# Patient Record
Sex: FEMALE | Race: WHITE | NOT HISPANIC OR LATINO | URBAN - METROPOLITAN AREA
[De-identification: names, ages, dates, MRNs, and addresses within clinical notes are randomized per-mention and may not be internally consistent; named-entity substitution may affect disease eponyms.]

---

## 2017-02-22 ENCOUNTER — APPOINTMENT (RX ONLY)
Dept: URBAN - METROPOLITAN AREA CLINIC 38 | Facility: CLINIC | Age: 57
Setting detail: DERMATOLOGY
End: 2017-02-22

## 2017-02-22 ENCOUNTER — APPOINTMENT (RX ONLY)
Dept: URBAN - METROPOLITAN AREA CLINIC 37 | Facility: CLINIC | Age: 57
Setting detail: DERMATOLOGY
End: 2017-02-22

## 2017-02-22 DIAGNOSIS — L40.0 PSORIASIS VULGARIS: ICD-10-CM | Status: INADEQUATELY CONTROLLED

## 2017-02-22 PROCEDURE — ? COUNSELING

## 2017-02-22 PROCEDURE — ? MEDICATION COUNSELING

## 2017-02-22 PROCEDURE — ? PRESCRIPTION MEDICATION MANAGEMENT

## 2017-02-22 PROCEDURE — 99213 OFFICE O/P EST LOW 20 MIN: CPT

## 2017-02-22 ASSESSMENT — LOCATION DETAILED DESCRIPTION DERM
LOCATION DETAILED: LEFT MEDIAL FRONTAL SCALP
LOCATION DETAILED: RIGHT CENTRAL FRONTAL SCALP
LOCATION DETAILED: RIGHT CENTRAL FRONTAL SCALP
LOCATION DETAILED: LEFT MEDIAL FRONTAL SCALP

## 2017-02-22 ASSESSMENT — LOCATION SIMPLE DESCRIPTION DERM
LOCATION SIMPLE: LEFT SCALP
LOCATION SIMPLE: LEFT SCALP
LOCATION SIMPLE: SCALP
LOCATION SIMPLE: SCALP

## 2017-02-22 ASSESSMENT — LOCATION ZONE DERM
LOCATION ZONE: SCALP
LOCATION ZONE: SCALP

## 2017-02-22 NOTE — HPI: RASH (PSORIASIS)
How Severe Is Your Psoriasis?: mild
Do You Have A Family History Of Psoriasis?: no
Is This A New Presentation, Or A Follow-Up?: Follow Up Psoriasis
Additional History: She is clear on the elbows

## 2017-02-22 NOTE — PROCEDURE: MIPS QUALITY
Detail Level: Generalized
Quality 110: Preventive Care And Screening: Influenza Immunization: Influenza Immunization Administered during Influenza season
Quality 226: Preventive Care And Screening: Tobacco Use: Screening And Cessation Intervention: Patient screened for tobacco and never smoked

## 2017-02-22 NOTE — PROCEDURE: PRESCRIPTION MEDICATION MANAGEMENT
Plan: Due to her history of hepatocellular carcinoma medications such as MTX as well as Soriatane are not options at present time. I would consider further discussion on Otezla which may take some time to get an authorization so PA started today. I'd like to d/w her current internist as well.
Continue Regimen: Calcipotriene, topicort for flares
Detail Level: Simple

## 2017-02-22 NOTE — PROCEDURE: PRESCRIPTION MEDICATION MANAGEMENT
Detail Level: Simple
Continue Regimen: Calcipotriene, topicort for flares
Plan: Due to her history of hepatocellular carcinoma medications such as MTX as well as Soriatane are not options at present time. I would consider further discussion on Otezla which may take some time to get an authorization so PA started today. I'd like to d/w her current internist as well.

## 2017-02-22 NOTE — PROCEDURE: MIPS QUALITY
Quality 110: Preventive Care And Screening: Influenza Immunization: Influenza Immunization Administered during Influenza season
Quality 226: Preventive Care And Screening: Tobacco Use: Screening And Cessation Intervention: Patient screened for tobacco and never smoked
Detail Level: Generalized

## 2017-07-05 ENCOUNTER — RX ONLY (OUTPATIENT)
Age: 57
Setting detail: RX ONLY
End: 2017-07-05

## 2017-07-05 RX ORDER — CALCIPOTRIENE 0.05 MG/ML
SOLUTION TOPICAL
Qty: 1 | Refills: 5 | Status: ERX

## 2017-10-09 ENCOUNTER — APPOINTMENT (RX ONLY)
Dept: URBAN - METROPOLITAN AREA CLINIC 38 | Facility: CLINIC | Age: 57
Setting detail: DERMATOLOGY
End: 2017-10-09

## 2017-10-09 ENCOUNTER — APPOINTMENT (RX ONLY)
Dept: URBAN - METROPOLITAN AREA CLINIC 37 | Facility: CLINIC | Age: 57
Setting detail: DERMATOLOGY
End: 2017-10-09

## 2017-10-09 DIAGNOSIS — D485 NEOPLASM OF UNCERTAIN BEHAVIOR OF SKIN: ICD-10-CM

## 2017-10-09 DIAGNOSIS — L40.0 PSORIASIS VULGARIS: ICD-10-CM

## 2017-10-09 PROBLEM — D48.5 NEOPLASM OF UNCERTAIN BEHAVIOR OF SKIN: Status: ACTIVE | Noted: 2017-10-09

## 2017-10-09 PROCEDURE — 11100: CPT

## 2017-10-09 PROCEDURE — 99212 OFFICE O/P EST SF 10 MIN: CPT | Mod: 25

## 2017-10-09 PROCEDURE — ? TREATMENT REGIMEN

## 2017-10-09 PROCEDURE — ? COUNSELING

## 2017-10-09 PROCEDURE — ? BIOPSY BY SHAVE METHOD

## 2017-10-09 ASSESSMENT — LOCATION SIMPLE DESCRIPTION DERM
LOCATION SIMPLE: LEFT SHOULDER
LOCATION SIMPLE: LEFT SHOULDER
LOCATION SIMPLE: POSTERIOR SCALP
LOCATION SIMPLE: POSTERIOR SCALP

## 2017-10-09 ASSESSMENT — LOCATION DETAILED DESCRIPTION DERM
LOCATION DETAILED: LEFT POSTAURICULAR SKIN
LOCATION DETAILED: LEFT POSTAURICULAR SKIN
LOCATION DETAILED: MID-OCCIPITAL SCALP
LOCATION DETAILED: RIGHT INFERIOR OCCIPITAL SCALP
LOCATION DETAILED: RIGHT INFERIOR OCCIPITAL SCALP
LOCATION DETAILED: LEFT ANTERIOR SHOULDER
LOCATION DETAILED: LEFT ANTERIOR SHOULDER
LOCATION DETAILED: MID-OCCIPITAL SCALP

## 2017-10-09 ASSESSMENT — LOCATION ZONE DERM
LOCATION ZONE: SCALP
LOCATION ZONE: ARM
LOCATION ZONE: SCALP
LOCATION ZONE: ARM

## 2017-10-09 NOTE — PROCEDURE: MIPS QUALITY
Quality 226: Preventive Care And Screening: Tobacco Use: Screening And Cessation Intervention: Patient screened for tobacco and never smoked
Quality 130: Documentation Of Current Medications In The Medical Record: Current Medications Documented
Detail Level: Generalized
Quality 110: Preventive Care And Screening: Influenza Immunization: Influenza Immunization Administered during Influenza season
Quality 431: Preventive Care And Screening: Unhealthy Alcohol Use - Screening: Patient screened for unhealthy alcohol use using a single question and scores less than 2 times per year

## 2017-10-09 NOTE — HPI: MEDICATION (OTEZLA)
How Severe Is It?: moderate
Is This A New Presentation, Or A Follow-Up?: Follow Up Otezla
Additional History: Patient's last visit was in February and we got her approved for Otezla in March of 2017. Patient states she received the Otezla from [facility] but then her insurance company switched pharmacies and was unable to receive the drug because she had to be home to sign for it. 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 Patient has breakthrough rash on her scalp.

## 2017-10-09 NOTE — PROCEDURE: BIOPSY BY SHAVE METHOD
Bill [id number] For Specimen Handling/Conveyance To Laboratory?: no
Cryotherapy Text: The wound bed was treated with cryotherapy after the biopsy was performed.
X Size Of Lesion In Cm: 0
Biopsy Type: H and E
Biopsy Method: Double edge Personna blades
Wound Care: Vaseline
Electrodesiccation And Curettage Text: The wound bed was treated with electrodesiccation and curettage after the biopsy was performed.
Curettage Text: The wound bed was treated with curettage after the biopsy was performed.
Anesthesia Type: 1% lidocaine with epinephrine
Post-Care Instructions: I reviewed with the patient in detail post-care instructions. Patient is to keep the biopsy site dry overnight, and then apply Vaseline daily until healed. Wound to be washed with gentle cleansers daily in the shower.
Billing Type: Third-Party Bill
Silver Nitrate Text: The wound bed was treated with silver nitrate after the biopsy was performed.
Electrodesiccation Text: The wound bed was treated with electrodesiccation after the biopsy was performed.
Dressing: bandage
Anesthesia Volume In Cc: 0.5
Hemostasis: Electrocautery
Notification Instructions: Patient will be notified of biopsy results. However, patient instructed to call the office if not contacted within 2 weeks. If lesion is benign they will be notified through portal or mail and contacted by phone if biopsy results require further discussion
Type Of Destruction Used: Curettage
Consent: Written consent was obtained and risks were reviewed including but not limited to scarring, infection, bleeding, scabbing, incomplete removal, nerve damage and allergy to anesthesia.
Detail Level: Detailed

## 2017-10-09 NOTE — PROCEDURE: TREATMENT REGIMEN
Action 1: Continue
Samples Given: Otezla starter pack
Continue Regimen: Otezla upon PA/any approvals being had\\n\\nRTO in 3 months for labs
Plan: Have recent lab work sent over for us to review
Detail Level: Simple

## 2017-10-09 NOTE — HPI: MEDICATION (OTEZLA)
How Severe Is It?: moderate
Is This A New Presentation, Or A Follow-Up?: Follow Up Otezla
Additional History: Patient's last visit was in February and we got her approved for Otezla in March of 2017. Patient states she received the Otezla from [facility] but then her insurance company switched pharmacies and was unable to receive the drug because she had to be home to sign for it. 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 Patient has breakthrough rash on her scalp.

## 2017-10-09 NOTE — PROCEDURE: BIOPSY BY SHAVE METHOD
Size Of Lesion In Cm: 0
Post-Care Instructions: I reviewed with the patient in detail post-care instructions. Patient is to keep the biopsy site dry overnight, and then apply Vaseline daily until healed. Wound to be washed with gentle cleansers daily in the shower.
Anesthesia Volume In Cc: 0.5
Silver Nitrate Text: The wound bed was treated with silver nitrate after the biopsy was performed.
Notification Instructions: Patient will be notified of biopsy results. However, patient instructed to call the office if not contacted within 2 weeks. If lesion is benign they will be notified through portal or mail and contacted by phone if biopsy results require further discussion
Render Post-Care Instructions In Note?: no
Cryotherapy Text: The wound bed was treated with cryotherapy after the biopsy was performed.
Electrodesiccation And Curettage Text: The wound bed was treated with electrodesiccation and curettage after the biopsy was performed.
Consent: Written consent was obtained and risks were reviewed including but not limited to scarring, infection, bleeding, scabbing, incomplete removal, nerve damage and allergy to anesthesia.
Lab Facility: [id number]
Lab: [id number]
Biopsy Type: H and E
Dressing: bandage
Wound Care: Vaseline
Curettage Text: The wound bed was treated with curettage after the biopsy was performed.
Billing Type: Third-Party Bill
Detail Level: Detailed
Biopsy Method: Double edge Personna blades
Hemostasis: Electrocautery
Anesthesia Type: 1% lidocaine with epinephrine
Type Of Destruction Used: Curettage
Electrodesiccation Text: The wound bed was treated with electrodesiccation after the biopsy was performed.

## 2017-10-09 NOTE — PROCEDURE: TREATMENT REGIMEN
Samples Given: Otezla starter pack
Action 3: Continue
Continue Regimen: Otezla upon PA/any approvals being had\\n\\nRTO in 3 months for labs
Plan: Have recent lab work sent over for us to review
Detail Level: Simple

## 2017-11-01 ENCOUNTER — RX ONLY (OUTPATIENT)
Age: 57
Setting detail: RX ONLY
End: 2017-11-01

## 2017-11-01 RX ORDER — APREMILAST 30 MG/1
TABLET, FILM COATED ORAL
Qty: 60 | Refills: 3 | Status: ERX

## 2017-11-13 ENCOUNTER — APPOINTMENT (RX ONLY)
Dept: URBAN - METROPOLITAN AREA CLINIC 38 | Facility: CLINIC | Age: 57
Setting detail: DERMATOLOGY
End: 2017-11-13

## 2017-11-13 ENCOUNTER — APPOINTMENT (RX ONLY)
Dept: URBAN - METROPOLITAN AREA CLINIC 37 | Facility: CLINIC | Age: 57
Setting detail: DERMATOLOGY
End: 2017-11-13

## 2017-11-13 DIAGNOSIS — L40.0 PSORIASIS VULGARIS: ICD-10-CM

## 2017-11-13 PROBLEM — C44.619 BASAL CELL CARCINOMA OF SKIN OF LEFT UPPER LIMB, INCLUDING SHOULDER: Status: ACTIVE | Noted: 2017-11-13

## 2017-11-13 PROCEDURE — ? TREATMENT REGIMEN

## 2017-11-13 PROCEDURE — ? COUNSELING

## 2017-11-13 PROCEDURE — 99213 OFFICE O/P EST LOW 20 MIN: CPT

## 2017-11-13 PROCEDURE — ? PRESCRIPTION

## 2017-11-13 RX ORDER — IMIQUIMOD 50 MG/G
1 CREAM TOPICAL ONCE A DAY
Qty: 1 | Refills: 0 | Status: ERX

## 2017-11-13 ASSESSMENT — LOCATION DETAILED DESCRIPTION DERM
LOCATION DETAILED: LEFT POSTAURICULAR SKIN
LOCATION DETAILED: RIGHT INFERIOR OCCIPITAL SCALP
LOCATION DETAILED: MID-OCCIPITAL SCALP
LOCATION DETAILED: LEFT POSTAURICULAR SKIN
LOCATION DETAILED: RIGHT INFERIOR OCCIPITAL SCALP
LOCATION DETAILED: MID-OCCIPITAL SCALP

## 2017-11-13 ASSESSMENT — LOCATION SIMPLE DESCRIPTION DERM
LOCATION SIMPLE: POSTERIOR SCALP
LOCATION SIMPLE: POSTERIOR SCALP

## 2017-11-13 ASSESSMENT — LOCATION ZONE DERM
LOCATION ZONE: SCALP
LOCATION ZONE: SCALP

## 2017-11-13 NOTE — PROCEDURE: MIPS QUALITY
Detail Level: Generalized
Quality 431: Preventive Care And Screening: Unhealthy Alcohol Use - Screening: Patient screened for unhealthy alcohol use using a single question and scores less than 2 times per year
Quality 110: Preventive Care And Screening: Influenza Immunization: Influenza Immunization Administered during Influenza season
Quality 226: Preventive Care And Screening: Tobacco Use: Screening And Cessation Intervention: Patient screened for tobacco and never smoked
Quality 130: Documentation Of Current Medications In The Medical Record: Current Medications Documented

## 2017-11-13 NOTE — PROCEDURE: MIPS QUALITY
Quality 431: Preventive Care And Screening: Unhealthy Alcohol Use - Screening: Patient screened for unhealthy alcohol use using a single question and scores less than 2 times per year
Detail Level: Generalized
Quality 130: Documentation Of Current Medications In The Medical Record: Current Medications Documented
Quality 110: Preventive Care And Screening: Influenza Immunization: Influenza Immunization Administered during Influenza season
Quality 226: Preventive Care And Screening: Tobacco Use: Screening And Cessation Intervention: Patient screened for tobacco and never smoked

## 2017-12-05 ENCOUNTER — RX ONLY (OUTPATIENT)
Age: 57
Setting detail: RX ONLY
End: 2017-12-05

## 2017-12-05 RX ORDER — APREMILAST 30 MG/1
TABLET, FILM COATED ORAL
Qty: 60 | Refills: 3 | Status: ERX

## 2018-02-06 ENCOUNTER — APPOINTMENT (RX ONLY)
Dept: URBAN - METROPOLITAN AREA CLINIC 38 | Facility: CLINIC | Age: 58
Setting detail: DERMATOLOGY
End: 2018-02-06

## 2018-02-06 ENCOUNTER — APPOINTMENT (RX ONLY)
Dept: URBAN - METROPOLITAN AREA CLINIC 37 | Facility: CLINIC | Age: 58
Setting detail: DERMATOLOGY
End: 2018-02-06

## 2018-02-06 DIAGNOSIS — D18.0 HEMANGIOMA: ICD-10-CM

## 2018-02-06 DIAGNOSIS — L71.8 OTHER ROSACEA: ICD-10-CM

## 2018-02-06 DIAGNOSIS — L57.3 POIKILODERMA OF CIVATTE: ICD-10-CM

## 2018-02-06 DIAGNOSIS — L81.4 OTHER MELANIN HYPERPIGMENTATION: ICD-10-CM

## 2018-02-06 DIAGNOSIS — D485 NEOPLASM OF UNCERTAIN BEHAVIOR OF SKIN: ICD-10-CM

## 2018-02-06 DIAGNOSIS — L40.0 PSORIASIS VULGARIS: ICD-10-CM

## 2018-02-06 PROBLEM — D48.5 NEOPLASM OF UNCERTAIN BEHAVIOR OF SKIN: Status: ACTIVE | Noted: 2018-02-06

## 2018-02-06 PROBLEM — D18.01 HEMANGIOMA OF SKIN AND SUBCUTANEOUS TISSUE: Status: ACTIVE | Noted: 2018-02-06

## 2018-02-06 PROCEDURE — 69100 BIOPSY OF EXTERNAL EAR: CPT

## 2018-02-06 PROCEDURE — ? INJECTION

## 2018-02-06 PROCEDURE — ? TREATMENT REGIMEN

## 2018-02-06 PROCEDURE — ? PRESCRIPTION

## 2018-02-06 PROCEDURE — ? COUNSELING

## 2018-02-06 PROCEDURE — ? BIOPSY BY SHAVE METHOD

## 2018-02-06 PROCEDURE — 96372 THER/PROPH/DIAG INJ SC/IM: CPT | Mod: 59

## 2018-02-06 PROCEDURE — 99214 OFFICE O/P EST MOD 30 MIN: CPT | Mod: 25

## 2018-02-06 RX ORDER — CLOBETASOL PROPIONATE 0.5 MG/G
CREAM TOPICAL BID
Qty: 1 | Refills: 0 | Status: ERX

## 2018-02-06 ASSESSMENT — LOCATION SIMPLE DESCRIPTION DERM
LOCATION SIMPLE: UPPER BACK
LOCATION SIMPLE: CHEST
LOCATION SIMPLE: RIGHT CHEEK
LOCATION SIMPLE: RIGHT CHEEK
LOCATION SIMPLE: SCALP
LOCATION SIMPLE: CHEST
LOCATION SIMPLE: POSTERIOR SCALP
LOCATION SIMPLE: LEFT CHEEK
LOCATION SIMPLE: ABDOMEN
LOCATION SIMPLE: RIGHT EAR
LOCATION SIMPLE: LEFT UPPER BACK
LOCATION SIMPLE: LEFT UPPER BACK
LOCATION SIMPLE: LEFT CHEEK
LOCATION SIMPLE: POSTERIOR SCALP
LOCATION SIMPLE: LEFT ELBOW
LOCATION SIMPLE: RIGHT EAR
LOCATION SIMPLE: UPPER BACK
LOCATION SIMPLE: ABDOMEN
LOCATION SIMPLE: LEFT ELBOW
LOCATION SIMPLE: SCALP

## 2018-02-06 ASSESSMENT — LOCATION DETAILED DESCRIPTION DERM
LOCATION DETAILED: PERIUMBILICAL SKIN
LOCATION DETAILED: RIGHT CENTRAL MALAR CHEEK
LOCATION DETAILED: LEFT INFERIOR OCCIPITAL SCALP
LOCATION DETAILED: RIGHT CENTRAL POSTAURICULAR SKIN
LOCATION DETAILED: LEFT CENTRAL MALAR CHEEK
LOCATION DETAILED: MIDDLE STERNUM
LOCATION DETAILED: LEFT INFERIOR OCCIPITAL SCALP
LOCATION DETAILED: STERNAL NOTCH
LOCATION DETAILED: RIGHT SUPERIOR HELIX
LOCATION DETAILED: LEFT INFERIOR PARIETAL SCALP
LOCATION DETAILED: RIGHT LATERAL SUPERIOR CHEST
LOCATION DETAILED: RIGHT SUPERIOR HELIX
LOCATION DETAILED: LEFT SUPERIOR POSTAURICULAR SKIN
LOCATION DETAILED: LEFT ELBOW
LOCATION DETAILED: STERNAL NOTCH
LOCATION DETAILED: LEFT SUPERIOR UPPER BACK
LOCATION DETAILED: LEFT INFERIOR PARIETAL SCALP
LOCATION DETAILED: RIGHT CENTRAL POSTAURICULAR SKIN
LOCATION DETAILED: MIDDLE STERNUM
LOCATION DETAILED: LEFT SUPERIOR UPPER BACK
LOCATION DETAILED: LEFT SUPERIOR OCCIPITAL SCALP
LOCATION DETAILED: RIGHT CENTRAL MALAR CHEEK
LOCATION DETAILED: LEFT ELBOW
LOCATION DETAILED: SUPERIOR THORACIC SPINE
LOCATION DETAILED: SUPERIOR THORACIC SPINE
LOCATION DETAILED: LEFT SUPERIOR POSTAURICULAR SKIN
LOCATION DETAILED: LEFT CENTRAL MALAR CHEEK
LOCATION DETAILED: PERIUMBILICAL SKIN
LOCATION DETAILED: LEFT SUPERIOR OCCIPITAL SCALP
LOCATION DETAILED: RIGHT LATERAL SUPERIOR CHEST

## 2018-02-06 ASSESSMENT — LOCATION ZONE DERM
LOCATION ZONE: TRUNK
LOCATION ZONE: FACE
LOCATION ZONE: FACE
LOCATION ZONE: ARM
LOCATION ZONE: SCALP
LOCATION ZONE: EAR
LOCATION ZONE: ARM
LOCATION ZONE: TRUNK
LOCATION ZONE: SCALP
LOCATION ZONE: EAR

## 2018-02-06 NOTE — PROCEDURE: INJECTION
Treatment Number: 0
Detail Level: Detailed
Render J-Code Information In Note?: yes
Procedure Information: Please note that the numeric value listed in the Medication (1) and associated J-code units and Medication (2) and associated J-code units variables are j-code amounts and do not represent either the concentration or the total amount of the medications injected.  I strongly recommend selecting no to the Render J-code information in note question. This will allow your note to be more clear. If you are billing j-codes with your injection codes you need to document the total amount of the medication injected. This amount should match the j-code units. For example, if you are injecting Triamcinolone 40mg as an intramuscular injection you would select 40 for the dose field and mg for the units. This would allow you to document  with 4 units (40mg = 10mg x 4). The total volume is not used to calculate j-codes only the amount of the medication administered.
Expiration Date (Optional): Jan 2019
Units: mg
Total Volume Injected In Cc (Will Not Affected Billing): 0.6
Consent: The risks of the medication was reviewed with the patient.
Lot # (Optional): [id number]
Medication (1) And Associated J-Code Units: Triamcinolone diacetate, 5mg
Post-Care Instructions: I reviewed with the patient in detail post-care instructions. Patient understands to keep the injection sites clean and call the clinic if there is any redness, swelling or pain.
Route: IM

## 2018-02-06 NOTE — PROCEDURE: BIOPSY BY SHAVE METHOD
Lab: [id number]
Cryotherapy Text: The wound bed was treated with cryotherapy after the biopsy was performed.
Size Of Lesion In Cm: 0
Biopsy Method: Personna blade
Wound Care: Vaseline
Hemostasis: Electrocautery
Notification Instructions: Patient will be notified of biopsy results. However, patient instructed to call the office if not contacted within 2 weeks. If lesion is benign they will be notified through portal or mail and contacted by phone if biopsy results require further discussion
Anesthesia Volume In Cc: 0.5
Consent: Written consent was obtained and risks were reviewed including but not limited to scarring, infection, bleeding, scabbing, incomplete removal, nerve damage and allergy to anesthesia.
Type Of Destruction Used: Curettage
Biopsy Type: H and E
Bill [id number] For Specimen Handling/Conveyance To Laboratory?: no
Billing Type: Third-Party Bill
Lab Facility: [id number]
Electrodesiccation And Curettage Text: The wound bed was treated with electrodesiccation and curettage after the biopsy was performed.
Silver Nitrate Text: The wound bed was treated with silver nitrate after the biopsy was performed.
Post-Care Instructions: I reviewed with the patient in detail post-care instructions. Patient is to keep the biopsy site dry overnight, and then apply Vaseline daily until healed. Wound to be washed with gentle cleansers daily in the shower.
Curettage Text: The wound bed was treated with curettage after the biopsy was performed.
Anesthesia Type: 1% lidocaine with epinephrine
Dressing: bandage
Electrodesiccation Text: The wound bed was treated with electrodesiccation after the biopsy was performed.
Detail Level: Detailed

## 2018-02-06 NOTE — PROCEDURE: BIOPSY BY SHAVE METHOD
Biopsy Method: Personna blade
Electrodesiccation And Curettage Text: The wound bed was treated with electrodesiccation and curettage after the biopsy was performed.
Destruction After The Procedure: No
Size Of Lesion In Cm: 0
Anesthesia Volume In Cc: 0.5
Hemostasis: Electrocautery
Silver Nitrate Text: The wound bed was treated with silver nitrate after the biopsy was performed.
Cryotherapy Text: The wound bed was treated with cryotherapy after the biopsy was performed.
Consent: Written consent was obtained and risks were reviewed including but not limited to scarring, infection, bleeding, scabbing, incomplete removal, nerve damage and allergy to anesthesia.
Curettage Text: The wound bed was treated with curettage after the biopsy was performed.
Anesthesia Type: 1% lidocaine with epinephrine
Biopsy Type: H and E
Billing Type: Third-Party Bill
Post-Care Instructions: I reviewed with the patient in detail post-care instructions. Patient is to keep the biopsy site dry overnight, and then apply Vaseline daily until healed. Wound to be washed with gentle cleansers daily in the shower.
Detail Level: Detailed
Type Of Destruction Used: Curettage
Wound Care: Vaseline
Dressing: bandage
Notification Instructions: Patient will be notified of biopsy results. However, patient instructed to call the office if not contacted within 2 weeks. If lesion is benign they will be notified through portal or mail and contacted by phone if biopsy results require further discussion
Electrodesiccation Text: The wound bed was treated with electrodesiccation after the biopsy was performed.

## 2018-02-06 NOTE — PROCEDURE: TREATMENT REGIMEN
Action 4: Continue
Detail Level: Simple
Continue Regimen: Otezla
Initiate Regimen: Clobeatsol
Plan: ILK provided today to some smaller plaques that have been very persistent. Patient's medical history does complicate her systemic therapy options (history of liver cancer removes use of MTX or soriatane) and she doesn't want biologics. We will see if ILK provides comfort of the persistent lesions as she has had stability with Otezla in the past keeping her clear.  She has done several rounds of xtrac and defers repeated treatments at this time

## 2018-02-06 NOTE — PROCEDURE: MIPS QUALITY
Quality 110: Preventive Care And Screening: Influenza Immunization: Influenza Immunization Administered during Influenza season
Quality 130: Documentation Of Current Medications In The Medical Record: Current Medications Documented
Quality 226: Preventive Care And Screening: Tobacco Use: Screening And Cessation Intervention: Patient screened for tobacco and never smoked
Detail Level: Generalized
Quality 431: Preventive Care And Screening: Unhealthy Alcohol Use - Screening: Patient screened for unhealthy alcohol use using a single question and scores less than 2 times per year

## 2018-02-06 NOTE — HPI: RASH
How Severe Is Your Rash?: mild
Is This A New Presentation, Or A Follow-Up?: Rash
Additional History: Pt is here for full body skin exam. She states the rash is her main concern today. She was using an antibiotic cream but stopped using it

## 2018-02-06 NOTE — PROCEDURE: INJECTION
Total Volume Injected In Cc (Will Not Affected Billing): 0.6
Detail Level: Detailed
Dose Administered (Numbers Only - Mg, G, Mcg, Units, Cc): 0
Expiration Date (Optional): Jan 2019
Consent: The risks of the medication was reviewed with the patient.
Post-Care Instructions: I reviewed with the patient in detail post-care instructions. Patient understands to keep the injection sites clean and call the clinic if there is any redness, swelling or pain.
Procedure Information: Please note that the numeric value listed in the Medication (1) and associated J-code units and Medication (2) and associated J-code units variables are j-code amounts and do not represent either the concentration or the total amount of the medications injected.  I strongly recommend selecting no to the Render J-code information in note question. This will allow your note to be more clear. If you are billing j-codes with your injection codes you need to document the total amount of the medication injected. This amount should match the j-code units. For example, if you are injecting Triamcinolone 40mg as an intramuscular injection you would select 40 for the dose field and mg for the units. This would allow you to document  with 4 units (40mg = 10mg x 4). The total volume is not used to calculate j-codes only the amount of the medication administered.
Bill J-Code: yes
Units: mg
Lot # (Optional): [id number]
Route: IM
Medication (1) And Associated J-Code Units: Triamcinolone diacetate, 5mg

## 2018-02-06 NOTE — PROCEDURE: TREATMENT REGIMEN
Detail Level: Simple
Continue Regimen: Otezla
Action 1: Continue
Initiate Regimen: Clobeatsol
Plan: ILK provided today to some smaller plaques that have been very persistent. Patient's medical history does complicate her systemic therapy options (history of liver cancer removes use of MTX or soriatane) and she doesn't want biologics. We will see if ILK provides comfort of the persistent lesions as she has had stability with Otezla in the past keeping her clear.  She has done several rounds of xtrac and defers repeated treatments at this time

## 2018-02-20 ENCOUNTER — APPOINTMENT (RX ONLY)
Dept: URBAN - METROPOLITAN AREA CLINIC 37 | Facility: CLINIC | Age: 58
Setting detail: DERMATOLOGY
End: 2018-02-20

## 2018-02-20 ENCOUNTER — RX ONLY (OUTPATIENT)
Age: 58
Setting detail: RX ONLY
End: 2018-02-20

## 2018-02-20 ENCOUNTER — APPOINTMENT (RX ONLY)
Dept: URBAN - METROPOLITAN AREA CLINIC 38 | Facility: CLINIC | Age: 58
Setting detail: DERMATOLOGY
End: 2018-02-20

## 2018-02-20 PROBLEM — C44.212 BASAL CELL CARCINOMA OF SKIN OF RIGHT EAR AND EXTERNAL AURICULAR CANAL: Status: ACTIVE | Noted: 2018-02-20

## 2018-02-20 PROCEDURE — 17312 MOHS ADDL STAGE: CPT

## 2018-02-20 PROCEDURE — 17311 MOHS 1 STAGE H/N/HF/G: CPT

## 2018-02-20 PROCEDURE — ? MOHS SURGERY

## 2018-02-20 PROCEDURE — 13152 CMPLX RPR E/N/E/L 2.6-7.5 CM: CPT

## 2018-02-20 PROCEDURE — ? PRESCRIPTION

## 2018-02-20 RX ORDER — CEPHALEXIN 500 MG/1
CAPSULE ORAL TWICE A DAY
Qty: 14 | Refills: 0 | Status: ERX

## 2018-02-20 NOTE — PROCEDURE: MOHS SURGERY
Interpolation Flap Text: A decision was made to reconstruct the defect utilizing an interpolation axial flap and a staged reconstruction.  A telfa template was made of the defect.  This telfa template was then used to outline the interpolation flap.  The donor area for the pedicle flap was then injected with anesthesia.  The flap was excised through the skin and subcutaneous tissue down to the layer of the underlying musculature.  The interpolation flap was carefully excised within this deep plane to maintain its blood supply.  The edges of the donor site were undermined.   The donor site was closed in a primary fashion.  The pedicle was then rotated into position and sutured.  Once the tube was sutured into place, adequate blood supply was confirmed with blanching and refill.  The pedicle was then wrapped with xeroform gauze and dressed appropriately with a telfa and gauze bandage to ensure continued blood supply and protect the attached pedicle.
Dorsal Nasal Flap Text: The defect edges were debeveled with a #15 scalpel blade.  Given the location of the defect and the proximity to free margins a dorsal nasal flap was deemed most appropriate.  Using a sterile surgical marker, an appropriate dorsal nasal flap was drawn around the defect.    The area thus outlined was incised deep to adipose tissue with a #15 scalpel blade.  The skin margins were undermined to an appropriate distance in all directions utilizing iris scissors.
Mucosal Advancement Flap Text: Given the location of the defect, shape of the defect and the proximity to free margins a mucosal advancement flap was deemed most appropriate. Incisions were made with a 15 blade scalpel in the appropriate fashion along the cutaneous vermilion border and the mucosal lip. The remaining actinically damaged mucosal tissue was excised.  The mucosal advancement flap was then elevated to the gingival sulcus with care taken to preserve the neurovascular structures and advanced into the primary defect. Care was taken to ensure that precise realignment of the vermilion border was achieved.
Hatchet Flap Text: The defect edges were debeveled with a #15 scalpel blade.  Given the location of the defect, shape of the defect and the proximity to free margins a hatchet flap was deemed most appropriate.  Using a sterile surgical marker, an appropriate hatchet flap was drawn incorporating the defect and placing the expected incisions within the relaxed skin tension lines where possible.    The area thus outlined was incised deep to adipose tissue with a #15 scalpel blade.  The skin margins were undermined to an appropriate distance in all directions utilizing iris scissors.
Localized Dermabrasion With Wire Brush Text: The patient was draped in routine manner.  Localized dermabrasion using 3 x 17 mm wire brush was performed in routine manner to papillary dermis. This spot dermabrasion is being performed to complete skin cancer reconstruction. It also will eliminate the other sun damaged precancerous cells that are known to be part of the regional effect of a lifetime's worth of sun exposure. This localized dermabrasion is therapeutic and should not be considered cosmetic in any regard.
Alternatives Discussed Intro (Do Not Add Period): I discussed alternative treatments to Mohs surgery and specifically discussed the risks and benefits of
Modified Advancement Flap Text: The defect edges were debeveled with a #15 scalpel blade.  Given the location of the defect, shape of the defect and the proximity to free margins a modified advancement flap was deemed most appropriate.  Using a sterile surgical marker, an appropriate advancement flap was drawn incorporating the defect and placing the expected incisions within the relaxed skin tension lines where possible.    The area thus outlined was incised deep to adipose tissue with a #15 scalpel blade.  The skin margins were undermined to an appropriate distance in all directions utilizing iris scissors.
Stage 6: Additional Anesthesia Type: 1% lidocaine with epinephrine
Quadrants Positive?: 0
Crescentic Complex Repair Preamble Text (Leave Blank If You Do Not Want): Extensive wide undermining was performed.
Partial Purse String (Intermediate) Text: Given the location of the defect and the characteristics of the surrounding skin an intermediate purse string closure was deemed most appropriate.  Undermining was performed circumfirentially around the surgical defect.  A purse string suture was then placed and tightened. Wound tension only allowed a partial closure of the circular defect.
Complex Repair And Flap Additional Text (Will Appearing After The Standard Complex Repair Text): The complex repair was not sufficient to completely close the primary defect. The remaining additional defect was repaired with the flap mentioned below.
Primary Defect Length In Cm (Final Defect Size - Required For Flaps/Grafts): 1.7
Keystone Flap Text: The defect edges were debeveled with a #15 scalpel blade.  Given the location of the defect, shape of the defect a keystone flap was deemed most appropriate.  Using a sterile surgical marker, an appropriate keystone flap was drawn incorporating the defect, outlining the appropriate donor tissue and placing the expected incisions within the relaxed skin tension lines where possible. The area thus outlined was incised deep to adipose tissue with a #15 scalpel blade.  The skin margins were undermined to an appropriate distance in all directions around the primary defect and laterally outward around the flap utilizing iris scissors.
Tarsorrhaphy Performed?: No
Helical Rim Advancement Flap Text: The defect edges were debeveled with a #15 blade scalpel.  Given the location of the defect and the proximity to free margins (helical rim) a double helical rim advancement flap was deemed most appropriate.  Using a sterile surgical marker, the appropriate advancement flaps were drawn incorporating the defect and placing the expected incisions between the helical rim and antihelix where possible.  The area thus outlined was incised through and through with a #15 scalpel blade.  With a skin hook and iris scissors, the flaps were gently and sharply undermined and freed up.
Crescentic Intermediate Repair Preamble Text (Leave Blank If You Do Not Want): Undermining was performed with blunt dissection.
Area M Indication Text: Tumors in this location are included in Area M (cheek, forehead, scalp, neck, jawline and pretibial skin).  Mohs surgery is indicated for tumors in these anatomic locations.
O-L Flap Text: The defect edges were debeveled with a #15 scalpel blade.  Given the location of the defect, shape of the defect and the proximity to free margins an O-L flap was deemed most appropriate.  Using a sterile surgical marker, an appropriate advancement flap was drawn incorporating the defect and placing the expected incisions within the relaxed skin tension lines where possible.    The area thus outlined was incised deep to adipose tissue with a #15 scalpel blade.  The skin margins were undermined to an appropriate distance in all directions utilizing iris scissors.
Bcc Infiltrative Histology Text: There were numerous aggregates of basaloid cells demonstrating an infiltrative pattern.
V-Y Plasty Text: The defect edges were debeveled with a #15 scalpel blade.  Given the location of the defect, shape of the defect and the proximity to free margins an V-Y advancement flap was deemed most appropriate.  Using a sterile surgical marker, an appropriate advancement flap was drawn incorporating the defect and placing the expected incisions within the relaxed skin tension lines where possible.    The area thus outlined was incised deep to adipose tissue with a #15 scalpel blade.  The skin margins were undermined to an appropriate distance in all directions utilizing iris scissors.
Use Separate Skin Prep For Stages And Repair: Yes
Graft Donor Site Dermal Sutures (Optional): 4-0 Vicryl
Home Suture Removal Text: Patient was provided instructions on removing sutures and will remove their sutures at home.  If they have any questions or difficulties they will call the office.
Consent (Spinal Accessory)/Introductory Paragraph: The rationale for Mohs was explained to the patient and consent was obtained. The risks, benefits and alternatives to therapy were discussed in detail. Specifically, the risks of damage to the spinal accessory nerve, infection, scarring, bleeding, prolonged wound healing, incomplete removal, allergy to anesthesia, and recurrence were addressed. Prior to the procedure, the treatment site was clearly identified and confirmed by the patient. All components of Universal Protocol/PAUSE Rule completed.
Purse String (Intermediate) Text: Given the location of the defect and the characteristics of the surrounding skin a pursestring intermediate closure was deemed most appropriate.  Undermining was performed circumfirentially around the surgical defect.  A purstring suture was then placed and tightened.
Same Histology In Subsequent Stages Text: The pattern and morphology of the tumor is as described in the first stage.
Consent (Scalp)/Introductory Paragraph: The rationale for Mohs was explained to the patient and consent was obtained. The risks, benefits and alternatives to therapy were discussed in detail. Specifically, the risks of changes in hair growth pattern secondary to repair, infection, scarring, bleeding, prolonged wound healing, incomplete removal, allergy to anesthesia, nerve injury and recurrence were addressed. Prior to the procedure, the treatment site was clearly identified and confirmed by the patient. All components of Universal Protocol/PAUSE Rule completed.
Location Indication Override (Is Already Calculated Based On Selected Body Location): Area H
Repair Performed By Another Provider Text (Leave Blank If You Do Not Want): After obtaining clear surgical margins the defect was repaired by another provider.
Post-Care Instructions: I reviewed with the patient in detail post-care instructions. Patient is not to engage in any heavy lifting, exercise, or swimming for the next 7 days. Should the patient develop any fevers, chills, bleeding, severe pain patient will contact the office immediately.
Bilobed Flap Text: The defect edges were debeveled with a #15 scalpel blade.  Given the location of the defect and the proximity to free margins a bilobe flap was deemed most appropriate.  Using a sterile surgical marker, an appropriate bilobe flap drawn around the defect.    The area thus outlined was incised deep to adipose tissue with a #15 scalpel blade.  The skin margins were undermined to an appropriate distance in all directions utilizing iris scissors.
Melolabial Transposition Flap Text: The defect edges were debeveled with a #15 scalpel blade.  Given the location of the defect and the proximity to free margins a melolabial flap was deemed most appropriate.  Using a sterile surgical marker, an appropriate melolabial transposition flap was drawn incorporating the defect.    The area thus outlined was incised deep to adipose tissue with a #15 scalpel blade.  The skin margins were undermined to an appropriate distance in all directions utilizing iris scissors.
Anesthesia Volume In Cc: 3
Consent (Nose)/Introductory Paragraph: The rationale for Mohs was explained to the patient and consent was obtained. The risks, benefits and alternatives to therapy were discussed in detail. Specifically, the risks of nasal deformity, changes in the flow of air through the nose, infection, scarring, bleeding, prolonged wound healing, incomplete removal, allergy to anesthesia, nerve injury and recurrence were addressed. Prior to the procedure, the treatment site was clearly identified and confirmed by the patient. All components of Universal Protocol/PAUSE Rule completed.
Muscle Hinge Flap Text: The defect edges were debeveled with a #15 scalpel blade.  Given the size, depth and location of the defect and the proximity to free margins a muscle hinge flap was deemed most appropriate.  Using a sterile surgical marker, an appropriate hinge flap was drawn incorporating the defect. The area thus outlined was incised with a #15 scalpel blade.  The skin margins were undermined to an appropriate distance in all directions utilizing iris scissors.
Rotation Flap Text: The defect edges were debeveled with a #15 scalpel blade.  Given the location of the defect, shape of the defect and the proximity to free margins a rotation flap was deemed most appropriate.  Using a sterile surgical marker, an appropriate rotation flap was drawn incorporating the defect and placing the expected incisions within the relaxed skin tension lines where possible.    The area thus outlined was incised deep to adipose tissue with a #15 scalpel blade.  The skin margins were undermined to an appropriate distance in all directions utilizing iris scissors.
Bi-Rhombic Flap Text: The defect edges were debeveled with a #15 scalpel blade.  Given the location of the defect and the proximity to free margins a bi-rhombic flap was deemed most appropriate.  Using a sterile surgical marker, an appropriate rhombic flap was drawn incorporating the defect. The area thus outlined was incised deep to adipose tissue with a #15 scalpel blade.  The skin margins were undermined to an appropriate distance in all directions utilizing iris scissors.
Surgeon/Pathologist Verbiage (Will Incorporate Name Of Surgeon From Intro If Not Blank): operated in two distinct and integrated capacities as the surgeon and pathologist.
Advancement Flap (Double) Text: The defect edges were debeveled with a #15 scalpel blade.  Given the location of the defect and the proximity to free margins a double advancement flap was deemed most appropriate.  Using a sterile surgical marker, the appropriate advancement flaps were drawn incorporating the defect and placing the expected incisions within the relaxed skin tension lines where possible.    The area thus outlined was incised deep to adipose tissue with a #15 scalpel blade.  The skin margins were undermined to an appropriate distance in all directions utilizing iris scissors.
Advancement-Rotation Flap Text: The defect edges were debeveled with a #15 scalpel blade.  Given the location of the defect, shape of the defect and the proximity to free margins an advancement-rotation flap was deemed most appropriate.  Using a sterile surgical marker, an appropriate flap was drawn incorporating the defect and placing the expected incisions within the relaxed skin tension lines where possible. The area thus outlined was incised deep to adipose tissue with a #15 scalpel blade.  The skin margins were undermined to an appropriate distance in all directions utilizing iris scissors.
Stage 1: Number Of Blocks?: 2
No Residual Tumor Seen Histology Text: There were no malignant cells seen in the sections examined.
Ear Wedge Repair Text: A wedge excision was completed by carrying down an excision through the full thickness of the ear and cartilage with an inward facing Burow's triangle. The wound was then closed in a layered fashion.
Cheek-To-Nose Interpolation Flap Text: A decision was made to reconstruct the defect utilizing an interpolation axial flap and a staged reconstruction.  A telfa template was made of the defect.  This telfa template was then used to outline the Cheek-To-Nose Interpolation flap.  The donor area for the pedicle flap was then injected with anesthesia.  The flap was excised through the skin and subcutaneous tissue down to the layer of the underlying musculature.  The interpolation flap was carefully excised within this deep plane to maintain its blood supply.  The edges of the donor site were undermined.   The donor site was closed in a primary fashion.  The pedicle was then rotated into position and sutured.  Once the tube was sutured into place, adequate blood supply was confirmed with blanching and refill.  The pedicle was then wrapped with xeroform gauze and dressed appropriately with a telfa and gauze bandage to ensure continued blood supply and protect the attached pedicle.
Wound Care: Petrolatum
Medical Necessity Statement: Based on my medical judgement, Mohs surgery is the most appropriate treatment for this cancer compared to other treatments.
S Plasty Text: Given the location and shape of the defect, and the orientation of relaxed skin tension lines, an S-plasty was deemed most appropriate for repair.  Using a sterile surgical marker, the appropriate outline of the S-plasty was drawn, incorporating the defect and placing the expected incisions within the relaxed skin tension lines where possible.  The area thus outlined was incised deep to adipose tissue with a #15 scalpel blade.  The skin margins were undermined to an appropriate distance in all directions utilizing iris scissors. The skin flaps were advanced over the defect.  The opposing margins were then approximated with interrupted buried subcutaneous sutures.
Rhombic Flap Text: The defect edges were debeveled with a #15 scalpel blade.  Given the location of the defect and the proximity to free margins a rhombic flap was deemed most appropriate.  Using a sterile surgical marker, an appropriate rhombic flap was drawn incorporating the defect.    The area thus outlined was incised deep to adipose tissue with a #15 scalpel blade.  The skin margins were undermined to an appropriate distance in all directions utilizing iris scissors.
Cheiloplasty (Complex) Text: A decision was made to reconstruct the defect with a  cheiloplasty.  The defect was undermined extensively.  Additional obicularis oris muscle was excised with a 15 blade scalpel.  The defect was converted into a full thickness wedge to facilite a better cosmetic result.  Small vessels were then tied off with 5-0 monocyrl. The obicularis oris, superficial fascia, adipose and dermis were then reapproximated.  After the deeper layers were approximated the epidermis was reapproximated with particular care given to realign the vermilion border.
Postop Diagnosis: same
Composite Graft Text: The defect edges were debeveled with a #15 scalpel blade.  Given the location of the defect, shape of the defect, the proximity to free margins and the fact the defect was full thickness a composite graft was deemed most appropriate.  The defect was outline and then transferred to the donor site.  A full thickness graft was then excised from the donor site. The graft was then placed in the primary defect, oriented appropriately and then sutured into place.  The secondary defect was then repaired using a primary closure.
Undermining Location (Optional): in the deep fat
Ftsg Text: The defect edges were debeveled with a #15 scalpel blade.  Given the location of the defect, shape of the defect and the proximity to free margins a full thickness skin graft was deemed most appropriate.  Using a sterile surgical marker, the primary defect shape was transferred to the donor site. The area thus outlined was incised deep to adipose tissue with a #15 scalpel blade.  The harvested graft was then trimmed of adipose tissue until only dermis and epidermis was left.  The skin margins of the secondary defect were undermined to an appropriate distance in all directions utilizing iris scissors.  The secondary defect was closed with interrupted buried subcutaneous sutures.  The skin edges were then re-apposed with running  sutures.  The skin graft was then placed in the primary defect and oriented appropriately.
Mohs Rapid Report Verbiage: The area of clinically evident tumor was marked with skin marking ink and appropriately hatched.  The initial incision was made following the Mohs approach through the skin.  The specimen was taken to the lab, divided into the necessary number of pieces, chromacoded and processed according to the Mohs protocol.  This was repeated in successive stages until a tumor free defect was achieved.
W Plasty Text: The lesion was extirpated to the level of the fat with a #15 scalpel blade.  Given the location of the defect, shape of the defect and the proximity to free margins a W-plasty was deemed most appropriate for repair.  Using a sterile surgical marker, the appropriate transposition arms of the W-plasty were drawn incorporating the defect and placing the expected incisions within the relaxed skin tension lines where possible.    The area thus outlined was incised deep to adipose tissue with a #15 scalpel blade.  The skin margins were undermined to an appropriate distance in all directions utilizing iris scissors.  The opposing transposition arms were then transposed into place in opposite direction and anchored with interrupted buried subcutaneous sutures.
Split-Thickness Skin Graft Text: The defect edges were debeveled with a #15 scalpel blade.  Given the location of the defect, shape of the defect and the proximity to free margins a split thickness skin graft was deemed most appropriate.  Using a sterile surgical marker, the primary defect shape was transferred to the donor site. The split thickness graft was then harvested.  The skin graft was then placed in the primary defect and oriented appropriately.
Ear Star Wedge Flap Text: The defect edges were debeveled with a #15 blade scalpel.  Given the location of the defect and the proximity to free margins (helical rim) an ear star wedge flap was deemed most appropriate.  Using a sterile surgical marker, the appropriate flap was drawn incorporating the defect and placing the expected incisions between the helical rim and antihelix where possible.  The area thus outlined was incised through and through with a #15 scalpel blade.
Mauc Instructions: By selecting yes to the question below the MAUC number will be added into the note.  This will be calculated automatically based on the diagnosis chosen, the size entered, the body zone selected (H,M,L) and the specific indications you chose. You will also have the option to override the Mohs AUC if you disagree with the automatically calculated number and this option is found in the Case Summary tab.
Closure 3 Information: This tab is for additional flaps and grafts above and beyond our usual structured repairs.  Please note if you enter information here it will not currently bill and you will need to add the billing information manually.
Dressing (No Sutures): dry sterile dressing
Consent (Ear)/Introductory Paragraph: The rationale for Mohs was explained to the patient and consent was obtained. The risks, benefits and alternatives to therapy were discussed in detail. Specifically, the risks of ear deformity, infection, scarring, bleeding, prolonged wound healing, incomplete removal, allergy to anesthesia, nerve injury and recurrence were addressed. Prior to the procedure, the treatment site was clearly identified and confirmed by the patient. All components of Universal Protocol/PAUSE Rule completed.
Complex Repair And Graft Additional Text (Will Appearing After The Standard Complex Repair Text): The complex repair was not sufficient to completely close the primary defect. The remaining additional defect was repaired with the graft mentioned below.
O-T Plasty Text: The defect edges were debeveled with a #15 scalpel blade.  Given the location of the defect, shape of the defect and the proximity to free margins an O-T plasty was deemed most appropriate.  Using a sterile surgical marker, an appropriate O-T plasty was drawn incorporating the defect and placing the expected incisions within the relaxed skin tension lines where possible.    The area thus outlined was incised deep to adipose tissue with a #15 scalpel blade.  The skin margins were undermined to an appropriate distance in all directions utilizing iris scissors.
Additional Anesthesia Volume In Cc: 6
Eye Protection Verbiage: Before proceeding with the stage, a plastic scleral shield was inserted. The globe was anesthetized with a few drops of 1% lidocaine with 1:100,000 epinephrine. Then, an appropriate sized scleral shield was chosen and coated with lacrilube ointment. The shield was gently inserted and left in place for the duration of each stage. After the stage was completed, the shield was gently removed.
Surgeon: Edgar Mccartney MD
Stage 4: Additional Anesthesia Type: 1% lidocaine, 0.5% Marcaine, 1:100,000 epinephrine and 8.4% sodium bicarbonate
Island Pedicle Flap-Requiring Vessel Identification Text: The defect edges were debeveled with a #15 scalpel blade.  Given the location of the defect, shape of the defect and the proximity to free margins an island pedicle advancement flap was deemed most appropriate.  Using a sterile surgical marker, an appropriate advancement flap was drawn, based on the axial vessel mentioned above, incorporating the defect, outlining the appropriate donor tissue and placing the expected incisions within the relaxed skin tension lines where possible.    The area thus outlined was incised deep to adipose tissue with a #15 scalpel blade.  The skin margins were undermined to an appropriate distance in all directions around the primary defect and laterally outward around the island pedicle utilizing iris scissors.  There was minimal undermining beneath the pedicle flap.
Mastoid Interpolation Flap Text: A decision was made to reconstruct the defect utilizing an interpolation axial flap and a staged reconstruction.  A telfa template was made of the defect.  This telfa template was then used to outline the mastoid interpolation flap.  The donor area for the pedicle flap was then injected with anesthesia.  The flap was excised through the skin and subcutaneous tissue down to the layer of the underlying musculature.  The pedicle flap was carefully excised within this deep plane to maintain its blood supply.  The edges of the donor site were undermined.   The donor site was closed in a primary fashion.  The pedicle was then rotated into position and sutured.  Once the tube was sutured into place, adequate blood supply was confirmed with blanching and refill.  The pedicle was then wrapped with xeroform gauze and dressed appropriately with a telfa and gauze bandage to ensure continued blood supply and protect the attached pedicle.
Mohs Case Number: 
Detail Level: Detailed
Epidermal Closure Graft Donor Site (Optional): running
Primary Defect Width In Cm (Final Defect Size - Required For Flaps/Grafts): 0.8
Star Wedge Flap Text: The defect edges were debeveled with a #15 scalpel blade.  Given the location of the defect, shape of the defect and the proximity to free margins a star wedge flap was deemed most appropriate.  Using a sterile surgical marker, an appropriate rotation flap was drawn incorporating the defect and placing the expected incisions within the relaxed skin tension lines where possible. The area thus outlined was incised deep to adipose tissue with a #15 scalpel blade.  The skin margins were undermined to an appropriate distance in all directions utilizing iris scissors.
Island Pedicle Flap Text: The defect edges were debeveled with a #15 scalpel blade.  Given the location of the defect, shape of the defect and the proximity to free margins an island pedicle advancement flap was deemed most appropriate.  Using a sterile surgical marker, an appropriate advancement flap was drawn incorporating the defect, outlining the appropriate donor tissue and placing the expected incisions within the relaxed skin tension lines where possible.    The area thus outlined was incised deep to adipose tissue with a #15 scalpel blade.  The skin margins were undermined to an appropriate distance in all directions around the primary defect and laterally outward around the island pedicle utilizing iris scissors.  There was minimal undermining beneath the pedicle flap.
Advancement Flap (Single) Text: The defect edges were debeveled with a #15 scalpel blade.  Given the location of the defect and the proximity to free margins a single advancement flap was deemed most appropriate.  Using a sterile surgical marker, an appropriate advancement flap was drawn incorporating the defect and placing the expected incisions within the relaxed skin tension lines where possible.    The area thus outlined was incised deep to adipose tissue with a #15 scalpel blade.  The skin margins were undermined to an appropriate distance in all directions utilizing iris scissors.
Closure 2 Information: This tab is for additional flaps and grafts, including complex repair and grafts and complex repair and flaps. You can also specify a different location for the additional defect, if the location is the same you do not need to select a new one. We will insert the automated text for the repair you select below just as we do for solitary flaps and grafts. Please note that at this time if you select a location with a different insurance zone you will need to override the ICD10 and CPT if appropriate.
H Plasty Text: Given the location of the defect, shape of the defect and the proximity to free margins a H-plasty was deemed most appropriate for repair.  Using a sterile surgical marker, the appropriate advancement arms of the H-plasty were drawn incorporating the defect and placing the expected incisions within the relaxed skin tension lines where possible. The area thus outlined was incised deep to adipose tissue with a #15 scalpel blade. The skin margins were undermined to an appropriate distance in all directions utilizing iris scissors.  The opposing advancement arms were then advanced into place in opposite direction and anchored with interrupted buried subcutaneous sutures.
A-T Advancement Flap Text: The defect edges were debeveled with a #15 scalpel blade.  Given the location of the defect, shape of the defect and the proximity to free margins an A-T advancement flap was deemed most appropriate.  Using a sterile surgical marker, an appropriate advancement flap was drawn incorporating the defect and placing the expected incisions within the relaxed skin tension lines where possible.    The area thus outlined was incised deep to adipose tissue with a #15 scalpel blade.  The skin margins were undermined to an appropriate distance in all directions utilizing iris scissors.
Consent Type: Consent 1 (Standard)
Consent 3/Introductory Paragraph: I gave the patient a chance to ask questions they had about the procedure.  Following this I explained the Mohs procedure and consent was obtained. The risks, benefits and alternatives to therapy were discussed in detail. Specifically, the risks of infection, scarring, bleeding, prolonged wound healing, incomplete removal, allergy to anesthesia, nerve injury and recurrence were addressed. Prior to the procedure, the treatment site was clearly identified and confirmed by the patient. All components of Universal Protocol/PAUSE Rule completed.
Consent (Temporal Branch)/Introductory Paragraph: The rationale for Mohs was explained to the patient and consent was obtained. The risks, benefits and alternatives to therapy were discussed in detail. Specifically, the risks of damage to the temporal branch of the facial nerve, infection, scarring, bleeding, prolonged wound healing, incomplete removal, allergy to anesthesia, and recurrence were addressed. Prior to the procedure, the treatment site was clearly identified and confirmed by the patient. All components of Universal Protocol/PAUSE Rule completed.
Donor Site Anesthesia Type: same as repair anesthesia
Graft Basting Suture (Optional): 5-0 Fast Absorbing Gut
Trilobed Flap Text: The defect edges were debeveled with a #15 scalpel blade.  Given the location of the defect and the proximity to free margins a trilobed flap was deemed most appropriate.  Using a sterile surgical marker, an appropriate trilobed flap drawn around the defect.    The area thus outlined was incised deep to adipose tissue with a #15 scalpel blade.  The skin margins were undermined to an appropriate distance in all directions utilizing iris scissors.
Area H Indication Text: Tumors in this location are included in Area H (eyelids, eyebrows, nose, lips, chin, ear, pre-auricular, post-auricular, temple, genitalia, hands, feet, ankles and areola).  Tissue conservation is critical in these anatomic locations.
Consent 1/Introductory Paragraph: The rationale for Mohs was explained to the patient and consent was obtained. The risks, benefits and alternatives to therapy were discussed in detail. Specifically, the risks of infection, scarring, bleeding, prolonged wound healing, incomplete removal, allergy to anesthesia, nerve injury and recurrence were addressed. Prior to the procedure, the treatment site was clearly identified and confirmed by the patient. All components of Universal Protocol/PAUSE Rule completed.
Bcc Histology Text: There were numerous aggregates of basaloid cells.
Bilateral Helical Rim Advancement Flap Text: The defect edges were debeveled with a #15 blade scalpel.  Given the location of the defect and the proximity to free margins (helical rim) a bilateral helical rim advancement flap was deemed most appropriate.  Using a sterile surgical marker, the appropriate advancement flaps were drawn incorporating the defect and placing the expected incisions between the helical rim and antihelix where possible.  The area thus outlined was incised through and through with a #15 scalpel blade.  With a skin hook and iris scissors, the flaps were gently and sharply undermined and freed up.
Referred To Oculoplastics For Closure Text (Leave Blank If You Do Not Want): After obtaining clear surgical margins the patient was sent to oculoplastics for surgical repair.  The patient understands they will receive post-surgical care and follow-up from the referring physician's office.
Graft Donor Site Epidermal Sutures (Optional): 5-0 Ethilon
Suture Removal: 14 days
Z Plasty Text: The lesion was extirpated to the level of the fat with a #15 scalpel blade.  Given the location of the defect, shape of the defect and the proximity to free margins a Z-plasty was deemed most appropriate for repair.  Using a sterile surgical marker, the appropriate transposition arms of the Z-plasty were drawn incorporating the defect and placing the expected incisions within the relaxed skin tension lines where possible.    The area thus outlined was incised deep to adipose tissue with a #15 scalpel blade.  The skin margins were undermined to an appropriate distance in all directions utilizing iris scissors.  The opposing transposition arms were then transposed into place in opposite direction and anchored with interrupted buried subcutaneous sutures.
Consent (Lip)/Introductory Paragraph: The rationale for Mohs was explained to the patient and consent was obtained. The risks, benefits and alternatives to therapy were discussed in detail. Specifically, the risks of lip deformity, changes in the oral aperture, infection, scarring, bleeding, prolonged wound healing, incomplete removal, allergy to anesthesia, nerve injury and recurrence were addressed. Prior to the procedure, the treatment site was clearly identified and confirmed by the patient. All components of Universal Protocol/PAUSE Rule completed.
Stage 2: Number Of Blocks?: 1
Xenograft Text: The defect edges were debeveled with a #15 scalpel blade.  Given the location of the defect, shape of the defect and the proximity to free margins a xenograft was deemed most appropriate.  The graft was then trimmed to fit the size of the defect.  The graft was then placed in the primary defect and oriented appropriately.
No Repair - Repaired With Adjacent Surgical Defect Text (Leave Blank If You Do Not Want): After obtaining clear surgical margins the defect was repaired concurrently with another surgical defect which was in close approximation.
Full Thickness Lip Wedge Repair (Flap) Text: Given the location of the defect and the proximity to free margins a full thickness wedge repair was deemed most appropriate.  Using a sterile surgical marker, the appropriate repair was drawn incorporating the defect and placing the expected incisions perpendicular to the vermilion border.  The vermilion border was also meticulously outlined to ensure appropriate reapproximation during the repair.  The area thus outlined was incised through and through with a #15 scalpel blade.  The muscularis and dermis were reaproximated with deep sutures following hemostasis. Care was taken to realign the vermilion border before proceeding with the superficial closure.  Once the vermilion was realigned the superfical and mucosal closure was finished.
Melolabial Interpolation Flap Text: A decision was made to reconstruct the defect utilizing an interpolation axial flap and a staged reconstruction.  A telfa template was made of the defect.  This telfa template was then used to outline the melolabial interpolation flap.  The donor area for the pedicle flap was then injected with anesthesia.  The flap was excised through the skin and subcutaneous tissue down to the layer of the underlying musculature.  The pedicle flap was carefully excised within this deep plane to maintain its blood supply.  The edges of the donor site were undermined.   The donor site was closed in a primary fashion.  The pedicle was then rotated into position and sutured.  Once the tube was sutured into place, adequate blood supply was confirmed with blanching and refill.  The pedicle was then wrapped with xeroform gauze and dressed appropriately with a telfa and gauze bandage to ensure continued blood supply and protect the attached pedicle.
Hemostasis: Electrodesiccation
Island Pedicle Flap With Canthal Suspension Text: The defect edges were debeveled with a #15 scalpel blade.  Given the location of the defect, shape of the defect and the proximity to free margins an island pedicle advancement flap was deemed most appropriate.  Using a sterile surgical marker, an appropriate advancement flap was drawn incorporating the defect, outlining the appropriate donor tissue and placing the expected incisions within the relaxed skin tension lines where possible. The area thus outlined was incised deep to adipose tissue with a #15 scalpel blade.  The skin margins were undermined to an appropriate distance in all directions around the primary defect and laterally outward around the island pedicle utilizing iris scissors.  There was minimal undermining beneath the pedicle flap. A suspension suture was placed in the canthal tendon to prevent tension and prevent ectropion.
Referred To Mid-Level For Closure Text (Leave Blank If You Do Not Want): After obtaining clear surgical margins the patient was sent to a mid-level provider for surgical repair.  The patient understands they will receive post-surgical care and follow-up from the mid-level provider.
Purse String (Simple) Text: Given the location of the defect and the characteristics of the surrounding skin a pursestring closure was deemed most appropriate.  Undermining was performed circumfirentially around the surgical defect.  A purstring suture was then placed and tightened.
Tissue Cultured Epidermal Autograft Text: The defect edges were debeveled with a #15 scalpel blade.  Given the location of the defect, shape of the defect and the proximity to free margins a tissue cultured epidermal autograft was deemed most appropriate.  The graft was then trimmed to fit the size of the defect.  The graft was then placed in the primary defect and oriented appropriately.
Simple / Intermediate / Complex Repair - Final Wound Length In Cm: 3.5
Secondary Intention Text (Leave Blank If You Do Not Want): The defect will heal with secondary intention.
Transposition Flap Text: The defect edges were debeveled with a #15 scalpel blade.  Given the location of the defect and the proximity to free margins a transposition flap was deemed most appropriate.  Using a sterile surgical marker, an appropriate transposition flap was drawn incorporating the defect.    The area thus outlined was incised deep to adipose tissue with a #15 scalpel blade.  The skin margins were undermined to an appropriate distance in all directions utilizing iris scissors.
Alar Island Pedicle Flap Text: The defect edges were debeveled with a #15 scalpel blade.  Given the location of the defect, shape of the defect and the proximity to the alar rim an island pedicle advancement flap was deemed most appropriate.  Using a sterile surgical marker, an appropriate advancement flap was drawn incorporating the defect, outlining the appropriate donor tissue and placing the expected incisions within the nasal ala running parallel to the alar rim. The area thus outlined was incised with a #15 scalpel blade.  The skin margins were undermined minimally to an appropriate distance in all directions around the primary defect and laterally outward around the island pedicle utilizing iris scissors.  There was minimal undermining beneath the pedicle flap.
O-Z Plasty Text: The defect edges were debeveled with a #15 scalpel blade.  Given the location of the defect, shape of the defect and the proximity to free margins an O-Z plasty (double transposition flap) was deemed most appropriate.  Using a sterile surgical marker, the appropriate transposition flaps were drawn incorporating the defect and placing the expected incisions within the relaxed skin tension lines where possible.    The area thus outlined was incised deep to adipose tissue with a #15 scalpel blade.  The skin margins were undermined to an appropriate distance in all directions utilizing iris scissors.  Hemostasis was achieved with electrocautery.  The flaps were then transposed into place, one clockwise and the other counterclockwise, and anchored with interrupted buried subcutaneous sutures.
Consent (Near Eyelid Margin)/Introductory Paragraph: The rationale for Mohs was explained to the patient and consent was obtained. The risks, benefits and alternatives to therapy were discussed in detail. Specifically, the risks of ectropion or eyelid deformity, infection, scarring, bleeding, prolonged wound healing, incomplete removal, allergy to anesthesia, nerve injury and recurrence were addressed. Prior to the procedure, the treatment site was clearly identified and confirmed by the patient. All components of Universal Protocol/PAUSE Rule completed.
Posterior Auricular Interpolation Flap Text: A decision was made to reconstruct the defect utilizing an interpolation axial flap and a staged reconstruction.  A telfa template was made of the defect.  This telfa template was then used to outline the posterior auricular interpolation flap.  The donor area for the pedicle flap was then injected with anesthesia.  The flap was excised through the skin and subcutaneous tissue down to the layer of the underlying musculature.  The pedicle flap was carefully excised within this deep plane to maintain its blood supply.  The edges of the donor site were undermined.   The donor site was closed in a primary fashion.  The pedicle was then rotated into position and sutured.  Once the tube was sutured into place, adequate blood supply was confirmed with blanching and refill.  The pedicle was then wrapped with xeroform gauze and dressed appropriately with a telfa and gauze bandage to ensure continued blood supply and protect the attached pedicle.
Repair Type: Ear Wedge Repair
Dermal Autograft Text: The defect edges were debeveled with a #15 scalpel blade.  Given the location of the defect, shape of the defect and the proximity to free margins a dermal autograft was deemed most appropriate.  Using a sterile surgical marker, the primary defect shape was transferred to the donor site. The area thus outlined was incised deep to adipose tissue with a #15 scalpel blade.  The harvested graft was then trimmed of adipose and epidermal tissue until only dermis was left.  The skin graft was then placed in the primary defect and oriented appropriately.
Repair Anesthesia Method: local infiltration
Mohs Method Verbiage: An incision at a 45 degree angle following the standard Mohs approach was done and the specimen was harvested as a microscopic controlled layer.
Tumor Debulked?: curette
V-Y Flap Text: The defect edges were debeveled with a #15 scalpel blade.  Given the location of the defect, shape of the defect and the proximity to free margins a V-Y flap was deemed most appropriate.  Using a sterile surgical marker, an appropriate advancement flap was drawn incorporating the defect and placing the expected incisions within the relaxed skin tension lines where possible.    The area thus outlined was incised deep to adipose tissue with a #15 scalpel blade.  The skin margins were undermined to an appropriate distance in all directions utilizing iris scissors.
Cartilage Graft Text: The defect edges were debeveled with a #15 scalpel blade.  Given the location of the defect, shape of the defect, the fact the defect involved a full thickness cartilage defect a cartilage graft was deemed most appropriate.  An appropriate donor site was identified, cleansed, and anesthetized. The cartilage graft was then harvested and transferred to the recipient site, oriented appropriately and then sutured into place.  The secondary defect was then repaired using a primary closure.
Burow's Advancement Flap Text: The defect edges were debeveled with a #15 scalpel blade.  Given the location of the defect and the proximity to free margins a Burow's advancement flap was deemed most appropriate.  Using a sterile surgical marker, the appropriate advancement flap was drawn incorporating the defect and placing the expected incisions within the relaxed skin tension lines where possible.    The area thus outlined was incised deep to adipose tissue with a #15 scalpel blade.  The skin margins were undermined to an appropriate distance in all directions utilizing iris scissors.
Inflammation Suggestive Of Cancer Camouflage Histology Text: There was a dense lymphocytic infiltrate which prevented adequate histologic evaluation of adjacent structures.
Dressing: pressure dressing
Partial Purse String (Simple) Text: Given the location of the defect and the characteristics of the surrounding skin a simple purse string closure was deemed most appropriate.  Undermining was performed circumfirentially around the surgical defect.  A purse string suture was then placed and tightened. Wound tension only allowed a partial closure of the circular defect.
Crescentic Advancement Flap Text: The defect edges were debeveled with a #15 scalpel blade.  Given the location of the defect and the proximity to free margins a crescentic advancement flap was deemed most appropriate.  Using a sterile surgical marker, the appropriate advancement flap was drawn incorporating the defect and placing the expected incisions within the relaxed skin tension lines where possible.    The area thus outlined was incised deep to adipose tissue with a #15 scalpel blade.  The skin margins were undermined to an appropriate distance in all directions utilizing iris scissors.
Cheiloplasty (Less Than 50%) Text: A decision was made to reconstruct the defect with a  cheiloplasty.  The defect was undermined extensively.  Additional obicularis oris muscle was excised with a 15 blade scalpel.  The defect was converted into a full thickness wedge, of less than 50% of the vertical height of the lip, to facilite a better cosmetic result.  Small vessels were then tied off with 5-0 monocyrl. The obicularis oris, superficial fascia, adipose and dermis were then reapproximated.  After the deeper layers were approximated the epidermis was reapproximated with particular care given to realign the vermilion border.
Graft Donor Site Bandage (Optional-Leave Blank If You Don't Want In Note): pressure bandage were applied to the donor site.
O-T Advancement Flap Text: The defect edges were debeveled with a #15 scalpel blade.  Given the location of the defect, shape of the defect and the proximity to free margins an O-T advancement flap was deemed most appropriate.  Using a sterile surgical marker, an appropriate advancement flap was drawn incorporating the defect and placing the expected incisions within the relaxed skin tension lines where possible.    The area thus outlined was incised deep to adipose tissue with a #15 scalpel blade.  The skin margins were undermined to an appropriate distance in all directions utilizing iris scissors.
Manual Repair Warning Statement: We plan on removing the manually selected variable below in favor of our much easier automatic structured text blocks found in the previous tab. We decided to do this to help make the flow better and give you the full power of structured data. Manual selection is never going to be ideal in our platform and I would encourage you to avoid using manual selection from this point on, especially since I will be sunsetting this feature. It is important that you do one of two things with the customized text below. First, you can save all of the text in a word file so you can have it for future reference. Second, transfer the text to the appropriate area in the Library tab. Lastly, if there is a flap or graft type which we do not have you need to let us know right away so I can add it in before the variable is hidden. No need to panic, we plan to give you roughly 6 months to make the change.
Double Island Pedicle Flap Text: The defect edges were debeveled with a #15 scalpel blade.  Given the location of the defect, shape of the defect and the proximity to free margins a double island pedicle advancement flap was deemed most appropriate.  Using a sterile surgical marker, an appropriate advancement flap was drawn incorporating the defect, outlining the appropriate donor tissue and placing the expected incisions within the relaxed skin tension lines where possible.    The area thus outlined was incised deep to adipose tissue with a #15 scalpel blade.  The skin margins were undermined to an appropriate distance in all directions around the primary defect and laterally outward around the island pedicle utilizing iris scissors.  There was minimal undermining beneath the pedicle flap.
Referred To Plastics For Closure Text (Leave Blank If You Do Not Want): After obtaining clear surgical margins the patient was sent to plastics for surgical repair.  The patient understands they will receive post-surgical care and follow-up from the referring physician's office.
Bilobed Transposition Flap Text: The defect edges were debeveled with a #15 scalpel blade.  Given the location of the defect and the proximity to free margins a bilobed transposition flap was deemed most appropriate.  Using a sterile surgical marker, an appropriate bilobe flap drawn around the defect.    The area thus outlined was incised deep to adipose tissue with a #15 scalpel blade.  The skin margins were undermined to an appropriate distance in all directions utilizing iris scissors.
Consent (Marginal Mandibular)/Introductory Paragraph: The rationale for Mohs was explained to the patient and consent was obtained. The risks, benefits and alternatives to therapy were discussed in detail. Specifically, the risks of damage to the marginal mandibular branch of the facial nerve, infection, scarring, bleeding, prolonged wound healing, incomplete removal, allergy to anesthesia, and recurrence were addressed. Prior to the procedure, the treatment site was clearly identified and confirmed by the patient. All components of Universal Protocol/PAUSE Rule completed.
Spiral Flap Text: The defect edges were debeveled with a #15 scalpel blade.  Given the location of the defect, shape of the defect and the proximity to free margins a spiral flap was deemed most appropriate.  Using a sterile surgical marker, an appropriate rotation flap was drawn incorporating the defect and placing the expected incisions within the relaxed skin tension lines where possible. The area thus outlined was incised deep to adipose tissue with a #15 scalpel blade.  The skin margins were undermined to an appropriate distance in all directions utilizing iris scissors.
Consent 2/Introductory Paragraph: Mohs surgery was explained to the patient and consent was obtained. The risks, benefits and alternatives to therapy were discussed in detail. Specifically, the risks of infection, scarring, bleeding, prolonged wound healing, incomplete removal, allergy to anesthesia, nerve injury and recurrence were addressed. Prior to the procedure, the treatment site was clearly identified and confirmed by the patient. All components of Universal Protocol/PAUSE Rule completed.
Epidermal Sutures: 4-0 Ethilon
Mohs Histo Method Verbiage: Each section was then chromacoded and processed in the Mohs lab using the Mohs protocol and submitted for frozen section.
Paramedian Forehead Flap Text: A decision was made to reconstruct the defect utilizing an interpolation axial flap and a staged reconstruction.  A telfa template was made of the defect.  This telfa template was then used to outline the paramedian forehead pedicle flap.  The donor area for the pedicle flap was then injected with anesthesia.  The flap was excised through the skin and subcutaneous tissue down to the layer of the underlying musculature.  The pedicle flap was carefully excised within this deep plane to maintain its blood supply.  The edges of the donor site were undermined.   The donor site was closed in a primary fashion.  The pedicle was then rotated into position and sutured.  Once the tube was sutured into place, adequate blood supply was confirmed with blanching and refill.  The pedicle was then wrapped with xeroform gauze and dressed appropriately with a telfa and gauze bandage to ensure continued blood supply and protect the attached pedicle.
Estimated Blood Loss (Cc): minimal
Subsequent Stages Histo Method Verbiage: Using a similar technique to that described above, a thin layer of tissue was removed from all areas where tumor was visible on the previous stage.  The tissue was again oriented, mapped, dyed, and processed as above.
ia Id #: [id number]
Skin Substitute Text: The defect edges were debeveled with a #15 scalpel blade.  Given the location of the defect, shape of the defect and the proximity to free margins a skin substitute graft was deemed most appropriate.  The graft material was trimmed to fit the size of the defect. The graft was then placed in the primary defect and oriented appropriately.
Initial Size Of Lesion: 0.7
Referred To Asc For Closure Text (Leave Blank If You Do Not Want): After obtaining clear surgical margins the patient was sent to an ASC for surgical repair.  The patient understands they will receive post-surgical care and follow-up from the ASC physician.
X Size Of Lesion In Cm (Optional): 0.5
Tarsorrhaphy Text: A tarsorrhaphy was performed using Frost sutures.
Cheek Interpolation Flap Text: A decision was made to reconstruct the defect utilizing an interpolation axial flap and a staged reconstruction.  A telfa template was made of the defect.  This telfa template was then used to outline the Cheek Interpolation flap.  The donor area for the pedicle flap was then injected with anesthesia.  The flap was excised through the skin and subcutaneous tissue down to the layer of the underlying musculature.  The interpolation flap was carefully excised within this deep plane to maintain its blood supply.  The edges of the donor site were undermined.   The donor site was closed in a primary fashion.  The pedicle was then rotated into position and sutured.  Once the tube was sutured into place, adequate blood supply was confirmed with blanching and refill.  The pedicle was then wrapped with xeroform gauze and dressed appropriately with a telfa and gauze bandage to ensure continued blood supply and protect the attached pedicle.
Referred To Otolaryngology For Closure Text (Leave Blank If You Do Not Want): After obtaining clear surgical margins the patient was sent to otolaryngology for surgical repair.  The patient understands they will receive post-surgical care and follow-up from the referring physician's office.
Epidermal Autograft Text: The defect edges were debeveled with a #15 scalpel blade.  Given the location of the defect, shape of the defect and the proximity to free margins an epidermal autograft was deemed most appropriate.  Using a sterile surgical marker, the primary defect shape was transferred to the donor site. The epidermal graft was then harvested.  The skin graft was then placed in the primary defect and oriented appropriately.
Unna Boot Text: An Unna boot was placed to help immobilize the limb and facilitate more rapid healing.
Area L Indication Text: Tumors in this location are included in Area L (trunk and extremities).  Mohs surgery is indicated for larger tumors, or tumors with aggressive histologic features, in these anatomic locations.

## 2018-02-20 NOTE — PROCEDURE: MOHS SURGERY
Bcc Infiltrative Histology Text: There were numerous aggregates of basaloid cells demonstrating an infiltrative pattern.
Unna Boot Applied: No
Consent (Nose)/Introductory Paragraph: The rationale for Mohs was explained to the patient and consent was obtained. The risks, benefits and alternatives to therapy were discussed in detail. Specifically, the risks of nasal deformity, changes in the flow of air through the nose, infection, scarring, bleeding, prolonged wound healing, incomplete removal, allergy to anesthesia, nerve injury and recurrence were addressed. Prior to the procedure, the treatment site was clearly identified and confirmed by the patient. All components of Universal Protocol/PAUSE Rule completed.
Stage 12: Number Of Blocks?: 0
Split-Thickness Skin Graft Text: The defect edges were debeveled with a #15 scalpel blade.  Given the location of the defect, shape of the defect and the proximity to free margins a split thickness skin graft was deemed most appropriate.  Using a sterile surgical marker, the primary defect shape was transferred to the donor site. The split thickness graft was then harvested.  The skin graft was then placed in the primary defect and oriented appropriately.
Graft Donor Site Dermal Sutures (Optional): 4-0 Vicryl
Mauc Instructions: By selecting yes to the question below the MAUC number will be added into the note.  This will be calculated automatically based on the diagnosis chosen, the size entered, the body zone selected (H,M,L) and the specific indications you chose. You will also have the option to override the Mohs AUC if you disagree with the automatically calculated number and this option is found in the Case Summary tab.
Stage 5: Additional Anesthesia Type: 1% lidocaine, 0.5% Marcaine, 1:100,000 epinephrine and 8.4% sodium bicarbonate
Unna Boot Text: An Unna boot was placed to help immobilize the limb and facilitate more rapid healing.
O-L Flap Text: The defect edges were debeveled with a #15 scalpel blade.  Given the location of the defect, shape of the defect and the proximity to free margins an O-L flap was deemed most appropriate.  Using a sterile surgical marker, an appropriate advancement flap was drawn incorporating the defect and placing the expected incisions within the relaxed skin tension lines where possible.    The area thus outlined was incised deep to adipose tissue with a #15 scalpel blade.  The skin margins were undermined to an appropriate distance in all directions utilizing iris scissors.
Double Island Pedicle Flap Text: The defect edges were debeveled with a #15 scalpel blade.  Given the location of the defect, shape of the defect and the proximity to free margins a double island pedicle advancement flap was deemed most appropriate.  Using a sterile surgical marker, an appropriate advancement flap was drawn incorporating the defect, outlining the appropriate donor tissue and placing the expected incisions within the relaxed skin tension lines where possible.    The area thus outlined was incised deep to adipose tissue with a #15 scalpel blade.  The skin margins were undermined to an appropriate distance in all directions around the primary defect and laterally outward around the island pedicle utilizing iris scissors.  There was minimal undermining beneath the pedicle flap.
Referred To Asc For Closure Text (Leave Blank If You Do Not Want): After obtaining clear surgical margins the patient was sent to an ASC for surgical repair.  The patient understands they will receive post-surgical care and follow-up from the ASC physician.
Eye Protection Verbiage: Before proceeding with the stage, a plastic scleral shield was inserted. The globe was anesthetized with a few drops of 1% lidocaine with 1:100,000 epinephrine. Then, an appropriate sized scleral shield was chosen and coated with lacrilube ointment. The shield was gently inserted and left in place for the duration of each stage. After the stage was completed, the shield was gently removed.
A-T Advancement Flap Text: The defect edges were debeveled with a #15 scalpel blade.  Given the location of the defect, shape of the defect and the proximity to free margins an A-T advancement flap was deemed most appropriate.  Using a sterile surgical marker, an appropriate advancement flap was drawn incorporating the defect and placing the expected incisions within the relaxed skin tension lines where possible.    The area thus outlined was incised deep to adipose tissue with a #15 scalpel blade.  The skin margins were undermined to an appropriate distance in all directions utilizing iris scissors.
ia Id #: [id number]
Complex Repair Preamble Text (Leave Blank If You Do Not Want): Extensive wide undermining was performed.
Consent 1/Introductory Paragraph: The rationale for Mohs was explained to the patient and consent was obtained. The risks, benefits and alternatives to therapy were discussed in detail. Specifically, the risks of infection, scarring, bleeding, prolonged wound healing, incomplete removal, allergy to anesthesia, nerve injury and recurrence were addressed. Prior to the procedure, the treatment site was clearly identified and confirmed by the patient. All components of Universal Protocol/PAUSE Rule completed.
X Size Of Lesion In Cm (Optional): 0.5
Graft Basting Suture (Optional): 5-0 Fast Absorbing Gut
Medical Necessity Statement: Based on my medical judgement, Mohs surgery is the most appropriate treatment for this cancer compared to other treatments.
Stage 7: Additional Anesthesia Type: 1% lidocaine with epinephrine
Partial Purse String (Intermediate) Text: Given the location of the defect and the characteristics of the surrounding skin an intermediate purse string closure was deemed most appropriate.  Undermining was performed circumfirentially around the surgical defect.  A purse string suture was then placed and tightened. Wound tension only allowed a partial closure of the circular defect.
Stage 1: Number Of Blocks?: 2
Biopsy Photograph Reviewed: Yes
Mastoid Interpolation Flap Text: A decision was made to reconstruct the defect utilizing an interpolation axial flap and a staged reconstruction.  A telfa template was made of the defect.  This telfa template was then used to outline the mastoid interpolation flap.  The donor area for the pedicle flap was then injected with anesthesia.  The flap was excised through the skin and subcutaneous tissue down to the layer of the underlying musculature.  The pedicle flap was carefully excised within this deep plane to maintain its blood supply.  The edges of the donor site were undermined.   The donor site was closed in a primary fashion.  The pedicle was then rotated into position and sutured.  Once the tube was sutured into place, adequate blood supply was confirmed with blanching and refill.  The pedicle was then wrapped with xeroform gauze and dressed appropriately with a telfa and gauze bandage to ensure continued blood supply and protect the attached pedicle.
Consent (Near Eyelid Margin)/Introductory Paragraph: The rationale for Mohs was explained to the patient and consent was obtained. The risks, benefits and alternatives to therapy were discussed in detail. Specifically, the risks of ectropion or eyelid deformity, infection, scarring, bleeding, prolonged wound healing, incomplete removal, allergy to anesthesia, nerve injury and recurrence were addressed. Prior to the procedure, the treatment site was clearly identified and confirmed by the patient. All components of Universal Protocol/PAUSE Rule completed.
Additional Anesthesia Volume In Cc: 6
Keystone Flap Text: The defect edges were debeveled with a #15 scalpel blade.  Given the location of the defect, shape of the defect a keystone flap was deemed most appropriate.  Using a sterile surgical marker, an appropriate keystone flap was drawn incorporating the defect, outlining the appropriate donor tissue and placing the expected incisions within the relaxed skin tension lines where possible. The area thus outlined was incised deep to adipose tissue with a #15 scalpel blade.  The skin margins were undermined to an appropriate distance in all directions around the primary defect and laterally outward around the flap utilizing iris scissors.
V-Y Plasty Text: The defect edges were debeveled with a #15 scalpel blade.  Given the location of the defect, shape of the defect and the proximity to free margins an V-Y advancement flap was deemed most appropriate.  Using a sterile surgical marker, an appropriate advancement flap was drawn incorporating the defect and placing the expected incisions within the relaxed skin tension lines where possible.    The area thus outlined was incised deep to adipose tissue with a #15 scalpel blade.  The skin margins were undermined to an appropriate distance in all directions utilizing iris scissors.
Surgeon/Pathologist Verbiage (Will Incorporate Name Of Surgeon From Intro If Not Blank): operated in two distinct and integrated capacities as the surgeon and pathologist.
Detail Level: Detailed
Same Histology In Subsequent Stages Text: The pattern and morphology of the tumor is as described in the first stage.
Z Plasty Text: The lesion was extirpated to the level of the fat with a #15 scalpel blade.  Given the location of the defect, shape of the defect and the proximity to free margins a Z-plasty was deemed most appropriate for repair.  Using a sterile surgical marker, the appropriate transposition arms of the Z-plasty were drawn incorporating the defect and placing the expected incisions within the relaxed skin tension lines where possible.    The area thus outlined was incised deep to adipose tissue with a #15 scalpel blade.  The skin margins were undermined to an appropriate distance in all directions utilizing iris scissors.  The opposing transposition arms were then transposed into place in opposite direction and anchored with interrupted buried subcutaneous sutures.
Estimated Blood Loss (Cc): minimal
Muscle Hinge Flap Text: The defect edges were debeveled with a #15 scalpel blade.  Given the size, depth and location of the defect and the proximity to free margins a muscle hinge flap was deemed most appropriate.  Using a sterile surgical marker, an appropriate hinge flap was drawn incorporating the defect. The area thus outlined was incised with a #15 scalpel blade.  The skin margins were undermined to an appropriate distance in all directions utilizing iris scissors.
Consent Type: Consent 1 (Standard)
Tissue Cultured Epidermal Autograft Text: The defect edges were debeveled with a #15 scalpel blade.  Given the location of the defect, shape of the defect and the proximity to free margins a tissue cultured epidermal autograft was deemed most appropriate.  The graft was then trimmed to fit the size of the defect.  The graft was then placed in the primary defect and oriented appropriately.
Modified Advancement Flap Text: The defect edges were debeveled with a #15 scalpel blade.  Given the location of the defect, shape of the defect and the proximity to free margins a modified advancement flap was deemed most appropriate.  Using a sterile surgical marker, an appropriate advancement flap was drawn incorporating the defect and placing the expected incisions within the relaxed skin tension lines where possible.    The area thus outlined was incised deep to adipose tissue with a #15 scalpel blade.  The skin margins were undermined to an appropriate distance in all directions utilizing iris scissors.
Lazy S Intermediate Repair Preamble Text (Leave Blank If You Do Not Want): Undermining was performed with blunt dissection.
Bilobed Transposition Flap Text: The defect edges were debeveled with a #15 scalpel blade.  Given the location of the defect and the proximity to free margins a bilobed transposition flap was deemed most appropriate.  Using a sterile surgical marker, an appropriate bilobe flap drawn around the defect.    The area thus outlined was incised deep to adipose tissue with a #15 scalpel blade.  The skin margins were undermined to an appropriate distance in all directions utilizing iris scissors.
Spiral Flap Text: The defect edges were debeveled with a #15 scalpel blade.  Given the location of the defect, shape of the defect and the proximity to free margins a spiral flap was deemed most appropriate.  Using a sterile surgical marker, an appropriate rotation flap was drawn incorporating the defect and placing the expected incisions within the relaxed skin tension lines where possible. The area thus outlined was incised deep to adipose tissue with a #15 scalpel blade.  The skin margins were undermined to an appropriate distance in all directions utilizing iris scissors.
Ear Star Wedge Flap Text: The defect edges were debeveled with a #15 blade scalpel.  Given the location of the defect and the proximity to free margins (helical rim) an ear star wedge flap was deemed most appropriate.  Using a sterile surgical marker, the appropriate flap was drawn incorporating the defect and placing the expected incisions between the helical rim and antihelix where possible.  The area thus outlined was incised through and through with a #15 scalpel blade.
Advancement-Rotation Flap Text: The defect edges were debeveled with a #15 scalpel blade.  Given the location of the defect, shape of the defect and the proximity to free margins an advancement-rotation flap was deemed most appropriate.  Using a sterile surgical marker, an appropriate flap was drawn incorporating the defect and placing the expected incisions within the relaxed skin tension lines where possible. The area thus outlined was incised deep to adipose tissue with a #15 scalpel blade.  The skin margins were undermined to an appropriate distance in all directions utilizing iris scissors.
Referred To Otolaryngology For Closure Text (Leave Blank If You Do Not Want): After obtaining clear surgical margins the patient was sent to otolaryngology for surgical repair.  The patient understands they will receive post-surgical care and follow-up from the referring physician's office.
Home Suture Removal Text: Patient was provided instructions on removing sutures and will remove their sutures at home.  If they have any questions or difficulties they will call the office.
Donor Site Anesthesia Type: same as repair anesthesia
O-Z Plasty Text: The defect edges were debeveled with a #15 scalpel blade.  Given the location of the defect, shape of the defect and the proximity to free margins an O-Z plasty (double transposition flap) was deemed most appropriate.  Using a sterile surgical marker, the appropriate transposition flaps were drawn incorporating the defect and placing the expected incisions within the relaxed skin tension lines where possible.    The area thus outlined was incised deep to adipose tissue with a #15 scalpel blade.  The skin margins were undermined to an appropriate distance in all directions utilizing iris scissors.  Hemostasis was achieved with electrocautery.  The flaps were then transposed into place, one clockwise and the other counterclockwise, and anchored with interrupted buried subcutaneous sutures.
Primary Defect Width In Cm (Final Defect Size - Required For Flaps/Grafts): 0.8
Crescentic Advancement Flap Text: The defect edges were debeveled with a #15 scalpel blade.  Given the location of the defect and the proximity to free margins a crescentic advancement flap was deemed most appropriate.  Using a sterile surgical marker, the appropriate advancement flap was drawn incorporating the defect and placing the expected incisions within the relaxed skin tension lines where possible.    The area thus outlined was incised deep to adipose tissue with a #15 scalpel blade.  The skin margins were undermined to an appropriate distance in all directions utilizing iris scissors.
Rhombic Flap Text: The defect edges were debeveled with a #15 scalpel blade.  Given the location of the defect and the proximity to free margins a rhombic flap was deemed most appropriate.  Using a sterile surgical marker, an appropriate rhombic flap was drawn incorporating the defect.    The area thus outlined was incised deep to adipose tissue with a #15 scalpel blade.  The skin margins were undermined to an appropriate distance in all directions utilizing iris scissors.
Composite Graft Text: The defect edges were debeveled with a #15 scalpel blade.  Given the location of the defect, shape of the defect, the proximity to free margins and the fact the defect was full thickness a composite graft was deemed most appropriate.  The defect was outline and then transferred to the donor site.  A full thickness graft was then excised from the donor site. The graft was then placed in the primary defect, oriented appropriately and then sutured into place.  The secondary defect was then repaired using a primary closure.
Dressing: pressure dressing
Epidermal Closure: running
Island Pedicle Flap Text: The defect edges were debeveled with a #15 scalpel blade.  Given the location of the defect, shape of the defect and the proximity to free margins an island pedicle advancement flap was deemed most appropriate.  Using a sterile surgical marker, an appropriate advancement flap was drawn incorporating the defect, outlining the appropriate donor tissue and placing the expected incisions within the relaxed skin tension lines where possible.    The area thus outlined was incised deep to adipose tissue with a #15 scalpel blade.  The skin margins were undermined to an appropriate distance in all directions around the primary defect and laterally outward around the island pedicle utilizing iris scissors.  There was minimal undermining beneath the pedicle flap.
Manual Repair Warning Statement: We plan on removing the manually selected variable below in favor of our much easier automatic structured text blocks found in the previous tab. We decided to do this to help make the flow better and give you the full power of structured data. Manual selection is never going to be ideal in our platform and I would encourage you to avoid using manual selection from this point on, especially since I will be sunsetting this feature. It is important that you do one of two things with the customized text below. First, you can save all of the text in a word file so you can have it for future reference. Second, transfer the text to the appropriate area in the Library tab. Lastly, if there is a flap or graft type which we do not have you need to let us know right away so I can add it in before the variable is hidden. No need to panic, we plan to give you roughly 6 months to make the change.
Consent (Temporal Branch)/Introductory Paragraph: The rationale for Mohs was explained to the patient and consent was obtained. The risks, benefits and alternatives to therapy were discussed in detail. Specifically, the risks of damage to the temporal branch of the facial nerve, infection, scarring, bleeding, prolonged wound healing, incomplete removal, allergy to anesthesia, and recurrence were addressed. Prior to the procedure, the treatment site was clearly identified and confirmed by the patient. All components of Universal Protocol/PAUSE Rule completed.
Subsequent Stages Histo Method Verbiage: Using a similar technique to that described above, a thin layer of tissue was removed from all areas where tumor was visible on the previous stage.  The tissue was again oriented, mapped, dyed, and processed as above.
Consent (Scalp)/Introductory Paragraph: The rationale for Mohs was explained to the patient and consent was obtained. The risks, benefits and alternatives to therapy were discussed in detail. Specifically, the risks of changes in hair growth pattern secondary to repair, infection, scarring, bleeding, prolonged wound healing, incomplete removal, allergy to anesthesia, nerve injury and recurrence were addressed. Prior to the procedure, the treatment site was clearly identified and confirmed by the patient. All components of Universal Protocol/PAUSE Rule completed.
Advancement Flap (Double) Text: The defect edges were debeveled with a #15 scalpel blade.  Given the location of the defect and the proximity to free margins a double advancement flap was deemed most appropriate.  Using a sterile surgical marker, the appropriate advancement flaps were drawn incorporating the defect and placing the expected incisions within the relaxed skin tension lines where possible.    The area thus outlined was incised deep to adipose tissue with a #15 scalpel blade.  The skin margins were undermined to an appropriate distance in all directions utilizing iris scissors.
Anesthesia Volume In Cc: 3
Interpolation Flap Text: A decision was made to reconstruct the defect utilizing an interpolation axial flap and a staged reconstruction.  A telfa template was made of the defect.  This telfa template was then used to outline the interpolation flap.  The donor area for the pedicle flap was then injected with anesthesia.  The flap was excised through the skin and subcutaneous tissue down to the layer of the underlying musculature.  The interpolation flap was carefully excised within this deep plane to maintain its blood supply.  The edges of the donor site were undermined.   The donor site was closed in a primary fashion.  The pedicle was then rotated into position and sutured.  Once the tube was sutured into place, adequate blood supply was confirmed with blanching and refill.  The pedicle was then wrapped with xeroform gauze and dressed appropriately with a telfa and gauze bandage to ensure continued blood supply and protect the attached pedicle.
Alar Island Pedicle Flap Text: The defect edges were debeveled with a #15 scalpel blade.  Given the location of the defect, shape of the defect and the proximity to the alar rim an island pedicle advancement flap was deemed most appropriate.  Using a sterile surgical marker, an appropriate advancement flap was drawn incorporating the defect, outlining the appropriate donor tissue and placing the expected incisions within the nasal ala running parallel to the alar rim. The area thus outlined was incised with a #15 scalpel blade.  The skin margins were undermined minimally to an appropriate distance in all directions around the primary defect and laterally outward around the island pedicle utilizing iris scissors.  There was minimal undermining beneath the pedicle flap.
Trilobed Flap Text: The defect edges were debeveled with a #15 scalpel blade.  Given the location of the defect and the proximity to free margins a trilobed flap was deemed most appropriate.  Using a sterile surgical marker, an appropriate trilobed flap drawn around the defect.    The area thus outlined was incised deep to adipose tissue with a #15 scalpel blade.  The skin margins were undermined to an appropriate distance in all directions utilizing iris scissors.
Cheiloplasty (Less Than 50%) Text: A decision was made to reconstruct the defect with a  cheiloplasty.  The defect was undermined extensively.  Additional obicularis oris muscle was excised with a 15 blade scalpel.  The defect was converted into a full thickness wedge, of less than 50% of the vertical height of the lip, to facilite a better cosmetic result.  Small vessels were then tied off with 5-0 monocyrl. The obicularis oris, superficial fascia, adipose and dermis were then reapproximated.  After the deeper layers were approximated the epidermis was reapproximated with particular care given to realign the vermilion border.
Purse String (Simple) Text: Given the location of the defect and the characteristics of the surrounding skin a pursestring closure was deemed most appropriate.  Undermining was performed circumfirentially around the surgical defect.  A purstring suture was then placed and tightened.
Tarsorrhaphy Text: A tarsorrhaphy was performed using Frost sutures.
Helical Rim Advancement Flap Text: The defect edges were debeveled with a #15 blade scalpel.  Given the location of the defect and the proximity to free margins (helical rim) a double helical rim advancement flap was deemed most appropriate.  Using a sterile surgical marker, the appropriate advancement flaps were drawn incorporating the defect and placing the expected incisions between the helical rim and antihelix where possible.  The area thus outlined was incised through and through with a #15 scalpel blade.  With a skin hook and iris scissors, the flaps were gently and sharply undermined and freed up.
Inflammation Suggestive Of Cancer Camouflage Histology Text: There was a dense lymphocytic infiltrate which prevented adequate histologic evaluation of adjacent structures.
Closure 2 Information: This tab is for additional flaps and grafts, including complex repair and grafts and complex repair and flaps. You can also specify a different location for the additional defect, if the location is the same you do not need to select a new one. We will insert the automated text for the repair you select below just as we do for solitary flaps and grafts. Please note that at this time if you select a location with a different insurance zone you will need to override the ICD10 and CPT if appropriate.
Dermal Autograft Text: The defect edges were debeveled with a #15 scalpel blade.  Given the location of the defect, shape of the defect and the proximity to free margins a dermal autograft was deemed most appropriate.  Using a sterile surgical marker, the primary defect shape was transferred to the donor site. The area thus outlined was incised deep to adipose tissue with a #15 scalpel blade.  The harvested graft was then trimmed of adipose and epidermal tissue until only dermis was left.  The skin graft was then placed in the primary defect and oriented appropriately.
Consent 2/Introductory Paragraph: Mohs surgery was explained to the patient and consent was obtained. The risks, benefits and alternatives to therapy were discussed in detail. Specifically, the risks of infection, scarring, bleeding, prolonged wound healing, incomplete removal, allergy to anesthesia, nerve injury and recurrence were addressed. Prior to the procedure, the treatment site was clearly identified and confirmed by the patient. All components of Universal Protocol/PAUSE Rule completed.
Ear Wedge Repair Text: A wedge excision was completed by carrying down an excision through the full thickness of the ear and cartilage with an inward facing Burow's triangle. The wound was then closed in a layered fashion.
No Repair - Repaired With Adjacent Surgical Defect Text (Leave Blank If You Do Not Want): After obtaining clear surgical margins the defect was repaired concurrently with another surgical defect which was in close approximation.
Hatchet Flap Text: The defect edges were debeveled with a #15 scalpel blade.  Given the location of the defect, shape of the defect and the proximity to free margins a hatchet flap was deemed most appropriate.  Using a sterile surgical marker, an appropriate hatchet flap was drawn incorporating the defect and placing the expected incisions within the relaxed skin tension lines where possible.    The area thus outlined was incised deep to adipose tissue with a #15 scalpel blade.  The skin margins were undermined to an appropriate distance in all directions utilizing iris scissors.
Bcc Histology Text: There were numerous aggregates of basaloid cells.
Consent (Spinal Accessory)/Introductory Paragraph: The rationale for Mohs was explained to the patient and consent was obtained. The risks, benefits and alternatives to therapy were discussed in detail. Specifically, the risks of damage to the spinal accessory nerve, infection, scarring, bleeding, prolonged wound healing, incomplete removal, allergy to anesthesia, and recurrence were addressed. Prior to the procedure, the treatment site was clearly identified and confirmed by the patient. All components of Universal Protocol/PAUSE Rule completed.
Wound Care: Petrolatum
Area M Indication Text: Tumors in this location are included in Area M (cheek, forehead, scalp, neck, jawline and pretibial skin).  Mohs surgery is indicated for tumors in these anatomic locations.
Advancement Flap (Single) Text: The defect edges were debeveled with a #15 scalpel blade.  Given the location of the defect and the proximity to free margins a single advancement flap was deemed most appropriate.  Using a sterile surgical marker, an appropriate advancement flap was drawn incorporating the defect and placing the expected incisions within the relaxed skin tension lines where possible.    The area thus outlined was incised deep to adipose tissue with a #15 scalpel blade.  The skin margins were undermined to an appropriate distance in all directions utilizing iris scissors.
Tumor Debulked?: curette
Dressing (No Sutures): dry sterile dressing
Alternatives Discussed Intro (Do Not Add Period): I discussed alternative treatments to Mohs surgery and specifically discussed the risks and benefits of
Transposition Flap Text: The defect edges were debeveled with a #15 scalpel blade.  Given the location of the defect and the proximity to free margins a transposition flap was deemed most appropriate.  Using a sterile surgical marker, an appropriate transposition flap was drawn incorporating the defect.    The area thus outlined was incised deep to adipose tissue with a #15 scalpel blade.  The skin margins were undermined to an appropriate distance in all directions utilizing iris scissors.
Area H Indication Text: Tumors in this location are included in Area H (eyelids, eyebrows, nose, lips, chin, ear, pre-auricular, post-auricular, temple, genitalia, hands, feet, ankles and areola).  Tissue conservation is critical in these anatomic locations.
Stage 2: Number Of Blocks?: 1
Surgeon: Khanh Victor MD
W Plasty Text: The lesion was extirpated to the level of the fat with a #15 scalpel blade.  Given the location of the defect, shape of the defect and the proximity to free margins a W-plasty was deemed most appropriate for repair.  Using a sterile surgical marker, the appropriate transposition arms of the W-plasty were drawn incorporating the defect and placing the expected incisions within the relaxed skin tension lines where possible.    The area thus outlined was incised deep to adipose tissue with a #15 scalpel blade.  The skin margins were undermined to an appropriate distance in all directions utilizing iris scissors.  The opposing transposition arms were then transposed into place in opposite direction and anchored with interrupted buried subcutaneous sutures.
Rotation Flap Text: The defect edges were debeveled with a #15 scalpel blade.  Given the location of the defect, shape of the defect and the proximity to free margins a rotation flap was deemed most appropriate.  Using a sterile surgical marker, an appropriate rotation flap was drawn incorporating the defect and placing the expected incisions within the relaxed skin tension lines where possible.    The area thus outlined was incised deep to adipose tissue with a #15 scalpel blade.  The skin margins were undermined to an appropriate distance in all directions utilizing iris scissors.
Island Pedicle Flap-Requiring Vessel Identification Text: The defect edges were debeveled with a #15 scalpel blade.  Given the location of the defect, shape of the defect and the proximity to free margins an island pedicle advancement flap was deemed most appropriate.  Using a sterile surgical marker, an appropriate advancement flap was drawn, based on the axial vessel mentioned above, incorporating the defect, outlining the appropriate donor tissue and placing the expected incisions within the relaxed skin tension lines where possible.    The area thus outlined was incised deep to adipose tissue with a #15 scalpel blade.  The skin margins were undermined to an appropriate distance in all directions around the primary defect and laterally outward around the island pedicle utilizing iris scissors.  There was minimal undermining beneath the pedicle flap.
Repair Anesthesia Method: local infiltration
Skin Substitute Text: The defect edges were debeveled with a #15 scalpel blade.  Given the location of the defect, shape of the defect and the proximity to free margins a skin substitute graft was deemed most appropriate.  The graft material was trimmed to fit the size of the defect. The graft was then placed in the primary defect and oriented appropriately.
Cheiloplasty (Complex) Text: A decision was made to reconstruct the defect with a  cheiloplasty.  The defect was undermined extensively.  Additional obicularis oris muscle was excised with a 15 blade scalpel.  The defect was converted into a full thickness wedge to facilite a better cosmetic result.  Small vessels were then tied off with 5-0 monocyrl. The obicularis oris, superficial fascia, adipose and dermis were then reapproximated.  After the deeper layers were approximated the epidermis was reapproximated with particular care given to realign the vermilion border.
Posterior Auricular Interpolation Flap Text: A decision was made to reconstruct the defect utilizing an interpolation axial flap and a staged reconstruction.  A telfa template was made of the defect.  This telfa template was then used to outline the posterior auricular interpolation flap.  The donor area for the pedicle flap was then injected with anesthesia.  The flap was excised through the skin and subcutaneous tissue down to the layer of the underlying musculature.  The pedicle flap was carefully excised within this deep plane to maintain its blood supply.  The edges of the donor site were undermined.   The donor site was closed in a primary fashion.  The pedicle was then rotated into position and sutured.  Once the tube was sutured into place, adequate blood supply was confirmed with blanching and refill.  The pedicle was then wrapped with xeroform gauze and dressed appropriately with a telfa and gauze bandage to ensure continued blood supply and protect the attached pedicle.
Closure 3 Information: This tab is for additional flaps and grafts above and beyond our usual structured repairs.  Please note if you enter information here it will not currently bill and you will need to add the billing information manually.
H Plasty Text: Given the location of the defect, shape of the defect and the proximity to free margins a H-plasty was deemed most appropriate for repair.  Using a sterile surgical marker, the appropriate advancement arms of the H-plasty were drawn incorporating the defect and placing the expected incisions within the relaxed skin tension lines where possible. The area thus outlined was incised deep to adipose tissue with a #15 scalpel blade. The skin margins were undermined to an appropriate distance in all directions utilizing iris scissors.  The opposing advancement arms were then advanced into place in opposite direction and anchored with interrupted buried subcutaneous sutures.
Referred To Plastics For Closure Text (Leave Blank If You Do Not Want): After obtaining clear surgical margins the patient was sent to plastics for surgical repair.  The patient understands they will receive post-surgical care and follow-up from the referring physician's office.
Complex Repair And Flap Additional Text (Will Appearing After The Standard Complex Repair Text): The complex repair was not sufficient to completely close the primary defect. The remaining additional defect was repaired with the flap mentioned below.
Mohs Case Number: 
Cartilage Graft Text: The defect edges were debeveled with a #15 scalpel blade.  Given the location of the defect, shape of the defect, the fact the defect involved a full thickness cartilage defect a cartilage graft was deemed most appropriate.  An appropriate donor site was identified, cleansed, and anesthetized. The cartilage graft was then harvested and transferred to the recipient site, oriented appropriately and then sutured into place.  The secondary defect was then repaired using a primary closure.
Ftsg Text: The defect edges were debeveled with a #15 scalpel blade.  Given the location of the defect, shape of the defect and the proximity to free margins a full thickness skin graft was deemed most appropriate.  Using a sterile surgical marker, the primary defect shape was transferred to the donor site. The area thus outlined was incised deep to adipose tissue with a #15 scalpel blade.  The harvested graft was then trimmed of adipose tissue until only dermis and epidermis was left.  The skin margins of the secondary defect were undermined to an appropriate distance in all directions utilizing iris scissors.  The secondary defect was closed with interrupted buried subcutaneous sutures.  The skin edges were then re-apposed with running  sutures.  The skin graft was then placed in the primary defect and oriented appropriately.
Consent (Ear)/Introductory Paragraph: The rationale for Mohs was explained to the patient and consent was obtained. The risks, benefits and alternatives to therapy were discussed in detail. Specifically, the risks of ear deformity, infection, scarring, bleeding, prolonged wound healing, incomplete removal, allergy to anesthesia, nerve injury and recurrence were addressed. Prior to the procedure, the treatment site was clearly identified and confirmed by the patient. All components of Universal Protocol/PAUSE Rule completed.
Referred To Mid-Level For Closure Text (Leave Blank If You Do Not Want): After obtaining clear surgical margins the patient was sent to a mid-level provider for surgical repair.  The patient understands they will receive post-surgical care and follow-up from the mid-level provider.
Repair Type: Ear Wedge Repair
Epidermal Sutures: 4-0 Ethilon
Bilobed Flap Text: The defect edges were debeveled with a #15 scalpel blade.  Given the location of the defect and the proximity to free margins a bilobe flap was deemed most appropriate.  Using a sterile surgical marker, an appropriate bilobe flap drawn around the defect.    The area thus outlined was incised deep to adipose tissue with a #15 scalpel blade.  The skin margins were undermined to an appropriate distance in all directions utilizing iris scissors.
O-T Advancement Flap Text: The defect edges were debeveled with a #15 scalpel blade.  Given the location of the defect, shape of the defect and the proximity to free margins an O-T advancement flap was deemed most appropriate.  Using a sterile surgical marker, an appropriate advancement flap was drawn incorporating the defect and placing the expected incisions within the relaxed skin tension lines where possible.    The area thus outlined was incised deep to adipose tissue with a #15 scalpel blade.  The skin margins were undermined to an appropriate distance in all directions utilizing iris scissors.
Partial Purse String (Simple) Text: Given the location of the defect and the characteristics of the surrounding skin a simple purse string closure was deemed most appropriate.  Undermining was performed circumfirentially around the surgical defect.  A purse string suture was then placed and tightened. Wound tension only allowed a partial closure of the circular defect.
Mohs Method Verbiage: An incision at a 45 degree angle following the standard Mohs approach was done and the specimen was harvested as a microscopic controlled layer.
Star Wedge Flap Text: The defect edges were debeveled with a #15 scalpel blade.  Given the location of the defect, shape of the defect and the proximity to free margins a star wedge flap was deemed most appropriate.  Using a sterile surgical marker, an appropriate rotation flap was drawn incorporating the defect and placing the expected incisions within the relaxed skin tension lines where possible. The area thus outlined was incised deep to adipose tissue with a #15 scalpel blade.  The skin margins were undermined to an appropriate distance in all directions utilizing iris scissors.
Cheek Interpolation Flap Text: A decision was made to reconstruct the defect utilizing an interpolation axial flap and a staged reconstruction.  A telfa template was made of the defect.  This telfa template was then used to outline the Cheek Interpolation flap.  The donor area for the pedicle flap was then injected with anesthesia.  The flap was excised through the skin and subcutaneous tissue down to the layer of the underlying musculature.  The interpolation flap was carefully excised within this deep plane to maintain its blood supply.  The edges of the donor site were undermined.   The donor site was closed in a primary fashion.  The pedicle was then rotated into position and sutured.  Once the tube was sutured into place, adequate blood supply was confirmed with blanching and refill.  The pedicle was then wrapped with xeroform gauze and dressed appropriately with a telfa and gauze bandage to ensure continued blood supply and protect the attached pedicle.
Melolabial Interpolation Flap Text: A decision was made to reconstruct the defect utilizing an interpolation axial flap and a staged reconstruction.  A telfa template was made of the defect.  This telfa template was then used to outline the melolabial interpolation flap.  The donor area for the pedicle flap was then injected with anesthesia.  The flap was excised through the skin and subcutaneous tissue down to the layer of the underlying musculature.  The pedicle flap was carefully excised within this deep plane to maintain its blood supply.  The edges of the donor site were undermined.   The donor site was closed in a primary fashion.  The pedicle was then rotated into position and sutured.  Once the tube was sutured into place, adequate blood supply was confirmed with blanching and refill.  The pedicle was then wrapped with xeroform gauze and dressed appropriately with a telfa and gauze bandage to ensure continued blood supply and protect the attached pedicle.
Initial Size Of Lesion: 0.7
Island Pedicle Flap With Canthal Suspension Text: The defect edges were debeveled with a #15 scalpel blade.  Given the location of the defect, shape of the defect and the proximity to free margins an island pedicle advancement flap was deemed most appropriate.  Using a sterile surgical marker, an appropriate advancement flap was drawn incorporating the defect, outlining the appropriate donor tissue and placing the expected incisions within the relaxed skin tension lines where possible. The area thus outlined was incised deep to adipose tissue with a #15 scalpel blade.  The skin margins were undermined to an appropriate distance in all directions around the primary defect and laterally outward around the island pedicle utilizing iris scissors.  There was minimal undermining beneath the pedicle flap. A suspension suture was placed in the canthal tendon to prevent tension and prevent ectropion.
Graft Donor Site Bandage (Optional-Leave Blank If You Don't Want In Note): pressure bandage were applied to the donor site.
Consent (Marginal Mandibular)/Introductory Paragraph: The rationale for Mohs was explained to the patient and consent was obtained. The risks, benefits and alternatives to therapy were discussed in detail. Specifically, the risks of damage to the marginal mandibular branch of the facial nerve, infection, scarring, bleeding, prolonged wound healing, incomplete removal, allergy to anesthesia, and recurrence were addressed. Prior to the procedure, the treatment site was clearly identified and confirmed by the patient. All components of Universal Protocol/PAUSE Rule completed.
No Residual Tumor Seen Histology Text: There were no malignant cells seen in the sections examined.
Repair Hemostasis (Optional): Electrodesiccation
Bilateral Helical Rim Advancement Flap Text: The defect edges were debeveled with a #15 blade scalpel.  Given the location of the defect and the proximity to free margins (helical rim) a bilateral helical rim advancement flap was deemed most appropriate.  Using a sterile surgical marker, the appropriate advancement flaps were drawn incorporating the defect and placing the expected incisions between the helical rim and antihelix where possible.  The area thus outlined was incised through and through with a #15 scalpel blade.  With a skin hook and iris scissors, the flaps were gently and sharply undermined and freed up.
Mohs Histo Method Verbiage: Each section was then chromacoded and processed in the Mohs lab using the Mohs protocol and submitted for frozen section.
Consent (Lip)/Introductory Paragraph: The rationale for Mohs was explained to the patient and consent was obtained. The risks, benefits and alternatives to therapy were discussed in detail. Specifically, the risks of lip deformity, changes in the oral aperture, infection, scarring, bleeding, prolonged wound healing, incomplete removal, allergy to anesthesia, nerve injury and recurrence were addressed. Prior to the procedure, the treatment site was clearly identified and confirmed by the patient. All components of Universal Protocol/PAUSE Rule completed.
Melolabial Transposition Flap Text: The defect edges were debeveled with a #15 scalpel blade.  Given the location of the defect and the proximity to free margins a melolabial flap was deemed most appropriate.  Using a sterile surgical marker, an appropriate melolabial transposition flap was drawn incorporating the defect.    The area thus outlined was incised deep to adipose tissue with a #15 scalpel blade.  The skin margins were undermined to an appropriate distance in all directions utilizing iris scissors.
Consent 3/Introductory Paragraph: I gave the patient a chance to ask questions they had about the procedure.  Following this I explained the Mohs procedure and consent was obtained. The risks, benefits and alternatives to therapy were discussed in detail. Specifically, the risks of infection, scarring, bleeding, prolonged wound healing, incomplete removal, allergy to anesthesia, nerve injury and recurrence were addressed. Prior to the procedure, the treatment site was clearly identified and confirmed by the patient. All components of Universal Protocol/PAUSE Rule completed.
Paramedian Forehead Flap Text: A decision was made to reconstruct the defect utilizing an interpolation axial flap and a staged reconstruction.  A telfa template was made of the defect.  This telfa template was then used to outline the paramedian forehead pedicle flap.  The donor area for the pedicle flap was then injected with anesthesia.  The flap was excised through the skin and subcutaneous tissue down to the layer of the underlying musculature.  The pedicle flap was carefully excised within this deep plane to maintain its blood supply.  The edges of the donor site were undermined.   The donor site was closed in a primary fashion.  The pedicle was then rotated into position and sutured.  Once the tube was sutured into place, adequate blood supply was confirmed with blanching and refill.  The pedicle was then wrapped with xeroform gauze and dressed appropriately with a telfa and gauze bandage to ensure continued blood supply and protect the attached pedicle.
Suture Removal: 14 days
O-T Plasty Text: The defect edges were debeveled with a #15 scalpel blade.  Given the location of the defect, shape of the defect and the proximity to free margins an O-T plasty was deemed most appropriate.  Using a sterile surgical marker, an appropriate O-T plasty was drawn incorporating the defect and placing the expected incisions within the relaxed skin tension lines where possible.    The area thus outlined was incised deep to adipose tissue with a #15 scalpel blade.  The skin margins were undermined to an appropriate distance in all directions utilizing iris scissors.
Xenograft Text: The defect edges were debeveled with a #15 scalpel blade.  Given the location of the defect, shape of the defect and the proximity to free margins a xenograft was deemed most appropriate.  The graft was then trimmed to fit the size of the defect.  The graft was then placed in the primary defect and oriented appropriately.
Repair Performed By Another Provider Text (Leave Blank If You Do Not Want): After obtaining clear surgical margins the defect was repaired by another provider.
Dorsal Nasal Flap Text: The defect edges were debeveled with a #15 scalpel blade.  Given the location of the defect and the proximity to free margins a dorsal nasal flap was deemed most appropriate.  Using a sterile surgical marker, an appropriate dorsal nasal flap was drawn around the defect.    The area thus outlined was incised deep to adipose tissue with a #15 scalpel blade.  The skin margins were undermined to an appropriate distance in all directions utilizing iris scissors.
Mucosal Advancement Flap Text: Given the location of the defect, shape of the defect and the proximity to free margins a mucosal advancement flap was deemed most appropriate. Incisions were made with a 15 blade scalpel in the appropriate fashion along the cutaneous vermilion border and the mucosal lip. The remaining actinically damaged mucosal tissue was excised.  The mucosal advancement flap was then elevated to the gingival sulcus with care taken to preserve the neurovascular structures and advanced into the primary defect. Care was taken to ensure that precise realignment of the vermilion border was achieved.
S Plasty Text: Given the location and shape of the defect, and the orientation of relaxed skin tension lines, an S-plasty was deemed most appropriate for repair.  Using a sterile surgical marker, the appropriate outline of the S-plasty was drawn, incorporating the defect and placing the expected incisions within the relaxed skin tension lines where possible.  The area thus outlined was incised deep to adipose tissue with a #15 scalpel blade.  The skin margins were undermined to an appropriate distance in all directions utilizing iris scissors. The skin flaps were advanced over the defect.  The opposing margins were then approximated with interrupted buried subcutaneous sutures.
Full Thickness Lip Wedge Repair (Flap) Text: Given the location of the defect and the proximity to free margins a full thickness wedge repair was deemed most appropriate.  Using a sterile surgical marker, the appropriate repair was drawn incorporating the defect and placing the expected incisions perpendicular to the vermilion border.  The vermilion border was also meticulously outlined to ensure appropriate reapproximation during the repair.  The area thus outlined was incised through and through with a #15 scalpel blade.  The muscularis and dermis were reaproximated with deep sutures following hemostasis. Care was taken to realign the vermilion border before proceeding with the superficial closure.  Once the vermilion was realigned the superfical and mucosal closure was finished.
Cheek-To-Nose Interpolation Flap Text: A decision was made to reconstruct the defect utilizing an interpolation axial flap and a staged reconstruction.  A telfa template was made of the defect.  This telfa template was then used to outline the Cheek-To-Nose Interpolation flap.  The donor area for the pedicle flap was then injected with anesthesia.  The flap was excised through the skin and subcutaneous tissue down to the layer of the underlying musculature.  The interpolation flap was carefully excised within this deep plane to maintain its blood supply.  The edges of the donor site were undermined.   The donor site was closed in a primary fashion.  The pedicle was then rotated into position and sutured.  Once the tube was sutured into place, adequate blood supply was confirmed with blanching and refill.  The pedicle was then wrapped with xeroform gauze and dressed appropriately with a telfa and gauze bandage to ensure continued blood supply and protect the attached pedicle.
Purse String (Intermediate) Text: Given the location of the defect and the characteristics of the surrounding skin a pursestring intermediate closure was deemed most appropriate.  Undermining was performed circumfirentially around the surgical defect.  A purstring suture was then placed and tightened.
Simple / Intermediate / Complex Repair - Final Wound Length In Cm: 3.5
Epidermal Autograft Text: The defect edges were debeveled with a #15 scalpel blade.  Given the location of the defect, shape of the defect and the proximity to free margins an epidermal autograft was deemed most appropriate.  Using a sterile surgical marker, the primary defect shape was transferred to the donor site. The epidermal graft was then harvested.  The skin graft was then placed in the primary defect and oriented appropriately.
V-Y Flap Text: The defect edges were debeveled with a #15 scalpel blade.  Given the location of the defect, shape of the defect and the proximity to free margins a V-Y flap was deemed most appropriate.  Using a sterile surgical marker, an appropriate advancement flap was drawn incorporating the defect and placing the expected incisions within the relaxed skin tension lines where possible.    The area thus outlined was incised deep to adipose tissue with a #15 scalpel blade.  The skin margins were undermined to an appropriate distance in all directions utilizing iris scissors.
Localized Dermabrasion With Wire Brush Text: The patient was draped in routine manner.  Localized dermabrasion using 3 x 17 mm wire brush was performed in routine manner to papillary dermis. This spot dermabrasion is being performed to complete skin cancer reconstruction. It also will eliminate the other sun damaged precancerous cells that are known to be part of the regional effect of a lifetime's worth of sun exposure. This localized dermabrasion is therapeutic and should not be considered cosmetic in any regard.
Secondary Intention Text (Leave Blank If You Do Not Want): The defect will heal with secondary intention.
Complex Repair And Graft Additional Text (Will Appearing After The Standard Complex Repair Text): The complex repair was not sufficient to completely close the primary defect. The remaining additional defect was repaired with the graft mentioned below.
Area L Indication Text: Tumors in this location are included in Area L (trunk and extremities).  Mohs surgery is indicated for larger tumors, or tumors with aggressive histologic features, in these anatomic locations.
Primary Defect Length In Cm (Final Defect Size - Required For Flaps/Grafts): 1.7
Referred To Oculoplastics For Closure Text (Leave Blank If You Do Not Want): After obtaining clear surgical margins the patient was sent to oculoplastics for surgical repair.  The patient understands they will receive post-surgical care and follow-up from the referring physician's office.
Post-Care Instructions: I reviewed with the patient in detail post-care instructions. Patient is not to engage in any heavy lifting, exercise, or swimming for the next 7 days. Should the patient develop any fevers, chills, bleeding, severe pain patient will contact the office immediately.
Bi-Rhombic Flap Text: The defect edges were debeveled with a #15 scalpel blade.  Given the location of the defect and the proximity to free margins a bi-rhombic flap was deemed most appropriate.  Using a sterile surgical marker, an appropriate rhombic flap was drawn incorporating the defect. The area thus outlined was incised deep to adipose tissue with a #15 scalpel blade.  The skin margins were undermined to an appropriate distance in all directions utilizing iris scissors.
Graft Donor Site Epidermal Sutures (Optional): 5-0 Ethilon
Postop Diagnosis: same
Undermining Location (Optional): in the deep fat
Burow's Advancement Flap Text: The defect edges were debeveled with a #15 scalpel blade.  Given the location of the defect and the proximity to free margins a Burow's advancement flap was deemed most appropriate.  Using a sterile surgical marker, the appropriate advancement flap was drawn incorporating the defect and placing the expected incisions within the relaxed skin tension lines where possible.    The area thus outlined was incised deep to adipose tissue with a #15 scalpel blade.  The skin margins were undermined to an appropriate distance in all directions utilizing iris scissors.
Location Indication Override (Is Already Calculated Based On Selected Body Location): Area H
Mohs Rapid Report Verbiage: The area of clinically evident tumor was marked with skin marking ink and appropriately hatched.  The initial incision was made following the Mohs approach through the skin.  The specimen was taken to the lab, divided into the necessary number of pieces, chromacoded and processed according to the Mohs protocol.  This was repeated in successive stages until a tumor free defect was achieved.

## 2018-02-26 ENCOUNTER — RX ONLY (OUTPATIENT)
Age: 58
Setting detail: RX ONLY
End: 2018-02-26

## 2018-02-26 RX ORDER — FLUCONAZOLE 200 MG/1
1 TAB TABLET ORAL QD
Qty: 1 | Refills: 0 | Status: ERX

## 2018-02-26 RX ORDER — FLUCONAZOLE 150 MG/1
TABLET ORAL
Qty: 1 | Refills: 0 | Status: CANCELLED

## 2018-03-06 ENCOUNTER — APPOINTMENT (RX ONLY)
Dept: URBAN - METROPOLITAN AREA CLINIC 37 | Facility: CLINIC | Age: 58
Setting detail: DERMATOLOGY
End: 2018-03-06

## 2018-03-06 ENCOUNTER — APPOINTMENT (RX ONLY)
Dept: URBAN - METROPOLITAN AREA CLINIC 38 | Facility: CLINIC | Age: 58
Setting detail: DERMATOLOGY
End: 2018-03-06

## 2018-03-06 DIAGNOSIS — L40.0 PSORIASIS VULGARIS: ICD-10-CM

## 2018-03-06 DIAGNOSIS — D485 NEOPLASM OF UNCERTAIN BEHAVIOR OF SKIN: ICD-10-CM

## 2018-03-06 DIAGNOSIS — Z48.02 ENCOUNTER FOR REMOVAL OF SUTURES: ICD-10-CM

## 2018-03-06 PROBLEM — D48.5 NEOPLASM OF UNCERTAIN BEHAVIOR OF SKIN: Status: ACTIVE | Noted: 2018-03-06

## 2018-03-06 PROBLEM — Z85.828 PERSONAL HISTORY OF OTHER MALIGNANT NEOPLASM OF SKIN: Status: ACTIVE | Noted: 2018-03-06

## 2018-03-06 PROCEDURE — 99024 POSTOP FOLLOW-UP VISIT: CPT

## 2018-03-06 PROCEDURE — ? BIOPSY BY SHAVE METHOD

## 2018-03-06 PROCEDURE — ? TREATMENT REGIMEN

## 2018-03-06 PROCEDURE — ? SUTURE REMOVAL (GLOBAL PERIOD)

## 2018-03-06 PROCEDURE — ? INTRALESIONAL KENALOG

## 2018-03-06 PROCEDURE — ? COUNSELING

## 2018-03-06 PROCEDURE — 11100: CPT

## 2018-03-06 PROCEDURE — 11900 INJECT SKIN LESIONS </W 7: CPT

## 2018-03-06 ASSESSMENT — LOCATION SIMPLE DESCRIPTION DERM
LOCATION SIMPLE: RIGHT EAR
LOCATION SIMPLE: POSTERIOR SCALP
LOCATION SIMPLE: LEFT SHOULDER
LOCATION SIMPLE: SCALP
LOCATION SIMPLE: RIGHT EAR
LOCATION SIMPLE: LEFT SHOULDER
LOCATION SIMPLE: SCALP
LOCATION SIMPLE: POSTERIOR SCALP

## 2018-03-06 ASSESSMENT — LOCATION ZONE DERM
LOCATION ZONE: EAR
LOCATION ZONE: SCALP
LOCATION ZONE: ARM
LOCATION ZONE: ARM
LOCATION ZONE: SCALP
LOCATION ZONE: EAR

## 2018-03-06 ASSESSMENT — LOCATION DETAILED DESCRIPTION DERM
LOCATION DETAILED: RIGHT SUPERIOR HELIX
LOCATION DETAILED: LEFT INFERIOR OCCIPITAL SCALP
LOCATION DETAILED: RIGHT SUPERIOR HELIX
LOCATION DETAILED: LEFT OCCIPITAL SCALP
LOCATION DETAILED: MID-OCCIPITAL SCALP
LOCATION DETAILED: LEFT ANTERIOR SHOULDER
LOCATION DETAILED: LEFT OCCIPITAL SCALP
LOCATION DETAILED: LEFT INFERIOR OCCIPITAL SCALP
LOCATION DETAILED: RIGHT SUPERIOR PARIETAL SCALP
LOCATION DETAILED: LEFT ANTERIOR SHOULDER
LOCATION DETAILED: RIGHT SUPERIOR PARIETAL SCALP
LOCATION DETAILED: MID-OCCIPITAL SCALP

## 2018-03-06 NOTE — PROCEDURE: BIOPSY BY SHAVE METHOD
Anesthesia Type: 1% lidocaine with epinephrine
Render Post-Care Instructions In Note?: no
Silver Nitrate Text: The wound bed was treated with silver nitrate after the biopsy was performed.
Post-Care Instructions: I reviewed with the patient in detail post-care instructions. Patient is to keep the biopsy site dry overnight, and then apply Vaseline daily until healed. Wound to be washed with gentle cleansers daily in the shower.
Lab Facility: [id number]
Size Of Lesion In Cm: 0
Dressing: bandage
Detail Level: Detailed
Billing Type: Third-Party Bill
Hemostasis: Electrocautery
Consent: Written consent was obtained and risks were reviewed including but not limited to scarring, infection, bleeding, scabbing, incomplete removal, nerve damage and allergy to anesthesia.
Lab: [id number]
Cryotherapy Text: The wound bed was treated with cryotherapy after the biopsy was performed.
Biopsy Type: H and E
Electrodesiccation And Curettage Text: The wound bed was treated with electrodesiccation and curettage after the biopsy was performed.
Electrodesiccation Text: The wound bed was treated with electrodesiccation after the biopsy was performed.
Curettage Text: The wound bed was treated with curettage after the biopsy was performed.
Wound Care: Vaseline
Type Of Destruction Used: Curettage
Anesthesia Volume In Cc: 0.5
Notification Instructions: Patient will be notified of biopsy results. However, patient instructed to call the office if not contacted within 2 weeks. If lesion is benign they will be notified through portal or mail and contacted by phone if biopsy results require further discussion

## 2018-03-06 NOTE — PROCEDURE: SUTURE REMOVAL (GLOBAL PERIOD)
Detail Level: Detailed
Add 21354 Cpt? (Important Note: In 2017 The Use Of 57244 Is Being Tracked By Cms To Determine Future Global Period Reimbursement For Global Periods): yes

## 2018-03-06 NOTE — PROCEDURE: MIPS QUALITY
Quality 226: Preventive Care And Screening: Tobacco Use: Screening And Cessation Intervention: Patient screened for tobacco and never smoked
Quality 431: Preventive Care And Screening: Unhealthy Alcohol Use - Screening: Patient screened for unhealthy alcohol use using a single question and scores less than 2 times per year
Quality 110: Preventive Care And Screening: Influenza Immunization: Influenza Immunization Administered during Influenza season
Quality 130: Documentation Of Current Medications In The Medical Record: Current Medications Documented
Detail Level: Generalized

## 2018-03-06 NOTE — PROCEDURE: SUTURE REMOVAL (GLOBAL PERIOD)
Add 47315 Cpt? (Important Note: In 2017 The Use Of 99219 Is Being Tracked By Cms To Determine Future Global Period Reimbursement For Global Periods): yes
Detail Level: Detailed

## 2018-03-06 NOTE — PROCEDURE: TREATMENT REGIMEN
Action 4: Continue
Detail Level: Zone
Continue Regimen: Otezla
Plan: ILK repeated today\\n\\nRTO in 4 weeks

## 2018-03-06 NOTE — PROCEDURE: INTRALESIONAL KENALOG
X Size Of Lesion In Cm (Optional): 0
Detail Level: Detailed
Include Z78.9 (Other Specified Conditions Influencing Health Status) As An Associated Diagnosis?: No
Total Volume (Ccs): 0.6
Administered By (Optional): Ziyad Narvaez PA-C
Concentration Of Kenalog Solution Injected (Mg/Ml): 10.0
Expiration Date For Kenalog (Optional): May 2018
Medical Necessity Clause: This procedure was medically necessary because the lesions that were treated were:
Kenalog Preparation: Kenalog
Consent: The risks of atrophy were reviewed with the patient.  Return visits suggested every month until symptoms resolve
Lot # For Kenalog (Optional): [id number]

## 2018-03-06 NOTE — PROCEDURE: TREATMENT REGIMEN
Continue Regimen: Otezla
Action 1: Continue
Plan: ILK repeated today\\n\\nRTO in 4 weeks
Detail Level: Zone

## 2018-03-06 NOTE — PROCEDURE: MIPS QUALITY
Quality 110: Preventive Care And Screening: Influenza Immunization: Influenza Immunization Administered during Influenza season
Quality 431: Preventive Care And Screening: Unhealthy Alcohol Use - Screening: Patient screened for unhealthy alcohol use using a single question and scores less than 2 times per year
Detail Level: Generalized
Quality 226: Preventive Care And Screening: Tobacco Use: Screening And Cessation Intervention: Patient screened for tobacco and never smoked
Quality 130: Documentation Of Current Medications In The Medical Record: Current Medications Documented

## 2018-03-06 NOTE — PROCEDURE: BIOPSY BY SHAVE METHOD
Anesthesia Volume In Cc: 0.5
Size Of Lesion In Cm: 0
Dressing: bandage
Hemostasis: Electrocautery
Electrodesiccation Text: The wound bed was treated with electrodesiccation after the biopsy was performed.
Wound Care: Vaseline
Silver Nitrate Text: The wound bed was treated with silver nitrate after the biopsy was performed.
Render Post-Care Instructions In Note?: no
Billing Type: Third-Party Bill
Post-Care Instructions: I reviewed with the patient in detail post-care instructions. Patient is to keep the biopsy site dry overnight, and then apply Vaseline daily until healed. Wound to be washed with gentle cleansers daily in the shower.
Detail Level: Detailed
Cryotherapy Text: The wound bed was treated with cryotherapy after the biopsy was performed.
Notification Instructions: Patient will be notified of biopsy results. However, patient instructed to call the office if not contacted within 2 weeks. If lesion is benign they will be notified through portal or mail and contacted by phone if biopsy results require further discussion
Anesthesia Type: 1% lidocaine with epinephrine
Consent: Written consent was obtained and risks were reviewed including but not limited to scarring, infection, bleeding, scabbing, incomplete removal, nerve damage and allergy to anesthesia.
Electrodesiccation And Curettage Text: The wound bed was treated with electrodesiccation and curettage after the biopsy was performed.
Biopsy Type: H and E
Curettage Text: The wound bed was treated with curettage after the biopsy was performed.
Type Of Destruction Used: Curettage

## 2018-03-06 NOTE — PROCEDURE: INTRALESIONAL KENALOG
Total Volume (Ccs): 0.6
Concentration Of Kenalog Solution Injected (Mg/Ml): 10.0
Expiration Date For Kenalog (Optional): May 2018
Consent: The risks of atrophy were reviewed with the patient.  Return visits suggested every month until symptoms resolve
Medical Necessity Clause: This procedure was medically necessary because the lesions that were treated were:
Detail Level: Detailed
Kenalog Preparation: Kenalog
Lot # For Kenalog (Optional): [id number]
Administered By (Optional): Valente Rico PA-C
X Size Of Lesion In Cm (Optional): 0
Include Z78.9 (Other Specified Conditions Influencing Health Status) As An Associated Diagnosis?: No

## 2018-03-28 ENCOUNTER — APPOINTMENT (RX ONLY)
Dept: URBAN - METROPOLITAN AREA CLINIC 38 | Facility: CLINIC | Age: 58
Setting detail: DERMATOLOGY
End: 2018-03-28

## 2018-03-28 ENCOUNTER — APPOINTMENT (RX ONLY)
Dept: URBAN - METROPOLITAN AREA CLINIC 37 | Facility: CLINIC | Age: 58
Setting detail: DERMATOLOGY
End: 2018-03-28

## 2018-03-28 PROBLEM — C44.619 BASAL CELL CARCINOMA OF SKIN OF LEFT UPPER LIMB, INCLUDING SHOULDER: Status: ACTIVE | Noted: 2018-03-28

## 2018-03-28 PROCEDURE — 12032 INTMD RPR S/A/T/EXT 2.6-7.5: CPT

## 2018-03-28 PROCEDURE — 11602 EXC TR-EXT MAL+MARG 1.1-2 CM: CPT

## 2018-03-28 PROCEDURE — ? EXCISION

## 2018-03-28 NOTE — PROCEDURE: EXCISION
Complex Repair And W Plasty Text: The defect edges were debeveled with a #15 scalpel blade.  The primary defect was closed partially with a complex linear closure.  Given the location of the remaining defect, shape of the defect and the proximity to free margins a W plasty was deemed most appropriate for complete closure of the defect.  Using a sterile surgical marker, an appropriate advancement flap was drawn incorporating the defect and placing the expected incisions within the relaxed skin tension lines where possible.    The area thus outlined was incised deep to adipose tissue with a #15 scalpel blade.  The skin margins were undermined to an appropriate distance in all directions utilizing iris scissors.
Complex Repair And Tissue Cultured Epidermal Autograft Text: The defect edges were debeveled with a #15 scalpel blade.  The primary defect was closed partially with a complex linear closure.  Given the location of the defect, shape of the defect and the proximity to free margins an tissue cultured epidermal autograft was deemed most appropriate to repair the remaining defect.  The graft was trimmed to fit the size of the remaining defect.  The graft was then placed in the primary defect, oriented appropriately, and sutured into place.
Anesthesia Volume In Cc: 4
Skin Substitute Units (Will Override Primary Defect Units If Greater Than 0): 0
Complex Repair And M Plasty Text: The defect edges were debeveled with a #15 scalpel blade.  The primary defect was closed partially with a complex linear closure.  Given the location of the remaining defect, shape of the defect and the proximity to free margins an M plasty was deemed most appropriate for complete closure of the defect.  Using a sterile surgical marker, an appropriate advancement flap was drawn incorporating the defect and placing the expected incisions within the relaxed skin tension lines where possible.    The area thus outlined was incised deep to adipose tissue with a #15 scalpel blade.  The skin margins were undermined to an appropriate distance in all directions utilizing iris scissors.
Anesthesia Volume In Cc: 3
Repair Performed By Another Provider Text (Leave Blank If You Do Not Want): After the tissue was excised the defect was repaired by another provider.
Additional Anesthesia Volume In Cc: 6
Render Path Notes In Note?: no
Complex Repair And A-T Advancement Flap Text: The defect edges were debeveled with a #15 scalpel blade.  The primary defect was closed partially with a complex linear closure.  Given the location of the remaining defect, shape of the defect and the proximity to free margins an A-T advancement flap was deemed most appropriate for complete closure of the defect.  Using a sterile surgical marker, an appropriate advancement flap was drawn incorporating the defect and placing the expected incisions within the relaxed skin tension lines where possible.    The area thus outlined was incised deep to adipose tissue with a #15 scalpel blade.  The skin margins were undermined to an appropriate distance in all directions utilizing iris scissors.
Cheek-To-Nose Interpolation Flap Text: A decision was made to reconstruct the defect utilizing an interpolation axial flap and a staged reconstruction.  A telfa template was made of the defect.  This telfa template was then used to outline the Cheek-To-Nose Interpolation flap.  The donor area for the pedicle flap was then injected with anesthesia.  The flap was excised through the skin and subcutaneous tissue down to the layer of the underlying musculature.  The interpolation flap was carefully excised within this deep plane to maintain its blood supply.  The edges of the donor site were undermined.   The donor site was closed in a primary fashion.  The pedicle was then rotated into position and sutured.  Once the tube was sutured into place, adequate blood supply was confirmed with blanching and refill.  The pedicle was then wrapped with xeroform gauze and dressed appropriately with a telfa and gauze bandage to ensure continued blood supply and protect the attached pedicle.
O-L Flap Text: The defect edges were debeveled with a #15 scalpel blade.  Given the location of the defect, shape of the defect and the proximity to free margins an O-L flap was deemed most appropriate.  Using a sterile surgical marker, an appropriate advancement flap was drawn incorporating the defect and placing the expected incisions within the relaxed skin tension lines where possible.    The area thus outlined was incised deep to adipose tissue with a #15 scalpel blade.  The skin margins were undermined to an appropriate distance in all directions utilizing iris scissors.
Complex Repair And Dermal Autograft Text: The defect edges were debeveled with a #15 scalpel blade.  The primary defect was closed partially with a complex linear closure.  Given the location of the defect, shape of the defect and the proximity to free margins an dermal autograft was deemed most appropriate to repair the remaining defect.  The graft was trimmed to fit the size of the remaining defect.  The graft was then placed in the primary defect, oriented appropriately, and sutured into place.
Xenograft Text: The defect edges were debeveled with a #15 scalpel blade.  Given the location of the defect, shape of the defect and the proximity to free margins a xenograft was deemed most appropriate.  The graft was then trimmed to fit the size of the defect.  The graft was then placed in the primary defect and oriented appropriately.
Fusiform Excision Additional Text (Leave Blank If You Do Not Want): The margin was drawn around the clinically apparent lesion.  A fusiform shape was then drawn on the skin incorporating the lesion and margins.  Incisions were then made along these lines to the appropriate tissue plane and the lesion was extirpated.
Cartilage Graft Text: The defect edges were debeveled with a #15 scalpel blade.  Given the location of the defect, shape of the defect, the fact the defect involved a full thickness cartilage defect a cartilage graft was deemed most appropriate.  An appropriate donor site was identified, cleansed, and anesthetized. The cartilage graft was then harvested and transferred to the recipient site, oriented appropriately and then sutured into place.  The secondary defect was then repaired using a primary closure.
Crescentic Intermediate Repair Preamble Text (Leave Blank If You Do Not Want): Undermining was performed with blunt dissection.
Complex Repair And Melolabial Flap Text: The defect edges were debeveled with a #15 scalpel blade.  The primary defect was closed partially with a complex linear closure.  Given the location of the remaining defect, shape of the defect and the proximity to free margins a melolabial flap was deemed most appropriate for complete closure of the defect.  Using a sterile surgical marker, an appropriate advancement flap was drawn incorporating the defect and placing the expected incisions within the relaxed skin tension lines where possible.    The area thus outlined was incised deep to adipose tissue with a #15 scalpel blade.  The skin margins were undermined to an appropriate distance in all directions utilizing iris scissors.
Double Island Pedicle Flap Text: The defect edges were debeveled with a #15 scalpel blade.  Given the location of the defect, shape of the defect and the proximity to free margins a double island pedicle advancement flap was deemed most appropriate.  Using a sterile surgical marker, an appropriate advancement flap was drawn incorporating the defect, outlining the appropriate donor tissue and placing the expected incisions within the relaxed skin tension lines where possible.    The area thus outlined was incised deep to adipose tissue with a #15 scalpel blade.  The skin margins were undermined to an appropriate distance in all directions around the primary defect and laterally outward around the island pedicle utilizing iris scissors.  There was minimal undermining beneath the pedicle flap.
Detail Level: Detailed
Ear Star Wedge Flap Text: The defect edges were debeveled with a #15 blade scalpel.  Given the location of the defect and the proximity to free margins (helical rim) an ear star wedge flap was deemed most appropriate.  Using a sterile surgical marker, the appropriate flap was drawn incorporating the defect and placing the expected incisions between the helical rim and antihelix where possible.  The area thus outlined was incised through and through with a #15 scalpel blade.
Split-Thickness Skin Graft Text: The defect edges were debeveled with a #15 scalpel blade.  Given the location of the defect, shape of the defect and the proximity to free margins a split thickness skin graft was deemed most appropriate.  Using a sterile surgical marker, the primary defect shape was transferred to the donor site. The split thickness graft was then harvested.  The skin graft was then placed in the primary defect and oriented appropriately.
Show Accession Variable: Yes
Scalpel Size: 15 blade
Complex Repair And Xenograft Text: The defect edges were debeveled with a #15 scalpel blade.  The primary defect was closed partially with a complex linear closure.  Given the location of the defect, shape of the defect and the proximity to free margins a xenograft was deemed most appropriate to repair the remaining defect.  The graft was trimmed to fit the size of the remaining defect.  The graft was then placed in the primary defect, oriented appropriately, and sutured into place.
W Plasty Text: The lesion was extirpated to the level of the fat with a #15 scalpel blade.  Given the location of the defect, shape of the defect and the proximity to free margins a W-plasty was deemed most appropriate for repair.  Using a sterile surgical marker, the appropriate transposition arms of the W-plasty were drawn incorporating the defect and placing the expected incisions within the relaxed skin tension lines where possible.    The area thus outlined was incised deep to adipose tissue with a #15 scalpel blade.  The skin margins were undermined to an appropriate distance in all directions utilizing iris scissors.  The opposing transposition arms were then transposed into place in opposite direction and anchored with interrupted buried subcutaneous sutures.
Mucosal Advancement Flap Text: Given the location of the defect, shape of the defect and the proximity to free margins a mucosal advancement flap was deemed most appropriate. Incisions were made with a 15 blade scalpel in the appropriate fashion along the cutaneous vermilion border and the mucosal lip. The remaining actinically damaged mucosal tissue was excised.  The mucosal advancement flap was then elevated to the gingival sulcus with care taken to preserve the neurovascular structures and advanced into the primary defect. Care was taken to ensure that precise realignment of the vermilion border was achieved.
Estimated Blood Loss (Cc): minimal
Complex Repair And Double M Plasty Text: The defect edges were debeveled with a #15 scalpel blade.  The primary defect was closed partially with a complex linear closure.  Given the location of the remaining defect, shape of the defect and the proximity to free margins a double M plasty was deemed most appropriate for complete closure of the defect.  Using a sterile surgical marker, an appropriate advancement flap was drawn incorporating the defect and placing the expected incisions within the relaxed skin tension lines where possible.    The area thus outlined was incised deep to adipose tissue with a #15 scalpel blade.  The skin margins were undermined to an appropriate distance in all directions utilizing iris scissors.
Lazy S Complex Repair Preamble Text (Leave Blank If You Do Not Want): Extensive wide undermining was performed.
Lab Facility: [id number]
Undermining Location (Optional): in the deep fat
Elliptical Excision Additional Text (Leave Blank If You Do Not Want): The margin was drawn around the clinically apparent lesion.  An elliptical shape was then drawn on the skin incorporating the lesion and margins.  Incisions were then made along these lines to the appropriate tissue plane and the lesion was extirpated.
Hatchet Flap Text: The defect edges were debeveled with a #15 scalpel blade.  Given the location of the defect, shape of the defect and the proximity to free margins a hatchet flap was deemed most appropriate.  Using a sterile surgical marker, an appropriate hatchet flap was drawn incorporating the defect and placing the expected incisions within the relaxed skin tension lines where possible.    The area thus outlined was incised deep to adipose tissue with a #15 scalpel blade.  The skin margins were undermined to an appropriate distance in all directions utilizing iris scissors.
Excision Depth: adipose tissue
Complex Repair And Z Plasty Text: The defect edges were debeveled with a #15 scalpel blade.  The primary defect was closed partially with a complex linear closure.  Given the location of the remaining defect, shape of the defect and the proximity to free margins a Z plasty was deemed most appropriate for complete closure of the defect.  Using a sterile surgical marker, an appropriate advancement flap was drawn incorporating the defect and placing the expected incisions within the relaxed skin tension lines where possible.    The area thus outlined was incised deep to adipose tissue with a #15 scalpel blade.  The skin margins were undermined to an appropriate distance in all directions utilizing iris scissors.
Spiral Flap Text: The defect edges were debeveled with a #15 scalpel blade.  Given the location of the defect, shape of the defect and the proximity to free margins a spiral flap was deemed most appropriate.  Using a sterile surgical marker, an appropriate rotation flap was drawn incorporating the defect and placing the expected incisions within the relaxed skin tension lines where possible. The area thus outlined was incised deep to adipose tissue with a #15 scalpel blade.  The skin margins were undermined to an appropriate distance in all directions utilizing iris scissors.
Island Pedicle Flap-Requiring Vessel Identification Text: The defect edges were debeveled with a #15 scalpel blade.  Given the location of the defect, shape of the defect and the proximity to free margins an island pedicle advancement flap was deemed most appropriate.  Using a sterile surgical marker, an appropriate advancement flap was drawn, based on the axial vessel mentioned above, incorporating the defect, outlining the appropriate donor tissue and placing the expected incisions within the relaxed skin tension lines where possible.    The area thus outlined was incised deep to adipose tissue with a #15 scalpel blade.  The skin margins were undermined to an appropriate distance in all directions around the primary defect and laterally outward around the island pedicle utilizing iris scissors.  There was minimal undermining beneath the pedicle flap.
Anesthesia Type: 1% lidocaine, 0.25% Marcaine, 1:600,000 epinephrine and a 1:10 solution of sodium bicarbonate
Purse String (Simple) Text: Given the location of the defect and the characteristics of the surrounding skin a purse string simple closure was deemed most appropriate.  Undermining was performed circumferentially around the surgical defect.  A purse string suture was then placed and tightened.
Burow's Advancement Flap Text: The defect edges were debeveled with a #15 scalpel blade.  Given the location of the defect and the proximity to free margins a Burow's advancement flap was deemed most appropriate.  Using a sterile surgical marker, the appropriate advancement flap was drawn incorporating the defect and placing the expected incisions within the relaxed skin tension lines where possible.    The area thus outlined was incised deep to adipose tissue with a #15 scalpel blade.  The skin margins were undermined to an appropriate distance in all directions utilizing iris scissors.
Graft Donor Site Bandage (Optional-Leave Blank If You Don't Want In Note): Steri-strips and a pressure bandage were applied to the donor site.
Complex Repair And Bilobe Flap Text: The defect edges were debeveled with a #15 scalpel blade.  The primary defect was closed partially with a complex linear closure.  Given the location of the remaining defect, shape of the defect and the proximity to free margins a bilobe flap was deemed most appropriate for complete closure of the defect.  Using a sterile surgical marker, an appropriate advancement flap was drawn incorporating the defect and placing the expected incisions within the relaxed skin tension lines where possible.    The area thus outlined was incised deep to adipose tissue with a #15 scalpel blade.  The skin margins were undermined to an appropriate distance in all directions utilizing iris scissors.
Epidermal Closure Graft Donor Site (Optional): simple interrupted
Bilobed Transposition Flap Text: The defect edges were debeveled with a #15 scalpel blade.  Given the location of the defect and the proximity to free margins a bilobed transposition flap was deemed most appropriate.  Using a sterile surgical marker, an appropriate bilobe flap drawn around the defect.    The area thus outlined was incised deep to adipose tissue with a #15 scalpel blade.  The skin margins were undermined to an appropriate distance in all directions utilizing iris scissors.
Purse String (Intermediate) Text: Given the location of the defect and the characteristics of the surrounding skin a pursestring intermediate closure was deemed most appropriate.  Undermining was performed circumfirentially around the surgical defect.  A purstring suture was then placed and tightened.
Interpolation Flap Text: A decision was made to reconstruct the defect utilizing an interpolation axial flap and a staged reconstruction.  A telfa template was made of the defect.  This telfa template was then used to outline the interpolation flap.  The donor area for the pedicle flap was then injected with anesthesia.  The flap was excised through the skin and subcutaneous tissue down to the layer of the underlying musculature.  The interpolation flap was carefully excised within this deep plane to maintain its blood supply.  The edges of the donor site were undermined.   The donor site was closed in a primary fashion.  The pedicle was then rotated into position and sutured.  Once the tube was sutured into place, adequate blood supply was confirmed with blanching and refill.  The pedicle was then wrapped with xeroform gauze and dressed appropriately with a telfa and gauze bandage to ensure continued blood supply and protect the attached pedicle.
A-T Advancement Flap Text: The defect edges were debeveled with a #15 scalpel blade.  Given the location of the defect, shape of the defect and the proximity to free margins an A-T advancement flap was deemed most appropriate.  Using a sterile surgical marker, an appropriate advancement flap was drawn incorporating the defect and placing the expected incisions within the relaxed skin tension lines where possible.    The area thus outlined was incised deep to adipose tissue with a #15 scalpel blade.  The skin margins were undermined to an appropriate distance in all directions utilizing iris scissors.
Complex Repair And O-L Flap Text: The defect edges were debeveled with a #15 scalpel blade.  The primary defect was closed partially with a complex linear closure.  Given the location of the remaining defect, shape of the defect and the proximity to free margins an O-L flap was deemed most appropriate for complete closure of the defect.  Using a sterile surgical marker, an appropriate flap was drawn incorporating the defect and placing the expected incisions within the relaxed skin tension lines where possible.    The area thus outlined was incised deep to adipose tissue with a #15 scalpel blade.  The skin margins were undermined to an appropriate distance in all directions utilizing iris scissors.
O-T Advancement Flap Text: The defect edges were debeveled with a #15 scalpel blade.  Given the location of the defect, shape of the defect and the proximity to free margins an O-T advancement flap was deemed most appropriate.  Using a sterile surgical marker, an appropriate advancement flap was drawn incorporating the defect and placing the expected incisions within the relaxed skin tension lines where possible.    The area thus outlined was incised deep to adipose tissue with a #15 scalpel blade.  The skin margins were undermined to an appropriate distance in all directions utilizing iris scissors.
Cheek Interpolation Flap Text: A decision was made to reconstruct the defect utilizing an interpolation axial flap and a staged reconstruction.  A telfa template was made of the defect.  This telfa template was then used to outline the Cheek Interpolation flap.  The donor area for the pedicle flap was then injected with anesthesia.  The flap was excised through the skin and subcutaneous tissue down to the layer of the underlying musculature.  The interpolation flap was carefully excised within this deep plane to maintain its blood supply.  The edges of the donor site were undermined.   The donor site was closed in a primary fashion.  The pedicle was then rotated into position and sutured.  Once the tube was sutured into place, adequate blood supply was confirmed with blanching and refill.  The pedicle was then wrapped with xeroform gauze and dressed appropriately with a telfa and gauze bandage to ensure continued blood supply and protect the attached pedicle.
Dermal Autograft Text: The defect edges were debeveled with a #15 scalpel blade.  Given the location of the defect, shape of the defect and the proximity to free margins a dermal autograft was deemed most appropriate.  Using a sterile surgical marker, the primary defect shape was transferred to the donor site. The area thus outlined was incised deep to adipose tissue with a #15 scalpel blade.  The harvested graft was then trimmed of adipose and epidermal tissue until only dermis was left.  The skin graft was then placed in the primary defect and oriented appropriately.
Post-Care Instructions: I reviewed with the patient in detail post-care instructions. Patient is not to engage in any heavy lifting, exercise, or swimming for the next 14 days. Should the patient develop any fevers, chills, bleeding, severe pain patient will contact the office immediately.
Z Plasty Text: The lesion was extirpated to the level of the fat with a #15 scalpel blade.  Given the location of the defect, shape of the defect and the proximity to free margins a Z-plasty was deemed most appropriate for repair.  Using a sterile surgical marker, the appropriate transposition arms of the Z-plasty were drawn incorporating the defect and placing the expected incisions within the relaxed skin tension lines where possible.    The area thus outlined was incised deep to adipose tissue with a #15 scalpel blade.  The skin margins were undermined to an appropriate distance in all directions utilizing iris scissors.  The opposing transposition arms were then transposed into place in opposite direction and anchored with interrupted buried subcutaneous sutures.
Complex Repair And V-Y Plasty Text: The defect edges were debeveled with a #15 scalpel blade.  The primary defect was closed partially with a complex linear closure.  Given the location of the remaining defect, shape of the defect and the proximity to free margins a V-Y plasty was deemed most appropriate for complete closure of the defect.  Using a sterile surgical marker, an appropriate advancement flap was drawn incorporating the defect and placing the expected incisions within the relaxed skin tension lines where possible.    The area thus outlined was incised deep to adipose tissue with a #15 scalpel blade.  The skin margins were undermined to an appropriate distance in all directions utilizing iris scissors.
Complex Repair And Epidermal Autograft Text: The defect edges were debeveled with a #15 scalpel blade.  The primary defect was closed partially with a complex linear closure.  Given the location of the defect, shape of the defect and the proximity to free margins an epidermal autograft was deemed most appropriate to repair the remaining defect.  The graft was trimmed to fit the size of the remaining defect.  The graft was then placed in the primary defect, oriented appropriately, and sutured into place.
S Plasty Text: Given the location and shape of the defect, and the orientation of relaxed skin tension lines, an S-plasty was deemed most appropriate for repair.  Using a sterile surgical marker, the appropriate outline of the S-plasty was drawn, incorporating the defect and placing the expected incisions within the relaxed skin tension lines where possible.  The area thus outlined was incised deep to adipose tissue with a #15 scalpel blade.  The skin margins were undermined to an appropriate distance in all directions utilizing iris scissors. The skin flaps were advanced over the defect.  The opposing margins were then approximated with interrupted buried subcutaneous sutures.
Saucerization Excision Additional Text (Leave Blank If You Do Not Want): The margin was drawn around the clinically apparent lesion.  Incisions were then made along these lines, in a tangential fashion, to the appropriate tissue plane and the lesion was extirpated.
Bilobed Flap Text: The defect edges were debeveled with a #15 scalpel blade.  Given the location of the defect and the proximity to free margins a bilobe flap was deemed most appropriate.  Using a sterile surgical marker, an appropriate bilobe flap drawn around the defect.    The area thus outlined was incised deep to adipose tissue with a #15 scalpel blade.  The skin margins were undermined to an appropriate distance in all directions utilizing iris scissors.
Ftsg Text: The defect edges were debeveled with a #15 scalpel blade.  Given the location of the defect, shape of the defect and the proximity to free margins a full thickness skin graft was deemed most appropriate.  Using a sterile surgical marker, the primary defect shape was transferred to the donor site. The area thus outlined was incised deep to adipose tissue with a #15 scalpel blade.  The harvested graft was then trimmed of adipose tissue until only dermis and epidermis was left.  The skin margins of the secondary defect were undermined to an appropriate distance in all directions utilizing iris scissors.  The secondary defect was closed with interrupted buried subcutaneous sutures.  The skin edges were then re-apposed with running  sutures.  The skin graft was then placed in the primary defect and oriented appropriately.
Excision Method: Elliptical
Rotation Flap Text: The defect edges were debeveled with a #15 scalpel blade.  Given the location of the defect, shape of the defect and the proximity to free margins a rotation flap was deemed most appropriate.  Using a sterile surgical marker, an appropriate rotation flap was drawn incorporating the defect and placing the expected incisions within the relaxed skin tension lines where possible.    The area thus outlined was incised deep to adipose tissue with a #15 scalpel blade.  The skin margins were undermined to an appropriate distance in all directions utilizing iris scissors.
Billing Type: Third-Party Bill
Wound Care: Petrolatum
Skin Substitute Text: The defect edges were debeveled with a #15 scalpel blade.  Given the location of the defect, shape of the defect and the proximity to free margins a skin substitute graft was deemed most appropriate.  The graft material was trimmed to fit the size of the defect. The graft was then placed in the primary defect and oriented appropriately.
Partial Purse String (Simple) Text: Given the location of the defect and the characteristics of the surrounding skin a simple purse string closure was deemed most appropriate.  Undermining was performed circumferentially around the surgical defect.  A purse string suture was then placed and tightened. Wound tension of the circular defect prevented complete closure of the wound.
Melolabial Interpolation Flap Text: A decision was made to reconstruct the defect utilizing an interpolation axial flap and a staged reconstruction.  A telfa template was made of the defect.  This telfa template was then used to outline the melolabial interpolation flap.  The donor area for the pedicle flap was then injected with anesthesia.  The flap was excised through the skin and subcutaneous tissue down to the layer of the underlying musculature.  The pedicle flap was carefully excised within this deep plane to maintain its blood supply.  The edges of the donor site were undermined.   The donor site was closed in a primary fashion.  The pedicle was then rotated into position and sutured.  Once the tube was sutured into place, adequate blood supply was confirmed with blanching and refill.  The pedicle was then wrapped with xeroform gauze and dressed appropriately with a telfa and gauze bandage to ensure continued blood supply and protect the attached pedicle.
Complex Repair And Rotation Flap Text: The defect edges were debeveled with a #15 scalpel blade.  The primary defect was closed partially with a complex linear closure.  Given the location of the remaining defect, shape of the defect and the proximity to free margins a rotation flap was deemed most appropriate for complete closure of the defect.  Using a sterile surgical marker, an appropriate advancement flap was drawn incorporating the defect and placing the expected incisions within the relaxed skin tension lines where possible.    The area thus outlined was incised deep to adipose tissue with a #15 scalpel blade.  The skin margins were undermined to an appropriate distance in all directions utilizing iris scissors.
Island Pedicle Flap With Canthal Suspension Text: The defect edges were debeveled with a #15 scalpel blade.  Given the location of the defect, shape of the defect and the proximity to free margins an island pedicle advancement flap was deemed most appropriate.  Using a sterile surgical marker, an appropriate advancement flap was drawn incorporating the defect, outlining the appropriate donor tissue and placing the expected incisions within the relaxed skin tension lines where possible. The area thus outlined was incised deep to adipose tissue with a #15 scalpel blade.  The skin margins were undermined to an appropriate distance in all directions around the primary defect and laterally outward around the island pedicle utilizing iris scissors.  There was minimal undermining beneath the pedicle flap. A suspension suture was placed in the canthal tendon to prevent tension and prevent ectropion.
Alar Island Pedicle Flap Text: The defect edges were debeveled with a #15 scalpel blade.  Given the location of the defect, shape of the defect and the proximity to the alar rim an island pedicle advancement flap was deemed most appropriate.  Using a sterile surgical marker, an appropriate advancement flap was drawn incorporating the defect, outlining the appropriate donor tissue and placing the expected incisions within the nasal ala running parallel to the alar rim. The area thus outlined was incised with a #15 scalpel blade.  The skin margins were undermined minimally to an appropriate distance in all directions around the primary defect and laterally outward around the island pedicle utilizing iris scissors.  There was minimal undermining beneath the pedicle flap.
Partial Purse String (Intermediate) Text: Given the location of the defect and the characteristics of the surrounding skin an intermediate purse string closure was deemed most appropriate.  Undermining was performed circumferentially around the surgical defect.  A purse string suture was then placed and tightened. Wound tension of the circular defect prevented complete closure of the wound.
Trilobed Flap Text: The defect edges were debeveled with a #15 scalpel blade.  Given the location of the defect and the proximity to free margins a trilobed flap was deemed most appropriate.  Using a sterile surgical marker, an appropriate trilobed flap drawn around the defect.    The area thus outlined was incised deep to adipose tissue with a #15 scalpel blade.  The skin margins were undermined to an appropriate distance in all directions utilizing iris scissors.
Epidermal Closure: running
Positioning (Leave Blank If You Do Not Want): The patient was placed in a comfortable position exposing the surgical site.
Muscle Hinge Flap Text: The defect edges were debeveled with a #15 scalpel blade.  Given the size, depth and location of the defect and the proximity to free margins a muscle hinge flap was deemed most appropriate.  Using a sterile surgical marker, an appropriate hinge flap was drawn incorporating the defect. The area thus outlined was incised with a #15 scalpel blade.  The skin margins were undermined to an appropriate distance in all directions utilizing iris scissors.
Slit Excision Additional Text (Leave Blank If You Do Not Want): A linear line was drawn on the skin overlying the lesion. An incision was made slowly until the lesion was visualized.  Once visualized, the lesion was removed with blunt dissection.
Repair Type: Intermediate
Complex Repair And Skin Substitute Graft Text: The defect edges were debeveled with a #15 scalpel blade.  The primary defect was closed partially with a complex linear closure.  Given the location of the remaining defect, shape of the defect and the proximity to free margins a skin substitute graft was deemed most appropriate to repair the remaining defect.  The graft was trimmed to fit the size of the remaining defect.  The graft was then placed in the primary defect, oriented appropriately, and sutured into place.
Complex Repair And O-T Advancement Flap Text: The defect edges were debeveled with a #15 scalpel blade.  The primary defect was closed partially with a complex linear closure.  Given the location of the remaining defect, shape of the defect and the proximity to free margins an O-T advancement flap was deemed most appropriate for complete closure of the defect.  Using a sterile surgical marker, an appropriate advancement flap was drawn incorporating the defect and placing the expected incisions within the relaxed skin tension lines where possible.    The area thus outlined was incised deep to adipose tissue with a #15 scalpel blade.  The skin margins were undermined to an appropriate distance in all directions utilizing iris scissors.
Advancement Flap (Double) Text: The defect edges were debeveled with a #15 scalpel blade.  Given the location of the defect and the proximity to free margins a double advancement flap was deemed most appropriate.  Using a sterile surgical marker, the appropriate advancement flaps were drawn incorporating the defect and placing the expected incisions within the relaxed skin tension lines where possible.    The area thus outlined was incised deep to adipose tissue with a #15 scalpel blade.  The skin margins were undermined to an appropriate distance in all directions utilizing iris scissors.
X Size Of Lesion In Cm (Optional): 0.5
Melolabial Transposition Flap Text: The defect edges were debeveled with a #15 scalpel blade.  Given the location of the defect and the proximity to free margins a melolabial flap was deemed most appropriate.  Using a sterile surgical marker, an appropriate melolabial transposition flap was drawn incorporating the defect.    The area thus outlined was incised deep to adipose tissue with a #15 scalpel blade.  The skin margins were undermined to an appropriate distance in all directions utilizing iris scissors.
Epidermal Autograft Text: The defect edges were debeveled with a #15 scalpel blade.  Given the location of the defect, shape of the defect and the proximity to free margins an epidermal autograft was deemed most appropriate.  Using a sterile surgical marker, the primary defect shape was transferred to the donor site. The epidermal graft was then harvested.  The skin graft was then placed in the primary defect and oriented appropriately.
Suture Removal: 14 days
Transposition Flap Text: The defect edges were debeveled with a #15 scalpel blade.  Given the location of the defect and the proximity to free margins a transposition flap was deemed most appropriate.  Using a sterile surgical marker, an appropriate transposition flap was drawn incorporating the defect.    The area thus outlined was incised deep to adipose tissue with a #15 scalpel blade.  The skin margins were undermined to an appropriate distance in all directions utilizing iris scissors.
Complex Repair And Dorsal Nasal Flap Text: The defect edges were debeveled with a #15 scalpel blade.  The primary defect was closed partially with a complex linear closure.  Given the location of the remaining defect, shape of the defect and the proximity to free margins a dorsal nasal flap was deemed most appropriate for complete closure of the defect.  Using a sterile surgical marker, an appropriate flap was drawn incorporating the defect and placing the expected incisions within the relaxed skin tension lines where possible.    The area thus outlined was incised deep to adipose tissue with a #15 scalpel blade.  The skin margins were undermined to an appropriate distance in all directions utilizing iris scissors.
Deep Sutures: 4-0 Vicryl
Lip Wedge Excision Repair Text: Given the location of the defect and the proximity to free margins a full thickness wedge repair was deemed most appropriate.  Using a sterile surgical marker, the appropriate repair was drawn incorporating the defect and placing the expected incisions perpendicular to the vermilion border.  The vermilion border was also meticulously outlined to ensure appropriate reapproximation during the repair.  The area thus outlined was incised through and through with a #15 scalpel blade.  The muscularis and dermis were reaproximated with deep sutures following hemostasis. Care was taken to realign the vermilion border before proceeding with the superficial closure.  Once the vermilion was realigned the superfical and mucosal closure was finished.
Pre-Excision Curettage Text (Leave Blank If You Do Not Want): Prior to drawing the surgical margin the visible lesion was removed with electrodesiccation and curettage to clearly define the lesion size.
Complex Repair And Split-Thickness Skin Graft Text: The defect edges were debeveled with a #15 scalpel blade.  The primary defect was closed partially with a complex linear closure.  Given the location of the defect, shape of the defect and the proximity to free margins a split thickness skin graft was deemed most appropriate to repair the remaining defect.  The graft was trimmed to fit the size of the remaining defect.  The graft was then placed in the primary defect, oriented appropriately, and sutured into place.
O-Z Plasty Text: The defect edges were debeveled with a #15 scalpel blade.  Given the location of the defect, shape of the defect and the proximity to free margins an O-Z plasty (double transposition flap) was deemed most appropriate.  Using a sterile surgical marker, the appropriate transposition flaps were drawn incorporating the defect and placing the expected incisions within the relaxed skin tension lines where possible.    The area thus outlined was incised deep to adipose tissue with a #15 scalpel blade.  The skin margins were undermined to an appropriate distance in all directions utilizing iris scissors.  Hemostasis was achieved with electrocautery.  The flaps were then transposed into place, one clockwise and the other counterclockwise, and anchored with interrupted buried subcutaneous sutures.
Complex Repair And Single Advancement Flap Text: The defect edges were debeveled with a #15 scalpel blade.  The primary defect was closed partially with a complex linear closure.  Given the location of the remaining defect, shape of the defect and the proximity to free margins a single advancement flap was deemed most appropriate for complete closure of the defect.  Using a sterile surgical marker, an appropriate advancement flap was drawn incorporating the defect and placing the expected incisions within the relaxed skin tension lines where possible.    The area thus outlined was incised deep to adipose tissue with a #15 scalpel blade.  The skin margins were undermined to an appropriate distance in all directions utilizing iris scissors.
Complex Repair And Double Advancement Flap Text: The defect edges were debeveled with a #15 scalpel blade.  The primary defect was closed partially with a complex linear closure.  Given the location of the remaining defect, shape of the defect and the proximity to free margins a double advancement flap was deemed most appropriate for complete closure of the defect.  Using a sterile surgical marker, an appropriate advancement flap was drawn incorporating the defect and placing the expected incisions within the relaxed skin tension lines where possible.    The area thus outlined was incised deep to adipose tissue with a #15 scalpel blade.  The skin margins were undermined to an appropriate distance in all directions utilizing iris scissors.
Bilateral Helical Rim Advancement Flap Text: The defect edges were debeveled with a #15 blade scalpel.  Given the location of the defect and the proximity to free margins (helical rim) a bilateral helical rim advancement flap was deemed most appropriate.  Using a sterile surgical marker, the appropriate advancement flaps were drawn incorporating the defect and placing the expected incisions between the helical rim and antihelix where possible.  The area thus outlined was incised through and through with a #15 scalpel blade.  With a skin hook and iris scissors, the flaps were gently and sharply undermined and freed up.
Epidermal Sutures: 4-0 Ethilon
Hemostasis: Electrodesiccation
Composite Graft Text: The defect edges were debeveled with a #15 scalpel blade.  Given the location of the defect, shape of the defect, the proximity to free margins and the fact the defect was full thickness a composite graft was deemed most appropriate.  The defect was outline and then transferred to the donor site.  A full thickness graft was then excised from the donor site. The graft was then placed in the primary defect, oriented appropriately and then sutured into place.  The secondary defect was then repaired using a primary closure.
Advancement Flap (Single) Text: The defect edges were debeveled with a #15 scalpel blade.  Given the location of the defect and the proximity to free margins a single advancement flap was deemed most appropriate.  Using a sterile surgical marker, an appropriate advancement flap was drawn incorporating the defect and placing the expected incisions within the relaxed skin tension lines where possible.    The area thus outlined was incised deep to adipose tissue with a #15 scalpel blade.  The skin margins were undermined to an appropriate distance in all directions utilizing iris scissors.
Consent was obtained from the patient. The risks and benefits to therapy were discussed in detail. Specifically, the risks of infection, scarring, bleeding, prolonged wound healing, incomplete removal, allergy to anesthesia, nerve injury and recurrence were addressed. Prior to the procedure, the treatment site was clearly identified and confirmed by the patient. All components of Universal Protocol/PAUSE Rule completed.
V-Y Plasty Text: The defect edges were debeveled with a #15 scalpel blade.  Given the location of the defect, shape of the defect and the proximity to free margins an V-Y advancement flap was deemed most appropriate.  Using a sterile surgical marker, an appropriate advancement flap was drawn incorporating the defect and placing the expected incisions within the relaxed skin tension lines where possible.    The area thus outlined was incised deep to adipose tissue with a #15 scalpel blade.  The skin margins were undermined to an appropriate distance in all directions utilizing iris scissors.
Island Pedicle Flap Text: The defect edges were debeveled with a #15 scalpel blade.  Given the location of the defect, shape of the defect and the proximity to free margins an island pedicle advancement flap was deemed most appropriate.  Using a sterile surgical marker, an appropriate advancement flap was drawn incorporating the defect, outlining the appropriate donor tissue and placing the expected incisions within the relaxed skin tension lines where possible.    The area thus outlined was incised deep to adipose tissue with a #15 scalpel blade.  The skin margins were undermined to an appropriate distance in all directions around the primary defect and laterally outward around the island pedicle utilizing iris scissors.  There was minimal undermining beneath the pedicle flap.
Path Notes (To The Dermatopathologist): Please check margins.
Excisional Biopsy Additional Text (Leave Blank If You Do Not Want): The margin was drawn around the clinically apparent lesion. An elliptical shape was then drawn on the skin incorporating the lesion and margins.  Incisions were then made along these lines to the appropriate tissue plane and the lesion was extirpated.
Complex Repair And Rhombic Flap Text: The defect edges were debeveled with a #15 scalpel blade.  The primary defect was closed partially with a complex linear closure.  Given the location of the remaining defect, shape of the defect and the proximity to free margins a rhombic flap was deemed most appropriate for complete closure of the defect.  Using a sterile surgical marker, an appropriate advancement flap was drawn incorporating the defect and placing the expected incisions within the relaxed skin tension lines where possible.    The area thus outlined was incised deep to adipose tissue with a #15 scalpel blade.  The skin margins were undermined to an appropriate distance in all directions utilizing iris scissors.
Mastoid Interpolation Flap Text: A decision was made to reconstruct the defect utilizing an interpolation axial flap and a staged reconstruction.  A telfa template was made of the defect.  This telfa template was then used to outline the mastoid interpolation flap.  The donor area for the pedicle flap was then injected with anesthesia.  The flap was excised through the skin and subcutaneous tissue down to the layer of the underlying musculature.  The pedicle flap was carefully excised within this deep plane to maintain its blood supply.  The edges of the donor site were undermined.   The donor site was closed in a primary fashion.  The pedicle was then rotated into position and sutured.  Once the tube was sutured into place, adequate blood supply was confirmed with blanching and refill.  The pedicle was then wrapped with xeroform gauze and dressed appropriately with a telfa and gauze bandage to ensure continued blood supply and protect the attached pedicle.
O-T Plasty Text: The defect edges were debeveled with a #15 scalpel blade.  Given the location of the defect, shape of the defect and the proximity to free margins an O-T plasty was deemed most appropriate.  Using a sterile surgical marker, an appropriate O-T plasty was drawn incorporating the defect and placing the expected incisions within the relaxed skin tension lines where possible.    The area thus outlined was incised deep to adipose tissue with a #15 scalpel blade.  The skin margins were undermined to an appropriate distance in all directions utilizing iris scissors.
Rhombic Flap Text: The defect edges were debeveled with a #15 scalpel blade.  Given the location of the defect and the proximity to free margins a rhombic flap was deemed most appropriate.  Using a sterile surgical marker, an appropriate rhombic flap was drawn incorporating the defect.    The area thus outlined was incised deep to adipose tissue with a #15 scalpel blade.  The skin margins were undermined to an appropriate distance in all directions utilizing iris scissors.
Tissue Cultured Epidermal Autograft Text: The defect edges were debeveled with a #15 scalpel blade.  Given the location of the defect, shape of the defect and the proximity to free margins a tissue cultured epidermal autograft was deemed most appropriate.  The graft was then trimmed to fit the size of the defect.  The graft was then placed in the primary defect and oriented appropriately.
Star Wedge Flap Text: The defect edges were debeveled with a #15 scalpel blade.  Given the location of the defect, shape of the defect and the proximity to free margins a star wedge flap was deemed most appropriate.  Using a sterile surgical marker, an appropriate rotation flap was drawn incorporating the defect and placing the expected incisions within the relaxed skin tension lines where possible. The area thus outlined was incised deep to adipose tissue with a #15 scalpel blade.  The skin margins were undermined to an appropriate distance in all directions utilizing iris scissors.
Perilesional Excision Additional Text (Leave Blank If You Do Not Want): The margin was drawn around the clinically apparent lesion. Incisions were then made along these lines to the appropriate tissue plane and the lesion was extirpated.
Advancement-Rotation Flap Text: The defect edges were debeveled with a #15 scalpel blade.  Given the location of the defect, shape of the defect and the proximity to free margins an advancement-rotation flap was deemed most appropriate.  Using a sterile surgical marker, an appropriate flap was drawn incorporating the defect and placing the expected incisions within the relaxed skin tension lines where possible. The area thus outlined was incised deep to adipose tissue with a #15 scalpel blade.  The skin margins were undermined to an appropriate distance in all directions utilizing iris scissors.
Crescentic Advancement Flap Text: The defect edges were debeveled with a #15 scalpel blade.  Given the location of the defect and the proximity to free margins a crescentic advancement flap was deemed most appropriate.  Using a sterile surgical marker, the appropriate advancement flap was drawn incorporating the defect and placing the expected incisions within the relaxed skin tension lines where possible.    The area thus outlined was incised deep to adipose tissue with a #15 scalpel blade.  The skin margins were undermined to an appropriate distance in all directions utilizing iris scissors.
Dressing: pressure dressing
Dorsal Nasal Flap Text: The defect edges were debeveled with a #15 scalpel blade.  Given the location of the defect and the proximity to free margins a dorsal nasal flap was deemed most appropriate.  Using a sterile surgical marker, an appropriate dorsal nasal flap was drawn around the defect.    The area thus outlined was incised deep to adipose tissue with a #15 scalpel blade.  The skin margins were undermined to an appropriate distance in all directions utilizing iris scissors.
Curvilinear Excision Additional Text (Leave Blank If You Do Not Want): The margin was drawn around the clinically apparent lesion.  A curvilinear shape was then drawn on the skin incorporating the lesion and margins.  Incisions were then made along these lines to the appropriate tissue plane and the lesion was extirpated.
No Repair - Repaired With Adjacent Surgical Defect Text (Leave Blank If You Do Not Want): After the excision the defect was repaired concurrently with another surgical defect which was in close approximation.
Size Of Margin In Cm: 0.4
H Plasty Text: Given the location of the defect, shape of the defect and the proximity to free margins a H-plasty was deemed most appropriate for repair.  Using a sterile surgical marker, the appropriate advancement arms of the H-plasty were drawn incorporating the defect and placing the expected incisions within the relaxed skin tension lines where possible. The area thus outlined was incised deep to adipose tissue with a #15 scalpel blade. The skin margins were undermined to an appropriate distance in all directions utilizing iris scissors.  The opposing advancement arms were then advanced into place in opposite direction and anchored with interrupted buried subcutaneous sutures.
Paramedian Forehead Flap Text: A decision was made to reconstruct the defect utilizing an interpolation axial flap and a staged reconstruction.  A telfa template was made of the defect.  This telfa template was then used to outline the paramedian forehead pedicle flap.  The donor area for the pedicle flap was then injected with anesthesia.  The flap was excised through the skin and subcutaneous tissue down to the layer of the underlying musculature.  The pedicle flap was carefully excised within this deep plane to maintain its blood supply.  The edges of the donor site were undermined.   The donor site was closed in a primary fashion.  The pedicle was then rotated into position and sutured.  Once the tube was sutured into place, adequate blood supply was confirmed with blanching and refill.  The pedicle was then wrapped with xeroform gauze and dressed appropriately with a telfa and gauze bandage to ensure continued blood supply and protect the attached pedicle.
Complex Repair And Modified Advancement Flap Text: The defect edges were debeveled with a #15 scalpel blade.  The primary defect was closed partially with a complex linear closure.  Given the location of the remaining defect, shape of the defect and the proximity to free margins a modified advancement flap was deemed most appropriate for complete closure of the defect.  Using a sterile surgical marker, an appropriate advancement flap was drawn incorporating the defect and placing the expected incisions within the relaxed skin tension lines where possible.    The area thus outlined was incised deep to adipose tissue with a #15 scalpel blade.  The skin margins were undermined to an appropriate distance in all directions utilizing iris scissors.
Complex Repair And Transposition Flap Text: The defect edges were debeveled with a #15 scalpel blade.  The primary defect was closed partially with a complex linear closure.  Given the location of the remaining defect, shape of the defect and the proximity to free margins a transposition flap was deemed most appropriate for complete closure of the defect.  Using a sterile surgical marker, an appropriate advancement flap was drawn incorporating the defect and placing the expected incisions within the relaxed skin tension lines where possible.    The area thus outlined was incised deep to adipose tissue with a #15 scalpel blade.  The skin margins were undermined to an appropriate distance in all directions utilizing iris scissors.
Keystone Flap Text: The defect edges were debeveled with a #15 scalpel blade.  Given the location of the defect, shape of the defect a keystone flap was deemed most appropriate.  Using a sterile surgical marker, an appropriate keystone flap was drawn incorporating the defect, outlining the appropriate donor tissue and placing the expected incisions within the relaxed skin tension lines where possible. The area thus outlined was incised deep to adipose tissue with a #15 scalpel blade.  The skin margins were undermined to an appropriate distance in all directions around the primary defect and laterally outward around the flap utilizing iris scissors.
V-Y Flap Text: The defect edges were debeveled with a #15 scalpel blade.  Given the location of the defect, shape of the defect and the proximity to free margins a V-Y flap was deemed most appropriate.  Using a sterile surgical marker, an appropriate advancement flap was drawn incorporating the defect and placing the expected incisions within the relaxed skin tension lines where possible.    The area thus outlined was incised deep to adipose tissue with a #15 scalpel blade.  The skin margins were undermined to an appropriate distance in all directions utilizing iris scissors.
Complex Repair And Ftsg Text: The defect edges were debeveled with a #15 scalpel blade.  The primary defect was closed partially with a complex linear closure.  Given the location of the defect, shape of the defect and the proximity to free margins a full thickness skin graft was deemed most appropriate to repair the remaining defect.  The graft was trimmed to fit the size of the remaining defect.  The graft was then placed in the primary defect, oriented appropriately, and sutured into place.
Posterior Auricular Interpolation Flap Text: A decision was made to reconstruct the defect utilizing an interpolation axial flap and a staged reconstruction.  A telfa template was made of the defect.  This telfa template was then used to outline the posterior auricular interpolation flap.  The donor area for the pedicle flap was then injected with anesthesia.  The flap was excised through the skin and subcutaneous tissue down to the layer of the underlying musculature.  The pedicle flap was carefully excised within this deep plane to maintain its blood supply.  The edges of the donor site were undermined.   The donor site was closed in a primary fashion.  The pedicle was then rotated into position and sutured.  Once the tube was sutured into place, adequate blood supply was confirmed with blanching and refill.  The pedicle was then wrapped with xeroform gauze and dressed appropriately with a telfa and gauze bandage to ensure continued blood supply and protect the attached pedicle.
Modified Advancement Flap Text: The defect edges were debeveled with a #15 scalpel blade.  Given the location of the defect, shape of the defect and the proximity to free margins a modified advancement flap was deemed most appropriate.  Using a sterile surgical marker, an appropriate advancement flap was drawn incorporating the defect and placing the expected incisions within the relaxed skin tension lines where possible.    The area thus outlined was incised deep to adipose tissue with a #15 scalpel blade.  The skin margins were undermined to an appropriate distance in all directions utilizing iris scissors.
Home Suture Removal Text: Patient was provided a home suture removal kit and will remove their sutures at home.  If they have any questions or difficulties they will call the office.
Helical Rim Advancement Flap Text: The defect edges were debeveled with a #15 blade scalpel.  Given the location of the defect and the proximity to free margins (helical rim) a double helical rim advancement flap was deemed most appropriate.  Using a sterile surgical marker, the appropriate advancement flaps were drawn incorporating the defect and placing the expected incisions between the helical rim and antihelix where possible.  The area thus outlined was incised through and through with a #15 scalpel blade.  With a skin hook and iris scissors, the flaps were gently and sharply undermined and freed up.
Lab: [id number]
Bi-Rhombic Flap Text: The defect edges were debeveled with a #15 scalpel blade.  Given the location of the defect and the proximity to free margins a bi-rhombic flap was deemed most appropriate.  Using a sterile surgical marker, an appropriate rhombic flap was drawn incorporating the defect. The area thus outlined was incised deep to adipose tissue with a #15 scalpel blade.  The skin margins were undermined to an appropriate distance in all directions utilizing iris scissors.

## 2018-03-28 NOTE — PROCEDURE: EXCISION
Patient Will Remove Sutures At Home?: No
Complex Repair And O-L Flap Text: The defect edges were debeveled with a #15 scalpel blade.  The primary defect was closed partially with a complex linear closure.  Given the location of the remaining defect, shape of the defect and the proximity to free margins an O-L flap was deemed most appropriate for complete closure of the defect.  Using a sterile surgical marker, an appropriate flap was drawn incorporating the defect and placing the expected incisions within the relaxed skin tension lines where possible.    The area thus outlined was incised deep to adipose tissue with a #15 scalpel blade.  The skin margins were undermined to an appropriate distance in all directions utilizing iris scissors.
Advancement Flap (Double) Text: The defect edges were debeveled with a #15 scalpel blade.  Given the location of the defect and the proximity to free margins a double advancement flap was deemed most appropriate.  Using a sterile surgical marker, the appropriate advancement flaps were drawn incorporating the defect and placing the expected incisions within the relaxed skin tension lines where possible.    The area thus outlined was incised deep to adipose tissue with a #15 scalpel blade.  The skin margins were undermined to an appropriate distance in all directions utilizing iris scissors.
Saucerization Excision Additional Text (Leave Blank If You Do Not Want): The margin was drawn around the clinically apparent lesion.  Incisions were then made along these lines, in a tangential fashion, to the appropriate tissue plane and the lesion was extirpated.
Complex Repair And Tissue Cultured Epidermal Autograft Text: The defect edges were debeveled with a #15 scalpel blade.  The primary defect was closed partially with a complex linear closure.  Given the location of the defect, shape of the defect and the proximity to free margins an tissue cultured epidermal autograft was deemed most appropriate to repair the remaining defect.  The graft was trimmed to fit the size of the remaining defect.  The graft was then placed in the primary defect, oriented appropriately, and sutured into place.
Lazy S Complex Repair Preamble Text (Leave Blank If You Do Not Want): Extensive wide undermining was performed.
Post-Care Instructions: I reviewed with the patient in detail post-care instructions. Patient is not to engage in any heavy lifting, exercise, or swimming for the next 14 days. Should the patient develop any fevers, chills, bleeding, severe pain patient will contact the office immediately.
Intermediate / Complex Repair - Final Wound Length In Cm: 3
Epidermal Closure Graft Donor Site (Optional): simple interrupted
Cheek-To-Nose Interpolation Flap Text: A decision was made to reconstruct the defect utilizing an interpolation axial flap and a staged reconstruction.  A telfa template was made of the defect.  This telfa template was then used to outline the Cheek-To-Nose Interpolation flap.  The donor area for the pedicle flap was then injected with anesthesia.  The flap was excised through the skin and subcutaneous tissue down to the layer of the underlying musculature.  The interpolation flap was carefully excised within this deep plane to maintain its blood supply.  The edges of the donor site were undermined.   The donor site was closed in a primary fashion.  The pedicle was then rotated into position and sutured.  Once the tube was sutured into place, adequate blood supply was confirmed with blanching and refill.  The pedicle was then wrapped with xeroform gauze and dressed appropriately with a telfa and gauze bandage to ensure continued blood supply and protect the attached pedicle.
Complex Repair And Ftsg Text: The defect edges were debeveled with a #15 scalpel blade.  The primary defect was closed partially with a complex linear closure.  Given the location of the defect, shape of the defect and the proximity to free margins a full thickness skin graft was deemed most appropriate to repair the remaining defect.  The graft was trimmed to fit the size of the remaining defect.  The graft was then placed in the primary defect, oriented appropriately, and sutured into place.
W Plasty Text: The lesion was extirpated to the level of the fat with a #15 scalpel blade.  Given the location of the defect, shape of the defect and the proximity to free margins a W-plasty was deemed most appropriate for repair.  Using a sterile surgical marker, the appropriate transposition arms of the W-plasty were drawn incorporating the defect and placing the expected incisions within the relaxed skin tension lines where possible.    The area thus outlined was incised deep to adipose tissue with a #15 scalpel blade.  The skin margins were undermined to an appropriate distance in all directions utilizing iris scissors.  The opposing transposition arms were then transposed into place in opposite direction and anchored with interrupted buried subcutaneous sutures.
Bi-Rhombic Flap Text: The defect edges were debeveled with a #15 scalpel blade.  Given the location of the defect and the proximity to free margins a bi-rhombic flap was deemed most appropriate.  Using a sterile surgical marker, an appropriate rhombic flap was drawn incorporating the defect. The area thus outlined was incised deep to adipose tissue with a #15 scalpel blade.  The skin margins were undermined to an appropriate distance in all directions utilizing iris scissors.
Dressing: pressure dressing
Complex Repair And Skin Substitute Graft Text: The defect edges were debeveled with a #15 scalpel blade.  The primary defect was closed partially with a complex linear closure.  Given the location of the remaining defect, shape of the defect and the proximity to free margins a skin substitute graft was deemed most appropriate to repair the remaining defect.  The graft was trimmed to fit the size of the remaining defect.  The graft was then placed in the primary defect, oriented appropriately, and sutured into place.
Spiral Flap Text: The defect edges were debeveled with a #15 scalpel blade.  Given the location of the defect, shape of the defect and the proximity to free margins a spiral flap was deemed most appropriate.  Using a sterile surgical marker, an appropriate rotation flap was drawn incorporating the defect and placing the expected incisions within the relaxed skin tension lines where possible. The area thus outlined was incised deep to adipose tissue with a #15 scalpel blade.  The skin margins were undermined to an appropriate distance in all directions utilizing iris scissors.
Anesthesia Type: 1% lidocaine, 0.25% Marcaine, 1:600,000 epinephrine and a 1:10 solution of sodium bicarbonate
Show Asc Variables: Yes
Island Pedicle Flap With Canthal Suspension Text: The defect edges were debeveled with a #15 scalpel blade.  Given the location of the defect, shape of the defect and the proximity to free margins an island pedicle advancement flap was deemed most appropriate.  Using a sterile surgical marker, an appropriate advancement flap was drawn incorporating the defect, outlining the appropriate donor tissue and placing the expected incisions within the relaxed skin tension lines where possible. The area thus outlined was incised deep to adipose tissue with a #15 scalpel blade.  The skin margins were undermined to an appropriate distance in all directions around the primary defect and laterally outward around the island pedicle utilizing iris scissors.  There was minimal undermining beneath the pedicle flap. A suspension suture was placed in the canthal tendon to prevent tension and prevent ectropion.
Complex Repair And Epidermal Autograft Text: The defect edges were debeveled with a #15 scalpel blade.  The primary defect was closed partially with a complex linear closure.  Given the location of the defect, shape of the defect and the proximity to free margins an epidermal autograft was deemed most appropriate to repair the remaining defect.  The graft was trimmed to fit the size of the remaining defect.  The graft was then placed in the primary defect, oriented appropriately, and sutured into place.
Tissue Cultured Epidermal Autograft Text: The defect edges were debeveled with a #15 scalpel blade.  Given the location of the defect, shape of the defect and the proximity to free margins a tissue cultured epidermal autograft was deemed most appropriate.  The graft was then trimmed to fit the size of the defect.  The graft was then placed in the primary defect and oriented appropriately.
Composite Graft Text: The defect edges were debeveled with a #15 scalpel blade.  Given the location of the defect, shape of the defect, the proximity to free margins and the fact the defect was full thickness a composite graft was deemed most appropriate.  The defect was outline and then transferred to the donor site.  A full thickness graft was then excised from the donor site. The graft was then placed in the primary defect, oriented appropriately and then sutured into place.  The secondary defect was then repaired using a primary closure.
Primary Defect Width (In Cm): 0
Purse String (Intermediate) Text: Given the location of the defect and the characteristics of the surrounding skin a pursestring intermediate closure was deemed most appropriate.  Undermining was performed circumfirentially around the surgical defect.  A purstring suture was then placed and tightened.
Complex Repair And Dermal Autograft Text: The defect edges were debeveled with a #15 scalpel blade.  The primary defect was closed partially with a complex linear closure.  Given the location of the defect, shape of the defect and the proximity to free margins an dermal autograft was deemed most appropriate to repair the remaining defect.  The graft was trimmed to fit the size of the remaining defect.  The graft was then placed in the primary defect, oriented appropriately, and sutured into place.
Epidermal Closure: running
No Repair - Repaired With Adjacent Surgical Defect Text (Leave Blank If You Do Not Want): After the excision the defect was repaired concurrently with another surgical defect which was in close approximation.
Lip Wedge Excision Repair Text: Given the location of the defect and the proximity to free margins a full thickness wedge repair was deemed most appropriate.  Using a sterile surgical marker, the appropriate repair was drawn incorporating the defect and placing the expected incisions perpendicular to the vermilion border.  The vermilion border was also meticulously outlined to ensure appropriate reapproximation during the repair.  The area thus outlined was incised through and through with a #15 scalpel blade.  The muscularis and dermis were reaproximated with deep sutures following hemostasis. Care was taken to realign the vermilion border before proceeding with the superficial closure.  Once the vermilion was realigned the superfical and mucosal closure was finished.
Wound Care: Petrolatum
Cartilage Graft Text: The defect edges were debeveled with a #15 scalpel blade.  Given the location of the defect, shape of the defect, the fact the defect involved a full thickness cartilage defect a cartilage graft was deemed most appropriate.  An appropriate donor site was identified, cleansed, and anesthetized. The cartilage graft was then harvested and transferred to the recipient site, oriented appropriately and then sutured into place.  The secondary defect was then repaired using a primary closure.
Size Of Margin In Cm: 0.4
Hemostasis: Electrodesiccation
Partial Purse String (Simple) Text: Given the location of the defect and the characteristics of the surrounding skin a simple purse string closure was deemed most appropriate.  Undermining was performed circumferentially around the surgical defect.  A purse string suture was then placed and tightened. Wound tension of the circular defect prevented complete closure of the wound.
Elliptical Excision Additional Text (Leave Blank If You Do Not Want): The margin was drawn around the clinically apparent lesion.  An elliptical shape was then drawn on the skin incorporating the lesion and margins.  Incisions were then made along these lines to the appropriate tissue plane and the lesion was extirpated.
Consent was obtained from the patient. The risks and benefits to therapy were discussed in detail. Specifically, the risks of infection, scarring, bleeding, prolonged wound healing, incomplete removal, allergy to anesthesia, nerve injury and recurrence were addressed. Prior to the procedure, the treatment site was clearly identified and confirmed by the patient. All components of Universal Protocol/PAUSE Rule completed.
Complex Repair And Dorsal Nasal Flap Text: The defect edges were debeveled with a #15 scalpel blade.  The primary defect was closed partially with a complex linear closure.  Given the location of the remaining defect, shape of the defect and the proximity to free margins a dorsal nasal flap was deemed most appropriate for complete closure of the defect.  Using a sterile surgical marker, an appropriate flap was drawn incorporating the defect and placing the expected incisions within the relaxed skin tension lines where possible.    The area thus outlined was incised deep to adipose tissue with a #15 scalpel blade.  The skin margins were undermined to an appropriate distance in all directions utilizing iris scissors.
Trilobed Flap Text: The defect edges were debeveled with a #15 scalpel blade.  Given the location of the defect and the proximity to free margins a trilobed flap was deemed most appropriate.  Using a sterile surgical marker, an appropriate trilobed flap drawn around the defect.    The area thus outlined was incised deep to adipose tissue with a #15 scalpel blade.  The skin margins were undermined to an appropriate distance in all directions utilizing iris scissors.
Paramedian Forehead Flap Text: A decision was made to reconstruct the defect utilizing an interpolation axial flap and a staged reconstruction.  A telfa template was made of the defect.  This telfa template was then used to outline the paramedian forehead pedicle flap.  The donor area for the pedicle flap was then injected with anesthesia.  The flap was excised through the skin and subcutaneous tissue down to the layer of the underlying musculature.  The pedicle flap was carefully excised within this deep plane to maintain its blood supply.  The edges of the donor site were undermined.   The donor site was closed in a primary fashion.  The pedicle was then rotated into position and sutured.  Once the tube was sutured into place, adequate blood supply was confirmed with blanching and refill.  The pedicle was then wrapped with xeroform gauze and dressed appropriately with a telfa and gauze bandage to ensure continued blood supply and protect the attached pedicle.
A-T Advancement Flap Text: The defect edges were debeveled with a #15 scalpel blade.  Given the location of the defect, shape of the defect and the proximity to free margins an A-T advancement flap was deemed most appropriate.  Using a sterile surgical marker, an appropriate advancement flap was drawn incorporating the defect and placing the expected incisions within the relaxed skin tension lines where possible.    The area thus outlined was incised deep to adipose tissue with a #15 scalpel blade.  The skin margins were undermined to an appropriate distance in all directions utilizing iris scissors.
Scalpel Size: 15 blade
Muscle Hinge Flap Text: The defect edges were debeveled with a #15 scalpel blade.  Given the size, depth and location of the defect and the proximity to free margins a muscle hinge flap was deemed most appropriate.  Using a sterile surgical marker, an appropriate hinge flap was drawn incorporating the defect. The area thus outlined was incised with a #15 scalpel blade.  The skin margins were undermined to an appropriate distance in all directions utilizing iris scissors.
Island Pedicle Flap-Requiring Vessel Identification Text: The defect edges were debeveled with a #15 scalpel blade.  Given the location of the defect, shape of the defect and the proximity to free margins an island pedicle advancement flap was deemed most appropriate.  Using a sterile surgical marker, an appropriate advancement flap was drawn, based on the axial vessel mentioned above, incorporating the defect, outlining the appropriate donor tissue and placing the expected incisions within the relaxed skin tension lines where possible.    The area thus outlined was incised deep to adipose tissue with a #15 scalpel blade.  The skin margins were undermined to an appropriate distance in all directions around the primary defect and laterally outward around the island pedicle utilizing iris scissors.  There was minimal undermining beneath the pedicle flap.
Complex Repair And Double M Plasty Text: The defect edges were debeveled with a #15 scalpel blade.  The primary defect was closed partially with a complex linear closure.  Given the location of the remaining defect, shape of the defect and the proximity to free margins a double M plasty was deemed most appropriate for complete closure of the defect.  Using a sterile surgical marker, an appropriate advancement flap was drawn incorporating the defect and placing the expected incisions within the relaxed skin tension lines where possible.    The area thus outlined was incised deep to adipose tissue with a #15 scalpel blade.  The skin margins were undermined to an appropriate distance in all directions utilizing iris scissors.
Complex Repair And Bilobe Flap Text: The defect edges were debeveled with a #15 scalpel blade.  The primary defect was closed partially with a complex linear closure.  Given the location of the remaining defect, shape of the defect and the proximity to free margins a bilobe flap was deemed most appropriate for complete closure of the defect.  Using a sterile surgical marker, an appropriate advancement flap was drawn incorporating the defect and placing the expected incisions within the relaxed skin tension lines where possible.    The area thus outlined was incised deep to adipose tissue with a #15 scalpel blade.  The skin margins were undermined to an appropriate distance in all directions utilizing iris scissors.
Advancement Flap (Single) Text: The defect edges were debeveled with a #15 scalpel blade.  Given the location of the defect and the proximity to free margins a single advancement flap was deemed most appropriate.  Using a sterile surgical marker, an appropriate advancement flap was drawn incorporating the defect and placing the expected incisions within the relaxed skin tension lines where possible.    The area thus outlined was incised deep to adipose tissue with a #15 scalpel blade.  The skin margins were undermined to an appropriate distance in all directions utilizing iris scissors.
V-Y Flap Text: The defect edges were debeveled with a #15 scalpel blade.  Given the location of the defect, shape of the defect and the proximity to free margins a V-Y flap was deemed most appropriate.  Using a sterile surgical marker, an appropriate advancement flap was drawn incorporating the defect and placing the expected incisions within the relaxed skin tension lines where possible.    The area thus outlined was incised deep to adipose tissue with a #15 scalpel blade.  The skin margins were undermined to an appropriate distance in all directions utilizing iris scissors.
Complex Repair And Xenograft Text: The defect edges were debeveled with a #15 scalpel blade.  The primary defect was closed partially with a complex linear closure.  Given the location of the defect, shape of the defect and the proximity to free margins a xenograft was deemed most appropriate to repair the remaining defect.  The graft was trimmed to fit the size of the remaining defect.  The graft was then placed in the primary defect, oriented appropriately, and sutured into place.
Complex Repair And Melolabial Flap Text: The defect edges were debeveled with a #15 scalpel blade.  The primary defect was closed partially with a complex linear closure.  Given the location of the remaining defect, shape of the defect and the proximity to free margins a melolabial flap was deemed most appropriate for complete closure of the defect.  Using a sterile surgical marker, an appropriate advancement flap was drawn incorporating the defect and placing the expected incisions within the relaxed skin tension lines where possible.    The area thus outlined was incised deep to adipose tissue with a #15 scalpel blade.  The skin margins were undermined to an appropriate distance in all directions utilizing iris scissors.
Transposition Flap Text: The defect edges were debeveled with a #15 scalpel blade.  Given the location of the defect and the proximity to free margins a transposition flap was deemed most appropriate.  Using a sterile surgical marker, an appropriate transposition flap was drawn incorporating the defect.    The area thus outlined was incised deep to adipose tissue with a #15 scalpel blade.  The skin margins were undermined to an appropriate distance in all directions utilizing iris scissors.
S Plasty Text: Given the location and shape of the defect, and the orientation of relaxed skin tension lines, an S-plasty was deemed most appropriate for repair.  Using a sterile surgical marker, the appropriate outline of the S-plasty was drawn, incorporating the defect and placing the expected incisions within the relaxed skin tension lines where possible.  The area thus outlined was incised deep to adipose tissue with a #15 scalpel blade.  The skin margins were undermined to an appropriate distance in all directions utilizing iris scissors. The skin flaps were advanced over the defect.  The opposing margins were then approximated with interrupted buried subcutaneous sutures.
Home Suture Removal Text: Patient was provided a home suture removal kit and will remove their sutures at home.  If they have any questions or difficulties they will call the office.
Dermal Autograft Text: The defect edges were debeveled with a #15 scalpel blade.  Given the location of the defect, shape of the defect and the proximity to free margins a dermal autograft was deemed most appropriate.  Using a sterile surgical marker, the primary defect shape was transferred to the donor site. The area thus outlined was incised deep to adipose tissue with a #15 scalpel blade.  The harvested graft was then trimmed of adipose and epidermal tissue until only dermis was left.  The skin graft was then placed in the primary defect and oriented appropriately.
Excisional Biopsy Additional Text (Leave Blank If You Do Not Want): The margin was drawn around the clinically apparent lesion. An elliptical shape was then drawn on the skin incorporating the lesion and margins.  Incisions were then made along these lines to the appropriate tissue plane and the lesion was extirpated.
Anesthesia Volume In Cc: 4
Repair Performed By Another Provider Text (Leave Blank If You Do Not Want): After the tissue was excised the defect was repaired by another provider.
Mastoid Interpolation Flap Text: A decision was made to reconstruct the defect utilizing an interpolation axial flap and a staged reconstruction.  A telfa template was made of the defect.  This telfa template was then used to outline the mastoid interpolation flap.  The donor area for the pedicle flap was then injected with anesthesia.  The flap was excised through the skin and subcutaneous tissue down to the layer of the underlying musculature.  The pedicle flap was carefully excised within this deep plane to maintain its blood supply.  The edges of the donor site were undermined.   The donor site was closed in a primary fashion.  The pedicle was then rotated into position and sutured.  Once the tube was sutured into place, adequate blood supply was confirmed with blanching and refill.  The pedicle was then wrapped with xeroform gauze and dressed appropriately with a telfa and gauze bandage to ensure continued blood supply and protect the attached pedicle.
Curvilinear Excision Additional Text (Leave Blank If You Do Not Want): The margin was drawn around the clinically apparent lesion.  A curvilinear shape was then drawn on the skin incorporating the lesion and margins.  Incisions were then made along these lines to the appropriate tissue plane and the lesion was extirpated.
Mucosal Advancement Flap Text: Given the location of the defect, shape of the defect and the proximity to free margins a mucosal advancement flap was deemed most appropriate. Incisions were made with a 15 blade scalpel in the appropriate fashion along the cutaneous vermilion border and the mucosal lip. The remaining actinically damaged mucosal tissue was excised.  The mucosal advancement flap was then elevated to the gingival sulcus with care taken to preserve the neurovascular structures and advanced into the primary defect. Care was taken to ensure that precise realignment of the vermilion border was achieved.
Island Pedicle Flap Text: The defect edges were debeveled with a #15 scalpel blade.  Given the location of the defect, shape of the defect and the proximity to free margins an island pedicle advancement flap was deemed most appropriate.  Using a sterile surgical marker, an appropriate advancement flap was drawn incorporating the defect, outlining the appropriate donor tissue and placing the expected incisions within the relaxed skin tension lines where possible.    The area thus outlined was incised deep to adipose tissue with a #15 scalpel blade.  The skin margins were undermined to an appropriate distance in all directions around the primary defect and laterally outward around the island pedicle utilizing iris scissors.  There was minimal undermining beneath the pedicle flap.
Purse String (Simple) Text: Given the location of the defect and the characteristics of the surrounding skin a purse string simple closure was deemed most appropriate.  Undermining was performed circumferentially around the surgical defect.  A purse string suture was then placed and tightened.
Star Wedge Flap Text: The defect edges were debeveled with a #15 scalpel blade.  Given the location of the defect, shape of the defect and the proximity to free margins a star wedge flap was deemed most appropriate.  Using a sterile surgical marker, an appropriate rotation flap was drawn incorporating the defect and placing the expected incisions within the relaxed skin tension lines where possible. The area thus outlined was incised deep to adipose tissue with a #15 scalpel blade.  The skin margins were undermined to an appropriate distance in all directions utilizing iris scissors.
Positioning (Leave Blank If You Do Not Want): The patient was placed in a comfortable position exposing the surgical site.
Bilateral Helical Rim Advancement Flap Text: The defect edges were debeveled with a #15 blade scalpel.  Given the location of the defect and the proximity to free margins (helical rim) a bilateral helical rim advancement flap was deemed most appropriate.  Using a sterile surgical marker, the appropriate advancement flaps were drawn incorporating the defect and placing the expected incisions between the helical rim and antihelix where possible.  The area thus outlined was incised through and through with a #15 scalpel blade.  With a skin hook and iris scissors, the flaps were gently and sharply undermined and freed up.
Complex Repair And W Plasty Text: The defect edges were debeveled with a #15 scalpel blade.  The primary defect was closed partially with a complex linear closure.  Given the location of the remaining defect, shape of the defect and the proximity to free margins a W plasty was deemed most appropriate for complete closure of the defect.  Using a sterile surgical marker, an appropriate advancement flap was drawn incorporating the defect and placing the expected incisions within the relaxed skin tension lines where possible.    The area thus outlined was incised deep to adipose tissue with a #15 scalpel blade.  The skin margins were undermined to an appropriate distance in all directions utilizing iris scissors.
Skin Substitute Text: The defect edges were debeveled with a #15 scalpel blade.  Given the location of the defect, shape of the defect and the proximity to free margins a skin substitute graft was deemed most appropriate.  The graft material was trimmed to fit the size of the defect. The graft was then placed in the primary defect and oriented appropriately.
Epidermal Autograft Text: The defect edges were debeveled with a #15 scalpel blade.  Given the location of the defect, shape of the defect and the proximity to free margins an epidermal autograft was deemed most appropriate.  Using a sterile surgical marker, the primary defect shape was transferred to the donor site. The epidermal graft was then harvested.  The skin graft was then placed in the primary defect and oriented appropriately.
Excision Depth: adipose tissue
Complex Repair And Modified Advancement Flap Text: The defect edges were debeveled with a #15 scalpel blade.  The primary defect was closed partially with a complex linear closure.  Given the location of the remaining defect, shape of the defect and the proximity to free margins a modified advancement flap was deemed most appropriate for complete closure of the defect.  Using a sterile surgical marker, an appropriate advancement flap was drawn incorporating the defect and placing the expected incisions within the relaxed skin tension lines where possible.    The area thus outlined was incised deep to adipose tissue with a #15 scalpel blade.  The skin margins were undermined to an appropriate distance in all directions utilizing iris scissors.
Hatchet Flap Text: The defect edges were debeveled with a #15 scalpel blade.  Given the location of the defect, shape of the defect and the proximity to free margins a hatchet flap was deemed most appropriate.  Using a sterile surgical marker, an appropriate hatchet flap was drawn incorporating the defect and placing the expected incisions within the relaxed skin tension lines where possible.    The area thus outlined was incised deep to adipose tissue with a #15 scalpel blade.  The skin margins were undermined to an appropriate distance in all directions utilizing iris scissors.
Z Plasty Text: The lesion was extirpated to the level of the fat with a #15 scalpel blade.  Given the location of the defect, shape of the defect and the proximity to free margins a Z-plasty was deemed most appropriate for repair.  Using a sterile surgical marker, the appropriate transposition arms of the Z-plasty were drawn incorporating the defect and placing the expected incisions within the relaxed skin tension lines where possible.    The area thus outlined was incised deep to adipose tissue with a #15 scalpel blade.  The skin margins were undermined to an appropriate distance in all directions utilizing iris scissors.  The opposing transposition arms were then transposed into place in opposite direction and anchored with interrupted buried subcutaneous sutures.
H Plasty Text: Given the location of the defect, shape of the defect and the proximity to free margins a H-plasty was deemed most appropriate for repair.  Using a sterile surgical marker, the appropriate advancement arms of the H-plasty were drawn incorporating the defect and placing the expected incisions within the relaxed skin tension lines where possible. The area thus outlined was incised deep to adipose tissue with a #15 scalpel blade. The skin margins were undermined to an appropriate distance in all directions utilizing iris scissors.  The opposing advancement arms were then advanced into place in opposite direction and anchored with interrupted buried subcutaneous sutures.
Rotation Flap Text: The defect edges were debeveled with a #15 scalpel blade.  Given the location of the defect, shape of the defect and the proximity to free margins a rotation flap was deemed most appropriate.  Using a sterile surgical marker, an appropriate rotation flap was drawn incorporating the defect and placing the expected incisions within the relaxed skin tension lines where possible.    The area thus outlined was incised deep to adipose tissue with a #15 scalpel blade.  The skin margins were undermined to an appropriate distance in all directions utilizing iris scissors.
Complex Repair And Transposition Flap Text: The defect edges were debeveled with a #15 scalpel blade.  The primary defect was closed partially with a complex linear closure.  Given the location of the remaining defect, shape of the defect and the proximity to free margins a transposition flap was deemed most appropriate for complete closure of the defect.  Using a sterile surgical marker, an appropriate advancement flap was drawn incorporating the defect and placing the expected incisions within the relaxed skin tension lines where possible.    The area thus outlined was incised deep to adipose tissue with a #15 scalpel blade.  The skin margins were undermined to an appropriate distance in all directions utilizing iris scissors.
Crescentic Intermediate Repair Preamble Text (Leave Blank If You Do Not Want): Undermining was performed with blunt dissection.
O-Z Plasty Text: The defect edges were debeveled with a #15 scalpel blade.  Given the location of the defect, shape of the defect and the proximity to free margins an O-Z plasty (double transposition flap) was deemed most appropriate.  Using a sterile surgical marker, the appropriate transposition flaps were drawn incorporating the defect and placing the expected incisions within the relaxed skin tension lines where possible.    The area thus outlined was incised deep to adipose tissue with a #15 scalpel blade.  The skin margins were undermined to an appropriate distance in all directions utilizing iris scissors.  Hemostasis was achieved with electrocautery.  The flaps were then transposed into place, one clockwise and the other counterclockwise, and anchored with interrupted buried subcutaneous sutures.
Cheek Interpolation Flap Text: A decision was made to reconstruct the defect utilizing an interpolation axial flap and a staged reconstruction.  A telfa template was made of the defect.  This telfa template was then used to outline the Cheek Interpolation flap.  The donor area for the pedicle flap was then injected with anesthesia.  The flap was excised through the skin and subcutaneous tissue down to the layer of the underlying musculature.  The interpolation flap was carefully excised within this deep plane to maintain its blood supply.  The edges of the donor site were undermined.   The donor site was closed in a primary fashion.  The pedicle was then rotated into position and sutured.  Once the tube was sutured into place, adequate blood supply was confirmed with blanching and refill.  The pedicle was then wrapped with xeroform gauze and dressed appropriately with a telfa and gauze bandage to ensure continued blood supply and protect the attached pedicle.
Complex Repair And Rhombic Flap Text: The defect edges were debeveled with a #15 scalpel blade.  The primary defect was closed partially with a complex linear closure.  Given the location of the remaining defect, shape of the defect and the proximity to free margins a rhombic flap was deemed most appropriate for complete closure of the defect.  Using a sterile surgical marker, an appropriate advancement flap was drawn incorporating the defect and placing the expected incisions within the relaxed skin tension lines where possible.    The area thus outlined was incised deep to adipose tissue with a #15 scalpel blade.  The skin margins were undermined to an appropriate distance in all directions utilizing iris scissors.
Advancement-Rotation Flap Text: The defect edges were debeveled with a #15 scalpel blade.  Given the location of the defect, shape of the defect and the proximity to free margins an advancement-rotation flap was deemed most appropriate.  Using a sterile surgical marker, an appropriate flap was drawn incorporating the defect and placing the expected incisions within the relaxed skin tension lines where possible. The area thus outlined was incised deep to adipose tissue with a #15 scalpel blade.  The skin margins were undermined to an appropriate distance in all directions utilizing iris scissors.
Crescentic Advancement Flap Text: The defect edges were debeveled with a #15 scalpel blade.  Given the location of the defect and the proximity to free margins a crescentic advancement flap was deemed most appropriate.  Using a sterile surgical marker, the appropriate advancement flap was drawn incorporating the defect and placing the expected incisions within the relaxed skin tension lines where possible.    The area thus outlined was incised deep to adipose tissue with a #15 scalpel blade.  The skin margins were undermined to an appropriate distance in all directions utilizing iris scissors.
Fusiform Excision Additional Text (Leave Blank If You Do Not Want): The margin was drawn around the clinically apparent lesion.  A fusiform shape was then drawn on the skin incorporating the lesion and margins.  Incisions were then made along these lines to the appropriate tissue plane and the lesion was extirpated.
Ear Star Wedge Flap Text: The defect edges were debeveled with a #15 blade scalpel.  Given the location of the defect and the proximity to free margins (helical rim) an ear star wedge flap was deemed most appropriate.  Using a sterile surgical marker, the appropriate flap was drawn incorporating the defect and placing the expected incisions between the helical rim and antihelix where possible.  The area thus outlined was incised through and through with a #15 scalpel blade.
Graft Donor Site Bandage (Optional-Leave Blank If You Don't Want In Note): Steri-strips and a pressure bandage were applied to the donor site.
Path Notes (To The Dermatopathologist): Please check margins.
O-T Plasty Text: The defect edges were debeveled with a #15 scalpel blade.  Given the location of the defect, shape of the defect and the proximity to free margins an O-T plasty was deemed most appropriate.  Using a sterile surgical marker, an appropriate O-T plasty was drawn incorporating the defect and placing the expected incisions within the relaxed skin tension lines where possible.    The area thus outlined was incised deep to adipose tissue with a #15 scalpel blade.  The skin margins were undermined to an appropriate distance in all directions utilizing iris scissors.
Partial Purse String (Intermediate) Text: Given the location of the defect and the characteristics of the surrounding skin an intermediate purse string closure was deemed most appropriate.  Undermining was performed circumferentially around the surgical defect.  A purse string suture was then placed and tightened. Wound tension of the circular defect prevented complete closure of the wound.
Double Island Pedicle Flap Text: The defect edges were debeveled with a #15 scalpel blade.  Given the location of the defect, shape of the defect and the proximity to free margins a double island pedicle advancement flap was deemed most appropriate.  Using a sterile surgical marker, an appropriate advancement flap was drawn incorporating the defect, outlining the appropriate donor tissue and placing the expected incisions within the relaxed skin tension lines where possible.    The area thus outlined was incised deep to adipose tissue with a #15 scalpel blade.  The skin margins were undermined to an appropriate distance in all directions around the primary defect and laterally outward around the island pedicle utilizing iris scissors.  There was minimal undermining beneath the pedicle flap.
Melolabial Transposition Flap Text: The defect edges were debeveled with a #15 scalpel blade.  Given the location of the defect and the proximity to free margins a melolabial flap was deemed most appropriate.  Using a sterile surgical marker, an appropriate melolabial transposition flap was drawn incorporating the defect.    The area thus outlined was incised deep to adipose tissue with a #15 scalpel blade.  The skin margins were undermined to an appropriate distance in all directions utilizing iris scissors.
Burow's Advancement Flap Text: The defect edges were debeveled with a #15 scalpel blade.  Given the location of the defect and the proximity to free margins a Burow's advancement flap was deemed most appropriate.  Using a sterile surgical marker, the appropriate advancement flap was drawn incorporating the defect and placing the expected incisions within the relaxed skin tension lines where possible.    The area thus outlined was incised deep to adipose tissue with a #15 scalpel blade.  The skin margins were undermined to an appropriate distance in all directions utilizing iris scissors.
Additional Anesthesia Volume In Cc: 6
Deep Sutures: 4-0 Vicryl
X Size Of Lesion In Cm (Optional): 0.5
V-Y Plasty Text: The defect edges were debeveled with a #15 scalpel blade.  Given the location of the defect, shape of the defect and the proximity to free margins an V-Y advancement flap was deemed most appropriate.  Using a sterile surgical marker, an appropriate advancement flap was drawn incorporating the defect and placing the expected incisions within the relaxed skin tension lines where possible.    The area thus outlined was incised deep to adipose tissue with a #15 scalpel blade.  The skin margins were undermined to an appropriate distance in all directions utilizing iris scissors.
Bilobed Flap Text: The defect edges were debeveled with a #15 scalpel blade.  Given the location of the defect and the proximity to free margins a bilobe flap was deemed most appropriate.  Using a sterile surgical marker, an appropriate bilobe flap drawn around the defect.    The area thus outlined was incised deep to adipose tissue with a #15 scalpel blade.  The skin margins were undermined to an appropriate distance in all directions utilizing iris scissors.
Slit Excision Additional Text (Leave Blank If You Do Not Want): A linear line was drawn on the skin overlying the lesion. An incision was made slowly until the lesion was visualized.  Once visualized, the lesion was removed with blunt dissection.
Posterior Auricular Interpolation Flap Text: A decision was made to reconstruct the defect utilizing an interpolation axial flap and a staged reconstruction.  A telfa template was made of the defect.  This telfa template was then used to outline the posterior auricular interpolation flap.  The donor area for the pedicle flap was then injected with anesthesia.  The flap was excised through the skin and subcutaneous tissue down to the layer of the underlying musculature.  The pedicle flap was carefully excised within this deep plane to maintain its blood supply.  The edges of the donor site were undermined.   The donor site was closed in a primary fashion.  The pedicle was then rotated into position and sutured.  Once the tube was sutured into place, adequate blood supply was confirmed with blanching and refill.  The pedicle was then wrapped with xeroform gauze and dressed appropriately with a telfa and gauze bandage to ensure continued blood supply and protect the attached pedicle.
Undermining Location (Optional): in the deep fat
Complex Repair And M Plasty Text: The defect edges were debeveled with a #15 scalpel blade.  The primary defect was closed partially with a complex linear closure.  Given the location of the remaining defect, shape of the defect and the proximity to free margins an M plasty was deemed most appropriate for complete closure of the defect.  Using a sterile surgical marker, an appropriate advancement flap was drawn incorporating the defect and placing the expected incisions within the relaxed skin tension lines where possible.    The area thus outlined was incised deep to adipose tissue with a #15 scalpel blade.  The skin margins were undermined to an appropriate distance in all directions utilizing iris scissors.
Billing Type: Third-Party Bill
Perilesional Excision Additional Text (Leave Blank If You Do Not Want): The margin was drawn around the clinically apparent lesion. Incisions were then made along these lines to the appropriate tissue plane and the lesion was extirpated.
Xenograft Text: The defect edges were debeveled with a #15 scalpel blade.  Given the location of the defect, shape of the defect and the proximity to free margins a xenograft was deemed most appropriate.  The graft was then trimmed to fit the size of the defect.  The graft was then placed in the primary defect and oriented appropriately.
Modified Advancement Flap Text: The defect edges were debeveled with a #15 scalpel blade.  Given the location of the defect, shape of the defect and the proximity to free margins a modified advancement flap was deemed most appropriate.  Using a sterile surgical marker, an appropriate advancement flap was drawn incorporating the defect and placing the expected incisions within the relaxed skin tension lines where possible.    The area thus outlined was incised deep to adipose tissue with a #15 scalpel blade.  The skin margins were undermined to an appropriate distance in all directions utilizing iris scissors.
Estimated Blood Loss (Cc): minimal
Suture Removal: 14 days
Complex Repair And A-T Advancement Flap Text: The defect edges were debeveled with a #15 scalpel blade.  The primary defect was closed partially with a complex linear closure.  Given the location of the remaining defect, shape of the defect and the proximity to free margins an A-T advancement flap was deemed most appropriate for complete closure of the defect.  Using a sterile surgical marker, an appropriate advancement flap was drawn incorporating the defect and placing the expected incisions within the relaxed skin tension lines where possible.    The area thus outlined was incised deep to adipose tissue with a #15 scalpel blade.  The skin margins were undermined to an appropriate distance in all directions utilizing iris scissors.
Pre-Excision Curettage Text (Leave Blank If You Do Not Want): Prior to drawing the surgical margin the visible lesion was removed with electrodesiccation and curettage to clearly define the lesion size.
Complex Repair And Double Advancement Flap Text: The defect edges were debeveled with a #15 scalpel blade.  The primary defect was closed partially with a complex linear closure.  Given the location of the remaining defect, shape of the defect and the proximity to free margins a double advancement flap was deemed most appropriate for complete closure of the defect.  Using a sterile surgical marker, an appropriate advancement flap was drawn incorporating the defect and placing the expected incisions within the relaxed skin tension lines where possible.    The area thus outlined was incised deep to adipose tissue with a #15 scalpel blade.  The skin margins were undermined to an appropriate distance in all directions utilizing iris scissors.
Repair Type: Intermediate
Rhombic Flap Text: The defect edges were debeveled with a #15 scalpel blade.  Given the location of the defect and the proximity to free margins a rhombic flap was deemed most appropriate.  Using a sterile surgical marker, an appropriate rhombic flap was drawn incorporating the defect.    The area thus outlined was incised deep to adipose tissue with a #15 scalpel blade.  The skin margins were undermined to an appropriate distance in all directions utilizing iris scissors.
O-L Flap Text: The defect edges were debeveled with a #15 scalpel blade.  Given the location of the defect, shape of the defect and the proximity to free margins an O-L flap was deemed most appropriate.  Using a sterile surgical marker, an appropriate advancement flap was drawn incorporating the defect and placing the expected incisions within the relaxed skin tension lines where possible.    The area thus outlined was incised deep to adipose tissue with a #15 scalpel blade.  The skin margins were undermined to an appropriate distance in all directions utilizing iris scissors.
Complex Repair And O-T Advancement Flap Text: The defect edges were debeveled with a #15 scalpel blade.  The primary defect was closed partially with a complex linear closure.  Given the location of the remaining defect, shape of the defect and the proximity to free margins an O-T advancement flap was deemed most appropriate for complete closure of the defect.  Using a sterile surgical marker, an appropriate advancement flap was drawn incorporating the defect and placing the expected incisions within the relaxed skin tension lines where possible.    The area thus outlined was incised deep to adipose tissue with a #15 scalpel blade.  The skin margins were undermined to an appropriate distance in all directions utilizing iris scissors.
Complex Repair And Single Advancement Flap Text: The defect edges were debeveled with a #15 scalpel blade.  The primary defect was closed partially with a complex linear closure.  Given the location of the remaining defect, shape of the defect and the proximity to free margins a single advancement flap was deemed most appropriate for complete closure of the defect.  Using a sterile surgical marker, an appropriate advancement flap was drawn incorporating the defect and placing the expected incisions within the relaxed skin tension lines where possible.    The area thus outlined was incised deep to adipose tissue with a #15 scalpel blade.  The skin margins were undermined to an appropriate distance in all directions utilizing iris scissors.
Epidermal Sutures: 4-0 Ethilon
Complex Repair And Rotation Flap Text: The defect edges were debeveled with a #15 scalpel blade.  The primary defect was closed partially with a complex linear closure.  Given the location of the remaining defect, shape of the defect and the proximity to free margins a rotation flap was deemed most appropriate for complete closure of the defect.  Using a sterile surgical marker, an appropriate advancement flap was drawn incorporating the defect and placing the expected incisions within the relaxed skin tension lines where possible.    The area thus outlined was incised deep to adipose tissue with a #15 scalpel blade.  The skin margins were undermined to an appropriate distance in all directions utilizing iris scissors.
O-T Advancement Flap Text: The defect edges were debeveled with a #15 scalpel blade.  Given the location of the defect, shape of the defect and the proximity to free margins an O-T advancement flap was deemed most appropriate.  Using a sterile surgical marker, an appropriate advancement flap was drawn incorporating the defect and placing the expected incisions within the relaxed skin tension lines where possible.    The area thus outlined was incised deep to adipose tissue with a #15 scalpel blade.  The skin margins were undermined to an appropriate distance in all directions utilizing iris scissors.
Split-Thickness Skin Graft Text: The defect edges were debeveled with a #15 scalpel blade.  Given the location of the defect, shape of the defect and the proximity to free margins a split thickness skin graft was deemed most appropriate.  Using a sterile surgical marker, the primary defect shape was transferred to the donor site. The split thickness graft was then harvested.  The skin graft was then placed in the primary defect and oriented appropriately.
Helical Rim Advancement Flap Text: The defect edges were debeveled with a #15 blade scalpel.  Given the location of the defect and the proximity to free margins (helical rim) a double helical rim advancement flap was deemed most appropriate.  Using a sterile surgical marker, the appropriate advancement flaps were drawn incorporating the defect and placing the expected incisions between the helical rim and antihelix where possible.  The area thus outlined was incised through and through with a #15 scalpel blade.  With a skin hook and iris scissors, the flaps were gently and sharply undermined and freed up.
Complex Repair And Z Plasty Text: The defect edges were debeveled with a #15 scalpel blade.  The primary defect was closed partially with a complex linear closure.  Given the location of the remaining defect, shape of the defect and the proximity to free margins a Z plasty was deemed most appropriate for complete closure of the defect.  Using a sterile surgical marker, an appropriate advancement flap was drawn incorporating the defect and placing the expected incisions within the relaxed skin tension lines where possible.    The area thus outlined was incised deep to adipose tissue with a #15 scalpel blade.  The skin margins were undermined to an appropriate distance in all directions utilizing iris scissors.
Complex Repair And V-Y Plasty Text: The defect edges were debeveled with a #15 scalpel blade.  The primary defect was closed partially with a complex linear closure.  Given the location of the remaining defect, shape of the defect and the proximity to free margins a V-Y plasty was deemed most appropriate for complete closure of the defect.  Using a sterile surgical marker, an appropriate advancement flap was drawn incorporating the defect and placing the expected incisions within the relaxed skin tension lines where possible.    The area thus outlined was incised deep to adipose tissue with a #15 scalpel blade.  The skin margins were undermined to an appropriate distance in all directions utilizing iris scissors.
Ftsg Text: The defect edges were debeveled with a #15 scalpel blade.  Given the location of the defect, shape of the defect and the proximity to free margins a full thickness skin graft was deemed most appropriate.  Using a sterile surgical marker, the primary defect shape was transferred to the donor site. The area thus outlined was incised deep to adipose tissue with a #15 scalpel blade.  The harvested graft was then trimmed of adipose tissue until only dermis and epidermis was left.  The skin margins of the secondary defect were undermined to an appropriate distance in all directions utilizing iris scissors.  The secondary defect was closed with interrupted buried subcutaneous sutures.  The skin edges were then re-apposed with running  sutures.  The skin graft was then placed in the primary defect and oriented appropriately.
Bilobed Transposition Flap Text: The defect edges were debeveled with a #15 scalpel blade.  Given the location of the defect and the proximity to free margins a bilobed transposition flap was deemed most appropriate.  Using a sterile surgical marker, an appropriate bilobe flap drawn around the defect.    The area thus outlined was incised deep to adipose tissue with a #15 scalpel blade.  The skin margins were undermined to an appropriate distance in all directions utilizing iris scissors.
Interpolation Flap Text: A decision was made to reconstruct the defect utilizing an interpolation axial flap and a staged reconstruction.  A telfa template was made of the defect.  This telfa template was then used to outline the interpolation flap.  The donor area for the pedicle flap was then injected with anesthesia.  The flap was excised through the skin and subcutaneous tissue down to the layer of the underlying musculature.  The interpolation flap was carefully excised within this deep plane to maintain its blood supply.  The edges of the donor site were undermined.   The donor site was closed in a primary fashion.  The pedicle was then rotated into position and sutured.  Once the tube was sutured into place, adequate blood supply was confirmed with blanching and refill.  The pedicle was then wrapped with xeroform gauze and dressed appropriately with a telfa and gauze bandage to ensure continued blood supply and protect the attached pedicle.
Detail Level: Detailed
Dorsal Nasal Flap Text: The defect edges were debeveled with a #15 scalpel blade.  Given the location of the defect and the proximity to free margins a dorsal nasal flap was deemed most appropriate.  Using a sterile surgical marker, an appropriate dorsal nasal flap was drawn around the defect.    The area thus outlined was incised deep to adipose tissue with a #15 scalpel blade.  The skin margins were undermined to an appropriate distance in all directions utilizing iris scissors.
Alar Island Pedicle Flap Text: The defect edges were debeveled with a #15 scalpel blade.  Given the location of the defect, shape of the defect and the proximity to the alar rim an island pedicle advancement flap was deemed most appropriate.  Using a sterile surgical marker, an appropriate advancement flap was drawn incorporating the defect, outlining the appropriate donor tissue and placing the expected incisions within the nasal ala running parallel to the alar rim. The area thus outlined was incised with a #15 scalpel blade.  The skin margins were undermined minimally to an appropriate distance in all directions around the primary defect and laterally outward around the island pedicle utilizing iris scissors.  There was minimal undermining beneath the pedicle flap.
Complex Repair And Split-Thickness Skin Graft Text: The defect edges were debeveled with a #15 scalpel blade.  The primary defect was closed partially with a complex linear closure.  Given the location of the defect, shape of the defect and the proximity to free margins a split thickness skin graft was deemed most appropriate to repair the remaining defect.  The graft was trimmed to fit the size of the remaining defect.  The graft was then placed in the primary defect, oriented appropriately, and sutured into place.
Excision Method: Elliptical
Keystone Flap Text: The defect edges were debeveled with a #15 scalpel blade.  Given the location of the defect, shape of the defect a keystone flap was deemed most appropriate.  Using a sterile surgical marker, an appropriate keystone flap was drawn incorporating the defect, outlining the appropriate donor tissue and placing the expected incisions within the relaxed skin tension lines where possible. The area thus outlined was incised deep to adipose tissue with a #15 scalpel blade.  The skin margins were undermined to an appropriate distance in all directions around the primary defect and laterally outward around the flap utilizing iris scissors.
Melolabial Interpolation Flap Text: A decision was made to reconstruct the defect utilizing an interpolation axial flap and a staged reconstruction.  A telfa template was made of the defect.  This telfa template was then used to outline the melolabial interpolation flap.  The donor area for the pedicle flap was then injected with anesthesia.  The flap was excised through the skin and subcutaneous tissue down to the layer of the underlying musculature.  The pedicle flap was carefully excised within this deep plane to maintain its blood supply.  The edges of the donor site were undermined.   The donor site was closed in a primary fashion.  The pedicle was then rotated into position and sutured.  Once the tube was sutured into place, adequate blood supply was confirmed with blanching and refill.  The pedicle was then wrapped with xeroform gauze and dressed appropriately with a telfa and gauze bandage to ensure continued blood supply and protect the attached pedicle.

## 2018-04-09 ENCOUNTER — APPOINTMENT (RX ONLY)
Dept: URBAN - METROPOLITAN AREA CLINIC 38 | Facility: CLINIC | Age: 58
Setting detail: DERMATOLOGY
End: 2018-04-09

## 2018-04-09 ENCOUNTER — APPOINTMENT (RX ONLY)
Dept: URBAN - METROPOLITAN AREA CLINIC 37 | Facility: CLINIC | Age: 58
Setting detail: DERMATOLOGY
End: 2018-04-09

## 2018-04-09 DIAGNOSIS — Z48.02 ENCOUNTER FOR REMOVAL OF SUTURES: ICD-10-CM

## 2018-04-09 PROCEDURE — ? SUTURE REMOVAL (GLOBAL PERIOD)

## 2018-04-09 PROCEDURE — 99024 POSTOP FOLLOW-UP VISIT: CPT

## 2018-04-09 ASSESSMENT — LOCATION SIMPLE DESCRIPTION DERM
LOCATION SIMPLE: LEFT SHOULDER
LOCATION SIMPLE: LEFT SHOULDER

## 2018-04-09 ASSESSMENT — LOCATION DETAILED DESCRIPTION DERM
LOCATION DETAILED: LEFT ANTERIOR SHOULDER
LOCATION DETAILED: LEFT ANTERIOR SHOULDER

## 2018-04-09 ASSESSMENT — LOCATION ZONE DERM
LOCATION ZONE: ARM
LOCATION ZONE: ARM

## 2018-04-09 NOTE — PROCEDURE: SUTURE REMOVAL (GLOBAL PERIOD)
Add 94777 Cpt? (Important Note: In 2017 The Use Of 97207 Is Being Tracked By Cms To Determine Future Global Period Reimbursement For Global Periods): yes
Detail Level: Detailed

## 2018-04-09 NOTE — PROCEDURE: SUTURE REMOVAL (GLOBAL PERIOD)
Add 93137 Cpt? (Important Note: In 2017 The Use Of 87584 Is Being Tracked By Cms To Determine Future Global Period Reimbursement For Global Periods): yes
Detail Level: Detailed

## 2018-04-13 ENCOUNTER — APPOINTMENT (RX ONLY)
Dept: URBAN - METROPOLITAN AREA CLINIC 38 | Facility: CLINIC | Age: 58
Setting detail: DERMATOLOGY
End: 2018-04-13

## 2018-04-13 ENCOUNTER — APPOINTMENT (RX ONLY)
Dept: URBAN - METROPOLITAN AREA CLINIC 37 | Facility: CLINIC | Age: 58
Setting detail: DERMATOLOGY
End: 2018-04-13

## 2018-04-13 DIAGNOSIS — L40.0 PSORIASIS VULGARIS: ICD-10-CM

## 2018-04-13 PROCEDURE — 99212 OFFICE O/P EST SF 10 MIN: CPT

## 2018-04-13 PROCEDURE — ? COUNSELING

## 2018-04-13 PROCEDURE — ? TREATMENT REGIMEN

## 2018-04-13 ASSESSMENT — LOCATION SIMPLE DESCRIPTION DERM
LOCATION SIMPLE: SCALP
LOCATION SIMPLE: SCALP

## 2018-04-13 ASSESSMENT — LOCATION DETAILED DESCRIPTION DERM
LOCATION DETAILED: RIGHT SUPERIOR PARIETAL SCALP
LOCATION DETAILED: RIGHT SUPERIOR PARIETAL SCALP

## 2018-04-13 ASSESSMENT — LOCATION ZONE DERM
LOCATION ZONE: SCALP
LOCATION ZONE: SCALP

## 2018-04-13 NOTE — PROCEDURE: TREATMENT REGIMEN
Action 1: Continue
Continue Regimen: Otezla for now
Plan: Discussed with patient today concerning previous liver cancer, and unable to take many other medications for psoriasis (IE: MTX).  \\n\\nUnfortunately she is not clear.  Ideally in most other situations I would suggest MTX or a biologic due to persistence. 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 However if improved with Xtrac we will continue with such \\n\\nReturn visit based on how she does with xtrac and clearance or lack of from GI (records faxed today)
Detail Level: Zone

## 2018-04-13 NOTE — PROCEDURE: TREATMENT REGIMEN
Action 1: Continue
Plan: Discussed with patient today concerning previous liver cancer, and unable to take many other medications for psoriasis (IE: MTX).  \\n\\nUnfortunately she is not clear.  Ideally in most other situations I would suggest MTX or a biologic due to persistence. 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 However if improved with Xtrac we will continue with such \\n\\nReturn visit based on how she does with xtrac and clearance or lack of from GI (records faxed today)
Continue Regimen: Otezla for now
Detail Level: Zone

## 2018-04-26 ENCOUNTER — APPOINTMENT (RX ONLY)
Dept: URBAN - METROPOLITAN AREA CLINIC 37 | Facility: CLINIC | Age: 58
Setting detail: DERMATOLOGY
End: 2018-04-26

## 2018-04-26 ENCOUNTER — APPOINTMENT (RX ONLY)
Dept: URBAN - METROPOLITAN AREA CLINIC 38 | Facility: CLINIC | Age: 58
Setting detail: DERMATOLOGY
End: 2018-04-26

## 2018-04-26 DIAGNOSIS — L40.0 PSORIASIS VULGARIS: ICD-10-CM

## 2018-04-26 PROCEDURE — ? EXCIMER LASER

## 2018-04-26 PROCEDURE — 96920 EXCIMER LSR PSRIASIS<250SQCM: CPT

## 2018-04-26 ASSESSMENT — LOCATION DETAILED DESCRIPTION DERM
LOCATION DETAILED: MID-OCCIPITAL SCALP
LOCATION DETAILED: RIGHT SUPERIOR POSTAURICULAR SKIN
LOCATION DETAILED: MID-OCCIPITAL SCALP
LOCATION DETAILED: LEFT CENTRAL POSTAURICULAR SKIN
LOCATION DETAILED: RIGHT SUPERIOR POSTAURICULAR SKIN
LOCATION DETAILED: LEFT CENTRAL POSTAURICULAR SKIN

## 2018-04-26 ASSESSMENT — LOCATION SIMPLE DESCRIPTION DERM
LOCATION SIMPLE: POSTERIOR SCALP
LOCATION SIMPLE: SCALP
LOCATION SIMPLE: POSTERIOR SCALP
LOCATION SIMPLE: SCALP

## 2018-04-26 ASSESSMENT — LOCATION ZONE DERM
LOCATION ZONE: SCALP
LOCATION ZONE: SCALP

## 2018-04-26 NOTE — PROCEDURE: EXCIMER LASER
Comments: Patient is returning for a second round of treatment. She states that she remained clear and in remission for several months. Patient states that she is currently using Topicort as a daily regimen on the scalp.
Location #1: scalp
Mode: repeat paint
Spot Size: 2 x 2 cm
Post-Care Instructions: I reviewed with the patient in detail post-care instructions. Patient should stay away from the sun and wear sun protection until treated areas are fully healed.
Total Pulses (Optional): 35
Fluence #1 (J/Cm2 Or Mj/Cm2): 400
Fluence Units: J/cm2
Treatment Number: 1
Device Serial Number (Optional): [id number]
Total Square Area In Cm2 (Required For Proper Billing- Whole Numbers Only Please): 84
Detail Level: Zone
Consent: Written consent obtained, risks reviewed including but not limited to crusting, scabbing, blistering, scarring, darker or lighter pigmentary change, incidental hair removal, bruising, and/or incomplete removal.

## 2018-04-26 NOTE — PROCEDURE: EXCIMER LASER
Fluence #1 (J/Cm2 Or Mj/Cm2): 400
Detail Level: Zone
Consent: Written consent obtained, risks reviewed including but not limited to crusting, scabbing, blistering, scarring, darker or lighter pigmentary change, incidental hair removal, bruising, and/or incomplete removal.
Spot Size: 2 x 2 cm
Fluence Units: J/cm2
Mode: repeat paint
Comments: Patient is returning for a second round of treatment. She states that she remained clear and in remission for several months. Patient states that she is currently using Topicort as a daily regimen on the scalp.
Total Square Area In Cm2 (Required For Proper Billing- Whole Numbers Only Please): 84
Total Pulses (Optional): 35
Device Serial Number (Optional): [id number]
Location #1: scalp
Post-Care Instructions: I reviewed with the patient in detail post-care instructions. Patient should stay away from the sun and wear sun protection until treated areas are fully healed.
Treatment Number: 1

## 2018-05-03 ENCOUNTER — APPOINTMENT (RX ONLY)
Dept: URBAN - METROPOLITAN AREA CLINIC 37 | Facility: CLINIC | Age: 58
Setting detail: DERMATOLOGY
End: 2018-05-03

## 2018-05-03 ENCOUNTER — APPOINTMENT (RX ONLY)
Dept: URBAN - METROPOLITAN AREA CLINIC 38 | Facility: CLINIC | Age: 58
Setting detail: DERMATOLOGY
End: 2018-05-03

## 2018-05-03 DIAGNOSIS — L40.0 PSORIASIS VULGARIS: ICD-10-CM

## 2018-05-03 PROCEDURE — ? EXCIMER LASER

## 2018-05-03 PROCEDURE — 96920 EXCIMER LSR PSRIASIS<250SQCM: CPT

## 2018-05-03 ASSESSMENT — LOCATION DETAILED DESCRIPTION DERM
LOCATION DETAILED: MID-OCCIPITAL SCALP
LOCATION DETAILED: MID-OCCIPITAL SCALP

## 2018-05-03 ASSESSMENT — LOCATION ZONE DERM
LOCATION ZONE: SCALP
LOCATION ZONE: SCALP

## 2018-05-03 ASSESSMENT — LOCATION SIMPLE DESCRIPTION DERM
LOCATION SIMPLE: POSTERIOR SCALP
LOCATION SIMPLE: POSTERIOR SCALP

## 2018-05-03 NOTE — PROCEDURE: EXCIMER LASER
Post-Care Instructions: I reviewed with the patient in detail post-care instructions. Patient should stay away from the sun and wear sun protection until treated areas are fully healed.
Spot Size: 2 x 2 cm
Device Serial Number (Optional): [id number]
Consent: Written consent obtained, risks reviewed including but not limited to crusting, scabbing, blistering, scarring, darker or lighter pigmentary change, incidental hair removal, bruising, and/or incomplete removal.
Mode: repeat paint
Total Square Area In Cm2 (Required For Proper Billing- Whole Numbers Only Please): 56
Comments: Patient states that she tolerated her last treatment well. Patient states that she has a recurring spot on her nose that come and go. She states that Valente Rico PA-C asked her to be sure the next time the spot recur to be sure to let her see it. \\n***Discussed with the patient Valente is not in the office today, but we will take a photo of the area on the nose and put in her chart. Discussed I will also, forward a message to Valente. Please SEE Photo.
Detail Level: Zone
Total Pulses (Optional): 28
Fluence Units: J/cm2
Fluence #1 (J/Cm2 Or Mj/Cm2): 500
Location #1: scalp
Treatment Number: 2

## 2018-05-03 NOTE — PROCEDURE: EXCIMER LASER
Total Pulses (Optional): 28
Location #1: scalp
Total Square Area In Cm2 (Required For Proper Billing- Whole Numbers Only Please): 56
Comments: Patient states that she tolerated her last treatment well. Patient states that she has a recurring spot on her nose that come and go. She states that Ziyad Narvaez PA-C asked her to be sure the next time the spot recur to be sure to let her see it. \\n***Discussed with the patient Ziyad is not in the office today, but we will take a photo of the area on the nose and put in her chart. Discussed I will also, forward a message to Ziyad. Please SEE Photo.
Spot Size: 2 x 2 cm
Consent: Written consent obtained, risks reviewed including but not limited to crusting, scabbing, blistering, scarring, darker or lighter pigmentary change, incidental hair removal, bruising, and/or incomplete removal.
Fluence #1 (J/Cm2 Or Mj/Cm2): 500
Treatment Number: 2
Device Serial Number (Optional): [id number]
Fluence Units: J/cm2
Post-Care Instructions: I reviewed with the patient in detail post-care instructions. Patient should stay away from the sun and wear sun protection until treated areas are fully healed.
Detail Level: Zone
Mode: repeat paint

## 2018-05-10 ENCOUNTER — APPOINTMENT (RX ONLY)
Dept: URBAN - METROPOLITAN AREA CLINIC 37 | Facility: CLINIC | Age: 58
Setting detail: DERMATOLOGY
End: 2018-05-10

## 2018-05-10 ENCOUNTER — APPOINTMENT (RX ONLY)
Dept: URBAN - METROPOLITAN AREA CLINIC 38 | Facility: CLINIC | Age: 58
Setting detail: DERMATOLOGY
End: 2018-05-10

## 2018-05-10 DIAGNOSIS — L40.0 PSORIASIS VULGARIS: ICD-10-CM

## 2018-05-10 PROCEDURE — 96920 EXCIMER LSR PSRIASIS<250SQCM: CPT

## 2018-05-10 PROCEDURE — ? EXCIMER LASER

## 2018-05-10 ASSESSMENT — LOCATION DETAILED DESCRIPTION DERM
LOCATION DETAILED: MID-OCCIPITAL SCALP
LOCATION DETAILED: MID-OCCIPITAL SCALP

## 2018-05-10 ASSESSMENT — LOCATION ZONE DERM
LOCATION ZONE: SCALP
LOCATION ZONE: SCALP

## 2018-05-10 ASSESSMENT — LOCATION SIMPLE DESCRIPTION DERM
LOCATION SIMPLE: POSTERIOR SCALP
LOCATION SIMPLE: POSTERIOR SCALP

## 2018-05-10 NOTE — PROCEDURE: EXCIMER LASER
Post-Care Instructions: I reviewed with the patient in detail post-care instructions. Patient should stay away from the sun and wear sun protection until treated areas are fully healed.
Fluence #1 (J/Cm2 Or Mj/Cm2): 625
Detail Level: Zone
Comments: Patient states that she tolerated her last treatment well.
Fluence Units: J/cm2
Total Pulses (Optional): 40
Device Serial Number (Optional): [id number]
Spot Size: 2 x 2 cm
Mode: repeat paint
Total Square Area In Cm2 (Required For Proper Billing- Whole Numbers Only Please): 64
Treatment Number: 1
Consent: Written consent obtained, risks reviewed including but not limited to crusting, scabbing, blistering, scarring, darker or lighter pigmentary change, incidental hair removal, bruising, and/or incomplete removal.
Location #1: scalp

## 2018-05-10 NOTE — PROCEDURE: EXCIMER LASER
Total Square Area In Cm2 (Required For Proper Billing- Whole Numbers Only Please): 64
Device Serial Number (Optional): [id number]
Treatment Number: 1
Detail Level: Zone
Mode: repeat paint
Fluence Units: J/cm2
Post-Care Instructions: I reviewed with the patient in detail post-care instructions. Patient should stay away from the sun and wear sun protection until treated areas are fully healed.
Comments: Patient states that she tolerated her last treatment well.
Fluence #1 (J/Cm2 Or Mj/Cm2): 625
Spot Size: 2 x 2 cm
Consent: Written consent obtained, risks reviewed including but not limited to crusting, scabbing, blistering, scarring, darker or lighter pigmentary change, incidental hair removal, bruising, and/or incomplete removal.
Total Pulses (Optional): 40
Location #1: scalp

## 2018-05-11 ENCOUNTER — APPOINTMENT (RX ONLY)
Dept: URBAN - METROPOLITAN AREA CLINIC 38 | Facility: CLINIC | Age: 58
Setting detail: DERMATOLOGY
End: 2018-05-11

## 2018-05-11 ENCOUNTER — APPOINTMENT (RX ONLY)
Dept: URBAN - METROPOLITAN AREA CLINIC 37 | Facility: CLINIC | Age: 58
Setting detail: DERMATOLOGY
End: 2018-05-11

## 2018-05-11 DIAGNOSIS — L57.0 ACTINIC KERATOSIS: ICD-10-CM

## 2018-05-11 PROCEDURE — ? COUNSELING

## 2018-05-11 PROCEDURE — 99213 OFFICE O/P EST LOW 20 MIN: CPT | Mod: 25

## 2018-05-11 PROCEDURE — ? LIQUID NITROGEN

## 2018-05-11 PROCEDURE — ? TREATMENT REGIMEN

## 2018-05-11 PROCEDURE — 17000 DESTRUCT PREMALG LESION: CPT

## 2018-05-11 ASSESSMENT — LOCATION SIMPLE DESCRIPTION DERM
LOCATION SIMPLE: NOSE
LOCATION SIMPLE: NOSE

## 2018-05-11 ASSESSMENT — LOCATION DETAILED DESCRIPTION DERM
LOCATION DETAILED: NASAL SUPRATIP
LOCATION DETAILED: NASAL SUPRATIP

## 2018-05-11 ASSESSMENT — LOCATION ZONE DERM
LOCATION ZONE: NOSE
LOCATION ZONE: NOSE

## 2018-05-11 NOTE — PROCEDURE: TREATMENT REGIMEN
Plan: Patient advised to monitor affected area on nose if there’s no improvement with the cryotherapy today, she will return to the office for a skin biopsy
Detail Level: Zone

## 2018-05-11 NOTE — PROCEDURE: LIQUID NITROGEN
Number Of Freeze-Thaw Cycles: 2 freeze-thaw cycles
Detail Level: Detailed
Duration Of Freeze Thaw-Cycle (Seconds): 3
Render Post-Care Instructions In Note?: yes
Consent: The patient's consent was obtained including but not limited to risks of crusting, scabbing, blistering, scarring, darker or lighter pigmentary change, recurrence, incomplete removal and infection.
Post-Care Instructions: I reviewed with the patient in detail post-care instructions. Patient is to wear sunprotection, and avoid picking at any of the treated lesions. Pt may apply Vaseline to crusted or scabbing areas.

## 2018-05-11 NOTE — HPI: SKIN LESION
How Severe Is Your Skin Lesion?: mild
Has Your Skin Lesion Been Treated?: not been treated
Is This A New Presentation, Or A Follow-Up?: Skin Lesion
Additional History: Patient stated the last time she was here Zaina took a picture of her nose when it was scaly. She stated a skin biopsy was discussed in the past for a similar lesion. Patient states it is rough and scaly but not bleeding as her BCC on her ear did

## 2018-05-11 NOTE — PROCEDURE: TREATMENT REGIMEN
Detail Level: Zone
Plan: Patient advised to monitor affected area on nose if there’s no improvement with the cryotherapy today, she will return to the office for a skin biopsy

## 2018-05-11 NOTE — HPI: SKIN LESION
How Severe Is Your Skin Lesion?: mild
Has Your Skin Lesion Been Treated?: not been treated
Is This A New Presentation, Or A Follow-Up?: Skin Lesion
Additional History: Patient stated the last time she was here Lobito took a picture of her nose when it was scaly. She stated a skin biopsy was discussed in the past for a similar lesion. Patient states it is rough and scaly but not bleeding as her BCC on her ear did

## 2018-05-17 ENCOUNTER — APPOINTMENT (RX ONLY)
Dept: URBAN - METROPOLITAN AREA CLINIC 38 | Facility: CLINIC | Age: 58
Setting detail: DERMATOLOGY
End: 2018-05-17

## 2018-05-17 ENCOUNTER — APPOINTMENT (RX ONLY)
Dept: URBAN - METROPOLITAN AREA CLINIC 37 | Facility: CLINIC | Age: 58
Setting detail: DERMATOLOGY
End: 2018-05-17

## 2018-05-17 DIAGNOSIS — L40.0 PSORIASIS VULGARIS: ICD-10-CM

## 2018-05-17 PROCEDURE — 96920 EXCIMER LSR PSRIASIS<250SQCM: CPT

## 2018-05-17 PROCEDURE — ? EXCIMER LASER

## 2018-05-17 ASSESSMENT — LOCATION ZONE DERM
LOCATION ZONE: SCALP
LOCATION ZONE: SCALP

## 2018-05-17 ASSESSMENT — LOCATION SIMPLE DESCRIPTION DERM
LOCATION SIMPLE: POSTERIOR SCALP
LOCATION SIMPLE: POSTERIOR SCALP

## 2018-05-17 ASSESSMENT — LOCATION DETAILED DESCRIPTION DERM
LOCATION DETAILED: MID-OCCIPITAL SCALP
LOCATION DETAILED: MID-OCCIPITAL SCALP

## 2018-05-17 NOTE — PROCEDURE: EXCIMER LASER
Spot Size: 2 x 2 cm
Total Pulses (Optional): 62
Total Square Area In Cm2 (Required For Proper Billing- Whole Numbers Only Please): 80
Location #1: posterior scalp
Comments: Patient states that she tolerated her last treatment well.
Consent: Written consent obtained, risks reviewed including but not limited to crusting, scabbing, blistering, scarring, darker or lighter pigmentary change, incidental hair removal, bruising, and/or incomplete removal.
Fluence Units: J/cm2
Fluence #1 (J/Cm2 Or Mj/Cm2): 78
Mode: repeat paint
Detail Level: Zone
Device Serial Number (Optional): [id number]
Post-Care Instructions: I reviewed with the patient in detail post-care instructions. Patient should stay away from the sun and wear sun protection until treated areas are fully healed.
Treatment Number: 4

## 2018-05-17 NOTE — PROCEDURE: EXCIMER LASER
Fluence #1 (J/Cm2 Or Mj/Cm2): 787
Total Pulses (Optional): 62
Comments: Patient states that she tolerated her last treatment well.
Detail Level: Zone
Total Square Area In Cm2 (Required For Proper Billing- Whole Numbers Only Please): 80
Mode: repeat paint
Post-Care Instructions: I reviewed with the patient in detail post-care instructions. Patient should stay away from the sun and wear sun protection until treated areas are fully healed.
Consent: Written consent obtained, risks reviewed including but not limited to crusting, scabbing, blistering, scarring, darker or lighter pigmentary change, incidental hair removal, bruising, and/or incomplete removal.
Fluence Units: J/cm2
Location #1: posterior scalp
Treatment Number: 4
Device Serial Number (Optional): [id number]
Spot Size: 2 x 2 cm

## 2018-05-24 ENCOUNTER — APPOINTMENT (RX ONLY)
Dept: URBAN - METROPOLITAN AREA CLINIC 37 | Facility: CLINIC | Age: 58
Setting detail: DERMATOLOGY
End: 2018-05-24

## 2018-05-24 ENCOUNTER — APPOINTMENT (RX ONLY)
Dept: URBAN - METROPOLITAN AREA CLINIC 38 | Facility: CLINIC | Age: 58
Setting detail: DERMATOLOGY
End: 2018-05-24

## 2018-05-24 DIAGNOSIS — L40.0 PSORIASIS VULGARIS: ICD-10-CM

## 2018-05-24 PROCEDURE — ? EXCIMER LASER

## 2018-05-24 PROCEDURE — 96920 EXCIMER LSR PSRIASIS<250SQCM: CPT

## 2018-05-24 ASSESSMENT — LOCATION DETAILED DESCRIPTION DERM
LOCATION DETAILED: RIGHT CENTRAL POSTAURICULAR SKIN
LOCATION DETAILED: MID-OCCIPITAL SCALP
LOCATION DETAILED: LEFT CENTRAL POSTAURICULAR SKIN
LOCATION DETAILED: MID-OCCIPITAL SCALP
LOCATION DETAILED: RIGHT CENTRAL POSTAURICULAR SKIN
LOCATION DETAILED: LEFT CENTRAL POSTAURICULAR SKIN

## 2018-05-24 ASSESSMENT — LOCATION ZONE DERM
LOCATION ZONE: SCALP
LOCATION ZONE: SCALP

## 2018-05-24 ASSESSMENT — LOCATION SIMPLE DESCRIPTION DERM
LOCATION SIMPLE: SCALP
LOCATION SIMPLE: POSTERIOR SCALP
LOCATION SIMPLE: SCALP
LOCATION SIMPLE: POSTERIOR SCALP

## 2018-05-24 NOTE — PROCEDURE: EXCIMER LASER
Post-Care Instructions: I reviewed with the patient in detail post-care instructions. Patient should stay away from the sun and wear sun protection until treated areas are fully healed.
Detail Level: Zone
Fluence Units: J/cm2
Total Pulses (Optional): 85
Consent: Written consent obtained, risks reviewed including but not limited to crusting, scabbing, blistering, scarring, darker or lighter pigmentary change, incidental hair removal, bruising, and/or incomplete removal.
Device Serial Number (Optional): [id number]
Spot Size: 2 x 2 cm
Fluence #1 (J/Cm2 Or Mj/Cm2): 976
Treatment Number: 5
Mode: repeat paint
Location #1: posterior scalp
Total Square Area In Cm2 (Required For Proper Billing- Whole Numbers Only Please): 88
Comments: Patient states that she tolerated her last treatment well.

## 2018-05-30 ENCOUNTER — APPOINTMENT (RX ONLY)
Dept: URBAN - METROPOLITAN AREA CLINIC 38 | Facility: CLINIC | Age: 58
Setting detail: DERMATOLOGY
End: 2018-05-30

## 2018-05-30 ENCOUNTER — APPOINTMENT (RX ONLY)
Dept: URBAN - METROPOLITAN AREA CLINIC 37 | Facility: CLINIC | Age: 58
Setting detail: DERMATOLOGY
End: 2018-05-30

## 2018-05-30 DIAGNOSIS — L40.0 PSORIASIS VULGARIS: ICD-10-CM

## 2018-05-30 PROCEDURE — 96920 EXCIMER LSR PSRIASIS<250SQCM: CPT

## 2018-05-30 PROCEDURE — ? PRESCRIPTION

## 2018-05-30 PROCEDURE — ? EXCIMER LASER

## 2018-05-30 RX ORDER — FLUOCINOLONE ACETONIDE 0.11 MG/ML
OIL TOPICAL
Qty: 1 | Refills: 3 | Status: ERX | COMMUNITY
Start: 2018-05-30

## 2018-05-30 RX ADMIN — FLUOCINOLONE ACETONIDE ONCE: 0.11 OIL TOPICAL at 00:00

## 2018-05-30 ASSESSMENT — LOCATION DETAILED DESCRIPTION DERM
LOCATION DETAILED: MID-OCCIPITAL SCALP
LOCATION DETAILED: MID-OCCIPITAL SCALP

## 2018-05-30 ASSESSMENT — LOCATION ZONE DERM
LOCATION ZONE: SCALP
LOCATION ZONE: SCALP

## 2018-05-30 ASSESSMENT — LOCATION SIMPLE DESCRIPTION DERM
LOCATION SIMPLE: POSTERIOR SCALP
LOCATION SIMPLE: POSTERIOR SCALP

## 2018-05-30 NOTE — PROCEDURE: EXCIMER LASER
Location #1: posterior scalp
Mode: repeat paint
Comments: Patient states that she tolerated her last treatment well. She states that she is still experiencing itching on the scalp and admits to scratching. \\n\\n***Discussed with the patient, we are adding Dermasmoothe FS oil to her treatment regimen, to be applied to the scalp the night prior to her xtrac session, overnight once to twice a week; then rinse the following morning.
Consent: Written consent obtained, risks reviewed including but not limited to crusting, scabbing, blistering, scarring, darker or lighter pigmentary change, incidental hair removal, bruising, and/or incomplete removal.
Detail Level: Zone
Treatment Number: 6
Spot Size: 2 x 2 cm
Fluence #1 (J/Cm2 Or Mj/Cm2): 6573
Fluence Units: J/cm2
Total Pulses (Optional): 97
Total Square Area In Cm2 (Required For Proper Billing- Whole Numbers Only Please): 80
Post-Care Instructions: I reviewed with the patient in detail post-care instructions. Patient should stay away from the sun and wear sun protection until treated areas are fully healed.
Device Serial Number (Optional): [id number]

## 2018-06-05 ENCOUNTER — APPOINTMENT (RX ONLY)
Dept: URBAN - METROPOLITAN AREA CLINIC 38 | Facility: CLINIC | Age: 58
Setting detail: DERMATOLOGY
End: 2018-06-05

## 2018-06-05 ENCOUNTER — APPOINTMENT (RX ONLY)
Dept: URBAN - METROPOLITAN AREA CLINIC 37 | Facility: CLINIC | Age: 58
Setting detail: DERMATOLOGY
End: 2018-06-05

## 2018-06-05 DIAGNOSIS — L40.0 PSORIASIS VULGARIS: ICD-10-CM

## 2018-06-05 PROCEDURE — ? EXCIMER LASER

## 2018-06-05 PROCEDURE — 96920 EXCIMER LSR PSRIASIS<250SQCM: CPT

## 2018-06-05 ASSESSMENT — LOCATION ZONE DERM
LOCATION ZONE: SCALP
LOCATION ZONE: SCALP

## 2018-06-05 ASSESSMENT — LOCATION DETAILED DESCRIPTION DERM
LOCATION DETAILED: MID-OCCIPITAL SCALP
LOCATION DETAILED: MID-OCCIPITAL SCALP

## 2018-06-05 ASSESSMENT — LOCATION SIMPLE DESCRIPTION DERM
LOCATION SIMPLE: POSTERIOR SCALP
LOCATION SIMPLE: POSTERIOR SCALP

## 2018-06-05 NOTE — PROCEDURE: EXCIMER LASER
Fluence Units: J/cm2
Comments: Patient states that she tolerated her last treatment well.
Spot Size: 2 x 2 cm
Total Pulses (Optional): 128
Mode: repeat paint
Total Square Area In Cm2 (Required For Proper Billing- Whole Numbers Only Please): 84
Treatment Number: 7
Device Serial Number (Optional): [id number]
Detail Level: Zone
Post-Care Instructions: I reviewed with the patient in detail post-care instructions. Patient should stay away from the sun and wear sun protection until treated areas are fully healed.
Fluence #1 (J/Cm2 Or Mj/Cm2): 5542
Location #1: posterior scalp
Consent: Written consent obtained, risks reviewed including but not limited to crusting, scabbing, blistering, scarring, darker or lighter pigmentary change, incidental hair removal, bruising, and/or incomplete removal.

## 2018-06-05 NOTE — PROCEDURE: EXCIMER LASER
Treatment Number: 7
Total Pulses (Optional): 128
Total Square Area In Cm2 (Required For Proper Billing- Whole Numbers Only Please): 84
Location #1: posterior scalp
Post-Care Instructions: I reviewed with the patient in detail post-care instructions. Patient should stay away from the sun and wear sun protection until treated areas are fully healed.
Consent: Written consent obtained, risks reviewed including but not limited to crusting, scabbing, blistering, scarring, darker or lighter pigmentary change, incidental hair removal, bruising, and/or incomplete removal.
Device Serial Number (Optional): [id number]
Spot Size: 2 x 2 cm
Fluence Units: J/cm2
Mode: repeat paint
Comments: Patient states that she tolerated her last treatment well.
Detail Level: Zone
Fluence #1 (J/Cm2 Or Mj/Cm2): 2418

## 2018-06-08 ENCOUNTER — APPOINTMENT (RX ONLY)
Dept: URBAN - METROPOLITAN AREA CLINIC 38 | Facility: CLINIC | Age: 58
Setting detail: DERMATOLOGY
End: 2018-06-08

## 2018-06-08 ENCOUNTER — APPOINTMENT (RX ONLY)
Dept: URBAN - METROPOLITAN AREA CLINIC 37 | Facility: CLINIC | Age: 58
Setting detail: DERMATOLOGY
End: 2018-06-08

## 2018-06-08 DIAGNOSIS — L57.0 ACTINIC KERATOSIS: ICD-10-CM

## 2018-06-08 DIAGNOSIS — L40.0 PSORIASIS VULGARIS: ICD-10-CM | Status: IMPROVED

## 2018-06-08 PROCEDURE — ? TREATMENT REGIMEN

## 2018-06-08 PROCEDURE — ? COUNSELING

## 2018-06-08 PROCEDURE — 99213 OFFICE O/P EST LOW 20 MIN: CPT

## 2018-06-14 ENCOUNTER — APPOINTMENT (RX ONLY)
Dept: URBAN - METROPOLITAN AREA CLINIC 38 | Facility: CLINIC | Age: 58
Setting detail: DERMATOLOGY
End: 2018-06-14

## 2018-06-14 ENCOUNTER — APPOINTMENT (RX ONLY)
Dept: URBAN - METROPOLITAN AREA CLINIC 37 | Facility: CLINIC | Age: 58
Setting detail: DERMATOLOGY
End: 2018-06-14

## 2018-06-14 DIAGNOSIS — L40.0 PSORIASIS VULGARIS: ICD-10-CM

## 2018-06-14 PROCEDURE — ? EXCIMER LASER

## 2018-06-14 PROCEDURE — 96920 EXCIMER LSR PSRIASIS<250SQCM: CPT

## 2018-06-14 ASSESSMENT — LOCATION ZONE DERM
LOCATION ZONE: SCALP
LOCATION ZONE: SCALP

## 2018-06-14 ASSESSMENT — LOCATION SIMPLE DESCRIPTION DERM
LOCATION SIMPLE: POSTERIOR SCALP
LOCATION SIMPLE: POSTERIOR SCALP

## 2018-06-14 ASSESSMENT — LOCATION DETAILED DESCRIPTION DERM
LOCATION DETAILED: MID-OCCIPITAL SCALP
LOCATION DETAILED: MID-OCCIPITAL SCALP

## 2018-06-14 NOTE — PROCEDURE: EXCIMER LASER
Detail Level: Zone
Fluence #1 (J/Cm2 Or Mj/Cm2): 8958
Spot Size: 2 x 2 cm
Location #1: posterior scalp
Total Pulses (Optional): 205
Comments: Patient states that she tolerated her last treatment well.
Device Serial Number (Optional): [id number]
Mode: repeat paint
Total Square Area In Cm2 (Required For Proper Billing- Whole Numbers Only Please): 108
Fluence Units: J/cm2
Consent: Written consent obtained, risks reviewed including but not limited to crusting, scabbing, blistering, scarring, darker or lighter pigmentary change, incidental hair removal, bruising, and/or incomplete removal.
Treatment Number: 8
Post-Care Instructions: I reviewed with the patient in detail post-care instructions. Patient should stay away from the sun and wear sun protection until treated areas are fully healed.

## 2018-06-14 NOTE — PROCEDURE: EXCIMER LASER
Fluence Units: J/cm2
Total Pulses (Optional): 205
Mode: repeat paint
Comments: Patient states that she tolerated her last treatment well.
Detail Level: Zone
Consent: Written consent obtained, risks reviewed including but not limited to crusting, scabbing, blistering, scarring, darker or lighter pigmentary change, incidental hair removal, bruising, and/or incomplete removal.
Spot Size: 2 x 2 cm
Post-Care Instructions: I reviewed with the patient in detail post-care instructions. Patient should stay away from the sun and wear sun protection until treated areas are fully healed.
Total Square Area In Cm2 (Required For Proper Billing- Whole Numbers Only Please): 108
Fluence #1 (J/Cm2 Or Mj/Cm2): 7166
Device Serial Number (Optional): [id number]
Treatment Number: 8
Location #1: posterior scalp

## 2018-06-20 ENCOUNTER — APPOINTMENT (RX ONLY)
Dept: URBAN - METROPOLITAN AREA CLINIC 37 | Facility: CLINIC | Age: 58
Setting detail: DERMATOLOGY
End: 2018-06-20

## 2018-06-20 ENCOUNTER — APPOINTMENT (RX ONLY)
Dept: URBAN - METROPOLITAN AREA CLINIC 38 | Facility: CLINIC | Age: 58
Setting detail: DERMATOLOGY
End: 2018-06-20

## 2018-06-20 DIAGNOSIS — L40.0 PSORIASIS VULGARIS: ICD-10-CM

## 2018-06-20 PROCEDURE — ? EXCIMER LASER

## 2018-06-20 PROCEDURE — 96920 EXCIMER LSR PSRIASIS<250SQCM: CPT

## 2018-06-20 ASSESSMENT — LOCATION DETAILED DESCRIPTION DERM
LOCATION DETAILED: LEFT INFERIOR FRONTAL SCALP
LOCATION DETAILED: RIGHT INFERIOR FRONTAL SCALP
LOCATION DETAILED: LEFT INFERIOR OCCIPITAL SCALP
LOCATION DETAILED: RIGHT INFERIOR OCCIPITAL SCALP
LOCATION DETAILED: LEFT INFERIOR FRONTAL SCALP
LOCATION DETAILED: LEFT INFERIOR OCCIPITAL SCALP
LOCATION DETAILED: RIGHT INFERIOR OCCIPITAL SCALP
LOCATION DETAILED: RIGHT INFERIOR FRONTAL SCALP

## 2018-06-20 ASSESSMENT — LOCATION ZONE DERM
LOCATION ZONE: SCALP
LOCATION ZONE: SCALP

## 2018-06-20 ASSESSMENT — LOCATION SIMPLE DESCRIPTION DERM
LOCATION SIMPLE: SCALP
LOCATION SIMPLE: SCALP
LOCATION SIMPLE: POSTERIOR SCALP
LOCATION SIMPLE: POSTERIOR SCALP

## 2018-06-20 NOTE — PROCEDURE: EXCIMER LASER
Comments: Patient states that she experienced moderate sunburn sensation with tenderness and oozing on the scalp 12-24 hours after her last treatment. She states today her scalp feel fine with very little tenderness noted.\\n\\n***We maintained the dose setting on the scalp; due to Good Effects.

## 2018-06-20 NOTE — PROCEDURE: EXCIMER LASER
Post-Care Instructions: I reviewed with the patient in detail post-care instructions. Patient should stay away from the sun and wear sun protection until treated areas are fully healed.
Total Square Area In Cm2 (Required For Proper Billing- Whole Numbers Only Please): 68
Fluence #1 (J/Cm2 Or Mj/Cm2): 9093
Total Pulses (Optional): 129
Spot Size: 2 x 2 cm
Treatment Number: 9
Fluence Units: J/cm2
Detail Level: Zone
Device Serial Number (Optional): [id number]
Consent: Written consent obtained, risks reviewed including but not limited to crusting, scabbing, blistering, scarring, darker or lighter pigmentary change, incidental hair removal, bruising, and/or incomplete removal.
Comments: Patient states that she experienced moderate sunburn sensation with tenderness and oozing on the scalp 12-24 hours after her last treatment. She states today her scalp feel fine with very little tenderness noted.\\n\\n***We maintained the dose setting on the scalp; due to Good Effects.
Mode: repeat paint
Location #1: posterior scalp

## 2018-06-27 ENCOUNTER — APPOINTMENT (RX ONLY)
Dept: URBAN - METROPOLITAN AREA CLINIC 37 | Facility: CLINIC | Age: 58
Setting detail: DERMATOLOGY
End: 2018-06-27

## 2018-06-27 ENCOUNTER — APPOINTMENT (RX ONLY)
Dept: URBAN - METROPOLITAN AREA CLINIC 38 | Facility: CLINIC | Age: 58
Setting detail: DERMATOLOGY
End: 2018-06-27

## 2018-06-27 DIAGNOSIS — L40.0 PSORIASIS VULGARIS: ICD-10-CM

## 2018-06-27 PROBLEM — E13.9 OTHER SPECIFIED DIABETES MELLITUS WITHOUT COMPLICATIONS: Status: ACTIVE | Noted: 2018-06-27

## 2018-06-27 PROCEDURE — 96920 EXCIMER LSR PSRIASIS<250SQCM: CPT

## 2018-06-27 PROCEDURE — ? EXCIMER LASER

## 2018-06-27 ASSESSMENT — LOCATION SIMPLE DESCRIPTION DERM
LOCATION SIMPLE: POSTERIOR SCALP
LOCATION SIMPLE: POSTERIOR SCALP
LOCATION SIMPLE: SCALP
LOCATION SIMPLE: SCALP

## 2018-06-27 ASSESSMENT — LOCATION DETAILED DESCRIPTION DERM
LOCATION DETAILED: LEFT CENTRAL POSTAURICULAR SKIN
LOCATION DETAILED: RIGHT CENTRAL POSTAURICULAR SKIN
LOCATION DETAILED: RIGHT CENTRAL POSTAURICULAR SKIN
LOCATION DETAILED: LEFT CENTRAL POSTAURICULAR SKIN
LOCATION DETAILED: MID-OCCIPITAL SCALP
LOCATION DETAILED: MID-OCCIPITAL SCALP

## 2018-06-27 ASSESSMENT — LOCATION ZONE DERM
LOCATION ZONE: SCALP
LOCATION ZONE: SCALP

## 2018-06-27 NOTE — PROCEDURE: EXCIMER LASER
Detail Level: Zone
Consent: Written consent obtained, risks reviewed including but not limited to crusting, scabbing, blistering, scarring, darker or lighter pigmentary change, incidental hair removal, bruising, and/or incomplete removal.
Comments: Patient states that she tolerated her last treatment well.
Spot Size: 2 x 2 cm
Total Square Area In Cm2 (Required For Proper Billing- Whole Numbers Only Please): 92
Treatment Number: 10
Total Pulses (Optional): 219
Fluence Units: J/cm2
Fluence #1 (J/Cm2 Or Mj/Cm2): 6254
Mode: repeat paint
Device Serial Number (Optional): [id number]
Location #1: posterior scalp
Post-Care Instructions: I reviewed with the patient in detail post-care instructions. Patient should stay away from the sun and wear sun protection until treated areas are fully healed.

## 2018-06-27 NOTE — PROCEDURE: EXCIMER LASER
Location #1: posterior scalp
Consent: Written consent obtained, risks reviewed including but not limited to crusting, scabbing, blistering, scarring, darker or lighter pigmentary change, incidental hair removal, bruising, and/or incomplete removal.
Fluence Units: J/cm2
Detail Level: Zone
Post-Care Instructions: I reviewed with the patient in detail post-care instructions. Patient should stay away from the sun and wear sun protection until treated areas are fully healed.
Comments: Patient states that she tolerated her last treatment well.
Fluence #1 (J/Cm2 Or Mj/Cm2): 5915
Total Square Area In Cm2 (Required For Proper Billing- Whole Numbers Only Please): 92
Treatment Number: 10
Mode: repeat paint
Total Pulses (Optional): 219
Spot Size: 2 x 2 cm
Device Serial Number (Optional): [id number]

## 2018-07-12 ENCOUNTER — APPOINTMENT (RX ONLY)
Dept: URBAN - METROPOLITAN AREA CLINIC 37 | Facility: CLINIC | Age: 58
Setting detail: DERMATOLOGY
End: 2018-07-12

## 2018-07-12 ENCOUNTER — APPOINTMENT (RX ONLY)
Dept: URBAN - METROPOLITAN AREA CLINIC 38 | Facility: CLINIC | Age: 58
Setting detail: DERMATOLOGY
End: 2018-07-12

## 2018-07-12 DIAGNOSIS — L40.0 PSORIASIS VULGARIS: ICD-10-CM

## 2018-07-12 PROCEDURE — 96920 EXCIMER LSR PSRIASIS<250SQCM: CPT

## 2018-07-12 PROCEDURE — ? EXCIMER LASER

## 2018-07-12 ASSESSMENT — LOCATION DETAILED DESCRIPTION DERM
LOCATION DETAILED: RIGHT INFERIOR FRONTAL SCALP
LOCATION DETAILED: LEFT INFERIOR FRONTAL SCALP
LOCATION DETAILED: MID-OCCIPITAL SCALP
LOCATION DETAILED: MID-OCCIPITAL SCALP
LOCATION DETAILED: RIGHT INFERIOR FRONTAL SCALP
LOCATION DETAILED: LEFT INFERIOR FRONTAL SCALP
LOCATION DETAILED: RIGHT CENTRAL POSTAURICULAR SKIN
LOCATION DETAILED: LEFT SUPERIOR POSTAURICULAR SKIN
LOCATION DETAILED: RIGHT CENTRAL POSTAURICULAR SKIN
LOCATION DETAILED: LEFT SUPERIOR POSTAURICULAR SKIN

## 2018-07-12 ASSESSMENT — LOCATION ZONE DERM
LOCATION ZONE: SCALP
LOCATION ZONE: SCALP

## 2018-07-12 NOTE — PROCEDURE: EXCIMER LASER
Total Square Area In Cm2 (Required For Proper Billing- Whole Numbers Only Please): 64
Fluence Units: J/cm2
Spot Size: 2 x 2 cm
Treatment Number: 11
Detail Level: Zone
Consent: Written consent obtained, risks reviewed including but not limited to crusting, scabbing, blistering, scarring, darker or lighter pigmentary change, incidental hair removal, bruising, and/or incomplete removal.
Comments: Patient states that she tolerated her last treatment well.
Post-Care Instructions: I reviewed with the patient in detail post-care instructions. Patient should stay away from the sun and wear sun protection until treated areas are fully healed.
Device Serial Number (Optional): [id number]
Mode: repeat paint
Fluence #1 (J/Cm2 Or Mj/Cm2): 6738
Location #1: posterior scalp
Total Pulses (Optional): 190

## 2018-07-12 NOTE — PROCEDURE: EXCIMER LASER
Mode: repeat paint
Treatment Number: 11
Post-Care Instructions: I reviewed with the patient in detail post-care instructions. Patient should stay away from the sun and wear sun protection until treated areas are fully healed.
Detail Level: Zone
Location #1: posterior scalp
Fluence Units: J/cm2
Consent: Written consent obtained, risks reviewed including but not limited to crusting, scabbing, blistering, scarring, darker or lighter pigmentary change, incidental hair removal, bruising, and/or incomplete removal.
Total Square Area In Cm2 (Required For Proper Billing- Whole Numbers Only Please): 64
Comments: Patient states that she tolerated her last treatment well.
Fluence #1 (J/Cm2 Or Mj/Cm2): 1693
Total Pulses (Optional): 190
Spot Size: 2 x 2 cm
Device Serial Number (Optional): [id number]

## 2018-07-25 ENCOUNTER — APPOINTMENT (RX ONLY)
Dept: URBAN - METROPOLITAN AREA CLINIC 38 | Facility: CLINIC | Age: 58
Setting detail: DERMATOLOGY
End: 2018-07-25

## 2018-07-25 ENCOUNTER — APPOINTMENT (RX ONLY)
Dept: URBAN - METROPOLITAN AREA CLINIC 37 | Facility: CLINIC | Age: 58
Setting detail: DERMATOLOGY
End: 2018-07-25

## 2018-07-25 DIAGNOSIS — L40.0 PSORIASIS VULGARIS: ICD-10-CM

## 2018-07-25 PROCEDURE — 96920 EXCIMER LSR PSRIASIS<250SQCM: CPT

## 2018-07-25 PROCEDURE — ? EXCIMER LASER

## 2018-07-25 ASSESSMENT — LOCATION DETAILED DESCRIPTION DERM
LOCATION DETAILED: RIGHT INFERIOR OCCIPITAL SCALP
LOCATION DETAILED: RIGHT INFERIOR OCCIPITAL SCALP
LOCATION DETAILED: LEFT POSTAURICULAR SKIN
LOCATION DETAILED: MID-OCCIPITAL SCALP
LOCATION DETAILED: MID-OCCIPITAL SCALP
LOCATION DETAILED: RIGHT INFERIOR PARIETAL SCALP
LOCATION DETAILED: RIGHT INFERIOR PARIETAL SCALP
LOCATION DETAILED: LEFT POSTAURICULAR SKIN

## 2018-07-25 ASSESSMENT — LOCATION ZONE DERM
LOCATION ZONE: SCALP
LOCATION ZONE: SCALP

## 2018-07-25 NOTE — PROCEDURE: EXCIMER LASER
Post-Care Instructions: I reviewed with the patient in detail post-care instructions. Patient should stay away from the sun and wear sun protection until treated areas are fully healed.
Spot Size: 2 x 2 cm
Location #1: posterior scalp
Detail Level: Zone
Device Serial Number (Optional): [id number]
Fluence Units: J/cm2
Consent: Written consent obtained, risks reviewed including but not limited to crusting, scabbing, blistering, scarring, darker or lighter pigmentary change, incidental hair removal, bruising, and/or incomplete removal.
Comments: Patient states that she experienced moderate sunburn sensation with tenderness and oozing on the scalp <12 hours after her last treatment. She states today her scalp feel better. She states that she did notice a lot of the plaque sloughing off.\\n\\n***We decreased the dose setting 25mJ; due to Good Effects and moderate erythema.
Mode: repeat paint
Fluence #1 (J/Cm2 Or Mj/Cm2): 0033
Treatment Number: 12
Total Square Area In Cm2 (Required For Proper Billing- Whole Numbers Only Please): 60
Total Pulses (Optional): 142

## 2018-07-25 NOTE — PROCEDURE: EXCIMER LASER
Post-Care Instructions: I reviewed with the patient in detail post-care instructions. Patient should stay away from the sun and wear sun protection until treated areas are fully healed.
Consent: Written consent obtained, risks reviewed including but not limited to crusting, scabbing, blistering, scarring, darker or lighter pigmentary change, incidental hair removal, bruising, and/or incomplete removal.
Fluence #1 (J/Cm2 Or Mj/Cm2): 3296
Device Serial Number (Optional): [id number]
Spot Size: 2 x 2 cm
Treatment Number: 12
Fluence Units: J/cm2
Comments: Patient states that she experienced moderate sunburn sensation with tenderness and oozing on the scalp <12 hours after her last treatment. She states today her scalp feel better. She states that she did notice a lot of the plaque sloughing off.\\n\\n***We decreased the dose setting 25mJ; due to Good Effects and moderate erythema.
Detail Level: Zone
Total Square Area In Cm2 (Required For Proper Billing- Whole Numbers Only Please): 60
Mode: repeat paint
Location #1: posterior scalp
Total Pulses (Optional): 142

## 2018-08-08 ENCOUNTER — APPOINTMENT (RX ONLY)
Dept: URBAN - METROPOLITAN AREA CLINIC 38 | Facility: CLINIC | Age: 58
Setting detail: DERMATOLOGY
End: 2018-08-08

## 2018-08-08 ENCOUNTER — APPOINTMENT (RX ONLY)
Dept: URBAN - METROPOLITAN AREA CLINIC 37 | Facility: CLINIC | Age: 58
Setting detail: DERMATOLOGY
End: 2018-08-08

## 2018-08-08 DIAGNOSIS — L40.0 PSORIASIS VULGARIS: ICD-10-CM

## 2018-08-08 PROCEDURE — 96920 EXCIMER LSR PSRIASIS<250SQCM: CPT

## 2018-08-08 PROCEDURE — ? EXCIMER LASER

## 2018-08-08 ASSESSMENT — LOCATION DETAILED DESCRIPTION DERM
LOCATION DETAILED: RIGHT CENTRAL POSTAURICULAR SKIN
LOCATION DETAILED: LEFT INFERIOR OCCIPITAL SCALP
LOCATION DETAILED: RIGHT CENTRAL POSTAURICULAR SKIN
LOCATION DETAILED: LEFT CENTRAL POSTAURICULAR SKIN
LOCATION DETAILED: LEFT INFERIOR OCCIPITAL SCALP
LOCATION DETAILED: LEFT CENTRAL POSTAURICULAR SKIN

## 2018-08-08 ASSESSMENT — LOCATION SIMPLE DESCRIPTION DERM
LOCATION SIMPLE: POSTERIOR SCALP
LOCATION SIMPLE: SCALP
LOCATION SIMPLE: SCALP
LOCATION SIMPLE: POSTERIOR SCALP

## 2018-08-08 ASSESSMENT — LOCATION ZONE DERM
LOCATION ZONE: SCALP
LOCATION ZONE: SCALP

## 2018-08-08 NOTE — PROCEDURE: EXCIMER LASER
Total Pulses (Optional): 305
Location #1: posterior scalp
Total Square Area In Cm2 (Required For Proper Billing- Whole Numbers Only Please): 128
Treatment Number: 13
Device Serial Number (Optional): [id number]
Fluence Units: J/cm2
Mode: repeat paint
Post-Care Instructions: I reviewed with the patient in detail post-care instructions. Patient should stay away from the sun and wear sun protection until treated areas are fully healed.
Spot Size: 2 x 2 cm
Consent: Written consent obtained, risks reviewed including but not limited to crusting, scabbing, blistering, scarring, darker or lighter pigmentary change, incidental hair removal, bruising, and/or incomplete removal.
Fluence #1 (J/Cm2 Or Mj/Cm2): 8757
Detail Level: Zone
Comments: Patient states that she tolerated her last treatment well.

## 2018-08-08 NOTE — PROCEDURE: EXCIMER LASER
Comments: Patient states that she tolerated her last treatment well.
Fluence Units: J/cm2
Consent: Written consent obtained, risks reviewed including but not limited to crusting, scabbing, blistering, scarring, darker or lighter pigmentary change, incidental hair removal, bruising, and/or incomplete removal.
Post-Care Instructions: I reviewed with the patient in detail post-care instructions. Patient should stay away from the sun and wear sun protection until treated areas are fully healed.
Location #1: posterior scalp
Fluence #1 (J/Cm2 Or Mj/Cm2): 5583
Device Serial Number (Optional): [id number]
Detail Level: Zone
Spot Size: 2 x 2 cm
Mode: repeat paint
Treatment Number: 13
Total Pulses (Optional): 305
Total Square Area In Cm2 (Required For Proper Billing- Whole Numbers Only Please): 128

## 2018-08-22 ENCOUNTER — APPOINTMENT (RX ONLY)
Dept: URBAN - METROPOLITAN AREA CLINIC 38 | Facility: CLINIC | Age: 58
Setting detail: DERMATOLOGY
End: 2018-08-22

## 2018-08-22 ENCOUNTER — APPOINTMENT (RX ONLY)
Dept: URBAN - METROPOLITAN AREA CLINIC 37 | Facility: CLINIC | Age: 58
Setting detail: DERMATOLOGY
End: 2018-08-22

## 2018-08-22 DIAGNOSIS — L40.0 PSORIASIS VULGARIS: ICD-10-CM

## 2018-08-22 PROCEDURE — ? EXCIMER LASER

## 2018-08-22 PROCEDURE — 96920 EXCIMER LSR PSRIASIS<250SQCM: CPT

## 2018-08-22 ASSESSMENT — LOCATION DETAILED DESCRIPTION DERM
LOCATION DETAILED: RIGHT CENTRAL POSTAURICULAR SKIN
LOCATION DETAILED: LEFT CENTRAL POSTAURICULAR SKIN
LOCATION DETAILED: MID-OCCIPITAL SCALP
LOCATION DETAILED: LEFT CENTRAL POSTAURICULAR SKIN
LOCATION DETAILED: MID-OCCIPITAL SCALP
LOCATION DETAILED: RIGHT CENTRAL POSTAURICULAR SKIN

## 2018-08-22 ASSESSMENT — LOCATION ZONE DERM
LOCATION ZONE: SCALP
LOCATION ZONE: SCALP

## 2018-08-22 NOTE — PROCEDURE: EXCIMER LASER
Detail Level: Zone
Total Square Area In Cm2 (Required For Proper Billing- Whole Numbers Only Please): 76
Consent: Written consent obtained, risks reviewed including but not limited to crusting, scabbing, blistering, scarring, darker or lighter pigmentary change, incidental hair removal, bruising, and/or incomplete removal.
Mode: repeat paint
Spot Size: 2 x 2 cm
Post-Care Instructions: I reviewed with the patient in detail post-care instructions. Patient should stay away from the sun and wear sun protection until treated areas are fully healed.
Fluence Units: J/cm2
Location #1: posterior scalp
Comments: Patient states that she tolerated her last treatment well. She states that she is not feeling well today; due to bronchitis, that is being treated with antibiotics.\\n\\n***We maintained the dose setting today.
Fluence #1 (J/Cm2 Or Mj/Cm2): 6286
Treatment Number: 14
Total Pulses (Optional): 181
Device Serial Number (Optional): [id number]

## 2018-08-22 NOTE — PROCEDURE: EXCIMER LASER
Treatment Number: 14
Total Square Area In Cm2 (Required For Proper Billing- Whole Numbers Only Please): 76
Mode: repeat paint
Fluence #1 (J/Cm2 Or Mj/Cm2): 3749
Post-Care Instructions: I reviewed with the patient in detail post-care instructions. Patient should stay away from the sun and wear sun protection until treated areas are fully healed.
Location #1: posterior scalp
Total Pulses (Optional): 181
Consent: Written consent obtained, risks reviewed including but not limited to crusting, scabbing, blistering, scarring, darker or lighter pigmentary change, incidental hair removal, bruising, and/or incomplete removal.
Comments: Patient states that she tolerated her last treatment well. She states that she is not feeling well today; due to bronchitis, that is being treated with antibiotics.\\n\\n***We maintained the dose setting today.
Device Serial Number (Optional): [id number]
Spot Size: 2 x 2 cm
Detail Level: Zone
Fluence Units: J/cm2

## 2018-08-29 ENCOUNTER — APPOINTMENT (RX ONLY)
Dept: URBAN - METROPOLITAN AREA CLINIC 38 | Facility: CLINIC | Age: 58
Setting detail: DERMATOLOGY
End: 2018-08-29

## 2018-08-29 ENCOUNTER — APPOINTMENT (RX ONLY)
Dept: URBAN - METROPOLITAN AREA CLINIC 37 | Facility: CLINIC | Age: 58
Setting detail: DERMATOLOGY
End: 2018-08-29

## 2018-08-29 DIAGNOSIS — L40.0 PSORIASIS VULGARIS: ICD-10-CM

## 2018-08-29 PROCEDURE — 96920 EXCIMER LSR PSRIASIS<250SQCM: CPT

## 2018-08-29 PROCEDURE — ? EXCIMER LASER

## 2018-08-29 ASSESSMENT — LOCATION SIMPLE DESCRIPTION DERM
LOCATION SIMPLE: SCALP
LOCATION SIMPLE: POSTERIOR SCALP
LOCATION SIMPLE: POSTERIOR SCALP
LOCATION SIMPLE: SCALP

## 2018-08-29 ASSESSMENT — LOCATION DETAILED DESCRIPTION DERM
LOCATION DETAILED: RIGHT SUPERIOR POSTAURICULAR SKIN
LOCATION DETAILED: MID-OCCIPITAL SCALP
LOCATION DETAILED: MID-OCCIPITAL SCALP
LOCATION DETAILED: RIGHT SUPERIOR POSTAURICULAR SKIN
LOCATION DETAILED: LEFT CENTRAL POSTAURICULAR SKIN
LOCATION DETAILED: LEFT CENTRAL POSTAURICULAR SKIN

## 2018-08-29 ASSESSMENT — LOCATION ZONE DERM
LOCATION ZONE: SCALP
LOCATION ZONE: SCALP

## 2018-08-29 NOTE — PROCEDURE: EXCIMER LASER
Mode: repeat paint
Fluence #1 (J/Cm2 Or Mj/Cm2): 3772
Fluence Units: J/cm2
Post-Care Instructions: I reviewed with the patient in detail post-care instructions. Patient should stay away from the sun and wear sun protection until treated areas are fully healed.
Consent: Written consent obtained, risks reviewed including but not limited to crusting, scabbing, blistering, scarring, darker or lighter pigmentary change, incidental hair removal, bruising, and/or incomplete removal.
Treatment Number: 15
Comments: Patient states that she tolerated her last treatment well. She states that she has been battling with bronchitis for the past three weeks, but getting better.\\n\\n***Ziyad Narvaez PA-C, evaluated the patient’s scalp; discussed very little improvement noted to the treated areas on the posterior scalp, after 15 sessions. Discussed other treatment options such as; systemic treatment.  We will give her the Humira handout and get the PA started. Discussed we will call  her once we have the approval.
Location #1: posterior scalp
Detail Level: Zone
Spot Size: 2 x 2 cm
Device Serial Number (Optional): [id number]
Total Square Area In Cm2 (Required For Proper Billing- Whole Numbers Only Please): 120
Total Pulses (Optional): 357

## 2018-08-29 NOTE — PROCEDURE: EXCIMER LASER
Fluence #1 (J/Cm2 Or Mj/Cm2): 9533
Total Pulses (Optional): 357
Post-Care Instructions: I reviewed with the patient in detail post-care instructions. Patient should stay away from the sun and wear sun protection until treated areas are fully healed.
Spot Size: 2 x 2 cm
Fluence Units: J/cm2
Treatment Number: 15
Mode: repeat paint
Total Square Area In Cm2 (Required For Proper Billing- Whole Numbers Only Please): 120
Consent: Written consent obtained, risks reviewed including but not limited to crusting, scabbing, blistering, scarring, darker or lighter pigmentary change, incidental hair removal, bruising, and/or incomplete removal.
Comments: Patient states that she tolerated her last treatment well. She states that she has been battling with bronchitis for the past three weeks, but getting better.\\n\\n***Valente Rico PA-C, evaluated the patient’s scalp; discussed very little improvement noted to the treated areas on the posterior scalp, after 15 sessions. Discussed other treatment options such as; systemic treatment.  We will give her the Humira handout and get the PA started. Discussed we will call  her once we have the approval.
Location #1: posterior scalp
Detail Level: Zone
Device Serial Number (Optional): [id number]

## 2018-09-06 ENCOUNTER — APPOINTMENT (RX ONLY)
Dept: URBAN - METROPOLITAN AREA CLINIC 38 | Facility: CLINIC | Age: 58
Setting detail: DERMATOLOGY
End: 2018-09-06

## 2018-09-06 ENCOUNTER — APPOINTMENT (RX ONLY)
Dept: URBAN - METROPOLITAN AREA CLINIC 37 | Facility: CLINIC | Age: 58
Setting detail: DERMATOLOGY
End: 2018-09-06

## 2018-09-06 DIAGNOSIS — L40.0 PSORIASIS VULGARIS: ICD-10-CM

## 2018-09-06 PROCEDURE — 96920 EXCIMER LSR PSRIASIS<250SQCM: CPT

## 2018-09-06 PROCEDURE — ? EXCIMER LASER

## 2018-09-06 ASSESSMENT — LOCATION DETAILED DESCRIPTION DERM
LOCATION DETAILED: RIGHT CENTRAL POSTAURICULAR SKIN
LOCATION DETAILED: MID-OCCIPITAL SCALP
LOCATION DETAILED: RIGHT CENTRAL POSTAURICULAR SKIN
LOCATION DETAILED: LEFT CENTRAL POSTAURICULAR SKIN
LOCATION DETAILED: MID-OCCIPITAL SCALP
LOCATION DETAILED: LEFT CENTRAL POSTAURICULAR SKIN

## 2018-09-06 ASSESSMENT — LOCATION ZONE DERM
LOCATION ZONE: SCALP
LOCATION ZONE: SCALP

## 2018-09-06 NOTE — PROCEDURE: EXCIMER LASER
Total Square Area In Cm2 (Required For Proper Billing- Whole Numbers Only Please): 108
Post-Care Instructions: I reviewed with the patient in detail post-care instructions. Patient should stay away from the sun and wear sun protection until treated areas are fully healed.
Device Serial Number (Optional): [id number]
Location #1: posterior scalp
Consent: Written consent obtained, risks reviewed including but not limited to crusting, scabbing, blistering, scarring, darker or lighter pigmentary change, incidental hair removal, bruising, and/or incomplete removal.
Total Pulses (Optional): 321
Fluence #1 (J/Cm2 Or Mj/Cm2): 6858
Treatment Number: 16
Fluence Units: J/cm2
Spot Size: 2 x 2 cm
Comments: Patient states that she tolerated her last treatment well. She states that she would like to continue with the Xtrac treatments, until approved for the Humira.
Mode: repeat paint
Detail Level: Zone

## 2018-09-06 NOTE — PROCEDURE: EXCIMER LASER
Total Pulses (Optional): 321
Location #1: posterior scalp
Total Square Area In Cm2 (Required For Proper Billing- Whole Numbers Only Please): 108
Spot Size: 2 x 2 cm
Device Serial Number (Optional): [id number]
Consent: Written consent obtained, risks reviewed including but not limited to crusting, scabbing, blistering, scarring, darker or lighter pigmentary change, incidental hair removal, bruising, and/or incomplete removal.
Fluence Units: J/cm2
Treatment Number: 16
Mode: repeat paint
Post-Care Instructions: I reviewed with the patient in detail post-care instructions. Patient should stay away from the sun and wear sun protection until treated areas are fully healed.
Fluence #1 (J/Cm2 Or Mj/Cm2): 3347
Comments: Patient states that she tolerated her last treatment well. She states that she would like to continue with the Xtrac treatments, until approved for the Humira.
Detail Level: Zone

## 2018-09-13 ENCOUNTER — APPOINTMENT (RX ONLY)
Dept: URBAN - METROPOLITAN AREA CLINIC 38 | Facility: CLINIC | Age: 58
Setting detail: DERMATOLOGY
End: 2018-09-13

## 2018-09-13 ENCOUNTER — APPOINTMENT (RX ONLY)
Dept: URBAN - METROPOLITAN AREA CLINIC 37 | Facility: CLINIC | Age: 58
Setting detail: DERMATOLOGY
End: 2018-09-13

## 2018-09-13 DIAGNOSIS — L40.0 PSORIASIS VULGARIS: ICD-10-CM

## 2018-09-13 PROCEDURE — 96920 EXCIMER LSR PSRIASIS<250SQCM: CPT

## 2018-09-13 PROCEDURE — ? EXCIMER LASER

## 2018-09-13 ASSESSMENT — LOCATION DETAILED DESCRIPTION DERM
LOCATION DETAILED: LEFT CENTRAL POSTAURICULAR SKIN
LOCATION DETAILED: LEFT INFERIOR OCCIPITAL SCALP
LOCATION DETAILED: RIGHT CENTRAL POSTAURICULAR SKIN
LOCATION DETAILED: LEFT CENTRAL POSTAURICULAR SKIN
LOCATION DETAILED: RIGHT CENTRAL POSTAURICULAR SKIN
LOCATION DETAILED: LEFT INFERIOR OCCIPITAL SCALP

## 2018-09-13 ASSESSMENT — LOCATION ZONE DERM
LOCATION ZONE: SCALP
LOCATION ZONE: SCALP

## 2018-09-13 NOTE — PROCEDURE: EXCIMER LASER
Mode: repeat paint
Fluence Units: J/cm2
Device Serial Number (Optional): [id number]
Detail Level: Zone
Total Pulses (Optional): 424
Treatment Number: 17
Fluence #1 (J/Cm2 Or Mj/Cm2): 6345
Consent: Written consent obtained, risks reviewed including but not limited to crusting, scabbing, blistering, scarring, darker or lighter pigmentary change, incidental hair removal, bruising, and/or incomplete removal.
Comments: Patient states that she tolerated her last treatment well.
Spot Size: 2 x 2 cm
Total Square Area In Cm2 (Required For Proper Billing- Whole Numbers Only Please): 124
Location #1: posterior scalp
Post-Care Instructions: I reviewed with the patient in detail post-care instructions. Patient should stay away from the sun and wear sun protection until treated areas are fully healed.

## 2018-09-13 NOTE — PROCEDURE: EXCIMER LASER
Total Pulses (Optional): 424
Total Square Area In Cm2 (Required For Proper Billing- Whole Numbers Only Please): 124
Fluence #1 (J/Cm2 Or Mj/Cm2): 8718
Mode: repeat paint
Location #1: posterior scalp
Consent: Written consent obtained, risks reviewed including but not limited to crusting, scabbing, blistering, scarring, darker or lighter pigmentary change, incidental hair removal, bruising, and/or incomplete removal.
Detail Level: Zone
Spot Size: 2 x 2 cm
Comments: Patient states that she tolerated her last treatment well.
Device Serial Number (Optional): [id number]
Treatment Number: 17
Fluence Units: J/cm2
Post-Care Instructions: I reviewed with the patient in detail post-care instructions. Patient should stay away from the sun and wear sun protection until treated areas are fully healed.

## 2018-09-20 ENCOUNTER — APPOINTMENT (RX ONLY)
Dept: URBAN - METROPOLITAN AREA CLINIC 37 | Facility: CLINIC | Age: 58
Setting detail: DERMATOLOGY
End: 2018-09-20

## 2018-09-20 ENCOUNTER — APPOINTMENT (RX ONLY)
Dept: URBAN - METROPOLITAN AREA CLINIC 38 | Facility: CLINIC | Age: 58
Setting detail: DERMATOLOGY
End: 2018-09-20

## 2018-09-20 DIAGNOSIS — L40.0 PSORIASIS VULGARIS: ICD-10-CM

## 2018-09-20 PROCEDURE — 96920 EXCIMER LSR PSRIASIS<250SQCM: CPT

## 2018-09-20 PROCEDURE — ? EXCIMER LASER

## 2018-09-20 ASSESSMENT — LOCATION DETAILED DESCRIPTION DERM
LOCATION DETAILED: RIGHT SUPERIOR POSTAURICULAR SKIN
LOCATION DETAILED: MID-OCCIPITAL SCALP
LOCATION DETAILED: MID-OCCIPITAL SCALP
LOCATION DETAILED: LEFT CENTRAL POSTAURICULAR SKIN
LOCATION DETAILED: RIGHT SUPERIOR POSTAURICULAR SKIN
LOCATION DETAILED: LEFT CENTRAL POSTAURICULAR SKIN

## 2018-09-20 ASSESSMENT — LOCATION ZONE DERM
LOCATION ZONE: SCALP
LOCATION ZONE: SCALP

## 2018-09-20 NOTE — PROCEDURE: EXCIMER LASER
Consent: Written consent obtained, risks reviewed including but not limited to crusting, scabbing, blistering, scarring, darker or lighter pigmentary change, incidental hair removal, bruising, and/or incomplete removal.
Location #1: posterior scalp
Total Square Area In Cm2 (Required For Proper Billing- Whole Numbers Only Please): 108
Mode: repeat paint
Device Serial Number (Optional): [id number]
Comments: Patient states that she tolerated her last treatment well.
Treatment Number: 18
Total Pulses (Optional): 425
Fluence #1 (J/Cm2 Or Mj/Cm2): 8180
Detail Level: Zone
Spot Size: 2 x 2 cm
Fluence Units: J/cm2
Post-Care Instructions: I reviewed with the patient in detail post-care instructions. Patient should stay away from the sun and wear sun protection until treated areas are fully healed.

## 2018-09-20 NOTE — PROCEDURE: EXCIMER LASER
Device Serial Number (Optional): [id number]
Spot Size: 2 x 2 cm
Location #1: posterior scalp
Mode: repeat paint
Comments: Patient states that she tolerated her last treatment well.
Total Square Area In Cm2 (Required For Proper Billing- Whole Numbers Only Please): 108
Detail Level: Zone
Post-Care Instructions: I reviewed with the patient in detail post-care instructions. Patient should stay away from the sun and wear sun protection until treated areas are fully healed.
Consent: Written consent obtained, risks reviewed including but not limited to crusting, scabbing, blistering, scarring, darker or lighter pigmentary change, incidental hair removal, bruising, and/or incomplete removal.
Treatment Number: 18
Total Pulses (Optional): 425
Fluence #1 (J/Cm2 Or Mj/Cm2): 3946
Fluence Units: J/cm2

## 2018-10-03 ENCOUNTER — APPOINTMENT (RX ONLY)
Dept: URBAN - METROPOLITAN AREA CLINIC 38 | Facility: CLINIC | Age: 58
Setting detail: DERMATOLOGY
End: 2018-10-03

## 2018-10-03 ENCOUNTER — APPOINTMENT (RX ONLY)
Dept: URBAN - METROPOLITAN AREA CLINIC 37 | Facility: CLINIC | Age: 58
Setting detail: DERMATOLOGY
End: 2018-10-03

## 2018-10-03 DIAGNOSIS — L40.0 PSORIASIS VULGARIS: ICD-10-CM

## 2018-10-03 PROCEDURE — ? EXCIMER LASER

## 2018-10-03 PROCEDURE — ? ORDER TESTS

## 2018-10-03 PROCEDURE — 96920 EXCIMER LSR PSRIASIS<250SQCM: CPT

## 2018-10-03 PROCEDURE — ? COUNSELING

## 2018-10-03 PROCEDURE — 36415 COLL VENOUS BLD VENIPUNCTURE: CPT

## 2018-10-03 PROCEDURE — 99213 OFFICE O/P EST LOW 20 MIN: CPT | Mod: 25

## 2018-10-03 PROCEDURE — ? VENIPUNCTURE

## 2018-10-03 ASSESSMENT — LOCATION SIMPLE DESCRIPTION DERM
LOCATION SIMPLE: SCALP
LOCATION SIMPLE: POSTERIOR SCALP
LOCATION SIMPLE: LEFT UPPER ARM
LOCATION SIMPLE: SCALP
LOCATION SIMPLE: LEFT UPPER ARM
LOCATION SIMPLE: POSTERIOR SCALP

## 2018-10-03 ASSESSMENT — LOCATION DETAILED DESCRIPTION DERM
LOCATION DETAILED: LEFT CENTRAL POSTAURICULAR SKIN
LOCATION DETAILED: RIGHT INFERIOR FRONTAL SCALP
LOCATION DETAILED: LEFT INFERIOR FRONTAL SCALP
LOCATION DETAILED: LEFT CENTRAL POSTAURICULAR SKIN
LOCATION DETAILED: MID-OCCIPITAL SCALP
LOCATION DETAILED: LEFT ANTECUBITAL SKIN
LOCATION DETAILED: MID-OCCIPITAL SCALP
LOCATION DETAILED: RIGHT INFERIOR FRONTAL SCALP
LOCATION DETAILED: RIGHT CENTRAL POSTAURICULAR SKIN
LOCATION DETAILED: RIGHT CENTRAL POSTAURICULAR SKIN
LOCATION DETAILED: LEFT ANTECUBITAL SKIN
LOCATION DETAILED: LEFT INFERIOR FRONTAL SCALP

## 2018-10-03 ASSESSMENT — LOCATION ZONE DERM
LOCATION ZONE: SCALP
LOCATION ZONE: ARM
LOCATION ZONE: ARM
LOCATION ZONE: SCALP

## 2018-10-03 NOTE — PROCEDURE: HIGH RISK MEDICATION MONITORING
Colchicine Counseling:  Patient counseled regarding adverse effects including but not limited to stomach upset (nausea, vomiting, stomach pain, or diarrhea).  Patient instructed to limit alcohol consumption while taking this medication.  Colchicine may reduce blood counts especially with prolonged use.  The patient understands that monitoring of kidney function and blood counts may be required, especially at baseline. The patient verbalized understanding of the proper use and possible adverse effects of colchicine.  All of the patient's questions and concerns were addressed.
Tazorac Counseling:  Patient advised that medication is irritating and drying.  Patient may need to apply sparingly and wash off after an hour before eventually leaving it on overnight.  The patient verbalized understanding of the proper use and possible adverse effects of tazorac.  All of the patient's questions and concerns were addressed.
Include Pregnancy/Lactation Warning?: No
Arava Counseling:  Patient counseled regarding adverse effects of Arava including but not limited to nausea, vomiting, abnormalities in liver function tests. Patients may develop mouth sores, rash, diarrhea, and abnormalities in blood counts. The patient understands that monitoring is required including LFTs and blood counts.  There is a rare possibility of scarring of the liver and lung problems that can occur when taking methotrexate. Persistent nausea, loss of appetite, pale stools, dark urine, cough, and shortness of breath should be reported immediately. Patient advised to discontinue Arava treatment and consult with a physician prior to attempting conception. The patient will have to undergo a treatment to eliminate Arava from the body prior to conception.
Cimzia Counseling:  I discussed with the patient the risks of Cimzia including but not limited to immunosuppression, allergic reactions and infections.  The patient understands that monitoring is required including a PPD at baseline and must alert us or the primary physician if symptoms of infection or other concerning signs are noted.
Rituxan Pregnancy And Lactation Text: This medication is Pregnancy Category C and it isn't know if it is safe during pregnancy. It is unknown if this medication is excreted in breast milk but similar antibodies are known to be excreted.
Simponi Counseling:  I discussed with the patient the risks of golimumab including but not limited to myelosuppression, immunosuppression, autoimmune hepatitis, demyelinating diseases, lymphoma, and serious infections.  The patient understands that monitoring is required including a PPD at baseline and must alert us or the primary physician if symptoms of infection or other concerning signs are noted.
Enbrel Counseling:  I discussed with the patient the risks of etanercept including but not limited to myelosuppression, immunosuppression, autoimmune hepatitis, demyelinating diseases, lymphoma, and infections.  The patient understands that monitoring is required including a PPD at baseline and must alert us or the primary physician if symptoms of infection or other concerning signs are noted.
Azathioprine Counseling:  I discussed with the patient the risks of azathioprine including but not limited to myelosuppression, immunosuppression, hepatotoxicity, lymphoma, and infections.  The patient understands that monitoring is required including baseline LFTs, Creatinine, possible TPMP genotyping and weekly CBCs for the first month and then every 2 weeks thereafter.  The patient verbalized understanding of the proper use and possible adverse effects of azathioprine.  All of the patient's questions and concerns were addressed.
Simponi Pregnancy And Lactation Text: The risk during pregnancy and breastfeeding is uncertain with this medication.
Imiquimod Pregnancy And Lactation Text: This medication is Pregnancy Category C. It is unknown if this medication is excreted in breast milk.
Doxycycline Pregnancy And Lactation Text: This medication is Pregnancy Category D and not consider safe during pregnancy. It is also excreted in breast milk but is considered safe for shorter treatment courses.
Rifampin Counseling: I discussed with the patient the risks of rifampin including but not limited to liver damage, kidney damage, red-orange body fluids, nausea/vomiting and severe allergy.
Cephalexin Pregnancy And Lactation Text: This medication is Pregnancy Category B and considered safe during pregnancy.  It is also excreted in breast milk but can be used safely for shorter doses.
Cellcept Pregnancy And Lactation Text: This medication is Pregnancy Category D and isn't considered safe during pregnancy. It is unknown if this medication is excreted in breast milk.
Acitretin Pregnancy And Lactation Text: This medication is Pregnancy Category X and should not be given to women who are pregnant or may become pregnant in the future. This medication is excreted in breast milk.
Solaraze Counseling:  I discussed with the patient the risks of Solaraze including but not limited to erythema, scaling, itching, weeping, crusting, and pain.
Spironolactone Pregnancy And Lactation Text: This medication can cause feminization of the male fetus and should be avoided during pregnancy. The active metabolite is also found in breast milk.
Nsaids Pregnancy And Lactation Text: These medications are considered safe up to 30 weeks gestation. It is excreted in breast milk.
Quinolones Counseling:  I discussed with the patient the risks of fluoroquinolones including but not limited to GI upset, allergic reaction, drug rash, diarrhea, dizziness, photosensitivity, yeast infections, liver function test abnormalities, tendonitis/tendon rupture.
Erivedge Pregnancy And Lactation Text: This medication is Pregnancy Category X and is absolutely contraindicated during pregnancy. It is unknown if it is excreted in breast milk.
Clindamycin Pregnancy And Lactation Text: This medication can be used in pregnancy if certain situations. Clindamycin is also present in breast milk.
Isotretinoin Pregnancy And Lactation Text: This medication is Pregnancy Category X and is considered extremely dangerous during pregnancy. It is unknown if it is excreted in breast milk.
Cephalexin Counseling: I counseled the patient regarding use of cephalexin as an antibiotic for prophylactic and/or therapeutic purposes. Cephalexin (commonly prescribed under brand name Keflex) is a cephalosporin antibiotic which is active against numerous classes of bacteria, including most skin bacteria. Side effects may include nausea, diarrhea, gastrointestinal upset, rash, hives, yeast infections, and in rare cases, hepatitis, kidney disease, seizures, fever, confusion, neurologic symptoms, and others. Patients with severe allergies to penicillin medications are cautioned that there is about a 10% incidence of cross-reactivity with cephalosporins. When possible, patients with penicillin allergies should use alternatives to cephalosporins for antibiotic therapy.
Cyclophosphamide Pregnancy And Lactation Text: This medication is Pregnancy Category D and it isn't considered safe during pregnancy. This medication is excreted in breast milk.
Elidel Counseling: Patient may experience a mild burning sensation during topical application. Elidel is not approved in children less than 2 years of age. There have been case reports of hematologic and skin malignancies in patients using topical calcineurin inhibitors although causality is questionable.
Dupixent Counseling: I discussed with the patient the risks of dupilumab including but not limited to eye infection and irritation, cold sores, injection site reactions, worsening of asthma, allergic reactions and increased risk of parasitic infection.  Live vaccines should be avoided while taking dupilumab. Dupilumab will also interact with certain medications such as warfarin and cyclosporine. The patient understands that monitoring is required and they must alert us or the primary physician if symptoms of infection or other concerning signs are noted.
Topical Retinoid counseling:  Patient advised to apply a pea-sized amount only at bedtime and wait 30 minutes after washing their face before applying.  If too drying, patient may add a non-comedogenic moisturizer. The patient verbalized understanding of the proper use and possible adverse effects of retinoids.  All of the patient's questions and concerns were addressed.
Doxepin Counseling:  Patient advised that the medication is sedating and not to drive a car after taking this medication. Patient informed of potential adverse effects including but not limited to dry mouth, urinary retention, and blurry vision.  The patient verbalized understanding of the proper use and possible adverse effects of doxepin.  All of the patient's questions and concerns were addressed.
Glycopyrrolate Pregnancy And Lactation Text: This medication is Pregnancy Category B and is considered safe during pregnancy. It is unknown if it is excreted breast milk.
Hydroquinone Pregnancy And Lactation Text: This medication has not been assigned a Pregnancy Risk Category but animal studies failed to show danger with the topical medication. It is unknown if the medication is excreted in breast milk.
Picato Counseling:  I discussed with the patient the risks of Picato including but not limited to erythema, scaling, itching, weeping, crusting, and pain.
Benzoyl Peroxide Counseling: Patient counseled that medicine may cause skin irritation and bleach clothing.  In the event of skin irritation, the patient was advised to reduce the amount of the drug applied or use it less frequently.   The patient verbalized understanding of the proper use and possible adverse effects of benzoyl peroxide.  All of the patient's questions and concerns were addressed.
Hydroxyzine Pregnancy And Lactation Text: This medication is not safe during pregnancy and should not be taken. It is also excreted in breast milk and breast feeding isn't recommended.
Hydroxychloroquine Counseling:  I discussed with the patient that a baseline ophthalmologic exam is needed at the start of therapy and every year thereafter while on therapy. A CBC may also be warranted for monitoring.  The side effects of this medication were discussed with the patient, including but not limited to agranulocytosis, aplastic anemia, seizures, rashes, retinopathy, and liver toxicity. Patient instructed to call the office should any adverse effect occur.  The patient verbalized understanding of the proper use and possible adverse effects of Plaquenil.  All the patient's questions and concerns were addressed.
Ivermectin Counseling:  Patient instructed to take medication on an empty stomach with a full glass of water.  Patient informed of potential adverse effects including but not limited to nausea, diarrhea, dizziness, itching, and swelling of the extremities or lymph nodes.  The patient verbalized understanding of the proper use and possible adverse effects of ivermectin.  All of the patient's questions and concerns were addressed.
Azithromycin Counseling:  I discussed with the patient the risks of azithromycin including but not limited to GI upset, allergic reaction, drug rash, diarrhea, and yeast infections.
Terbinafine Counseling: Patient counseling regarding adverse effects of terbinafine including but not limited to headache, diarrhea, rash, upset stomach, liver function test abnormalities, itching, taste/smell disturbance, nausea, abdominal pain, and flatulence.  There is a rare possibility of liver failure that can occur when taking terbinafine.  The patient understands that a baseline LFT and kidney function test may be required. The patient verbalized understanding of the proper use and possible adverse effects of terbinafine.  All of the patient's questions and concerns were addressed.
Xeljasonz Pregnancy And Lactation Text: This medication is Pregnancy Category D and is not considered safe during pregnancy.  The risk during breast feeding is also uncertain.
Humira Counseling:  I discussed with the patient the risks of adalimumab including but not limited to myelosuppression, immunosuppression, autoimmune hepatitis, demyelinating diseases, lymphoma, and serious infections.  The patient understands that monitoring is required including a PPD at baseline and must alert us or the primary physician if symptoms of infection or other concerning signs are noted.
Colchicine Pregnancy And Lactation Text: This medication is Pregnancy Category C and isn't considered safe during pregnancy. It is excreted in breast milk.
Methotrexate Pregnancy And Lactation Text: This medication is Pregnancy Category X and is known to cause fetal harm. This medication is excreted in breast milk.
Tazorac Pregnancy And Lactation Text: This medication is not safe during pregnancy. It is unknown if this medication is excreted in breast milk.
Hydroquinone Counseling:  Patient advised that medication may result in skin irritation, lightening (hypopigmentation), dryness, and burning.  In the event of skin irritation, the patient was advised to reduce the amount of the drug applied or use it less frequently.  Rarely, spots that are treated with hydroquinone can become darker (pseudoochronosis).  Should this occur, patient instructed to stop medication and call the office. The patient verbalized understanding of the proper use and possible adverse effects of hydroquinone.  All of the patient's questions and concerns were addressed.
Sski Pregnancy And Lactation Text: This medication is Pregnancy Category D and isn't considered safe during pregnancy. It is excreted in breast milk.
Metronidazole Pregnancy And Lactation Text: This medication is Pregnancy Category B and considered safe during pregnancy.  It is also excreted in breast milk.
Bexarotene Pregnancy And Lactation Text: This medication is Pregnancy Category X and should not be given to women who are pregnant or may become pregnant. This medication should not be used if you are breast feeding.
Detail Level: Zone
Methotrexate Counseling:  Patient counseled regarding adverse effects of methotrexate including but not limited to nausea, vomiting, abnormalities in liver function tests. Patients may develop mouth sores, rash, diarrhea, and abnormalities in blood counts. The patient understands that monitoring is required including LFT's and blood counts.  There is a rare possibility of scarring of the liver and lung problems that can occur when taking methotrexate. Persistent nausea, loss of appetite, pale stools, dark urine, cough, and shortness of breath should be reported immediately. Patient advised to discontinue methotrexate treatment at least three months before attempting to become pregnant.  I discussed the need for folate supplements while taking methotrexate.  These supplements can decrease side effects during methotrexate treatment. The patient verbalized understanding of the proper use and possible adverse effects of methotrexate.  All of the patient's questions and concerns were addressed.
Erivedge Counseling- I discussed with the patient the risks of Erivedge including but not limited to nausea, vomiting, diarrhea, constipation, weight loss, changes in the sense of taste, decreased appetite, muscle spasms, and hair loss.  The patient verbalized understanding of the proper use and possible adverse effects of Erivedge.  All of the patient's questions and concerns were addressed.
Hydroxyzine Counseling: Patient advised that the medication is sedating and not to drive a car after taking this medication.  Patient informed of potential adverse effects including but not limited to dry mouth, urinary retention, and blurry vision.  The patient verbalized understanding of the proper use and possible adverse effects of hydroxyzine.  All of the patient's questions and concerns were addressed.
Dupixent Pregnancy And Lactation Text: This medication likely crosses the placenta but the risk for the fetus is uncertain. This medication is excreted in breast milk.
Cosentyx Pregnancy And Lactation Text: This medication is Pregnancy Category B and is considered safe during pregnancy. It is unknown if this medication is excreted in breast milk.
Taltz Counseling: I discussed with the patient the risks of ixekizumab including but not limited to immunosuppression, serious infections, worsening of inflammatory bowel disease and drug reactions.  The patient understands that monitoring is required including a PPD at baseline and must alert us or the primary physician if symptoms of infection or other concerning signs are noted.
Infliximab Counseling:  I discussed with the patient the risks of infliximab including but not limited to myelosuppression, immunosuppression, autoimmune hepatitis, demyelinating diseases, lymphoma, and serious infections.  The patient understands that monitoring is required including a PPD at baseline and must alert us or the primary physician if symptoms of infection or other concerning signs are noted.
Spironolactone Counseling: Patient advised regarding risks of diarrhea, abdominal pain, hyperkalemia, birth defects (for female patients), liver toxicity and renal toxicity. The patient may need blood work to monitor liver and kidney function and potassium levels while on therapy. The patient verbalized understanding of the proper use and possible adverse effects of spironolactone.  All of the patient's questions and concerns were addressed.
Clindamycin Counseling: I counseled the patient regarding use of clindamycin as an antibiotic for prophylactic and/or therapeutic purposes. Clindamycin is active against numerous classes of bacteria, including skin bacteria. Side effects may include nausea, diarrhea, gastrointestinal upset, rash, hives, yeast infections, and in rare cases, colitis.
Birth Control Pills Counseling: Birth Control Pill Counseling: I discussed with the patient the potential side effects of OCPs including but not limited to increased risk of stroke, heart attack, thrombophlebitis, deep venous thrombosis, hepatic adenomas, breast changes, GI upset, headaches, and depression.  The patient verbalized understanding of the proper use and possible adverse effects of OCPs. All of the patient's questions and concerns were addressed.
Cimzia Pregnancy And Lactation Text: This medication crosses the placenta but can be considered safe in certain situations. Cimzia may be excreted in breast milk.
Topical Clindamycin Pregnancy And Lactation Text: This medication is Pregnancy Category B and is considered safe during pregnancy. It is unknown if it is excreted in breast milk.
Topical Sulfur Applications Pregnancy And Lactation Text: This medication is Pregnancy Category C and has an unknown safety profile during pregnancy. It is unknown if this topical medication is excreted in breast milk.
Cimetidine Pregnancy And Lactation Text: This medication is Pregnancy Category B and is considered safe during pregnancy. It is also excreted in breast milk and breast feeding isn't recommended.
High Dose Vitamin A Counseling: Side effects reviewed, pt to contact office should one occur.
Valtrex Pregnancy And Lactation Text: this medication is Pregnancy Category B and is considered safe during pregnancy. This medication is not directly found in breast milk but it's metabolite acyclovir is present.
Carac Pregnancy And Lactation Text: This medication is Pregnancy Category X and contraindicated in pregnancy and in women who may become pregnant. It is unknown if this medication is excreted in breast milk.
Valtrex Counseling: I discussed with the patient the risks of valacyclovir including but not limited to kidney damage, nausea, vomiting and severe allergy.  The patient understands that if the infection seems to be worsening or is not improving, they are to call.
Drysol Counseling:  I discussed with the patient the risks of drysol/aluminum chloride including but not limited to skin rash, itching, irritation, burning.
Glycopyrrolate Counseling:  I discussed with the patient the risks of glycopyrrolate including but not limited to skin rash, drowsiness, dry mouth, difficulty urinating, and blurred vision.
Prednisone Counseling:  I discussed with the patient the risks of prolonged use of prednisone including but not limited to weight gain, insomnia, osteoporosis, mood changes, diabetes, susceptibility to infection, glaucoma and high blood pressure.  In cases where prednisone use is prolonged, patients should be monitored with blood pressure checks, serum glucose levels and an eye exam.  Additionally, the patient may need to be placed on GI prophylaxis, PCP prophylaxis, and calcium and vitamin D supplementation and/or a bisphosphonate.  The patient verbalized understanding of the proper use and the possible adverse effects of prednisone.  All of the patient's questions and concerns were addressed.
Erythromycin Pregnancy And Lactation Text: This medication is Pregnancy Category B and is considered safe during pregnancy. It is also excreted in breast milk.
5-Fu Counseling: 5-Fluorouracil Counseling:  I discussed with the patient the risks of 5-fluorouracil including but not limited to erythema, scaling, itching, weeping, crusting, and pain.
Isotretinoin Counseling: Patient should get monthly blood tests, not donate blood, not drive at night if vision affected, not share medication, and not undergo elective surgery for 6 months after tx completed. Side effects reviewed, pt to contact office should one occur.
Itraconazole Pregnancy And Lactation Text: This medication is Pregnancy Category C and it isn't know if it is safe during pregnancy. It is also excreted in breast milk.
Itraconazole Counseling:  I discussed with the patient the risks of itraconazole including but not limited to liver damage, nausea/vomiting, neuropathy, and severe allergy.  The patient understands that this medication is best absorbed when taken with acidic beverages such as non-diet cola or ginger ale.  The patient understands that monitoring is required including baseline LFTs and repeat LFTs at intervals.  The patient understands that they are to contact us or the primary physician if concerning signs are noted.
Rituxan Counseling:  I discussed with the patient the risks of Rituxan infusions. Side effects can include infusion reactions, severe drug rashes including mucocutaneous reactions, reactivation of latent hepatitis and other infections and rarely progressive multifocal leukoencephalopathy.  All of the patient's questions and concerns were addressed.
Otezla Pregnancy And Lactation Text: This medication is Pregnancy Category C and it isn't known if it is safe during pregnancy. It is unknown if it is excreted in breast milk.
Xolair Counseling:  Patient informed of potential adverse effects including but not limited to fever, muscle aches, rash and allergic reactions.  The patient verbalized understanding of the proper use and possible adverse effects of Xolair.  All of the patient's questions and concerns were addressed.
Solaraze Pregnancy And Lactation Text: This medication is Pregnancy Category B and is considered safe. There is some data to suggest avoiding during the third trimester. It is unknown if this medication is excreted in breast milk.
Xeljanz Counseling: I discussed with the patient the risks of Xeljanz therapy including increased risk of infection, liver issues, headache, diarrhea, or cold symptoms. Live vaccines should be avoided. They were instructed to call if they have any problems.
Hydroxychloroquine Pregnancy And Lactation Text: This medication has been shown to cause fetal harm but it isn't assigned a Pregnancy Risk Category. There are small amounts excreted in breast milk.
Rifampin Pregnancy And Lactation Text: This medication is Pregnancy Category C and it isn't know if it is safe during pregnancy. It is also excreted in breast milk and should not be used if you are breast feeding.
Otezla Counseling: The side effects of Otezla were discussed with the patient, including but not limited to worsening or new depression, weight loss, diarrhea, nausea, upper respiratory tract infection, and headache. Patient instructed to call the office should any adverse effect occur.  The patient verbalized understanding of the proper use and possible adverse effects of Otezla.  All the patient's questions and concerns were addressed.
Nsaids Counseling: NSAID Counseling: I discussed with the patient that NSAIDs should be taken with food. Prolonged use of NSAIDs can result in the development of stomach ulcers.  Patient advised to stop taking NSAIDs if abdominal pain occurs.  The patient verbalized understanding of the proper use and possible adverse effects of NSAIDs.  All of the patient's questions and concerns were addressed.
Doxycycline Counseling:  Patient counseled regarding possible photosensitivity and increased risk for sunburn.  Patient instructed to avoid sunlight, if possible.  When exposed to sunlight, patients should wear protective clothing, sunglasses, and sunscreen.  The patient was instructed to call the office immediately if the following severe adverse effects occur:  hearing changes, easy bruising/bleeding, severe headache, or vision changes.  The patient verbalized understanding of the proper use and possible adverse effects of doxycycline.  All of the patient's questions and concerns were addressed.
Siliq Counseling:  I discussed with the patient the risks of Siliq including but not limited to new or worsening depression, suicidal thoughts and behavior, immunosuppression, malignancy, posterior leukoencephalopathy syndrome, and serious infections.  The patient understands that monitoring is required including a PPD at baseline and must alert us or the primary physician if symptoms of infection or other concerning signs are noted. There is also a special program designed to monitor depression which is required with Siliq.
Drysol Pregnancy And Lactation Text: This medication is considered safe during pregnancy and breast feeding.
High Dose Vitamin A Pregnancy And Lactation Text: High dose vitamin A therapy is contraindicated during pregnancy and breast feeding.
Protopic Counseling: Patient may experience a mild burning sensation during topical application. Protopic is not approved in children less than 2 years of age. There have been case reports of hematologic and skin malignancies in patients using topical calcineurin inhibitors although causality is questionable.
Doxepin Pregnancy And Lactation Text: This medication is Pregnancy Category C and it isn't known if it is safe during pregnancy. It is also excreted in breast milk and breast feeding isn't recommended.
Odomzo Counseling- I discussed with the patient the risks of Odomzo including but not limited to nausea, vomiting, diarrhea, constipation, weight loss, changes in the sense of taste, decreased appetite, muscle spasms, and hair loss.  The patient verbalized understanding of the proper use and possible adverse effects of Odomzo.  All of the patient's questions and concerns were addressed.
Zyclara Counseling:  I discussed with the patient the risks of imiquimod including but not limited to erythema, scaling, itching, weeping, crusting, and pain.  Patient understands that the inflammatory response to imiquimod is variable from person to person and was educated regarded proper titration schedule.  If flu-like symptoms develop, patient knows to discontinue the medication and contact us.
Acitretin Counseling:  I discussed with the patient the risks of acitretin including but not limited to hair loss, dry lips/skin/eyes, liver damage, hyperlipidemia, depression/suicidal ideation, photosensitivity.  Serious rare side effects can include but are not limited to pancreatitis, pseudotumor cerebri, bony changes, clot formation/stroke/heart attack.  Patient understands that alcohol is contraindicated since it can result in liver toxicity and significantly prolong the elimination of the drug by many years.
Bexarotene Counseling:  I discussed with the patient the risks of bexarotene including but not limited to hair loss, dry lips/skin/eyes, liver abnormalities, hyperlipidemia, pancreatitis, depression/suicidal ideation, photosensitivity, drug rash/allergic reactions, hypothyroidism, anemia, leukopenia, infection, cataracts, and teratogenicity.  Patient understands that they will need regular blood tests to check lipid profile, liver function tests, white blood cell count, thyroid function tests and pregnancy test if applicable.
Cyclosporine Counseling:  I discussed with the patient the risks of cyclosporine including but not limited to hypertension, gingival hyperplasia,myelosuppression, immunosuppression, liver damage, kidney damage, neurotoxicity, lymphoma, and serious infections. The patient understands that monitoring is required including baseline blood pressure, CBC, CMP, lipid panel and uric acid, and then 1-2 times monthly CMP and blood pressure.
Cyclophosphamide Counseling:  I discussed with the patient the risks of cyclophosphamide including but not limited to hair loss, hormonal abnormalities, decreased fertility, abdominal pain, diarrhea, nausea and vomiting, bone marrow suppression and infection. The patient understands that monitoring is required while taking this medication.
SSKI Counseling:  I discussed with the patient the risks of SSKI including but not limited to thyroid abnormalities, metallic taste, GI upset, fever, headache, acne, arthralgias, paraesthesias, lymphadenopathy, easy bleeding, arrhythmias, and allergic reaction.
Minoxidil Counseling: Minoxidil is a topical medication which can increase blood flow where it is applied. It is uncertain how this medication increases hair growth. Side effects are uncommon and include stinging and allergic reactions.
Minocycline Pregnancy And Lactation Text: This medication is Pregnancy Category D and not consider safe during pregnancy. It is also excreted in breast milk.
Ivermectin Pregnancy And Lactation Text: This medication is Pregnancy Category C and it isn't known if it is safe during pregnancy. It is also excreted in breast milk.
Oxybutynin Counseling:  I discussed with the patient the risks of oxybutynin including but not limited to skin rash, drowsiness, dry mouth, difficulty urinating, and blurred vision.
Eucrisa Counseling: Patient may experience a mild burning sensation during topical application. Eucrisa is not approved in children less than 2 years of age.
Bactrim Counseling:  I discussed with the patient the risks of sulfa antibiotics including but not limited to GI upset, allergic reaction, drug rash, diarrhea, dizziness, photosensitivity, and yeast infections.  Rarely, more serious reactions can occur including but not limited to aplastic anemia, agranulocytosis, methemoglobinemia, blood dyscrasias, liver or kidney failure, lung infiltrates or desquamative/blistering drug rashes.
Dapsone Counseling: I discussed with the patient the risks of dapsone including but not limited to hemolytic anemia, agranulocytosis, rashes, methemoglobinemia, kidney failure, peripheral neuropathy, headaches, GI upset, and liver toxicity.  Patients who start dapsone require monitoring including baseline LFTs and weekly CBCs for the first month, then every month thereafter.  The patient verbalized understanding of the proper use and possible adverse effects of dapsone.  All of the patient's questions and concerns were addressed.
Xolair Pregnancy And Lactation Text: This medication is Pregnancy Category B and is considered safe during pregnancy. This medication is excreted in breast milk.
Stelara Counseling:  I discussed with the patient the risks of ustekinumab including but not limited to immunosuppression, malignancy, posterior leukoencephalopathy syndrome, and serious infections.  The patient understands that monitoring is required including a PPD at baseline and must alert us or the primary physician if symptoms of infection or other concerning signs are noted.
Dapsone Pregnancy And Lactation Text: This medication is Pregnancy Category C and is not considered safe during pregnancy or breast feeding.
Ketoconazole Pregnancy And Lactation Text: This medication is Pregnancy Category C and it isn't know if it is safe during pregnancy. It is also excreted in breast milk and breast feeding isn't recommended.
Carac Counseling:  I discussed with the patient the risks of Carac including but not limited to erythema, scaling, itching, weeping, crusting, and pain.
Imiquimod Counseling:  I discussed with the patient the risks of imiquimod including but not limited to erythema, scaling, itching, weeping, crusting, and pain.  Patient understands that the inflammatory response to imiquimod is variable from person to person and was educated regarded proper titration schedule.  If flu-like symptoms develop, patient knows to discontinue the medication and contact us.
Benzoyl Peroxide Pregnancy And Lactation Text: This medication is Pregnancy Category C. It is unknown if benzoyl peroxide is excreted in breast milk.
Cosentyx Counseling:  I discussed with the patient the risks of Cosentyx including but not limited to worsening of Crohn's disease, immunosuppression, allergic reactions and infections.  The patient understands that monitoring is required including a PPD at baseline and must alert us or the primary physician if symptoms of infection or other concerning signs are noted.
Cimetidine Counseling:  I discussed with the patient the risks of Cimetidine including but not limited to gynecomastia, headache, diarrhea, nausea, drowsiness, arrhythmias, pancreatitis, skin rashes, psychosis, bone marrow suppression and kidney toxicity.
Cyclosporine Pregnancy And Lactation Text: This medication is Pregnancy Category C and it isn't know if it is safe during pregnancy. This medication is excreted in breast milk.
Minocycline Counseling: Patient advised regarding possible photosensitivity and discoloration of the teeth, skin, lips, tongue and gums.  Patient instructed to avoid sunlight, if possible.  When exposed to sunlight, patients should wear protective clothing, sunglasses, and sunscreen.  The patient was instructed to call the office immediately if the following severe adverse effects occur:  hearing changes, easy bruising/bleeding, severe headache, or vision changes.  The patient verbalized understanding of the proper use and possible adverse effects of minocycline.  All of the patient's questions and concerns were addressed.
Gabapentin Counseling: I discussed with the patient the risks of gabapentin including but not limited to dizziness, somnolence, fatigue and ataxia.
Topical Clindamycin Counseling: Patient counseled that this medication may cause skin irritation or allergic reactions.  In the event of skin irritation, the patient was advised to reduce the amount of the drug applied or use it less frequently.   The patient verbalized understanding of the proper use and possible adverse effects of clindamycin.  All of the patient's questions and concerns were addressed.
Ketoconazole Counseling:   Patient counseled regarding improving absorption with orange juice.  Adverse effects include but are not limited to breast enlargement, headache, diarrhea, nausea, upset stomach, liver function test abnormalities, taste disturbance, and stomach pain.  There is a rare possibility of liver failure that can occur when taking ketoconazole. The patient understands that monitoring of LFTs may be required, especially at baseline. The patient verbalized understanding of the proper use and possible adverse effects of ketoconazole.  All of the patient's questions and concerns were addressed.
Albendazole Counseling:  I discussed with the patient the risks of albendazole including but not limited to cytopenia, kidney damage, nausea/vomiting and severe allergy.  The patient understands that this medication is being used in an off-label manner.
Tremfya Counseling: I discussed with the patient the risks of guselkumab including but not limited to immunosuppression, serious infections, worsening of inflammatory bowel disease and drug reactions.  The patient understands that monitoring is required including a PPD at baseline and must alert us or the primary physician if symptoms of infection or other concerning signs are noted.
Azithromycin Pregnancy And Lactation Text: This medication is considered safe during pregnancy and is also secreted in breast milk.
Erythromycin Counseling:  I discussed with the patient the risks of erythromycin including but not limited to GI upset, allergic reaction, drug rash, diarrhea, increase in liver enzymes, and yeast infections.
Bactrim Pregnancy And Lactation Text: This medication is Pregnancy Category D and is known to cause fetal risk.  It is also excreted in breast milk.
Clofazimine Counseling:  I discussed with the patient the risks of clofazimine including but not limited to skin and eye pigmentation, liver damage, nausea/vomiting, gastrointestinal bleeding and allergy.
Protopic Pregnancy And Lactation Text: This medication is Pregnancy Category C. It is unknown if this medication is excreted in breast milk when applied topically.
Griseofulvin Pregnancy And Lactation Text: This medication is Pregnancy Category X and is known to cause serious birth defects. It is unknown if this medication is excreted in breast milk but breast feeding should be avoided.
Cellcept Counseling:  I discussed with the patient the risks of mycophenolate mofetil including but not limited to infection/immunosuppression, GI upset, hypokalemia, hypercholesterolemia, bone marrow suppression, lymphoproliferative disorders, malignancy, GI ulceration/bleed/perforation, colitis, interstitial lung disease, kidney failure, progressive multifocal leukoencephalopathy, and birth defects.  The patient understands that monitoring is required including a baseline creatinine and regular CBC testing. In addition, patient must alert us immediately if symptoms of infection or other concerning signs are noted.
Griseofulvin Counseling:  I discussed with the patient the risks of griseofulvin including but not limited to photosensitivity, cytopenia, liver damage, nausea/vomiting and severe allergy.  The patient understands that this medication is best absorbed when taken with a fatty meal (e.g., ice cream or french fries).
Metronidazole Counseling:  I discussed with the patient the risks of metronidazole including but not limited to seizures, nausea/vomiting, a metallic taste in the mouth, nausea/vomiting and severe allergy.
Fluconazole Counseling:  Patient counseled regarding adverse effects of fluconazole including but not limited to headache, diarrhea, nausea, upset stomach, liver function test abnormalities, taste disturbance, and stomach pain.  There is a rare possibility of liver failure that can occur when taking fluconazole.  The patient understands that monitoring of LFTs and kidney function test may be required, especially at baseline. The patient verbalized understanding of the proper use and possible adverse effects of fluconazole.  All of the patient's questions and concerns were addressed.
Tetracycline Counseling: Patient counseled regarding possible photosensitivity and increased risk for sunburn.  Patient instructed to avoid sunlight, if possible.  When exposed to sunlight, patients should wear protective clothing, sunglasses, and sunscreen.  The patient was instructed to call the office immediately if the following severe adverse effects occur:  hearing changes, easy bruising/bleeding, severe headache, or vision changes.  The patient verbalized understanding of the proper use and possible adverse effects of tetracycline.  All of the patient's questions and concerns were addressed. Patient understands to avoid pregnancy while on therapy due to potential birth defects.
Thalidomide Counseling: I discussed with the patient the risks of thalidomide including but not limited to birth defects, anxiety, weakness, chest pain, dizziness, cough and severe allergy.
Birth Control Pills Pregnancy And Lactation Text: This medication should be avoided if pregnant and for the first 30 days post-partum.
Topical Sulfur Applications Counseling: Topical Sulfur Counseling: Patient counseled that this medication may cause skin irritation or allergic reactions.  In the event of skin irritation, the patient was advised to reduce the amount of the drug applied or use it less frequently.   The patient verbalized understanding of the proper use and possible adverse effects of topical sulfur application.  All of the patient's questions and concerns were addressed.

## 2018-10-03 NOTE — PROCEDURE: VENIPUNCTURE
Number Of Tubes Drawn: 6
Bill For Individual Tests Below?: yes
Venipuncture Paragraph: An alcohol pad was applied to the venipuncture site. Venipuncture was performed using a butterfly needle. Pressure and a bandaid was applied to the site. No complications were noted.
Detail Level: None

## 2018-10-03 NOTE — PROCEDURE: EXCIMER LASER
Device Serial Number (Optional): [id number]
Detail Level: Zone
Spot Size: 2 x 2 cm
Location #1: posterior scalp
Consent: Written consent obtained, risks reviewed including but not limited to crusting, scabbing, blistering, scarring, darker or lighter pigmentary change, incidental hair removal, bruising, and/or incomplete removal.
Treatment Number: 19
Fluence #1 (J/Cm2 Or Mj/Cm2): 6961
Comments: Patient states that she tolerated her last treatment well. Patient states that her scalp feel so much better.
Total Square Area In Cm2 (Required For Proper Billing- Whole Numbers Only Please): 72
Fluence Units: J/cm2
Total Pulses (Optional): 354
Post-Care Instructions: I reviewed with the patient in detail post-care instructions. Patient should stay away from the sun and wear sun protection until treated areas are fully healed.
Mode: repeat paint

## 2018-10-03 NOTE — PROCEDURE: VENIPUNCTURE
Venipuncture Paragraph: An alcohol pad was applied to the venipuncture site. Venipuncture was performed using a butterfly needle. Pressure and a bandaid was applied to the site. No complications were noted.
Bill For Individual Tests Below?: yes
Number Of Tubes Drawn: 6
Detail Level: None

## 2018-10-03 NOTE — PROCEDURE: MIPS QUALITY
Additional Notes: Has not yet gotten flu shot
Quality 130: Documentation Of Current Medications In The Medical Record: Current Medications Documented
Quality 110: Preventive Care And Screening: Influenza Immunization: Influenza Immunization not Administered for Documented Reasons.
Quality 431: Preventive Care And Screening: Unhealthy Alcohol Use - Screening: Patient screened for unhealthy alcohol use using a single question and scores less than 2 times per year
Quality 226: Preventive Care And Screening: Tobacco Use: Screening And Cessation Intervention: Patient screened for tobacco and never smoked
Detail Level: Generalized

## 2018-10-03 NOTE — PROCEDURE: ORDER TESTS
Expected Date Of Service: 10/03/2018
Lab Facility: [id number]
Billing Type: Third-Party Bill
Performing Laboratory: [id number]
Bill For Surgical Tray: no

## 2018-10-03 NOTE — PROCEDURE: MIPS QUALITY
Quality 130: Documentation Of Current Medications In The Medical Record: Current Medications Documented
Quality 431: Preventive Care And Screening: Unhealthy Alcohol Use - Screening: Patient screened for unhealthy alcohol use using a single question and scores less than 2 times per year
Quality 226: Preventive Care And Screening: Tobacco Use: Screening And Cessation Intervention: Patient screened for tobacco and never smoked
Additional Notes: Has not yet gotten flu shot
Quality 110: Preventive Care And Screening: Influenza Immunization: Influenza Immunization not Administered for Documented Reasons.
Detail Level: Generalized

## 2018-10-03 NOTE — PROCEDURE: HIGH RISK MEDICATION MONITORING
Clindamycin Pregnancy And Lactation Text: This medication can be used in pregnancy if certain situations. Clindamycin is also present in breast milk.
Protopic Counseling: Patient may experience a mild burning sensation during topical application. Protopic is not approved in children less than 2 years of age. There have been case reports of hematologic and skin malignancies in patients using topical calcineurin inhibitors although causality is questionable.
Colchicine Pregnancy And Lactation Text: This medication is Pregnancy Category C and isn't considered safe during pregnancy. It is excreted in breast milk.
Topical Retinoid Pregnancy And Lactation Text: This medication is Pregnancy Category C. It is unknown if this medication is excreted in breast milk.
Enbrel Counseling:  I discussed with the patient the risks of etanercept including but not limited to myelosuppression, immunosuppression, autoimmune hepatitis, demyelinating diseases, lymphoma, and infections.  The patient understands that monitoring is required including a PPD at baseline and must alert us or the primary physician if symptoms of infection or other concerning signs are noted.
Eucrisa Pregnancy And Lactation Text: This medication has not been assigned a Pregnancy Risk Category but animal studies failed to show danger with the topical medication. It is unknown if the medication is excreted in breast milk.
Tremfya Pregnancy And Lactation Text: The risk during pregnancy and breastfeeding is uncertain with this medication.
Tremfya Counseling: I discussed with the patient the risks of guselkumab including but not limited to immunosuppression, serious infections, worsening of inflammatory bowel disease and drug reactions.  The patient understands that monitoring is required including a PPD at baseline and must alert us or the primary physician if symptoms of infection or other concerning signs are noted.
Bactrim Pregnancy And Lactation Text: This medication is Pregnancy Category D and is known to cause fetal risk.  It is also excreted in breast milk.
Dupixent Counseling: I discussed with the patient the risks of dupilumab including but not limited to eye infection and irritation, cold sores, injection site reactions, worsening of asthma, allergic reactions and increased risk of parasitic infection.  Live vaccines should be avoided while taking dupilumab. Dupilumab will also interact with certain medications such as warfarin and cyclosporine. The patient understands that monitoring is required and they must alert us or the primary physician if symptoms of infection or other concerning signs are noted.
Acitretin Counseling:  I discussed with the patient the risks of acitretin including but not limited to hair loss, dry lips/skin/eyes, liver damage, hyperlipidemia, depression/suicidal ideation, photosensitivity.  Serious rare side effects can include but are not limited to pancreatitis, pseudotumor cerebri, bony changes, clot formation/stroke/heart attack.  Patient understands that alcohol is contraindicated since it can result in liver toxicity and significantly prolong the elimination of the drug by many years.
Albendazole Pregnancy And Lactation Text: This medication is Pregnancy Category C and it isn't known if it is safe during pregnancy. It is also excreted in breast milk.
Metronidazole Pregnancy And Lactation Text: This medication is Pregnancy Category B and considered safe during pregnancy.  It is also excreted in breast milk.
Hydroxyzine Counseling: Patient advised that the medication is sedating and not to drive a car after taking this medication.  Patient informed of potential adverse effects including but not limited to dry mouth, urinary retention, and blurry vision.  The patient verbalized understanding of the proper use and possible adverse effects of hydroxyzine.  All of the patient's questions and concerns were addressed.
Doxycycline Pregnancy And Lactation Text: This medication is Pregnancy Category D and not consider safe during pregnancy. It is also excreted in breast milk but is considered safe for shorter treatment courses.
Otezla Pregnancy And Lactation Text: This medication is Pregnancy Category C and it isn't known if it is safe during pregnancy. It is unknown if it is excreted in breast milk.
Quinolones Counseling:  I discussed with the patient the risks of fluoroquinolones including but not limited to GI upset, allergic reaction, drug rash, diarrhea, dizziness, photosensitivity, yeast infections, liver function test abnormalities, tendonitis/tendon rupture.
Use Enhanced Medication Counseling?: No
Cimetidine Pregnancy And Lactation Text: This medication is Pregnancy Category B and is considered safe during pregnancy. It is also excreted in breast milk and breast feeding isn't recommended.
Prednisone Pregnancy And Lactation Text: This medication is Pregnancy Category C and it isn't know if it is safe during pregnancy. This medication is excreted in breast milk.
Picato Counseling:  I discussed with the patient the risks of Picato including but not limited to erythema, scaling, itching, weeping, crusting, and pain.
Minoxidil Counseling: Minoxidil is a topical medication which can increase blood flow where it is applied. It is uncertain how this medication increases hair growth. Side effects are uncommon and include stinging and allergic reactions.
Fluconazole Pregnancy And Lactation Text: This medication is Pregnancy Category C and it isn't know if it is safe during pregnancy. It is also excreted in breast milk.
Solaraze Counseling:  I discussed with the patient the risks of Solaraze including but not limited to erythema, scaling, itching, weeping, crusting, and pain.
Valtrex Counseling: I discussed with the patient the risks of valacyclovir including but not limited to kidney damage, nausea, vomiting and severe allergy.  The patient understands that if the infection seems to be worsening or is not improving, they are to call.
Metronidazole Counseling:  I discussed with the patient the risks of metronidazole including but not limited to seizures, nausea/vomiting, a metallic taste in the mouth, nausea/vomiting and severe allergy.
Dapsone Pregnancy And Lactation Text: This medication is Pregnancy Category C and is not considered safe during pregnancy or breast feeding.
Nsaids Counseling: NSAID Counseling: I discussed with the patient that NSAIDs should be taken with food. Prolonged use of NSAIDs can result in the development of stomach ulcers.  Patient advised to stop taking NSAIDs if abdominal pain occurs.  The patient verbalized understanding of the proper use and possible adverse effects of NSAIDs.  All of the patient's questions and concerns were addressed.
Erythromycin Pregnancy And Lactation Text: This medication is Pregnancy Category B and is considered safe during pregnancy. It is also excreted in breast milk.
Birth Control Pills Pregnancy And Lactation Text: This medication should be avoided if pregnant and for the first 30 days post-partum.
Humira Counseling:  I discussed with the patient the risks of adalimumab including but not limited to myelosuppression, immunosuppression, autoimmune hepatitis, demyelinating diseases, lymphoma, and serious infections.  The patient understands that monitoring is required including a PPD at baseline and must alert us or the primary physician if symptoms of infection or other concerning signs are noted.
Methotrexate Counseling:  Patient counseled regarding adverse effects of methotrexate including but not limited to nausea, vomiting, abnormalities in liver function tests. Patients may develop mouth sores, rash, diarrhea, and abnormalities in blood counts. The patient understands that monitoring is required including LFT's and blood counts.  There is a rare possibility of scarring of the liver and lung problems that can occur when taking methotrexate. Persistent nausea, loss of appetite, pale stools, dark urine, cough, and shortness of breath should be reported immediately. Patient advised to discontinue methotrexate treatment at least three months before attempting to become pregnant.  I discussed the need for folate supplements while taking methotrexate.  These supplements can decrease side effects during methotrexate treatment. The patient verbalized understanding of the proper use and possible adverse effects of methotrexate.  All of the patient's questions and concerns were addressed.
Imiquimod Counseling:  I discussed with the patient the risks of imiquimod including but not limited to erythema, scaling, itching, weeping, crusting, and pain.  Patient understands that the inflammatory response to imiquimod is variable from person to person and was educated regarded proper titration schedule.  If flu-like symptoms develop, patient knows to discontinue the medication and contact us.
Isotretinoin Counseling: Patient should get monthly blood tests, not donate blood, not drive at night if vision affected, not share medication, and not undergo elective surgery for 6 months after tx completed. Side effects reviewed, pt to contact office should one occur.
Erivedge Pregnancy And Lactation Text: This medication is Pregnancy Category X and is absolutely contraindicated during pregnancy. It is unknown if it is excreted in breast milk.
High Dose Vitamin A Pregnancy And Lactation Text: High dose vitamin A therapy is contraindicated during pregnancy and breast feeding.
Albendazole Counseling:  I discussed with the patient the risks of albendazole including but not limited to cytopenia, kidney damage, nausea/vomiting and severe allergy.  The patient understands that this medication is being used in an off-label manner.
Erythromycin Counseling:  I discussed with the patient the risks of erythromycin including but not limited to GI upset, allergic reaction, drug rash, diarrhea, increase in liver enzymes, and yeast infections.
Zyclara Counseling:  I discussed with the patient the risks of imiquimod including but not limited to erythema, scaling, itching, weeping, crusting, and pain.  Patient understands that the inflammatory response to imiquimod is variable from person to person and was educated regarded proper titration schedule.  If flu-like symptoms develop, patient knows to discontinue the medication and contact us.
Sski Pregnancy And Lactation Text: This medication is Pregnancy Category D and isn't considered safe during pregnancy. It is excreted in breast milk.
Erivedge Counseling- I discussed with the patient the risks of Erivedge including but not limited to nausea, vomiting, diarrhea, constipation, weight loss, changes in the sense of taste, decreased appetite, muscle spasms, and hair loss.  The patient verbalized understanding of the proper use and possible adverse effects of Erivedge.  All of the patient's questions and concerns were addressed.
Cimetidine Counseling:  I discussed with the patient the risks of Cimetidine including but not limited to gynecomastia, headache, diarrhea, nausea, drowsiness, arrhythmias, pancreatitis, skin rashes, psychosis, bone marrow suppression and kidney toxicity.
Clindamycin Counseling: I counseled the patient regarding use of clindamycin as an antibiotic for prophylactic and/or therapeutic purposes. Clindamycin is active against numerous classes of bacteria, including skin bacteria. Side effects may include nausea, diarrhea, gastrointestinal upset, rash, hives, yeast infections, and in rare cases, colitis.
Tetracycline Pregnancy And Lactation Text: This medication is Pregnancy Category D and not consider safe during pregnancy. It is also excreted in breast milk.
Glycopyrrolate Counseling:  I discussed with the patient the risks of glycopyrrolate including but not limited to skin rash, drowsiness, dry mouth, difficulty urinating, and blurred vision.
Doxycycline Counseling:  Patient counseled regarding possible photosensitivity and increased risk for sunburn.  Patient instructed to avoid sunlight, if possible.  When exposed to sunlight, patients should wear protective clothing, sunglasses, and sunscreen.  The patient was instructed to call the office immediately if the following severe adverse effects occur:  hearing changes, easy bruising/bleeding, severe headache, or vision changes.  The patient verbalized understanding of the proper use and possible adverse effects of doxycycline.  All of the patient's questions and concerns were addressed.
Azithromycin Counseling:  I discussed with the patient the risks of azithromycin including but not limited to GI upset, allergic reaction, drug rash, diarrhea, and yeast infections.
Topical Sulfur Applications Counseling: Topical Sulfur Counseling: Patient counseled that this medication may cause skin irritation or allergic reactions.  In the event of skin irritation, the patient was advised to reduce the amount of the drug applied or use it less frequently.   The patient verbalized understanding of the proper use and possible adverse effects of topical sulfur application.  All of the patient's questions and concerns were addressed.
Cimzia Counseling:  I discussed with the patient the risks of Cimzia including but not limited to immunosuppression, allergic reactions and infections.  The patient understands that monitoring is required including a PPD at baseline and must alert us or the primary physician if symptoms of infection or other concerning signs are noted.
Azathioprine Pregnancy And Lactation Text: This medication is Pregnancy Category D and isn't considered safe during pregnancy. It is unknown if this medication is excreted in breast milk.
Benzoyl Peroxide Pregnancy And Lactation Text: This medication is Pregnancy Category C. It is unknown if benzoyl peroxide is excreted in breast milk.
Isotretinoin Pregnancy And Lactation Text: This medication is Pregnancy Category X and is considered extremely dangerous during pregnancy. It is unknown if it is excreted in breast milk.
Doxepin Pregnancy And Lactation Text: This medication is Pregnancy Category C and it isn't known if it is safe during pregnancy. It is also excreted in breast milk and breast feeding isn't recommended.
Methotrexate Pregnancy And Lactation Text: This medication is Pregnancy Category X and is known to cause fetal harm. This medication is excreted in breast milk.
Colchicine Counseling:  Patient counseled regarding adverse effects including but not limited to stomach upset (nausea, vomiting, stomach pain, or diarrhea).  Patient instructed to limit alcohol consumption while taking this medication.  Colchicine may reduce blood counts especially with prolonged use.  The patient understands that monitoring of kidney function and blood counts may be required, especially at baseline. The patient verbalized understanding of the proper use and possible adverse effects of colchicine.  All of the patient's questions and concerns were addressed.
Azathioprine Counseling:  I discussed with the patient the risks of azathioprine including but not limited to myelosuppression, immunosuppression, hepatotoxicity, lymphoma, and infections.  The patient understands that monitoring is required including baseline LFTs, Creatinine, possible TPMP genotyping and weekly CBCs for the first month and then every 2 weeks thereafter.  The patient verbalized understanding of the proper use and possible adverse effects of azathioprine.  All of the patient's questions and concerns were addressed.
Dupixent Pregnancy And Lactation Text: This medication likely crosses the placenta but the risk for the fetus is uncertain. This medication is excreted in breast milk.
Birth Control Pills Counseling: Birth Control Pill Counseling: I discussed with the patient the potential side effects of OCPs including but not limited to increased risk of stroke, heart attack, thrombophlebitis, deep venous thrombosis, hepatic adenomas, breast changes, GI upset, headaches, and depression.  The patient verbalized understanding of the proper use and possible adverse effects of OCPs. All of the patient's questions and concerns were addressed.
Clofazimine Counseling:  I discussed with the patient the risks of clofazimine including but not limited to skin and eye pigmentation, liver damage, nausea/vomiting, gastrointestinal bleeding and allergy.
Topical Retinoid counseling:  Patient advised to apply a pea-sized amount only at bedtime and wait 30 minutes after washing their face before applying.  If too drying, patient may add a non-comedogenic moisturizer. The patient verbalized understanding of the proper use and possible adverse effects of retinoids.  All of the patient's questions and concerns were addressed.
Bexarotene Counseling:  I discussed with the patient the risks of bexarotene including but not limited to hair loss, dry lips/skin/eyes, liver abnormalities, hyperlipidemia, pancreatitis, depression/suicidal ideation, photosensitivity, drug rash/allergic reactions, hypothyroidism, anemia, leukopenia, infection, cataracts, and teratogenicity.  Patient understands that they will need regular blood tests to check lipid profile, liver function tests, white blood cell count, thyroid function tests and pregnancy test if applicable.
Bexarotene Pregnancy And Lactation Text: This medication is Pregnancy Category X and should not be given to women who are pregnant or may become pregnant. This medication should not be used if you are breast feeding.
5-Fu Pregnancy And Lactation Text: This medication is Pregnancy Category X and contraindicated in pregnancy and in women who may become pregnant. It is unknown if this medication is excreted in breast milk.
Rifampin Counseling: I discussed with the patient the risks of rifampin including but not limited to liver damage, kidney damage, red-orange body fluids, nausea/vomiting and severe allergy.
Humira Pregnancy And Lactation Text: This medication is Pregnancy Category B and is considered safe during pregnancy. It is unknown if this medication is excreted in breast milk.
High Dose Vitamin A Counseling: Side effects reviewed, pt to contact office should one occur.
Dapsone Counseling: I discussed with the patient the risks of dapsone including but not limited to hemolytic anemia, agranulocytosis, rashes, methemoglobinemia, kidney failure, peripheral neuropathy, headaches, GI upset, and liver toxicity.  Patients who start dapsone require monitoring including baseline LFTs and weekly CBCs for the first month, then every month thereafter.  The patient verbalized understanding of the proper use and possible adverse effects of dapsone.  All of the patient's questions and concerns were addressed.
Spironolactone Pregnancy And Lactation Text: This medication can cause feminization of the male fetus and should be avoided during pregnancy. The active metabolite is also found in breast milk.
Drysol Counseling:  I discussed with the patient the risks of drysol/aluminum chloride including but not limited to skin rash, itching, irritation, burning.
Ivermectin Counseling:  Patient instructed to take medication on an empty stomach with a full glass of water.  Patient informed of potential adverse effects including but not limited to nausea, diarrhea, dizziness, itching, and swelling of the extremities or lymph nodes.  The patient verbalized understanding of the proper use and possible adverse effects of ivermectin.  All of the patient's questions and concerns were addressed.
Arava Counseling:  Patient counseled regarding adverse effects of Arava including but not limited to nausea, vomiting, abnormalities in liver function tests. Patients may develop mouth sores, rash, diarrhea, and abnormalities in blood counts. The patient understands that monitoring is required including LFTs and blood counts.  There is a rare possibility of scarring of the liver and lung problems that can occur when taking methotrexate. Persistent nausea, loss of appetite, pale stools, dark urine, cough, and shortness of breath should be reported immediately. Patient advised to discontinue Arava treatment and consult with a physician prior to attempting conception. The patient will have to undergo a treatment to eliminate Arava from the body prior to conception.
Cellcept Counseling:  I discussed with the patient the risks of mycophenolate mofetil including but not limited to infection/immunosuppression, GI upset, hypokalemia, hypercholesterolemia, bone marrow suppression, lymphoproliferative disorders, malignancy, GI ulceration/bleed/perforation, colitis, interstitial lung disease, kidney failure, progressive multifocal leukoencephalopathy, and birth defects.  The patient understands that monitoring is required including a baseline creatinine and regular CBC testing. In addition, patient must alert us immediately if symptoms of infection or other concerning signs are noted.
Xolair Pregnancy And Lactation Text: This medication is Pregnancy Category B and is considered safe during pregnancy. This medication is excreted in breast milk.
Valtrex Pregnancy And Lactation Text: this medication is Pregnancy Category B and is considered safe during pregnancy. This medication is not directly found in breast milk but it's metabolite acyclovir is present.
Rifampin Pregnancy And Lactation Text: This medication is Pregnancy Category C and it isn't know if it is safe during pregnancy. It is also excreted in breast milk and should not be used if you are breast feeding.
Hydroxyzine Pregnancy And Lactation Text: This medication is not safe during pregnancy and should not be taken. It is also excreted in breast milk and breast feeding isn't recommended.
Prednisone Counseling:  I discussed with the patient the risks of prolonged use of prednisone including but not limited to weight gain, insomnia, osteoporosis, mood changes, diabetes, susceptibility to infection, glaucoma and high blood pressure.  In cases where prednisone use is prolonged, patients should be monitored with blood pressure checks, serum glucose levels and an eye exam.  Additionally, the patient may need to be placed on GI prophylaxis, PCP prophylaxis, and calcium and vitamin D supplementation and/or a bisphosphonate.  The patient verbalized understanding of the proper use and the possible adverse effects of prednisone.  All of the patient's questions and concerns were addressed.
Tetracycline Counseling: Patient counseled regarding possible photosensitivity and increased risk for sunburn.  Patient instructed to avoid sunlight, if possible.  When exposed to sunlight, patients should wear protective clothing, sunglasses, and sunscreen.  The patient was instructed to call the office immediately if the following severe adverse effects occur:  hearing changes, easy bruising/bleeding, severe headache, or vision changes.  The patient verbalized understanding of the proper use and possible adverse effects of tetracycline.  All of the patient's questions and concerns were addressed. Patient understands to avoid pregnancy while on therapy due to potential birth defects.
Ketoconazole Pregnancy And Lactation Text: This medication is Pregnancy Category C and it isn't know if it is safe during pregnancy. It is also excreted in breast milk and breast feeding isn't recommended.
Siliq Counseling:  I discussed with the patient the risks of Siliq including but not limited to new or worsening depression, suicidal thoughts and behavior, immunosuppression, malignancy, posterior leukoencephalopathy syndrome, and serious infections.  The patient understands that monitoring is required including a PPD at baseline and must alert us or the primary physician if symptoms of infection or other concerning signs are noted. There is also a special program designed to monitor depression which is required with Siliq.
Terbinafine Counseling: Patient counseling regarding adverse effects of terbinafine including but not limited to headache, diarrhea, rash, upset stomach, liver function test abnormalities, itching, taste/smell disturbance, nausea, abdominal pain, and flatulence.  There is a rare possibility of liver failure that can occur when taking terbinafine.  The patient understands that a baseline LFT and kidney function test may be required. The patient verbalized understanding of the proper use and possible adverse effects of terbinafine.  All of the patient's questions and concerns were addressed.
Azithromycin Pregnancy And Lactation Text: This medication is considered safe during pregnancy and is also secreted in breast milk.
Rituxan Pregnancy And Lactation Text: This medication is Pregnancy Category C and it isn't know if it is safe during pregnancy. It is unknown if this medication is excreted in breast milk but similar antibodies are known to be excreted.
Infliximab Counseling:  I discussed with the patient the risks of infliximab including but not limited to myelosuppression, immunosuppression, autoimmune hepatitis, demyelinating diseases, lymphoma, and serious infections.  The patient understands that monitoring is required including a PPD at baseline and must alert us or the primary physician if symptoms of infection or other concerning signs are noted.
Topical Clindamycin Counseling: Patient counseled that this medication may cause skin irritation or allergic reactions.  In the event of skin irritation, the patient was advised to reduce the amount of the drug applied or use it less frequently.   The patient verbalized understanding of the proper use and possible adverse effects of clindamycin.  All of the patient's questions and concerns were addressed.
Itraconazole Counseling:  I discussed with the patient the risks of itraconazole including but not limited to liver damage, nausea/vomiting, neuropathy, and severe allergy.  The patient understands that this medication is best absorbed when taken with acidic beverages such as non-diet cola or ginger ale.  The patient understands that monitoring is required including baseline LFTs and repeat LFTs at intervals.  The patient understands that they are to contact us or the primary physician if concerning signs are noted.
Cephalexin Counseling: I counseled the patient regarding use of cephalexin as an antibiotic for prophylactic and/or therapeutic purposes. Cephalexin (commonly prescribed under brand name Keflex) is a cephalosporin antibiotic which is active against numerous classes of bacteria, including most skin bacteria. Side effects may include nausea, diarrhea, gastrointestinal upset, rash, hives, yeast infections, and in rare cases, hepatitis, kidney disease, seizures, fever, confusion, neurologic symptoms, and others. Patients with severe allergies to penicillin medications are cautioned that there is about a 10% incidence of cross-reactivity with cephalosporins. When possible, patients with penicillin allergies should use alternatives to cephalosporins for antibiotic therapy.
Tazorac Pregnancy And Lactation Text: This medication is not safe during pregnancy. It is unknown if this medication is excreted in breast milk.
Tazorac Counseling:  Patient advised that medication is irritating and drying.  Patient may need to apply sparingly and wash off after an hour before eventually leaving it on overnight.  The patient verbalized understanding of the proper use and possible adverse effects of tazorac.  All of the patient's questions and concerns were addressed.
Detail Level: Zone
Minocycline Counseling: Patient advised regarding possible photosensitivity and discoloration of the teeth, skin, lips, tongue and gums.  Patient instructed to avoid sunlight, if possible.  When exposed to sunlight, patients should wear protective clothing, sunglasses, and sunscreen.  The patient was instructed to call the office immediately if the following severe adverse effects occur:  hearing changes, easy bruising/bleeding, severe headache, or vision changes.  The patient verbalized understanding of the proper use and possible adverse effects of minocycline.  All of the patient's questions and concerns were addressed.
Cimzia Pregnancy And Lactation Text: This medication crosses the placenta but can be considered safe in certain situations. Cimzia may be excreted in breast milk.
Xolair Counseling:  Patient informed of potential adverse effects including but not limited to fever, muscle aches, rash and allergic reactions.  The patient verbalized understanding of the proper use and possible adverse effects of Xolair.  All of the patient's questions and concerns were addressed.
Doxepin Counseling:  Patient advised that the medication is sedating and not to drive a car after taking this medication. Patient informed of potential adverse effects including but not limited to dry mouth, urinary retention, and blurry vision.  The patient verbalized understanding of the proper use and possible adverse effects of doxepin.  All of the patient's questions and concerns were addressed.
Cyclosporine Counseling:  I discussed with the patient the risks of cyclosporine including but not limited to hypertension, gingival hyperplasia,myelosuppression, immunosuppression, liver damage, kidney damage, neurotoxicity, lymphoma, and serious infections. The patient understands that monitoring is required including baseline blood pressure, CBC, CMP, lipid panel and uric acid, and then 1-2 times monthly CMP and blood pressure.
Oxybutynin Counseling:  I discussed with the patient the risks of oxybutynin including but not limited to skin rash, drowsiness, dry mouth, difficulty urinating, and blurred vision.
Griseofulvin Pregnancy And Lactation Text: This medication is Pregnancy Category X and is known to cause serious birth defects. It is unknown if this medication is excreted in breast milk but breast feeding should be avoided.
Cephalexin Pregnancy And Lactation Text: This medication is Pregnancy Category B and considered safe during pregnancy.  It is also excreted in breast milk but can be used safely for shorter doses.
Hydroxychloroquine Pregnancy And Lactation Text: This medication has been shown to cause fetal harm but it isn't assigned a Pregnancy Risk Category. There are small amounts excreted in breast milk.
Benzoyl Peroxide Counseling: Patient counseled that medicine may cause skin irritation and bleach clothing.  In the event of skin irritation, the patient was advised to reduce the amount of the drug applied or use it less frequently.   The patient verbalized understanding of the proper use and possible adverse effects of benzoyl peroxide.  All of the patient's questions and concerns were addressed.
Fluconazole Counseling:  Patient counseled regarding adverse effects of fluconazole including but not limited to headache, diarrhea, nausea, upset stomach, liver function test abnormalities, taste disturbance, and stomach pain.  There is a rare possibility of liver failure that can occur when taking fluconazole.  The patient understands that monitoring of LFTs and kidney function test may be required, especially at baseline. The patient verbalized understanding of the proper use and possible adverse effects of fluconazole.  All of the patient's questions and concerns were addressed.
Nsaids Pregnancy And Lactation Text: These medications are considered safe up to 30 weeks gestation. It is excreted in breast milk.
Elidel Counseling: Patient may experience a mild burning sensation during topical application. Elidel is not approved in children less than 2 years of age. There have been case reports of hematologic and skin malignancies in patients using topical calcineurin inhibitors although causality is questionable.
Topical Sulfur Applications Pregnancy And Lactation Text: This medication is Pregnancy Category C and has an unknown safety profile during pregnancy. It is unknown if this topical medication is excreted in breast milk.
Protopic Pregnancy And Lactation Text: This medication is Pregnancy Category C. It is unknown if this medication is excreted in breast milk when applied topically.
SSKI Counseling:  I discussed with the patient the risks of SSKI including but not limited to thyroid abnormalities, metallic taste, GI upset, fever, headache, acne, arthralgias, paraesthesias, lymphadenopathy, easy bleeding, arrhythmias, and allergic reaction.
Bactrim Counseling:  I discussed with the patient the risks of sulfa antibiotics including but not limited to GI upset, allergic reaction, drug rash, diarrhea, dizziness, photosensitivity, and yeast infections.  Rarely, more serious reactions can occur including but not limited to aplastic anemia, agranulocytosis, methemoglobinemia, blood dyscrasias, liver or kidney failure, lung infiltrates or desquamative/blistering drug rashes.
Taltz Counseling: I discussed with the patient the risks of ixekizumab including but not limited to immunosuppression, serious infections, worsening of inflammatory bowel disease and drug reactions.  The patient understands that monitoring is required including a PPD at baseline and must alert us or the primary physician if symptoms of infection or other concerning signs are noted.
Acitretin Pregnancy And Lactation Text: This medication is Pregnancy Category X and should not be given to women who are pregnant or may become pregnant in the future. This medication is excreted in breast milk.
Xeljanz Counseling: I discussed with the patient the risks of Xeljanz therapy including increased risk of infection, liver issues, headache, diarrhea, or cold symptoms. Live vaccines should be avoided. They were instructed to call if they have any problems.
Griseofulvin Counseling:  I discussed with the patient the risks of griseofulvin including but not limited to photosensitivity, cytopenia, liver damage, nausea/vomiting and severe allergy.  The patient understands that this medication is best absorbed when taken with a fatty meal (e.g., ice cream or french fries).
Hydroxychloroquine Counseling:  I discussed with the patient that a baseline ophthalmologic exam is needed at the start of therapy and every year thereafter while on therapy. A CBC may also be warranted for monitoring.  The side effects of this medication were discussed with the patient, including but not limited to agranulocytosis, aplastic anemia, seizures, rashes, retinopathy, and liver toxicity. Patient instructed to call the office should any adverse effect occur.  The patient verbalized understanding of the proper use and possible adverse effects of Plaquenil.  All the patient's questions and concerns were addressed.
Stelara Counseling:  I discussed with the patient the risks of ustekinumab including but not limited to immunosuppression, malignancy, posterior leukoencephalopathy syndrome, and serious infections.  The patient understands that monitoring is required including a PPD at baseline and must alert us or the primary physician if symptoms of infection or other concerning signs are noted.
Otezla Counseling: The side effects of Otezla were discussed with the patient, including but not limited to worsening or new depression, weight loss, diarrhea, nausea, upper respiratory tract infection, and headache. Patient instructed to call the office should any adverse effect occur.  The patient verbalized understanding of the proper use and possible adverse effects of Otezla.  All the patient's questions and concerns were addressed.
Thalidomide Counseling: I discussed with the patient the risks of thalidomide including but not limited to birth defects, anxiety, weakness, chest pain, dizziness, cough and severe allergy.
Xelalessandraz Pregnancy And Lactation Text: This medication is Pregnancy Category D and is not considered safe during pregnancy.  The risk during breast feeding is also uncertain.
Carac Counseling:  I discussed with the patient the risks of Carac including but not limited to erythema, scaling, itching, weeping, crusting, and pain.
Simponi Counseling:  I discussed with the patient the risks of golimumab including but not limited to myelosuppression, immunosuppression, autoimmune hepatitis, demyelinating diseases, lymphoma, and serious infections.  The patient understands that monitoring is required including a PPD at baseline and must alert us or the primary physician if symptoms of infection or other concerning signs are noted.
5-Fu Counseling: 5-Fluorouracil Counseling:  I discussed with the patient the risks of 5-fluorouracil including but not limited to erythema, scaling, itching, weeping, crusting, and pain.
Drysol Pregnancy And Lactation Text: This medication is considered safe during pregnancy and breast feeding.
Hydroquinone Counseling:  Patient advised that medication may result in skin irritation, lightening (hypopigmentation), dryness, and burning.  In the event of skin irritation, the patient was advised to reduce the amount of the drug applied or use it less frequently.  Rarely, spots that are treated with hydroquinone can become darker (pseudoochronosis).  Should this occur, patient instructed to stop medication and call the office. The patient verbalized understanding of the proper use and possible adverse effects of hydroquinone.  All of the patient's questions and concerns were addressed.
Cosentyx Counseling:  I discussed with the patient the risks of Cosentyx including but not limited to worsening of Crohn's disease, immunosuppression, allergic reactions and infections.  The patient understands that monitoring is required including a PPD at baseline and must alert us or the primary physician if symptoms of infection or other concerning signs are noted.
Spironolactone Counseling: Patient advised regarding risks of diarrhea, abdominal pain, hyperkalemia, birth defects (for female patients), liver toxicity and renal toxicity. The patient may need blood work to monitor liver and kidney function and potassium levels while on therapy. The patient verbalized understanding of the proper use and possible adverse effects of spironolactone.  All of the patient's questions and concerns were addressed.
Ketoconazole Counseling:   Patient counseled regarding improving absorption with orange juice.  Adverse effects include but are not limited to breast enlargement, headache, diarrhea, nausea, upset stomach, liver function test abnormalities, taste disturbance, and stomach pain.  There is a rare possibility of liver failure that can occur when taking ketoconazole. The patient understands that monitoring of LFTs may be required, especially at baseline. The patient verbalized understanding of the proper use and possible adverse effects of ketoconazole.  All of the patient's questions and concerns were addressed.
Rituxan Counseling:  I discussed with the patient the risks of Rituxan infusions. Side effects can include infusion reactions, severe drug rashes including mucocutaneous reactions, reactivation of latent hepatitis and other infections and rarely progressive multifocal leukoencephalopathy.  All of the patient's questions and concerns were addressed.
Cyclophosphamide Pregnancy And Lactation Text: This medication is Pregnancy Category D and it isn't considered safe during pregnancy. This medication is excreted in breast milk.
Odomzo Counseling- I discussed with the patient the risks of Odomzo including but not limited to nausea, vomiting, diarrhea, constipation, weight loss, changes in the sense of taste, decreased appetite, muscle spasms, and hair loss.  The patient verbalized understanding of the proper use and possible adverse effects of Odomzo.  All of the patient's questions and concerns were addressed.
Oxybutynin Pregnancy And Lactation Text: This medication is Pregnancy Category B and is considered safe during pregnancy. It is unknown if it is excreted in breast milk.
Eucrisa Counseling: Patient may experience a mild burning sensation during topical application. Eucrisa is not approved in children less than 2 years of age.
Gabapentin Counseling: I discussed with the patient the risks of gabapentin including but not limited to dizziness, somnolence, fatigue and ataxia.
Solaraze Pregnancy And Lactation Text: This medication is Pregnancy Category B and is considered safe. There is some data to suggest avoiding during the third trimester. It is unknown if this medication is excreted in breast milk.
Glycopyrrolate Pregnancy And Lactation Text: This medication is Pregnancy Category B and is considered safe during pregnancy. It is unknown if it is excreted breast milk.
Cyclophosphamide Counseling:  I discussed with the patient the risks of cyclophosphamide including but not limited to hair loss, hormonal abnormalities, decreased fertility, abdominal pain, diarrhea, nausea and vomiting, bone marrow suppression and infection. The patient understands that monitoring is required while taking this medication.

## 2018-10-03 NOTE — PROCEDURE: EXCIMER LASER
Consent: Written consent obtained, risks reviewed including but not limited to crusting, scabbing, blistering, scarring, darker or lighter pigmentary change, incidental hair removal, bruising, and/or incomplete removal.
Fluence #1 (J/Cm2 Or Mj/Cm2): 4511
Device Serial Number (Optional): [id number]
Comments: Patient states that she tolerated her last treatment well. Patient states that her scalp feel so much better.
Post-Care Instructions: I reviewed with the patient in detail post-care instructions. Patient should stay away from the sun and wear sun protection until treated areas are fully healed.
Spot Size: 2 x 2 cm
Total Pulses (Optional): 354
Detail Level: Zone
Fluence Units: J/cm2
Treatment Number: 19
Total Square Area In Cm2 (Required For Proper Billing- Whole Numbers Only Please): 72
Mode: repeat paint
Location #1: posterior scalp

## 2018-10-19 ENCOUNTER — APPOINTMENT (RX ONLY)
Dept: URBAN - METROPOLITAN AREA CLINIC 37 | Facility: CLINIC | Age: 58
Setting detail: DERMATOLOGY
End: 2018-10-19

## 2018-10-19 ENCOUNTER — APPOINTMENT (RX ONLY)
Dept: URBAN - METROPOLITAN AREA CLINIC 38 | Facility: CLINIC | Age: 58
Setting detail: DERMATOLOGY
End: 2018-10-19

## 2018-10-19 NOTE — HPI: RASH (PSORIASIS)
How Severe Is Your Psoriasis?: mild
Do You Have A Family History Of Psoriasis?: no
Is This A New Presentation, Or A Follow-Up?: Psoriasis
Additional History: Patient states that her Humira will be delivered any day to her home so she can start on medication.

## 2018-10-19 NOTE — PROCEDURE: MIPS QUALITY
Detail Level: Generalized
Quality 226: Preventive Care And Screening: Tobacco Use: Screening And Cessation Intervention: Patient screened for tobacco and never smoked
Quality 130: Documentation Of Current Medications In The Medical Record: Current Medications Documented
Quality 110: Preventive Care And Screening: Influenza Immunization: Influenza Immunization Administered during Influenza season
Quality 431: Preventive Care And Screening: Unhealthy Alcohol Use - Screening: Patient screened for unhealthy alcohol use using a single question and scores less than 2 times per year

## 2018-11-05 ENCOUNTER — APPOINTMENT (RX ONLY)
Dept: URBAN - METROPOLITAN AREA CLINIC 38 | Facility: CLINIC | Age: 58
Setting detail: DERMATOLOGY
End: 2018-11-05

## 2018-11-05 ENCOUNTER — APPOINTMENT (RX ONLY)
Dept: URBAN - METROPOLITAN AREA CLINIC 37 | Facility: CLINIC | Age: 58
Setting detail: DERMATOLOGY
End: 2018-11-05

## 2018-11-05 DIAGNOSIS — L40.0 PSORIASIS VULGARIS: ICD-10-CM

## 2018-11-05 PROCEDURE — ? EXCIMER LASER

## 2018-11-05 PROCEDURE — 96920 EXCIMER LSR PSRIASIS<250SQCM: CPT

## 2018-11-05 ASSESSMENT — LOCATION DETAILED DESCRIPTION DERM
LOCATION DETAILED: RIGHT CENTRAL POSTAURICULAR SKIN
LOCATION DETAILED: MID-OCCIPITAL SCALP
LOCATION DETAILED: LEFT INFERIOR OCCIPITAL SCALP
LOCATION DETAILED: MID-OCCIPITAL SCALP
LOCATION DETAILED: LEFT INFERIOR OCCIPITAL SCALP
LOCATION DETAILED: LEFT CENTRAL POSTAURICULAR SKIN
LOCATION DETAILED: LEFT CENTRAL POSTAURICULAR SKIN
LOCATION DETAILED: RIGHT CENTRAL POSTAURICULAR SKIN

## 2018-11-05 ASSESSMENT — LOCATION ZONE DERM
LOCATION ZONE: SCALP
LOCATION ZONE: SCALP

## 2018-11-05 NOTE — PROCEDURE: EXCIMER LASER
Mode: repeat paint
Detail Level: Zone
Total Pulses (Optional): 147
Fluence Units: J/cm2
Treatment Number: 20
Consent: Written consent obtained, risks reviewed including but not limited to crusting, scabbing, blistering, scarring, darker or lighter pigmentary change, incidental hair removal, bruising, and/or incomplete removal.
Spot Size: 2 x 2 cm
Fluence #1 (J/Cm2 Or Mj/Cm2): 4928
Device Serial Number (Optional): [id number]
Total Square Area In Cm2 (Required For Proper Billing- Whole Numbers Only Please): 60
Comments: Patient is returning for treatment after one month of downtime; due to starting Humira. Patient states that her scalp is flaring and very itchy. She states that she feel that the Humira has not kicked in yet. She would like to continue treating with Xtrac.
Location #1: posterior scalp
Post-Care Instructions: I reviewed with the patient in detail post-care instructions. Patient should stay away from the sun and wear sun protection until treated areas are fully healed.

## 2018-11-05 NOTE — PROCEDURE: EXCIMER LASER
Spot Size: 2 x 2 cm
Device Serial Number (Optional): [id number]
Comments: Patient is returning for treatment after one month of downtime; due to starting Humira. Patient states that her scalp is flaring and very itchy. She states that she feel that the Humira has not kicked in yet. She would like to continue treating with Xtrac.
Treatment Number: 20
Detail Level: Zone
Total Pulses (Optional): 147
Total Square Area In Cm2 (Required For Proper Billing- Whole Numbers Only Please): 60
Consent: Written consent obtained, risks reviewed including but not limited to crusting, scabbing, blistering, scarring, darker or lighter pigmentary change, incidental hair removal, bruising, and/or incomplete removal.
Location #1: posterior scalp
Fluence Units: J/cm2
Post-Care Instructions: I reviewed with the patient in detail post-care instructions. Patient should stay away from the sun and wear sun protection until treated areas are fully healed.
Mode: repeat paint
Fluence #1 (J/Cm2 Or Mj/Cm2): 1371

## 2018-11-12 ENCOUNTER — APPOINTMENT (RX ONLY)
Dept: URBAN - METROPOLITAN AREA CLINIC 37 | Facility: CLINIC | Age: 58
Setting detail: DERMATOLOGY
End: 2018-11-12

## 2018-11-12 ENCOUNTER — APPOINTMENT (RX ONLY)
Dept: URBAN - METROPOLITAN AREA CLINIC 38 | Facility: CLINIC | Age: 58
Setting detail: DERMATOLOGY
End: 2018-11-12

## 2018-11-12 DIAGNOSIS — L40.0 PSORIASIS VULGARIS: ICD-10-CM | Status: STABLE

## 2018-11-12 DIAGNOSIS — Z79.899 OTHER LONG TERM (CURRENT) DRUG THERAPY: ICD-10-CM

## 2018-11-12 DIAGNOSIS — L40.0 PSORIASIS VULGARIS: ICD-10-CM

## 2018-11-12 PROCEDURE — 96920 EXCIMER LSR PSRIASIS<250SQCM: CPT

## 2018-11-12 PROCEDURE — 99213 OFFICE O/P EST LOW 20 MIN: CPT

## 2018-11-12 PROCEDURE — ? TREATMENT REGIMEN

## 2018-11-12 PROCEDURE — ? COUNSELING

## 2018-11-12 PROCEDURE — ? EXCIMER LASER

## 2018-11-12 PROCEDURE — ? HIGH RISK MEDICATION MONITORING

## 2018-11-12 ASSESSMENT — LOCATION DETAILED DESCRIPTION DERM
LOCATION DETAILED: MID POSTERIOR NECK
LOCATION DETAILED: MID-OCCIPITAL SCALP
LOCATION DETAILED: LEFT CENTRAL POSTAURICULAR SKIN
LOCATION DETAILED: MID POSTERIOR NECK
LOCATION DETAILED: RIGHT SUPERIOR POSTAURICULAR SKIN
LOCATION DETAILED: LEFT CENTRAL POSTAURICULAR SKIN
LOCATION DETAILED: RIGHT SUPERIOR POSTAURICULAR SKIN
LOCATION DETAILED: MID-OCCIPITAL SCALP

## 2018-11-12 ASSESSMENT — LOCATION ZONE DERM
LOCATION ZONE: SCALP
LOCATION ZONE: NECK
LOCATION ZONE: SCALP
LOCATION ZONE: NECK

## 2018-11-12 ASSESSMENT — LOCATION SIMPLE DESCRIPTION DERM
LOCATION SIMPLE: POSTERIOR SCALP
LOCATION SIMPLE: POSTERIOR SCALP
LOCATION SIMPLE: SCALP
LOCATION SIMPLE: POSTERIOR NECK
LOCATION SIMPLE: POSTERIOR NECK
LOCATION SIMPLE: POSTERIOR SCALP
LOCATION SIMPLE: POSTERIOR SCALP
LOCATION SIMPLE: SCALP

## 2018-11-12 NOTE — PROCEDURE: HIGH RISK MEDICATION MONITORING
Doxepin Counseling:  Patient advised that the medication is sedating and not to drive a car after taking this medication. Patient informed of potential adverse effects including but not limited to dry mouth, urinary retention, and blurry vision.  The patient verbalized understanding of the proper use and possible adverse effects of doxepin.  All of the patient's questions and concerns were addressed.
Nsaids Pregnancy And Lactation Text: These medications are considered safe up to 30 weeks gestation. It is excreted in breast milk.
Tremfya Pregnancy And Lactation Text: The risk during pregnancy and breastfeeding is uncertain with this medication.
Erythromycin Pregnancy And Lactation Text: This medication is Pregnancy Category B and is considered safe during pregnancy. It is also excreted in breast milk.
Xolair Counseling:  Patient informed of potential adverse effects including but not limited to fever, muscle aches, rash and allergic reactions.  The patient verbalized understanding of the proper use and possible adverse effects of Xolair.  All of the patient's questions and concerns were addressed.
Protopic Pregnancy And Lactation Text: This medication is Pregnancy Category C. It is unknown if this medication is excreted in breast milk when applied topically.
Enbrel Counseling:  I discussed with the patient the risks of etanercept including but not limited to myelosuppression, immunosuppression, autoimmune hepatitis, demyelinating diseases, lymphoma, and infections.  The patient understands that monitoring is required including a PPD at baseline and must alert us or the primary physician if symptoms of infection or other concerning signs are noted.
Drysol Pregnancy And Lactation Text: This medication is considered safe during pregnancy and breast feeding.
Bactrim Counseling:  I discussed with the patient the risks of sulfa antibiotics including but not limited to GI upset, allergic reaction, drug rash, diarrhea, dizziness, photosensitivity, and yeast infections.  Rarely, more serious reactions can occur including but not limited to aplastic anemia, agranulocytosis, methemoglobinemia, blood dyscrasias, liver or kidney failure, lung infiltrates or desquamative/blistering drug rashes.
Topical Retinoid counseling:  Patient advised to apply a pea-sized amount only at bedtime and wait 30 minutes after washing their face before applying.  If too drying, patient may add a non-comedogenic moisturizer. The patient verbalized understanding of the proper use and possible adverse effects of retinoids.  All of the patient's questions and concerns were addressed.
Topical Sulfur Applications Counseling: Topical Sulfur Counseling: Patient counseled that this medication may cause skin irritation or allergic reactions.  In the event of skin irritation, the patient was advised to reduce the amount of the drug applied or use it less frequently.   The patient verbalized understanding of the proper use and possible adverse effects of topical sulfur application.  All of the patient's questions and concerns were addressed.
5-Fu Pregnancy And Lactation Text: This medication is Pregnancy Category X and contraindicated in pregnancy and in women who may become pregnant. It is unknown if this medication is excreted in breast milk.
Thalidomide Counseling: I discussed with the patient the risks of thalidomide including but not limited to birth defects, anxiety, weakness, chest pain, dizziness, cough and severe allergy.
High Dose Vitamin A Pregnancy And Lactation Text: High dose vitamin A therapy is contraindicated during pregnancy and breast feeding.
Gabapentin Pregnancy And Lactation Text: This medication is Pregnancy Category C and isn't considered safe during pregnancy. It is excreted in breast milk.
Cellcept Pregnancy And Lactation Text: This medication is Pregnancy Category D and isn't considered safe during pregnancy. It is unknown if this medication is excreted in breast milk.
Dupixent Counseling: I discussed with the patient the risks of dupilumab including but not limited to eye infection and irritation, cold sores, injection site reactions, worsening of asthma, allergic reactions and increased risk of parasitic infection.  Live vaccines should be avoided while taking dupilumab. Dupilumab will also interact with certain medications such as warfarin and cyclosporine. The patient understands that monitoring is required and they must alert us or the primary physician if symptoms of infection or other concerning signs are noted.
Picato Counseling:  I discussed with the patient the risks of Picato including but not limited to erythema, scaling, itching, weeping, crusting, and pain.
Clindamycin Counseling: I counseled the patient regarding use of clindamycin as an antibiotic for prophylactic and/or therapeutic purposes. Clindamycin is active against numerous classes of bacteria, including skin bacteria. Side effects may include nausea, diarrhea, gastrointestinal upset, rash, hives, yeast infections, and in rare cases, colitis.
Sski Pregnancy And Lactation Text: This medication is Pregnancy Category D and isn't considered safe during pregnancy. It is excreted in breast milk.
Rituxan Pregnancy And Lactation Text: This medication is Pregnancy Category C and it isn't know if it is safe during pregnancy. It is unknown if this medication is excreted in breast milk but similar antibodies are known to be excreted.
Minoxidil Counseling: Minoxidil is a topical medication which can increase blood flow where it is applied. It is uncertain how this medication increases hair growth. Side effects are uncommon and include stinging and allergic reactions.
Tetracycline Counseling: Patient counseled regarding possible photosensitivity and increased risk for sunburn.  Patient instructed to avoid sunlight, if possible.  When exposed to sunlight, patients should wear protective clothing, sunglasses, and sunscreen.  The patient was instructed to call the office immediately if the following severe adverse effects occur:  hearing changes, easy bruising/bleeding, severe headache, or vision changes.  The patient verbalized understanding of the proper use and possible adverse effects of tetracycline.  All of the patient's questions and concerns were addressed. Patient understands to avoid pregnancy while on therapy due to potential birth defects.
Ketoconazole Counseling:   Patient counseled regarding improving absorption with orange juice.  Adverse effects include but are not limited to breast enlargement, headache, diarrhea, nausea, upset stomach, liver function test abnormalities, taste disturbance, and stomach pain.  There is a rare possibility of liver failure that can occur when taking ketoconazole. The patient understands that monitoring of LFTs may be required, especially at baseline. The patient verbalized understanding of the proper use and possible adverse effects of ketoconazole.  All of the patient's questions and concerns were addressed.
Cimzia Pregnancy And Lactation Text: This medication crosses the placenta but can be considered safe in certain situations. Cimzia may be excreted in breast milk.
Cosentyx Counseling:  I discussed with the patient the risks of Cosentyx including but not limited to worsening of Crohn's disease, immunosuppression, allergic reactions and infections.  The patient understands that monitoring is required including a PPD at baseline and must alert us or the primary physician if symptoms of infection or other concerning signs are noted.
Otezla Counseling: The side effects of Otezla were discussed with the patient, including but not limited to worsening or new depression, weight loss, diarrhea, nausea, upper respiratory tract infection, and headache. Patient instructed to call the office should any adverse effect occur.  The patient verbalized understanding of the proper use and possible adverse effects of Otezla.  All the patient's questions and concerns were addressed.
Valtrex Counseling: I discussed with the patient the risks of valacyclovir including but not limited to kidney damage, nausea, vomiting and severe allergy.  The patient understands that if the infection seems to be worsening or is not improving, they are to call.
Hydroquinone Counseling:  Patient advised that medication may result in skin irritation, lightening (hypopigmentation), dryness, and burning.  In the event of skin irritation, the patient was advised to reduce the amount of the drug applied or use it less frequently.  Rarely, spots that are treated with hydroquinone can become darker (pseudoochronosis).  Should this occur, patient instructed to stop medication and call the office. The patient verbalized understanding of the proper use and possible adverse effects of hydroquinone.  All of the patient's questions and concerns were addressed.
Albendazole Pregnancy And Lactation Text: This medication is Pregnancy Category C and it isn't known if it is safe during pregnancy. It is also excreted in breast milk.
Infliximab Counseling:  I discussed with the patient the risks of infliximab including but not limited to myelosuppression, immunosuppression, autoimmune hepatitis, demyelinating diseases, lymphoma, and serious infections.  The patient understands that monitoring is required including a PPD at baseline and must alert us or the primary physician if symptoms of infection or other concerning signs are noted.
Xeljanz Counseling: I discussed with the patient the risks of Xeljanz therapy including increased risk of infection, liver issues, headache, diarrhea, or cold symptoms. Live vaccines should be avoided. They were instructed to call if they have any problems.
Clofazimine Counseling:  I discussed with the patient the risks of clofazimine including but not limited to skin and eye pigmentation, liver damage, nausea/vomiting, gastrointestinal bleeding and allergy.
Terbinafine Pregnancy And Lactation Text: This medication is Pregnancy Category B and is considered safe during pregnancy. It is also excreted in breast milk and breast feeding isn't recommended.
Stelara Counseling:  I discussed with the patient the risks of ustekinumab including but not limited to immunosuppression, malignancy, posterior leukoencephalopathy syndrome, and serious infections.  The patient understands that monitoring is required including a PPD at baseline and must alert us or the primary physician if symptoms of infection or other concerning signs are noted.
Fluconazole Counseling:  Patient counseled regarding adverse effects of fluconazole including but not limited to headache, diarrhea, nausea, upset stomach, liver function test abnormalities, taste disturbance, and stomach pain.  There is a rare possibility of liver failure that can occur when taking fluconazole.  The patient understands that monitoring of LFTs and kidney function test may be required, especially at baseline. The patient verbalized understanding of the proper use and possible adverse effects of fluconazole.  All of the patient's questions and concerns were addressed.
Nsaids Counseling: NSAID Counseling: I discussed with the patient that NSAIDs should be taken with food. Prolonged use of NSAIDs can result in the development of stomach ulcers.  Patient advised to stop taking NSAIDs if abdominal pain occurs.  The patient verbalized understanding of the proper use and possible adverse effects of NSAIDs.  All of the patient's questions and concerns were addressed.
Topical Sulfur Applications Pregnancy And Lactation Text: This medication is Pregnancy Category C and has an unknown safety profile during pregnancy. It is unknown if this topical medication is excreted in breast milk.
Infliximab Pregnancy And Lactation Text: This medication is Pregnancy Category B and is considered safe during pregnancy. It is unknown if this medication is excreted in breast milk.
Xolair Pregnancy And Lactation Text: This medication is Pregnancy Category B and is considered safe during pregnancy. This medication is excreted in breast milk.
Itraconazole Counseling:  I discussed with the patient the risks of itraconazole including but not limited to liver damage, nausea/vomiting, neuropathy, and severe allergy.  The patient understands that this medication is best absorbed when taken with acidic beverages such as non-diet cola or ginger ale.  The patient understands that monitoring is required including baseline LFTs and repeat LFTs at intervals.  The patient understands that they are to contact us or the primary physician if concerning signs are noted.
Minocycline Counseling: Patient advised regarding possible photosensitivity and discoloration of the teeth, skin, lips, tongue and gums.  Patient instructed to avoid sunlight, if possible.  When exposed to sunlight, patients should wear protective clothing, sunglasses, and sunscreen.  The patient was instructed to call the office immediately if the following severe adverse effects occur:  hearing changes, easy bruising/bleeding, severe headache, or vision changes.  The patient verbalized understanding of the proper use and possible adverse effects of minocycline.  All of the patient's questions and concerns were addressed.
Azathioprine Counseling:  I discussed with the patient the risks of azathioprine including but not limited to myelosuppression, immunosuppression, hepatotoxicity, lymphoma, and infections.  The patient understands that monitoring is required including baseline LFTs, Creatinine, possible TPMP genotyping and weekly CBCs for the first month and then every 2 weeks thereafter.  The patient verbalized understanding of the proper use and possible adverse effects of azathioprine.  All of the patient's questions and concerns were addressed.
Erivedge Pregnancy And Lactation Text: This medication is Pregnancy Category X and is absolutely contraindicated during pregnancy. It is unknown if it is excreted in breast milk.
Minocycline Pregnancy And Lactation Text: This medication is Pregnancy Category D and not consider safe during pregnancy. It is also excreted in breast milk.
Taltz Counseling: I discussed with the patient the risks of ixekizumab including but not limited to immunosuppression, serious infections, worsening of inflammatory bowel disease and drug reactions.  The patient understands that monitoring is required including a PPD at baseline and must alert us or the primary physician if symptoms of infection or other concerning signs are noted.
Bexarotene Pregnancy And Lactation Text: This medication is Pregnancy Category X and should not be given to women who are pregnant or may become pregnant. This medication should not be used if you are breast feeding.
Dupixent Pregnancy And Lactation Text: This medication likely crosses the placenta but the risk for the fetus is uncertain. This medication is excreted in breast milk.
Hydroxychloroquine Pregnancy And Lactation Text: This medication has been shown to cause fetal harm but it isn't assigned a Pregnancy Risk Category. There are small amounts excreted in breast milk.
Picato Pregnancy And Lactation Text: This medication is Pregnancy Category C. It is unknown if this medication is excreted in breast milk.
Valtrex Pregnancy And Lactation Text: this medication is Pregnancy Category B and is considered safe during pregnancy. This medication is not directly found in breast milk but it's metabolite acyclovir is present.
Cyclosporine Pregnancy And Lactation Text: This medication is Pregnancy Category C and it isn't know if it is safe during pregnancy. This medication is excreted in breast milk.
Cyclosporine Counseling:  I discussed with the patient the risks of cyclosporine including but not limited to hypertension, gingival hyperplasia,myelosuppression, immunosuppression, liver damage, kidney damage, neurotoxicity, lymphoma, and serious infections. The patient understands that monitoring is required including baseline blood pressure, CBC, CMP, lipid panel and uric acid, and then 1-2 times monthly CMP and blood pressure.
Rifampin Pregnancy And Lactation Text: This medication is Pregnancy Category C and it isn't know if it is safe during pregnancy. It is also excreted in breast milk and should not be used if you are breast feeding.
Odomzo Counseling- I discussed with the patient the risks of Odomzo including but not limited to nausea, vomiting, diarrhea, constipation, weight loss, changes in the sense of taste, decreased appetite, muscle spasms, and hair loss.  The patient verbalized understanding of the proper use and possible adverse effects of Odomzo.  All of the patient's questions and concerns were addressed.
Ketoconazole Pregnancy And Lactation Text: This medication is Pregnancy Category C and it isn't know if it is safe during pregnancy. It is also excreted in breast milk and breast feeding isn't recommended.
Siliq Counseling:  I discussed with the patient the risks of Siliq including but not limited to new or worsening depression, suicidal thoughts and behavior, immunosuppression, malignancy, posterior leukoencephalopathy syndrome, and serious infections.  The patient understands that monitoring is required including a PPD at baseline and must alert us or the primary physician if symptoms of infection or other concerning signs are noted. There is also a special program designed to monitor depression which is required with Siliq.
Rifampin Counseling: I discussed with the patient the risks of rifampin including but not limited to liver damage, kidney damage, red-orange body fluids, nausea/vomiting and severe allergy.
High Dose Vitamin A Counseling: Side effects reviewed, pt to contact office should one occur.
Cellcept Counseling:  I discussed with the patient the risks of mycophenolate mofetil including but not limited to infection/immunosuppression, GI upset, hypokalemia, hypercholesterolemia, bone marrow suppression, lymphoproliferative disorders, malignancy, GI ulceration/bleed/perforation, colitis, interstitial lung disease, kidney failure, progressive multifocal leukoencephalopathy, and birth defects.  The patient understands that monitoring is required including a baseline creatinine and regular CBC testing. In addition, patient must alert us immediately if symptoms of infection or other concerning signs are noted.
Cimetidine Counseling:  I discussed with the patient the risks of Cimetidine including but not limited to gynecomastia, headache, diarrhea, nausea, drowsiness, arrhythmias, pancreatitis, skin rashes, psychosis, bone marrow suppression and kidney toxicity.
Birth Control Pills Counseling: Birth Control Pill Counseling: I discussed with the patient the potential side effects of OCPs including but not limited to increased risk of stroke, heart attack, thrombophlebitis, deep venous thrombosis, hepatic adenomas, breast changes, GI upset, headaches, and depression.  The patient verbalized understanding of the proper use and possible adverse effects of OCPs. All of the patient's questions and concerns were addressed.
Cyclophosphamide Counseling:  I discussed with the patient the risks of cyclophosphamide including but not limited to hair loss, hormonal abnormalities, decreased fertility, abdominal pain, diarrhea, nausea and vomiting, bone marrow suppression and infection. The patient understands that monitoring is required while taking this medication.
Albendazole Counseling:  I discussed with the patient the risks of albendazole including but not limited to cytopenia, kidney damage, nausea/vomiting and severe allergy.  The patient understands that this medication is being used in an off-label manner.
Topical Clindamycin Counseling: Patient counseled that this medication may cause skin irritation or allergic reactions.  In the event of skin irritation, the patient was advised to reduce the amount of the drug applied or use it less frequently.   The patient verbalized understanding of the proper use and possible adverse effects of clindamycin.  All of the patient's questions and concerns were addressed.
Ivermectin Counseling:  Patient instructed to take medication on an empty stomach with a full glass of water.  Patient informed of potential adverse effects including but not limited to nausea, diarrhea, dizziness, itching, and swelling of the extremities or lymph nodes.  The patient verbalized understanding of the proper use and possible adverse effects of ivermectin.  All of the patient's questions and concerns were addressed.
Dapsone Pregnancy And Lactation Text: This medication is Pregnancy Category C and is not considered safe during pregnancy or breast feeding.
Tremfya Counseling: I discussed with the patient the risks of guselkumab including but not limited to immunosuppression, serious infections, worsening of inflammatory bowel disease and drug reactions.  The patient understands that monitoring is required including a PPD at baseline and must alert us or the primary physician if symptoms of infection or other concerning signs are noted.
Isotretinoin Counseling: Patient should get monthly blood tests, not donate blood, not drive at night if vision affected, not share medication, and not undergo elective surgery for 6 months after tx completed. Side effects reviewed, pt to contact office should one occur.
Eucrisa Counseling: Patient may experience a mild burning sensation during topical application. Eucrisa is not approved in children less than 2 years of age.
Itraconazole Pregnancy And Lactation Text: This medication is Pregnancy Category C and it isn't know if it is safe during pregnancy. It is also excreted in breast milk.
Detail Level: Zone
Oxybutynin Pregnancy And Lactation Text: This medication is Pregnancy Category B and is considered safe during pregnancy. It is unknown if it is excreted in breast milk.
Spironolactone Pregnancy And Lactation Text: This medication can cause feminization of the male fetus and should be avoided during pregnancy. The active metabolite is also found in breast milk.
Otezla Pregnancy And Lactation Text: This medication is Pregnancy Category C and it isn't known if it is safe during pregnancy. It is unknown if it is excreted in breast milk.
Elidel Counseling: Patient may experience a mild burning sensation during topical application. Elidel is not approved in children less than 2 years of age. There have been case reports of hematologic and skin malignancies in patients using topical calcineurin inhibitors although causality is questionable.
Solaraze Pregnancy And Lactation Text: This medication is Pregnancy Category B and is considered safe. There is some data to suggest avoiding during the third trimester. It is unknown if this medication is excreted in breast milk.
Cephalexin Pregnancy And Lactation Text: This medication is Pregnancy Category B and considered safe during pregnancy.  It is also excreted in breast milk but can be used safely for shorter doses.
Hydroxyzine Pregnancy And Lactation Text: This medication is not safe during pregnancy and should not be taken. It is also excreted in breast milk and breast feeding isn't recommended.
Rituxan Counseling:  I discussed with the patient the risks of Rituxan infusions. Side effects can include infusion reactions, severe drug rashes including mucocutaneous reactions, reactivation of latent hepatitis and other infections and rarely progressive multifocal leukoencephalopathy.  All of the patient's questions and concerns were addressed.
SSKI Counseling:  I discussed with the patient the risks of SSKI including but not limited to thyroid abnormalities, metallic taste, GI upset, fever, headache, acne, arthralgias, paraesthesias, lymphadenopathy, easy bleeding, arrhythmias, and allergic reaction.
Cimzia Counseling:  I discussed with the patient the risks of Cimzia including but not limited to immunosuppression, allergic reactions and infections.  The patient understands that monitoring is required including a PPD at baseline and must alert us or the primary physician if symptoms of infection or other concerning signs are noted.
Zyclara Counseling:  I discussed with the patient the risks of imiquimod including but not limited to erythema, scaling, itching, weeping, crusting, and pain.  Patient understands that the inflammatory response to imiquimod is variable from person to person and was educated regarded proper titration schedule.  If flu-like symptoms develop, patient knows to discontinue the medication and contact us.
Benzoyl Peroxide Pregnancy And Lactation Text: This medication is Pregnancy Category C. It is unknown if benzoyl peroxide is excreted in breast milk.
Imiquimod Counseling:  I discussed with the patient the risks of imiquimod including but not limited to erythema, scaling, itching, weeping, crusting, and pain.  Patient understands that the inflammatory response to imiquimod is variable from person to person and was educated regarded proper titration schedule.  If flu-like symptoms develop, patient knows to discontinue the medication and contact us.
Griseofulvin Pregnancy And Lactation Text: This medication is Pregnancy Category X and is known to cause serious birth defects. It is unknown if this medication is excreted in breast milk but breast feeding should be avoided.
Bactrim Pregnancy And Lactation Text: This medication is Pregnancy Category D and is known to cause fetal risk.  It is also excreted in breast milk.
Include Pregnancy/Lactation Warning?: No
Drysol Counseling:  I discussed with the patient the risks of drysol/aluminum chloride including but not limited to skin rash, itching, irritation, burning.
Metronidazole Pregnancy And Lactation Text: This medication is Pregnancy Category B and considered safe during pregnancy.  It is also excreted in breast milk.
5-Fu Counseling: 5-Fluorouracil Counseling:  I discussed with the patient the risks of 5-fluorouracil including but not limited to erythema, scaling, itching, weeping, crusting, and pain.
Acitretin Pregnancy And Lactation Text: This medication is Pregnancy Category X and should not be given to women who are pregnant or may become pregnant in the future. This medication is excreted in breast milk.
Hydroxychloroquine Counseling:  I discussed with the patient that a baseline ophthalmologic exam is needed at the start of therapy and every year thereafter while on therapy. A CBC may also be warranted for monitoring.  The side effects of this medication were discussed with the patient, including but not limited to agranulocytosis, aplastic anemia, seizures, rashes, retinopathy, and liver toxicity. Patient instructed to call the office should any adverse effect occur.  The patient verbalized understanding of the proper use and possible adverse effects of Plaquenil.  All the patient's questions and concerns were addressed.
Doxepin Pregnancy And Lactation Text: This medication is Pregnancy Category C and it isn't known if it is safe during pregnancy. It is also excreted in breast milk and breast feeding isn't recommended.
Glycopyrrolate Counseling:  I discussed with the patient the risks of glycopyrrolate including but not limited to skin rash, drowsiness, dry mouth, difficulty urinating, and blurred vision.
Azithromycin Counseling:  I discussed with the patient the risks of azithromycin including but not limited to GI upset, allergic reaction, drug rash, diarrhea, and yeast infections.
Doxycycline Pregnancy And Lactation Text: This medication is Pregnancy Category D and not consider safe during pregnancy. It is also excreted in breast milk but is considered safe for shorter treatment courses.
Terbinafine Counseling: Patient counseling regarding adverse effects of terbinafine including but not limited to headache, diarrhea, rash, upset stomach, liver function test abnormalities, itching, taste/smell disturbance, nausea, abdominal pain, and flatulence.  There is a rare possibility of liver failure that can occur when taking terbinafine.  The patient understands that a baseline LFT and kidney function test may be required. The patient verbalized understanding of the proper use and possible adverse effects of terbinafine.  All of the patient's questions and concerns were addressed.
Prednisone Counseling:  I discussed with the patient the risks of prolonged use of prednisone including but not limited to weight gain, insomnia, osteoporosis, mood changes, diabetes, susceptibility to infection, glaucoma and high blood pressure.  In cases where prednisone use is prolonged, patients should be monitored with blood pressure checks, serum glucose levels and an eye exam.  Additionally, the patient may need to be placed on GI prophylaxis, PCP prophylaxis, and calcium and vitamin D supplementation and/or a bisphosphonate.  The patient verbalized understanding of the proper use and the possible adverse effects of prednisone.  All of the patient's questions and concerns were addressed.
Isotretinoin Pregnancy And Lactation Text: This medication is Pregnancy Category X and is considered extremely dangerous during pregnancy. It is unknown if it is excreted in breast milk.
Azithromycin Pregnancy And Lactation Text: This medication is considered safe during pregnancy and is also secreted in breast milk.
Glycopyrrolate Pregnancy And Lactation Text: This medication is Pregnancy Category B and is considered safe during pregnancy. It is unknown if it is excreted breast milk.
Erivedge Counseling- I discussed with the patient the risks of Erivedge including but not limited to nausea, vomiting, diarrhea, constipation, weight loss, changes in the sense of taste, decreased appetite, muscle spasms, and hair loss.  The patient verbalized understanding of the proper use and possible adverse effects of Erivedge.  All of the patient's questions and concerns were addressed.
Arava Counseling:  Patient counseled regarding adverse effects of Arava including but not limited to nausea, vomiting, abnormalities in liver function tests. Patients may develop mouth sores, rash, diarrhea, and abnormalities in blood counts. The patient understands that monitoring is required including LFTs and blood counts.  There is a rare possibility of scarring of the liver and lung problems that can occur when taking methotrexate. Persistent nausea, loss of appetite, pale stools, dark urine, cough, and shortness of breath should be reported immediately. Patient advised to discontinue Arava treatment and consult with a physician prior to attempting conception. The patient will have to undergo a treatment to eliminate Arava from the body prior to conception.
Clindamycin Pregnancy And Lactation Text: This medication can be used in pregnancy if certain situations. Clindamycin is also present in breast milk.
Hydroxyzine Counseling: Patient advised that the medication is sedating and not to drive a car after taking this medication.  Patient informed of potential adverse effects including but not limited to dry mouth, urinary retention, and blurry vision.  The patient verbalized understanding of the proper use and possible adverse effects of hydroxyzine.  All of the patient's questions and concerns were addressed.
Benzoyl Peroxide Counseling: Patient counseled that medicine may cause skin irritation and bleach clothing.  In the event of skin irritation, the patient was advised to reduce the amount of the drug applied or use it less frequently.   The patient verbalized understanding of the proper use and possible adverse effects of benzoyl peroxide.  All of the patient's questions and concerns were addressed.
Methotrexate Pregnancy And Lactation Text: This medication is Pregnancy Category X and is known to cause fetal harm. This medication is excreted in breast milk.
Simponi Counseling:  I discussed with the patient the risks of golimumab including but not limited to myelosuppression, immunosuppression, autoimmune hepatitis, demyelinating diseases, lymphoma, and serious infections.  The patient understands that monitoring is required including a PPD at baseline and must alert us or the primary physician if symptoms of infection or other concerning signs are noted.
Methotrexate Counseling:  Patient counseled regarding adverse effects of methotrexate including but not limited to nausea, vomiting, abnormalities in liver function tests. Patients may develop mouth sores, rash, diarrhea, and abnormalities in blood counts. The patient understands that monitoring is required including LFT's and blood counts.  There is a rare possibility of scarring of the liver and lung problems that can occur when taking methotrexate. Persistent nausea, loss of appetite, pale stools, dark urine, cough, and shortness of breath should be reported immediately. Patient advised to discontinue methotrexate treatment at least three months before attempting to become pregnant.  I discussed the need for folate supplements while taking methotrexate.  These supplements can decrease side effects during methotrexate treatment. The patient verbalized understanding of the proper use and possible adverse effects of methotrexate.  All of the patient's questions and concerns were addressed.
Gabapentin Counseling: I discussed with the patient the risks of gabapentin including but not limited to dizziness, somnolence, fatigue and ataxia.
Eucrisa Pregnancy And Lactation Text: This medication has not been assigned a Pregnancy Risk Category but animal studies failed to show danger with the topical medication. It is unknown if the medication is excreted in breast milk.
Oxybutynin Counseling:  I discussed with the patient the risks of oxybutynin including but not limited to skin rash, drowsiness, dry mouth, difficulty urinating, and blurred vision.
Spironolactone Counseling: Patient advised regarding risks of diarrhea, abdominal pain, hyperkalemia, birth defects (for female patients), liver toxicity and renal toxicity. The patient may need blood work to monitor liver and kidney function and potassium levels while on therapy. The patient verbalized understanding of the proper use and possible adverse effects of spironolactone.  All of the patient's questions and concerns were addressed.
Birth Control Pills Pregnancy And Lactation Text: This medication should be avoided if pregnant and for the first 30 days post-partum.
Protopic Counseling: Patient may experience a mild burning sensation during topical application. Protopic is not approved in children less than 2 years of age. There have been case reports of hematologic and skin malignancies in patients using topical calcineurin inhibitors although causality is questionable.
Metronidazole Counseling:  I discussed with the patient the risks of metronidazole including but not limited to seizures, nausea/vomiting, a metallic taste in the mouth, nausea/vomiting and severe allergy.
Dapsone Counseling: I discussed with the patient the risks of dapsone including but not limited to hemolytic anemia, agranulocytosis, rashes, methemoglobinemia, kidney failure, peripheral neuropathy, headaches, GI upset, and liver toxicity.  Patients who start dapsone require monitoring including baseline LFTs and weekly CBCs for the first month, then every month thereafter.  The patient verbalized understanding of the proper use and possible adverse effects of dapsone.  All of the patient's questions and concerns were addressed.
Griseofulvin Counseling:  I discussed with the patient the risks of griseofulvin including but not limited to photosensitivity, cytopenia, liver damage, nausea/vomiting and severe allergy.  The patient understands that this medication is best absorbed when taken with a fatty meal (e.g., ice cream or french fries).
Humira Counseling:  I discussed with the patient the risks of adalimumab including but not limited to myelosuppression, immunosuppression, autoimmune hepatitis, demyelinating diseases, lymphoma, and serious infections.  The patient understands that monitoring is required including a PPD at baseline and must alert us or the primary physician if symptoms of infection or other concerning signs are noted.
Colchicine Counseling:  Patient counseled regarding adverse effects including but not limited to stomach upset (nausea, vomiting, stomach pain, or diarrhea).  Patient instructed to limit alcohol consumption while taking this medication.  Colchicine may reduce blood counts especially with prolonged use.  The patient understands that monitoring of kidney function and blood counts may be required, especially at baseline. The patient verbalized understanding of the proper use and possible adverse effects of colchicine.  All of the patient's questions and concerns were addressed.
Carac Counseling:  I discussed with the patient the risks of Carac including but not limited to erythema, scaling, itching, weeping, crusting, and pain.
Acitretin Counseling:  I discussed with the patient the risks of acitretin including but not limited to hair loss, dry lips/skin/eyes, liver damage, hyperlipidemia, depression/suicidal ideation, photosensitivity.  Serious rare side effects can include but are not limited to pancreatitis, pseudotumor cerebri, bony changes, clot formation/stroke/heart attack.  Patient understands that alcohol is contraindicated since it can result in liver toxicity and significantly prolong the elimination of the drug by many years.
Doxycycline Counseling:  Patient counseled regarding possible photosensitivity and increased risk for sunburn.  Patient instructed to avoid sunlight, if possible.  When exposed to sunlight, patients should wear protective clothing, sunglasses, and sunscreen.  The patient was instructed to call the office immediately if the following severe adverse effects occur:  hearing changes, easy bruising/bleeding, severe headache, or vision changes.  The patient verbalized understanding of the proper use and possible adverse effects of doxycycline.  All of the patient's questions and concerns were addressed.
Cyclophosphamide Pregnancy And Lactation Text: This medication is Pregnancy Category D and it isn't considered safe during pregnancy. This medication is excreted in breast milk.
Bexarotene Counseling:  I discussed with the patient the risks of bexarotene including but not limited to hair loss, dry lips/skin/eyes, liver abnormalities, hyperlipidemia, pancreatitis, depression/suicidal ideation, photosensitivity, drug rash/allergic reactions, hypothyroidism, anemia, leukopenia, infection, cataracts, and teratogenicity.  Patient understands that they will need regular blood tests to check lipid profile, liver function tests, white blood cell count, thyroid function tests and pregnancy test if applicable.
Solaraze Counseling:  I discussed with the patient the risks of Solaraze including but not limited to erythema, scaling, itching, weeping, crusting, and pain.
Tazorac Pregnancy And Lactation Text: This medication is not safe during pregnancy. It is unknown if this medication is excreted in breast milk.
Cephalexin Counseling: I counseled the patient regarding use of cephalexin as an antibiotic for prophylactic and/or therapeutic purposes. Cephalexin (commonly prescribed under brand name Keflex) is a cephalosporin antibiotic which is active against numerous classes of bacteria, including most skin bacteria. Side effects may include nausea, diarrhea, gastrointestinal upset, rash, hives, yeast infections, and in rare cases, hepatitis, kidney disease, seizures, fever, confusion, neurologic symptoms, and others. Patients with severe allergies to penicillin medications are cautioned that there is about a 10% incidence of cross-reactivity with cephalosporins. When possible, patients with penicillin allergies should use alternatives to cephalosporins for antibiotic therapy.
Xeljasonz Pregnancy And Lactation Text: This medication is Pregnancy Category D and is not considered safe during pregnancy.  The risk during breast feeding is also uncertain.
Quinolones Counseling:  I discussed with the patient the risks of fluoroquinolones including but not limited to GI upset, allergic reaction, drug rash, diarrhea, dizziness, photosensitivity, yeast infections, liver function test abnormalities, tendonitis/tendon rupture.
Tazorac Counseling:  Patient advised that medication is irritating and drying.  Patient may need to apply sparingly and wash off after an hour before eventually leaving it on overnight.  The patient verbalized understanding of the proper use and possible adverse effects of tazorac.  All of the patient's questions and concerns were addressed.
Erythromycin Counseling:  I discussed with the patient the risks of erythromycin including but not limited to GI upset, allergic reaction, drug rash, diarrhea, increase in liver enzymes, and yeast infections.

## 2018-11-12 NOTE — PROCEDURE: TREATMENT REGIMEN
Plan: If at any point she starts to get worse, if she has any pain at the site, or if she experiences any aching or pain, she will let us know and we can look into changing medications. \\n\\nWe discussed although not necessarily better yet, she is stable today and we will continue to keep an eye and watch for any improvements. It could be to soon to see improvements from the Humira yet as it has only been 4 weeks total on treatment. \\n\\nShe is having lab work done soon and will have those faxed over to us.\\n\\nFollow up in 3 months
Action 1: Continue
Detail Level: Simple
Continue Regimen: Humira- inject every 2 weeks\\n\\nUVB phototherapy

## 2018-11-12 NOTE — PROCEDURE: EXCIMER LASER
Fluence #1 (J/Cm2 Or Mj/Cm2): 7167
Treatment Number: 21
Mode: repeat paint
Device Serial Number (Optional): [id number]
Post-Care Instructions: I reviewed with the patient in detail post-care instructions. Patient should stay away from the sun and wear sun protection until treated areas are fully healed.
Consent: Written consent obtained, risks reviewed including but not limited to crusting, scabbing, blistering, scarring, darker or lighter pigmentary change, incidental hair removal, bruising, and/or incomplete removal.
Total Pulses (Optional): 167
Comments: Patient states that she experienced mild sunburn sensation on the posterior scalp 12-24 hours after her last treatment. \\n\\n***We maintained the dose setting today; due to Good Effects.
Detail Level: Zone
Spot Size: 2 x 2 cm
Location #1: posterior scalp
Total Square Area In Cm2 (Required For Proper Billing- Whole Numbers Only Please): 68
Fluence Units: J/cm2

## 2018-11-12 NOTE — PROCEDURE: HIGH RISK MEDICATION MONITORING
Glycopyrrolate Pregnancy And Lactation Text: This medication is Pregnancy Category B and is considered safe during pregnancy. It is unknown if it is excreted breast milk.
Minocycline Counseling: Patient advised regarding possible photosensitivity and discoloration of the teeth, skin, lips, tongue and gums.  Patient instructed to avoid sunlight, if possible.  When exposed to sunlight, patients should wear protective clothing, sunglasses, and sunscreen.  The patient was instructed to call the office immediately if the following severe adverse effects occur:  hearing changes, easy bruising/bleeding, severe headache, or vision changes.  The patient verbalized understanding of the proper use and possible adverse effects of minocycline.  All of the patient's questions and concerns were addressed.
Eucrisa Counseling: Patient may experience a mild burning sensation during topical application. Eucrisa is not approved in children less than 2 years of age.
Thalidomide Counseling: I discussed with the patient the risks of thalidomide including but not limited to birth defects, anxiety, weakness, chest pain, dizziness, cough and severe allergy.
Solaraze Pregnancy And Lactation Text: This medication is Pregnancy Category B and is considered safe. There is some data to suggest avoiding during the third trimester. It is unknown if this medication is excreted in breast milk.
Sski Pregnancy And Lactation Text: This medication is Pregnancy Category D and isn't considered safe during pregnancy. It is excreted in breast milk.
Tazorac Counseling:  Patient advised that medication is irritating and drying.  Patient may need to apply sparingly and wash off after an hour before eventually leaving it on overnight.  The patient verbalized understanding of the proper use and possible adverse effects of tazorac.  All of the patient's questions and concerns were addressed.
Ivermectin Pregnancy And Lactation Text: This medication is Pregnancy Category C and it isn't known if it is safe during pregnancy. It is also excreted in breast milk.
Eucrisa Pregnancy And Lactation Text: This medication has not been assigned a Pregnancy Risk Category but animal studies failed to show danger with the topical medication. It is unknown if the medication is excreted in breast milk.
Bexarotene Pregnancy And Lactation Text: This medication is Pregnancy Category X and should not be given to women who are pregnant or may become pregnant. This medication should not be used if you are breast feeding.
Picato Counseling:  I discussed with the patient the risks of Picato including but not limited to erythema, scaling, itching, weeping, crusting, and pain.
Ketoconazole Counseling:   Patient counseled regarding improving absorption with orange juice.  Adverse effects include but are not limited to breast enlargement, headache, diarrhea, nausea, upset stomach, liver function test abnormalities, taste disturbance, and stomach pain.  There is a rare possibility of liver failure that can occur when taking ketoconazole. The patient understands that monitoring of LFTs may be required, especially at baseline. The patient verbalized understanding of the proper use and possible adverse effects of ketoconazole.  All of the patient's questions and concerns were addressed.
Use Enhanced Medication Counseling?: No
Cyclosporine Pregnancy And Lactation Text: This medication is Pregnancy Category C and it isn't know if it is safe during pregnancy. This medication is excreted in breast milk.
Carac Counseling:  I discussed with the patient the risks of Carac including but not limited to erythema, scaling, itching, weeping, crusting, and pain.
High Dose Vitamin A Counseling: Side effects reviewed, pt to contact office should one occur.
Tremfya Pregnancy And Lactation Text: The risk during pregnancy and breastfeeding is uncertain with this medication.
Cimzia Counseling:  I discussed with the patient the risks of Cimzia including but not limited to immunosuppression, allergic reactions and infections.  The patient understands that monitoring is required including a PPD at baseline and must alert us or the primary physician if symptoms of infection or other concerning signs are noted.
Topical Clindamycin Pregnancy And Lactation Text: This medication is Pregnancy Category B and is considered safe during pregnancy. It is unknown if it is excreted in breast milk.
Valtrex Counseling: I discussed with the patient the risks of valacyclovir including but not limited to kidney damage, nausea, vomiting and severe allergy.  The patient understands that if the infection seems to be worsening or is not improving, they are to call.
Glycopyrrolate Counseling:  I discussed with the patient the risks of glycopyrrolate including but not limited to skin rash, drowsiness, dry mouth, difficulty urinating, and blurred vision.
Xeljanz Counseling: I discussed with the patient the risks of Xeljanz therapy including increased risk of infection, liver issues, headache, diarrhea, or cold symptoms. Live vaccines should be avoided. They were instructed to call if they have any problems.
Siliq Counseling:  I discussed with the patient the risks of Siliq including but not limited to new or worsening depression, suicidal thoughts and behavior, immunosuppression, malignancy, posterior leukoencephalopathy syndrome, and serious infections.  The patient understands that monitoring is required including a PPD at baseline and must alert us or the primary physician if symptoms of infection or other concerning signs are noted. There is also a special program designed to monitor depression which is required with Siliq.
Cyclophosphamide Counseling:  I discussed with the patient the risks of cyclophosphamide including but not limited to hair loss, hormonal abnormalities, decreased fertility, abdominal pain, diarrhea, nausea and vomiting, bone marrow suppression and infection. The patient understands that monitoring is required while taking this medication.
Drysol Pregnancy And Lactation Text: This medication is considered safe during pregnancy and breast feeding.
Imiquimod Counseling:  I discussed with the patient the risks of imiquimod including but not limited to erythema, scaling, itching, weeping, crusting, and pain.  Patient understands that the inflammatory response to imiquimod is variable from person to person and was educated regarded proper titration schedule.  If flu-like symptoms develop, patient knows to discontinue the medication and contact us.
Azithromycin Pregnancy And Lactation Text: This medication is considered safe during pregnancy and is also secreted in breast milk.
Fluconazole Counseling:  Patient counseled regarding adverse effects of fluconazole including but not limited to headache, diarrhea, nausea, upset stomach, liver function test abnormalities, taste disturbance, and stomach pain.  There is a rare possibility of liver failure that can occur when taking fluconazole.  The patient understands that monitoring of LFTs and kidney function test may be required, especially at baseline. The patient verbalized understanding of the proper use and possible adverse effects of fluconazole.  All of the patient's questions and concerns were addressed.
Itraconazole Counseling:  I discussed with the patient the risks of itraconazole including but not limited to liver damage, nausea/vomiting, neuropathy, and severe allergy.  The patient understands that this medication is best absorbed when taken with acidic beverages such as non-diet cola or ginger ale.  The patient understands that monitoring is required including baseline LFTs and repeat LFTs at intervals.  The patient understands that they are to contact us or the primary physician if concerning signs are noted.
Solaraze Counseling:  I discussed with the patient the risks of Solaraze including but not limited to erythema, scaling, itching, weeping, crusting, and pain.
Humira Pregnancy And Lactation Text: This medication is Pregnancy Category B and is considered safe during pregnancy. It is unknown if this medication is excreted in breast milk.
5-Fu Pregnancy And Lactation Text: This medication is Pregnancy Category X and contraindicated in pregnancy and in women who may become pregnant. It is unknown if this medication is excreted in breast milk.
Clindamycin Counseling: I counseled the patient regarding use of clindamycin as an antibiotic for prophylactic and/or therapeutic purposes. Clindamycin is active against numerous classes of bacteria, including skin bacteria. Side effects may include nausea, diarrhea, gastrointestinal upset, rash, hives, yeast infections, and in rare cases, colitis.
Isotretinoin Counseling: Patient should get monthly blood tests, not donate blood, not drive at night if vision affected, not share medication, and not undergo elective surgery for 6 months after tx completed. Side effects reviewed, pt to contact office should one occur.
Hydroxyzine Counseling: Patient advised that the medication is sedating and not to drive a car after taking this medication.  Patient informed of potential adverse effects including but not limited to dry mouth, urinary retention, and blurry vision.  The patient verbalized understanding of the proper use and possible adverse effects of hydroxyzine.  All of the patient's questions and concerns were addressed.
Gabapentin Counseling: I discussed with the patient the risks of gabapentin including but not limited to dizziness, somnolence, fatigue and ataxia.
Taltz Counseling: I discussed with the patient the risks of ixekizumab including but not limited to immunosuppression, serious infections, worsening of inflammatory bowel disease and drug reactions.  The patient understands that monitoring is required including a PPD at baseline and must alert us or the primary physician if symptoms of infection or other concerning signs are noted.
Topical Clindamycin Counseling: Patient counseled that this medication may cause skin irritation or allergic reactions.  In the event of skin irritation, the patient was advised to reduce the amount of the drug applied or use it less frequently.   The patient verbalized understanding of the proper use and possible adverse effects of clindamycin.  All of the patient's questions and concerns were addressed.
Terbinafine Pregnancy And Lactation Text: This medication is Pregnancy Category B and is considered safe during pregnancy. It is also excreted in breast milk and breast feeding isn't recommended.
Topical Sulfur Applications Pregnancy And Lactation Text: This medication is Pregnancy Category C and has an unknown safety profile during pregnancy. It is unknown if this topical medication is excreted in breast milk.
Spironolactone Pregnancy And Lactation Text: This medication can cause feminization of the male fetus and should be avoided during pregnancy. The active metabolite is also found in breast milk.
Bactrim Counseling:  I discussed with the patient the risks of sulfa antibiotics including but not limited to GI upset, allergic reaction, drug rash, diarrhea, dizziness, photosensitivity, and yeast infections.  Rarely, more serious reactions can occur including but not limited to aplastic anemia, agranulocytosis, methemoglobinemia, blood dyscrasias, liver or kidney failure, lung infiltrates or desquamative/blistering drug rashes.
Bactrim Pregnancy And Lactation Text: This medication is Pregnancy Category D and is known to cause fetal risk.  It is also excreted in breast milk.
Cephalexin Counseling: I counseled the patient regarding use of cephalexin as an antibiotic for prophylactic and/or therapeutic purposes. Cephalexin (commonly prescribed under brand name Keflex) is a cephalosporin antibiotic which is active against numerous classes of bacteria, including most skin bacteria. Side effects may include nausea, diarrhea, gastrointestinal upset, rash, hives, yeast infections, and in rare cases, hepatitis, kidney disease, seizures, fever, confusion, neurologic symptoms, and others. Patients with severe allergies to penicillin medications are cautioned that there is about a 10% incidence of cross-reactivity with cephalosporins. When possible, patients with penicillin allergies should use alternatives to cephalosporins for antibiotic therapy.
Erivedge Counseling- I discussed with the patient the risks of Erivedge including but not limited to nausea, vomiting, diarrhea, constipation, weight loss, changes in the sense of taste, decreased appetite, muscle spasms, and hair loss.  The patient verbalized understanding of the proper use and possible adverse effects of Erivedge.  All of the patient's questions and concerns were addressed.
Azathioprine Counseling:  I discussed with the patient the risks of azathioprine including but not limited to myelosuppression, immunosuppression, hepatotoxicity, lymphoma, and infections.  The patient understands that monitoring is required including baseline LFTs, Creatinine, possible TPMP genotyping and weekly CBCs for the first month and then every 2 weeks thereafter.  The patient verbalized understanding of the proper use and possible adverse effects of azathioprine.  All of the patient's questions and concerns were addressed.
Azathioprine Pregnancy And Lactation Text: This medication is Pregnancy Category D and isn't considered safe during pregnancy. It is unknown if this medication is excreted in breast milk.
Fluconazole Pregnancy And Lactation Text: This medication is Pregnancy Category C and it isn't know if it is safe during pregnancy. It is also excreted in breast milk.
High Dose Vitamin A Pregnancy And Lactation Text: High dose vitamin A therapy is contraindicated during pregnancy and breast feeding.
Dupixent Pregnancy And Lactation Text: This medication likely crosses the placenta but the risk for the fetus is uncertain. This medication is excreted in breast milk.
Oxybutynin Counseling:  I discussed with the patient the risks of oxybutynin including but not limited to skin rash, drowsiness, dry mouth, difficulty urinating, and blurred vision.
Dapsone Counseling: I discussed with the patient the risks of dapsone including but not limited to hemolytic anemia, agranulocytosis, rashes, methemoglobinemia, kidney failure, peripheral neuropathy, headaches, GI upset, and liver toxicity.  Patients who start dapsone require monitoring including baseline LFTs and weekly CBCs for the first month, then every month thereafter.  The patient verbalized understanding of the proper use and possible adverse effects of dapsone.  All of the patient's questions and concerns were addressed.
Hydroxyzine Pregnancy And Lactation Text: This medication is not safe during pregnancy and should not be taken. It is also excreted in breast milk and breast feeding isn't recommended.
Cimzia Pregnancy And Lactation Text: This medication crosses the placenta but can be considered safe in certain situations. Cimzia may be excreted in breast milk.
Cellcept Counseling:  I discussed with the patient the risks of mycophenolate mofetil including but not limited to infection/immunosuppression, GI upset, hypokalemia, hypercholesterolemia, bone marrow suppression, lymphoproliferative disorders, malignancy, GI ulceration/bleed/perforation, colitis, interstitial lung disease, kidney failure, progressive multifocal leukoencephalopathy, and birth defects.  The patient understands that monitoring is required including a baseline creatinine and regular CBC testing. In addition, patient must alert us immediately if symptoms of infection or other concerning signs are noted.
Azithromycin Counseling:  I discussed with the patient the risks of azithromycin including but not limited to GI upset, allergic reaction, drug rash, diarrhea, and yeast infections.
Cyclosporine Counseling:  I discussed with the patient the risks of cyclosporine including but not limited to hypertension, gingival hyperplasia,myelosuppression, immunosuppression, liver damage, kidney damage, neurotoxicity, lymphoma, and serious infections. The patient understands that monitoring is required including baseline blood pressure, CBC, CMP, lipid panel and uric acid, and then 1-2 times monthly CMP and blood pressure.
Erythromycin Pregnancy And Lactation Text: This medication is Pregnancy Category B and is considered safe during pregnancy. It is also excreted in breast milk.
Zyclara Counseling:  I discussed with the patient the risks of imiquimod including but not limited to erythema, scaling, itching, weeping, crusting, and pain.  Patient understands that the inflammatory response to imiquimod is variable from person to person and was educated regarded proper titration schedule.  If flu-like symptoms develop, patient knows to discontinue the medication and contact us.
Hydroquinone Counseling:  Patient advised that medication may result in skin irritation, lightening (hypopigmentation), dryness, and burning.  In the event of skin irritation, the patient was advised to reduce the amount of the drug applied or use it less frequently.  Rarely, spots that are treated with hydroquinone can become darker (pseudoochronosis).  Should this occur, patient instructed to stop medication and call the office. The patient verbalized understanding of the proper use and possible adverse effects of hydroquinone.  All of the patient's questions and concerns were addressed.
Drysol Counseling:  I discussed with the patient the risks of drysol/aluminum chloride including but not limited to skin rash, itching, irritation, burning.
Doxycycline Counseling:  Patient counseled regarding possible photosensitivity and increased risk for sunburn.  Patient instructed to avoid sunlight, if possible.  When exposed to sunlight, patients should wear protective clothing, sunglasses, and sunscreen.  The patient was instructed to call the office immediately if the following severe adverse effects occur:  hearing changes, easy bruising/bleeding, severe headache, or vision changes.  The patient verbalized understanding of the proper use and possible adverse effects of doxycycline.  All of the patient's questions and concerns were addressed.
Albendazole Counseling:  I discussed with the patient the risks of albendazole including but not limited to cytopenia, kidney damage, nausea/vomiting and severe allergy.  The patient understands that this medication is being used in an off-label manner.
Prednisone Counseling:  I discussed with the patient the risks of prolonged use of prednisone including but not limited to weight gain, insomnia, osteoporosis, mood changes, diabetes, susceptibility to infection, glaucoma and high blood pressure.  In cases where prednisone use is prolonged, patients should be monitored with blood pressure checks, serum glucose levels and an eye exam.  Additionally, the patient may need to be placed on GI prophylaxis, PCP prophylaxis, and calcium and vitamin D supplementation and/or a bisphosphonate.  The patient verbalized understanding of the proper use and the possible adverse effects of prednisone.  All of the patient's questions and concerns were addressed.
Clofazimine Counseling:  I discussed with the patient the risks of clofazimine including but not limited to skin and eye pigmentation, liver damage, nausea/vomiting, gastrointestinal bleeding and allergy.
Nsaids Counseling: NSAID Counseling: I discussed with the patient that NSAIDs should be taken with food. Prolonged use of NSAIDs can result in the development of stomach ulcers.  Patient advised to stop taking NSAIDs if abdominal pain occurs.  The patient verbalized understanding of the proper use and possible adverse effects of NSAIDs.  All of the patient's questions and concerns were addressed.
Griseofulvin Pregnancy And Lactation Text: This medication is Pregnancy Category X and is known to cause serious birth defects. It is unknown if this medication is excreted in breast milk but breast feeding should be avoided.
Tazorac Pregnancy And Lactation Text: This medication is not safe during pregnancy. It is unknown if this medication is excreted in breast milk.
Doxepin Counseling:  Patient advised that the medication is sedating and not to drive a car after taking this medication. Patient informed of potential adverse effects including but not limited to dry mouth, urinary retention, and blurry vision.  The patient verbalized understanding of the proper use and possible adverse effects of doxepin.  All of the patient's questions and concerns were addressed.
Odomzo Counseling- I discussed with the patient the risks of Odomzo including but not limited to nausea, vomiting, diarrhea, constipation, weight loss, changes in the sense of taste, decreased appetite, muscle spasms, and hair loss.  The patient verbalized understanding of the proper use and possible adverse effects of Odomzo.  All of the patient's questions and concerns were addressed.
Topical Retinoid Pregnancy And Lactation Text: This medication is Pregnancy Category C. It is unknown if this medication is excreted in breast milk.
5-Fu Counseling: 5-Fluorouracil Counseling:  I discussed with the patient the risks of 5-fluorouracil including but not limited to erythema, scaling, itching, weeping, crusting, and pain.
Doxycycline Pregnancy And Lactation Text: This medication is Pregnancy Category D and not consider safe during pregnancy. It is also excreted in breast milk but is considered safe for shorter treatment courses.
Protopic Pregnancy And Lactation Text: This medication is Pregnancy Category C. It is unknown if this medication is excreted in breast milk when applied topically.
Ivermectin Counseling:  Patient instructed to take medication on an empty stomach with a full glass of water.  Patient informed of potential adverse effects including but not limited to nausea, diarrhea, dizziness, itching, and swelling of the extremities or lymph nodes.  The patient verbalized understanding of the proper use and possible adverse effects of ivermectin.  All of the patient's questions and concerns were addressed.
Cyclophosphamide Pregnancy And Lactation Text: This medication is Pregnancy Category D and it isn't considered safe during pregnancy. This medication is excreted in breast milk.
Tetracycline Pregnancy And Lactation Text: This medication is Pregnancy Category D and not consider safe during pregnancy. It is also excreted in breast milk.
Otezla Pregnancy And Lactation Text: This medication is Pregnancy Category C and it isn't known if it is safe during pregnancy. It is unknown if it is excreted in breast milk.
Topical Retinoid counseling:  Patient advised to apply a pea-sized amount only at bedtime and wait 30 minutes after washing their face before applying.  If too drying, patient may add a non-comedogenic moisturizer. The patient verbalized understanding of the proper use and possible adverse effects of retinoids.  All of the patient's questions and concerns were addressed.
Erivedge Pregnancy And Lactation Text: This medication is Pregnancy Category X and is absolutely contraindicated during pregnancy. It is unknown if it is excreted in breast milk.
Metronidazole Counseling:  I discussed with the patient the risks of metronidazole including but not limited to seizures, nausea/vomiting, a metallic taste in the mouth, nausea/vomiting and severe allergy.
Xelalessandraz Pregnancy And Lactation Text: This medication is Pregnancy Category D and is not considered safe during pregnancy.  The risk during breast feeding is also uncertain.
Rituxan Pregnancy And Lactation Text: This medication is Pregnancy Category C and it isn't know if it is safe during pregnancy. It is unknown if this medication is excreted in breast milk but similar antibodies are known to be excreted.
Arava Counseling:  Patient counseled regarding adverse effects of Arava including but not limited to nausea, vomiting, abnormalities in liver function tests. Patients may develop mouth sores, rash, diarrhea, and abnormalities in blood counts. The patient understands that monitoring is required including LFTs and blood counts.  There is a rare possibility of scarring of the liver and lung problems that can occur when taking methotrexate. Persistent nausea, loss of appetite, pale stools, dark urine, cough, and shortness of breath should be reported immediately. Patient advised to discontinue Arava treatment and consult with a physician prior to attempting conception. The patient will have to undergo a treatment to eliminate Arava from the body prior to conception.
Simponi Counseling:  I discussed with the patient the risks of golimumab including but not limited to myelosuppression, immunosuppression, autoimmune hepatitis, demyelinating diseases, lymphoma, and serious infections.  The patient understands that monitoring is required including a PPD at baseline and must alert us or the primary physician if symptoms of infection or other concerning signs are noted.
Gabapentin Pregnancy And Lactation Text: This medication is Pregnancy Category C and isn't considered safe during pregnancy. It is excreted in breast milk.
Hydroxychloroquine Pregnancy And Lactation Text: This medication has been shown to cause fetal harm but it isn't assigned a Pregnancy Risk Category. There are small amounts excreted in breast milk.
Griseofulvin Counseling:  I discussed with the patient the risks of griseofulvin including but not limited to photosensitivity, cytopenia, liver damage, nausea/vomiting and severe allergy.  The patient understands that this medication is best absorbed when taken with a fatty meal (e.g., ice cream or french fries).
Acitretin Pregnancy And Lactation Text: This medication is Pregnancy Category X and should not be given to women who are pregnant or may become pregnant in the future. This medication is excreted in breast milk.
Benzoyl Peroxide Counseling: Patient counseled that medicine may cause skin irritation and bleach clothing.  In the event of skin irritation, the patient was advised to reduce the amount of the drug applied or use it less frequently.   The patient verbalized understanding of the proper use and possible adverse effects of benzoyl peroxide.  All of the patient's questions and concerns were addressed.
Spironolactone Counseling: Patient advised regarding risks of diarrhea, abdominal pain, hyperkalemia, birth defects (for female patients), liver toxicity and renal toxicity. The patient may need blood work to monitor liver and kidney function and potassium levels while on therapy. The patient verbalized understanding of the proper use and possible adverse effects of spironolactone.  All of the patient's questions and concerns were addressed.
Rituxan Counseling:  I discussed with the patient the risks of Rituxan infusions. Side effects can include infusion reactions, severe drug rashes including mucocutaneous reactions, reactivation of latent hepatitis and other infections and rarely progressive multifocal leukoencephalopathy.  All of the patient's questions and concerns were addressed.
Rifampin Pregnancy And Lactation Text: This medication is Pregnancy Category C and it isn't know if it is safe during pregnancy. It is also excreted in breast milk and should not be used if you are breast feeding.
Cimetidine Counseling:  I discussed with the patient the risks of Cimetidine including but not limited to gynecomastia, headache, diarrhea, nausea, drowsiness, arrhythmias, pancreatitis, skin rashes, psychosis, bone marrow suppression and kidney toxicity.
Metronidazole Pregnancy And Lactation Text: This medication is Pregnancy Category B and considered safe during pregnancy.  It is also excreted in breast milk.
Xolair Counseling:  Patient informed of potential adverse effects including but not limited to fever, muscle aches, rash and allergic reactions.  The patient verbalized understanding of the proper use and possible adverse effects of Xolair.  All of the patient's questions and concerns were addressed.
Cephalexin Pregnancy And Lactation Text: This medication is Pregnancy Category B and considered safe during pregnancy.  It is also excreted in breast milk but can be used safely for shorter doses.
Dupixent Counseling: I discussed with the patient the risks of dupilumab including but not limited to eye infection and irritation, cold sores, injection site reactions, worsening of asthma, allergic reactions and increased risk of parasitic infection.  Live vaccines should be avoided while taking dupilumab. Dupilumab will also interact with certain medications such as warfarin and cyclosporine. The patient understands that monitoring is required and they must alert us or the primary physician if symptoms of infection or other concerning signs are noted.
Tetracycline Counseling: Patient counseled regarding possible photosensitivity and increased risk for sunburn.  Patient instructed to avoid sunlight, if possible.  When exposed to sunlight, patients should wear protective clothing, sunglasses, and sunscreen.  The patient was instructed to call the office immediately if the following severe adverse effects occur:  hearing changes, easy bruising/bleeding, severe headache, or vision changes.  The patient verbalized understanding of the proper use and possible adverse effects of tetracycline.  All of the patient's questions and concerns were addressed. Patient understands to avoid pregnancy while on therapy due to potential birth defects.
Valtrex Pregnancy And Lactation Text: this medication is Pregnancy Category B and is considered safe during pregnancy. This medication is not directly found in breast milk but it's metabolite acyclovir is present.
Protopic Counseling: Patient may experience a mild burning sensation during topical application. Protopic is not approved in children less than 2 years of age. There have been case reports of hematologic and skin malignancies in patients using topical calcineurin inhibitors although causality is questionable.
Colchicine Counseling:  Patient counseled regarding adverse effects including but not limited to stomach upset (nausea, vomiting, stomach pain, or diarrhea).  Patient instructed to limit alcohol consumption while taking this medication.  Colchicine may reduce blood counts especially with prolonged use.  The patient understands that monitoring of kidney function and blood counts may be required, especially at baseline. The patient verbalized understanding of the proper use and possible adverse effects of colchicine.  All of the patient's questions and concerns were addressed.
Rifampin Counseling: I discussed with the patient the risks of rifampin including but not limited to liver damage, kidney damage, red-orange body fluids, nausea/vomiting and severe allergy.
Otezla Counseling: The side effects of Otezla were discussed with the patient, including but not limited to worsening or new depression, weight loss, diarrhea, nausea, upper respiratory tract infection, and headache. Patient instructed to call the office should any adverse effect occur.  The patient verbalized understanding of the proper use and possible adverse effects of Otezla.  All the patient's questions and concerns were addressed.
Doxepin Pregnancy And Lactation Text: This medication is Pregnancy Category C and it isn't known if it is safe during pregnancy. It is also excreted in breast milk and breast feeding isn't recommended.
Nsaids Pregnancy And Lactation Text: These medications are considered safe up to 30 weeks gestation. It is excreted in breast milk.
Ketoconazole Pregnancy And Lactation Text: This medication is Pregnancy Category C and it isn't know if it is safe during pregnancy. It is also excreted in breast milk and breast feeding isn't recommended.
Acitretin Counseling:  I discussed with the patient the risks of acitretin including but not limited to hair loss, dry lips/skin/eyes, liver damage, hyperlipidemia, depression/suicidal ideation, photosensitivity.  Serious rare side effects can include but are not limited to pancreatitis, pseudotumor cerebri, bony changes, clot formation/stroke/heart attack.  Patient understands that alcohol is contraindicated since it can result in liver toxicity and significantly prolong the elimination of the drug by many years.
Birth Control Pills Counseling: Birth Control Pill Counseling: I discussed with the patient the potential side effects of OCPs including but not limited to increased risk of stroke, heart attack, thrombophlebitis, deep venous thrombosis, hepatic adenomas, breast changes, GI upset, headaches, and depression.  The patient verbalized understanding of the proper use and possible adverse effects of OCPs. All of the patient's questions and concerns were addressed.
Benzoyl Peroxide Pregnancy And Lactation Text: This medication is Pregnancy Category C. It is unknown if benzoyl peroxide is excreted in breast milk.
Terbinafine Counseling: Patient counseling regarding adverse effects of terbinafine including but not limited to headache, diarrhea, rash, upset stomach, liver function test abnormalities, itching, taste/smell disturbance, nausea, abdominal pain, and flatulence.  There is a rare possibility of liver failure that can occur when taking terbinafine.  The patient understands that a baseline LFT and kidney function test may be required. The patient verbalized understanding of the proper use and possible adverse effects of terbinafine.  All of the patient's questions and concerns were addressed.
Infliximab Counseling:  I discussed with the patient the risks of infliximab including but not limited to myelosuppression, immunosuppression, autoimmune hepatitis, demyelinating diseases, lymphoma, and serious infections.  The patient understands that monitoring is required including a PPD at baseline and must alert us or the primary physician if symptoms of infection or other concerning signs are noted.
Enbrel Counseling:  I discussed with the patient the risks of etanercept including but not limited to myelosuppression, immunosuppression, autoimmune hepatitis, demyelinating diseases, lymphoma, and infections.  The patient understands that monitoring is required including a PPD at baseline and must alert us or the primary physician if symptoms of infection or other concerning signs are noted.
Xolair Pregnancy And Lactation Text: This medication is Pregnancy Category B and is considered safe during pregnancy. This medication is excreted in breast milk.
Bexarotene Counseling:  I discussed with the patient the risks of bexarotene including but not limited to hair loss, dry lips/skin/eyes, liver abnormalities, hyperlipidemia, pancreatitis, depression/suicidal ideation, photosensitivity, drug rash/allergic reactions, hypothyroidism, anemia, leukopenia, infection, cataracts, and teratogenicity.  Patient understands that they will need regular blood tests to check lipid profile, liver function tests, white blood cell count, thyroid function tests and pregnancy test if applicable.
Dapsone Pregnancy And Lactation Text: This medication is Pregnancy Category C and is not considered safe during pregnancy or breast feeding.
Topical Sulfur Applications Counseling: Topical Sulfur Counseling: Patient counseled that this medication may cause skin irritation or allergic reactions.  In the event of skin irritation, the patient was advised to reduce the amount of the drug applied or use it less frequently.   The patient verbalized understanding of the proper use and possible adverse effects of topical sulfur application.  All of the patient's questions and concerns were addressed.
Isotretinoin Pregnancy And Lactation Text: This medication is Pregnancy Category X and is considered extremely dangerous during pregnancy. It is unknown if it is excreted in breast milk.
Stelara Counseling:  I discussed with the patient the risks of ustekinumab including but not limited to immunosuppression, malignancy, posterior leukoencephalopathy syndrome, and serious infections.  The patient understands that monitoring is required including a PPD at baseline and must alert us or the primary physician if symptoms of infection or other concerning signs are noted.
Tremfya Counseling: I discussed with the patient the risks of guselkumab including but not limited to immunosuppression, serious infections, worsening of inflammatory bowel disease and drug reactions.  The patient understands that monitoring is required including a PPD at baseline and must alert us or the primary physician if symptoms of infection or other concerning signs are noted.
Elidel Counseling: Patient may experience a mild burning sensation during topical application. Elidel is not approved in children less than 2 years of age. There have been case reports of hematologic and skin malignancies in patients using topical calcineurin inhibitors although causality is questionable.
Methotrexate Counseling:  Patient counseled regarding adverse effects of methotrexate including but not limited to nausea, vomiting, abnormalities in liver function tests. Patients may develop mouth sores, rash, diarrhea, and abnormalities in blood counts. The patient understands that monitoring is required including LFT's and blood counts.  There is a rare possibility of scarring of the liver and lung problems that can occur when taking methotrexate. Persistent nausea, loss of appetite, pale stools, dark urine, cough, and shortness of breath should be reported immediately. Patient advised to discontinue methotrexate treatment at least three months before attempting to become pregnant.  I discussed the need for folate supplements while taking methotrexate.  These supplements can decrease side effects during methotrexate treatment. The patient verbalized understanding of the proper use and possible adverse effects of methotrexate.  All of the patient's questions and concerns were addressed.
Quinolones Counseling:  I discussed with the patient the risks of fluoroquinolones including but not limited to GI upset, allergic reaction, drug rash, diarrhea, dizziness, photosensitivity, yeast infections, liver function test abnormalities, tendonitis/tendon rupture.
Minoxidil Counseling: Minoxidil is a topical medication which can increase blood flow where it is applied. It is uncertain how this medication increases hair growth. Side effects are uncommon and include stinging and allergic reactions.
Clindamycin Pregnancy And Lactation Text: This medication can be used in pregnancy if certain situations. Clindamycin is also present in breast milk.
Detail Level: Zone
Birth Control Pills Pregnancy And Lactation Text: This medication should be avoided if pregnant and for the first 30 days post-partum.
Humira Counseling:  I discussed with the patient the risks of adalimumab including but not limited to myelosuppression, immunosuppression, autoimmune hepatitis, demyelinating diseases, lymphoma, and serious infections.  The patient understands that monitoring is required including a PPD at baseline and must alert us or the primary physician if symptoms of infection or other concerning signs are noted.
Erythromycin Counseling:  I discussed with the patient the risks of erythromycin including but not limited to GI upset, allergic reaction, drug rash, diarrhea, increase in liver enzymes, and yeast infections.
SSKI Counseling:  I discussed with the patient the risks of SSKI including but not limited to thyroid abnormalities, metallic taste, GI upset, fever, headache, acne, arthralgias, paraesthesias, lymphadenopathy, easy bleeding, arrhythmias, and allergic reaction.
Cosentyx Counseling:  I discussed with the patient the risks of Cosentyx including but not limited to worsening of Crohn's disease, immunosuppression, allergic reactions and infections.  The patient understands that monitoring is required including a PPD at baseline and must alert us or the primary physician if symptoms of infection or other concerning signs are noted.
Hydroxychloroquine Counseling:  I discussed with the patient that a baseline ophthalmologic exam is needed at the start of therapy and every year thereafter while on therapy. A CBC may also be warranted for monitoring.  The side effects of this medication were discussed with the patient, including but not limited to agranulocytosis, aplastic anemia, seizures, rashes, retinopathy, and liver toxicity. Patient instructed to call the office should any adverse effect occur.  The patient verbalized understanding of the proper use and possible adverse effects of Plaquenil.  All the patient's questions and concerns were addressed.
Methotrexate Pregnancy And Lactation Text: This medication is Pregnancy Category X and is known to cause fetal harm. This medication is excreted in breast milk.

## 2018-11-12 NOTE — HPI: MEDICATION (HUMIRA)
Is This A New Presentation, Or A Follow-Up?: Follow Up Humira
How Severe Is It?: mild
How Many Months Of Humira Have You Completed?: 1
Additional History: Patient had her starting dose of Humira on October 20th. She received her 4th injection this past Saturday. She is just using Dermasmoothe topically for scalp just as needed. \\n\\nShe states she has not seen much improvement from the Humira just yet.

## 2018-11-12 NOTE — PROCEDURE: TREATMENT REGIMEN
Plan: If at any point she starts to get worse, if she has any pain at the site, or if she experiences any aching or pain, she will let us know and we can look into changing medications. \\n\\nWe discussed although not necessarily better yet, she is stable today and we will continue to keep an eye and watch for any improvements. It could be to soon to see improvements from the Humira yet as it has only been 4 weeks total on treatment. \\n\\nShe is having lab work done soon and will have those faxed over to us.\\n\\nFollow up in 3 months
Action 2: Continue
Detail Level: Simple
Continue Regimen: Humira- inject every 2 weeks\\n\\nUVB phototherapy

## 2018-11-12 NOTE — PROCEDURE: EXCIMER LASER
Fluence Units: J/cm2
Total Pulses (Optional): 167
Post-Care Instructions: I reviewed with the patient in detail post-care instructions. Patient should stay away from the sun and wear sun protection until treated areas are fully healed.
Fluence #1 (J/Cm2 Or Mj/Cm2): 9707
Device Serial Number (Optional): [id number]
Comments: Patient states that she experienced mild sunburn sensation on the posterior scalp 12-24 hours after her last treatment. \\n\\n***We maintained the dose setting today; due to Good Effects.
Consent: Written consent obtained, risks reviewed including but not limited to crusting, scabbing, blistering, scarring, darker or lighter pigmentary change, incidental hair removal, bruising, and/or incomplete removal.
Location #1: posterior scalp
Treatment Number: 21
Detail Level: Zone
Spot Size: 2 x 2 cm
Mode: repeat paint
Total Square Area In Cm2 (Required For Proper Billing- Whole Numbers Only Please): 68

## 2018-11-12 NOTE — HPI: MEDICATION (HUMIRA)
How Severe Is It?: mild
Is This A New Presentation, Or A Follow-Up?: Follow Up Humira
Additional History: Patient had her starting dose of Humira on October 20th. She received her 4th injection this past Saturday. She is just using Dermasmoothe topically for scalp just as needed. \\n\\nShe states she has not seen much improvement from the Humira just yet.
How Many Months Of Humira Have You Completed?: 1

## 2018-11-12 NOTE — PROCEDURE: MIPS QUALITY
Quality 130: Documentation Of Current Medications In The Medical Record: Current Medications Documented
Quality 431: Preventive Care And Screening: Unhealthy Alcohol Use - Screening: Patient screened for unhealthy alcohol use using a single question and scores less than 2 times per year
Quality 110: Preventive Care And Screening: Influenza Immunization: Influenza Immunization Administered during Influenza season
Detail Level: Generalized
Quality 226: Preventive Care And Screening: Tobacco Use: Screening And Cessation Intervention: Patient screened for tobacco use and is an ex/non-smoker

## 2018-11-27 ENCOUNTER — APPOINTMENT (RX ONLY)
Dept: URBAN - METROPOLITAN AREA CLINIC 38 | Facility: CLINIC | Age: 58
Setting detail: DERMATOLOGY
End: 2018-11-27

## 2018-11-27 ENCOUNTER — APPOINTMENT (RX ONLY)
Dept: URBAN - METROPOLITAN AREA CLINIC 37 | Facility: CLINIC | Age: 58
Setting detail: DERMATOLOGY
End: 2018-11-27

## 2018-11-27 DIAGNOSIS — L40.0 PSORIASIS VULGARIS: ICD-10-CM

## 2018-11-27 PROCEDURE — ? EXCIMER LASER

## 2018-11-27 PROCEDURE — 96920 EXCIMER LSR PSRIASIS<250SQCM: CPT

## 2018-11-27 ASSESSMENT — LOCATION DETAILED DESCRIPTION DERM
LOCATION DETAILED: RIGHT CENTRAL POSTAURICULAR SKIN
LOCATION DETAILED: RIGHT CENTRAL POSTAURICULAR SKIN
LOCATION DETAILED: MID-OCCIPITAL SCALP
LOCATION DETAILED: LEFT CENTRAL POSTAURICULAR SKIN
LOCATION DETAILED: LEFT CENTRAL POSTAURICULAR SKIN
LOCATION DETAILED: MID-OCCIPITAL SCALP

## 2018-11-27 ASSESSMENT — LOCATION ZONE DERM
LOCATION ZONE: SCALP
LOCATION ZONE: SCALP

## 2018-11-27 NOTE — PROCEDURE: EXCIMER LASER
Treatment Number: 22
Fluence Units: J/cm2
Device Serial Number (Optional): [id number]
Detail Level: Zone
Total Pulses (Optional): 294
Mode: repeat paint
Location #1: posterior scalp
Spot Size: 2 x 2 cm
Consent: Written consent obtained, risks reviewed including but not limited to crusting, scabbing, blistering, scarring, darker or lighter pigmentary change, incidental hair removal, bruising, and/or incomplete removal.
Post-Care Instructions: I reviewed with the patient in detail post-care instructions. Patient should stay away from the sun and wear sun protection until treated areas are fully healed.
Comments: Patient states that she tolerated her last treatment well. She states that her scalp is flaring.
Total Square Area In Cm2 (Required For Proper Billing- Whole Numbers Only Please): 104
Fluence #1 (J/Cm2 Or Mj/Cm2): 7016

## 2018-11-27 NOTE — PROCEDURE: EXCIMER LASER
Total Square Area In Cm2 (Required For Proper Billing- Whole Numbers Only Please): 104
Fluence #1 (J/Cm2 Or Mj/Cm2): 7662
Spot Size: 2 x 2 cm
Total Pulses (Optional): 294
Post-Care Instructions: I reviewed with the patient in detail post-care instructions. Patient should stay away from the sun and wear sun protection until treated areas are fully healed.
Treatment Number: 22
Device Serial Number (Optional): [id number]
Mode: repeat paint
Comments: Patient states that she tolerated her last treatment well. She states that her scalp is flaring.
Location #1: posterior scalp
Detail Level: Zone
Consent: Written consent obtained, risks reviewed including but not limited to crusting, scabbing, blistering, scarring, darker or lighter pigmentary change, incidental hair removal, bruising, and/or incomplete removal.
Fluence Units: J/cm2

## 2018-12-18 ENCOUNTER — APPOINTMENT (RX ONLY)
Dept: URBAN - METROPOLITAN AREA CLINIC 38 | Facility: CLINIC | Age: 58
Setting detail: DERMATOLOGY
End: 2018-12-18

## 2018-12-18 ENCOUNTER — APPOINTMENT (RX ONLY)
Dept: URBAN - METROPOLITAN AREA CLINIC 37 | Facility: CLINIC | Age: 58
Setting detail: DERMATOLOGY
End: 2018-12-18

## 2018-12-18 DIAGNOSIS — L40.0 PSORIASIS VULGARIS: ICD-10-CM

## 2018-12-18 PROCEDURE — 96920 EXCIMER LSR PSRIASIS<250SQCM: CPT

## 2018-12-18 PROCEDURE — ? EXCIMER LASER

## 2018-12-18 ASSESSMENT — LOCATION ZONE DERM
LOCATION ZONE: SCALP
LOCATION ZONE: SCALP

## 2018-12-18 ASSESSMENT — LOCATION DETAILED DESCRIPTION DERM
LOCATION DETAILED: RIGHT CENTRAL POSTAURICULAR SKIN
LOCATION DETAILED: RIGHT CENTRAL POSTAURICULAR SKIN
LOCATION DETAILED: MID-OCCIPITAL SCALP
LOCATION DETAILED: MID-OCCIPITAL SCALP
LOCATION DETAILED: LEFT CENTRAL POSTAURICULAR SKIN
LOCATION DETAILED: LEFT CENTRAL POSTAURICULAR SKIN

## 2018-12-18 NOTE — PROCEDURE: EXCIMER LASER
Detail Level: Zone
Fluence Units: J/cm2
Total Pulses (Optional): 271
Treatment Number: 23
Consent: Written consent obtained, risks reviewed including but not limited to crusting, scabbing, blistering, scarring, darker or lighter pigmentary change, incidental hair removal, bruising, and/or incomplete removal.
Location #1: posterior scalp
Comments: Patient states that her scalp seem to be very itchy; she admits to scratching.
Total Square Area In Cm2 (Required For Proper Billing- Whole Numbers Only Please): 128
Post-Care Instructions: I reviewed with the patient in detail post-care instructions. Patient should stay away from the sun and wear sun protection until treated areas are fully healed.
Mode: repeat paint
Fluence #1 (J/Cm2 Or Mj/Cm2): 9337
Spot Size: 2 x 2 cm
Device Serial Number (Optional): [id number]

## 2018-12-18 NOTE — PROCEDURE: EXCIMER LASER
Location #1: posterior scalp
Mode: repeat paint
Treatment Number: 23
Detail Level: Zone
Total Pulses (Optional): 271
Total Square Area In Cm2 (Required For Proper Billing- Whole Numbers Only Please): 128
Device Serial Number (Optional): [id number]
Spot Size: 2 x 2 cm
Post-Care Instructions: I reviewed with the patient in detail post-care instructions. Patient should stay away from the sun and wear sun protection until treated areas are fully healed.
Consent: Written consent obtained, risks reviewed including but not limited to crusting, scabbing, blistering, scarring, darker or lighter pigmentary change, incidental hair removal, bruising, and/or incomplete removal.
Fluence Units: J/cm2
Comments: Patient states that her scalp seem to be very itchy; she admits to scratching.
Fluence #1 (J/Cm2 Or Mj/Cm2): 5406

## 2019-01-10 ENCOUNTER — APPOINTMENT (RX ONLY)
Dept: URBAN - METROPOLITAN AREA CLINIC 38 | Facility: CLINIC | Age: 59
Setting detail: DERMATOLOGY
End: 2019-01-10

## 2019-01-10 ENCOUNTER — APPOINTMENT (RX ONLY)
Dept: URBAN - METROPOLITAN AREA CLINIC 37 | Facility: CLINIC | Age: 59
Setting detail: DERMATOLOGY
End: 2019-01-10

## 2019-01-10 DIAGNOSIS — L40.0 PSORIASIS VULGARIS: ICD-10-CM

## 2019-01-10 PROCEDURE — ? EXCIMER LASER

## 2019-01-10 PROCEDURE — 96920 EXCIMER LSR PSRIASIS<250SQCM: CPT

## 2019-01-10 ASSESSMENT — LOCATION DETAILED DESCRIPTION DERM
LOCATION DETAILED: RIGHT INFERIOR POSTAURICULAR SKIN
LOCATION DETAILED: MID-OCCIPITAL SCALP
LOCATION DETAILED: RIGHT INFERIOR POSTAURICULAR SKIN
LOCATION DETAILED: LEFT CENTRAL POSTAURICULAR SKIN
LOCATION DETAILED: MID-OCCIPITAL SCALP
LOCATION DETAILED: LEFT CENTRAL POSTAURICULAR SKIN

## 2019-01-10 ASSESSMENT — LOCATION ZONE DERM
LOCATION ZONE: SCALP
LOCATION ZONE: SCALP

## 2019-01-10 NOTE — PROCEDURE: EXCIMER LASER
Detail Level: Zone
Fluence #1 (J/Cm2 Or Mj/Cm2): 1150
Treatment Number: 24
Mode: repeat paint
Fluence Units: J/cm2
Location #1: posterior scalp
Device Serial Number (Optional): [id number]
Post-Care Instructions: I reviewed with the patient in detail post-care instructions. Patient should stay away from the sun and wear sun protection until treated areas are fully healed.
Comments: Patient is returning for treatment after three weeks of downtime; due to the Holidays.
Total Pulses (Optional): 159
Consent: Written consent obtained, risks reviewed including but not limited to crusting, scabbing, blistering, scarring, darker or lighter pigmentary change, incidental hair removal, bruising, and/or incomplete removal.
Total Square Area In Cm2 (Required For Proper Billing- Whole Numbers Only Please): 100
Spot Size: 2 x 2 cm

## 2019-01-10 NOTE — PROCEDURE: EXCIMER LASER
Post-Care Instructions: I reviewed with the patient in detail post-care instructions. Patient should stay away from the sun and wear sun protection until treated areas are fully healed.
Comments: Patient is returning for treatment after three weeks of downtime; due to the Holidays.
Device Serial Number (Optional): [id number]
Total Square Area In Cm2 (Required For Proper Billing- Whole Numbers Only Please): 100
Fluence #1 (J/Cm2 Or Mj/Cm2): 8815
Mode: repeat paint
Fluence Units: J/cm2
Consent: Written consent obtained, risks reviewed including but not limited to crusting, scabbing, blistering, scarring, darker or lighter pigmentary change, incidental hair removal, bruising, and/or incomplete removal.
Detail Level: Zone
Spot Size: 2 x 2 cm
Total Pulses (Optional): 159
Location #1: posterior scalp
Treatment Number: 24

## 2019-01-16 ENCOUNTER — APPOINTMENT (RX ONLY)
Dept: URBAN - METROPOLITAN AREA CLINIC 37 | Facility: CLINIC | Age: 59
Setting detail: DERMATOLOGY
End: 2019-01-16

## 2019-01-16 ENCOUNTER — APPOINTMENT (RX ONLY)
Dept: URBAN - METROPOLITAN AREA CLINIC 38 | Facility: CLINIC | Age: 59
Setting detail: DERMATOLOGY
End: 2019-01-16

## 2019-01-16 DIAGNOSIS — L40.0 PSORIASIS VULGARIS: ICD-10-CM

## 2019-01-16 PROCEDURE — ? EXCIMER LASER

## 2019-01-16 PROCEDURE — 96920 EXCIMER LSR PSRIASIS<250SQCM: CPT

## 2019-01-16 ASSESSMENT — LOCATION DETAILED DESCRIPTION DERM
LOCATION DETAILED: RIGHT INFERIOR POSTAURICULAR SKIN
LOCATION DETAILED: RIGHT CENTRAL POSTAURICULAR SKIN
LOCATION DETAILED: RIGHT CAVUM CONCHA
LOCATION DETAILED: MEDIAL FRONTAL SCALP
LOCATION DETAILED: MID-OCCIPITAL SCALP
LOCATION DETAILED: RIGHT CENTRAL POSTAURICULAR SKIN
LOCATION DETAILED: RIGHT INFERIOR POSTAURICULAR SKIN
LOCATION DETAILED: RIGHT CAVUM CONCHA
LOCATION DETAILED: MEDIAL FRONTAL SCALP
LOCATION DETAILED: LEFT CENTRAL POSTAURICULAR SKIN
LOCATION DETAILED: MID-OCCIPITAL SCALP
LOCATION DETAILED: LEFT CENTRAL POSTAURICULAR SKIN

## 2019-01-16 ASSESSMENT — LOCATION ZONE DERM
LOCATION ZONE: SCALP
LOCATION ZONE: SCALP
LOCATION ZONE: EAR
LOCATION ZONE: EAR

## 2019-01-16 ASSESSMENT — LOCATION SIMPLE DESCRIPTION DERM
LOCATION SIMPLE: POSTERIOR SCALP
LOCATION SIMPLE: RIGHT EAR
LOCATION SIMPLE: POSTERIOR SCALP
LOCATION SIMPLE: SCALP
LOCATION SIMPLE: RIGHT EAR
LOCATION SIMPLE: SCALP
LOCATION SIMPLE: FRONTAL SCALP
LOCATION SIMPLE: FRONTAL SCALP

## 2019-01-16 NOTE — PROCEDURE: EXCIMER LASER
Treatment Number: 25
Fluence Units: J/cm2
Location #1: posterior scalp, right ear
Total Pulses (Optional): 153
Comments: Patient states that she tolerated her last treatment well. She states today, her scalp seems to be flared and very itchy. Patient states that she washed her scalp in vinegar last night, to help calm the itch. Patient states that she would like Ziyad Narvaez PA-C, to take a look at her scalp. She states that she does not feel that the Humira is working for her and would like to try something else.\\n\\n***Ziyad Narvaez PA-C evaluated the patient’s scalp; discussed after three months of Humira, the patient should already be clear or close to clearance on the scalp. Discussed the next step, is to try Stelara. Discussed we will get the paperwork started. Patient signed the Stelara Consent form today. Patient is scheduled tomorrow for bloodwork at her PCP office; of which, she will have the results faxed to our office.
Mode: repeat paint
Fluence #1 (J/Cm2 Or Mj/Cm2): 1140
Total Square Area In Cm2 (Required For Proper Billing- Whole Numbers Only Please): 84
Post-Care Instructions: I reviewed with the patient in detail post-care instructions. Patient should stay away from the sun and wear sun protection until treated areas are fully healed.
Consent: Written consent obtained, risks reviewed including but not limited to crusting, scabbing, blistering, scarring, darker or lighter pigmentary change, incidental hair removal, bruising, and/or incomplete removal.
Detail Level: Zone
Spot Size: 2 x 2 cm
Device Serial Number (Optional): [id number]

## 2019-01-16 NOTE — PROCEDURE: EXCIMER LASER
Treatment Number: 25
Total Square Area In Cm2 (Required For Proper Billing- Whole Numbers Only Please): 84
Location #1: posterior scalp, right ear
Post-Care Instructions: I reviewed with the patient in detail post-care instructions. Patient should stay away from the sun and wear sun protection until treated areas are fully healed.
Mode: repeat paint
Device Serial Number (Optional): [id number]
Total Pulses (Optional): 153
Spot Size: 2 x 2 cm
Comments: Patient states that she tolerated her last treatment well. She states today, her scalp seems to be flared and very itchy. Patient states that she washed her scalp in vinegar last night, to help calm the itch. Patient states that she would like Valente Rico PA-C, to take a look at her scalp. She states that she does not feel that the Humira is working for her and would like to try something else.\\n\\n***Valente Rico PA-C evaluated the patient’s scalp; discussed after three months of Humira, the patient should already be clear or close to clearance on the scalp. Discussed the next step, is to try Stelara. Discussed we will get the paperwork started. Patient signed the Stelara Consent form today. Patient is scheduled tomorrow for bloodwork at her PCP office; of which, she will have the results faxed to our office.
Consent: Written consent obtained, risks reviewed including but not limited to crusting, scabbing, blistering, scarring, darker or lighter pigmentary change, incidental hair removal, bruising, and/or incomplete removal.
Fluence Units: J/cm2
Fluence #1 (J/Cm2 Or Mj/Cm2): 9627
Detail Level: Zone

## 2019-01-25 ENCOUNTER — APPOINTMENT (RX ONLY)
Dept: URBAN - METROPOLITAN AREA CLINIC 37 | Facility: CLINIC | Age: 59
Setting detail: DERMATOLOGY
End: 2019-01-25

## 2019-01-25 ENCOUNTER — APPOINTMENT (RX ONLY)
Dept: URBAN - METROPOLITAN AREA CLINIC 38 | Facility: CLINIC | Age: 59
Setting detail: DERMATOLOGY
End: 2019-01-25

## 2019-01-25 DIAGNOSIS — L57.3 POIKILODERMA OF CIVATTE: ICD-10-CM

## 2019-01-25 DIAGNOSIS — L81.4 OTHER MELANIN HYPERPIGMENTATION: ICD-10-CM

## 2019-01-25 DIAGNOSIS — D18.0 HEMANGIOMA: ICD-10-CM

## 2019-01-25 DIAGNOSIS — L71.8 OTHER ROSACEA: ICD-10-CM

## 2019-01-25 DIAGNOSIS — L40.0 PSORIASIS VULGARIS: ICD-10-CM

## 2019-01-25 DIAGNOSIS — L82.1 OTHER SEBORRHEIC KERATOSIS: ICD-10-CM

## 2019-01-25 DIAGNOSIS — D22 MELANOCYTIC NEVI: ICD-10-CM

## 2019-01-25 PROBLEM — D22.39 MELANOCYTIC NEVI OF OTHER PARTS OF FACE: Status: ACTIVE | Noted: 2019-01-25

## 2019-01-25 PROBLEM — D18.01 HEMANGIOMA OF SKIN AND SUBCUTANEOUS TISSUE: Status: ACTIVE | Noted: 2019-01-25

## 2019-01-25 PROBLEM — D22.5 MELANOCYTIC NEVI OF TRUNK: Status: ACTIVE | Noted: 2019-01-25

## 2019-01-25 PROCEDURE — ? COSENTYX INITIATION

## 2019-01-25 PROCEDURE — ? COUNSELING

## 2019-01-25 PROCEDURE — ? TREATMENT REGIMEN

## 2019-01-25 PROCEDURE — 99214 OFFICE O/P EST MOD 30 MIN: CPT

## 2019-01-25 ASSESSMENT — LOCATION DETAILED DESCRIPTION DERM
LOCATION DETAILED: LEFT DISTAL POSTERIOR UPPER ARM
LOCATION DETAILED: LEFT INFERIOR LATERAL NECK
LOCATION DETAILED: MID-OCCIPITAL SCALP
LOCATION DETAILED: LEFT BUTTOCK
LOCATION DETAILED: RIGHT ELBOW
LOCATION DETAILED: LEFT DISTAL POSTERIOR UPPER ARM
LOCATION DETAILED: MID-OCCIPITAL SCALP
LOCATION DETAILED: LEFT LATERAL BUTTOCK
LOCATION DETAILED: RIGHT PROXIMAL POSTERIOR UPPER ARM
LOCATION DETAILED: RIGHT INFERIOR FOREHEAD
LOCATION DETAILED: EPIGASTRIC SKIN
LOCATION DETAILED: LEFT POSTERIOR EAR
LOCATION DETAILED: LEFT CENTRAL MALAR CHEEK
LOCATION DETAILED: LEFT BUTTOCK
LOCATION DETAILED: LEFT LATERAL BUTTOCK
LOCATION DETAILED: LEFT INFERIOR LATERAL NECK
LOCATION DETAILED: RIGHT PROXIMAL LATERAL POSTERIOR THIGH
LOCATION DETAILED: RIGHT INFERIOR LATERAL NECK
LOCATION DETAILED: RIGHT POSTERIOR EAR
LOCATION DETAILED: EPIGASTRIC SKIN
LOCATION DETAILED: RIGHT CENTRAL MALAR CHEEK
LOCATION DETAILED: LEFT MEDIAL SUPERIOR CHEST
LOCATION DETAILED: RIGHT CENTRAL MALAR CHEEK
LOCATION DETAILED: LEFT PROXIMAL POSTERIOR UPPER ARM
LOCATION DETAILED: RIGHT INFERIOR LATERAL NECK
LOCATION DETAILED: RIGHT ELBOW
LOCATION DETAILED: LEFT CENTRAL MALAR CHEEK
LOCATION DETAILED: INFERIOR THORACIC SPINE
LOCATION DETAILED: STERNAL NOTCH
LOCATION DETAILED: RIGHT PROXIMAL POSTERIOR UPPER ARM
LOCATION DETAILED: LEFT POSTERIOR EAR
LOCATION DETAILED: RIGHT PROXIMAL LATERAL POSTERIOR THIGH
LOCATION DETAILED: RIGHT INFERIOR FOREHEAD
LOCATION DETAILED: RIGHT CAVUM CONCHA
LOCATION DETAILED: RIGHT POSTERIOR EAR
LOCATION DETAILED: STERNAL NOTCH
LOCATION DETAILED: LEFT MEDIAL SUPERIOR CHEST
LOCATION DETAILED: LEFT PROXIMAL POSTERIOR UPPER ARM
LOCATION DETAILED: SUPERIOR THORACIC SPINE
LOCATION DETAILED: RIGHT CAVUM CONCHA
LOCATION DETAILED: SUPERIOR THORACIC SPINE
LOCATION DETAILED: INFERIOR THORACIC SPINE

## 2019-01-25 ASSESSMENT — LOCATION SIMPLE DESCRIPTION DERM
LOCATION SIMPLE: CHEST
LOCATION SIMPLE: ABDOMEN
LOCATION SIMPLE: UPPER BACK
LOCATION SIMPLE: LEFT ANTERIOR NECK
LOCATION SIMPLE: RIGHT CHEEK
LOCATION SIMPLE: LEFT ANTERIOR NECK
LOCATION SIMPLE: RIGHT EAR
LOCATION SIMPLE: RIGHT POSTERIOR UPPER ARM
LOCATION SIMPLE: RIGHT ELBOW
LOCATION SIMPLE: UPPER BACK
LOCATION SIMPLE: LEFT CHEEK
LOCATION SIMPLE: LEFT EAR
LOCATION SIMPLE: RIGHT ANTERIOR NECK
LOCATION SIMPLE: LEFT EAR
LOCATION SIMPLE: RIGHT POSTERIOR UPPER ARM
LOCATION SIMPLE: RIGHT POSTERIOR THIGH
LOCATION SIMPLE: RIGHT EAR
LOCATION SIMPLE: RIGHT FOREHEAD
LOCATION SIMPLE: LEFT BUTTOCK
LOCATION SIMPLE: RIGHT CHEEK
LOCATION SIMPLE: POSTERIOR SCALP
LOCATION SIMPLE: RIGHT POSTERIOR THIGH
LOCATION SIMPLE: LEFT POSTERIOR UPPER ARM
LOCATION SIMPLE: RIGHT ELBOW
LOCATION SIMPLE: POSTERIOR SCALP
LOCATION SIMPLE: RIGHT ANTERIOR NECK
LOCATION SIMPLE: LEFT BUTTOCK
LOCATION SIMPLE: LEFT POSTERIOR UPPER ARM
LOCATION SIMPLE: ABDOMEN
LOCATION SIMPLE: CHEST
LOCATION SIMPLE: RIGHT FOREHEAD
LOCATION SIMPLE: LEFT CHEEK

## 2019-01-25 ASSESSMENT — LOCATION ZONE DERM
LOCATION ZONE: EAR
LOCATION ZONE: ARM
LOCATION ZONE: TRUNK
LOCATION ZONE: FACE
LOCATION ZONE: NECK
LOCATION ZONE: LEG
LOCATION ZONE: EAR
LOCATION ZONE: LEG
LOCATION ZONE: NECK
LOCATION ZONE: SCALP
LOCATION ZONE: FACE
LOCATION ZONE: SCALP
LOCATION ZONE: TRUNK
LOCATION ZONE: ARM

## 2019-01-25 NOTE — PROCEDURE: TREATMENT REGIMEN
Action 2: Continue
Detail Level: Simple
Hold Regimen: Xtrac for now
Continue Regimen: Humira for now
Plan: We have submitted a PA for Kurt. She has medicare which may prove to be a challenge for coverage however her secondary BCBS may pick it up. If not to avoid going to [place] which is over an hour away to receive it we will have her sign cosentyx paperwork today should we have a challenge moving forward with Stelara

## 2019-01-25 NOTE — PROCEDURE: TREATMENT REGIMEN
Action 1: Continue
Plan: We have submitted a PA for Yogesh. She has medicare which may prove to be a challenge for coverage however her secondary BCBS may pick it up. If not to avoid going to [place] which is over an hour away to receive it we will have her sign cosentyx paperwork today should we have a challenge moving forward with Stelara
Continue Regimen: Humira for now
Hold Regimen: Xtrac for now
Detail Level: Simple

## 2019-01-25 NOTE — PROCEDURE: COSENTYX INITIATION
Add Pregnancy And Lactation Warning To Medication Counseling?: No
Detail Level: Zone
Cosentyx Dosing: 300 mg SC week 0, 1, 2, 3, and 4 then every 4 weeks after that
Pregnancy And Lactation Warning Text: This medication is Pregnancy Category B and is considered safe during pregnancy. It is unknown if this medication is excreted in breast milk.
Diagnosis (Required): Psoriasis
Cosentyx Monitoring Guidelines: A yearly test for tuberculosis is required while taking Cosentyx.

## 2019-01-25 NOTE — PROCEDURE: MIPS QUALITY
Quality 110: Preventive Care And Screening: Influenza Immunization: Influenza Immunization previously received during influenza season
Quality 47: Advance Care Plan: Advance Care Planning discussed and documented in the medical record; patient did not wish or was not able to name a surrogate decision maker or provide an advance care plan.
Quality 226: Preventive Care And Screening: Tobacco Use: Screening And Cessation Intervention: Patient screened for tobacco use and is an ex/non-smoker
Quality 431: Preventive Care And Screening: Unhealthy Alcohol Use - Screening: Patient screened for unhealthy alcohol use using a single question and scores less than 2 times per year
Quality 130: Documentation Of Current Medications In The Medical Record: Current Medications Documented
Detail Level: Generalized
Quality 111:Pneumonia Vaccination Status For Older Adults: Pneumococcal Vaccination not Administered or Previously Received, Reason not Otherwise Specified

## 2019-01-25 NOTE — HPI: EVALUATION OF SKIN LESION(S)
How Severe Are Your Spot(S)?: mild
Have Your Spot(S) Been Treated In The Past?: has not been treated
Hpi Title: Evaluation of Skin Lesions
Additional History: Patient is here for a TBSE
Year Removed: 1900

## 2019-01-25 NOTE — PROCEDURE: COSENTYX INITIATION
Is Phototherapy Contraindicated?: No
Pregnancy And Lactation Warning Text: This medication is Pregnancy Category B and is considered safe during pregnancy. It is unknown if this medication is excreted in breast milk.
Cosentyx Dosing: 300 mg SC week 0, 1, 2, 3, and 4 then every 4 weeks after that
Diagnosis (Required): Psoriasis
Detail Level: Zone
Cosentyx Monitoring Guidelines: A yearly test for tuberculosis is required while taking Cosentyx.

## 2019-01-25 NOTE — PROCEDURE: MIPS QUALITY
Quality 431: Preventive Care And Screening: Unhealthy Alcohol Use - Screening: Patient screened for unhealthy alcohol use using a single question and scores less than 2 times per year
Quality 110: Preventive Care And Screening: Influenza Immunization: Influenza Immunization previously received during influenza season
Quality 130: Documentation Of Current Medications In The Medical Record: Current Medications Documented
Quality 47: Advance Care Plan: Advance Care Planning discussed and documented in the medical record; patient did not wish or was not able to name a surrogate decision maker or provide an advance care plan.
Detail Level: Generalized
Quality 226: Preventive Care And Screening: Tobacco Use: Screening And Cessation Intervention: Patient screened for tobacco use and is an ex/non-smoker
Quality 111:Pneumonia Vaccination Status For Older Adults: Pneumococcal Vaccination not Administered or Previously Received, Reason not Otherwise Specified

## 2019-01-28 ENCOUNTER — APPOINTMENT (RX ONLY)
Dept: URBAN - METROPOLITAN AREA CLINIC 37 | Facility: CLINIC | Age: 59
Setting detail: DERMATOLOGY
End: 2019-01-28

## 2019-01-28 ENCOUNTER — APPOINTMENT (RX ONLY)
Dept: URBAN - METROPOLITAN AREA CLINIC 38 | Facility: CLINIC | Age: 59
Setting detail: DERMATOLOGY
End: 2019-01-28

## 2019-01-28 DIAGNOSIS — L40.0 PSORIASIS VULGARIS: ICD-10-CM

## 2019-01-28 PROCEDURE — ? EXCIMER LASER

## 2019-01-28 PROCEDURE — 96920 EXCIMER LSR PSRIASIS<250SQCM: CPT

## 2019-01-28 ASSESSMENT — LOCATION SIMPLE DESCRIPTION DERM
LOCATION SIMPLE: POSTERIOR SCALP
LOCATION SIMPLE: NECK
LOCATION SIMPLE: SCALP
LOCATION SIMPLE: POSTERIOR SCALP
LOCATION SIMPLE: NECK
LOCATION SIMPLE: SCALP

## 2019-01-28 ASSESSMENT — LOCATION ZONE DERM
LOCATION ZONE: NECK
LOCATION ZONE: SCALP
LOCATION ZONE: NECK
LOCATION ZONE: SCALP

## 2019-01-28 ASSESSMENT — LOCATION DETAILED DESCRIPTION DERM
LOCATION DETAILED: LEFT SUPERIOR LATERAL NECK
LOCATION DETAILED: LEFT CENTRAL POSTAURICULAR SKIN
LOCATION DETAILED: MID-OCCIPITAL SCALP
LOCATION DETAILED: LEFT SUPERIOR LATERAL NECK
LOCATION DETAILED: RIGHT INFERIOR POSTAURICULAR SKIN
LOCATION DETAILED: RIGHT INFERIOR POSTAURICULAR SKIN
LOCATION DETAILED: LEFT CENTRAL POSTAURICULAR SKIN
LOCATION DETAILED: MID-OCCIPITAL SCALP

## 2019-01-28 NOTE — PROCEDURE: EXCIMER LASER
Detail Level: Zone
Consent: Written consent obtained, risks reviewed including but not limited to crusting, scabbing, blistering, scarring, darker or lighter pigmentary change, incidental hair removal, bruising, and/or incomplete removal.
Treatment Number: 26
Total Pulses (Optional): 173
Location #1: posterior scalp
Spot Size: 2 x 2 cm
Fluence Units: J/cm2
Device Serial Number (Optional): [id number]
Mode: repeat paint
Comments: Patient states that she tolerated her last treatment well.
Total Square Area In Cm2 (Required For Proper Billing- Whole Numbers Only Please): 76
Fluence #1 (J/Cm2 Or Mj/Cm2): 0969
Post-Care Instructions: I reviewed with the patient in detail post-care instructions. Patient should stay away from the sun and wear sun protection until treated areas are fully healed.

## 2019-01-28 NOTE — PROCEDURE: EXCIMER LASER
Total Pulses (Optional): 173
Total Square Area In Cm2 (Required For Proper Billing- Whole Numbers Only Please): 76
Comments: Patient states that she tolerated her last treatment well.
Consent: Written consent obtained, risks reviewed including but not limited to crusting, scabbing, blistering, scarring, darker or lighter pigmentary change, incidental hair removal, bruising, and/or incomplete removal.
Fluence Units: J/cm2
Location #1: posterior scalp
Fluence #1 (J/Cm2 Or Mj/Cm2): 3756
Mode: repeat paint
Post-Care Instructions: I reviewed with the patient in detail post-care instructions. Patient should stay away from the sun and wear sun protection until treated areas are fully healed.
Detail Level: Zone
Device Serial Number (Optional): [id number]
Treatment Number: 26
Spot Size: 2 x 2 cm

## 2019-02-19 ENCOUNTER — APPOINTMENT (RX ONLY)
Dept: URBAN - METROPOLITAN AREA CLINIC 37 | Facility: CLINIC | Age: 59
Setting detail: DERMATOLOGY
End: 2019-02-19

## 2019-02-19 ENCOUNTER — APPOINTMENT (RX ONLY)
Dept: URBAN - METROPOLITAN AREA CLINIC 38 | Facility: CLINIC | Age: 59
Setting detail: DERMATOLOGY
End: 2019-02-19

## 2019-02-19 DIAGNOSIS — L40.0 PSORIASIS VULGARIS: ICD-10-CM

## 2019-02-19 PROCEDURE — 96920 EXCIMER LSR PSRIASIS<250SQCM: CPT

## 2019-02-19 PROCEDURE — ? EXCIMER LASER

## 2019-02-19 ASSESSMENT — LOCATION ZONE DERM
LOCATION ZONE: SCALP
LOCATION ZONE: SCALP

## 2019-02-19 ASSESSMENT — LOCATION DETAILED DESCRIPTION DERM
LOCATION DETAILED: LEFT INFERIOR OCCIPITAL SCALP
LOCATION DETAILED: LEFT SUPERIOR POSTAURICULAR SKIN
LOCATION DETAILED: RIGHT CENTRAL POSTAURICULAR SKIN
LOCATION DETAILED: RIGHT CENTRAL POSTAURICULAR SKIN
LOCATION DETAILED: LEFT INFERIOR OCCIPITAL SCALP
LOCATION DETAILED: LEFT SUPERIOR POSTAURICULAR SKIN

## 2019-02-19 NOTE — PROCEDURE: EXCIMER LASER
Total Square Area In Cm2 (Required For Proper Billing- Whole Numbers Only Please): 40
Fluence #1 (J/Cm2 Or Mj/Cm2): 2555
Post-Care Instructions: I reviewed with the patient in detail post-care instructions. Patient should stay away from the sun and wear sun protection until treated areas are fully healed.
Spot Size: 2 x 2 cm
Comments: Patient states that her scalp feel so much better; with very little itching.
Mode: repeat paint
Total Pulses (Optional): 91
Detail Level: Zone
Fluence Units: J/cm2
Device Serial Number (Optional): [id number]
Location #1: posterior scalp
Consent: Written consent obtained, risks reviewed including but not limited to crusting, scabbing, blistering, scarring, darker or lighter pigmentary change, incidental hair removal, bruising, and/or incomplete removal.
Treatment Number: 27

## 2019-02-19 NOTE — PROCEDURE: EXCIMER LASER
Mode: repeat paint
Total Square Area In Cm2 (Required For Proper Billing- Whole Numbers Only Please): 40
Location #1: posterior scalp
Spot Size: 2 x 2 cm
Total Pulses (Optional): 91
Fluence Units: J/cm2
Fluence #1 (J/Cm2 Or Mj/Cm2): 4445
Consent: Written consent obtained, risks reviewed including but not limited to crusting, scabbing, blistering, scarring, darker or lighter pigmentary change, incidental hair removal, bruising, and/or incomplete removal.
Treatment Number: 27
Comments: Patient states that her scalp feel so much better; with very little itching.
Post-Care Instructions: I reviewed with the patient in detail post-care instructions. Patient should stay away from the sun and wear sun protection until treated areas are fully healed.
Device Serial Number (Optional): [id number]
Detail Level: Zone

## 2019-03-07 ENCOUNTER — APPOINTMENT (RX ONLY)
Dept: URBAN - METROPOLITAN AREA CLINIC 38 | Facility: CLINIC | Age: 59
Setting detail: DERMATOLOGY
End: 2019-03-07

## 2019-03-07 ENCOUNTER — APPOINTMENT (RX ONLY)
Dept: URBAN - METROPOLITAN AREA CLINIC 37 | Facility: CLINIC | Age: 59
Setting detail: DERMATOLOGY
End: 2019-03-07

## 2019-03-07 DIAGNOSIS — L40.0 PSORIASIS VULGARIS: ICD-10-CM

## 2019-03-07 PROCEDURE — ? EXCIMER LASER

## 2019-03-07 PROCEDURE — 96920 EXCIMER LSR PSRIASIS<250SQCM: CPT

## 2019-03-07 ASSESSMENT — LOCATION DETAILED DESCRIPTION DERM
LOCATION DETAILED: LEFT CENTRAL POSTAURICULAR SKIN
LOCATION DETAILED: MID-OCCIPITAL SCALP
LOCATION DETAILED: RIGHT CENTRAL POSTAURICULAR SKIN
LOCATION DETAILED: MID-OCCIPITAL SCALP
LOCATION DETAILED: RIGHT CENTRAL POSTAURICULAR SKIN
LOCATION DETAILED: LEFT CENTRAL POSTAURICULAR SKIN

## 2019-03-07 ASSESSMENT — LOCATION ZONE DERM
LOCATION ZONE: SCALP
LOCATION ZONE: SCALP

## 2019-03-07 NOTE — PROCEDURE: EXCIMER LASER
Consent: Written consent obtained, risks reviewed including but not limited to crusting, scabbing, blistering, scarring, darker or lighter pigmentary change, incidental hair removal, bruising, and/or incomplete removal.
Fluence Units: J/cm2
Total Pulses (Optional): 201
Spot Size: 2 x 2 cm
Device Serial Number (Optional): [id number]
Fluence #1 (J/Cm2 Or Mj/Cm2): 8097
Total Square Area In Cm2 (Required For Proper Billing- Whole Numbers Only Please): 88
Mode: repeat paint
Post-Care Instructions: I reviewed with the patient in detail post-care instructions. Patient should stay away from the sun and wear sun protection until treated areas are fully healed.
Comments: Patient states that her scalp feel much better.
Location #1: posterior scalp
Detail Level: Zone
Treatment Number: 28

## 2019-03-07 NOTE — PROCEDURE: EXCIMER LASER
Device Serial Number (Optional): [id number]
Post-Care Instructions: I reviewed with the patient in detail post-care instructions. Patient should stay away from the sun and wear sun protection until treated areas are fully healed.
Treatment Number: 28
Fluence #1 (J/Cm2 Or Mj/Cm2): 0795
Comments: Patient states that her scalp feel much better.
Total Pulses (Optional): 201
Detail Level: Zone
Consent: Written consent obtained, risks reviewed including but not limited to crusting, scabbing, blistering, scarring, darker or lighter pigmentary change, incidental hair removal, bruising, and/or incomplete removal.
Total Square Area In Cm2 (Required For Proper Billing- Whole Numbers Only Please): 88
Location #1: posterior scalp
Spot Size: 2 x 2 cm
Fluence Units: J/cm2
Mode: repeat paint

## 2019-03-15 ENCOUNTER — RX ONLY (OUTPATIENT)
Age: 59
Setting detail: RX ONLY
End: 2019-03-15

## 2019-03-15 RX ORDER — SECUKINUMAB 150 MG/ML
2 INJECTIONS INJECTION SUBCUTANEOUS
Qty: 1 | Refills: 6 | Status: ERX

## 2019-03-15 RX ORDER — SECUKINUMAB 150 MG/ML
2 INJECTION SUBCUTANEOUS
Qty: 5 | Refills: 0 | Status: ERX

## 2019-04-03 ENCOUNTER — APPOINTMENT (RX ONLY)
Dept: URBAN - METROPOLITAN AREA CLINIC 37 | Facility: CLINIC | Age: 59
Setting detail: DERMATOLOGY
End: 2019-04-03

## 2019-04-03 ENCOUNTER — APPOINTMENT (RX ONLY)
Dept: URBAN - METROPOLITAN AREA CLINIC 38 | Facility: CLINIC | Age: 59
Setting detail: DERMATOLOGY
End: 2019-04-03

## 2019-04-03 DIAGNOSIS — L40.0 PSORIASIS VULGARIS: ICD-10-CM

## 2019-04-03 PROCEDURE — 96920 EXCIMER LSR PSRIASIS<250SQCM: CPT

## 2019-04-03 PROCEDURE — ? EXCIMER LASER

## 2019-04-03 ASSESSMENT — LOCATION ZONE DERM
LOCATION ZONE: SCALP
LOCATION ZONE: SCALP

## 2019-04-03 ASSESSMENT — LOCATION DETAILED DESCRIPTION DERM
LOCATION DETAILED: LEFT CENTRAL POSTAURICULAR SKIN
LOCATION DETAILED: RIGHT CENTRAL POSTAURICULAR SKIN
LOCATION DETAILED: LEFT CENTRAL POSTAURICULAR SKIN
LOCATION DETAILED: RIGHT CENTRAL POSTAURICULAR SKIN
LOCATION DETAILED: MID-OCCIPITAL SCALP
LOCATION DETAILED: MID-OCCIPITAL SCALP

## 2019-04-03 NOTE — PROCEDURE: EXCIMER LASER
Comments: Patient is returning for treatment after one month of downtime. She states that her scalp is still itchy.
Treatment Number: 29
Spot Size: 2 x 2 cm
Consent: Written consent obtained, risks reviewed including but not limited to crusting, scabbing, blistering, scarring, darker or lighter pigmentary change, incidental hair removal, bruising, and/or incomplete removal.
Post-Care Instructions: I reviewed with the patient in detail post-care instructions. Patient should stay away from the sun and wear sun protection until treated areas are fully healed.
Fluence Units: J/cm2
Total Square Area In Cm2 (Required For Proper Billing- Whole Numbers Only Please): 60
Fluence #1 (J/Cm2 Or Mj/Cm2): 8250
Detail Level: Zone
Total Pulses (Optional): 137
Location #1: posterior scalp
Device Serial Number (Optional): [id number]
Mode: repeat paint

## 2019-04-03 NOTE — PROCEDURE: EXCIMER LASER
Fluence #1 (J/Cm2 Or Mj/Cm2): 6570
Spot Size: 2 x 2 cm
Mode: repeat paint
Total Square Area In Cm2 (Required For Proper Billing- Whole Numbers Only Please): 60
Comments: Patient is returning for treatment after one month of downtime. She states that her scalp is still itchy.
Post-Care Instructions: I reviewed with the patient in detail post-care instructions. Patient should stay away from the sun and wear sun protection until treated areas are fully healed.
Total Pulses (Optional): 137
Location #1: posterior scalp
Fluence Units: J/cm2
Device Serial Number (Optional): [id number]
Consent: Written consent obtained, risks reviewed including but not limited to crusting, scabbing, blistering, scarring, darker or lighter pigmentary change, incidental hair removal, bruising, and/or incomplete removal.
Treatment Number: 29
Detail Level: Zone

## 2019-04-24 ENCOUNTER — APPOINTMENT (RX ONLY)
Dept: URBAN - METROPOLITAN AREA CLINIC 38 | Facility: CLINIC | Age: 59
Setting detail: DERMATOLOGY
End: 2019-04-24

## 2019-04-24 ENCOUNTER — APPOINTMENT (RX ONLY)
Dept: URBAN - METROPOLITAN AREA CLINIC 37 | Facility: CLINIC | Age: 59
Setting detail: DERMATOLOGY
End: 2019-04-24

## 2019-04-24 DIAGNOSIS — L40.0 PSORIASIS VULGARIS: ICD-10-CM

## 2019-04-24 PROCEDURE — ? EXCIMER LASER

## 2019-04-24 PROCEDURE — 96920 EXCIMER LSR PSRIASIS<250SQCM: CPT

## 2019-04-24 ASSESSMENT — LOCATION ZONE DERM
LOCATION ZONE: SCALP
LOCATION ZONE: SCALP
LOCATION ZONE: EAR
LOCATION ZONE: ARM
LOCATION ZONE: ARM
LOCATION ZONE: EAR

## 2019-04-24 ASSESSMENT — LOCATION SIMPLE DESCRIPTION DERM
LOCATION SIMPLE: POSTERIOR SCALP
LOCATION SIMPLE: LEFT ELBOW
LOCATION SIMPLE: LEFT ELBOW
LOCATION SIMPLE: RIGHT EAR
LOCATION SIMPLE: SCALP
LOCATION SIMPLE: RIGHT EAR
LOCATION SIMPLE: POSTERIOR SCALP
LOCATION SIMPLE: RIGHT ELBOW
LOCATION SIMPLE: SCALP
LOCATION SIMPLE: RIGHT ELBOW

## 2019-04-24 ASSESSMENT — LOCATION DETAILED DESCRIPTION DERM
LOCATION DETAILED: MID-OCCIPITAL SCALP
LOCATION DETAILED: MID-OCCIPITAL SCALP
LOCATION DETAILED: RIGHT INFERIOR POSTAURICULAR SKIN
LOCATION DETAILED: LEFT INFERIOR POSTAURICULAR SKIN
LOCATION DETAILED: RIGHT SUPERIOR POSTAURICULAR SKIN
LOCATION DETAILED: RIGHT ELBOW
LOCATION DETAILED: RIGHT CAVUM CONCHA
LOCATION DETAILED: RIGHT SUPERIOR POSTAURICULAR SKIN
LOCATION DETAILED: LEFT SUPERIOR POSTAURICULAR SKIN
LOCATION DETAILED: LEFT ELBOW
LOCATION DETAILED: RIGHT ELBOW
LOCATION DETAILED: LEFT ELBOW
LOCATION DETAILED: RIGHT INFERIOR POSTAURICULAR SKIN
LOCATION DETAILED: LEFT SUPERIOR POSTAURICULAR SKIN
LOCATION DETAILED: LEFT INFERIOR POSTAURICULAR SKIN
LOCATION DETAILED: RIGHT CAVUM CONCHA

## 2019-04-24 NOTE — PROCEDURE: EXCIMER LASER
Total Pulses (Optional): 210
Fluence Units: J/cm2
Treatment Number: 30
Location #1: posterior scalp, right ear, elbows
Device Serial Number (Optional): [id number]
Fluence #1 (J/Cm2 Or Mj/Cm2): 6128
Spot Size: 2 x 2 cm
Detail Level: Zone
Consent: Written consent obtained, risks reviewed including but not limited to crusting, scabbing, blistering, scarring, darker or lighter pigmentary change, incidental hair removal, bruising, and/or incomplete removal.
Total Square Area In Cm2 (Required For Proper Billing- Whole Numbers Only Please): 92
Mode: repeat paint
Comments: Patient states that her scalp feel much better, but still has itching. Patient admits to scratching.
Post-Care Instructions: I reviewed with the patient in detail post-care instructions. Patient should stay away from the sun and wear sun protection until treated areas are fully healed.

## 2019-04-24 NOTE — PROCEDURE: EXCIMER LASER
Total Square Area In Cm2 (Required For Proper Billing- Whole Numbers Only Please): 92
Detail Level: Zone
Device Serial Number (Optional): [id number]
Comments: Patient states that her scalp feel much better, but still has itching. Patient admits to scratching.
Total Pulses (Optional): 210
Mode: repeat paint
Post-Care Instructions: I reviewed with the patient in detail post-care instructions. Patient should stay away from the sun and wear sun protection until treated areas are fully healed.
Fluence #1 (J/Cm2 Or Mj/Cm2): 5498
Location #1: posterior scalp, right ear, elbows
Spot Size: 2 x 2 cm
Fluence Units: J/cm2
Consent: Written consent obtained, risks reviewed including but not limited to crusting, scabbing, blistering, scarring, darker or lighter pigmentary change, incidental hair removal, bruising, and/or incomplete removal.
Treatment Number: 30

## 2019-05-16 ENCOUNTER — APPOINTMENT (RX ONLY)
Dept: URBAN - METROPOLITAN AREA CLINIC 37 | Facility: CLINIC | Age: 59
Setting detail: DERMATOLOGY
End: 2019-05-16

## 2019-05-16 ENCOUNTER — APPOINTMENT (RX ONLY)
Dept: URBAN - METROPOLITAN AREA CLINIC 38 | Facility: CLINIC | Age: 59
Setting detail: DERMATOLOGY
End: 2019-05-16

## 2019-05-16 DIAGNOSIS — L40.0 PSORIASIS VULGARIS: ICD-10-CM

## 2019-05-16 PROCEDURE — 96920 EXCIMER LSR PSRIASIS<250SQCM: CPT

## 2019-05-16 PROCEDURE — ? EXCIMER LASER

## 2019-05-16 ASSESSMENT — LOCATION DETAILED DESCRIPTION DERM
LOCATION DETAILED: RIGHT SUPERIOR LATERAL NECK
LOCATION DETAILED: LEFT INFERIOR POSTAURICULAR SKIN
LOCATION DETAILED: RIGHT CENTRAL POSTAURICULAR SKIN
LOCATION DETAILED: MID-OCCIPITAL SCALP
LOCATION DETAILED: RIGHT ELBOW
LOCATION DETAILED: MID-OCCIPITAL SCALP
LOCATION DETAILED: RIGHT SUPERIOR LATERAL NECK
LOCATION DETAILED: LEFT INFERIOR POSTAURICULAR SKIN
LOCATION DETAILED: RIGHT CENTRAL POSTAURICULAR SKIN
LOCATION DETAILED: LEFT CENTRAL POSTAURICULAR SKIN
LOCATION DETAILED: RIGHT ELBOW
LOCATION DETAILED: LEFT CENTRAL POSTAURICULAR SKIN
LOCATION DETAILED: LEFT ELBOW
LOCATION DETAILED: LEFT ELBOW

## 2019-05-16 ASSESSMENT — LOCATION ZONE DERM
LOCATION ZONE: ARM
LOCATION ZONE: SCALP
LOCATION ZONE: ARM
LOCATION ZONE: NECK
LOCATION ZONE: NECK
LOCATION ZONE: SCALP

## 2019-05-16 ASSESSMENT — LOCATION SIMPLE DESCRIPTION DERM
LOCATION SIMPLE: RIGHT ELBOW
LOCATION SIMPLE: SCALP
LOCATION SIMPLE: RIGHT ELBOW
LOCATION SIMPLE: LEFT ELBOW
LOCATION SIMPLE: SCALP
LOCATION SIMPLE: NECK
LOCATION SIMPLE: POSTERIOR SCALP
LOCATION SIMPLE: NECK
LOCATION SIMPLE: LEFT ELBOW
LOCATION SIMPLE: POSTERIOR SCALP

## 2019-05-16 NOTE — PROCEDURE: EXCIMER LASER
Comments: Patient is returning for treatment on the scalp, after three weeks since her last session. Patient states that her scalp is flaring and admits to scratching. \\n\\n***Discussed with the patient, much flaring noted on the scalp.
Post-Care Instructions: I reviewed with the patient in detail post-care instructions. Patient should stay away from the sun and wear sun protection until treated areas are fully healed.
Total Square Area In Cm2 (Required For Proper Billing- Whole Numbers Only Please): 140
Fluence #1 (J/Cm2 Or Mj/Cm2): 7945
Mode: repeat paint
Device Serial Number (Optional): [id number]
Spot Size: 2 x 2 cm
Detail Level: Zone
Consent: Written consent obtained, risks reviewed including but not limited to crusting, scabbing, blistering, scarring, darker or lighter pigmentary change, incidental hair removal, bruising, and/or incomplete removal.
Total Pulses (Optional): 368
Fluence Units: J/cm2
Location #1: scalp, posterior ears, elbows
Treatment Number: 31

## 2019-05-16 NOTE — PROCEDURE: EXCIMER LASER
Total Pulses (Optional): 368
Treatment Number: 31
Mode: repeat paint
Consent: Written consent obtained, risks reviewed including but not limited to crusting, scabbing, blistering, scarring, darker or lighter pigmentary change, incidental hair removal, bruising, and/or incomplete removal.
Fluence Units: J/cm2
Detail Level: Zone
Post-Care Instructions: I reviewed with the patient in detail post-care instructions. Patient should stay away from the sun and wear sun protection until treated areas are fully healed.
Spot Size: 2 x 2 cm
Device Serial Number (Optional): [id number]
Total Square Area In Cm2 (Required For Proper Billing- Whole Numbers Only Please): 140
Comments: Patient is returning for treatment on the scalp, after three weeks since her last session. Patient states that her scalp is flaring and admits to scratching. \\n\\n***Discussed with the patient, much flaring noted on the scalp.
Location #1: scalp, posterior ears, elbows
Fluence #1 (J/Cm2 Or Mj/Cm2): 9237

## 2019-06-13 ENCOUNTER — APPOINTMENT (RX ONLY)
Dept: URBAN - METROPOLITAN AREA CLINIC 38 | Facility: CLINIC | Age: 59
Setting detail: DERMATOLOGY
End: 2019-06-13

## 2019-06-13 ENCOUNTER — APPOINTMENT (RX ONLY)
Dept: URBAN - METROPOLITAN AREA CLINIC 37 | Facility: CLINIC | Age: 59
Setting detail: DERMATOLOGY
End: 2019-06-13

## 2019-06-13 DIAGNOSIS — L40.0 PSORIASIS VULGARIS: ICD-10-CM

## 2019-06-13 PROCEDURE — 96920 EXCIMER LSR PSRIASIS<250SQCM: CPT

## 2019-06-13 PROCEDURE — ? EXCIMER LASER

## 2019-06-13 ASSESSMENT — LOCATION DETAILED DESCRIPTION DERM
LOCATION DETAILED: MID-OCCIPITAL SCALP
LOCATION DETAILED: MID-OCCIPITAL SCALP
LOCATION DETAILED: RIGHT INFERIOR POSTAURICULAR SKIN
LOCATION DETAILED: RIGHT INFERIOR POSTAURICULAR SKIN

## 2019-06-13 ASSESSMENT — LOCATION ZONE DERM
LOCATION ZONE: SCALP
LOCATION ZONE: SCALP

## 2019-06-13 NOTE — PROCEDURE: EXCIMER LASER
Device Serial Number (Optional): [id number]
Location #1: scalp, right posterior ear
Comments: Patient states that she tolerated her last treatment well. She states that her scalp is still flaring, but not as bad. She states that she started her initial Stelara injection three weeks ago.
Treatment Number: 32
Fluence #1 (J/Cm2 Or Mj/Cm2): 4199
Post-Care Instructions: I reviewed with the patient in detail post-care instructions. Patient should stay away from the sun and wear sun protection until treated areas are fully healed.
Mode: repeat paint
Spot Size: 2 x 2 cm
Total Square Area In Cm2 (Required For Proper Billing- Whole Numbers Only Please): 100
Total Pulses (Optional): 263
Fluence Units: J/cm2
Consent: Written consent obtained, risks reviewed including but not limited to crusting, scabbing, blistering, scarring, darker or lighter pigmentary change, incidental hair removal, bruising, and/or incomplete removal.
Detail Level: Zone

## 2019-06-13 NOTE — PROCEDURE: EXCIMER LASER
Treatment Number: 32
Location #1: scalp, right posterior ear
Total Pulses (Optional): 263
Detail Level: Zone
Consent: Written consent obtained, risks reviewed including but not limited to crusting, scabbing, blistering, scarring, darker or lighter pigmentary change, incidental hair removal, bruising, and/or incomplete removal.
Spot Size: 2 x 2 cm
Fluence Units: J/cm2
Device Serial Number (Optional): [id number]
Mode: repeat paint
Total Square Area In Cm2 (Required For Proper Billing- Whole Numbers Only Please): 100
Fluence #1 (J/Cm2 Or Mj/Cm2): 3925
Post-Care Instructions: I reviewed with the patient in detail post-care instructions. Patient should stay away from the sun and wear sun protection until treated areas are fully healed.
Comments: Patient states that she tolerated her last treatment well. She states that her scalp is still flaring, but not as bad. She states that she started her initial Stelara injection three weeks ago.

## 2019-06-21 ENCOUNTER — APPOINTMENT (RX ONLY)
Dept: URBAN - METROPOLITAN AREA CLINIC 37 | Facility: CLINIC | Age: 59
Setting detail: DERMATOLOGY
End: 2019-06-21

## 2019-06-21 ENCOUNTER — APPOINTMENT (RX ONLY)
Dept: URBAN - METROPOLITAN AREA CLINIC 38 | Facility: CLINIC | Age: 59
Setting detail: DERMATOLOGY
End: 2019-06-21

## 2019-06-21 DIAGNOSIS — L40.0 PSORIASIS VULGARIS: ICD-10-CM | Status: IMPROVED

## 2019-06-21 PROCEDURE — ? ORDER TESTS

## 2019-06-21 PROCEDURE — ? COUNSELING

## 2019-06-21 PROCEDURE — ? MEDICATION COUNSELING

## 2019-06-21 PROCEDURE — 99213 OFFICE O/P EST LOW 20 MIN: CPT | Mod: 25

## 2019-06-21 PROCEDURE — ? VENIPUNCTURE

## 2019-06-21 PROCEDURE — 36415 COLL VENOUS BLD VENIPUNCTURE: CPT

## 2019-06-21 PROCEDURE — ? TREATMENT REGIMEN

## 2019-06-21 ASSESSMENT — LOCATION SIMPLE DESCRIPTION DERM
LOCATION SIMPLE: RIGHT UPPER ARM
LOCATION SIMPLE: RIGHT UPPER ARM

## 2019-06-21 ASSESSMENT — PGA PSORIASIS
PGA PSORIASIS: MODERATE (MODERATE PLAQUE ELEVATION, MODERATE ERYTHEMA, COARSE SCALE PREDOMINATES)
PGA PSORIASIS: MODERATE (MODERATE PLAQUE ELEVATION, MODERATE ERYTHEMA, COARSE SCALE PREDOMINATES)

## 2019-06-21 ASSESSMENT — LOCATION DETAILED DESCRIPTION DERM
LOCATION DETAILED: RIGHT ANTECUBITAL SKIN
LOCATION DETAILED: RIGHT ANTECUBITAL SKIN

## 2019-06-21 ASSESSMENT — LOCATION ZONE DERM
LOCATION ZONE: ARM
LOCATION ZONE: ARM

## 2019-06-21 NOTE — PROCEDURE: MEDICATION COUNSELING
Cosentyx Pregnancy And Lactation Text: This medication is Pregnancy Category B and is considered safe during pregnancy. It is unknown if this medication is excreted in breast milk.
Valtrex Counseling: I discussed with the patient the risks of valacyclovir including but not limited to kidney damage, nausea, vomiting and severe allergy.  The patient understands that if the infection seems to be worsening or is not improving, they are to call.
Solaraze Counseling:  I discussed with the patient the risks of Solaraze including but not limited to erythema, scaling, itching, weeping, crusting, and pain.
Prednisone Counseling:  I discussed with the patient the risks of prolonged use of prednisone including but not limited to weight gain, insomnia, osteoporosis, mood changes, diabetes, susceptibility to infection, glaucoma and high blood pressure.  In cases where prednisone use is prolonged, patients should be monitored with blood pressure checks, serum glucose levels and an eye exam.  Additionally, the patient may need to be placed on GI prophylaxis, PCP prophylaxis, and calcium and vitamin D supplementation and/or a bisphosphonate.  The patient verbalized understanding of the proper use and the possible adverse effects of prednisone.  All of the patient's questions and concerns were addressed.
Drysol Pregnancy And Lactation Text: This medication is considered safe during pregnancy and breast feeding.
Hydroxychloroquine Pregnancy And Lactation Text: This medication has been shown to cause fetal harm but it isn't assigned a Pregnancy Risk Category. There are small amounts excreted in breast milk.
Prednisone Pregnancy And Lactation Text: This medication is Pregnancy Category C and it isn't know if it is safe during pregnancy. This medication is excreted in breast milk.
Opioid Counseling: I discussed with the patient the potential side effects of opioids including but not limited to addiction, altered mental status, and depression. I stressed avoiding alcohol, benzodiazepines, muscle relaxants and sleep aids unless specifically okayed by a physician. The patient verbalized understanding of the proper use and possible adverse effects of opioids. All of the patient's questions and concerns were addressed. They were instructed to flush the remaining pills down the toilet if they did not need them for pain.
Cimetidine Counseling:  I discussed with the patient the risks of Cimetidine including but not limited to gynecomastia, headache, diarrhea, nausea, drowsiness, arrhythmias, pancreatitis, skin rashes, psychosis, bone marrow suppression and kidney toxicity.
Taltz Pregnancy And Lactation Text: The risk during pregnancy and breastfeeding is uncertain with this medication.
Minocycline Pregnancy And Lactation Text: This medication is Pregnancy Category D and not consider safe during pregnancy. It is also excreted in breast milk.
Dupixent Counseling: I discussed with the patient the risks of dupilumab including but not limited to eye infection and irritation, cold sores, injection site reactions, worsening of asthma, allergic reactions and increased risk of parasitic infection.  Live vaccines should be avoided while taking dupilumab. Dupilumab will also interact with certain medications such as warfarin and cyclosporine. The patient understands that monitoring is required and they must alert us or the primary physician if symptoms of infection or other concerning signs are noted.
Niacinamide Counseling: I recommended taking niacin or niacinamide, also know as vitamin B3, twice daily. Recent evidence suggests that taking vitamin B3 (500 mg twice daily) can reduce the risk of actinic keratoses and non-melanoma skin cancers. Side effects of vitamin B3 include flushing and headache.
Elidel Counseling: Patient may experience a mild burning sensation during topical application. Elidel is not approved in children less than 2 years of age. There have been case reports of hematologic and skin malignancies in patients using topical calcineurin inhibitors although causality is questionable.
Solaraze Pregnancy And Lactation Text: This medication is Pregnancy Category B and is considered safe. There is some data to suggest avoiding during the third trimester. It is unknown if this medication is excreted in breast milk.
Valtrex Pregnancy And Lactation Text: this medication is Pregnancy Category B and is considered safe during pregnancy. This medication is not directly found in breast milk but it's metabolite acyclovir is present.
Quinolones Counseling:  I discussed with the patient the risks of fluoroquinolones including but not limited to GI upset, allergic reaction, drug rash, diarrhea, dizziness, photosensitivity, yeast infections, liver function test abnormalities, tendonitis/tendon rupture.
Niacinamide Pregnancy And Lactation Text: These medications are considered safe during pregnancy.
Elidel Pregnancy And Lactation Text: This medication is Pregnancy Category C. It is unknown if this medication is excreted in breast milk.
Tremfya Counseling: I discussed with the patient the risks of guselkumab including but not limited to immunosuppression, serious infections, worsening of inflammatory bowel disease and drug reactions.  The patient understands that monitoring is required including a PPD at baseline and must alert us or the primary physician if symptoms of infection or other concerning signs are noted.
Use Enhanced Medication Counseling?: No
Topical Retinoid counseling:  Patient advised to apply a pea-sized amount only at bedtime and wait 30 minutes after washing their face before applying.  If too drying, patient may add a non-comedogenic moisturizer. The patient verbalized understanding of the proper use and possible adverse effects of retinoids.  All of the patient's questions and concerns were addressed.
Dupixent Pregnancy And Lactation Text: This medication likely crosses the placenta but the risk for the fetus is uncertain. This medication is excreted in breast milk.
Cimetidine Pregnancy And Lactation Text: This medication is Pregnancy Category B and is considered safe during pregnancy. It is also excreted in breast milk and breast feeding isn't recommended.
Opioid Pregnancy And Lactation Text: These medications can lead to premature delivery and should be avoided during pregnancy. These medications are also present in breast milk in small amounts.
Quinolones Pregnancy And Lactation Text: This medication is Pregnancy Category C and it isn't know if it is safe during pregnancy. It is also excreted in breast milk.
Enbrel Counseling:  I discussed with the patient the risks of etanercept including but not limited to myelosuppression, immunosuppression, autoimmune hepatitis, demyelinating diseases, lymphoma, and infections.  The patient understands that monitoring is required including a PPD at baseline and must alert us or the primary physician if symptoms of infection or other concerning signs are noted.
Doxepin Counseling:  Patient advised that the medication is sedating and not to drive a car after taking this medication. Patient informed of potential adverse effects including but not limited to dry mouth, urinary retention, and blurry vision.  The patient verbalized understanding of the proper use and possible adverse effects of doxepin.  All of the patient's questions and concerns were addressed.
Acitretin Counseling:  I discussed with the patient the risks of acitretin including but not limited to hair loss, dry lips/skin/eyes, liver damage, hyperlipidemia, depression/suicidal ideation, photosensitivity.  Serious rare side effects can include but are not limited to pancreatitis, pseudotumor cerebri, bony changes, clot formation/stroke/heart attack.  Patient understands that alcohol is contraindicated since it can result in liver toxicity and significantly prolong the elimination of the drug by many years.
Eucrisa Counseling: Patient may experience a mild burning sensation during topical application. Eucrisa is not approved in children less than 2 years of age.
Detail Level: Simple
Nsaids Counseling: NSAID Counseling: I discussed with the patient that NSAIDs should be taken with food. Prolonged use of NSAIDs can result in the development of stomach ulcers.  Patient advised to stop taking NSAIDs if abdominal pain occurs.  The patient verbalized understanding of the proper use and possible adverse effects of NSAIDs.  All of the patient's questions and concerns were addressed.
Acitretin Pregnancy And Lactation Text: This medication is Pregnancy Category X and should not be given to women who are pregnant or may become pregnant in the future. This medication is excreted in breast milk.
Arava Counseling:  Patient counseled regarding adverse effects of Arava including but not limited to nausea, vomiting, abnormalities in liver function tests. Patients may develop mouth sores, rash, diarrhea, and abnormalities in blood counts. The patient understands that monitoring is required including LFTs and blood counts.  There is a rare possibility of scarring of the liver and lung problems that can occur when taking methotrexate. Persistent nausea, loss of appetite, pale stools, dark urine, cough, and shortness of breath should be reported immediately. Patient advised to discontinue Arava treatment and consult with a physician prior to attempting conception. The patient will have to undergo a treatment to eliminate Arava from the body prior to conception.
Doxepin Pregnancy And Lactation Text: This medication is Pregnancy Category C and it isn't known if it is safe during pregnancy. It is also excreted in breast milk and breast feeding isn't recommended.
Rifampin Counseling: I discussed with the patient the risks of rifampin including but not limited to liver damage, kidney damage, red-orange body fluids, nausea/vomiting and severe allergy.
Xeljanz Counseling: I discussed with the patient the risks of Xeljanz therapy including increased risk of infection, liver issues, headache, diarrhea, or cold symptoms. Live vaccines should be avoided. They were instructed to call if they have any problems.
Eucrisa Pregnancy And Lactation Text: This medication has not been assigned a Pregnancy Risk Category but animal studies failed to show danger with the topical medication. It is unknown if the medication is excreted in breast milk.
Nsaids Pregnancy And Lactation Text: These medications are considered safe up to 30 weeks gestation. It is excreted in breast milk.
Tazorac Counseling:  Patient advised that medication is irritating and drying.  Patient may need to apply sparingly and wash off after an hour before eventually leaving it on overnight.  The patient verbalized understanding of the proper use and possible adverse effects of tazorac.  All of the patient's questions and concerns were addressed.
Odomzo Counseling- I discussed with the patient the risks of Odomzo including but not limited to nausea, vomiting, diarrhea, constipation, weight loss, changes in the sense of taste, decreased appetite, muscle spasms, and hair loss.  The patient verbalized understanding of the proper use and possible adverse effects of Odomzo.  All of the patient's questions and concerns were addressed.
Hydroxyzine Counseling: Patient advised that the medication is sedating and not to drive a car after taking this medication.  Patient informed of potential adverse effects including but not limited to dry mouth, urinary retention, and blurry vision.  The patient verbalized understanding of the proper use and possible adverse effects of hydroxyzine.  All of the patient's questions and concerns were addressed.
Hydroquinone Counseling:  Patient advised that medication may result in skin irritation, lightening (hypopigmentation), dryness, and burning.  In the event of skin irritation, the patient was advised to reduce the amount of the drug applied or use it less frequently.  Rarely, spots that are treated with hydroquinone can become darker (pseudoochronosis).  Should this occur, patient instructed to stop medication and call the office. The patient verbalized understanding of the proper use and possible adverse effects of hydroquinone.  All of the patient's questions and concerns were addressed.
Rifampin Pregnancy And Lactation Text: This medication is Pregnancy Category C and it isn't know if it is safe during pregnancy. It is also excreted in breast milk and should not be used if you are breast feeding.
Tazorac Pregnancy And Lactation Text: This medication is not safe during pregnancy. It is unknown if this medication is excreted in breast milk.
Xeljasonz Pregnancy And Lactation Text: This medication is Pregnancy Category D and is not considered safe during pregnancy.  The risk during breast feeding is also uncertain.
Humira Counseling:  I discussed with the patient the risks of adalimumab including but not limited to myelosuppression, immunosuppression, autoimmune hepatitis, demyelinating diseases, lymphoma, and serious infections.  The patient understands that monitoring is required including a PPD at baseline and must alert us or the primary physician if symptoms of infection or other concerning signs are noted.
Bexarotene Counseling:  I discussed with the patient the risks of bexarotene including but not limited to hair loss, dry lips/skin/eyes, liver abnormalities, hyperlipidemia, pancreatitis, depression/suicidal ideation, photosensitivity, drug rash/allergic reactions, hypothyroidism, anemia, leukopenia, infection, cataracts, and teratogenicity.  Patient understands that they will need regular blood tests to check lipid profile, liver function tests, white blood cell count, thyroid function tests and pregnancy test if applicable.
Tetracycline Counseling: Patient counseled regarding possible photosensitivity and increased risk for sunburn.  Patient instructed to avoid sunlight, if possible.  When exposed to sunlight, patients should wear protective clothing, sunglasses, and sunscreen.  The patient was instructed to call the office immediately if the following severe adverse effects occur:  hearing changes, easy bruising/bleeding, severe headache, or vision changes.  The patient verbalized understanding of the proper use and possible adverse effects of tetracycline.  All of the patient's questions and concerns were addressed. Patient understands to avoid pregnancy while on therapy due to potential birth defects.
Arava Pregnancy And Lactation Text: This medication is Pregnancy Category X and is absolutely contraindicated during pregnancy. It is unknown if it is excreted in breast milk.
Topical Clindamycin Counseling: Patient counseled that this medication may cause skin irritation or allergic reactions.  In the event of skin irritation, the patient was advised to reduce the amount of the drug applied or use it less frequently.   The patient verbalized understanding of the proper use and possible adverse effects of clindamycin.  All of the patient's questions and concerns were addressed.
Clofazimine Counseling:  I discussed with the patient the risks of clofazimine including but not limited to skin and eye pigmentation, liver damage, nausea/vomiting, gastrointestinal bleeding and allergy.
Hydroxyzine Pregnancy And Lactation Text: This medication is not safe during pregnancy and should not be taken. It is also excreted in breast milk and breast feeding isn't recommended.
Bexarotene Pregnancy And Lactation Text: This medication is Pregnancy Category X and should not be given to women who are pregnant or may become pregnant. This medication should not be used if you are breast feeding.
Xolair Counseling:  Patient informed of potential adverse effects including but not limited to fever, muscle aches, rash and allergic reactions.  The patient verbalized understanding of the proper use and possible adverse effects of Xolair.  All of the patient's questions and concerns were addressed.
Isotretinoin Counseling: Patient should get monthly blood tests, not donate blood, not drive at night if vision affected, not share medication, and not undergo elective surgery for 6 months after tx completed. Side effects reviewed, pt to contact office should one occur.
Otezla Counseling: The side effects of Otezla were discussed with the patient, including but not limited to worsening or new depression, weight loss, diarrhea, nausea, upper respiratory tract infection, and headache. Patient instructed to call the office should any adverse effect occur.  The patient verbalized understanding of the proper use and possible adverse effects of Otezla.  All the patient's questions and concerns were addressed.
Clofazimine Pregnancy And Lactation Text: This medication is Pregnancy Category C and isn't considered safe during pregnancy. It is excreted in breast milk.
Xolair Pregnancy And Lactation Text: This medication is Pregnancy Category B and is considered safe during pregnancy. This medication is excreted in breast milk.
Ilumya Counseling: I discussed with the patient the risks of tildrakizumab including but not limited to immunosuppression, malignancy, posterior leukoencephalopathy syndrome, and serious infections.  The patient understands that monitoring is required including a PPD at baseline and must alert us or the primary physician if symptoms of infection or other concerning signs are noted.
Imiquimod Counseling:  I discussed with the patient the risks of imiquimod including but not limited to erythema, scaling, itching, weeping, crusting, and pain.  Patient understands that the inflammatory response to imiquimod is variable from person to person and was educated regarded proper titration schedule.  If flu-like symptoms develop, patient knows to discontinue the medication and contact us.
Topical Clindamycin Pregnancy And Lactation Text: This medication is Pregnancy Category B and is considered safe during pregnancy. It is unknown if it is excreted in breast milk.
Otezla Pregnancy And Lactation Text: This medication is Pregnancy Category C and it isn't known if it is safe during pregnancy. It is unknown if it is excreted in breast milk.
Albendazole Counseling:  I discussed with the patient the risks of albendazole including but not limited to cytopenia, kidney damage, nausea/vomiting and severe allergy.  The patient understands that this medication is being used in an off-label manner.
Topical Sulfur Applications Counseling: Topical Sulfur Counseling: Patient counseled that this medication may cause skin irritation or allergic reactions.  In the event of skin irritation, the patient was advised to reduce the amount of the drug applied or use it less frequently.   The patient verbalized understanding of the proper use and possible adverse effects of topical sulfur application.  All of the patient's questions and concerns were addressed.
Fluconazole Counseling:  Patient counseled regarding adverse effects of fluconazole including but not limited to headache, diarrhea, nausea, upset stomach, liver function test abnormalities, taste disturbance, and stomach pain.  There is a rare possibility of liver failure that can occur when taking fluconazole.  The patient understands that monitoring of LFTs and kidney function test may be required, especially at baseline. The patient verbalized understanding of the proper use and possible adverse effects of fluconazole.  All of the patient's questions and concerns were addressed.
Isotretinoin Pregnancy And Lactation Text: This medication is Pregnancy Category X and is considered extremely dangerous during pregnancy. It is unknown if it is excreted in breast milk.
Colchicine Counseling:  Patient counseled regarding adverse effects including but not limited to stomach upset (nausea, vomiting, stomach pain, or diarrhea).  Patient instructed to limit alcohol consumption while taking this medication.  Colchicine may reduce blood counts especially with prolonged use.  The patient understands that monitoring of kidney function and blood counts may be required, especially at baseline. The patient verbalized understanding of the proper use and possible adverse effects of colchicine.  All of the patient's questions and concerns were addressed.
Topical Sulfur Applications Pregnancy And Lactation Text: This medication is Pregnancy Category C and has an unknown safety profile during pregnancy. It is unknown if this topical medication is excreted in breast milk.
Infliximab Counseling:  I discussed with the patient the risks of infliximab including but not limited to myelosuppression, immunosuppression, autoimmune hepatitis, demyelinating diseases, lymphoma, and serious infections.  The patient understands that monitoring is required including a PPD at baseline and must alert us or the primary physician if symptoms of infection or other concerning signs are noted.
Azithromycin Counseling:  I discussed with the patient the risks of azithromycin including but not limited to GI upset, allergic reaction, drug rash, diarrhea, and yeast infections.
High Dose Vitamin A Counseling: Side effects reviewed, pt to contact office should one occur.
Albendazole Pregnancy And Lactation Text: This medication is Pregnancy Category C and it isn't known if it is safe during pregnancy. It is also excreted in breast milk.
Minoxidil Counseling: Minoxidil is a topical medication which can increase blood flow where it is applied. It is uncertain how this medication increases hair growth. Side effects are uncommon and include stinging and allergic reactions.
Azathioprine Counseling:  I discussed with the patient the risks of azathioprine including but not limited to myelosuppression, immunosuppression, hepatotoxicity, lymphoma, and infections.  The patient understands that monitoring is required including baseline LFTs, Creatinine, possible TPMP genotyping and weekly CBCs for the first month and then every 2 weeks thereafter.  The patient verbalized understanding of the proper use and possible adverse effects of azathioprine.  All of the patient's questions and concerns were addressed.
Ivermectin Counseling:  Patient instructed to take medication on an empty stomach with a full glass of water.  Patient informed of potential adverse effects including but not limited to nausea, diarrhea, dizziness, itching, and swelling of the extremities or lymph nodes.  The patient verbalized understanding of the proper use and possible adverse effects of ivermectin.  All of the patient's questions and concerns were addressed.
Oxybutynin Counseling:  I discussed with the patient the risks of oxybutynin including but not limited to skin rash, drowsiness, dry mouth, difficulty urinating, and blurred vision.
High Dose Vitamin A Pregnancy And Lactation Text: High dose vitamin A therapy is contraindicated during pregnancy and breast feeding.
Azathioprine Pregnancy And Lactation Text: This medication is Pregnancy Category D and isn't considered safe during pregnancy. It is unknown if this medication is excreted in breast milk.
Azithromycin Pregnancy And Lactation Text: This medication is considered safe during pregnancy and is also secreted in breast milk.
Dapsone Counseling: I discussed with the patient the risks of dapsone including but not limited to hemolytic anemia, agranulocytosis, rashes, methemoglobinemia, kidney failure, peripheral neuropathy, headaches, GI upset, and liver toxicity.  Patients who start dapsone require monitoring including baseline LFTs and weekly CBCs for the first month, then every month thereafter.  The patient verbalized understanding of the proper use and possible adverse effects of dapsone.  All of the patient's questions and concerns were addressed.
Mirvaso Counseling: Mirvaso is a topical medication which can decrease superficial blood flow where applied. Side effects are uncommon and include stinging, redness and allergic reactions.
Griseofulvin Counseling:  I discussed with the patient the risks of griseofulvin including but not limited to photosensitivity, cytopenia, liver damage, nausea/vomiting and severe allergy.  The patient understands that this medication is best absorbed when taken with a fatty meal (e.g., ice cream or french fries).
Cellcept Counseling:  I discussed with the patient the risks of mycophenolate mofetil including but not limited to infection/immunosuppression, GI upset, hypokalemia, hypercholesterolemia, bone marrow suppression, lymphoproliferative disorders, malignancy, GI ulceration/bleed/perforation, colitis, interstitial lung disease, kidney failure, progressive multifocal leukoencephalopathy, and birth defects.  The patient understands that monitoring is required including a baseline creatinine and regular CBC testing. In addition, patient must alert us immediately if symptoms of infection or other concerning signs are noted.
Rituxan Counseling:  I discussed with the patient the risks of Rituxan infusions. Side effects can include infusion reactions, severe drug rashes including mucocutaneous reactions, reactivation of latent hepatitis and other infections and rarely progressive multifocal leukoencephalopathy.  All of the patient's questions and concerns were addressed.
Bactrim Counseling:  I discussed with the patient the risks of sulfa antibiotics including but not limited to GI upset, allergic reaction, drug rash, diarrhea, dizziness, photosensitivity, and yeast infections.  Rarely, more serious reactions can occur including but not limited to aplastic anemia, agranulocytosis, methemoglobinemia, blood dyscrasias, liver or kidney failure, lung infiltrates or desquamative/blistering drug rashes.
Griseofulvin Pregnancy And Lactation Text: This medication is Pregnancy Category X and is known to cause serious birth defects. It is unknown if this medication is excreted in breast milk but breast feeding should be avoided.
Dapsone Pregnancy And Lactation Text: This medication is Pregnancy Category C and is not considered safe during pregnancy or breast feeding.
Birth Control Pills Counseling: Birth Control Pill Counseling: I discussed with the patient the potential side effects of OCPs including but not limited to increased risk of stroke, heart attack, thrombophlebitis, deep venous thrombosis, hepatic adenomas, breast changes, GI upset, headaches, and depression.  The patient verbalized understanding of the proper use and possible adverse effects of OCPs. All of the patient's questions and concerns were addressed.
Rituxan Pregnancy And Lactation Text: This medication is Pregnancy Category C and it isn't know if it is safe during pregnancy. It is unknown if this medication is excreted in breast milk but similar antibodies are known to be excreted.
Bactrim Pregnancy And Lactation Text: This medication is Pregnancy Category D and is known to cause fetal risk.  It is also excreted in breast milk.
Benzoyl Peroxide Counseling: Patient counseled that medicine may cause skin irritation and bleach clothing.  In the event of skin irritation, the patient was advised to reduce the amount of the drug applied or use it less frequently.   The patient verbalized understanding of the proper use and possible adverse effects of benzoyl peroxide.  All of the patient's questions and concerns were addressed.
Erivedge Counseling- I discussed with the patient the risks of Erivedge including but not limited to nausea, vomiting, diarrhea, constipation, weight loss, changes in the sense of taste, decreased appetite, muscle spasms, and hair loss.  The patient verbalized understanding of the proper use and possible adverse effects of Erivedge.  All of the patient's questions and concerns were addressed.
Mirvaso Pregnancy And Lactation Text: This medication has not been assigned a Pregnancy Risk Category. It is unknown if the medication is excreted in breast milk.
Itraconazole Counseling:  I discussed with the patient the risks of itraconazole including but not limited to liver damage, nausea/vomiting, neuropathy, and severe allergy.  The patient understands that this medication is best absorbed when taken with acidic beverages such as non-diet cola or ginger ale.  The patient understands that monitoring is required including baseline LFTs and repeat LFTs at intervals.  The patient understands that they are to contact us or the primary physician if concerning signs are noted.
Picato Counseling:  I discussed with the patient the risks of Picato including but not limited to erythema, scaling, itching, weeping, crusting, and pain.
Siliq Counseling:  I discussed with the patient the risks of Siliq including but not limited to new or worsening depression, suicidal thoughts and behavior, immunosuppression, malignancy, posterior leukoencephalopathy syndrome, and serious infections.  The patient understands that monitoring is required including a PPD at baseline and must alert us or the primary physician if symptoms of infection or other concerning signs are noted. There is also a special program designed to monitor depression which is required with Siliq.
Birth Control Pills Pregnancy And Lactation Text: This medication should be avoided if pregnant and for the first 30 days post-partum.
Spironolactone Counseling: Patient advised regarding risks of diarrhea, abdominal pain, hyperkalemia, birth defects (for female patients), liver toxicity and renal toxicity. The patient may need blood work to monitor liver and kidney function and potassium levels while on therapy. The patient verbalized understanding of the proper use and possible adverse effects of spironolactone.  All of the patient's questions and concerns were addressed.
Cyclophosphamide Counseling:  I discussed with the patient the risks of cyclophosphamide including but not limited to hair loss, hormonal abnormalities, decreased fertility, abdominal pain, diarrhea, nausea and vomiting, bone marrow suppression and infection. The patient understands that monitoring is required while taking this medication.
Cephalexin Counseling: I counseled the patient regarding use of cephalexin as an antibiotic for prophylactic and/or therapeutic purposes. Cephalexin (commonly prescribed under brand name Keflex) is a cephalosporin antibiotic which is active against numerous classes of bacteria, including most skin bacteria. Side effects may include nausea, diarrhea, gastrointestinal upset, rash, hives, yeast infections, and in rare cases, hepatitis, kidney disease, seizures, fever, confusion, neurologic symptoms, and others. Patients with severe allergies to penicillin medications are cautioned that there is about a 10% incidence of cross-reactivity with cephalosporins. When possible, patients with penicillin allergies should use alternatives to cephalosporins for antibiotic therapy.
Benzoyl Peroxide Pregnancy And Lactation Text: This medication is Pregnancy Category C. It is unknown if benzoyl peroxide is excreted in breast milk.
Cyclophosphamide Pregnancy And Lactation Text: This medication is Pregnancy Category D and it isn't considered safe during pregnancy. This medication is excreted in breast milk.
Cephalexin Pregnancy And Lactation Text: This medication is Pregnancy Category B and considered safe during pregnancy.  It is also excreted in breast milk but can be used safely for shorter doses.
Gabapentin Counseling: I discussed with the patient the risks of gabapentin including but not limited to dizziness, somnolence, fatigue and ataxia.
Ketoconazole Counseling:   Patient counseled regarding improving absorption with orange juice.  Adverse effects include but are not limited to breast enlargement, headache, diarrhea, nausea, upset stomach, liver function test abnormalities, taste disturbance, and stomach pain.  There is a rare possibility of liver failure that can occur when taking ketoconazole. The patient understands that monitoring of LFTs may be required, especially at baseline. The patient verbalized understanding of the proper use and possible adverse effects of ketoconazole.  All of the patient's questions and concerns were addressed.
Carac Counseling:  I discussed with the patient the risks of Carac including but not limited to erythema, scaling, itching, weeping, crusting, and pain.
Spironolactone Pregnancy And Lactation Text: This medication can cause feminization of the male fetus and should be avoided during pregnancy. The active metabolite is also found in breast milk.
Cyclosporine Counseling:  I discussed with the patient the risks of cyclosporine including but not limited to hypertension, gingival hyperplasia,myelosuppression, immunosuppression, liver damage, kidney damage, neurotoxicity, lymphoma, and serious infections. The patient understands that monitoring is required including baseline blood pressure, CBC, CMP, lipid panel and uric acid, and then 1-2 times monthly CMP and blood pressure.
Wartpeel Counseling:  I discussed with the patient the risks of Wartpeel including but not limited to erythema, scaling, itching, weeping, crusting, and pain.
Clindamycin Counseling: I counseled the patient regarding use of clindamycin as an antibiotic for prophylactic and/or therapeutic purposes. Clindamycin is active against numerous classes of bacteria, including skin bacteria. Side effects may include nausea, diarrhea, gastrointestinal upset, rash, hives, yeast infections, and in rare cases, colitis.
Erythromycin Counseling:  I discussed with the patient the risks of erythromycin including but not limited to GI upset, allergic reaction, drug rash, diarrhea, increase in liver enzymes, and yeast infections.
Simponi Counseling:  I discussed with the patient the risks of golimumab including but not limited to myelosuppression, immunosuppression, autoimmune hepatitis, demyelinating diseases, lymphoma, and serious infections.  The patient understands that monitoring is required including a PPD at baseline and must alert us or the primary physician if symptoms of infection or other concerning signs are noted.
Carac Pregnancy And Lactation Text: This medication is Pregnancy Category X and contraindicated in pregnancy and in women who may become pregnant. It is unknown if this medication is excreted in breast milk.
Protopic Counseling: Patient may experience a mild burning sensation during topical application. Protopic is not approved in children less than 2 years of age. There have been case reports of hematologic and skin malignancies in patients using topical calcineurin inhibitors although causality is questionable.
Erythromycin Pregnancy And Lactation Text: This medication is Pregnancy Category B and is considered safe during pregnancy. It is also excreted in breast milk.
SSKI Counseling:  I discussed with the patient the risks of SSKI including but not limited to thyroid abnormalities, metallic taste, GI upset, fever, headache, acne, arthralgias, paraesthesias, lymphadenopathy, easy bleeding, arrhythmias, and allergic reaction.
Cimzia Counseling:  I discussed with the patient the risks of Cimzia including but not limited to immunosuppression, allergic reactions and infections.  The patient understands that monitoring is required including a PPD at baseline and must alert us or the primary physician if symptoms of infection or other concerning signs are noted.
Sski Pregnancy And Lactation Text: This medication is Pregnancy Category D and isn't considered safe during pregnancy. It is excreted in breast milk.
Protopic Pregnancy And Lactation Text: This medication is Pregnancy Category C. It is unknown if this medication is excreted in breast milk when applied topically.
Clindamycin Pregnancy And Lactation Text: This medication can be used in pregnancy if certain situations. Clindamycin is also present in breast milk.
Ketoconazole Pregnancy And Lactation Text: This medication is Pregnancy Category C and it isn't know if it is safe during pregnancy. It is also excreted in breast milk and breast feeding isn't recommended.
Glycopyrrolate Counseling:  I discussed with the patient the risks of glycopyrrolate including but not limited to skin rash, drowsiness, dry mouth, difficulty urinating, and blurred vision.
5-Fu Counseling: 5-Fluorouracil Counseling:  I discussed with the patient the risks of 5-fluorouracil including but not limited to erythema, scaling, itching, weeping, crusting, and pain.
Cimzia Pregnancy And Lactation Text: This medication crosses the placenta but can be considered safe in certain situations. Cimzia may be excreted in breast milk.
Zyclara Counseling:  I discussed with the patient the risks of imiquimod including but not limited to erythema, scaling, itching, weeping, crusting, and pain.  Patient understands that the inflammatory response to imiquimod is variable from person to person and was educated regarded proper titration schedule.  If flu-like symptoms develop, patient knows to discontinue the medication and contact us.
Methotrexate Counseling:  Patient counseled regarding adverse effects of methotrexate including but not limited to nausea, vomiting, abnormalities in liver function tests. Patients may develop mouth sores, rash, diarrhea, and abnormalities in blood counts. The patient understands that monitoring is required including LFT's and blood counts.  There is a rare possibility of scarring of the liver and lung problems that can occur when taking methotrexate. Persistent nausea, loss of appetite, pale stools, dark urine, cough, and shortness of breath should be reported immediately. Patient advised to discontinue methotrexate treatment at least three months before attempting to become pregnant.  I discussed the need for folate supplements while taking methotrexate.  These supplements can decrease side effects during methotrexate treatment. The patient verbalized understanding of the proper use and possible adverse effects of methotrexate.  All of the patient's questions and concerns were addressed.
Terbinafine Counseling: Patient counseling regarding adverse effects of terbinafine including but not limited to headache, diarrhea, rash, upset stomach, liver function test abnormalities, itching, taste/smell disturbance, nausea, abdominal pain, and flatulence.  There is a rare possibility of liver failure that can occur when taking terbinafine.  The patient understands that a baseline LFT and kidney function test may be required. The patient verbalized understanding of the proper use and possible adverse effects of terbinafine.  All of the patient's questions and concerns were addressed.
Metronidazole Counseling:  I discussed with the patient the risks of metronidazole including but not limited to seizures, nausea/vomiting, a metallic taste in the mouth, nausea/vomiting and severe allergy.
Stelara Counseling:  I discussed with the patient the risks of ustekinumab including but not limited to immunosuppression, malignancy, posterior leukoencephalopathy syndrome, and serious infections.  The patient understands that monitoring is required including a PPD at baseline and must alert us or the primary physician if symptoms of infection or other concerning signs are noted.
Glycopyrrolate Pregnancy And Lactation Text: This medication is Pregnancy Category B and is considered safe during pregnancy. It is unknown if it is excreted breast milk.
Rhofade Counseling: Rhofade is a topical medication which can decrease superficial blood flow where applied. Side effects are uncommon and include stinging, redness and allergic reactions.
Thalidomide Counseling: I discussed with the patient the risks of thalidomide including but not limited to birth defects, anxiety, weakness, chest pain, dizziness, cough and severe allergy.
Methotrexate Pregnancy And Lactation Text: This medication is Pregnancy Category X and is known to cause fetal harm. This medication is excreted in breast milk.
Hydroxychloroquine Counseling:  I discussed with the patient that a baseline ophthalmologic exam is needed at the start of therapy and every year thereafter while on therapy. A CBC may also be warranted for monitoring.  The side effects of this medication were discussed with the patient, including but not limited to agranulocytosis, aplastic anemia, seizures, rashes, retinopathy, and liver toxicity. Patient instructed to call the office should any adverse effect occur.  The patient verbalized understanding of the proper use and possible adverse effects of Plaquenil.  All the patient's questions and concerns were addressed.
Metronidazole Pregnancy And Lactation Text: This medication is Pregnancy Category B and considered safe during pregnancy.  It is also excreted in breast milk.
Drysol Counseling:  I discussed with the patient the risks of drysol/aluminum chloride including but not limited to skin rash, itching, irritation, burning.
Cosentyx Counseling:  I discussed with the patient the risks of Cosentyx including but not limited to worsening of Crohn's disease, immunosuppression, allergic reactions and infections.  The patient understands that monitoring is required including a PPD at baseline and must alert us or the primary physician if symptoms of infection or other concerning signs are noted.
Doxycycline Counseling:  Patient counseled regarding possible photosensitivity and increased risk for sunburn.  Patient instructed to avoid sunlight, if possible.  When exposed to sunlight, patients should wear protective clothing, sunglasses, and sunscreen.  The patient was instructed to call the office immediately if the following severe adverse effects occur:  hearing changes, easy bruising/bleeding, severe headache, or vision changes.  The patient verbalized understanding of the proper use and possible adverse effects of doxycycline.  All of the patient's questions and concerns were addressed.
Doxycycline Pregnancy And Lactation Text: This medication is Pregnancy Category D and not consider safe during pregnancy. It is also excreted in breast milk but is considered safe for shorter treatment courses.
Minocycline Counseling: Patient advised regarding possible photosensitivity and discoloration of the teeth, skin, lips, tongue and gums.  Patient instructed to avoid sunlight, if possible.  When exposed to sunlight, patients should wear protective clothing, sunglasses, and sunscreen.  The patient was instructed to call the office immediately if the following severe adverse effects occur:  hearing changes, easy bruising/bleeding, severe headache, or vision changes.  The patient verbalized understanding of the proper use and possible adverse effects of minocycline.  All of the patient's questions and concerns were addressed.
Taltz Counseling: I discussed with the patient the risks of ixekizumab including but not limited to immunosuppression, serious infections, worsening of inflammatory bowel disease and drug reactions.  The patient understands that monitoring is required including a PPD at baseline and must alert us or the primary physician if symptoms of infection or other concerning signs are noted.

## 2019-06-21 NOTE — PROCEDURE: ORDER TESTS
Billing Type: Third-Party Bill
Lab Facility: [id number]
Bill For Surgical Tray: no
Performing Laboratory: [id number]
Expected Date Of Service: 06/21/2019

## 2019-06-21 NOTE — PROCEDURE: TREATMENT REGIMEN
Action 4: Continue
Detail Level: Simple
Treatment 1: Stelara 90 mg injected every 12 weeks
Continue Regimen: Xtrac for the next few weeks as plaques begin to thin given patient is early in treatment
Plan: RTO in 4 months for f/u. If no change in exam may have to discuss other therapeutics but early signs of improvement after baseline injection are supportive of a favorable response ahead.  Will watch closely

## 2019-06-21 NOTE — PROCEDURE: TREATMENT REGIMEN
Action 4: Continue
Treatment 1: Stelara 90 mg injected every 12 weeks
Plan: RTO in 4 months for f/u. If no change in exam may have to discuss other therapeutics but early signs of improvement after baseline injection are supportive of a favorable response ahead.  Will watch closely
Continue Regimen: Xtrac for the next few weeks as plaques begin to thin given patient is early in treatment
Detail Level: Simple

## 2019-06-21 NOTE — HPI: MEDICATION (STELARA)
How Severe Is It?: moderate
Is This A New Presentation, Or A Follow-Up?: Follow Up Stelara
Additional History: Pt presents following up on psoriasis with STELARA. Or does see improvement and is due for an injection tomorrow\\n\\nShe has only had her \"week 0\" injection. \\n\\nShe is already noticing her scalp lesions thinning with less itch

## 2019-06-21 NOTE — PROCEDURE: MEDICATION COUNSELING
Acitretin Counseling:  I discussed with the patient the risks of acitretin including but not limited to hair loss, dry lips/skin/eyes, liver damage, hyperlipidemia, depression/suicidal ideation, photosensitivity.  Serious rare side effects can include but are not limited to pancreatitis, pseudotumor cerebri, bony changes, clot formation/stroke/heart attack.  Patient understands that alcohol is contraindicated since it can result in liver toxicity and significantly prolong the elimination of the drug by many years.
Rhofade Pregnancy And Lactation Text: This medication has not been assigned a Pregnancy Risk Category. It is unknown if the medication is excreted in breast milk.
Tazorac Pregnancy And Lactation Text: This medication is not safe during pregnancy. It is unknown if this medication is excreted in breast milk.
Clofazimine Counseling:  I discussed with the patient the risks of clofazimine including but not limited to skin and eye pigmentation, liver damage, nausea/vomiting, gastrointestinal bleeding and allergy.
Prednisone Counseling:  I discussed with the patient the risks of prolonged use of prednisone including but not limited to weight gain, insomnia, osteoporosis, mood changes, diabetes, susceptibility to infection, glaucoma and high blood pressure.  In cases where prednisone use is prolonged, patients should be monitored with blood pressure checks, serum glucose levels and an eye exam.  Additionally, the patient may need to be placed on GI prophylaxis, PCP prophylaxis, and calcium and vitamin D supplementation and/or a bisphosphonate.  The patient verbalized understanding of the proper use and the possible adverse effects of prednisone.  All of the patient's questions and concerns were addressed.
Methotrexate Counseling:  Patient counseled regarding adverse effects of methotrexate including but not limited to nausea, vomiting, abnormalities in liver function tests. Patients may develop mouth sores, rash, diarrhea, and abnormalities in blood counts. The patient understands that monitoring is required including LFT's and blood counts.  There is a rare possibility of scarring of the liver and lung problems that can occur when taking methotrexate. Persistent nausea, loss of appetite, pale stools, dark urine, cough, and shortness of breath should be reported immediately. Patient advised to discontinue methotrexate treatment at least three months before attempting to become pregnant.  I discussed the need for folate supplements while taking methotrexate.  These supplements can decrease side effects during methotrexate treatment. The patient verbalized understanding of the proper use and possible adverse effects of methotrexate.  All of the patient's questions and concerns were addressed.
Birth Control Pills Pregnancy And Lactation Text: This medication should be avoided if pregnant and for the first 30 days post-partum.
Odomzo Pregnancy And Lactation Text: This medication is Pregnancy Category X and is absolutely contraindicated during pregnancy. It is unknown if it is excreted in breast milk.
Siliq Pregnancy And Lactation Text: The risk during pregnancy and breastfeeding is uncertain with this medication.
Colchicine Counseling:  Patient counseled regarding adverse effects including but not limited to stomach upset (nausea, vomiting, stomach pain, or diarrhea).  Patient instructed to limit alcohol consumption while taking this medication.  Colchicine may reduce blood counts especially with prolonged use.  The patient understands that monitoring of kidney function and blood counts may be required, especially at baseline. The patient verbalized understanding of the proper use and possible adverse effects of colchicine.  All of the patient's questions and concerns were addressed.
Bactrim Pregnancy And Lactation Text: This medication is Pregnancy Category D and is known to cause fetal risk.  It is also excreted in breast milk.
Opioid Counseling: I discussed with the patient the potential side effects of opioids including but not limited to addiction, altered mental status, and depression. I stressed avoiding alcohol, benzodiazepines, muscle relaxants and sleep aids unless specifically okayed by a physician. The patient verbalized understanding of the proper use and possible adverse effects of opioids. All of the patient's questions and concerns were addressed. They were instructed to flush the remaining pills down the toilet if they did not need them for pain.
5-Fu Pregnancy And Lactation Text: This medication is Pregnancy Category X and contraindicated in pregnancy and in women who may become pregnant. It is unknown if this medication is excreted in breast milk.
Benzoyl Peroxide Pregnancy And Lactation Text: This medication is Pregnancy Category C. It is unknown if benzoyl peroxide is excreted in breast milk.
Clofazimine Pregnancy And Lactation Text: This medication is Pregnancy Category C and isn't considered safe during pregnancy. It is excreted in breast milk.
Picato Pregnancy And Lactation Text: This medication is Pregnancy Category C. It is unknown if this medication is excreted in breast milk.
High Dose Vitamin A Counseling: Side effects reviewed, pt to contact office should one occur.
Oxybutynin Pregnancy And Lactation Text: This medication is Pregnancy Category B and is considered safe during pregnancy. It is unknown if it is excreted in breast milk.
Doxepin Pregnancy And Lactation Text: This medication is Pregnancy Category C and it isn't known if it is safe during pregnancy. It is also excreted in breast milk and breast feeding isn't recommended.
Niacinamide Counseling: I recommended taking niacin or niacinamide, also know as vitamin B3, twice daily. Recent evidence suggests that taking vitamin B3 (500 mg twice daily) can reduce the risk of actinic keratoses and non-melanoma skin cancers. Side effects of vitamin B3 include flushing and headache.
Topical Retinoid counseling:  Patient advised to apply a pea-sized amount only at bedtime and wait 30 minutes after washing their face before applying.  If too drying, patient may add a non-comedogenic moisturizer. The patient verbalized understanding of the proper use and possible adverse effects of retinoids.  All of the patient's questions and concerns were addressed.
Metronidazole Pregnancy And Lactation Text: This medication is Pregnancy Category B and considered safe during pregnancy.  It is also excreted in breast milk.
Erythromycin Counseling:  I discussed with the patient the risks of erythromycin including but not limited to GI upset, allergic reaction, drug rash, diarrhea, increase in liver enzymes, and yeast infections.
Detail Level: Simple
Doxycycline Counseling:  Patient counseled regarding possible photosensitivity and increased risk for sunburn.  Patient instructed to avoid sunlight, if possible.  When exposed to sunlight, patients should wear protective clothing, sunglasses, and sunscreen.  The patient was instructed to call the office immediately if the following severe adverse effects occur:  hearing changes, easy bruising/bleeding, severe headache, or vision changes.  The patient verbalized understanding of the proper use and possible adverse effects of doxycycline.  All of the patient's questions and concerns were addressed.
Nsaids Pregnancy And Lactation Text: These medications are considered safe up to 30 weeks gestation. It is excreted in breast milk.
Ivermectin Pregnancy And Lactation Text: This medication is Pregnancy Category C and it isn't known if it is safe during pregnancy. It is also excreted in breast milk.
Cyclosporine Pregnancy And Lactation Text: This medication is Pregnancy Category C and it isn't know if it is safe during pregnancy. This medication is excreted in breast milk.
Niacinamide Pregnancy And Lactation Text: These medications are considered safe during pregnancy.
Quinolones Pregnancy And Lactation Text: This medication is Pregnancy Category C and it isn't know if it is safe during pregnancy. It is also excreted in breast milk.
Topical Sulfur Applications Counseling: Topical Sulfur Counseling: Patient counseled that this medication may cause skin irritation or allergic reactions.  In the event of skin irritation, the patient was advised to reduce the amount of the drug applied or use it less frequently.   The patient verbalized understanding of the proper use and possible adverse effects of topical sulfur application.  All of the patient's questions and concerns were addressed.
Taltz Counseling: I discussed with the patient the risks of ixekizumab including but not limited to immunosuppression, serious infections, worsening of inflammatory bowel disease and drug reactions.  The patient understands that monitoring is required including a PPD at baseline and must alert us or the primary physician if symptoms of infection or other concerning signs are noted.
Rifampin Pregnancy And Lactation Text: This medication is Pregnancy Category C and it isn't know if it is safe during pregnancy. It is also excreted in breast milk and should not be used if you are breast feeding.
Bactrim Counseling:  I discussed with the patient the risks of sulfa antibiotics including but not limited to GI upset, allergic reaction, drug rash, diarrhea, dizziness, photosensitivity, and yeast infections.  Rarely, more serious reactions can occur including but not limited to aplastic anemia, agranulocytosis, methemoglobinemia, blood dyscrasias, liver or kidney failure, lung infiltrates or desquamative/blistering drug rashes.
Humira Pregnancy And Lactation Text: This medication is Pregnancy Category B and is considered safe during pregnancy. It is unknown if this medication is excreted in breast milk.
Zyclara Counseling:  I discussed with the patient the risks of imiquimod including but not limited to erythema, scaling, itching, weeping, crusting, and pain.  Patient understands that the inflammatory response to imiquimod is variable from person to person and was educated regarded proper titration schedule.  If flu-like symptoms develop, patient knows to discontinue the medication and contact us.
Cyclophosphamide Pregnancy And Lactation Text: This medication is Pregnancy Category D and it isn't considered safe during pregnancy. This medication is excreted in breast milk.
Isotretinoin Pregnancy And Lactation Text: This medication is Pregnancy Category X and is considered extremely dangerous during pregnancy. It is unknown if it is excreted in breast milk.
Minoxidil Pregnancy And Lactation Text: This medication has not been assigned a Pregnancy Risk Category but animal studies failed to show danger with the topical medication. It is unknown if the medication is excreted in breast milk.
Protopic Pregnancy And Lactation Text: This medication is Pregnancy Category C. It is unknown if this medication is excreted in breast milk when applied topically.
Solaraze Pregnancy And Lactation Text: This medication is Pregnancy Category B and is considered safe. There is some data to suggest avoiding during the third trimester. It is unknown if this medication is excreted in breast milk.
Albendazole Counseling:  I discussed with the patient the risks of albendazole including but not limited to cytopenia, kidney damage, nausea/vomiting and severe allergy.  The patient understands that this medication is being used in an off-label manner.
Tremfya Counseling: I discussed with the patient the risks of guselkumab including but not limited to immunosuppression, serious infections, worsening of inflammatory bowel disease and drug reactions.  The patient understands that monitoring is required including a PPD at baseline and must alert us or the primary physician if symptoms of infection or other concerning signs are noted.
Enbrel Counseling:  I discussed with the patient the risks of etanercept including but not limited to myelosuppression, immunosuppression, autoimmune hepatitis, demyelinating diseases, lymphoma, and infections.  The patient understands that monitoring is required including a PPD at baseline and must alert us or the primary physician if symptoms of infection or other concerning signs are noted.
Griseofulvin Counseling:  I discussed with the patient the risks of griseofulvin including but not limited to photosensitivity, cytopenia, liver damage, nausea/vomiting and severe allergy.  The patient understands that this medication is best absorbed when taken with a fatty meal (e.g., ice cream or french fries).
Odomzo Counseling- I discussed with the patient the risks of Odomzo including but not limited to nausea, vomiting, diarrhea, constipation, weight loss, changes in the sense of taste, decreased appetite, muscle spasms, and hair loss.  The patient verbalized understanding of the proper use and possible adverse effects of Odomzo.  All of the patient's questions and concerns were addressed.
SSKI Counseling:  I discussed with the patient the risks of SSKI including but not limited to thyroid abnormalities, metallic taste, GI upset, fever, headache, acne, arthralgias, paraesthesias, lymphadenopathy, easy bleeding, arrhythmias, and allergic reaction.
Glycopyrrolate Counseling:  I discussed with the patient the risks of glycopyrrolate including but not limited to skin rash, drowsiness, dry mouth, difficulty urinating, and blurred vision.
Cellcept Pregnancy And Lactation Text: This medication is Pregnancy Category D and isn't considered safe during pregnancy. It is unknown if this medication is excreted in breast milk.
Hydroxyzine Pregnancy And Lactation Text: This medication is not safe during pregnancy and should not be taken. It is also excreted in breast milk and breast feeding isn't recommended.
Eucrisa Counseling: Patient may experience a mild burning sensation during topical application. Eucrisa is not approved in children less than 2 years of age.
Griseofulvin Pregnancy And Lactation Text: This medication is Pregnancy Category X and is known to cause serious birth defects. It is unknown if this medication is excreted in breast milk but breast feeding should be avoided.
Xelalessandraz Pregnancy And Lactation Text: This medication is Pregnancy Category D and is not considered safe during pregnancy.  The risk during breast feeding is also uncertain.
Terbinafine Counseling: Patient counseling regarding adverse effects of terbinafine including but not limited to headache, diarrhea, rash, upset stomach, liver function test abnormalities, itching, taste/smell disturbance, nausea, abdominal pain, and flatulence.  There is a rare possibility of liver failure that can occur when taking terbinafine.  The patient understands that a baseline LFT and kidney function test may be required. The patient verbalized understanding of the proper use and possible adverse effects of terbinafine.  All of the patient's questions and concerns were addressed.
Spironolactone Counseling: Patient advised regarding risks of diarrhea, abdominal pain, hyperkalemia, birth defects (for female patients), liver toxicity and renal toxicity. The patient may need blood work to monitor liver and kidney function and potassium levels while on therapy. The patient verbalized understanding of the proper use and possible adverse effects of spironolactone.  All of the patient's questions and concerns were addressed.
Isotretinoin Counseling: Patient should get monthly blood tests, not donate blood, not drive at night if vision affected, not share medication, and not undergo elective surgery for 6 months after tx completed. Side effects reviewed, pt to contact office should one occur.
Cephalexin Pregnancy And Lactation Text: This medication is Pregnancy Category B and considered safe during pregnancy.  It is also excreted in breast milk but can be used safely for shorter doses.
Topical Sulfur Applications Pregnancy And Lactation Text: This medication is Pregnancy Category C and has an unknown safety profile during pregnancy. It is unknown if this topical medication is excreted in breast milk.
Rhofade Counseling: Rhofade is a topical medication which can decrease superficial blood flow where applied. Side effects are uncommon and include stinging, redness and allergic reactions.
Include Pregnancy/Lactation Warning?: No
Benzoyl Peroxide Counseling: Patient counseled that medicine may cause skin irritation and bleach clothing.  In the event of skin irritation, the patient was advised to reduce the amount of the drug applied or use it less frequently.   The patient verbalized understanding of the proper use and possible adverse effects of benzoyl peroxide.  All of the patient's questions and concerns were addressed.
Cimetidine Counseling:  I discussed with the patient the risks of Cimetidine including but not limited to gynecomastia, headache, diarrhea, nausea, drowsiness, arrhythmias, pancreatitis, skin rashes, psychosis, bone marrow suppression and kidney toxicity.
Xolair Pregnancy And Lactation Text: This medication is Pregnancy Category B and is considered safe during pregnancy. This medication is excreted in breast milk.
Spironolactone Pregnancy And Lactation Text: This medication can cause feminization of the male fetus and should be avoided during pregnancy. The active metabolite is also found in breast milk.
Rifampin Counseling: I discussed with the patient the risks of rifampin including but not limited to liver damage, kidney damage, red-orange body fluids, nausea/vomiting and severe allergy.
Elidel Counseling: Patient may experience a mild burning sensation during topical application. Elidel is not approved in children less than 2 years of age. There have been case reports of hematologic and skin malignancies in patients using topical calcineurin inhibitors although causality is questionable.
Dupixent Pregnancy And Lactation Text: This medication likely crosses the placenta but the risk for the fetus is uncertain. This medication is excreted in breast milk.
Otezla Pregnancy And Lactation Text: This medication is Pregnancy Category C and it isn't known if it is safe during pregnancy. It is unknown if it is excreted in breast milk.
Simponi Counseling:  I discussed with the patient the risks of golimumab including but not limited to myelosuppression, immunosuppression, autoimmune hepatitis, demyelinating diseases, lymphoma, and serious infections.  The patient understands that monitoring is required including a PPD at baseline and must alert us or the primary physician if symptoms of infection or other concerning signs are noted.
Terbinafine Pregnancy And Lactation Text: This medication is Pregnancy Category B and is considered safe during pregnancy. It is also excreted in breast milk and breast feeding isn't recommended.
Mirvaso Counseling: Mirvaso is a topical medication which can decrease superficial blood flow where applied. Side effects are uncommon and include stinging, redness and allergic reactions.
Tazorac Counseling:  Patient advised that medication is irritating and drying.  Patient may need to apply sparingly and wash off after an hour before eventually leaving it on overnight.  The patient verbalized understanding of the proper use and possible adverse effects of tazorac.  All of the patient's questions and concerns were addressed.
Erythromycin Pregnancy And Lactation Text: This medication is Pregnancy Category B and is considered safe during pregnancy. It is also excreted in breast milk.
Methotrexate Pregnancy And Lactation Text: This medication is Pregnancy Category X and is known to cause fetal harm. This medication is excreted in breast milk.
Acitretin Pregnancy And Lactation Text: This medication is Pregnancy Category X and should not be given to women who are pregnant or may become pregnant in the future. This medication is excreted in breast milk.
Wartpeel Counseling:  I discussed with the patient the risks of Wartpeel including but not limited to erythema, scaling, itching, weeping, crusting, and pain.
Minocycline Pregnancy And Lactation Text: This medication is Pregnancy Category D and not consider safe during pregnancy. It is also excreted in breast milk.
Ilumya Counseling: I discussed with the patient the risks of tildrakizumab including but not limited to immunosuppression, malignancy, posterior leukoencephalopathy syndrome, and serious infections.  The patient understands that monitoring is required including a PPD at baseline and must alert us or the primary physician if symptoms of infection or other concerning signs are noted.
Valtrex Pregnancy And Lactation Text: this medication is Pregnancy Category B and is considered safe during pregnancy. This medication is not directly found in breast milk but it's metabolite acyclovir is present.
Opioid Pregnancy And Lactation Text: These medications can lead to premature delivery and should be avoided during pregnancy. These medications are also present in breast milk in small amounts.
Clindamycin Pregnancy And Lactation Text: This medication can be used in pregnancy if certain situations. Clindamycin is also present in breast milk.
Hydroxyzine Counseling: Patient advised that the medication is sedating and not to drive a car after taking this medication.  Patient informed of potential adverse effects including but not limited to dry mouth, urinary retention, and blurry vision.  The patient verbalized understanding of the proper use and possible adverse effects of hydroxyzine.  All of the patient's questions and concerns were addressed.
Doxycycline Pregnancy And Lactation Text: This medication is Pregnancy Category D and not consider safe during pregnancy. It is also excreted in breast milk but is considered safe for shorter treatment courses.
Cellcept Counseling:  I discussed with the patient the risks of mycophenolate mofetil including but not limited to infection/immunosuppression, GI upset, hypokalemia, hypercholesterolemia, bone marrow suppression, lymphoproliferative disorders, malignancy, GI ulceration/bleed/perforation, colitis, interstitial lung disease, kidney failure, progressive multifocal leukoencephalopathy, and birth defects.  The patient understands that monitoring is required including a baseline creatinine and regular CBC testing. In addition, patient must alert us immediately if symptoms of infection or other concerning signs are noted.
Minoxidil Counseling: Minoxidil is a topical medication which can increase blood flow where it is applied. It is uncertain how this medication increases hair growth. Side effects are uncommon and include stinging and allergic reactions.
Valtrex Counseling: I discussed with the patient the risks of valacyclovir including but not limited to kidney damage, nausea, vomiting and severe allergy.  The patient understands that if the infection seems to be worsening or is not improving, they are to call.
Cimzia Counseling:  I discussed with the patient the risks of Cimzia including but not limited to immunosuppression, allergic reactions and infections.  The patient understands that monitoring is required including a PPD at baseline and must alert us or the primary physician if symptoms of infection or other concerning signs are noted.
5-Fu Counseling: 5-Fluorouracil Counseling:  I discussed with the patient the risks of 5-fluorouracil including but not limited to erythema, scaling, itching, weeping, crusting, and pain.
Dapsone Pregnancy And Lactation Text: This medication is Pregnancy Category C and is not considered safe during pregnancy or breast feeding.
Humira Counseling:  I discussed with the patient the risks of adalimumab including but not limited to myelosuppression, immunosuppression, autoimmune hepatitis, demyelinating diseases, lymphoma, and serious infections.  The patient understands that monitoring is required including a PPD at baseline and must alert us or the primary physician if symptoms of infection or other concerning signs are noted.
Itraconazole Counseling:  I discussed with the patient the risks of itraconazole including but not limited to liver damage, nausea/vomiting, neuropathy, and severe allergy.  The patient understands that this medication is best absorbed when taken with acidic beverages such as non-diet cola or ginger ale.  The patient understands that monitoring is required including baseline LFTs and repeat LFTs at intervals.  The patient understands that they are to contact us or the primary physician if concerning signs are noted.
Bexarotene Pregnancy And Lactation Text: This medication is Pregnancy Category X and should not be given to women who are pregnant or may become pregnant. This medication should not be used if you are breast feeding.
Metronidazole Counseling:  I discussed with the patient the risks of metronidazole including but not limited to seizures, nausea/vomiting, a metallic taste in the mouth, nausea/vomiting and severe allergy.
Infliximab Counseling:  I discussed with the patient the risks of infliximab including but not limited to myelosuppression, immunosuppression, autoimmune hepatitis, demyelinating diseases, lymphoma, and serious infections.  The patient understands that monitoring is required including a PPD at baseline and must alert us or the primary physician if symptoms of infection or other concerning signs are noted.
Drysol Pregnancy And Lactation Text: This medication is considered safe during pregnancy and breast feeding.
Erivedge Counseling- I discussed with the patient the risks of Erivedge including but not limited to nausea, vomiting, diarrhea, constipation, weight loss, changes in the sense of taste, decreased appetite, muscle spasms, and hair loss.  The patient verbalized understanding of the proper use and possible adverse effects of Erivedge.  All of the patient's questions and concerns were addressed.
Otezla Counseling: The side effects of Otezla were discussed with the patient, including but not limited to worsening or new depression, weight loss, diarrhea, nausea, upper respiratory tract infection, and headache. Patient instructed to call the office should any adverse effect occur.  The patient verbalized understanding of the proper use and possible adverse effects of Otezla.  All the patient's questions and concerns were addressed.
Cyclophosphamide Counseling:  I discussed with the patient the risks of cyclophosphamide including but not limited to hair loss, hormonal abnormalities, decreased fertility, abdominal pain, diarrhea, nausea and vomiting, bone marrow suppression and infection. The patient understands that monitoring is required while taking this medication.
Gabapentin Counseling: I discussed with the patient the risks of gabapentin including but not limited to dizziness, somnolence, fatigue and ataxia.
Thalidomide Counseling: I discussed with the patient the risks of thalidomide including but not limited to birth defects, anxiety, weakness, chest pain, dizziness, cough and severe allergy.
Nsaids Counseling: NSAID Counseling: I discussed with the patient that NSAIDs should be taken with food. Prolonged use of NSAIDs can result in the development of stomach ulcers.  Patient advised to stop taking NSAIDs if abdominal pain occurs.  The patient verbalized understanding of the proper use and possible adverse effects of NSAIDs.  All of the patient's questions and concerns were addressed.
Cyclosporine Counseling:  I discussed with the patient the risks of cyclosporine including but not limited to hypertension, gingival hyperplasia,myelosuppression, immunosuppression, liver damage, kidney damage, neurotoxicity, lymphoma, and serious infections. The patient understands that monitoring is required including baseline blood pressure, CBC, CMP, lipid panel and uric acid, and then 1-2 times monthly CMP and blood pressure.
Drysol Counseling:  I discussed with the patient the risks of drysol/aluminum chloride including but not limited to skin rash, itching, irritation, burning.
Azithromycin Pregnancy And Lactation Text: This medication is considered safe during pregnancy and is also secreted in breast milk.
Xeljanz Counseling: I discussed with the patient the risks of Xeljanz therapy including increased risk of infection, liver issues, headache, diarrhea, or cold symptoms. Live vaccines should be avoided. They were instructed to call if they have any problems.
Rituxan Pregnancy And Lactation Text: This medication is Pregnancy Category C and it isn't know if it is safe during pregnancy. It is unknown if this medication is excreted in breast milk but similar antibodies are known to be excreted.
Tetracycline Counseling: Patient counseled regarding possible photosensitivity and increased risk for sunburn.  Patient instructed to avoid sunlight, if possible.  When exposed to sunlight, patients should wear protective clothing, sunglasses, and sunscreen.  The patient was instructed to call the office immediately if the following severe adverse effects occur:  hearing changes, easy bruising/bleeding, severe headache, or vision changes.  The patient verbalized understanding of the proper use and possible adverse effects of tetracycline.  All of the patient's questions and concerns were addressed. Patient understands to avoid pregnancy while on therapy due to potential birth defects.
Siliq Counseling:  I discussed with the patient the risks of Siliq including but not limited to new or worsening depression, suicidal thoughts and behavior, immunosuppression, malignancy, posterior leukoencephalopathy syndrome, and serious infections.  The patient understands that monitoring is required including a PPD at baseline and must alert us or the primary physician if symptoms of infection or other concerning signs are noted. There is also a special program designed to monitor depression which is required with Siliq.
Xolair Counseling:  Patient informed of potential adverse effects including but not limited to fever, muscle aches, rash and allergic reactions.  The patient verbalized understanding of the proper use and possible adverse effects of Xolair.  All of the patient's questions and concerns were addressed.
Glycopyrrolate Pregnancy And Lactation Text: This medication is Pregnancy Category B and is considered safe during pregnancy. It is unknown if it is excreted breast milk.
Oxybutynin Counseling:  I discussed with the patient the risks of oxybutynin including but not limited to skin rash, drowsiness, dry mouth, difficulty urinating, and blurred vision.
Carac Counseling:  I discussed with the patient the risks of Carac including but not limited to erythema, scaling, itching, weeping, crusting, and pain.
Arava Counseling:  Patient counseled regarding adverse effects of Arava including but not limited to nausea, vomiting, abnormalities in liver function tests. Patients may develop mouth sores, rash, diarrhea, and abnormalities in blood counts. The patient understands that monitoring is required including LFTs and blood counts.  There is a rare possibility of scarring of the liver and lung problems that can occur when taking methotrexate. Persistent nausea, loss of appetite, pale stools, dark urine, cough, and shortness of breath should be reported immediately. Patient advised to discontinue Arava treatment and consult with a physician prior to attempting conception. The patient will have to undergo a treatment to eliminate Arava from the body prior to conception.
Dupixent Counseling: I discussed with the patient the risks of dupilumab including but not limited to eye infection and irritation, cold sores, injection site reactions, worsening of asthma, allergic reactions and increased risk of parasitic infection.  Live vaccines should be avoided while taking dupilumab. Dupilumab will also interact with certain medications such as warfarin and cyclosporine. The patient understands that monitoring is required and they must alert us or the primary physician if symptoms of infection or other concerning signs are noted.
Hydroxychloroquine Counseling:  I discussed with the patient that a baseline ophthalmologic exam is needed at the start of therapy and every year thereafter while on therapy. A CBC may also be warranted for monitoring.  The side effects of this medication were discussed with the patient, including but not limited to agranulocytosis, aplastic anemia, seizures, rashes, retinopathy, and liver toxicity. Patient instructed to call the office should any adverse effect occur.  The patient verbalized understanding of the proper use and possible adverse effects of Plaquenil.  All the patient's questions and concerns were addressed.
High Dose Vitamin A Pregnancy And Lactation Text: High dose vitamin A therapy is contraindicated during pregnancy and breast feeding.
Imiquimod Counseling:  I discussed with the patient the risks of imiquimod including but not limited to erythema, scaling, itching, weeping, crusting, and pain.  Patient understands that the inflammatory response to imiquimod is variable from person to person and was educated regarded proper titration schedule.  If flu-like symptoms develop, patient knows to discontinue the medication and contact us.
Clindamycin Counseling: I counseled the patient regarding use of clindamycin as an antibiotic for prophylactic and/or therapeutic purposes. Clindamycin is active against numerous classes of bacteria, including skin bacteria. Side effects may include nausea, diarrhea, gastrointestinal upset, rash, hives, yeast infections, and in rare cases, colitis.
Dapsone Counseling: I discussed with the patient the risks of dapsone including but not limited to hemolytic anemia, agranulocytosis, rashes, methemoglobinemia, kidney failure, peripheral neuropathy, headaches, GI upset, and liver toxicity.  Patients who start dapsone require monitoring including baseline LFTs and weekly CBCs for the first month, then every month thereafter.  The patient verbalized understanding of the proper use and possible adverse effects of dapsone.  All of the patient's questions and concerns were addressed.
Quinolones Counseling:  I discussed with the patient the risks of fluoroquinolones including but not limited to GI upset, allergic reaction, drug rash, diarrhea, dizziness, photosensitivity, yeast infections, liver function test abnormalities, tendonitis/tendon rupture.
Cosentyx Counseling:  I discussed with the patient the risks of Cosentyx including but not limited to worsening of Crohn's disease, immunosuppression, allergic reactions and infections.  The patient understands that monitoring is required including a PPD at baseline and must alert us or the primary physician if symptoms of infection or other concerning signs are noted.
Protopic Counseling: Patient may experience a mild burning sensation during topical application. Protopic is not approved in children less than 2 years of age. There have been case reports of hematologic and skin malignancies in patients using topical calcineurin inhibitors although causality is questionable.
Ketoconazole Pregnancy And Lactation Text: This medication is Pregnancy Category C and it isn't know if it is safe during pregnancy. It is also excreted in breast milk and breast feeding isn't recommended.
Rituxan Counseling:  I discussed with the patient the risks of Rituxan infusions. Side effects can include infusion reactions, severe drug rashes including mucocutaneous reactions, reactivation of latent hepatitis and other infections and rarely progressive multifocal leukoencephalopathy.  All of the patient's questions and concerns were addressed.
Hydroquinone Counseling:  Patient advised that medication may result in skin irritation, lightening (hypopigmentation), dryness, and burning.  In the event of skin irritation, the patient was advised to reduce the amount of the drug applied or use it less frequently.  Rarely, spots that are treated with hydroquinone can become darker (pseudoochronosis).  Should this occur, patient instructed to stop medication and call the office. The patient verbalized understanding of the proper use and possible adverse effects of hydroquinone.  All of the patient's questions and concerns were addressed.
Topical Clindamycin Counseling: Patient counseled that this medication may cause skin irritation or allergic reactions.  In the event of skin irritation, the patient was advised to reduce the amount of the drug applied or use it less frequently.   The patient verbalized understanding of the proper use and possible adverse effects of clindamycin.  All of the patient's questions and concerns were addressed.
Cimzia Pregnancy And Lactation Text: This medication crosses the placenta but can be considered safe in certain situations. Cimzia may be excreted in breast milk.
Doxepin Counseling:  Patient advised that the medication is sedating and not to drive a car after taking this medication. Patient informed of potential adverse effects including but not limited to dry mouth, urinary retention, and blurry vision.  The patient verbalized understanding of the proper use and possible adverse effects of doxepin.  All of the patient's questions and concerns were addressed.
Minocycline Counseling: Patient advised regarding possible photosensitivity and discoloration of the teeth, skin, lips, tongue and gums.  Patient instructed to avoid sunlight, if possible.  When exposed to sunlight, patients should wear protective clothing, sunglasses, and sunscreen.  The patient was instructed to call the office immediately if the following severe adverse effects occur:  hearing changes, easy bruising/bleeding, severe headache, or vision changes.  The patient verbalized understanding of the proper use and possible adverse effects of minocycline.  All of the patient's questions and concerns were addressed.
Fluconazole Counseling:  Patient counseled regarding adverse effects of fluconazole including but not limited to headache, diarrhea, nausea, upset stomach, liver function test abnormalities, taste disturbance, and stomach pain.  There is a rare possibility of liver failure that can occur when taking fluconazole.  The patient understands that monitoring of LFTs and kidney function test may be required, especially at baseline. The patient verbalized understanding of the proper use and possible adverse effects of fluconazole.  All of the patient's questions and concerns were addressed.
Azathioprine Counseling:  I discussed with the patient the risks of azathioprine including but not limited to myelosuppression, immunosuppression, hepatotoxicity, lymphoma, and infections.  The patient understands that monitoring is required including baseline LFTs, Creatinine, possible TPMP genotyping and weekly CBCs for the first month and then every 2 weeks thereafter.  The patient verbalized understanding of the proper use and possible adverse effects of azathioprine.  All of the patient's questions and concerns were addressed.
Solaraze Counseling:  I discussed with the patient the risks of Solaraze including but not limited to erythema, scaling, itching, weeping, crusting, and pain.
Sski Pregnancy And Lactation Text: This medication is Pregnancy Category D and isn't considered safe during pregnancy. It is excreted in breast milk.
Bexarotene Counseling:  I discussed with the patient the risks of bexarotene including but not limited to hair loss, dry lips/skin/eyes, liver abnormalities, hyperlipidemia, pancreatitis, depression/suicidal ideation, photosensitivity, drug rash/allergic reactions, hypothyroidism, anemia, leukopenia, infection, cataracts, and teratogenicity.  Patient understands that they will need regular blood tests to check lipid profile, liver function tests, white blood cell count, thyroid function tests and pregnancy test if applicable.
Birth Control Pills Counseling: Birth Control Pill Counseling: I discussed with the patient the potential side effects of OCPs including but not limited to increased risk of stroke, heart attack, thrombophlebitis, deep venous thrombosis, hepatic adenomas, breast changes, GI upset, headaches, and depression.  The patient verbalized understanding of the proper use and possible adverse effects of OCPs. All of the patient's questions and concerns were addressed.
Picato Counseling:  I discussed with the patient the risks of Picato including but not limited to erythema, scaling, itching, weeping, crusting, and pain.
Stelara Counseling:  I discussed with the patient the risks of ustekinumab including but not limited to immunosuppression, malignancy, posterior leukoencephalopathy syndrome, and serious infections.  The patient understands that monitoring is required including a PPD at baseline and must alert us or the primary physician if symptoms of infection or other concerning signs are noted.
Ivermectin Counseling:  Patient instructed to take medication on an empty stomach with a full glass of water.  Patient informed of potential adverse effects including but not limited to nausea, diarrhea, dizziness, itching, and swelling of the extremities or lymph nodes.  The patient verbalized understanding of the proper use and possible adverse effects of ivermectin.  All of the patient's questions and concerns were addressed.
Ketoconazole Counseling:   Patient counseled regarding improving absorption with orange juice.  Adverse effects include but are not limited to breast enlargement, headache, diarrhea, nausea, upset stomach, liver function test abnormalities, taste disturbance, and stomach pain.  There is a rare possibility of liver failure that can occur when taking ketoconazole. The patient understands that monitoring of LFTs may be required, especially at baseline. The patient verbalized understanding of the proper use and possible adverse effects of ketoconazole.  All of the patient's questions and concerns were addressed.
Azithromycin Counseling:  I discussed with the patient the risks of azithromycin including but not limited to GI upset, allergic reaction, drug rash, diarrhea, and yeast infections.
Hydroxychloroquine Pregnancy And Lactation Text: This medication has been shown to cause fetal harm but it isn't assigned a Pregnancy Risk Category. There are small amounts excreted in breast milk.
Cephalexin Counseling: I counseled the patient regarding use of cephalexin as an antibiotic for prophylactic and/or therapeutic purposes. Cephalexin (commonly prescribed under brand name Keflex) is a cephalosporin antibiotic which is active against numerous classes of bacteria, including most skin bacteria. Side effects may include nausea, diarrhea, gastrointestinal upset, rash, hives, yeast infections, and in rare cases, hepatitis, kidney disease, seizures, fever, confusion, neurologic symptoms, and others. Patients with severe allergies to penicillin medications are cautioned that there is about a 10% incidence of cross-reactivity with cephalosporins. When possible, patients with penicillin allergies should use alternatives to cephalosporins for antibiotic therapy.

## 2019-06-25 ENCOUNTER — APPOINTMENT (RX ONLY)
Dept: URBAN - METROPOLITAN AREA CLINIC 38 | Facility: CLINIC | Age: 59
Setting detail: DERMATOLOGY
End: 2019-06-25

## 2019-06-25 ENCOUNTER — APPOINTMENT (RX ONLY)
Dept: URBAN - METROPOLITAN AREA CLINIC 37 | Facility: CLINIC | Age: 59
Setting detail: DERMATOLOGY
End: 2019-06-25

## 2019-06-25 DIAGNOSIS — R79.89 OTHER SPECIFIED ABNORMAL FINDINGS OF BLOOD CHEMISTRY: ICD-10-CM

## 2019-06-25 PROCEDURE — ? LAB REPORTS REVIEWED

## 2019-06-25 PROCEDURE — ? REFERRAL

## 2019-07-02 ENCOUNTER — APPOINTMENT (RX ONLY)
Dept: URBAN - METROPOLITAN AREA CLINIC 37 | Facility: CLINIC | Age: 59
Setting detail: DERMATOLOGY
End: 2019-07-02

## 2019-07-02 ENCOUNTER — APPOINTMENT (RX ONLY)
Dept: URBAN - METROPOLITAN AREA CLINIC 38 | Facility: CLINIC | Age: 59
Setting detail: DERMATOLOGY
End: 2019-07-02

## 2019-07-02 DIAGNOSIS — L40.0 PSORIASIS VULGARIS: ICD-10-CM

## 2019-07-02 PROCEDURE — ? EXCIMER LASER

## 2019-07-02 PROCEDURE — 96920 EXCIMER LSR PSRIASIS<250SQCM: CPT

## 2019-07-02 ASSESSMENT — LOCATION SIMPLE DESCRIPTION DERM
LOCATION SIMPLE: SCALP
LOCATION SIMPLE: POSTERIOR SCALP
LOCATION SIMPLE: RIGHT EAR
LOCATION SIMPLE: SCALP
LOCATION SIMPLE: RIGHT EAR
LOCATION SIMPLE: POSTERIOR SCALP

## 2019-07-02 ASSESSMENT — LOCATION ZONE DERM
LOCATION ZONE: EAR
LOCATION ZONE: SCALP
LOCATION ZONE: EAR
LOCATION ZONE: SCALP

## 2019-07-02 ASSESSMENT — LOCATION DETAILED DESCRIPTION DERM
LOCATION DETAILED: MID-OCCIPITAL SCALP
LOCATION DETAILED: MID-OCCIPITAL SCALP
LOCATION DETAILED: LEFT INFERIOR POSTAURICULAR SKIN
LOCATION DETAILED: RIGHT CAVUM CONCHA
LOCATION DETAILED: LEFT INFERIOR POSTAURICULAR SKIN
LOCATION DETAILED: RIGHT CAVUM CONCHA
LOCATION DETAILED: RIGHT INFERIOR POSTAURICULAR SKIN
LOCATION DETAILED: RIGHT INFERIOR POSTAURICULAR SKIN

## 2019-07-02 NOTE — PROCEDURE: EXCIMER LASER
Fluence Units: J/cm2
Fluence #1 (J/Cm2 Or Mj/Cm2): 6312
Spot Size: 2 x 2 cm
Location #1: posterior scalp, right ear
Consent: Written consent obtained, risks reviewed including but not limited to crusting, scabbing, blistering, scarring, darker or lighter pigmentary change, incidental hair removal, bruising, and/or incomplete removal.
Detail Level: Zone
Total Pulses (Optional): 411
Mode: repeat paint
Comments: Patient states that she tolerated her last treatment well.
Total Square Area In Cm2 (Required For Proper Billing- Whole Numbers Only Please): 136
Treatment Number: 33
Post-Care Instructions: I reviewed with the patient in detail post-care instructions. Patient should stay away from the sun and wear sun protection until treated areas are fully healed.
Device Serial Number (Optional): [id number]

## 2019-07-02 NOTE — PROCEDURE: EXCIMER LASER
Treatment Number: 33
Location #1: posterior scalp, right ear
Post-Care Instructions: I reviewed with the patient in detail post-care instructions. Patient should stay away from the sun and wear sun protection until treated areas are fully healed.
Comments: Patient states that she tolerated her last treatment well.
Device Serial Number (Optional): [id number]
Total Square Area In Cm2 (Required For Proper Billing- Whole Numbers Only Please): 136
Fluence #1 (J/Cm2 Or Mj/Cm2): 9873
Mode: repeat paint
Consent: Written consent obtained, risks reviewed including but not limited to crusting, scabbing, blistering, scarring, darker or lighter pigmentary change, incidental hair removal, bruising, and/or incomplete removal.
Fluence Units: J/cm2
Total Pulses (Optional): 411
Spot Size: 2 x 2 cm
Detail Level: Zone

## 2019-07-05 ENCOUNTER — RX ONLY (OUTPATIENT)
Age: 59
Setting detail: RX ONLY
End: 2019-07-05

## 2019-07-05 RX ORDER — USTEKINUMAB 90 MG/ML
INJECTION, SOLUTION SUBCUTANEOUS
Qty: 1 | Refills: 4 | Status: CANCELLED
Stop reason: CLARIF

## 2019-07-24 RX ORDER — CLOBETASOL PROPIONATE 0.5 MG/G
THIN LAYER CREAM TOPICAL BID
Qty: 1 | Refills: 3 | Status: ERX

## 2019-07-24 RX ORDER — CLOBETASOL PROPIONATE 0.5 MG/G
THIN LAYER CREAM TOPICAL BID
Qty: 1 | Refills: 3 | Status: ERX | COMMUNITY
Start: 2019-07-24

## 2019-07-24 RX ADMIN — CLOBETASOL PROPIONATE THIN LAYER: 0.5 CREAM TOPICAL at 00:00

## 2019-07-29 ENCOUNTER — APPOINTMENT (RX ONLY)
Dept: URBAN - METROPOLITAN AREA CLINIC 37 | Facility: CLINIC | Age: 59
Setting detail: DERMATOLOGY
End: 2019-07-29

## 2019-07-29 ENCOUNTER — APPOINTMENT (RX ONLY)
Dept: URBAN - METROPOLITAN AREA CLINIC 38 | Facility: CLINIC | Age: 59
Setting detail: DERMATOLOGY
End: 2019-07-29

## 2019-07-29 DIAGNOSIS — L40.0 PSORIASIS VULGARIS: ICD-10-CM

## 2019-07-29 PROCEDURE — ? EXCIMER LASER

## 2019-07-29 PROCEDURE — 96920 EXCIMER LSR PSRIASIS<250SQCM: CPT

## 2019-07-29 ASSESSMENT — LOCATION DETAILED DESCRIPTION DERM
LOCATION DETAILED: RIGHT CRUS OF HELIX
LOCATION DETAILED: LEFT ELBOW
LOCATION DETAILED: RIGHT CENTRAL POSTAURICULAR SKIN
LOCATION DETAILED: LEFT CENTRAL POSTAURICULAR SKIN
LOCATION DETAILED: LEFT CENTRAL POSTAURICULAR SKIN
LOCATION DETAILED: RIGHT CENTRAL POSTAURICULAR SKIN
LOCATION DETAILED: LEFT OCCIPITAL SCALP
LOCATION DETAILED: LEFT ELBOW
LOCATION DETAILED: RIGHT CRUS OF HELIX
LOCATION DETAILED: MID-OCCIPITAL SCALP
LOCATION DETAILED: LEFT OCCIPITAL SCALP
LOCATION DETAILED: MID-OCCIPITAL SCALP

## 2019-07-29 ASSESSMENT — LOCATION SIMPLE DESCRIPTION DERM
LOCATION SIMPLE: POSTERIOR SCALP
LOCATION SIMPLE: LEFT ELBOW
LOCATION SIMPLE: RIGHT EAR
LOCATION SIMPLE: POSTERIOR SCALP
LOCATION SIMPLE: SCALP
LOCATION SIMPLE: SCALP
LOCATION SIMPLE: RIGHT EAR
LOCATION SIMPLE: LEFT ELBOW

## 2019-07-29 ASSESSMENT — LOCATION ZONE DERM
LOCATION ZONE: ARM
LOCATION ZONE: EAR
LOCATION ZONE: SCALP
LOCATION ZONE: SCALP
LOCATION ZONE: EAR
LOCATION ZONE: ARM

## 2019-07-29 NOTE — PROCEDURE: EXCIMER LASER
Treatment Number: 34
Detail Level: Zone
Total Square Area In Cm2 (Required For Proper Billing- Whole Numbers Only Please): 88
Location #1: posterior scalp, right ear, left elbow
Mode: repeat paint
Consent: Written consent obtained, risks reviewed including but not limited to crusting, scabbing, blistering, scarring, darker or lighter pigmentary change, incidental hair removal, bruising, and/or incomplete removal.
Post-Care Instructions: I reviewed with the patient in detail post-care instructions. Patient should stay away from the sun and wear sun protection until treated areas are fully healed.
Comments: Patient states that she tolerated her last treatment well. She states that she applied the Dermasmoothe FS oil on her scalp, last night and noticed this morning, a lot of the plaque sloughing off. She states that her scalp feel so much better.
Device Serial Number (Optional): [id number]
Spot Size: 2 x 2 cm
Fluence #1 (J/Cm2 Or Mj/Cm2): 7528
Fluence Units: J/cm2
Total Pulses (Optional): 306

## 2019-07-29 NOTE — PROCEDURE: EXCIMER LASER
Consent: Written consent obtained, risks reviewed including but not limited to crusting, scabbing, blistering, scarring, darker or lighter pigmentary change, incidental hair removal, bruising, and/or incomplete removal.
Device Serial Number (Optional): [id number]
Fluence Units: J/cm2
Spot Size: 2 x 2 cm
Detail Level: Zone
Fluence #1 (J/Cm2 Or Mj/Cm2): 3147
Total Square Area In Cm2 (Required For Proper Billing- Whole Numbers Only Please): 88
Mode: repeat paint
Post-Care Instructions: I reviewed with the patient in detail post-care instructions. Patient should stay away from the sun and wear sun protection until treated areas are fully healed.
Comments: Patient states that she tolerated her last treatment well. She states that she applied the Dermasmoothe FS oil on her scalp, last night and noticed this morning, a lot of the plaque sloughing off. She states that her scalp feel so much better.
Location #1: posterior scalp, right ear, left elbow
Total Pulses (Optional): 306
Treatment Number: 34

## 2019-08-22 RX ORDER — FLUOCINOLONE ACETONIDE 0.11 MG/ML
THIN LAYER OIL TOPICAL BIW
Qty: 1 | Refills: 3 | Status: ERX

## 2019-09-04 ENCOUNTER — RX ONLY (OUTPATIENT)
Age: 59
Setting detail: RX ONLY
End: 2019-09-04

## 2019-09-04 ENCOUNTER — APPOINTMENT (RX ONLY)
Dept: URBAN - METROPOLITAN AREA CLINIC 37 | Facility: CLINIC | Age: 59
Setting detail: DERMATOLOGY
End: 2019-09-04

## 2019-09-04 ENCOUNTER — APPOINTMENT (RX ONLY)
Dept: URBAN - METROPOLITAN AREA CLINIC 38 | Facility: CLINIC | Age: 59
Setting detail: DERMATOLOGY
End: 2019-09-04

## 2019-09-04 DIAGNOSIS — L40.0 PSORIASIS VULGARIS: ICD-10-CM

## 2019-09-04 PROCEDURE — ? EXCIMER LASER

## 2019-09-04 PROCEDURE — 96920 EXCIMER LSR PSRIASIS<250SQCM: CPT

## 2019-09-04 RX ORDER — USTEKINUMAB 90 MG/ML
1 INJECTION, SOLUTION SUBCUTANEOUS
Qty: 1 | Refills: 3 | Status: CANCELLED

## 2019-09-04 ASSESSMENT — LOCATION SIMPLE DESCRIPTION DERM
LOCATION SIMPLE: POSTERIOR SCALP
LOCATION SIMPLE: LEFT EAR
LOCATION SIMPLE: LEFT EAR
LOCATION SIMPLE: POSTERIOR SCALP
LOCATION SIMPLE: LEFT POSTERIOR UPPER ARM
LOCATION SIMPLE: LEFT POSTERIOR UPPER ARM

## 2019-09-04 ASSESSMENT — LOCATION ZONE DERM
LOCATION ZONE: EAR
LOCATION ZONE: SCALP
LOCATION ZONE: SCALP
LOCATION ZONE: ARM
LOCATION ZONE: ARM
LOCATION ZONE: EAR

## 2019-09-04 ASSESSMENT — LOCATION DETAILED DESCRIPTION DERM
LOCATION DETAILED: MID-OCCIPITAL SCALP
LOCATION DETAILED: MID-OCCIPITAL SCALP
LOCATION DETAILED: LEFT DISTAL POSTERIOR UPPER ARM
LOCATION DETAILED: RIGHT POSTAURICULAR SKIN
LOCATION DETAILED: LEFT POSTERIOR EAR
LOCATION DETAILED: RIGHT POSTAURICULAR SKIN
LOCATION DETAILED: LEFT DISTAL POSTERIOR UPPER ARM
LOCATION DETAILED: LEFT POSTERIOR EAR

## 2019-09-04 NOTE — PROCEDURE: EXCIMER LASER
Comments: Patient states that she tolerated her last treatment well. She says that her scalp feel much better.
Location #1: posterior scalp, posterior ears, left elbow
Treatment Number: 35
Consent: Written consent obtained, risks reviewed including but not limited to crusting, scabbing, blistering, scarring, darker or lighter pigmentary change, incidental hair removal, bruising, and/or incomplete removal.
Fluence Units: J/cm2
Total Pulses (Optional): 417
Spot Size: 2 x 2 cm
Detail Level: Zone
Mode: repeat paint
Device Serial Number (Optional): [id number]
Fluence #1 (J/Cm2 Or Mj/Cm2): 0177
Total Square Area In Cm2 (Required For Proper Billing- Whole Numbers Only Please): 120
Post-Care Instructions: I reviewed with the patient in detail post-care instructions. Patient should stay away from the sun and wear sun protection until treated areas are fully healed.

## 2019-09-04 NOTE — PROCEDURE: EXCIMER LASER
Fluence #1 (J/Cm2 Or Mj/Cm2): 9732
Comments: Patient states that she tolerated her last treatment well. She says that her scalp feel much better.
Treatment Number: 35
Spot Size: 2 x 2 cm
Total Square Area In Cm2 (Required For Proper Billing- Whole Numbers Only Please): 120
Location #1: posterior scalp, posterior ears, left elbow
Total Pulses (Optional): 417
Mode: repeat paint
Consent: Written consent obtained, risks reviewed including but not limited to crusting, scabbing, blistering, scarring, darker or lighter pigmentary change, incidental hair removal, bruising, and/or incomplete removal.
Device Serial Number (Optional): [id number]
Post-Care Instructions: I reviewed with the patient in detail post-care instructions. Patient should stay away from the sun and wear sun protection until treated areas are fully healed.
Fluence Units: J/cm2
Detail Level: Zone

## 2019-09-23 ENCOUNTER — APPOINTMENT (RX ONLY)
Dept: URBAN - METROPOLITAN AREA CLINIC 38 | Facility: CLINIC | Age: 59
Setting detail: DERMATOLOGY
End: 2019-09-23

## 2019-09-23 ENCOUNTER — APPOINTMENT (RX ONLY)
Dept: URBAN - METROPOLITAN AREA CLINIC 37 | Facility: CLINIC | Age: 59
Setting detail: DERMATOLOGY
End: 2019-09-23

## 2019-09-23 DIAGNOSIS — L40.0 PSORIASIS VULGARIS: ICD-10-CM

## 2019-09-23 PROCEDURE — ? COUNSELING

## 2019-09-23 PROCEDURE — ? ORDER TESTS

## 2019-09-23 PROCEDURE — 99213 OFFICE O/P EST LOW 20 MIN: CPT | Mod: 25

## 2019-09-23 PROCEDURE — ? VENIPUNCTURE

## 2019-09-23 PROCEDURE — 36415 COLL VENOUS BLD VENIPUNCTURE: CPT

## 2019-09-23 ASSESSMENT — LOCATION SIMPLE DESCRIPTION DERM
LOCATION SIMPLE: POSTERIOR SCALP
LOCATION SIMPLE: LEFT ELBOW
LOCATION SIMPLE: LEFT ELBOW
LOCATION SIMPLE: POSTERIOR SCALP
LOCATION SIMPLE: RIGHT PRETIBIAL REGION
LOCATION SIMPLE: SCALP
LOCATION SIMPLE: LEFT PRETIBIAL REGION
LOCATION SIMPLE: LEFT PRETIBIAL REGION
LOCATION SIMPLE: RIGHT PRETIBIAL REGION
LOCATION SIMPLE: SCALP

## 2019-09-23 ASSESSMENT — LOCATION ZONE DERM
LOCATION ZONE: LEG
LOCATION ZONE: SCALP
LOCATION ZONE: LEG
LOCATION ZONE: ARM
LOCATION ZONE: ARM
LOCATION ZONE: SCALP

## 2019-09-23 ASSESSMENT — LOCATION DETAILED DESCRIPTION DERM
LOCATION DETAILED: RIGHT CENTRAL PARIETAL SCALP
LOCATION DETAILED: MID-OCCIPITAL SCALP
LOCATION DETAILED: LEFT DISTAL PRETIBIAL REGION
LOCATION DETAILED: RIGHT PROXIMAL PRETIBIAL REGION
LOCATION DETAILED: RIGHT PROXIMAL PRETIBIAL REGION
LOCATION DETAILED: MID-OCCIPITAL SCALP
LOCATION DETAILED: RIGHT CENTRAL PARIETAL SCALP
LOCATION DETAILED: LEFT ELBOW
LOCATION DETAILED: LEFT ELBOW
LOCATION DETAILED: LEFT DISTAL PRETIBIAL REGION

## 2019-09-23 ASSESSMENT — BSA PSORIASIS
% BODY COVERED IN PSORIASIS: 1
% BODY COVERED IN PSORIASIS: 1

## 2019-09-23 NOTE — PROCEDURE: ORDER TESTS
Expected Date Of Service: 09/23/2019
Bill For Surgical Tray: no
Lab Facility: [id number]
Billing Type: Third-Party Bill
Performing Laboratory: [id number]

## 2019-09-23 NOTE — HPI: MEDICATION (STELARA)
How Severe Is It?: moderate
Is This A New Presentation, Or A Follow-Up?: Follow Up Stelara
Additional History: Patient does inject herself at home and reports no issues.  She seems to think this injection has helped most.

## 2019-09-23 NOTE — PROCEDURE: VENIPUNCTURE
Venipuncture Paragraph: An alcohol pad was applied to the venipuncture site. Venipuncture was performed using a butterfly needle. Pressure and a bandaid was applied to the site. No complications were noted.
Number Of Tubes Drawn: 6
Detail Level: None
Bill For Individual Tests Below?: no

## 2019-09-23 NOTE — PROCEDURE: COUNSELING
Detail Level: Detailed
Patient Specific Counseling (Will Not Stick From Patient To Patient): \\nWhile BSA is modest, the scalp is quite severe in intensity. It's bad enough that she is continuing to get Xtrac treatments. To me, given the similar safety profiles and cost of newer agents, it makes more sense to me to try and find a single biologic that will clear her scalp to the point where she does not need adjuvant laser treatments. Decision will depend on what her insurance will approve but would start with Tremfiya given similar safety prfile and greater efficacy.

## 2019-09-23 NOTE — PROCEDURE: COUNSELING
Patient Specific Counseling (Will Not Stick From Patient To Patient): \\nWhile BSA is modest, the scalp is quite severe in intensity. It's bad enough that she is continuing to get Xtrac treatments. To me, given the similar safety profiles and cost of newer agents, it makes more sense to me to try and find a single biologic that will clear her scalp to the point where she does not need adjuvant laser treatments. Decision will depend on what her insurance will approve but would start with Tremfiya given similar safety prfile and greater efficacy.
Detail Level: Detailed

## 2019-10-02 ENCOUNTER — APPOINTMENT (RX ONLY)
Dept: URBAN - METROPOLITAN AREA CLINIC 37 | Facility: CLINIC | Age: 59
Setting detail: DERMATOLOGY
End: 2019-10-02

## 2019-10-02 ENCOUNTER — APPOINTMENT (RX ONLY)
Dept: URBAN - METROPOLITAN AREA CLINIC 38 | Facility: CLINIC | Age: 59
Setting detail: DERMATOLOGY
End: 2019-10-02

## 2019-10-02 DIAGNOSIS — L40.0 PSORIASIS VULGARIS: ICD-10-CM

## 2019-10-02 PROCEDURE — ? EXCIMER LASER

## 2019-10-02 PROCEDURE — 96920 EXCIMER LSR PSRIASIS<250SQCM: CPT

## 2019-10-02 ASSESSMENT — LOCATION DETAILED DESCRIPTION DERM
LOCATION DETAILED: LEFT SUPERIOR POSTAURICULAR SKIN
LOCATION DETAILED: RIGHT CENTRAL POSTAURICULAR SKIN
LOCATION DETAILED: RIGHT CENTRAL POSTAURICULAR SKIN
LOCATION DETAILED: MID-OCCIPITAL SCALP
LOCATION DETAILED: LEFT SUPERIOR POSTAURICULAR SKIN
LOCATION DETAILED: MID-OCCIPITAL SCALP

## 2019-10-02 ASSESSMENT — LOCATION ZONE DERM
LOCATION ZONE: SCALP
LOCATION ZONE: SCALP

## 2019-10-02 NOTE — PROCEDURE: EXCIMER LASER
Total Square Area In Cm2 (Required For Proper Billing- Whole Numbers Only Please): 84
Comments: Patient states that her scalp has been flaring for the past few days. She admits to scratching the scalp. She did apply the Dermasmoothe FS oil last night to the scalp.
Mode: repeat paint
Device Serial Number (Optional): [id number]
Post-Care Instructions: I reviewed with the patient in detail post-care instructions. Patient should stay away from the sun and wear sun protection until treated areas are fully healed.
Total Pulses (Optional): 292
Fluence #1 (J/Cm2 Or Mj/Cm2): 2222
Location #1: posterior scalp, posterior ears, left elbow
Fluence Units: J/cm2
Detail Level: Zone
Consent: Written consent obtained, risks reviewed including but not limited to crusting, scabbing, blistering, scarring, darker or lighter pigmentary change, incidental hair removal, bruising, and/or incomplete removal.
Spot Size: 2 x 2 cm
Treatment Number: 36

## 2019-10-02 NOTE — PROCEDURE: EXCIMER LASER
Treatment Number: 36
Total Pulses (Optional): 292
Location #1: posterior scalp, posterior ears, left elbow
Consent: Written consent obtained, risks reviewed including but not limited to crusting, scabbing, blistering, scarring, darker or lighter pigmentary change, incidental hair removal, bruising, and/or incomplete removal.
Comments: Patient states that her scalp has been flaring for the past few days. She admits to scratching the scalp. She did apply the Dermasmoothe FS oil last night to the scalp.
Mode: repeat paint
Total Square Area In Cm2 (Required For Proper Billing- Whole Numbers Only Please): 84
Spot Size: 2 x 2 cm
Post-Care Instructions: I reviewed with the patient in detail post-care instructions. Patient should stay away from the sun and wear sun protection until treated areas are fully healed.
Fluence #1 (J/Cm2 Or Mj/Cm2): 4630
Detail Level: Zone
Device Serial Number (Optional): [id number]
Fluence Units: J/cm2

## 2019-12-19 ENCOUNTER — APPOINTMENT (RX ONLY)
Dept: URBAN - METROPOLITAN AREA CLINIC 37 | Facility: CLINIC | Age: 59
Setting detail: DERMATOLOGY
End: 2019-12-19

## 2019-12-19 ENCOUNTER — APPOINTMENT (RX ONLY)
Dept: URBAN - METROPOLITAN AREA CLINIC 38 | Facility: CLINIC | Age: 59
Setting detail: DERMATOLOGY
End: 2019-12-19

## 2019-12-19 DIAGNOSIS — L40.0 PSORIASIS VULGARIS: ICD-10-CM

## 2019-12-19 PROCEDURE — 96920 EXCIMER LSR PSRIASIS<250SQCM: CPT

## 2019-12-19 PROCEDURE — ? EXCIMER LASER

## 2019-12-19 ASSESSMENT — LOCATION SIMPLE DESCRIPTION DERM
LOCATION SIMPLE: SCALP
LOCATION SIMPLE: RIGHT EAR
LOCATION SIMPLE: RIGHT EAR
LOCATION SIMPLE: SCALP
LOCATION SIMPLE: POSTERIOR SCALP
LOCATION SIMPLE: POSTERIOR SCALP

## 2019-12-19 ASSESSMENT — LOCATION DETAILED DESCRIPTION DERM
LOCATION DETAILED: LEFT INFERIOR FRONTAL SCALP
LOCATION DETAILED: RIGHT SUPERIOR POSTAURICULAR SKIN
LOCATION DETAILED: RIGHT SUPERIOR POSTAURICULAR SKIN
LOCATION DETAILED: LEFT INFERIOR OCCIPITAL SCALP
LOCATION DETAILED: LEFT SUPERIOR POSTAURICULAR SKIN
LOCATION DETAILED: LEFT INFERIOR FRONTAL SCALP
LOCATION DETAILED: RIGHT CAVUM CONCHA
LOCATION DETAILED: RIGHT INFERIOR OCCIPITAL SCALP
LOCATION DETAILED: LEFT INFERIOR OCCIPITAL SCALP
LOCATION DETAILED: RIGHT INFERIOR OCCIPITAL SCALP
LOCATION DETAILED: RIGHT CAVUM CONCHA
LOCATION DETAILED: LEFT SUPERIOR POSTAURICULAR SKIN

## 2019-12-19 ASSESSMENT — LOCATION ZONE DERM
LOCATION ZONE: EAR
LOCATION ZONE: SCALP
LOCATION ZONE: EAR
LOCATION ZONE: SCALP

## 2019-12-19 NOTE — PROCEDURE: EXCIMER LASER
Fluence #1 (J/Cm2 Or Mj/Cm2): 1000
Detail Level: Zone
Total Pulses (Optional): 116
Location #1: posterior scalp, posterior ears
Fluence Units: J/cm2
Mode: repeat paint
Spot Size: 2 x 2 cm
Treatment Number: 37
Device Serial Number (Optional): [id number]
Comments: Patient is returning for treatment after two months. She states that her scalp is flaring a little and itching.
Consent: Written consent obtained, risks reviewed including but not limited to crusting, scabbing, blistering, scarring, darker or lighter pigmentary change, incidental hair removal, bruising, and/or incomplete removal.
Post-Care Instructions: I reviewed with the patient in detail post-care instructions. Patient should stay away from the sun and wear sun protection until treated areas are fully healed.

## 2019-12-19 NOTE — PROCEDURE: EXCIMER LASER
Total Square Area In Cm2 (Required For Proper Billing- Whole Numbers Only Please): 116
Mode: repeat paint
Post-Care Instructions: I reviewed with the patient in detail post-care instructions. Patient should stay away from the sun and wear sun protection until treated areas are fully healed.
Spot Size: 2 x 2 cm
Detail Level: Zone
Device Serial Number (Optional): [id number]
Fluence #1 (J/Cm2 Or Mj/Cm2): 1000
Treatment Number: 37
Consent: Written consent obtained, risks reviewed including but not limited to crusting, scabbing, blistering, scarring, darker or lighter pigmentary change, incidental hair removal, bruising, and/or incomplete removal.
Fluence Units: J/cm2
Comments: Patient is returning for treatment after two months. She states that her scalp is flaring a little and itching.
Location #1: posterior scalp, posterior ears

## 2019-12-31 ENCOUNTER — APPOINTMENT (RX ONLY)
Dept: URBAN - METROPOLITAN AREA CLINIC 38 | Facility: CLINIC | Age: 59
Setting detail: DERMATOLOGY
End: 2019-12-31

## 2019-12-31 ENCOUNTER — RX ONLY (OUTPATIENT)
Age: 59
Setting detail: RX ONLY
End: 2019-12-31

## 2019-12-31 ENCOUNTER — APPOINTMENT (RX ONLY)
Dept: URBAN - METROPOLITAN AREA CLINIC 37 | Facility: CLINIC | Age: 59
Setting detail: DERMATOLOGY
End: 2019-12-31

## 2019-12-31 DIAGNOSIS — L40.0 PSORIASIS VULGARIS: ICD-10-CM

## 2019-12-31 PROCEDURE — 36415 COLL VENOUS BLD VENIPUNCTURE: CPT

## 2019-12-31 PROCEDURE — ? COUNSELING

## 2019-12-31 PROCEDURE — ? ORDER TESTS

## 2019-12-31 PROCEDURE — 99213 OFFICE O/P EST LOW 20 MIN: CPT | Mod: 25

## 2019-12-31 PROCEDURE — ? VENIPUNCTURE

## 2019-12-31 PROCEDURE — ? ADDITIONAL NOTES

## 2019-12-31 RX ORDER — FLUOCINOLONE ACETONIDE 0.11 MG/ML
THIN LAYER OIL TOPICAL BIW
Qty: 1 | Refills: 3 | Status: ERX

## 2019-12-31 ASSESSMENT — LOCATION DETAILED DESCRIPTION DERM
LOCATION DETAILED: RIGHT CRUS OF HELIX
LOCATION DETAILED: LEFT SUPERIOR PARIETAL SCALP
LOCATION DETAILED: RIGHT ANTECUBITAL SKIN
LOCATION DETAILED: MID-OCCIPITAL SCALP
LOCATION DETAILED: RIGHT ELBOW
LOCATION DETAILED: LEFT SUPERIOR PARIETAL SCALP
LOCATION DETAILED: RIGHT CRUS OF HELIX
LOCATION DETAILED: RIGHT ELBOW
LOCATION DETAILED: MID-OCCIPITAL SCALP
LOCATION DETAILED: RIGHT ANTECUBITAL SKIN
LOCATION DETAILED: LEFT ELBOW
LOCATION DETAILED: LEFT ELBOW

## 2019-12-31 ASSESSMENT — LOCATION SIMPLE DESCRIPTION DERM
LOCATION SIMPLE: RIGHT UPPER ARM
LOCATION SIMPLE: POSTERIOR SCALP
LOCATION SIMPLE: RIGHT EAR
LOCATION SIMPLE: RIGHT ELBOW
LOCATION SIMPLE: POSTERIOR SCALP
LOCATION SIMPLE: LEFT ELBOW
LOCATION SIMPLE: RIGHT EAR
LOCATION SIMPLE: RIGHT ELBOW
LOCATION SIMPLE: LEFT ELBOW
LOCATION SIMPLE: RIGHT UPPER ARM
LOCATION SIMPLE: SCALP
LOCATION SIMPLE: SCALP

## 2019-12-31 ASSESSMENT — LOCATION ZONE DERM
LOCATION ZONE: SCALP
LOCATION ZONE: EAR
LOCATION ZONE: ARM
LOCATION ZONE: ARM
LOCATION ZONE: EAR
LOCATION ZONE: SCALP

## 2019-12-31 NOTE — PROCEDURE: ORDER TESTS
Bill For Surgical Tray: no
Billing Type: Third-Party Bill
Lab Facility: [id number]
Performing Laboratory: -[id number]
Expected Date Of Service: 12/31/2019

## 2019-12-31 NOTE — PROCEDURE: MIPS QUALITY
Quality 226: Preventive Care And Screening: Tobacco Use: Screening And Cessation Intervention: Patient screened for tobacco use and is an ex/non-smoker
Quality 130: Documentation Of Current Medications In The Medical Record: Current Medications Documented
Quality 110: Preventive Care And Screening: Influenza Immunization: Influenza Immunization Administered during Influenza season
Quality 431: Preventive Care And Screening: Unhealthy Alcohol Use - Screening: Patient screened for unhealthy alcohol use using a single question and scores less than 2 times per year
Detail Level: Generalized

## 2019-12-31 NOTE — PROCEDURE: MIPS QUALITY
Quality 226: Preventive Care And Screening: Tobacco Use: Screening And Cessation Intervention: Patient screened for tobacco use and is an ex/non-smoker
Quality 130: Documentation Of Current Medications In The Medical Record: Current Medications Documented
Detail Level: Generalized
Quality 110: Preventive Care And Screening: Influenza Immunization: Influenza Immunization Administered during Influenza season
Quality 431: Preventive Care And Screening: Unhealthy Alcohol Use - Screening: Patient screened for unhealthy alcohol use using a single question and scores less than 2 times per year

## 2019-12-31 NOTE — PROCEDURE: VENIPUNCTURE
Bill For Individual Tests Below?: no
Number Of Tubes Drawn: 2
Detail Level: Simple
Venipuncture Paragraph: An alcohol pad was applied to the venipuncture site. Venipuncture was performed using a butterfly needle. Pressure and a bandaid was applied to the site. No complications were noted.

## 2019-12-31 NOTE — HPI: RASH (PSORIASIS)
How Severe Is Your Psoriasis?: moderate
Do You Have A Family History Of Psoriasis?: no
Is This A New Presentation, Or A Follow-Up?: Follow Up Psoriasis
Additional History: Pt is following up on Skyrizi, states no side effects at this time. Pt states flaring on the scalp

## 2019-12-31 NOTE — PROCEDURE: ADDITIONAL NOTES
Detail Level: Simple
Additional Notes: Pt will find out Friday about heart surgery. Explained that it is up to the surgeon if injection needs to be held. Explained as the practice we are good with what is decided.

## 2019-12-31 NOTE — PROCEDURE: VENIPUNCTURE
Detail Level: Simple
Number Of Tubes Drawn: 2
Venipuncture Paragraph: An alcohol pad was applied to the venipuncture site. Venipuncture was performed using a butterfly needle. Pressure and a bandaid was applied to the site. No complications were noted.
Bill For Individual Tests Below?: no

## 2020-03-02 ENCOUNTER — APPOINTMENT (RX ONLY)
Dept: URBAN - METROPOLITAN AREA CLINIC 37 | Facility: CLINIC | Age: 60
Setting detail: DERMATOLOGY
End: 2020-03-02

## 2020-03-02 ENCOUNTER — APPOINTMENT (RX ONLY)
Dept: URBAN - METROPOLITAN AREA CLINIC 38 | Facility: CLINIC | Age: 60
Setting detail: DERMATOLOGY
End: 2020-03-02

## 2020-03-02 DIAGNOSIS — L40.0 PSORIASIS VULGARIS: ICD-10-CM

## 2020-03-02 PROCEDURE — 96920 EXCIMER LSR PSRIASIS<250SQCM: CPT

## 2020-03-02 PROCEDURE — ? EXCIMER LASER

## 2020-03-02 ASSESSMENT — LOCATION DETAILED DESCRIPTION DERM
LOCATION DETAILED: RIGHT POSTERIOR EAR
LOCATION DETAILED: MID-OCCIPITAL SCALP
LOCATION DETAILED: RIGHT POSTERIOR EAR
LOCATION DETAILED: MID-OCCIPITAL SCALP

## 2020-03-02 ASSESSMENT — LOCATION SIMPLE DESCRIPTION DERM
LOCATION SIMPLE: POSTERIOR SCALP
LOCATION SIMPLE: RIGHT EAR
LOCATION SIMPLE: RIGHT EAR
LOCATION SIMPLE: POSTERIOR SCALP

## 2020-03-02 ASSESSMENT — LOCATION ZONE DERM
LOCATION ZONE: EAR
LOCATION ZONE: SCALP
LOCATION ZONE: EAR
LOCATION ZONE: SCALP

## 2020-03-02 NOTE — PROCEDURE: EXCIMER LASER
Fluence #1 (J/Cm2 Or Mj/Cm2): 300
Device Serial Number (Optional): [id number]
Total Square Area In Cm2 (Required For Proper Billing- Whole Numbers Only Please): 152
Post-Care Instructions: I reviewed with the patient in detail post-care instructions. Patient should stay away from the sun and wear sun protection until treated areas are fully healed.
Comments: Patient is returning after 3.5 months, for Xtrac treatment on the scalp. She states that her scalp has been manageable for several weeks. She is continuing with her Dermasmoothe FS oil.
Fluence Units: J/cm2
Treatment Number: 1
Detail Level: Zone
Location #1: posterior scalp, right posterior ear
Spot Size: 2 x 2 cm
Total Pulses (Optional): 45
Mode: repeat paint
Consent: Written consent obtained, risks reviewed including but not limited to crusting, scabbing, blistering, scarring, darker or lighter pigmentary change, incidental hair removal, bruising, and/or incomplete removal.

## 2020-03-02 NOTE — PROCEDURE: EXCIMER LASER
Fluence #1 (J/Cm2 Or Mj/Cm2): 300
Post-Care Instructions: I reviewed with the patient in detail post-care instructions. Patient should stay away from the sun and wear sun protection until treated areas are fully healed.
Fluence Units: J/cm2
Spot Size: 2 x 2 cm
Device Serial Number (Optional): [id number]
Total Square Area In Cm2 (Required For Proper Billing- Whole Numbers Only Please): 152
Total Pulses (Optional): 45
Detail Level: Zone
Mode: repeat paint
Location #1: posterior scalp, right posterior ear
Treatment Number: 1
Comments: Patient is returning after 3.5 months, for Xtrac treatment on the scalp. She states that her scalp has been manageable for several weeks. She is continuing with her Dermasmoothe FS oil.
Consent: Written consent obtained, risks reviewed including but not limited to crusting, scabbing, blistering, scarring, darker or lighter pigmentary change, incidental hair removal, bruising, and/or incomplete removal.

## 2020-06-03 ENCOUNTER — LAB OUTSIDE AN ENCOUNTER (OUTPATIENT)
Dept: URBAN - METROPOLITAN AREA CLINIC 92 | Facility: CLINIC | Age: 60
End: 2020-06-03

## 2020-06-03 ENCOUNTER — TELEPHONE ENCOUNTER (OUTPATIENT)
Dept: URBAN - METROPOLITAN AREA CLINIC 92 | Facility: CLINIC | Age: 60
End: 2020-06-03

## 2020-06-15 ENCOUNTER — OFFICE VISIT (OUTPATIENT)
Dept: URBAN - METROPOLITAN AREA TELEHEALTH 2 | Facility: TELEHEALTH | Age: 60
End: 2020-06-15

## 2020-06-15 ENCOUNTER — TELEPHONE ENCOUNTER (OUTPATIENT)
Dept: URBAN - METROPOLITAN AREA CLINIC 92 | Facility: CLINIC | Age: 60
End: 2020-06-15

## 2020-06-15 RX ORDER — POTASSIUM CHLORIDE 1500 MG/1
TAKE 1 TABLET (20 MEQ) BY ORAL ROUTE ONCE DAILY WITH FOOD TABLET, FILM COATED, EXTENDED RELEASE ORAL 1
Qty: 0 | Refills: 0 | Status: ACTIVE | COMMUNITY
Start: 1900-01-01 | End: 1900-01-01

## 2020-06-15 RX ORDER — VORTIOXETINE 20 MG/1
TAKE 1 TABLET (20 MG) BY ORAL ROUTE ONCE DAILY AT THE SAME TIME EACH DAY TABLET, FILM COATED ORAL 1
Qty: 0 | Refills: 0 | Status: ACTIVE | COMMUNITY
Start: 1900-01-01 | End: 1900-01-01

## 2020-06-15 RX ORDER — OMEPRAZOLE MAGNESIUM 10 MG/1
GRANULE, DELAYED RELEASE ORAL
Qty: 0 | Refills: 0 | Status: ACTIVE | COMMUNITY
Start: 1900-01-01 | End: 1900-01-01

## 2020-06-17 ENCOUNTER — OFFICE VISIT (OUTPATIENT)
Dept: URBAN - METROPOLITAN AREA SURGERY CENTER 16 | Facility: SURGERY CENTER | Age: 60
End: 2020-06-17
Payer: MEDICARE

## 2020-06-17 ENCOUNTER — CLAIMS CREATED FROM THE CLAIM WINDOW (OUTPATIENT)
Dept: URBAN - METROPOLITAN AREA CLINIC 4 | Facility: CLINIC | Age: 60
End: 2020-06-17
Payer: MEDICARE

## 2020-06-17 DIAGNOSIS — K25.9 GASTRIC ULCER, UNSPECIFIED AS ACUTE OR CHRONIC, WITHOUT HEMORRHAGE OR PERFORATION: ICD-10-CM

## 2020-06-17 DIAGNOSIS — K31.89 OTHER DISEASES OF STOMACH AND DUODENUM: ICD-10-CM

## 2020-06-17 DIAGNOSIS — K31.89 ACQUIRED DEFORMITY OF PYLORUS: ICD-10-CM

## 2020-06-17 DIAGNOSIS — K21.0 BILE REFLUX ESOPHAGITIS: ICD-10-CM

## 2020-06-17 DIAGNOSIS — K25.3 ACUTE BENIGN GASTRIC ULCER: ICD-10-CM

## 2020-06-17 DIAGNOSIS — K76.6 CLINICALLY SIGNIFICANT PORTAL HYPERTENSION: ICD-10-CM

## 2020-06-17 PROCEDURE — G8907 PT DOC NO EVENTS ON DISCHARG: HCPCS | Performed by: INTERNAL MEDICINE

## 2020-06-17 PROCEDURE — 43239 EGD BIOPSY SINGLE/MULTIPLE: CPT | Performed by: INTERNAL MEDICINE

## 2020-06-17 PROCEDURE — 88342 IMHCHEM/IMCYTCHM 1ST ANTB: CPT | Performed by: PATHOLOGY

## 2020-06-17 PROCEDURE — 88305 TISSUE EXAM BY PATHOLOGIST: CPT | Performed by: PATHOLOGY

## 2020-06-17 RX ORDER — VORTIOXETINE 20 MG/1
TAKE 1 TABLET (20 MG) BY ORAL ROUTE ONCE DAILY AT THE SAME TIME EACH DAY TABLET, FILM COATED ORAL 1
Qty: 0 | Refills: 0 | Status: ACTIVE | COMMUNITY
Start: 1900-01-01 | End: 1900-01-01

## 2020-06-17 RX ORDER — OMEPRAZOLE MAGNESIUM 10 MG/1
GRANULE, DELAYED RELEASE ORAL
Qty: 0 | Refills: 0 | Status: ACTIVE | COMMUNITY
Start: 1900-01-01 | End: 1900-01-01

## 2020-06-17 RX ORDER — POTASSIUM CHLORIDE 1500 MG/1
TAKE 1 TABLET (20 MEQ) BY ORAL ROUTE ONCE DAILY WITH FOOD TABLET, FILM COATED, EXTENDED RELEASE ORAL 1
Qty: 0 | Refills: 0 | Status: ACTIVE | COMMUNITY
Start: 1900-01-01 | End: 1900-01-01

## 2020-06-17 RX ORDER — OMEPRAZOLE 40 MG/1
1 CAPSULE 30 MINUTES BEFORE MORNING MEAL CAPSULE, DELAYED RELEASE ORAL TWICE DAILY
Qty: 60 | Refills: 11 | OUTPATIENT
Start: 2020-06-17

## 2020-07-06 ENCOUNTER — APPOINTMENT (RX ONLY)
Dept: URBAN - METROPOLITAN AREA CLINIC 37 | Facility: CLINIC | Age: 60
Setting detail: DERMATOLOGY
End: 2020-07-06

## 2020-07-06 ENCOUNTER — APPOINTMENT (RX ONLY)
Dept: URBAN - METROPOLITAN AREA CLINIC 38 | Facility: CLINIC | Age: 60
Setting detail: DERMATOLOGY
End: 2020-07-06

## 2020-07-06 DIAGNOSIS — L40.0 PSORIASIS VULGARIS: ICD-10-CM

## 2020-07-06 PROCEDURE — ? EXCIMER LASER

## 2020-07-06 PROCEDURE — 96920 EXCIMER LSR PSRIASIS<250SQCM: CPT

## 2020-07-06 ASSESSMENT — LOCATION DETAILED DESCRIPTION DERM
LOCATION DETAILED: MID-OCCIPITAL SCALP
LOCATION DETAILED: RIGHT INFERIOR POSTAURICULAR SKIN
LOCATION DETAILED: MID-OCCIPITAL SCALP
LOCATION DETAILED: RIGHT INFERIOR POSTAURICULAR SKIN

## 2020-07-06 ASSESSMENT — LOCATION ZONE DERM
LOCATION ZONE: SCALP
LOCATION ZONE: SCALP

## 2020-07-06 NOTE — PROCEDURE: EXCIMER LASER
Treatment Number: 2
Detail Level: Zone
Post-Care Instructions: I reviewed with the patient in detail post-care instructions. Patient should stay away from the sun and wear sun protection until treated areas are fully healed.
Fluence #1 (J/Cm2 Or Mj/Cm2): 300
Total Pulses (Optional): 45
Mode: repeat paint
Device Serial Number (Optional): [id number]
Fluence Units: J/cm2
Spot Size: 2 x 2 cm
Consent: Written consent obtained, risks reviewed including but not limited to crusting, scabbing, blistering, scarring, darker or lighter pigmentary change, incidental hair removal, bruising, and/or incomplete removal.
Location #1: scalp, right posterior ear
Total Square Area In Cm2 (Required For Proper Billing- Whole Numbers Only Please): 152
Comments: Patient is returning for treatment after 4 months of downtime; due to COVID-19 shut down. She states that her scalp is much better, but still has stubborn areas on the posterior scalp.

## 2020-07-06 NOTE — PROCEDURE: EXCIMER LASER
Total Pulses (Optional): 45
Treatment Number: 2
Spot Size: 2 x 2 cm
Total Square Area In Cm2 (Required For Proper Billing- Whole Numbers Only Please): 152
Comments: Patient is returning for treatment after 4 months of downtime; due to COVID-19 shut down. She states that her scalp is much better, but still has stubborn areas on the posterior scalp.
Device Serial Number (Optional): [id number]
Detail Level: Zone
Post-Care Instructions: I reviewed with the patient in detail post-care instructions. Patient should stay away from the sun and wear sun protection until treated areas are fully healed.
Consent: Written consent obtained, risks reviewed including but not limited to crusting, scabbing, blistering, scarring, darker or lighter pigmentary change, incidental hair removal, bruising, and/or incomplete removal.
Fluence Units: J/cm2
Mode: repeat paint
Fluence #1 (J/Cm2 Or Mj/Cm2): 300
Location #1: scalp, right posterior ear

## 2020-07-10 ENCOUNTER — OFFICE VISIT (OUTPATIENT)
Dept: URBAN - METROPOLITAN AREA TELEHEALTH 2 | Facility: TELEHEALTH | Age: 60
End: 2020-07-10
Payer: MEDICARE

## 2020-07-10 DIAGNOSIS — K74.69 OTHER CIRRHOSIS OF LIVER: ICD-10-CM

## 2020-07-10 DIAGNOSIS — Z86.010 HISTORY OF COLON POLYPS: ICD-10-CM

## 2020-07-10 DIAGNOSIS — K27.9 PUD (PEPTIC ULCER DISEASE): ICD-10-CM

## 2020-07-10 DIAGNOSIS — K76.6 PORTAL HYPERTENSION: ICD-10-CM

## 2020-07-10 DIAGNOSIS — K21.0 REFLUX ESOPHAGITIS: ICD-10-CM

## 2020-07-10 DIAGNOSIS — I85.10 SECONDARY ESOPHAGEAL VARICES WITHOUT BLEEDING: ICD-10-CM

## 2020-07-10 DIAGNOSIS — K75.81 NASH (NONALCOHOLIC STEATOHEPATITIS): ICD-10-CM

## 2020-07-10 PROCEDURE — 99214 OFFICE O/P EST MOD 30 MIN: CPT | Performed by: INTERNAL MEDICINE

## 2020-07-10 RX ORDER — OMEPRAZOLE 40 MG/1
1 CAPSULE 30 MINUTES BEFORE MORNING MEAL CAPSULE, DELAYED RELEASE ORAL TWICE DAILY
Qty: 60 | Refills: 11 | Status: ACTIVE | COMMUNITY
Start: 2020-06-17

## 2020-07-10 RX ORDER — POTASSIUM CHLORIDE 1500 MG/1
TAKE 1 TABLET (20 MEQ) BY ORAL ROUTE ONCE DAILY WITH FOOD TABLET, FILM COATED, EXTENDED RELEASE ORAL 1
Qty: 0 | Refills: 0 | Status: ACTIVE | COMMUNITY
Start: 1900-01-01 | End: 1900-01-01

## 2020-07-10 RX ORDER — OMEPRAZOLE MAGNESIUM 10 MG/1
GRANULE, DELAYED RELEASE ORAL
Qty: 0 | Refills: 0 | Status: ACTIVE | COMMUNITY
Start: 1900-01-01 | End: 1900-01-01

## 2020-07-10 RX ORDER — VORTIOXETINE 20 MG/1
TAKE 1 TABLET (20 MG) BY ORAL ROUTE ONCE DAILY AT THE SAME TIME EACH DAY TABLET, FILM COATED ORAL 1
Qty: 0 | Refills: 0 | Status: ACTIVE | COMMUNITY
Start: 1900-01-01 | End: 1900-01-01

## 2020-07-10 NOTE — HPI-OTHER HISTORIES
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

## 2020-07-29 ENCOUNTER — APPOINTMENT (RX ONLY)
Dept: URBAN - METROPOLITAN AREA CLINIC 38 | Facility: CLINIC | Age: 60
Setting detail: DERMATOLOGY
End: 2020-07-29

## 2020-07-29 ENCOUNTER — APPOINTMENT (RX ONLY)
Dept: URBAN - METROPOLITAN AREA CLINIC 37 | Facility: CLINIC | Age: 60
Setting detail: DERMATOLOGY
End: 2020-07-29

## 2020-07-29 DIAGNOSIS — L40.0 PSORIASIS VULGARIS: ICD-10-CM

## 2020-07-29 PROCEDURE — ? EXCIMER LASER

## 2020-07-29 PROCEDURE — ? PATIENT SPECIFIC COUNSELING

## 2020-07-29 PROCEDURE — 96920 EXCIMER LSR PSRIASIS<250SQCM: CPT

## 2020-07-29 PROCEDURE — ? COUNSELING

## 2020-07-29 ASSESSMENT — PGA PSORIASIS
PGA PSORIASIS 2020: MODERATE
PGA PSORIASIS 2020: MODERATE

## 2020-07-29 ASSESSMENT — LOCATION DETAILED DESCRIPTION DERM
LOCATION DETAILED: MID-OCCIPITAL SCALP
LOCATION DETAILED: MID-OCCIPITAL SCALP

## 2020-07-29 ASSESSMENT — LOCATION ZONE DERM
LOCATION ZONE: SCALP
LOCATION ZONE: SCALP

## 2020-07-29 ASSESSMENT — LOCATION SIMPLE DESCRIPTION DERM
LOCATION SIMPLE: POSTERIOR SCALP
LOCATION SIMPLE: POSTERIOR SCALP

## 2020-07-29 ASSESSMENT — BSA PSORIASIS
% BODY COVERED IN PSORIASIS: 5
% BODY COVERED IN PSORIASIS: 5

## 2020-07-29 NOTE — PROCEDURE: EXCIMER LASER
Detail Level: Zone
Treatment Number: 3
Total Pulses (Optional): 87
Location #1: scalp, right ear
Consent: Written consent obtained, risks reviewed including but not limited to crusting, scabbing, blistering, scarring, darker or lighter pigmentary change, incidental hair removal, bruising, and/or incomplete removal.
Device Serial Number (Optional): [id number]
Fluence #1 (J/Cm2 Or Mj/Cm2): 941
Spot Size: 2 x 2 cm
Fluence Units: J/cm2
Comments: Patient states that she tolerated her last treatment well. She states that her scalp is flaring. She states the plaque has gotten thicker and admits to scratching. \\n\\n**By accident, we increased the dose setting higher than recommended. Discussed with the patient, to expect possible sunburn sensation with tenderness. Our hope is that, she has no blistering. I ask the patient to call our office, if she should experience great discomfort, on the scalp. Patient voiced understanding.
Post-Care Instructions: I reviewed with the patient in detail post-care instructions. Patient should stay away from the sun and wear sun protection until treated areas are fully healed.
Total Square Area In Cm2 (Required For Proper Billing- Whole Numbers Only Please): 92
Mode: repeat paint

## 2020-07-29 NOTE — PROCEDURE: EXCIMER LASER
Treatment Number: 3
Comments: Patient states that she tolerated her last treatment well. She states that her scalp is flaring. She states the plaque has gotten thicker and admits to scratching. \\n\\n**By accident, we increased the dose setting higher than recommended. Discussed with the patient, to expect possible sunburn sensation with tenderness. Our hope is that, she has no blistering. I ask the patient to call our office, if she should experience great discomfort, on the scalp. Patient voiced understanding.
Consent: Written consent obtained, risks reviewed including but not limited to crusting, scabbing, blistering, scarring, darker or lighter pigmentary change, incidental hair removal, bruising, and/or incomplete removal.
Fluence #1 (J/Cm2 Or Mj/Cm2): 946
Post-Care Instructions: I reviewed with the patient in detail post-care instructions. Patient should stay away from the sun and wear sun protection until treated areas are fully healed.
Device Serial Number (Optional): [id number]
Fluence Units: J/cm2
Spot Size: 2 x 2 cm
Total Square Area In Cm2 (Required For Proper Billing- Whole Numbers Only Please): 92
Total Pulses (Optional): 87
Detail Level: Zone
Location #1: scalp, right ear
Mode: repeat paint

## 2020-07-29 NOTE — PROCEDURE: PATIENT SPECIFIC COUNSELING
D/w patient that having tried and failed IL-17, IL-23 and Xtrac I  feel we are in the position now to consider siliq.  We reviewed the risk of worsening depression and patient will monitor her mood closely. She has no h/o SI but should she feel such she will notify us immediately.  Forms signed and will apply for PA and patient will schedule full f/u for 1 month
Detail Level: Simple

## 2020-08-13 ENCOUNTER — RX ONLY (OUTPATIENT)
Age: 60
Setting detail: RX ONLY
End: 2020-08-13

## 2020-08-13 RX ORDER — BRODALUMAB 210 MG/1
210 MG INJECTION SUBCUTANEOUS EVERY 2 WEEKS
Qty: 2 | Refills: 5 | Status: ERX

## 2020-08-13 RX ORDER — BRODALUMAB 210 MG/1
210MG INJECTION SUBCUTANEOUS
Qty: 3 | Refills: 0 | Status: ERX

## 2020-08-19 ENCOUNTER — APPOINTMENT (RX ONLY)
Dept: URBAN - METROPOLITAN AREA CLINIC 38 | Facility: CLINIC | Age: 60
Setting detail: DERMATOLOGY
End: 2020-08-19

## 2020-08-19 ENCOUNTER — APPOINTMENT (RX ONLY)
Dept: URBAN - METROPOLITAN AREA CLINIC 37 | Facility: CLINIC | Age: 60
Setting detail: DERMATOLOGY
End: 2020-08-19

## 2020-08-19 DIAGNOSIS — L40.0 PSORIASIS VULGARIS: ICD-10-CM

## 2020-08-19 PROCEDURE — ? SILIQ INITIATION

## 2020-08-19 PROCEDURE — ? PATIENT SPECIFIC COUNSELING

## 2020-08-19 PROCEDURE — ? EXCIMER LASER

## 2020-08-19 PROCEDURE — 99213 OFFICE O/P EST LOW 20 MIN: CPT | Mod: 25

## 2020-08-19 PROCEDURE — 36415 COLL VENOUS BLD VENIPUNCTURE: CPT

## 2020-08-19 PROCEDURE — ? ORDER TESTS

## 2020-08-19 PROCEDURE — ? COUNSELING

## 2020-08-19 PROCEDURE — 96920 EXCIMER LSR PSRIASIS<250SQCM: CPT

## 2020-08-19 PROCEDURE — ? VENIPUNCTURE

## 2020-08-19 ASSESSMENT — LOCATION ZONE DERM
LOCATION ZONE: ARM
LOCATION ZONE: SCALP
LOCATION ZONE: SCALP
LOCATION ZONE: ARM

## 2020-08-19 ASSESSMENT — LOCATION SIMPLE DESCRIPTION DERM
LOCATION SIMPLE: SCALP
LOCATION SIMPLE: SCALP
LOCATION SIMPLE: POSTERIOR SCALP
LOCATION SIMPLE: RIGHT UPPER ARM
LOCATION SIMPLE: POSTERIOR SCALP
LOCATION SIMPLE: RIGHT UPPER ARM

## 2020-08-19 ASSESSMENT — LOCATION DETAILED DESCRIPTION DERM
LOCATION DETAILED: MID-OCCIPITAL SCALP
LOCATION DETAILED: RIGHT INFERIOR POSTAURICULAR SKIN
LOCATION DETAILED: MID-OCCIPITAL SCALP
LOCATION DETAILED: RIGHT ANTECUBITAL SKIN
LOCATION DETAILED: RIGHT ANTECUBITAL SKIN
LOCATION DETAILED: RIGHT INFERIOR POSTAURICULAR SKIN

## 2020-08-19 NOTE — PROCEDURE: SILIQ INITIATION
Siliq Monitoring Guidelines: A yearly test for tuberculosis is required while taking Siliq. Monitoring for depression is also required.
Detail Level: Zone
Add Pregnancy And Lactation Warning To Medication Counseling?: No
Diagnosis (Required): Psoriasis
Siliq Dosing: 210mg SC day 0, 7 and 14 then every two weeks after that
Pregnancy And Lactation Warning Text: The risk during pregnancy and breastfeeding is uncertain with this medication.

## 2020-08-19 NOTE — PROCEDURE: SILIQ INITIATION
Is Soriatane Contraindicated?: No
Detail Level: Zone
Siliq Monitoring Guidelines: A yearly test for tuberculosis is required while taking Siliq. Monitoring for depression is also required.
Pregnancy And Lactation Warning Text: The risk during pregnancy and breastfeeding is uncertain with this medication.
Siliq Dosing: 210mg SC day 0, 7 and 14 then every two weeks after that
Diagnosis (Required): Psoriasis

## 2020-08-19 NOTE — PROCEDURE: ORDER TESTS
Lab Facility: [id number]
Performing Laboratory: [id number]
Billing Type: Third-Party Bill
Expected Date Of Service: 08/19/2020
Bill For Surgical Tray: no

## 2020-08-19 NOTE — HPI: MEDICATION (SILIQ)
How Severe Is It?: moderate
Is This A New Presentation, Or A Follow-Up?: Evaluation for Biologic Therapy
Additional History: Pt presents today for initiation of siliq and due for BW

## 2020-08-19 NOTE — PROCEDURE: EXCIMER LASER
Spot Size: 2 x 2 cm
Total Square Area In Cm2 (Required For Proper Billing- Whole Numbers Only Please): 108
Post-Care Instructions: I reviewed with the patient in detail post-care instructions. Patient should stay away from the sun and wear sun protection until treated areas are fully healed.
Fluence #1 (J/Cm2 Or Mj/Cm2): 700
Location #1: posterior scalp, right posterior ear
Treatment Number: 4
Consent: Written consent obtained, risks reviewed including but not limited to crusting, scabbing, blistering, scarring, darker or lighter pigmentary change, incidental hair removal, bruising, and/or incomplete removal.
Total Pulses (Optional): 75
Mode: repeat paint
Device Serial Number (Optional): [id number]
Fluence Units: J/cm2
Comments: Patient states that she tolerated her last treatment well.
Detail Level: Zone

## 2020-08-19 NOTE — PROCEDURE: PATIENT SPECIFIC COUNSELING
Detail Level: Simple
Patient has no h/o depression, anxiety or SI however d/w her that should any evidence of such arise to please let us know right way. \\n\\nWill f/u in 2 months after starting sili. She has already been approved, just awaiting shipment

## 2020-08-19 NOTE — PROCEDURE: PATIENT SPECIFIC COUNSELING
Patient has no h/o depression, anxiety or SI however d/w her that should any evidence of such arise to please let us know right way. \\n\\nWill f/u in 2 months after starting sili. She has already been approved, just awaiting shipment
Detail Level: Simple

## 2020-08-19 NOTE — PROCEDURE: EXCIMER LASER
Total Pulses (Optional): 75
Fluence #1 (J/Cm2 Or Mj/Cm2): 700
Detail Level: Zone
Treatment Number: 4
Mode: repeat paint
Spot Size: 2 x 2 cm
Device Serial Number (Optional): [id number]
Fluence Units: J/cm2
Consent: Written consent obtained, risks reviewed including but not limited to crusting, scabbing, blistering, scarring, darker or lighter pigmentary change, incidental hair removal, bruising, and/or incomplete removal.
Total Square Area In Cm2 (Required For Proper Billing- Whole Numbers Only Please): 108
Location #1: posterior scalp, right posterior ear
Comments: Patient states that she tolerated her last treatment well.
Post-Care Instructions: I reviewed with the patient in detail post-care instructions. Patient should stay away from the sun and wear sun protection until treated areas are fully healed.

## 2020-08-25 ENCOUNTER — RX ONLY (OUTPATIENT)
Age: 60
Setting detail: RX ONLY
End: 2020-08-25

## 2020-08-25 RX ORDER — BRODALUMAB 210 MG/1
210MG INJECTION SUBCUTANEOUS
Qty: 3 | Refills: 0 | Status: ERX

## 2020-09-06 ENCOUNTER — TELEPHONE ENCOUNTER (OUTPATIENT)
Dept: URBAN - METROPOLITAN AREA CLINIC 92 | Facility: CLINIC | Age: 60
End: 2020-09-06

## 2020-09-06 NOTE — HPI-OTHER HISTORIES
This is a scheduled follow-up appointment for this patient, a 60 year old /White female, after a previous visit in Nov 2019 , for an evaluation of cirrhosis.  She states no history of new medications or alcohol use.      Pt here for follow up for sr/cirrhois. she saw [place] transplant team in sept 2019 and is stable. she has trace ascites on imaging and is undergoing egd's with dr. vickers and did one in may showing grade 2 varices. recommend repeat in 1 year.  pt states she would like to see us prn since she is being followed by the transplant team q6 months and it takes her almost 2 hours to come see us a few days after. recommend she get her rx filled  by the transplant team as well in case we don't see her in 6 months.  she understands and is in agreement to this plan.   labs sept 2019 na 138 gluc 162 cr 0.69 albumin 3.4 tb 1.5 alt 30 alp 112 ast 48 afp 3.3 wbc 4.4 hgb 13.1 plt 81  sept 2019 mri * Final Report *  Reason For Exam Cirrhosis, HCC screening  REPORT EXAM: MRI Abdomen w/ + w/o Contrast  CLINICAL INDICATION: Cirrhosis, HCC screening.   TECHNIQUE: Multisequence, multiplanar MRI of the abdomen was performed without and with intravenous contrast. ESRC.[id number]   CONTRAST: 19 cc of Prohance  COMPARISON: 3/11/2019.  FINDINGS:  Lower Thorax: Bibasilar atelectasis.   Liver: Mild fat deposition the liver. Please note that arterial phase image are too early with no contrast in the portal vein. Morphologic changes of chronic liver disease, including nodular contour and lobar redistribution redistribution. Delayed reticular enhancement hepatic rectal, compatible fibrosis. No washout suggestive of hepatocellular carcinoma. Hepatic arteries are patent. Replaced right hepatic artery from the SMA. Hepatic veins are patent. Unchanged chronic occlusion of the splenic vein, distal SMV, and main portal vein with resulting cavernous transformation of the portal vein. Recannulized paraumbilical vein; periesophageal, perigastric, perisplenic, and mesenteric varices with a spleno mesenteric shunt with a stable nonocclusive thrombus in the left upper quadrant (11:73).   Gallbladder/Biliary Tree: Status post cholecystectomy.  Spleen: Enlarged measuring 19.7 cm in the craniocaudal dimension without focal lesion.  Pancreas: Normal.  Adrenal Glands: Normal.   Kidneys/Ureters: Subcentimeter renal cysts.  Gastrointestinal: No bowel obstruction.   Lymph Nodes: Stable benign-appearing cardiophrenic lymph nodes. Subcentimeter mesenteric and retroperitoneal lymph nodes, nonspecific and likely reactive in the setting of chronic liver disease.  Vessels: As above.  Peritoneum/Retroperitoneum: Trace ascites.  Bones/Soft Tissues: Normal.  IMPRESSION:     1.  Morphologic changes of chronic liver disease without hepatocellular carcinoma. 2.  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 Stigmata of portal hypertension, including splenomegaly, trace ascites, and upper abdominal varices, including a spleno mesenteric shunt with stable eccentric, nonocclusive thrombus.     Signature Line *** Final ***  Electronically Signed By:  Ricky Boyer on  09/27/2019 08:30   march 2019 labs na 137 k 3.4 low gluc 149 cr 0.76 tb 2.3 alt 35 alp 95 ast 59 afp 5.9 wbc 4.1 hgb 13.5 plt 60 inr 1.31 hiv neg  may 2019 egd grade 2 EV portal hypertensive gastropathy acute gastritis recommend repeat in 1 year  march 2019 mri:  * Final Report *  Reason For Exam assess pvp & access for tumor  REPORT EXAM: MRI Abdomen w/ + w/o Contrast  CLINICAL INDICATION: SR cirrhosis. Hepatocellular carcinoma screening.  TECHNIQUE: Multisequence, multiplanar MRI of the abdomen was performed without and with intravenous contrast. ESRC.[id number]   CONTRAST: 22 cc of Prohance  COMPARISON: 9/10/2018 and prior.  FINDINGS:  Lower Thorax: Bibasilar atelectasis. No enhancing septation on dynamic and delayed images the sinus, second. They are length of the female no safe that doesn't examine all filling while there is  Liver: Mild fat deposition the liver. Morphologic changes of chronic liver disease, including nodular contour and lobar redistribution redistribution. Delayed reticular enhancement hepatic rectal, compatible fibrosis. No arterial enhancement washout suggestive hepatocellular carcinoma. Hepatic arteries are patent. Placed right hepatic artery from the SMA. Hepatic veins are patent. Unchanged chronic occlusion of the splenic vein, distal SMV, and main portal vein with resulting cavernous transformation of the portal vein. Recannulized paraumbilical vein; periesophageal, perigastric, perisplenic, and mesenteric varices with a spleno mesenteric shunt with a nonocclusive thrombus (13:85).   Gallbladder/Biliary Tree: Status post cholecystectomy.  Spleen: Enlarged measuring 18.2 cm in the cranial caudal dimension without focal lesion.  Pancreas: Normal.  Adrenal Glands: Normal.   Kidneys/Ureters: Subcentimeter renal cysts.  Gastrointestinal: No bowel obstruction.   Lymph Nodes: Stable benign-appearing cardiophrenic lymph nodes. Subcentimeter mesenteric and retroperitoneal lymph nodes, nonspecific and likely reactive in the setting of chronic liver disease.  Vessels: As above.  Peritoneum/Retroperitoneum: Trace ascites.  Bones/Soft Tissues: Normal.  IMPRESSION:     1.  Morphologic changes of chronic liver disease without hepatocellular carcinoma. 2.  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 Stigmata portal hypertension, including spinal medically, trace ascites, and upper abdominal varices, including a spleno mesenteric shunt with stable eccentric thrombus.     Signature Line *** Final ***  Electronically Signed By:  Ricky Boyer on  03/11/2019 13:02  Duration of visit 25 mins with over 50% of the time explaining pt's condition and treatment plan  Recap: Pt developed fluid retention and she had some recent dental work and so had appetite issues and had been taking some soup and so that added to the salt load. Prior mri with some ascites but small.  She saw Dr Quintero who inc bumex to tid for 2 days and she lost 11 pounds and so feeling better.  She is to do mri fernando monday and to do labs then but will check cmp and pt now and reassess her to be sure stable.  Spoke with Dr Quintero and relayed the info re the salty soup consumption so he is aware. Hoping labs will be stable.  She in may needs to see Dr Vickers re the egd of the grade 2 varices.  She had a colonoscopy on 05/02/2018: polyps removed repeat recommended in 3years.    3/12/2018 Mri: liver with fat deposition, mildly cirrhotic morphology with nodular contour and lobar redistribution, no arterially enhancing lesion with washout to suggest hepatocellular carcinoma. Splenomegaly, no supsicious splenic lesion. Pancreas normal. Sequela of portal vein thrombosis with cavernous transformation with persistent occlusive thrombus involving superior messenteric vein, splenic vein, and the portal vein near the confluence. Sequela of portal hypertension including extensive paraesophageal and upper abdominal varices including perigastric, perisplenic and retroperitoneal varices with umbilcal recannulization. Hepatic veins are patent. Trace of ascites.   Sept 10 2018 oltx clinic: meld 11 has the hx of the phtn and mild ascites on water pills.  PSE mild and more so in the past and on tx.  Optic nerve tumor treated with radiation.   May 2018 she saw Dr Merlino for a bicuspid valve and told may need surgery in the next 10 yrs.  03/14/2018: creatinine 0.77, potassium 3.8, wbc 5.0, hgb 13.7, platelets 99,000, sodium 141, glucose 99, BUN 11, albumin 3.8, globulin 3.3, bilirubin 1.5, AST 41.2, ALP 76, ALT 30.7(less than 19 preferred), INR 1.2, vitamin D 47.7, AFP 3.6. MELD 10, MELD Na is 10.   She is normally on a low salt diet and zero gram ammonia diet.   She is s/p radiation therapy for a pituitary adenoma and completed 28 doses on 06/20/2017. She felt that it added to dental caries and completed extractions for this.   Skin cancer also treated her right ear and left shoulder believed from the radiation.

## 2020-09-08 ENCOUNTER — OFFICE VISIT (OUTPATIENT)
Dept: URBAN - METROPOLITAN AREA TELEHEALTH 2 | Facility: TELEHEALTH | Age: 60
End: 2020-09-08

## 2020-09-08 RX ORDER — OMEPRAZOLE 40 MG/1
1 CAPSULE 30 MINUTES BEFORE MORNING MEAL CAPSULE, DELAYED RELEASE ORAL TWICE DAILY
Qty: 60 | Refills: 11 | Status: ACTIVE | COMMUNITY
Start: 2020-06-17

## 2020-09-08 RX ORDER — OMEPRAZOLE MAGNESIUM 10 MG/1
GRANULE, DELAYED RELEASE ORAL
Qty: 0 | Refills: 0 | Status: ACTIVE | COMMUNITY
Start: 1900-01-01 | End: 1900-01-01

## 2020-09-08 RX ORDER — POTASSIUM CHLORIDE 1500 MG/1
TAKE 1 TABLET (20 MEQ) BY ORAL ROUTE ONCE DAILY WITH FOOD TABLET, FILM COATED, EXTENDED RELEASE ORAL 1
Qty: 0 | Refills: 0 | Status: ACTIVE | COMMUNITY
Start: 1900-01-01 | End: 1900-01-01

## 2020-09-08 RX ORDER — VORTIOXETINE 20 MG/1
TAKE 1 TABLET (20 MG) BY ORAL ROUTE ONCE DAILY AT THE SAME TIME EACH DAY TABLET, FILM COATED ORAL 1
Qty: 0 | Refills: 0 | Status: ACTIVE | COMMUNITY
Start: 1900-01-01 | End: 1900-01-01

## 2020-09-09 ENCOUNTER — LAB OUTSIDE AN ENCOUNTER (OUTPATIENT)
Dept: URBAN - METROPOLITAN AREA TELEHEALTH 2 | Facility: TELEHEALTH | Age: 60
End: 2020-09-09

## 2020-09-09 ENCOUNTER — OFFICE VISIT (OUTPATIENT)
Dept: URBAN - METROPOLITAN AREA TELEHEALTH 2 | Facility: TELEHEALTH | Age: 60
End: 2020-09-09
Payer: MEDICARE

## 2020-09-09 DIAGNOSIS — I81 PORTAL VEIN THROMBOSIS: ICD-10-CM

## 2020-09-09 DIAGNOSIS — E23.7 PITUITARY GLAND DISORDER: ICD-10-CM

## 2020-09-09 DIAGNOSIS — Z71.89 VACCINE COUNSELING: ICD-10-CM

## 2020-09-09 DIAGNOSIS — D04.9: ICD-10-CM

## 2020-09-09 DIAGNOSIS — K75.81 NASH (NONALCOHOLIC STEATOHEPATITIS): ICD-10-CM

## 2020-09-09 DIAGNOSIS — K72.90 HEPATIC ENCEPHALOPATHY: ICD-10-CM

## 2020-09-09 DIAGNOSIS — I85.00 ESOPHAGEAL VARICES: ICD-10-CM

## 2020-09-09 DIAGNOSIS — E66.3 OVER WEIGHT: ICD-10-CM

## 2020-09-09 DIAGNOSIS — K74.60 CIRRHOSIS OF LIVER: ICD-10-CM

## 2020-09-09 DIAGNOSIS — R18.8 ASCITES: ICD-10-CM

## 2020-09-09 DIAGNOSIS — Z86.010 HISTORY OF COLON POLYPS: ICD-10-CM

## 2020-09-09 DIAGNOSIS — Z79.899 HIGH RISK MEDICATION USE: ICD-10-CM

## 2020-09-09 DIAGNOSIS — K74.69 CIRRHOSIS, CRYPTOGENIC: ICD-10-CM

## 2020-09-09 PROCEDURE — G8427 DOCREV CUR MEDS BY ELIG CLIN: HCPCS | Performed by: PHYSICIAN ASSISTANT

## 2020-09-09 PROCEDURE — G8417 CALC BMI ABV UP PARAM F/U: HCPCS | Performed by: PHYSICIAN ASSISTANT

## 2020-09-09 PROCEDURE — 99214 OFFICE O/P EST MOD 30 MIN: CPT | Performed by: PHYSICIAN ASSISTANT

## 2020-09-09 RX ORDER — RIFAXIMIN 550 MG/1
1 TABLET TABLET ORAL TWICE A DAY
Status: ACTIVE | COMMUNITY

## 2020-09-09 RX ORDER — POTASSIUM CHLORIDE 1500 MG/1
TAKE 1 TABLET (20 MEQ) BY ORAL ROUTE ONCE DAILY WITH FOOD TABLET, FILM COATED, EXTENDED RELEASE ORAL 1
Qty: 0 | Refills: 0 | Status: ACTIVE | COMMUNITY
Start: 1900-01-01

## 2020-09-09 RX ORDER — OMEPRAZOLE MAGNESIUM 10 MG/1
GRANULE, DELAYED RELEASE ORAL
Qty: 0 | Refills: 0 | Status: ACTIVE | COMMUNITY
Start: 1900-01-01

## 2020-09-09 RX ORDER — VORTIOXETINE 20 MG/1
TAKE 1 TABLET (20 MG) BY ORAL ROUTE ONCE DAILY AT THE SAME TIME EACH DAY TABLET, FILM COATED ORAL 1
Qty: 0 | Refills: 0 | Status: ACTIVE | COMMUNITY
Start: 1900-01-01

## 2020-09-09 RX ORDER — OMEPRAZOLE 40 MG/1
1 CAPSULE 30 MINUTES BEFORE MORNING MEAL CAPSULE, DELAYED RELEASE ORAL TWICE DAILY
Qty: 60 | Refills: 11 | Status: ACTIVE | COMMUNITY
Start: 2020-06-17

## 2020-09-09 NOTE — HPI-OTHER HISTORIES
This is a scheduled follow-up appointment for this patient, a 60 year old /White female, after a previous visit in nov 2019 , for an evaluation of cirrhosis.  She states no history of new medications or alcohol use.        Pt here for follow up for sr/cirrhois. she saw [place] transplant team in sept 2019 and is stable.  She completed her cardiac rehab due valve repair in march 2020. she has lost 16lbs since her last visit. she is exercising since she has more energy. watching carb intake. she is helping her grandson with virtual learning since he has asthma.    RECAP: she has trace ascites on imaging and is undergoing egd's with dr. waller and did one in may showing grade 2 varices. recommend repeat in 1 year.  pt states she would like to see us prn since she is being followed by the transplant team q6 months and it takes her almost 2 hours to come see us a few days after. recommend she get her rx filled  by the transplant team as well in case we don't see her in 6 months.  she understands and is in agreement to this plan.  she saw dr. waller a few months ago. seeing [place] transplant team and saw them in march, last MELD score was 13.  march 2020 MRI: * Final Report *  Reason For Exam Liver disease  REPORT EXAM: MRI Abdomen w/ + w/o Contrast  CLINICAL INDICATION: Liver disease. History SR cirrhosis.  TECHNIQUE: Multisequence, multiplanar MRI of the abdomen was performed without and with intravenous contrast. ESRC.2.7.3  CONTRAST: 19 cc of Prohance  COMPARISON: MRI abdomen 9/27/2019, 3/11/2019, and 6/10/2013.  FINDINGS:  Lower Thorax: Trace left-sided pleural effusion.  Liver: Mild fat deposition. Morphologic changes of chronic liver disease. Delayed phase peripheral reticular enhancement suggesting early fibrosis. No suspicious areas of arterial enhancement or delayed phase washout.   Gallbladder/Biliary Tree: Status post cholecystectomy. No ductal dilation.  Spleen: The spleen is enlarged measuring 19.3 cm in the craniocaudad dimension.  Pancreas: Normal.  Adrenal Glands: Normal.   Kidneys/Ureters: Subcentimeter simple renal cyst superior pole right kidney.  Gastrointestinal: Scattered areas of small bowel submucosal edema and asymmetric submucosal edema throughout the right hemicolon, representing portal enteropathy and colopathy.   Lymph Nodes: Nonspecific prominent subcentimeter lymph nodes noted in the peripancreatic regions, jaelyn hepatis, and portacaval region. These are favored to be reactive to concurrent hepatic disease.  Vessels: Mild aortic atherosclerosis without aneurysm. Replaced versus accessory right hepatic artery arising from the superior mesenteric artery. There appears to be chronic thrombosis of the main portal vein with cavernous transformation. A portion of the main splenic vein may be patent but appears severely attenuated, likely related to chronic thrombosis. The superior mesenteric vein is completely occluded with collateral vessels all throughout the mesentery and perisplenic spaces. Perigastric and paraesophageal varices are present including esophageal submucosal component. Focal splenorenal shunting is noted. There is recanalization of the paraumbilical vein with associated ventral abdominal wall collaterals.  Peritoneum/Retroperitoneum: Trace ascites. The peripancreatic edema is also felt to be related to hepatic disease.  Bones/Soft Tissues: Normal.  IMPRESSION:      1. Morphologic changes of chronic liver disease and sequelae of portal hypertension. No suspicious hepatic lesions. 2. 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  The images were reviewed and interpreted by Tasia Starr MD.  Signature Line *** Final ***  Electronically Signed By:  Tasia Starr on  03/04/2020 16:19   labs sept 2019 na 138 gluc 162 cr 0.69 albumin 3.4 tb 1.5 alt 30 alp 112 ast 48 afp 3.3 wbc 4.4 hgb 13.1 plt 81  sept 2019 mri * Final Report *  Reason For Exam Cirrhosis, HCC screening  REPORT EXAM: MRI Abdomen w/ + w/o Contrast  CLINICAL INDICATION: Cirrhosis, HCC screening.   TECHNIQUE: Multisequence, multiplanar MRI of the abdomen was performed without and with intravenous contrast. ESRC.[id number]   CONTRAST: 19 cc of Prohance  COMPARISON: 3/11/2019.  FINDINGS:  Lower Thorax: Bibasilar atelectasis.   Liver: Mild fat deposition the liver. Please note that arterial phase image are too early with no contrast in the portal vein. Morphologic changes of chronic liver disease, including nodular contour and lobar redistribution redistribution. Delayed reticular enhancement hepatic rectal, compatible fibrosis. No washout suggestive of hepatocellular carcinoma. Hepatic arteries are patent. Replaced right hepatic artery from the SMA. Hepatic veins are patent. Unchanged chronic occlusion of the splenic vein, distal SMV, and main portal vein with resulting cavernous transformation of the portal vein. Recannulized paraumbilical vein; periesophageal, perigastric, perisplenic, and mesenteric varices with a spleno mesenteric shunt with a stable nonocclusive thrombus in the left upper quadrant (11:73).   Gallbladder/Biliary Tree: Status post cholecystectomy.  Spleen: Enlarged measuring 19.7 cm in the craniocaudal dimension without focal lesion.  Pancreas: Normal.  Adrenal Glands: Normal.   Kidneys/Ureters: Subcentimeter renal cysts.  Gastrointestinal: No bowel obstruction.   Lymph Nodes: Stable benign-appearing cardiophrenic lymph nodes. Subcentimeter mesenteric and retroperitoneal lymph nodes, nonspecific and likely reactive in the setting of chronic liver disease.  Vessels: As above.  Peritoneum/Retroperitoneum: Trace ascites.  Bones/Soft Tissues: Normal.  IMPRESSION:     1.  Morphologic changes of chronic liver disease without hepatocellular carcinoma. 2.  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 Stigmata of portal hypertension, including splenomegaly, trace ascites, and upper abdominal varices, including a spleno mesenteric shunt with stable eccentric, nonocclusive thrombus.     Signature Line *** Final ***  Electronically Signed By:  Ricky Boyer on  09/27/2019 08:30   march 2019 labs na 137 k 3.4 low gluc 149 cr 0.76 tb 2.3 alt 35 alp 95 ast 59 afp 5.9 wbc 4.1 hgb 13.5 plt 60 inr 1.31 hiv neg  may 2019 egd grade 2 EV portal hypertensive gastropathy acute gastritis recommend repeat in 1 year  march 2019 mri:  * Final Report *  Reason For Exam assess pvp & access for tumor  REPORT EXAM: MRI Abdomen w/ + w/o Contrast  CLINICAL INDICATION: SR cirrhosis. Hepatocellular carcinoma screening.  TECHNIQUE: Multisequence, multiplanar MRI of the abdomen was performed without and with intravenous contrast. ESRC.2.7.3   CONTRAST: 22 cc of Prohance  COMPARISON: 9/10/2018 and prior.  FINDINGS:  Lower Thorax: Bibasilar atelectasis. No enhancing septation on dynamic and delayed images the sinus, second. They are length of the female no safe that doesn't examine all filling while there is  Liver: Mild fat deposition the liver. Morphologic changes of chronic liver disease, including nodular contour and lobar redistribution redistribution. Delayed reticular enhancement hepatic rectal, compatible fibrosis. No arterial enhancement washout suggestive hepatocellular carcinoma. Hepatic arteries are patent. Placed right hepatic artery from the SMA. Hepatic veins are patent. Unchanged chronic occlusion of the splenic vein, distal SMV, and main portal vein with resulting cavernous transformation of the portal vein. Recannulized paraumbilical vein; periesophageal, perigastric, perisplenic, and mesenteric varices with a spleno mesenteric shunt with a nonocclusive thrombus (13:85).   Gallbladder/Biliary Tree: Status post cholecystectomy.  Spleen: Enlarged measuring 18.2 cm in the cranial caudal dimension without focal lesion.  Pancreas: Normal.  Adrenal Glands: Normal.   Kidneys/Ureters: Subcentimeter renal cysts.  Gastrointestinal: No bowel obstruction.   Lymph Nodes: Stable benign-appearing cardiophrenic lymph nodes. Subcentimeter mesenteric and retroperitoneal lymph nodes, nonspecific and likely reactive in the setting of chronic liver disease.  Vessels: As above.  Peritoneum/Retroperitoneum: Trace ascites.  Bones/Soft Tissues: Normal.  IMPRESSION:     1.  Morphologic changes of chronic liver disease without hepatocellular carcinoma. 2.  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 Stigmata portal hypertension, including spinal medically, trace ascites, and upper abdominal varices, including a spleno mesenteric shunt with stable eccentric thrombus.     Signature Line *** Final ***  Electronically Signed By:  Ricky Boyer on  03/11/2019 13:02

## 2020-09-24 ENCOUNTER — APPOINTMENT (RX ONLY)
Dept: URBAN - METROPOLITAN AREA CLINIC 38 | Facility: CLINIC | Age: 60
Setting detail: DERMATOLOGY
End: 2020-09-24

## 2020-09-24 ENCOUNTER — APPOINTMENT (RX ONLY)
Dept: URBAN - METROPOLITAN AREA CLINIC 37 | Facility: CLINIC | Age: 60
Setting detail: DERMATOLOGY
End: 2020-09-24

## 2020-09-24 DIAGNOSIS — L40.0 PSORIASIS VULGARIS: ICD-10-CM

## 2020-09-24 PROCEDURE — 96920 EXCIMER LSR PSRIASIS<250SQCM: CPT

## 2020-09-24 PROCEDURE — ? EXCIMER LASER

## 2020-09-24 ASSESSMENT — LOCATION ZONE DERM
LOCATION ZONE: SCALP
LOCATION ZONE: EAR
LOCATION ZONE: SCALP
LOCATION ZONE: EAR

## 2020-09-24 ASSESSMENT — LOCATION DETAILED DESCRIPTION DERM
LOCATION DETAILED: RIGHT INFERIOR OCCIPITAL SCALP
LOCATION DETAILED: LEFT POSTAURICULAR SKIN
LOCATION DETAILED: RIGHT INFERIOR POSTAURICULAR SKIN
LOCATION DETAILED: RIGHT INFERIOR POSTAURICULAR SKIN
LOCATION DETAILED: LEFT POSTAURICULAR SKIN
LOCATION DETAILED: LEFT INFERIOR OCCIPITAL SCALP
LOCATION DETAILED: RIGHT CAVUM CONCHA
LOCATION DETAILED: RIGHT CAVUM CONCHA
LOCATION DETAILED: LEFT INFERIOR OCCIPITAL SCALP
LOCATION DETAILED: RIGHT INFERIOR OCCIPITAL SCALP

## 2020-09-24 ASSESSMENT — LOCATION SIMPLE DESCRIPTION DERM
LOCATION SIMPLE: RIGHT EAR
LOCATION SIMPLE: POSTERIOR SCALP
LOCATION SIMPLE: SCALP
LOCATION SIMPLE: SCALP
LOCATION SIMPLE: POSTERIOR SCALP
LOCATION SIMPLE: RIGHT EAR

## 2020-09-24 NOTE — PROCEDURE: EXCIMER LASER
Detail Level: Zone
Fluence #1 (J/Cm2 Or Mj/Cm2): 807
Comments: Patient states that her scalp, is greatly improving.
Spot Size: 2 x 2 cm
Consent: Written consent obtained, risks reviewed including but not limited to crusting, scabbing, blistering, scarring, darker or lighter pigmentary change, incidental hair removal, bruising, and/or incomplete removal.
Post-Care Instructions: I reviewed with the patient in detail post-care instructions. Patient should stay away from the sun and wear sun protection until treated areas are fully healed.
Device Serial Number (Optional): [id number]
Total Square Area In Cm2 (Required For Proper Billing- Whole Numbers Only Please): 108
Total Pulses (Optional): 86
Treatment Number: 5
Location #1: posterior scalp, right posterior ear
Fluence Units: J/cm2
Mode: repeat paint

## 2020-09-24 NOTE — PROCEDURE: EXCIMER LASER
Total Pulses (Optional): 86
Mode: repeat paint
Fluence #1 (J/Cm2 Or Mj/Cm2): 800
Device Serial Number (Optional): [id number]
Treatment Number: 5
Detail Level: Zone
Post-Care Instructions: I reviewed with the patient in detail post-care instructions. Patient should stay away from the sun and wear sun protection until treated areas are fully healed.
Comments: Patient states that her scalp, is greatly improving.
Total Square Area In Cm2 (Required For Proper Billing- Whole Numbers Only Please): 108
Location #1: posterior scalp, right posterior ear
Fluence Units: J/cm2
Spot Size: 2 x 2 cm
Consent: Written consent obtained, risks reviewed including but not limited to crusting, scabbing, blistering, scarring, darker or lighter pigmentary change, incidental hair removal, bruising, and/or incomplete removal.

## 2020-09-28 ENCOUNTER — TELEPHONE ENCOUNTER (OUTPATIENT)
Dept: URBAN - METROPOLITAN AREA CLINIC 6 | Facility: CLINIC | Age: 60
End: 2020-09-28

## 2020-10-14 ENCOUNTER — TELEPHONE ENCOUNTER (OUTPATIENT)
Dept: URBAN - METROPOLITAN AREA CLINIC 6 | Facility: CLINIC | Age: 60
End: 2020-10-14

## 2020-10-14 ENCOUNTER — TELEPHONE ENCOUNTER (OUTPATIENT)
Dept: URBAN - METROPOLITAN AREA CLINIC 92 | Facility: CLINIC | Age: 60
End: 2020-10-14

## 2020-10-14 NOTE — HPI-TODAY'S VISIT:
Alejandra Pineda sent me the mri report that you sent in: Liver no fat or iron. No suspicious liver lesion. Prior gallbladder surgery changes seen. Spleen enlarged 18.4cm. Pancreas  normal.  Subcentimeter (very small) renal cyst seen in superior right kidney. Show to  local md to be aware of. Chronic thrombosis of the main portal vein seen with cavernous transformation. Paraesopheal and perigastric varices seen. Unchanged superior mesenteric vein occlusion.  Collateral vessels seen in mesenteric  and perisplenic spaces.  Inferior mesenteric vein open.  Splenic vein not well seen. Trace ascites near liver and spleen so stay on low salt diet.    Rosaline was not aware of this scan and so thanks for letting us know.  Dr Gonzales

## 2021-01-19 ENCOUNTER — OUT OF OFFICE VISIT (OUTPATIENT)
Dept: URBAN - METROPOLITAN AREA MEDICAL CENTER 12 | Facility: MEDICAL CENTER | Age: 61
End: 2021-01-19
Payer: MEDICARE

## 2021-01-19 DIAGNOSIS — Z87.19 H/O ACUTE PANCREATITIS: ICD-10-CM

## 2021-01-19 DIAGNOSIS — K92.0 BLOODY EMESIS: ICD-10-CM

## 2021-01-19 DIAGNOSIS — R41.82 ALTERED MENTAL STATE: ICD-10-CM

## 2021-01-19 DIAGNOSIS — K74.69 CIRRHOSIS, CRYPTOGENIC: ICD-10-CM

## 2021-01-19 PROCEDURE — 99233 SBSQ HOSP IP/OBS HIGH 50: CPT | Performed by: INTERNAL MEDICINE

## 2021-01-19 PROCEDURE — 99222 1ST HOSP IP/OBS MODERATE 55: CPT | Performed by: INTERNAL MEDICINE

## 2021-01-19 PROCEDURE — G8427 DOCREV CUR MEDS BY ELIG CLIN: HCPCS | Performed by: INTERNAL MEDICINE

## 2021-01-19 PROCEDURE — 99232 SBSQ HOSP IP/OBS MODERATE 35: CPT | Performed by: INTERNAL MEDICINE

## 2021-01-21 ENCOUNTER — OUT OF OFFICE VISIT (OUTPATIENT)
Dept: URBAN - METROPOLITAN AREA MEDICAL CENTER 12 | Facility: MEDICAL CENTER | Age: 61
End: 2021-01-21
Payer: MEDICARE

## 2021-01-21 DIAGNOSIS — K31.89 ACQUIRED DEFORMITY OF DUODENUM: ICD-10-CM

## 2021-01-21 DIAGNOSIS — K92.0 BLOODY EMESIS: ICD-10-CM

## 2021-01-21 DIAGNOSIS — R41.82 ALTERED MENTAL STATE: ICD-10-CM

## 2021-01-21 DIAGNOSIS — K74.60 ADVANCED CIRRHOSIS OF LIVER: ICD-10-CM

## 2021-01-21 DIAGNOSIS — K74.69 CIRRHOSIS, CRYPTOGENIC: ICD-10-CM

## 2021-01-21 DIAGNOSIS — I85.10 ESOPH VARICE OTHER DIS: ICD-10-CM

## 2021-01-21 PROCEDURE — 43239 EGD BIOPSY SINGLE/MULTIPLE: CPT | Performed by: INTERNAL MEDICINE

## 2021-01-21 PROCEDURE — 43244 EGD VARICES LIGATION: CPT | Performed by: INTERNAL MEDICINE

## 2021-01-21 PROCEDURE — 99231 SBSQ HOSP IP/OBS SF/LOW 25: CPT | Performed by: INTERNAL MEDICINE

## 2021-01-21 PROCEDURE — 99232 SBSQ HOSP IP/OBS MODERATE 35: CPT | Performed by: INTERNAL MEDICINE

## 2021-01-25 ENCOUNTER — OUT OF OFFICE VISIT (OUTPATIENT)
Dept: URBAN - METROPOLITAN AREA MEDICAL CENTER 12 | Facility: MEDICAL CENTER | Age: 61
End: 2021-01-25
Payer: MEDICARE

## 2021-01-25 DIAGNOSIS — K74.69 CIRRHOSIS, CRYPTOGENIC: ICD-10-CM

## 2021-01-25 DIAGNOSIS — K92.0 BLOODY EMESIS: ICD-10-CM

## 2021-01-25 DIAGNOSIS — I85.10 ESOPH VARICE OTHER DIS: ICD-10-CM

## 2021-01-25 PROCEDURE — 99232 SBSQ HOSP IP/OBS MODERATE 35: CPT | Performed by: INTERNAL MEDICINE

## 2021-02-06 ENCOUNTER — TELEPHONE ENCOUNTER (OUTPATIENT)
Dept: URBAN - METROPOLITAN AREA CLINIC 92 | Facility: CLINIC | Age: 61
End: 2021-02-06

## 2021-02-06 NOTE — HPI-OTHER HISTORIES
Anil Carson,  1/26/21 kub: nonobstructive bowel gas pattern.  We did not order but sent to us.  Dr Gonzales

## 2021-02-09 ENCOUNTER — TELEPHONE ENCOUNTER (OUTPATIENT)
Dept: URBAN - METROPOLITAN AREA CLINIC 92 | Facility: CLINIC | Age: 61
End: 2021-02-09

## 2021-03-05 ENCOUNTER — TELEPHONE ENCOUNTER (OUTPATIENT)
Dept: URBAN - METROPOLITAN AREA CLINIC 92 | Facility: CLINIC | Age: 61
End: 2021-03-05

## 2021-03-05 NOTE — HPI-OTHER HISTORIES
Anil Carson, March 2021 mri: Morphologic changes of chronic liver disease seen and sequela of portal hypertension was seen.  No definite evidence of hepatocellular carcinoma. Calculated  at percentage is approximately 4%, within normal limits.  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 Status post cholecystectomy changes. No biliary ductal dilation. Splenomegaly seen Pmeasuring 18.7 cm in craniocaudal dimension. Pancreas and spleen normal. Upper abdominal and paraesophageal, perigastric, perisplenic, and anterior abdominal varices seen and so need to stay on top of the egd screenings. Replaced versus accessory right hepatic artery arising from the SMA. Small volume ascites seen and need to stay on low salt diet. Clot not new and small ascites seen prior also.  Dr Gonzales Detail Level: Simple Plan: Discussed with patient if the rash doesn’t improve in three weeks, patient will call and we will prescribe Triamcinolone cream

## 2021-03-09 ENCOUNTER — TELEPHONE ENCOUNTER (OUTPATIENT)
Dept: URBAN - METROPOLITAN AREA CLINIC 92 | Facility: CLINIC | Age: 61
End: 2021-03-09

## 2021-03-09 ENCOUNTER — OFFICE VISIT (OUTPATIENT)
Dept: URBAN - METROPOLITAN AREA TELEHEALTH 2 | Facility: TELEHEALTH | Age: 61
End: 2021-03-09
Payer: MEDICARE

## 2021-03-09 DIAGNOSIS — K75.81 NASH (NONALCOHOLIC STEATOHEPATITIS): ICD-10-CM

## 2021-03-09 DIAGNOSIS — K76.6 PORTAL HYPERTENSION: ICD-10-CM

## 2021-03-09 DIAGNOSIS — Z86.010 HISTORY OF COLON POLYPS: ICD-10-CM

## 2021-03-09 DIAGNOSIS — I81 PORTAL VEIN THROMBOSIS: ICD-10-CM

## 2021-03-09 PROCEDURE — 99214 OFFICE O/P EST MOD 30 MIN: CPT | Performed by: PHYSICIAN ASSISTANT

## 2021-03-09 RX ORDER — OMEPRAZOLE MAGNESIUM 10 MG/1
GRANULE, DELAYED RELEASE ORAL
Qty: 0 | Refills: 0 | Status: ACTIVE | COMMUNITY
Start: 1900-01-01

## 2021-03-09 RX ORDER — POTASSIUM CHLORIDE 1500 MG/1
TAKE 1 TABLET (20 MEQ) BY ORAL ROUTE ONCE DAILY WITH FOOD TABLET, FILM COATED, EXTENDED RELEASE ORAL 1
Qty: 0 | Refills: 0 | Status: ACTIVE | COMMUNITY
Start: 1900-01-01

## 2021-03-09 RX ORDER — VORTIOXETINE 20 MG/1
TAKE 1 TABLET (20 MG) BY ORAL ROUTE ONCE DAILY AT THE SAME TIME EACH DAY TABLET, FILM COATED ORAL 1
Qty: 0 | Refills: 0 | Status: ACTIVE | COMMUNITY
Start: 1900-01-01

## 2021-03-09 RX ORDER — OMEPRAZOLE 40 MG/1
1 CAPSULE 30 MINUTES BEFORE MORNING MEAL CAPSULE, DELAYED RELEASE ORAL TWICE DAILY
Qty: 60 | Refills: 11 | Status: ACTIVE | COMMUNITY
Start: 2020-06-17

## 2021-03-09 RX ORDER — RIFAXIMIN 550 MG/1
1 TABLET TABLET ORAL TWICE A DAY
Status: ACTIVE | COMMUNITY

## 2021-03-09 NOTE — HPI-OTHER HISTORIES
This is a scheduled follow-up appointment for this patient, a 61 year old /White female, after a previous visit for an evaluation of cirrhosis.     She states no history of new medications or alcohol use.          did telephone visit with daughter and pt. they have a telehealth visit with the transplant team today. pt was hospitalized due to sepsis in jan 2021 note from hospitalization: pt history of VARMA cirrhosis, esophageal varices s/p banding, TAVR, HFpEF, and chorinc thrombosis of SMV and PV who presented to the hospital with hematemesis, noted to be hypotensive, febrile with ascites.  She had positive blood cultures for Strep salivaris.   Underwent EGD with esophageal banding-also had a PICC line removed this past week.  egd jan 2021: grade 2 varices noted, bands placed-had gi bleeding in hospital  March 2021 mri: Morphologic changes of chronic liver disease seen and sequela of portal hypertension was seen.  No definite evidence of hepatocellular carcinoma. Calculated  at percentage is approximately 4%, within normal limits.  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 Status post cholecystectomy changes. No biliary ductal dilation. Splenomegaly seen Pmeasuring 18.7 cm in craniocaudal dimension. Pancreas and spleen normal. Upper abdominal and paraesophageal, perigastric, perisplenic, and anterior abdominal varices seen and so need to stay on top of the egd screenings. Replaced versus accessory right hepatic artery arising from the SMA. Small volume ascites seen and need to stay on low salt diet. Clot not new and small ascites seen prior also.  labs jan 2021: na 135 k 3.2 low cr 0.67 tb 2.2 direct 0.69 indirect 1.51 alt 12 alp 54 ast 21 wbc 3.1 hgb 10.9 low MELD 10  RECAP:  she saw [facility] transplant team in sept 2019 and is stable.  She completed her cardiac rehab due valve repair in march 2020. she has lost 16lbs since her last visit. she is exercising since she has more energy. watching carb intake. she is helping her grandson with virtual learning since he has asthma.     she has trace ascites on imaging and is undergoing egd's with dr. waller and did one in may showing grade 2 varices. recommend repeat in 1 year.  pt states she would like to see us prn since she is being followed by the transplant team q6 months and it takes her almost 2 hours to come see us a few days after. recommend she get her rx filled  by the transplant team as well in case we don't see her in 6 months.  she understands and is in agreement to this plan.  she saw dr. waller a few months ago. seeing [facility] transplant team and saw them in march, last MELD score was 13.  march 2020 MRI: * Final Report *  Reason For Exam Liver disease  REPORT EXAM: MRI Abdomen w/ + w/o Contrast  CLINICAL INDICATION: Liver disease. History VARMA cirrhosis.  TECHNIQUE: Multisequence, multiplanar MRI of the abdomen was performed without and with intravenous contrast. ESRC.[id number]  CONTRAST: 19 cc of Prohance  COMPARISON: MRI abdomen 9/27/2019, 3/11/2019, and 6/10/2013.  FINDINGS:  Lower Thorax: Trace left-sided pleural effusion.  Liver: Mild fat deposition. Morphologic changes of chronic liver disease. Delayed phase peripheral reticular enhancement suggesting early fibrosis. No suspicious areas of arterial enhancement or delayed phase washout.   Gallbladder/Biliary Tree: Status post cholecystectomy. No ductal dilation.  Spleen: The spleen is enlarged measuring 19.3 cm in the craniocaudad dimension.  Pancreas: Normal.  Adrenal Glands: Normal.   Kidneys/Ureters: Subcentimeter simple renal cyst superior pole right kidney.  Gastrointestinal: Scattered areas of small bowel submucosal edema and asymmetric submucosal edema throughout the right hemicolon, representing portal enteropathy and colopathy.   Lymph Nodes: Nonspecific prominent subcentimeter lymph nodes noted in the peripancreatic regions, jaelyn hepatis, and portacaval region. These are favored to be reactive to concurrent hepatic disease.  Vessels: Mild aortic atherosclerosis without aneurysm. Replaced versus accessory right hepatic artery arising from the superior mesenteric artery. There appears to be chronic thrombosis of the main portal vein with cavernous transformation. A portion of the main splenic vein may be patent but appears severely attenuated, likely related to chronic thrombosis. The superior mesenteric vein is completely occluded with collateral vessels all throughout the mesentery and perisplenic spaces. Perigastric and paraesophageal varices are present including esophageal submucosal component. Focal splenorenal shunting is noted. There is recanalization of the paraumbilical vein with associated ventral abdominal wall collaterals.  Peritoneum/Retroperitoneum: Trace ascites. The peripancreatic edema is also felt to be related to hepatic disease.  Bones/Soft Tissues: Normal.  IMPRESSION:      1. Morphologic changes of chronic liver disease and sequelae of portal hypertension. No suspicious hepatic lesions. 2. 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  The images were reviewed and interpreted by Tasia Starr MD.  Signature Line *** Final ***  Electronically Signed By:  Tasia Starr on  03/04/2020 16:19   labs sept 2019 na 138 gluc 162 cr 0.69 albumin 3.4 tb 1.5 alt 30 alp 112 ast 48 afp 3.3 wbc 4.4 hgb 13.1 plt 81

## 2021-03-18 ENCOUNTER — RX ONLY (OUTPATIENT)
Age: 61
Setting detail: RX ONLY
End: 2021-03-18

## 2021-03-18 RX ORDER — FLUOCINOLONE ACETONIDE 0.11 MG/ML
THIN LAYER OIL TOPICAL BIW
Qty: 1 | Refills: 3 | Status: ERX

## 2021-03-29 ENCOUNTER — LAB OUTSIDE AN ENCOUNTER (OUTPATIENT)
Dept: URBAN - METROPOLITAN AREA CLINIC 6 | Facility: CLINIC | Age: 61
End: 2021-03-29

## 2021-05-10 ENCOUNTER — APPOINTMENT (RX ONLY)
Dept: URBAN - METROPOLITAN AREA CLINIC 37 | Facility: CLINIC | Age: 61
Setting detail: DERMATOLOGY
End: 2021-05-10

## 2021-05-10 ENCOUNTER — APPOINTMENT (RX ONLY)
Dept: URBAN - METROPOLITAN AREA CLINIC 38 | Facility: CLINIC | Age: 61
Setting detail: DERMATOLOGY
End: 2021-05-10

## 2021-05-10 DIAGNOSIS — L40.0 PSORIASIS VULGARIS: ICD-10-CM

## 2021-05-10 DIAGNOSIS — L40.0 PSORIASIS VULGARIS: ICD-10-CM | Status: INADEQUATELY CONTROLLED

## 2021-05-10 PROCEDURE — 96920 EXCIMER LSR PSRIASIS<250SQCM: CPT

## 2021-05-10 PROCEDURE — 99214 OFFICE O/P EST MOD 30 MIN: CPT

## 2021-05-10 PROCEDURE — ? TREATMENT REGIMEN

## 2021-05-10 PROCEDURE — ? EXCIMER LASER

## 2021-05-10 PROCEDURE — ? PRESCRIPTION

## 2021-05-10 PROCEDURE — ? COUNSELING

## 2021-05-10 RX ORDER — CALCIPOTRIENE AND BETAMETHASONE DIPROPIONATE 50; .5 UG/G; MG/G
THIN LAYER AEROSOL, FOAM TOPICAL QD
Qty: 1 | Refills: 2 | Status: ERX | COMMUNITY
Start: 2021-05-10

## 2021-05-10 RX ORDER — CALCIPOTRIENE AND BETAMETHASONE DIPROPIONATE 50; .5 UG/G; MG/G
THIN LAYER AEROSOL, FOAM TOPICAL QD
Qty: 1 | Refills: 2 | Status: ERX

## 2021-05-10 RX ADMIN — CALCIPOTRIENE AND BETAMETHASONE DIPROPIONATE THIN LAYER: 50; .5 AEROSOL, FOAM TOPICAL at 00:00

## 2021-05-10 ASSESSMENT — LOCATION ZONE DERM
LOCATION ZONE: SCALP

## 2021-05-10 ASSESSMENT — LOCATION SIMPLE DESCRIPTION DERM
LOCATION SIMPLE: POSTERIOR SCALP
LOCATION SIMPLE: RIGHT SCALP
LOCATION SIMPLE: POSTERIOR SCALP
LOCATION SIMPLE: SCALP
LOCATION SIMPLE: POSTERIOR SCALP
LOCATION SIMPLE: POSTERIOR SCALP
LOCATION SIMPLE: RIGHT SCALP

## 2021-05-10 ASSESSMENT — LOCATION DETAILED DESCRIPTION DERM
LOCATION DETAILED: RIGHT INFERIOR POSTAURICULAR SKIN
LOCATION DETAILED: RIGHT SUPERIOR PARIETAL SCALP
LOCATION DETAILED: RIGHT INFERIOR POSTAURICULAR SKIN
LOCATION DETAILED: MID-OCCIPITAL SCALP
LOCATION DETAILED: RIGHT SUPERIOR PARIETAL SCALP
LOCATION DETAILED: RIGHT MEDIAL FRONTAL SCALP
LOCATION DETAILED: RIGHT SUPERIOR POSTAURICULAR SKIN
LOCATION DETAILED: MID-OCCIPITAL SCALP
LOCATION DETAILED: RIGHT SUPERIOR POSTAURICULAR SKIN
LOCATION DETAILED: RIGHT MEDIAL FRONTAL SCALP
LOCATION DETAILED: MID-OCCIPITAL SCALP
LOCATION DETAILED: MID-OCCIPITAL SCALP
LOCATION DETAILED: LEFT SUPERIOR POSTAURICULAR SKIN
LOCATION DETAILED: LEFT SUPERIOR POSTAURICULAR SKIN

## 2021-05-10 NOTE — HPI: RASH (PSORIASIS)
How Severe Is Your Psoriasis?: moderate
Is This A New Presentation, Or A Follow-Up?: Follow Up Psoriasis
Additional History: Pt presents today for flaring of psoriasis. Pt has been off the Siliq since January d/t having CDiff and physician had pt stop medication. Pt has been applying DermaSmooth in th meantime to help with flaring.

## 2021-05-10 NOTE — PROCEDURE: EXCIMER LASER
Fluence #1 (J/Cm2 Or Mj/Cm2): 300
Device Serial Number (Optional): [id number]
Post-Care Instructions: I reviewed with the patient in detail post-care instructions. Patient should stay away from the sun and wear sun protection until treated areas are fully healed.
Comments: Patient is returning for treatment after seven months, since her last treatment. Patient states that her scalp is flaring and very itchy; she admits to scratching the scalp. She states that she used the Dermasmoothe FS oil on her scalp last night.
Total Pulses (Optional): 44
Mode: repeat paint
Detail Level: Zone
Total Square Area In Cm2 (Required For Proper Billing- Whole Numbers Only Please): 148
Fluence Units: J/cm2
Location #1: scalp, right posterior ear
Treatment Number: 1
Spot Size: 2 x 2 cm
Consent: Written consent obtained, risks reviewed including but not limited to crusting, scabbing, blistering, scarring, darker or lighter pigmentary change, incidental hair removal, bruising, and/or incomplete removal.

## 2021-05-10 NOTE — PROCEDURE: EXCIMER LASER
Consent: Written consent obtained, risks reviewed including but not limited to crusting, scabbing, blistering, scarring, darker or lighter pigmentary change, incidental hair removal, bruising, and/or incomplete removal.
Fluence Units: J/cm2
Spot Size: 2 x 2 cm
Total Pulses (Optional): 44
Device Serial Number (Optional): [id number]
Treatment Number: 1
Mode: repeat paint
Detail Level: Zone
Fluence #1 (J/Cm2 Or Mj/Cm2): 300
Post-Care Instructions: I reviewed with the patient in detail post-care instructions. Patient should stay away from the sun and wear sun protection until treated areas are fully healed.
Comments: Patient is returning for treatment after seven months, since her last treatment. Patient states that her scalp is flaring and very itchy; she admits to scratching the scalp. She states that she used the Dermasmoothe FS oil on her scalp last night.
Total Square Area In Cm2 (Required For Proper Billing- Whole Numbers Only Please): 148
Location #1: scalp, right posterior ear

## 2021-05-10 NOTE — PROCEDURE: TREATMENT REGIMEN
Action 2: Continue
Plan: D/w pt of restarting Siliq but explained that it is possible not as effective. Option is to hold a little longer then restart later date in August. Would use topicals for now in the meantime.\\n\\nPt is seeing Markalexandreha for Xtrac treatment today.\\nRecommended to start treatments regularly for now.\\n\\nD/w pt of trying Enstilar
Detail Level: Simple
Initiate Regimen: Enstilar once daily for up to 4 weeks

## 2021-05-10 NOTE — PROCEDURE: TREATMENT REGIMEN
Action 4: Continue
Initiate Regimen: Enstilar once daily for up to 4 weeks
Detail Level: Simple
Plan: D/w pt of restarting Siliq but explained that it is possible not as effective. Option is to hold a little longer then restart later date in August. Would use topicals for now in the meantime.\\n\\nPt is seeing Markeugenioha for Xtrac treatment today.\\nRecommended to start treatments regularly for now.\\n\\nD/w pt of trying Enstilar

## 2021-05-13 ENCOUNTER — OFFICE VISIT (OUTPATIENT)
Dept: URBAN - METROPOLITAN AREA TELEHEALTH 2 | Facility: TELEHEALTH | Age: 61
End: 2021-05-13
Payer: MEDICARE

## 2021-05-13 DIAGNOSIS — K75.81 NASH (NONALCOHOLIC STEATOHEPATITIS): ICD-10-CM

## 2021-05-13 DIAGNOSIS — I81 PORTAL VEIN THROMBOSIS: ICD-10-CM

## 2021-05-13 DIAGNOSIS — I85.00 ESOPHAGEAL VARICES: ICD-10-CM

## 2021-05-13 DIAGNOSIS — K76.6 PORTAL HYPERTENSION: ICD-10-CM

## 2021-05-13 PROCEDURE — 99214 OFFICE O/P EST MOD 30 MIN: CPT

## 2021-05-13 RX ORDER — RIFAXIMIN 550 MG/1
1 TABLET TABLET ORAL TWICE A DAY
Status: ACTIVE | COMMUNITY

## 2021-05-13 RX ORDER — OMEPRAZOLE 40 MG/1
1 CAPSULE 30 MINUTES BEFORE MORNING MEAL CAPSULE, DELAYED RELEASE ORAL TWICE DAILY
Qty: 60 | Refills: 11 | Status: ACTIVE | COMMUNITY
Start: 2020-06-17

## 2021-05-13 RX ORDER — BUMETANIDE 0.5 MG/1
4 (2MG) TABLET ORAL ONCE A DAY
Status: ACTIVE | COMMUNITY

## 2021-05-13 RX ORDER — POTASSIUM CHLORIDE 1500 MG/1
TAKE 1 TABLET (20 MEQ) BY ORAL ROUTE ONCE DAILY WITH FOOD TABLET, FILM COATED, EXTENDED RELEASE ORAL 1
Qty: 0 | Refills: 0 | Status: ACTIVE | COMMUNITY
Start: 1900-01-01

## 2021-05-13 RX ORDER — VORTIOXETINE 20 MG/1
TAKE 1 TABLET (20 MG) BY ORAL ROUTE ONCE DAILY AT THE SAME TIME EACH DAY TABLET, FILM COATED ORAL 1
Qty: 0 | Refills: 0 | Status: ACTIVE | COMMUNITY
Start: 1900-01-01

## 2021-05-13 RX ORDER — OMEPRAZOLE MAGNESIUM 10 MG/1
GRANULE, DELAYED RELEASE ORAL
Qty: 0 | Refills: 0 | Status: DISCONTINUED | COMMUNITY
Start: 1900-01-01

## 2021-05-13 NOTE — HPI-TODAY'S VISIT:
This is a scheduled follow-up appointment for this patient, a 61 year old /White female, after a previous visit march 2021 for an evaluation of cirrhosis and sr.    Notes:  March 2, 2021 she was seen by the infectious disease team.  She had 4 weeks of IV antibiotics for the Streptococcus salivarious bacteremia that she had.  PICC line was discontinued March 3, 2021.  Repeat transthoracic echo showed no vegetations and improved ejection fraction.  March 3, 2021 cardiology visit showed that the patient has a history of bicuspid aortic valve with severe stenosis status post TAVR January 23, 2020.  She was recently admitted with strep salivarius treatment sepsis was on antibiotics there was concern about the valve being the source will be uninvolved.  Echo did not show any issue.  Transesophageal echo could not be done due to the varices.  Team is following her closely.  She was felt to be you euvolemic and on Bumex 1 mg daily.  March 9, 2021 Dr Nichols transplant clinic visit felt to have a low meld score of 10.  They felt that she was early for transplant.  They wanted to see her back in September after she has the next imaging.   Rosaline saw in febv and was getting over the issues. Pt was hospitalized due to sepsis in jan 2021 note from hospitalization: euhm.  Pt history of SR cirrhosis, esophageal varices s/p banding, TAVR, HFpEF, and chroinc thrombosis of SMV and PV who presented to the hospital with hematemesis, noted to be hypotensive, febrile with ascites.  She had positive blood cultures for Strep salivaris.   Underwent EGD with esophageal banding-also had a PICC line removed this past week.   egd jan 2021: grade 2 varices noted, bands placed-had gi bleeding in hospital. Dr Vickers did [facility]  March 2021 mri: Morphologic changes of chronic liver disease seen and sequela of portal hypertension was seen.  No definite evidence of hepatocellular carcinoma. Calculated  at percentage is approximately 4%, within normal limits.  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 Status post cholecystectomy changes. No biliary ductal dilation. Splenomegaly seen Pmeasuring 18.7 cm in craniocaudal dimension. Pancreas and spleen normal. Upper abdominal and paraesophageal, perigastric, perisplenic, and anterior abdominal varices seen and so need to stay on top of the egd screenings. Replaced versus accessory right hepatic artery arising from the SMA. Small volume ascites seen and need to stay on low salt diet. Clot not new and small ascites seen prior also.   jan 2021: na 135 k 3.2 low cr 0.67 tb 2.2 direct 0.69 indirect 1.51 alt 12 alp 54 ast 21 wbc 3.1 hgb 10.9 low MELD 10  RECAP:  Prior she saw [facility] transplant team in sept 2019 and is stable.   She completed her cardiac rehab due valve repair in 2020. Sshe has lost 16lbs priro and kept it off.  May 2021 weighting  186.  She is exercising more since she has more energy. Watching carb intake. She is helping her grandson with virtual learning since he has asthma and he is 11.  She has not had the covid 19 vaccine.  AASLD with chronic liver disease.  Cirrhosis has a higher death rate risk with covid 19 if gets it.  Prior undergoing egd's with dr. vickers and did one in may showing grade 2 varices. Recommend repeat in 1 year and he did and rebanded 2021.  Prior s 2020 MELD score was 13.  march 2020 MRI: * Final Report *  Reason For Exam Liver disease  REPORT EXAM: MRI Abdomen w/ + w/o Contrast  CLINICAL INDICATION: Liver disease. History SR cirrhosis.  TECHNIQUE: Multisequence, multiplanar MRI of the abdomen was performed without and with intravenous contrast. ESRC.2.7.3  CONTRAST: 19 cc of Prohance  COMPARISON: MRI abdomen 9/27/2019, 3/11/2019, and 6/10/2013.  FINDINGS:  Lower Thorax: Trace left-sided pleural effusion.  Liver: Mild fat deposition. Morphologic changes of chronic liver disease. Delayed phase peripheral reticular enhancement suggesting early fibrosis. No suspicious areas of arterial enhancement or delayed phase washout.   Gallbladder/Biliary Tree: Status post cholecystectomy. No ductal dilation.  Spleen: The spleen is enlarged measuring 19.3 cm in the craniocaudad dimension.  Pancreas: Normal.  Adrenal Glands: Normal.   Kidneys/Ureters: Subcentimeter simple renal cyst superior pole right kidney.  Gastrointestinal: Scattered areas of small bowel submucosal edema and asymmetric submucosal edema throughout the right hemicolon, representing portal enteropathy and colopathy.   Lymph Nodes: Nonspecific prominent subcentimeter lymph nodes noted in the peripancreatic regions, jaelyn hepatis, and portacaval region. These are favored to be reactive to concurrent hepatic disease.  Vessels: Mild aortic atherosclerosis without aneurysm. Replaced versus accessory right hepatic artery arising from the superior mesenteric artery. There appears to be chronic thrombosis of the main portal vein with cavernous transformation. A portion of the main splenic vein may be patent but appears severely attenuated, likely related to chronic thrombosis. The superior mesenteric vein is completely occluded with collateral vessels all throughout the mesentery and perisplenic spaces. Perigastric and paraesophageal varices are present including esophageal submucosal component. Focal splenorenal shunting is noted. There is recanalization of the paraumbilical vein with associated ventral abdominal wall collaterals.  Peritoneum/Retroperitoneum: Trace ascites. The peripancreatic edema is also felt to be related to hepatic disease.  Bones/Soft Tissues: Normal.  IMPRESSION:      1. Morphologic changes of chronic liver disease and sequelae of portal hypertension. No suspicious hepatic lesions. 2. 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  The images were reviewed and interpreted by Tasia Starr MD.  Signature Line *** Final ***  Electronically Signed By:  Tasia Starr on  03/04/2020 16:19   Sept 2019 na 138 gluc 162 cr 0.69 albumin 3.4 tb 1.5 alt 30 alp 112 ast 48 afp 3.3 wbc 4.4 hgb 13.1 plt 81    Plan: 1. Need the mri to be set up for sept. 2. We need the labs that she did locally sent in from april 29. 3. We need labs in sept and mri.  4. She will follow with cards.  Cautioned pt re the pandemic to use strict hand washing, wear mask even if the covid 19 vaccine is or has not been obtained, socially distance, and look to the cdc web page or announcements for  updates as this a moving target and lots of updates coming at us re the COVID 19.  Duration of the visit was 34 min with 10 min of prep of chart looking at [employer] system and then 24 min of the video visit via [employer] as debra failed reviewing the data with the pt.

## 2021-05-15 ENCOUNTER — TELEPHONE ENCOUNTER (OUTPATIENT)
Dept: URBAN - METROPOLITAN AREA CLINIC 92 | Facility: CLINIC | Age: 61
End: 2021-05-15

## 2021-05-15 NOTE — HPI-TODAY'S VISIT:
Dear Lady Carson, March 1, 2021 labs were sent in by Dr. Quintero.  Level from count was 3.4 hemoglobin was 12 platelet count was 39 which was low.  MCV was 92.  Neutrophils were normal at 2.7 but lymphocytes a little low at 0.3.  Sugar was up at 236 and defer to primary provider.  BUN was 8 creatinine 0.66 which was previously 0.67.  Sodium 139 potassium 4.6 chloride 101 CO2 of 18 albumin little bit low at 3.3 bilirubin was 0.6.  Alkaline phosphatase was 94.  AST was 27 ALT of 18 and previous AST was 21 and ALT of 12.  INR is 1.4. meld 10 and meld na 10. Prior meld reported to be 10 also. Plat 81 in past but can vary depending on how fast processed.  Dr Gonzales

## 2021-05-25 ENCOUNTER — OUT OF OFFICE VISIT (OUTPATIENT)
Dept: URBAN - METROPOLITAN AREA MEDICAL CENTER 12 | Facility: MEDICAL CENTER | Age: 61
End: 2021-05-25
Payer: MEDICARE

## 2021-05-25 DIAGNOSIS — I10 ACCELERATED ESSENTIAL HYPERTENSION: ICD-10-CM

## 2021-05-25 DIAGNOSIS — E11.9 ABNORMAL METABOLIC STATE DUE TO DIABETES MELLITUS: ICD-10-CM

## 2021-05-25 DIAGNOSIS — K75.81 STEATOHEPATITIS, NON-ALCOHOLIC: ICD-10-CM

## 2021-05-25 PROCEDURE — 99223 1ST HOSP IP/OBS HIGH 75: CPT

## 2021-05-25 PROCEDURE — G8427 DOCREV CUR MEDS BY ELIG CLIN: HCPCS

## 2021-05-25 PROCEDURE — 99233 SBSQ HOSP IP/OBS HIGH 50: CPT

## 2021-05-26 ENCOUNTER — TELEPHONE ENCOUNTER (OUTPATIENT)
Dept: URBAN - METROPOLITAN AREA CLINIC 6 | Facility: CLINIC | Age: 61
End: 2021-05-26

## 2021-05-28 ENCOUNTER — OFFICE VISIT (OUTPATIENT)
Dept: URBAN - METROPOLITAN AREA TELEHEALTH 2 | Facility: TELEHEALTH | Age: 61
End: 2021-05-28

## 2021-06-02 NOTE — PROCEDURE: EXCIMER LASER
Total Pulses (Optional): 97
Detail Level: Zone
Post-Care Instructions: I reviewed with the patient in detail post-care instructions. Patient should stay away from the sun and wear sun protection until treated areas are fully healed.
Treatment Number: 6
Location #1: posterior scalp
Consent: Written consent obtained, risks reviewed including but not limited to crusting, scabbing, blistering, scarring, darker or lighter pigmentary change, incidental hair removal, bruising, and/or incomplete removal.
Spot Size: 2 x 2 cm
Comments: Patient states that she tolerated her last treatment well. She states that she is still experiencing itching on the scalp and admits to scratching. \\n\\n***Discussed with the patient, we are adding Dermasmoothe FS oil to her treatment regimen, to be applied to the scalp the night prior to her xtrac session, overnight once to twice a week; then rinse the following morning.
Fluence #1 (J/Cm2 Or Mj/Cm2): 6932
Total Square Area In Cm2 (Required For Proper Billing- Whole Numbers Only Please): 80
Mode: repeat paint
Device Serial Number (Optional): [id number]
Fluence Units: J/cm2
02-Jun-2021 20:05:53

## 2021-06-07 ENCOUNTER — TELEPHONE ENCOUNTER (OUTPATIENT)
Dept: URBAN - METROPOLITAN AREA CLINIC 6 | Facility: CLINIC | Age: 61
End: 2021-06-07

## 2021-06-09 ENCOUNTER — LAB OUTSIDE AN ENCOUNTER (OUTPATIENT)
Dept: URBAN - METROPOLITAN AREA TELEHEALTH 2 | Facility: TELEHEALTH | Age: 61
End: 2021-06-09

## 2021-06-22 ENCOUNTER — OFFICE VISIT (OUTPATIENT)
Dept: URBAN - METROPOLITAN AREA TELEHEALTH 2 | Facility: TELEHEALTH | Age: 61
End: 2021-06-22
Payer: MEDICARE

## 2021-06-22 DIAGNOSIS — I85.00 ESOPHAGEAL VARICES: ICD-10-CM

## 2021-06-22 DIAGNOSIS — K75.81 NASH (NONALCOHOLIC STEATOHEPATITIS): ICD-10-CM

## 2021-06-22 DIAGNOSIS — Z86.010 H/O ADENOMATOUS POLYP OF COLON: ICD-10-CM

## 2021-06-22 DIAGNOSIS — K72.90 HEPATIC ENCEPHALOPATHY: ICD-10-CM

## 2021-06-22 PROCEDURE — 99214 OFFICE O/P EST MOD 30 MIN: CPT | Performed by: INTERNAL MEDICINE

## 2021-06-22 RX ORDER — OMEPRAZOLE 40 MG/1
1 CAPSULE 30 MINUTES BEFORE MORNING MEAL CAPSULE, DELAYED RELEASE ORAL TWICE DAILY
Qty: 60 | Refills: 11 | COMMUNITY
Start: 2020-06-17

## 2021-06-22 RX ORDER — RIFAXIMIN 550 MG/1
1 TABLET TABLET ORAL TWICE A DAY
COMMUNITY

## 2021-06-22 RX ORDER — BUMETANIDE 0.5 MG/1
4 (2MG) TABLET ORAL ONCE A DAY
COMMUNITY

## 2021-06-22 RX ORDER — VORTIOXETINE 20 MG/1
TAKE 1 TABLET (20 MG) BY ORAL ROUTE ONCE DAILY AT THE SAME TIME EACH DAY TABLET, FILM COATED ORAL 1
Qty: 0 | Refills: 0 | COMMUNITY
Start: 1900-01-01

## 2021-06-22 RX ORDER — POTASSIUM CHLORIDE 1500 MG/1
TAKE 1 TABLET (20 MEQ) BY ORAL ROUTE ONCE DAILY WITH FOOD TABLET, FILM COATED, EXTENDED RELEASE ORAL 1
Qty: 0 | Refills: 0 | COMMUNITY
Start: 1900-01-01

## 2021-06-22 NOTE — HPI-OTHER HISTORIES
wt decr 3# over 5wks (was 187)  1/21/21 EGD Grade 2 reflux esoph Grade 2 varices banded x 4 PHTN g'opathy Gastritis, bx's negative hpylori Normal duodenum  May 2018 colonoscopy tubular adenoma removed  On PPI Dyspepsia and htbrn under good cntrl  no new c/o - denies GIB, altered BMs; appetite is good

## 2021-06-30 NOTE — PROCEDURE: ADDITIONAL NOTES
Outreach attempt was made to schedule a Medicare Wellness Visit. This was the second attempt. Contact was made, [facility] appointment scheduled.     Patient would like to have a screening mammogram done.  She is not having any problems.  Can an order be entered for this.  Please call patient when order placed so she can schedule.  [phone]      
Detail Level: Simple
Additional Notes: Pt will find out Friday about heart surgery. Explained that it is up to the surgeon if injection needs to be held. Explained as the practice we are good with what is decided.

## 2021-08-12 ENCOUNTER — APPOINTMENT (RX ONLY)
Dept: URBAN - METROPOLITAN AREA CLINIC 38 | Facility: CLINIC | Age: 61
Setting detail: DERMATOLOGY
End: 2021-08-12

## 2021-08-12 ENCOUNTER — APPOINTMENT (RX ONLY)
Dept: URBAN - METROPOLITAN AREA CLINIC 37 | Facility: CLINIC | Age: 61
Setting detail: DERMATOLOGY
End: 2021-08-12

## 2021-08-12 DIAGNOSIS — L40.0 PSORIASIS VULGARIS: ICD-10-CM

## 2021-08-12 PROCEDURE — 96921 EXCIMER LSR PSRIASIS 250-500: CPT

## 2021-08-12 PROCEDURE — ? EXCIMER LASER

## 2021-08-12 ASSESSMENT — LOCATION SIMPLE DESCRIPTION DERM
LOCATION SIMPLE: POSTERIOR SCALP
LOCATION SIMPLE: SCALP
LOCATION SIMPLE: RIGHT EAR
LOCATION SIMPLE: SCALP
LOCATION SIMPLE: POSTERIOR SCALP
LOCATION SIMPLE: RIGHT EAR

## 2021-08-12 ASSESSMENT — LOCATION DETAILED DESCRIPTION DERM
LOCATION DETAILED: RIGHT CAVUM CONCHA
LOCATION DETAILED: MID-OCCIPITAL SCALP
LOCATION DETAILED: MID-OCCIPITAL SCALP
LOCATION DETAILED: RIGHT INFERIOR POSTAURICULAR SKIN
LOCATION DETAILED: RIGHT CAVUM CONCHA
LOCATION DETAILED: RIGHT INFERIOR POSTAURICULAR SKIN

## 2021-08-12 ASSESSMENT — LOCATION ZONE DERM
LOCATION ZONE: EAR
LOCATION ZONE: SCALP
LOCATION ZONE: SCALP
LOCATION ZONE: EAR

## 2021-08-12 NOTE — PROCEDURE: EXCIMER LASER
Mode: repeat paint
Fluence Units: J/cm2
Device Serial Number (Optional): [id number]
Total Pulses (Optional): 84
Fluence #1 (J/Cm2 Or Mj/Cm2): 300
Consent: Written consent obtained, risks reviewed including but not limited to crusting, scabbing, blistering, scarring, darker or lighter pigmentary change, incidental hair removal, bruising, and/or incomplete removal.
Post-Care Instructions: I reviewed with the patient in detail post-care instructions. Patient should stay away from the sun and wear sun protection until treated areas are fully healed.
Comments: Patient is returning for treatment after three months, since her last treatment. She states that she noticed her scalp flaring a month ago. She complains of itching on the scalp and admits to scratching.\\n\\n**Excoriations noted on the posterior scalp.
Location #1: scalp, right ear
Spot Size: 2 x 2 cm
Detail Level: Zone
Total Square Area In Cm2 (Required For Proper Billing- Whole Numbers Only Please): 280
Treatment Number: 1

## 2021-08-13 ENCOUNTER — APPOINTMENT (RX ONLY)
Dept: URBAN - METROPOLITAN AREA CLINIC 38 | Facility: CLINIC | Age: 61
Setting detail: DERMATOLOGY
End: 2021-08-13

## 2021-08-13 ENCOUNTER — APPOINTMENT (RX ONLY)
Dept: URBAN - METROPOLITAN AREA CLINIC 37 | Facility: CLINIC | Age: 61
Setting detail: DERMATOLOGY
End: 2021-08-13

## 2021-08-13 DIAGNOSIS — L40.0 PSORIASIS VULGARIS: ICD-10-CM

## 2021-08-13 PROCEDURE — 11901 INJECT SKIN LESIONS >7: CPT

## 2021-08-13 PROCEDURE — ? COUNSELING

## 2021-08-13 PROCEDURE — ? ADDITIONAL NOTES

## 2021-08-13 PROCEDURE — ? TREATMENT REGIMEN

## 2021-08-13 PROCEDURE — ? SEPARATE AND IDENTIFIABLE DOCUMENTATION

## 2021-08-13 PROCEDURE — ? INTRALESIONAL KENALOG

## 2021-08-13 PROCEDURE — 99213 OFFICE O/P EST LOW 20 MIN: CPT | Mod: 25

## 2021-08-13 ASSESSMENT — LOCATION DETAILED DESCRIPTION DERM
LOCATION DETAILED: LEFT OCCIPITAL SCALP
LOCATION DETAILED: RIGHT INFERIOR PARIETAL SCALP
LOCATION DETAILED: RIGHT SUPERIOR PARIETAL SCALP
LOCATION DETAILED: RIGHT POSTAURICULAR SKIN
LOCATION DETAILED: MID-OCCIPITAL SCALP
LOCATION DETAILED: RIGHT POSTAURICULAR SKIN
LOCATION DETAILED: LEFT OCCIPITAL SCALP
LOCATION DETAILED: LEFT INFERIOR OCCIPITAL SCALP
LOCATION DETAILED: LEFT INFERIOR OCCIPITAL SCALP
LOCATION DETAILED: LEFT SUPERIOR POSTAURICULAR SKIN
LOCATION DETAILED: RIGHT SUPERIOR PARIETAL SCALP
LOCATION DETAILED: LEFT SUPERIOR POSTAURICULAR SKIN
LOCATION DETAILED: RIGHT INFERIOR PARIETAL SCALP
LOCATION DETAILED: MID-OCCIPITAL SCALP
LOCATION DETAILED: RIGHT SUPERIOR POSTAURICULAR SKIN
LOCATION DETAILED: RIGHT SUPERIOR POSTAURICULAR SKIN

## 2021-08-13 ASSESSMENT — LOCATION ZONE DERM
LOCATION ZONE: SCALP
LOCATION ZONE: SCALP

## 2021-08-13 NOTE — PROCEDURE: INTRALESIONAL KENALOG
Consent: The risks of atrophy were reviewed with the patient.  Return visits suggested every month until symptoms resolve
Concentration Of Kenalog Solution Injected (Mg/Ml): 15.0
Total Volume (Ccs): 2
Include Z78.9 (Other Specified Conditions Influencing Health Status) As An Associated Diagnosis?: No
Kenalog Preparation: Kenalog
Ndc# For Kenalog Only: 0979-5123-06
Validate Note Data When Using Inventory: Yes
Lot # For Kenalog (Optional): [id number]
Detail Level: Detailed
Medical Necessity Clause: This procedure was medically necessary because the lesions that were treated were:
Expiration Date For Theron (Optional): JUN 2022
Administered By (Optional): Ziyad Narvaez PA-C
X Size Of Lesion In Cm (Optional): 0

## 2021-08-13 NOTE — PROCEDURE: ADDITIONAL NOTES
Additional Notes: Patient states that she does not think that her PCP will let her start Saliq back due to her recent hospitalization. Discussed ILK with patient to get the plaque broken up to assist the XTRAC laser.
Render Risk Assessment In Note?: no
Detail Level: Zone

## 2021-08-13 NOTE — PROCEDURE: INTRALESIONAL KENALOG
Concentration Of Kenalog Solution Injected (Mg/Ml): 15.0
Administered By (Optional): Valente Rico PA-C
Lot # For Kenalog (Optional): [id number]
Ndc# For Kenalog Only: 4776-7047-42
X Size Of Lesion In Cm (Optional): 0
Detail Level: Detailed
Validate Note Data When Using Inventory: Yes
Total Volume (Ccs): 2
Consent: The risks of atrophy were reviewed with the patient.  Return visits suggested every month until symptoms resolve
Kenalog Preparation: Kenalog
Expiration Date For Nicolas (Optional): JUN 2022
Include Z78.9 (Other Specified Conditions Influencing Health Status) As An Associated Diagnosis?: No
Medical Necessity Clause: This procedure was medically necessary because the lesions that were treated were:

## 2021-08-13 NOTE — PROCEDURE: TREATMENT REGIMEN
Detail Level: Simple
Action 2: Continue
Plan: Unfortunately quite limited here with the severity of her psoriasis being what it is and only having responded in the past to biologics\\n\\nDue to her struggling with recurrent urinary tract infections that previously led to hospitalizations it is imperative to clear that up before moving forward with siliq.  Her pcp/urology will not clear her to start until underlying cause is identified
Continue Regimen: Dermasmoothe \\nXtrac

## 2021-08-13 NOTE — PROCEDURE: TREATMENT REGIMEN
Action 3: Continue
Plan: Unfortunately quite limited here with the severity of her psoriasis being what it is and only having responded in the past to biologics\\n\\nDue to her struggling with recurrent urinary tract infections that previously led to hospitalizations it is imperative to clear that up before moving forward with siliq.  Her pcp/urology will not clear her to start until underlying cause is identified
Detail Level: Simple
Continue Regimen: Dermasmoothe \\nXtrac

## 2021-08-19 ENCOUNTER — APPOINTMENT (RX ONLY)
Dept: URBAN - METROPOLITAN AREA CLINIC 37 | Facility: CLINIC | Age: 61
Setting detail: DERMATOLOGY
End: 2021-08-19

## 2021-08-19 ENCOUNTER — APPOINTMENT (RX ONLY)
Dept: URBAN - METROPOLITAN AREA CLINIC 38 | Facility: CLINIC | Age: 61
Setting detail: DERMATOLOGY
End: 2021-08-19

## 2021-08-19 DIAGNOSIS — L40.0 PSORIASIS VULGARIS: ICD-10-CM

## 2021-08-19 PROCEDURE — ? EXCIMER LASER

## 2021-08-19 PROCEDURE — 96920 EXCIMER LSR PSRIASIS<250SQCM: CPT

## 2021-08-19 ASSESSMENT — LOCATION SIMPLE DESCRIPTION DERM
LOCATION SIMPLE: RIGHT EAR
LOCATION SIMPLE: RIGHT EAR
LOCATION SIMPLE: POSTERIOR SCALP
LOCATION SIMPLE: SCALP
LOCATION SIMPLE: POSTERIOR SCALP
LOCATION SIMPLE: SCALP

## 2021-08-19 ASSESSMENT — LOCATION ZONE DERM
LOCATION ZONE: EAR
LOCATION ZONE: EAR
LOCATION ZONE: SCALP
LOCATION ZONE: SCALP

## 2021-08-19 ASSESSMENT — LOCATION DETAILED DESCRIPTION DERM
LOCATION DETAILED: RIGHT CAVUM CONCHA
LOCATION DETAILED: MID-OCCIPITAL SCALP
LOCATION DETAILED: RIGHT CAVUM CONCHA
LOCATION DETAILED: RIGHT INFERIOR POSTAURICULAR SKIN
LOCATION DETAILED: MID-OCCIPITAL SCALP
LOCATION DETAILED: RIGHT INFERIOR POSTAURICULAR SKIN

## 2021-08-19 NOTE — PROCEDURE: EXCIMER LASER
Comments: Patient states that she tolerated her last treatment well.
Detail Level: Zone
Total Pulses (Optional): 86
Device Serial Number (Optional): [id number]
Post-Care Instructions: I reviewed with the patient in detail post-care instructions. Patient should stay away from the sun and wear sun protection until treated areas are fully healed.
Treatment Number: 2
Fluence #1 (J/Cm2 Or Mj/Cm2): 375
Mode: repeat paint
Consent: Written consent obtained, risks reviewed including but not limited to crusting, scabbing, blistering, scarring, darker or lighter pigmentary change, incidental hair removal, bruising, and/or incomplete removal.
Fluence Units: J/cm2
Location #1: scalp, right ear
Spot Size: 2 x 2 cm
Total Square Area In Cm2 (Required For Proper Billing- Whole Numbers Only Please): 228

## 2021-08-19 NOTE — PROCEDURE: EXCIMER LASER
Comments: Patient states that she tolerated her last treatment well.
Post-Care Instructions: I reviewed with the patient in detail post-care instructions. Patient should stay away from the sun and wear sun protection until treated areas are fully healed.
Total Square Area In Cm2 (Required For Proper Billing- Whole Numbers Only Please): 228
Location #1: scalp, right ear
Fluence Units: J/cm2
Consent: Written consent obtained, risks reviewed including but not limited to crusting, scabbing, blistering, scarring, darker or lighter pigmentary change, incidental hair removal, bruising, and/or incomplete removal.
Spot Size: 2 x 2 cm
Fluence #1 (J/Cm2 Or Mj/Cm2): 375
Device Serial Number (Optional): [id number]
Mode: repeat paint
Treatment Number: 2
Total Pulses (Optional): 86
Detail Level: Zone

## 2021-08-23 ENCOUNTER — LAB OUTSIDE AN ENCOUNTER (OUTPATIENT)
Dept: URBAN - METROPOLITAN AREA TELEHEALTH 2 | Facility: TELEHEALTH | Age: 61
End: 2021-08-23

## 2021-09-07 ENCOUNTER — LAB OUTSIDE AN ENCOUNTER (OUTPATIENT)
Dept: URBAN - METROPOLITAN AREA TELEHEALTH 2 | Facility: TELEHEALTH | Age: 61
End: 2021-09-07

## 2021-09-09 ENCOUNTER — APPOINTMENT (RX ONLY)
Dept: URBAN - METROPOLITAN AREA CLINIC 38 | Facility: CLINIC | Age: 61
Setting detail: DERMATOLOGY
End: 2021-09-09

## 2021-09-09 ENCOUNTER — APPOINTMENT (RX ONLY)
Dept: URBAN - METROPOLITAN AREA CLINIC 37 | Facility: CLINIC | Age: 61
Setting detail: DERMATOLOGY
End: 2021-09-09

## 2021-09-09 DIAGNOSIS — L40.0 PSORIASIS VULGARIS: ICD-10-CM

## 2021-09-09 PROCEDURE — ? EXCIMER LASER

## 2021-09-09 PROCEDURE — 96920 EXCIMER LSR PSRIASIS<250SQCM: CPT

## 2021-09-09 ASSESSMENT — LOCATION DETAILED DESCRIPTION DERM
LOCATION DETAILED: LEFT OCCIPITAL SCALP
LOCATION DETAILED: LEFT INFERIOR FRONTAL SCALP
LOCATION DETAILED: RIGHT INFERIOR PARIETAL SCALP
LOCATION DETAILED: LEFT SUPERIOR LATERAL NECK
LOCATION DETAILED: LEFT SUPERIOR LATERAL NECK
LOCATION DETAILED: MID-OCCIPITAL SCALP
LOCATION DETAILED: RIGHT INFERIOR POSTAURICULAR SKIN
LOCATION DETAILED: RIGHT CAVUM CONCHA
LOCATION DETAILED: LEFT OCCIPITAL SCALP
LOCATION DETAILED: MID-OCCIPITAL SCALP
LOCATION DETAILED: RIGHT INFERIOR PARIETAL SCALP
LOCATION DETAILED: RIGHT CAVUM CONCHA
LOCATION DETAILED: LEFT INFERIOR FRONTAL SCALP
LOCATION DETAILED: RIGHT INFERIOR POSTAURICULAR SKIN

## 2021-09-09 ASSESSMENT — LOCATION SIMPLE DESCRIPTION DERM
LOCATION SIMPLE: RIGHT EAR
LOCATION SIMPLE: RIGHT EAR
LOCATION SIMPLE: NECK
LOCATION SIMPLE: SCALP
LOCATION SIMPLE: SCALP
LOCATION SIMPLE: POSTERIOR SCALP
LOCATION SIMPLE: NECK
LOCATION SIMPLE: POSTERIOR SCALP

## 2021-09-09 ASSESSMENT — LOCATION ZONE DERM
LOCATION ZONE: SCALP
LOCATION ZONE: NECK
LOCATION ZONE: EAR
LOCATION ZONE: SCALP
LOCATION ZONE: NECK
LOCATION ZONE: EAR

## 2021-09-09 NOTE — PROCEDURE: EXCIMER LASER
Fluence Units: J/cm2
Total Pulses (Optional): 48
Location #1: scalp, right ear
Mode: repeat paint
Comments: Patient states that she tolerated her last treatment well. She states that her scalp feel so much better.
Spot Size: 2 x 2 cm
Fluence #1 (J/Cm2 Or Mj/Cm2): 460
Consent: Written consent obtained, risks reviewed including but not limited to crusting, scabbing, blistering, scarring, darker or lighter pigmentary change, incidental hair removal, bruising, and/or incomplete removal.
Detail Level: Zone
Total Square Area In Cm2 (Required For Proper Billing- Whole Numbers Only Please): 104
Post-Care Instructions: I reviewed with the patient in detail post-care instructions. Patient should stay away from the sun and wear sun protection until treated areas are fully healed.
Device Serial Number (Optional): [id number]
Treatment Number: 3

## 2021-09-09 NOTE — PROCEDURE: EXCIMER LASER
Consent: Written consent obtained, risks reviewed including but not limited to crusting, scabbing, blistering, scarring, darker or lighter pigmentary change, incidental hair removal, bruising, and/or incomplete removal.
Total Square Area In Cm2 (Required For Proper Billing- Whole Numbers Only Please): 104
Location #1: scalp, right ear
Fluence Units: J/cm2
Total Pulses (Optional): 48
Spot Size: 2 x 2 cm
Mode: repeat paint
Device Serial Number (Optional): [id number]
Treatment Number: 3
Fluence #1 (J/Cm2 Or Mj/Cm2): 463
Comments: Patient states that she tolerated her last treatment well. She states that her scalp feel so much better.
Post-Care Instructions: I reviewed with the patient in detail post-care instructions. Patient should stay away from the sun and wear sun protection until treated areas are fully healed.
Detail Level: Zone

## 2021-09-14 ENCOUNTER — OFFICE VISIT (OUTPATIENT)
Dept: URBAN - METROPOLITAN AREA CLINIC 86 | Facility: CLINIC | Age: 61
End: 2021-09-14

## 2021-10-20 ENCOUNTER — OFFICE VISIT (OUTPATIENT)
Dept: URBAN - METROPOLITAN AREA TELEHEALTH 2 | Facility: TELEHEALTH | Age: 61
End: 2021-10-20

## 2021-10-22 ENCOUNTER — OFFICE VISIT (OUTPATIENT)
Dept: URBAN - METROPOLITAN AREA TELEHEALTH 2 | Facility: TELEHEALTH | Age: 61
End: 2021-10-22
Payer: MEDICARE

## 2021-10-22 DIAGNOSIS — I85.10 ESOPH VARICE OTHER DIS: ICD-10-CM

## 2021-10-22 DIAGNOSIS — K74.69 OTHER CIRRHOSIS OF LIVER: ICD-10-CM

## 2021-10-22 DIAGNOSIS — K75.81 NASH (NONALCOHOLIC STEATOHEPATITIS): ICD-10-CM

## 2021-10-22 DIAGNOSIS — R18.8 ASCITES: ICD-10-CM

## 2021-10-22 DIAGNOSIS — Z86.010 HISTORY OF COLON POLYPS: ICD-10-CM

## 2021-10-22 DIAGNOSIS — R13.19 DYSPHAGIA: ICD-10-CM

## 2021-10-22 DIAGNOSIS — K72.90 HEPATIC ENCEPHALOPATHY: ICD-10-CM

## 2021-10-22 PROCEDURE — 99214 OFFICE O/P EST MOD 30 MIN: CPT | Performed by: INTERNAL MEDICINE

## 2021-10-22 RX ORDER — VORTIOXETINE 20 MG/1
TAKE 1 TABLET (20 MG) BY ORAL ROUTE ONCE DAILY AT THE SAME TIME EACH DAY TABLET, FILM COATED ORAL 1
Qty: 0 | Refills: 0 | COMMUNITY
Start: 1900-01-01

## 2021-10-22 RX ORDER — RIFAXIMIN 550 MG/1
1 TABLET TABLET ORAL TWICE A DAY
COMMUNITY

## 2021-10-22 RX ORDER — BUMETANIDE 0.5 MG/1
4 (2MG) TABLET ORAL ONCE A DAY
COMMUNITY

## 2021-10-22 RX ORDER — OMEPRAZOLE 40 MG/1
1 CAPSULE 30 MINUTES BEFORE MORNING MEAL CAPSULE, DELAYED RELEASE ORAL TWICE DAILY
Qty: 180 | Refills: 3

## 2021-10-22 RX ORDER — POTASSIUM CHLORIDE 1500 MG/1
TAKE 1 TABLET (20 MEQ) BY ORAL ROUTE ONCE DAILY WITH FOOD TABLET, FILM COATED, EXTENDED RELEASE ORAL 1
Qty: 0 | Refills: 0 | COMMUNITY
Start: 1900-01-01

## 2021-10-22 RX ORDER — FAMOTIDINE 40 MG/1
1 TABLET AT BEDTIME TABLET, FILM COATED ORAL ONCE A DAY
Qty: 90 TABLET | Refills: 3 | OUTPATIENT
Start: 2021-10-22

## 2021-10-22 RX ORDER — OMEPRAZOLE 40 MG/1
1 CAPSULE 30 MINUTES BEFORE MORNING MEAL CAPSULE, DELAYED RELEASE ORAL TWICE DAILY
Qty: 60 | Refills: 11 | COMMUNITY
Start: 2020-06-17

## 2021-10-22 NOTE — HPI-OTHER HISTORIES
wt incr 9# over 4mo (was 184)  1/21/21 EGD Grade 2 reflux esoph Grade 2 varices banded x 4 PHTN g'opathy Gastritis, bx's negative hpylori Normal duodenum  May 2018 colonoscopy tubular adenoma removed  On PPI Dyspepsia and htbrn under good cntrl now w new onset dysphagia intermittent when swallows, food sits in throat/chest, feels like shes going to choke notes moderate associated chest discomfort some improvement w drinking liquid but still feels a knot in chest  denies GIB, altered BMs; appetite is good

## 2021-10-29 ENCOUNTER — APPOINTMENT (RX ONLY)
Dept: URBAN - METROPOLITAN AREA CLINIC 41 | Facility: CLINIC | Age: 61
Setting detail: DERMATOLOGY
End: 2021-10-29

## 2021-10-29 DIAGNOSIS — L40.0 PSORIASIS VULGARIS: ICD-10-CM | Status: INADEQUATELY CONTROLLED

## 2021-10-29 PROCEDURE — ? INTRALESIONAL KENALOG

## 2021-10-29 PROCEDURE — ? SEPARATE AND IDENTIFIABLE DOCUMENTATION

## 2021-10-29 PROCEDURE — 99213 OFFICE O/P EST LOW 20 MIN: CPT | Mod: 25

## 2021-10-29 PROCEDURE — ? TREATMENT REGIMEN

## 2021-10-29 PROCEDURE — ? COUNSELING

## 2021-10-29 PROCEDURE — 11901 INJECT SKIN LESIONS >7: CPT

## 2021-10-29 ASSESSMENT — LOCATION DETAILED DESCRIPTION DERM
LOCATION DETAILED: MID-OCCIPITAL SCALP
LOCATION DETAILED: LEFT INFERIOR OCCIPITAL SCALP
LOCATION DETAILED: LEFT SUPERIOR OCCIPITAL SCALP
LOCATION DETAILED: LEFT SUPERIOR POSTAURICULAR SKIN
LOCATION DETAILED: LEFT SUPERIOR PARIETAL SCALP

## 2021-10-29 ASSESSMENT — LOCATION SIMPLE DESCRIPTION DERM
LOCATION SIMPLE: POSTERIOR SCALP
LOCATION SIMPLE: SCALP

## 2021-10-29 ASSESSMENT — LOCATION ZONE DERM: LOCATION ZONE: SCALP

## 2021-10-29 NOTE — PROCEDURE: MIPS QUALITY
Quality 110: Preventive Care And Screening: Influenza Immunization: Influenza Immunization not Administered for Documented Reasons.
Additional Notes: Pt declines flu vaccine
Detail Level: Detailed

## 2021-10-29 NOTE — PROCEDURE: INTRALESIONAL KENALOG
Detail Level: Detailed
Medical Necessity Clause: This procedure was medically necessary because the lesions that were treated were:
Validate Note Data When Using Inventory: Yes
Kenalog Preparation: Kenalog
Lot # For Kenalog (Optional): [id number]
Ndc# For Kenalog Only: [address]-98
Include Z78.9 (Other Specified Conditions Influencing Health Status) As An Associated Diagnosis?: No
Consent: The risks of atrophy were reviewed with the patient.
Concentration Of Kenalog Solution Injected (Mg/Ml): 15.0
Total Volume (Ccs): 1
Expiration Date For Nicolas (Optional): JUN 2022
Administered By (Optional): Valente White PA-C
X Size Of Lesion In Cm (Optional): 0

## 2021-10-29 NOTE — PROCEDURE: TREATMENT REGIMEN
Action 1: Continue
Detail Level: Zone
Plan: Patient is still being monitored by her PCP due to h/o sepsis with UTI several months back. He will not clear her to restart any biologics until her \"immune system\" improves. Patient states she is having labs checked so asked for a copy that we can review as well. \\n\\nDue to inability to take any biologics at this time she can either \\n1. Restart Xtract\\n2. Continue ILK as it appears to be helping her itch\\n3. Continue ILK AND restart Xtrac. \\n\\nWill f/u in 1 month as patient defers xtrac at this time and would [place] to see how the ILK continues to help

## 2021-11-17 ENCOUNTER — CLAIMS CREATED FROM THE CLAIM WINDOW (OUTPATIENT)
Dept: URBAN - METROPOLITAN AREA CLINIC 4 | Facility: CLINIC | Age: 61
End: 2021-11-17
Payer: MEDICARE

## 2021-11-17 ENCOUNTER — OFFICE VISIT (OUTPATIENT)
Dept: URBAN - METROPOLITAN AREA SURGERY CENTER 16 | Facility: SURGERY CENTER | Age: 61
End: 2021-11-17
Payer: MEDICARE

## 2021-11-17 DIAGNOSIS — K31.89 DEFORMED PYLORUS, ACQUIRED: ICD-10-CM

## 2021-11-17 DIAGNOSIS — K31.89 ACQUIRED DEFORMITY OF DUODENUM: ICD-10-CM

## 2021-11-17 DIAGNOSIS — K22.2 ACQUIRED ESOPHAGEAL RING: ICD-10-CM

## 2021-11-17 PROCEDURE — 43239 EGD BIOPSY SINGLE/MULTIPLE: CPT | Performed by: INTERNAL MEDICINE

## 2021-11-17 PROCEDURE — 88305 TISSUE EXAM BY PATHOLOGIST: CPT | Performed by: PATHOLOGY

## 2021-11-17 PROCEDURE — 43248 EGD GUIDE WIRE INSERTION: CPT | Performed by: INTERNAL MEDICINE

## 2021-11-17 PROCEDURE — G8907 PT DOC NO EVENTS ON DISCHARG: HCPCS | Performed by: INTERNAL MEDICINE

## 2021-11-29 ENCOUNTER — APPOINTMENT (RX ONLY)
Dept: URBAN - METROPOLITAN AREA CLINIC 41 | Facility: CLINIC | Age: 61
Setting detail: DERMATOLOGY
End: 2021-11-29

## 2021-11-29 DIAGNOSIS — L40.0 PSORIASIS VULGARIS: ICD-10-CM

## 2021-11-29 PROCEDURE — ? INTRALESIONAL KENALOG

## 2021-11-29 PROCEDURE — 11900 INJECT SKIN LESIONS </W 7: CPT

## 2021-11-29 PROCEDURE — ? TREATMENT REGIMEN

## 2021-11-29 PROCEDURE — ? COUNSELING

## 2021-11-29 ASSESSMENT — LOCATION DETAILED DESCRIPTION DERM
LOCATION DETAILED: MID-OCCIPITAL SCALP
LOCATION DETAILED: LEFT SUPERIOR POSTAURICULAR SKIN
LOCATION DETAILED: LEFT SUPERIOR OCCIPITAL SCALP
LOCATION DETAILED: LEFT INFERIOR OCCIPITAL SCALP
LOCATION DETAILED: LEFT SUPERIOR PARIETAL SCALP

## 2021-11-29 ASSESSMENT — LOCATION SIMPLE DESCRIPTION DERM
LOCATION SIMPLE: SCALP
LOCATION SIMPLE: POSTERIOR SCALP

## 2021-11-29 ASSESSMENT — LOCATION ZONE DERM: LOCATION ZONE: SCALP

## 2021-11-29 NOTE — PROCEDURE: INTRALESIONAL KENALOG
Detail Level: Detailed
Medical Necessity Clause: This procedure was medically necessary because the lesions that were treated were:
Validate Note Data When Using Inventory: Yes
Kenalog Preparation: Kenalog
Lot # For Kenalog (Optional): [id number]
Ndc# For Kenalog Only: [address]-42
Include Z78.9 (Other Specified Conditions Influencing Health Status) As An Associated Diagnosis?: No
Consent: The risks of atrophy were reviewed with the patient.
Concentration Of Kenalog Solution Injected (Mg/Ml): 20.0
Total Volume (Ccs): 1.5
Expiration Date For Nicolas (Optional): JUN 2022
Administered By (Optional): Valente White PA-C
X Size Of Lesion In Cm (Optional): 0

## 2021-11-29 NOTE — PROCEDURE: MIPS QUALITY
Quality 110: Preventive Care And Screening: Influenza Immunization: Influenza Immunization not Administered for Documented Reasons.
Additional Notes: Pt declines for personal reasons
Detail Level: Detailed

## 2021-11-29 NOTE — PROCEDURE: TREATMENT REGIMEN
Action 1: Continue
Detail Level: Zone
Plan: She is doing a lot better with the XTRAC and ILK. We injected with the ILK today and she can continue XTRAC.\\n\\nRTC: 1 month
Continue Regimen: Derm-smooth oil

## 2021-12-06 ENCOUNTER — OFFICE VISIT (OUTPATIENT)
Dept: URBAN - METROPOLITAN AREA CLINIC 92 | Facility: CLINIC | Age: 61
End: 2021-12-06
Payer: MEDICARE

## 2021-12-06 ENCOUNTER — OFFICE VISIT (OUTPATIENT)
Dept: URBAN - METROPOLITAN AREA SURGERY CENTER 16 | Facility: SURGERY CENTER | Age: 61
End: 2021-12-06

## 2021-12-06 VITALS
BODY MASS INDEX: 37.38 KG/M2 | DIASTOLIC BLOOD PRESSURE: 70 MMHG | SYSTOLIC BLOOD PRESSURE: 119 MMHG | HEART RATE: 88 BPM | WEIGHT: 198 LBS | HEIGHT: 61 IN | TEMPERATURE: 98.3 F

## 2021-12-06 DIAGNOSIS — R13.10 DYSPHAGIA: ICD-10-CM

## 2021-12-06 DIAGNOSIS — K75.81 NASH (NONALCOHOLIC STEATOHEPATITIS): ICD-10-CM

## 2021-12-06 DIAGNOSIS — I85.00 ESOPHAGEAL VARICES: ICD-10-CM

## 2021-12-06 DIAGNOSIS — Z86.010 HISTORY OF COLON POLYPS: ICD-10-CM

## 2021-12-06 PROCEDURE — 99214 OFFICE O/P EST MOD 30 MIN: CPT | Performed by: INTERNAL MEDICINE

## 2021-12-06 RX ORDER — OMEPRAZOLE 40 MG/1
1 CAPSULE 30 MINUTES BEFORE MORNING MEAL CAPSULE, DELAYED RELEASE ORAL TWICE DAILY
Qty: 180 | Refills: 3

## 2021-12-06 RX ORDER — RIFAXIMIN 550 MG/1
1 TABLET TABLET ORAL TWICE A DAY
COMMUNITY

## 2021-12-06 RX ORDER — OMEPRAZOLE 40 MG/1
1 CAPSULE 30 MINUTES BEFORE MORNING MEAL CAPSULE, DELAYED RELEASE ORAL TWICE DAILY
Qty: 180 | Refills: 3 | Status: ACTIVE | COMMUNITY

## 2021-12-06 RX ORDER — FAMOTIDINE 40 MG/1
1 TABLET AT BEDTIME TABLET, FILM COATED ORAL ONCE A DAY
Qty: 90 TABLET | Refills: 3 | Status: ACTIVE | COMMUNITY
Start: 2021-10-22

## 2021-12-06 RX ORDER — VORTIOXETINE 20 MG/1
TAKE 1 TABLET (20 MG) BY ORAL ROUTE ONCE DAILY AT THE SAME TIME EACH DAY TABLET, FILM COATED ORAL 1
Qty: 0 | Refills: 0 | COMMUNITY
Start: 1900-01-01

## 2021-12-06 RX ORDER — BUMETANIDE 0.5 MG/1
4 (2MG) TABLET ORAL ONCE A DAY
COMMUNITY

## 2021-12-06 RX ORDER — FAMOTIDINE 40 MG/1
1 TABLET AT BEDTIME TABLET, FILM COATED ORAL ONCE A DAY
Qty: 90 TABLET | Refills: 3 | OUTPATIENT

## 2021-12-06 RX ORDER — POTASSIUM CHLORIDE 1500 MG/1
TAKE 1 TABLET (20 MEQ) BY ORAL ROUTE ONCE DAILY WITH FOOD TABLET, FILM COATED, EXTENDED RELEASE ORAL 1
Qty: 0 | Refills: 0 | COMMUNITY
Start: 1900-01-01

## 2021-12-06 NOTE — HPI-TODAY'S VISIT:
wt incr 5# over 6wk (was 193)  11/17/21 EGD Grade 1 varices esophagus Schatzki ring, dilated to 51F Gastritis, bx's negative hpylori Normal duodenum  May 2018 colonoscopy tubular adenoma removed  On PPI Dyspepsia and htbrn under good cntrl less dysphagia than prior  denies GIB, altered BMs; appetite is good

## 2021-12-08 ENCOUNTER — OFFICE VISIT (OUTPATIENT)
Dept: URBAN - METROPOLITAN AREA TELEHEALTH 2 | Facility: TELEHEALTH | Age: 61
End: 2021-12-08
Payer: MEDICARE

## 2021-12-08 DIAGNOSIS — Z79.899 HIGH RISK MEDICATION USE: ICD-10-CM

## 2021-12-08 DIAGNOSIS — E66.3 OVER WEIGHT: ICD-10-CM

## 2021-12-08 DIAGNOSIS — K76.6 PORTAL HYPERTENSION: ICD-10-CM

## 2021-12-08 DIAGNOSIS — I81 PORTAL VEIN THROMBOSIS: ICD-10-CM

## 2021-12-08 DIAGNOSIS — K75.81 NASH (NONALCOHOLIC STEATOHEPATITIS): ICD-10-CM

## 2021-12-08 DIAGNOSIS — Z71.89 VACCINE COUNSELING: ICD-10-CM

## 2021-12-08 DIAGNOSIS — R18.8 ASCITES: ICD-10-CM

## 2021-12-08 DIAGNOSIS — D04.9: ICD-10-CM

## 2021-12-08 DIAGNOSIS — Z86.010 HISTORY OF COLON POLYPS: ICD-10-CM

## 2021-12-08 DIAGNOSIS — K72.90 HEPATIC ENCEPHALOPATHY: ICD-10-CM

## 2021-12-08 DIAGNOSIS — I85.00 ESOPHAGEAL VARICES: ICD-10-CM

## 2021-12-08 PROCEDURE — 99442 PHONE E/M BY PHYS 11-20 MIN: CPT | Performed by: PHYSICIAN ASSISTANT

## 2021-12-08 RX ORDER — BUMETANIDE 0.5 MG/1
4 (2MG) TABLET ORAL ONCE A DAY
COMMUNITY

## 2021-12-08 RX ORDER — POTASSIUM CHLORIDE 1500 MG/1
TAKE 1 TABLET (20 MEQ) BY ORAL ROUTE ONCE DAILY WITH FOOD TABLET, FILM COATED, EXTENDED RELEASE ORAL 1
Qty: 0 | Refills: 0 | COMMUNITY
Start: 1900-01-01

## 2021-12-08 RX ORDER — OMEPRAZOLE 40 MG/1
1 CAPSULE 30 MINUTES BEFORE MORNING MEAL CAPSULE, DELAYED RELEASE ORAL TWICE DAILY
Qty: 180 | Refills: 3 | Status: ACTIVE | COMMUNITY

## 2021-12-08 RX ORDER — VORTIOXETINE 20 MG/1
TAKE 1 TABLET (20 MG) BY ORAL ROUTE ONCE DAILY AT THE SAME TIME EACH DAY TABLET, FILM COATED ORAL 1
Qty: 0 | Refills: 0 | COMMUNITY
Start: 1900-01-01

## 2021-12-08 RX ORDER — RIFAXIMIN 550 MG/1
1 TABLET TABLET ORAL TWICE A DAY
COMMUNITY

## 2021-12-08 RX ORDER — FAMOTIDINE 40 MG/1
1 TABLET AT BEDTIME TABLET, FILM COATED ORAL ONCE A DAY
Qty: 90 TABLET | Refills: 3 | Status: ACTIVE | COMMUNITY

## 2021-12-08 NOTE — HPI-TODAY'S VISIT:
Pt here for telephone visit. had multiple hospitalizations for bacterial infections and ascites.    did egd in nov 2021 and grade 1 varices noted.  nov 2021 mri:  Reason For Exam Hepatocellular carcinoma;Hepatocellular carcinoma  REPORT EXAM: MRI Abdomen w/ + w/o Contrast  CLINICAL INDICATION: Hepatocellular carcinoma  TECHNIQUE: Multisequence, multiplanar MRI of the abdomen was performed without and with intravenous contrast. ESRC.[id number]  CONTRAST: 18 cc of Prohance  COMPARISON: March 2, 2021  FINDINGS:  Lower Thorax: Normal.  Liver: No fat or iron. Cirrhotic liver morphology. No arterially enhancing lesions. No lesions with capsule. No lesions with washout.  Gallbladder/Biliary Tree: Status post cholecystectomy.  Spleen: Measuring 19.0 cm in maximum craniocaudal diameter.  Pancreas: Normal.  Adrenal Glands: Normal.   Kidneys/Ureters: Normal.  Gastrointestinal: Normal.   Lymph Nodes: Normal.  Vessels: Chronic thrombosis of the main portal vein with cavernous transformation redemonstrated. Multiple large collateral pathways including gastroesophageal varices, again noted. 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  Peritoneum/Retroperitoneum: Minimal ascites is present.  Bones/Soft Tissues: Normal.  IMPRESSION:     Cirrhosis with chronic portal vein thrombosis and cavernous transformation with associated evidence of portal hypertension. No hepatocellular carcinoma.

## 2021-12-10 ENCOUNTER — OFFICE VISIT (OUTPATIENT)
Dept: URBAN - METROPOLITAN AREA TELEHEALTH 2 | Facility: TELEHEALTH | Age: 61
End: 2021-12-10

## 2021-12-10 RX ORDER — OMEPRAZOLE 40 MG/1
1 CAPSULE 30 MINUTES BEFORE MORNING MEAL CAPSULE, DELAYED RELEASE ORAL TWICE DAILY
Qty: 180 | Refills: 3 | COMMUNITY

## 2021-12-10 RX ORDER — POTASSIUM CHLORIDE 1500 MG/1
TAKE 1 TABLET (20 MEQ) BY ORAL ROUTE ONCE DAILY WITH FOOD TABLET, FILM COATED, EXTENDED RELEASE ORAL 1
Qty: 0 | Refills: 0 | COMMUNITY
Start: 1900-01-01

## 2021-12-10 RX ORDER — VORTIOXETINE 20 MG/1
TAKE 1 TABLET (20 MG) BY ORAL ROUTE ONCE DAILY AT THE SAME TIME EACH DAY TABLET, FILM COATED ORAL 1
Qty: 0 | Refills: 0 | COMMUNITY
Start: 1900-01-01

## 2021-12-10 RX ORDER — OMEPRAZOLE 40 MG/1
1 CAPSULE 30 MINUTES BEFORE MORNING MEAL CAPSULE, DELAYED RELEASE ORAL TWICE DAILY
Qty: 180 | Refills: 3

## 2021-12-10 RX ORDER — RIFAXIMIN 550 MG/1
1 TABLET TABLET ORAL TWICE A DAY
COMMUNITY

## 2021-12-10 RX ORDER — FAMOTIDINE 40 MG/1
1 TABLET AT BEDTIME TABLET, FILM COATED ORAL ONCE A DAY
Qty: 90 TABLET | Refills: 3 | COMMUNITY
Start: 2021-10-22

## 2021-12-10 RX ORDER — FAMOTIDINE 40 MG/1
1 TABLET AT BEDTIME TABLET, FILM COATED ORAL ONCE A DAY
Qty: 90 TABLET | Refills: 3 | OUTPATIENT

## 2021-12-10 RX ORDER — BUMETANIDE 0.5 MG/1
4 (2MG) TABLET ORAL ONCE A DAY
COMMUNITY

## 2021-12-10 NOTE — HPI-OTHER HISTORIES
wt __# over 6wk (was 193)  11/17/21 EGD Grade 1 varices esophagug Schatzki ring, dilated to 51F Gastritis, bx's negative hpylori Normal duodenum  May 2018 colonoscopy tubular adenoma removed  On PPI Dyspepsia and htbrn under good cntrl now w new onset dysphagia intermittent when swallows, food sits in throat/chest, feels like shes going to choke notes moderate associated chest discomfort some improvement w drinking liquid but still feels a knot in chest  denies GIB, altered BMs; appetite is good

## 2022-01-19 ENCOUNTER — OFFICE VISIT (OUTPATIENT)
Dept: URBAN - METROPOLITAN AREA TELEHEALTH 2 | Facility: TELEHEALTH | Age: 62
End: 2022-01-19

## 2022-01-19 ENCOUNTER — LAB OUTSIDE AN ENCOUNTER (OUTPATIENT)
Dept: URBAN - METROPOLITAN AREA TELEHEALTH 2 | Facility: TELEHEALTH | Age: 62
End: 2022-01-19

## 2022-03-08 ENCOUNTER — LAB OUTSIDE AN ENCOUNTER (OUTPATIENT)
Dept: URBAN - METROPOLITAN AREA TELEHEALTH 2 | Facility: TELEHEALTH | Age: 62
End: 2022-03-08

## 2022-03-30 ENCOUNTER — TELEPHONE ENCOUNTER (OUTPATIENT)
Dept: URBAN - METROPOLITAN AREA CLINIC 92 | Facility: CLINIC | Age: 62
End: 2022-03-30

## 2022-04-27 ENCOUNTER — APPOINTMENT (RX ONLY)
Dept: URBAN - METROPOLITAN AREA CLINIC 41 | Facility: CLINIC | Age: 62
Setting detail: DERMATOLOGY
End: 2022-04-27

## 2022-04-27 DIAGNOSIS — L40.0 PSORIASIS VULGARIS: ICD-10-CM

## 2022-04-27 PROCEDURE — ? TREATMENT REGIMEN

## 2022-04-27 PROCEDURE — ? COUNSELING

## 2022-04-27 PROCEDURE — 99213 OFFICE O/P EST LOW 20 MIN: CPT | Mod: 25

## 2022-04-27 PROCEDURE — ? INTRALESIONAL KENALOG

## 2022-04-27 PROCEDURE — 11900 INJECT SKIN LESIONS </W 7: CPT

## 2022-04-27 PROCEDURE — ? SEPARATE AND IDENTIFIABLE DOCUMENTATION

## 2022-04-27 ASSESSMENT — LOCATION SIMPLE DESCRIPTION DERM
LOCATION SIMPLE: POSTERIOR SCALP
LOCATION SIMPLE: SCALP

## 2022-04-27 ASSESSMENT — LOCATION DETAILED DESCRIPTION DERM
LOCATION DETAILED: LEFT SUPERIOR PARIETAL SCALP
LOCATION DETAILED: MID-OCCIPITAL SCALP
LOCATION DETAILED: LEFT INFERIOR OCCIPITAL SCALP
LOCATION DETAILED: RIGHT INFERIOR OCCIPITAL SCALP

## 2022-04-27 ASSESSMENT — LOCATION ZONE DERM: LOCATION ZONE: SCALP

## 2022-04-27 NOTE — PROCEDURE: INTRALESIONAL KENALOG
Detail Level: Detailed
Medical Necessity Clause: This procedure was medically necessary because the lesions that were treated were:
Validate Note Data When Using Inventory: Yes
Kenalog Preparation: Kenalog
Lot # For Kenalog (Optional): [id number]
Treatment Number (Optional): 2
Ndc# For Kenalog Only: 0721-2336-04
Include Z78.9 (Other Specified Conditions Influencing Health Status) As An Associated Diagnosis?: No
Consent: The risks of atrophy were reviewed with the patient.
Concentration Of Kenalog Solution Injected (Mg/Ml): 10.0
Total Volume (Ccs): 1
Expiration Date For Kenalog (Optional): April 2023
Administered By (Optional): Valente White PA-C
X Size Of Lesion In Cm (Optional): 0

## 2022-04-27 NOTE — PROCEDURE: TREATMENT REGIMEN
Action 1: Continue
Detail Level: Zone
Plan: We injected with the ILK today and will see her back in one month and will see if she needs XTRAC treatment \\nRTC: 1 month\\n\\nPracheal has yovany battling with recurrent UTI's and kidney infections and due to such is not cleared to start biologics right now and patient does not desire to resume use at this time either way. Given her current infection and timing of the flare it could be related and hopefully we are seeing a small flare versus a sign of persistent relapse needing consideration for either xtrac or systemic therapies at this time
Continue Regimen: Derm-smooth oil

## 2022-04-28 PROBLEM — 91302008 SEPSIS: Status: ACTIVE | Noted: 2022-04-28

## 2022-04-29 ENCOUNTER — LAB OUTSIDE AN ENCOUNTER (OUTPATIENT)
Dept: URBAN - METROPOLITAN AREA CLINIC 86 | Facility: CLINIC | Age: 62
End: 2022-04-29

## 2022-04-29 ENCOUNTER — OFFICE VISIT (OUTPATIENT)
Dept: URBAN - METROPOLITAN AREA CLINIC 86 | Facility: CLINIC | Age: 62
End: 2022-04-29
Payer: MEDICARE

## 2022-04-29 ENCOUNTER — WEB ENCOUNTER (OUTPATIENT)
Dept: URBAN - METROPOLITAN AREA CLINIC 86 | Facility: CLINIC | Age: 62
End: 2022-04-29

## 2022-04-29 DIAGNOSIS — R18.8 ASCITES: ICD-10-CM

## 2022-04-29 DIAGNOSIS — I85.00 ESOPHAGEAL VARICES: ICD-10-CM

## 2022-04-29 DIAGNOSIS — E23.7 PITUITARY GLAND DISORDER: ICD-10-CM

## 2022-04-29 DIAGNOSIS — K02.9 DENTAL CARIES: ICD-10-CM

## 2022-04-29 DIAGNOSIS — Z79.899 HIGH RISK MEDICATION USE: ICD-10-CM

## 2022-04-29 DIAGNOSIS — E66.3 OVER WEIGHT: ICD-10-CM

## 2022-04-29 DIAGNOSIS — D04.9: ICD-10-CM

## 2022-04-29 DIAGNOSIS — K74.60 CIRRHOSIS OF LIVER: ICD-10-CM

## 2022-04-29 DIAGNOSIS — K76.6 PORTAL HYPERTENSION: ICD-10-CM

## 2022-04-29 DIAGNOSIS — K65.9 PERITONITIS, UNSPECIFIED: ICD-10-CM

## 2022-04-29 DIAGNOSIS — Z71.89 VACCINE COUNSELING: ICD-10-CM

## 2022-04-29 DIAGNOSIS — E11.9 DIABETES: ICD-10-CM

## 2022-04-29 DIAGNOSIS — R13.10 DYSPHAGIA: ICD-10-CM

## 2022-04-29 DIAGNOSIS — Z86.010 HISTORY OF COLON POLYPS: ICD-10-CM

## 2022-04-29 DIAGNOSIS — I81 PORTAL VEIN THROMBOSIS: ICD-10-CM

## 2022-04-29 DIAGNOSIS — K75.81 NASH (NONALCOHOLIC STEATOHEPATITIS): ICD-10-CM

## 2022-04-29 DIAGNOSIS — I81 SUPERIOR MESENTERIC VEIN THROMBOSIS: ICD-10-CM

## 2022-04-29 DIAGNOSIS — K72.90 HEPATIC ENCEPHALOPATHY: ICD-10-CM

## 2022-04-29 DIAGNOSIS — I85.00 VARICES, ESOPHAGEAL: ICD-10-CM

## 2022-04-29 PROCEDURE — 99215 OFFICE O/P EST HI 40 MIN: CPT

## 2022-04-29 RX ORDER — OMEPRAZOLE 40 MG/1
1 CAPSULE 30 MINUTES BEFORE MORNING MEAL CAPSULE, DELAYED RELEASE ORAL TWICE DAILY
Qty: 180 | Refills: 3 | Status: ACTIVE | COMMUNITY

## 2022-04-29 RX ORDER — BUMETANIDE 0.5 MG/1
4 (2MG) TABLET ORAL ONCE A DAY
Status: ACTIVE | COMMUNITY

## 2022-04-29 RX ORDER — DEXTROSE 4 G
1 TABLET TABLET,CHEWABLE ORAL ONCE A DAY
Status: ACTIVE | COMMUNITY

## 2022-04-29 RX ORDER — DENOSUMAB 60 MG/ML
AS DIRECTED INJECTION SUBCUTANEOUS
Status: ACTIVE | COMMUNITY

## 2022-04-29 RX ORDER — INSULIN DEGLUDEC INJECTION 100 U/ML
8 UNITS INJECTION, SOLUTION SUBCUTANEOUS
Refills: 0 | Status: ACTIVE | COMMUNITY
Start: 1900-01-01

## 2022-04-29 RX ORDER — ASPIRIN 81 MG/1
1 TABLET TABLET, COATED ORAL ONCE A DAY
Status: ACTIVE | COMMUNITY

## 2022-04-29 RX ORDER — LACTULOSE 10 G/15ML
30 ML SOLUTION ORAL ONCE A DAY
Status: ON HOLD | COMMUNITY

## 2022-04-29 RX ORDER — GABAPENTIN 300 MG/1
1 CAPSULE CAPSULE ORAL THREE TIMES A DAY
Status: ACTIVE | COMMUNITY

## 2022-04-29 RX ORDER — NADOLOL 40 MG/1
1 TABLET TABLET ORAL TWICE A DAY
Status: ACTIVE | COMMUNITY

## 2022-04-29 RX ORDER — VORTIOXETINE 20 MG/1
TAKE 1 TABLET (20 MG) BY ORAL ROUTE ONCE DAILY AT THE SAME TIME EACH DAY TABLET, FILM COATED ORAL ONCE A DAY
Refills: 0 | Status: ACTIVE | COMMUNITY
Start: 1900-01-01

## 2022-04-29 RX ORDER — POTASSIUM CHLORIDE 1500 MG/1
2 TABLETS WITH FOOD TABLET, FILM COATED, EXTENDED RELEASE ORAL ONCE A DAY
Refills: 0 | Status: ACTIVE | COMMUNITY
Start: 1900-01-01

## 2022-04-29 RX ORDER — MIDODRINE HYDROCHLORIDE 10 MG/1
1 TABLET TABLET ORAL THREE TIMES A DAY
Status: ACTIVE | COMMUNITY

## 2022-04-29 RX ORDER — RIFAXIMIN 550 MG/1
1 TABLET TABLET ORAL TWICE A DAY
Status: ACTIVE | COMMUNITY

## 2022-04-29 RX ORDER — FAMOTIDINE 40 MG/1
1 TABLET AT BEDTIME TABLET, FILM COATED ORAL ONCE A DAY
Qty: 90 TABLET | Refills: 3 | Status: DISCONTINUED | COMMUNITY

## 2022-04-29 NOTE — HPI-TODAY'S VISIT:
Pt is 63 yo f with hx sr and smv/pv clot, hx varices, hx pse, march 2022 urosepsis/sbp and now here for ongoing care and eval.  A copy of the note was sent to the referring provider.  Saw the notes and had urosepsis. Had 48182 cells in the ascites.  Had 1 tap in the hospital. had the infection.  Prior tap in [place] had no infection and she had been started on abx once a month. 2500 ml.  20-30 wbc, nos.  90% of pts with sbp if the infection will be reinfected in a year.  Once a month is not a usual tx for sbp prevention. Daily or once a week. Dr Lopez is her id and they need to look at this new data and need to adjust tx.  Here at [facility].  Need new labs.  March 30, 2022 labs show sodium 137 potassium 3.6 BUN of 10 creatinine 0.68 glucose 156 total bilirubin 1.3 AST 25 ALT 14 alk phos 83 calcium 8.7 labs count 3.3 hemoglobin 12 platelet count 66.  March 28, 2022 CT abdomen and pelvis with IV contrast revealed left-sided pleural effusion with some atelectasis.  Cardiophrenic enhancing node measuring 1.1 x 1.8 cm slightly increased in size.  Liver with chronic liver disease.  Prior cholecystectomy no bile duct dilation.  Splenomegaly seen.  Pancreas normal.  No hydronephrosis but with left nonobstructing renal stone.  Diffuse colon wall thickening and edema with surrounding stranding.  Prominent retroperitoneal mesenteric lymph nodes.  Large varices seen on the vessel exam with focal occlusion of the SMV/portal vein which appears similar to prior study.  Moderate continuation of the right portal vein.  Mild increase cannot exclude thrombus within the splenic venous collaterals in the left upper quadrant.  March 28, 2022 paracentesis removed 2600 mL of fluid.  November 2021 MRI liver with no fat or iron cirrhotic liver in order to enhancing lesions.  Spleen 19 cm.  Prior cholecystectomy changes seen.  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  Multiple large collateral pathways including gastroesophageal varices.  Replaced right hepatic artery seen.  Ascites may have been primary vs uti.  She has been seen by urogyn for the recurrent uti.   On abx once a month not for prevention but rather infection every month and so that makes more sense.  Covid 19 Jan 2022 and admitted locally for sepsis.  June 8, 2022 patient has appointment for labs and another MRI and in July 20 9022 will be seen by the  oltx transplant nurse practitioner at [facility].   April 8, 2022 transplant clinic visit mentions that the patient has Sr and had a recent hospitalization from March 20 7 March 21st for urosepsis.  Drained 2.6 L and prior to that had not been drained since February 2022.  Taking Bumex 2 mg twice daily and Aldactone 100 mg a day.  Meld increased during the hospital but was down to 14 by March 27, 2022.  Bili decreased to 1.3 on March 30.  Recap: Patient with history of chronic SMV/portal vein occlusion.  EGD January 2021 status post band ligation.  No significant encephalopathy on Xifaxan.  Current medicines include omeprazole 2 mg a day, aspirin 81 mg a day, Bumex 2 mg a day, vitamin D 1000 units a day, Prolia every 6 months Estrace 2 mg a day, gabapentin 3 mg 3 times daily, NovoLog sliding scale, Tresiba 8 units at night, lactulose 20 g Daily, mesalamine 1 g p.o. twice daily, midodrine 10 mg p.o. 3 times daily, nadolol 40 mg twice daily, potassium 40 mg a day, Xifaxan 550 twice daily, Aldactone on milligrams a day, Trintellix 20 mg a day.  Patient was listed as following up with Dr. Vickers for endoscopy. She says he has been seeing. He also saw dysphagia.  March 27 March 31 discharge summary from [facility].   She was a patient came in with concerns of urosepsis and left pyelonephritis.  Urinalysis high-grade 150 WBCs.  Blood cultures were negative but urine did grow pansensitive Klebsiella.  Patient was seen by ID and was given a course of daptomycin and Levaquin treatment.  They wanted urogynecology to see her. Left renal pole lesion that could be a nidus for recurrent infection.  Cell count on the fluid was 10,520 range  nucleated cells 84% neutrophils though the cultures were negative to date and maintained on antibiotics.  Looked up the actual fluid count from March 28 and had had albumin less than 1.5 total protein was 3.0 nucleated cells 10,520 neutrophils 84%.  Lasted been EGD high-grade fevers or November 17, 2021 with Dr. Vickers where she had grade 1 varices lower esophagus.  Moderate Schatzki ring was seen.  Dilation was seen to 51 [place].  Moderately erythematous mucosa without bleeding seen in the gastric antrum.  H. pylori testing sent.  Biopsy was negative for significant abnormality.  She did egd in nov 2021 and grade 1 varices noted. He is planning nov 2022.  nov 2021 mri: Reason For Exam Hepatocellular carcinoma;Hepatocellular carcinoma  REPORT EXAM: MRI Abdomen w/ + w/o Contrast  CLINICAL INDICATION: Hepatocellular carcinoma  TECHNIQUE: Multisequence, multiplanar MRI of the abdomen was performed without and with intravenous contrast. ESRC.2.7.3  CONTRAST: 18 cc of Prohance  COMPARISON: March 2, 2021  FINDINGS:  Lower Thorax: Normal.  Liver: No fat or iron. Cirrhotic liver morphology. No arterially enhancing lesions. No lesions with capsule. No lesions with washout.  Gallbladder/Biliary Tree: Status post cholecystectomy.  Spleen: Measuring 19.0 cm in maximum craniocaudal diameter.  Pancreas: Normal.  Adrenal Glands: Normal.   Kidneys/Ureters: Normal.  Gastrointestinal: Normal.   Lymph Nodes: Normal.  Vessels: Chronic thrombosis of the main portal vein with cavernous transformation redemonstrated. Multiple large collateral pathways including gastroesophageal varices, again noted. 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  Peritoneum/Retroperitoneum: Minimal ascites is present.  Bones/Soft Tissues: Normal.  IMPRESSION:     Cirrhosis with chronic portal vein thrombosis and cavernous transformation with associated evidence of portal hypertension. No hepatocellular carcinoma.  Plan: 1. Keep oltx eval appts June 2022. 2. Mri then. 3. Needs sbp prophylaxis. I will call ID and they will ask ID to discuss case. 4. Need to check meld. 5. See us in July 2022 to see how doing post above.  Stressed to pt the need for social distancing and strict handwashing and wearing a mask and to follow any other new or added CDC recommendations as this is an evolving target.  Duration of the visit was 50 minutes with 20 minutes of chart prep and 30 minutes in this face to face visit visit with time reviewing their prior and recent records and labs and discussing their current status and future plans for care for the patient and her daughter.

## 2022-04-29 NOTE — EXAM-PHYSICAL EXAM
Gen: awake and responsive. Eyes: anicteric, normal lids. Mouth: covered with mask. Nose: covered with mask. Hearing: intact grossly. Neck: trachea midline and no jvd. CV: RRR no s3. Lungs: clear. No wheezes, Abd: Soft, nabs, nr, NT. No hsm. Minimal fluid wave. Ext: no sig edema, some palm erythema. Neuro: moves all 4 ext grossly. No asterixis. Skin: no pruritis and some palm erythema.

## 2022-04-29 NOTE — PROCEDURE: MIPS QUALITY
PRINCIPAL DISCHARGE DIAGNOSIS  Diagnosis: Hematemesis  Assessment and Plan of Treatment: You had no further episodes of vomiting while hospitalized at [facility].  Your hemoglobin remained stable and was trending on repeat blood counts.      SECONDARY DISCHARGE DIAGNOSES  Diagnosis: Ulcerative esophagitis  Assessment and Plan of Treatment: You were seen by our advanced endoscopists and had an EGD performed.    Diagnosis: Chronic pancreatitis  Assessment and Plan of Treatment: You should continue your Creon for chronic pancreatitis.  You should continue your chronic pain medications and follow up with your pain management physician about additional pain medications.    Diagnosis: Acute kidney injury superimposed on CKD  Assessment and Plan of Treatment: Your creatinine was slightly elevated when you arrived at [facility], improved from while you were at [place].  It further improved to your baseline of approximately 2.6.    
Detail Level: Generalized
Quality 431: Preventive Care And Screening: Unhealthy Alcohol Use - Screening: Patient screened for unhealthy alcohol use using a single question and scores less than 2 times per year
Quality 226: Preventive Care And Screening: Tobacco Use: Screening And Cessation Intervention: Patient screened for tobacco and never smoked
Quality 130: Documentation Of Current Medications In The Medical Record: Current Medications Documented
PRINCIPAL DISCHARGE DIAGNOSIS  Diagnosis: Hematemesis  Assessment and Plan of Treatment: You had no further episodes of vomiting while hospitalized at [facility]. You did not require addiditonal blood transufion while admitted and Your hemoglobin remained stable and was trending on repeat blood counts.      SECONDARY DISCHARGE DIAGNOSES  Diagnosis: Ulcerative esophagitis  Assessment and Plan of Treatment: You were seen by our advanced endoscopists and had an EGD performed. The EGD showed that there was a erosion in your Esophagus. Because of this, you ere strart on Prillosec Carafate to prvent the recrurrence ot another upper GI bleed.    Diagnosis: Chronic pancreatitis  Assessment and Plan of Treatment: You should continue your Creon for chronic pancreatitis.  You should continue your chronic pain medications and follow up with your pain management physician about additional pain medications.    Diagnosis: Acute kidney injury superimposed on CKD  Assessment and Plan of Treatment: Your creatinine was slightly elevated when you arrived at [facility], improved from while you were at [place].  It further improved to your baseline of approximately 2.6.    
Quality 110: Preventive Care And Screening: Influenza Immunization: Influenza Immunization Administered during Influenza season

## 2022-04-30 ENCOUNTER — TELEPHONE ENCOUNTER (OUTPATIENT)
Dept: URBAN - METROPOLITAN AREA CLINIC 92 | Facility: CLINIC | Age: 62
End: 2022-04-30

## 2022-04-30 LAB
A/G RATIO: 1.2
ALBUMIN: 3.8
ALKALINE PHOSPHATASE: 112
ALT (SGPT): 25
AMMONIA, PLASMA: 61
AST (SGOT): 46
BASO (ABSOLUTE): 0
BASOS: 1
BILIRUBIN, TOTAL: 1.2
BUN/CREATININE RATIO: 19
BUN: 13
CALCIUM: 9.5
CARBON DIOXIDE, TOTAL: 26
CHLORIDE: 100
CREATININE: 0.69
EGFR: 99
EOS (ABSOLUTE): 0.1
EOS: 2
GLOBULIN, TOTAL: 3.2
GLUCOSE: 173
HEMATOCRIT: 38.6
HEMATOLOGY COMMENTS:: (no result)
HEMOGLOBIN: 12.4
IMMATURE CELLS: (no result)
IMMATURE GRANS (ABS): 0
IMMATURE GRANULOCYTES: 1
INR: 1.2
LYMPHS (ABSOLUTE): 0.5
LYMPHS: 13
MCH: 27.8
MCHC: 32.1
MCV: 87
MONOCYTES(ABSOLUTE): 0.3
MONOCYTES: 8
NEUTROPHILS (ABSOLUTE): 2.8
NEUTROPHILS: 75
NRBC: (no result)
PLATELETS: 54
POTASSIUM: 4.4
PROTEIN, TOTAL: 7
PROTHROMBIN TIME: 12.6
RBC: 4.46
RDW: 13.4
SODIUM: 138
WBC: 3.7

## 2022-04-30 NOTE — HPI-TODAY'S VISIT:
Dear Lady Carson, April 29th labs stable ammonia level normal at 61. INR normal at 1.2.   Sugar was elevated at 173.  Please share with primary provider. BUN of 13 creatinine 0.69 sodium 138 potassium 4.4 albumin normal at 3.8.  Bilirubin 1.2 alkaline phosphatase 112. Only the AST was elevated at 46 and ALT was ideal at 25. White blood cell count 3.7 hemoglobin 12.4 and MCV 87 and these are normal.  Platelet count low at 54.  Neutrophils normal at 2.8 and lymphocytes slightly low at 0.5. Meld down to 9 and meld na 9. Suggests that the infection is what tipped your labs recently  to be worse. So really need to focus on trying to prevent those recurrent urinary infections as worsens your underlying liver state. Please ask local ID to contact us to discuss the recent infection and options. Dr Gonzales

## 2022-05-11 ENCOUNTER — APPOINTMENT (RX ONLY)
Dept: URBAN - METROPOLITAN AREA CLINIC 41 | Facility: CLINIC | Age: 62
Setting detail: DERMATOLOGY
End: 2022-05-11

## 2022-05-11 DIAGNOSIS — L40.0 PSORIASIS VULGARIS: ICD-10-CM

## 2022-05-11 PROCEDURE — 96920 EXCIMER LSR PSRIASIS<250SQCM: CPT

## 2022-05-11 PROCEDURE — ? XTRAC LASER

## 2022-05-11 ASSESSMENT — LOCATION SIMPLE DESCRIPTION DERM
LOCATION SIMPLE: RIGHT EAR
LOCATION SIMPLE: POSTERIOR SCALP
LOCATION SIMPLE: POSTERIOR NECK

## 2022-05-11 ASSESSMENT — LOCATION ZONE DERM
LOCATION ZONE: EAR
LOCATION ZONE: NECK
LOCATION ZONE: SCALP

## 2022-05-11 ASSESSMENT — LOCATION DETAILED DESCRIPTION DERM
LOCATION DETAILED: MID-OCCIPITAL SCALP
LOCATION DETAILED: RIGHT SUPERIOR POSTERIOR NECK
LOCATION DETAILED: RIGHT CAVUM CONCHA
LOCATION DETAILED: LEFT SUPERIOR POSTERIOR NECK

## 2022-05-11 NOTE — PROCEDURE: XTRAC LASER
Detail Level: Zone
Session Time: 10:30am
Total Square Area In Cm2 (Required For Proper Billing): 112
Treatment Number: 1
Post-Care Instructions: I reviewed with the patient in detail post-care instructions. Patient should stay away from the sun and wear sun protection until treated areas are fully healed.
Dose Setting #1 (Mj/Cm2): 300
Total Energy In Joules: 33
Comments: Patient is returning to the office after eight months, to restart the xtrac laser treatment on the scalp. Patient states her scalp has been flaring for the past 3-4 weeks, and starting to spread.
Device Serial Number (Optional): [id number]
Total Pulses (Will Not Render If 0): 0
Consent: Written consent obtained, risks reviewed including but not limited to crusting, scabbing, blistering, scarring, darker or lighter pigmentary change, incidental hair removal, bruising, and/or incomplete removal.
Patient Id: [id number]
Location #1: scalp, right ear

## 2022-05-18 ENCOUNTER — APPOINTMENT (RX ONLY)
Dept: URBAN - METROPOLITAN AREA CLINIC 41 | Facility: CLINIC | Age: 62
Setting detail: DERMATOLOGY
End: 2022-05-18

## 2022-05-18 DIAGNOSIS — L40.0 PSORIASIS VULGARIS: ICD-10-CM

## 2022-05-18 PROCEDURE — 96920 EXCIMER LSR PSRIASIS<250SQCM: CPT

## 2022-05-18 PROCEDURE — ? XTRAC LASER

## 2022-05-18 PROCEDURE — ? PRESCRIPTION

## 2022-05-18 RX ORDER — FLUOCINOLONE ACETONIDE 0.11 MG/ML
ONCE OIL TOPICAL
Qty: 118.28 | Refills: 3 | Status: ERX

## 2022-05-18 ASSESSMENT — LOCATION SIMPLE DESCRIPTION DERM
LOCATION SIMPLE: POSTERIOR SCALP
LOCATION SIMPLE: POSTERIOR NECK
LOCATION SIMPLE: RIGHT EAR

## 2022-05-18 ASSESSMENT — LOCATION DETAILED DESCRIPTION DERM
LOCATION DETAILED: RIGHT CAVUM CONCHA
LOCATION DETAILED: RIGHT INFERIOR OCCIPITAL SCALP
LOCATION DETAILED: LEFT SUPERIOR POSTERIOR NECK
LOCATION DETAILED: LEFT INFERIOR OCCIPITAL SCALP

## 2022-05-18 ASSESSMENT — LOCATION ZONE DERM
LOCATION ZONE: SCALP
LOCATION ZONE: EAR
LOCATION ZONE: NECK

## 2022-05-18 NOTE — PROCEDURE: XTRAC LASER
Consent: Written consent obtained, risks reviewed including but not limited to crusting, scabbing, blistering, scarring, darker or lighter pigmentary change, incidental hair removal, bruising, and/or incomplete removal.
Session Time: 10:30am
Total Square Area In Cm2 (Required For Proper Billing): 100
Total Pulses (Will Not Render If 0): 0
Treatment Number: 2
Location #1: scalp
Dose Increase/Decrease #1: 15% increase
Device Serial Number (Optional): [id number]
Comments: Patient states she tolerated her last treatment well. Patient is requesting a refill for the Dermasmooth FS oil for the scalp.
Dose Setting #1 (Mj/Cm2): 345
Total Energy In Joules: 34
Detail Level: Zone
Patient Id: [id number]
Post-Care Instructions: I reviewed with the patient in detail post-care instructions. Patient should stay away from the sun and wear sun protection until treated areas are fully healed.

## 2022-05-25 ENCOUNTER — APPOINTMENT (RX ONLY)
Dept: URBAN - METROPOLITAN AREA CLINIC 41 | Facility: CLINIC | Age: 62
Setting detail: DERMATOLOGY
End: 2022-05-25

## 2022-05-25 DIAGNOSIS — L40.0 PSORIASIS VULGARIS: ICD-10-CM

## 2022-05-25 PROCEDURE — ? XTRAC LASER

## 2022-05-25 PROCEDURE — 96920 EXCIMER LSR PSRIASIS<250SQCM: CPT

## 2022-05-25 ASSESSMENT — LOCATION DETAILED DESCRIPTION DERM
LOCATION DETAILED: MID-OCCIPITAL SCALP
LOCATION DETAILED: RIGHT CAVUM CONCHA
LOCATION DETAILED: LEFT INFERIOR OCCIPITAL SCALP
LOCATION DETAILED: RIGHT INFERIOR POSTAURICULAR SKIN

## 2022-05-25 ASSESSMENT — LOCATION SIMPLE DESCRIPTION DERM
LOCATION SIMPLE: POSTERIOR SCALP
LOCATION SIMPLE: RIGHT EAR

## 2022-05-25 ASSESSMENT — LOCATION ZONE DERM
LOCATION ZONE: EAR
LOCATION ZONE: SCALP

## 2022-05-25 NOTE — PROCEDURE: XTRAC LASER
Device Serial Number (Optional): [id number]
Treatment Number: 3
Location #1: scalp, right ear
Post-Care Instructions: I reviewed with the patient in detail post-care instructions. Patient should stay away from the sun and wear sun protection until treated areas are fully healed.
Session Time: 2:30pm
Total Square Area In Cm2 (Required For Proper Billing): 116
Total Energy In Joules: 50
Consent: Written consent obtained, risks reviewed including but not limited to crusting, scabbing, blistering, scarring, darker or lighter pigmentary change, incidental hair removal, bruising, and/or incomplete removal.
Dose Setting #1 (Mj/Cm2): 431
Patient Id: [id number]
Detail Level: Zone
Dose Increase/Decrease #1: 25% increase
Total Pulses (Will Not Render If 0): 0
Comments: Patient states she tolerated her last treatment well.

## 2022-06-09 ENCOUNTER — APPOINTMENT (RX ONLY)
Dept: URBAN - METROPOLITAN AREA CLINIC 41 | Facility: CLINIC | Age: 62
Setting detail: DERMATOLOGY
End: 2022-06-09

## 2022-06-09 DIAGNOSIS — L40.0 PSORIASIS VULGARIS: ICD-10-CM

## 2022-06-09 PROCEDURE — ? XTRAC LASER

## 2022-06-09 PROCEDURE — 96920 EXCIMER LSR PSRIASIS<250SQCM: CPT

## 2022-06-09 ASSESSMENT — LOCATION SIMPLE DESCRIPTION DERM
LOCATION SIMPLE: POSTERIOR SCALP
LOCATION SIMPLE: RIGHT EAR

## 2022-06-09 ASSESSMENT — LOCATION DETAILED DESCRIPTION DERM
LOCATION DETAILED: RIGHT CAVUM CONCHA
LOCATION DETAILED: MID-OCCIPITAL SCALP

## 2022-06-09 ASSESSMENT — LOCATION ZONE DERM
LOCATION ZONE: EAR
LOCATION ZONE: SCALP

## 2022-06-09 NOTE — PROCEDURE: XTRAC LASER
Treatment Number: 4
Consent: Written consent obtained, risks reviewed including but not limited to crusting, scabbing, blistering, scarring, darker or lighter pigmentary change, incidental hair removal, bruising, and/or incomplete removal.
Total Pulses (Will Not Render If 0): 0
Detail Level: Zone
Device Serial Number (Optional): [id number]
Comments: Patient states she tolerated her last treatment well.
Dose Setting #1 (Mj/Cm2): 495
Patient Id: [id number]
Total Energy In Joules: 73
Total Square Area In Cm2 (Required For Proper Billing): 148
Post-Care Instructions: I reviewed with the patient in detail post-care instructions. Patient should stay away from the sun and wear sun protection until treated areas are fully healed.
Session Time: 8:00am
Location #1: scalp, right ear
Dose Increase/Decrease #1: 15% increase

## 2022-06-15 ENCOUNTER — APPOINTMENT (RX ONLY)
Dept: URBAN - METROPOLITAN AREA CLINIC 41 | Facility: CLINIC | Age: 62
Setting detail: DERMATOLOGY
End: 2022-06-15

## 2022-06-15 DIAGNOSIS — L40.0 PSORIASIS VULGARIS: ICD-10-CM

## 2022-06-15 PROCEDURE — ? XTRAC LASER

## 2022-06-15 PROCEDURE — 96920 EXCIMER LSR PSRIASIS<250SQCM: CPT

## 2022-06-15 ASSESSMENT — LOCATION SIMPLE DESCRIPTION DERM
LOCATION SIMPLE: POSTERIOR SCALP
LOCATION SIMPLE: RIGHT EAR

## 2022-06-15 ASSESSMENT — LOCATION ZONE DERM
LOCATION ZONE: EAR
LOCATION ZONE: SCALP

## 2022-06-15 ASSESSMENT — LOCATION DETAILED DESCRIPTION DERM
LOCATION DETAILED: MID-OCCIPITAL SCALP
LOCATION DETAILED: RIGHT CRUS OF HELIX

## 2022-06-15 NOTE — PROCEDURE: XTRAC LASER
Device Serial Number (Optional): [id number]
Comments: Patient states she tolerated her last treatment well. She states she has noticed more itching; and admits to scratching.
Detail Level: Zone
Total Pulses (Will Not Render If 0): 0
Dose Increase/Decrease #1: 25% increase
Location #1: scalp, right ear
Treatment Number: 5
Consent: Written consent obtained, risks reviewed including but not limited to crusting, scabbing, blistering, scarring, darker or lighter pigmentary change, incidental hair removal, bruising, and/or incomplete removal.
Total Energy In Joules: 116
Session Time: 9:15am
Post-Care Instructions: I reviewed with the patient in detail post-care instructions. Patient should stay away from the sun and wear sun protection until treated areas are fully healed.
Total Square Area In Cm2 (Required For Proper Billing): 188
Patient Id: [id number]
Dose Setting #1 (Mj/Cm2): 860

## 2022-07-06 ENCOUNTER — APPOINTMENT (RX ONLY)
Dept: URBAN - METROPOLITAN AREA CLINIC 41 | Facility: CLINIC | Age: 62
Setting detail: DERMATOLOGY
End: 2022-07-06

## 2022-07-06 DIAGNOSIS — L40.0 PSORIASIS VULGARIS: ICD-10-CM

## 2022-07-06 DIAGNOSIS — L65.9 NONSCARRING HAIR LOSS, UNSPECIFIED: ICD-10-CM

## 2022-07-06 PROCEDURE — ? TREATMENT REGIMEN

## 2022-07-06 PROCEDURE — 99212 OFFICE O/P EST SF 10 MIN: CPT | Mod: 25

## 2022-07-06 PROCEDURE — ? COUNSELING

## 2022-07-06 PROCEDURE — 11901 INJECT SKIN LESIONS >7: CPT

## 2022-07-06 PROCEDURE — ? INTRALESIONAL KENALOG

## 2022-07-06 PROCEDURE — ? ADDITIONAL NOTES

## 2022-07-06 ASSESSMENT — LOCATION SIMPLE DESCRIPTION DERM
LOCATION SIMPLE: SCALP
LOCATION SIMPLE: LEFT SCALP
LOCATION SIMPLE: POSTERIOR SCALP

## 2022-07-06 ASSESSMENT — PGA PSORIASIS: PGA PSORIASIS 2020: MODERATE

## 2022-07-06 ASSESSMENT — LOCATION DETAILED DESCRIPTION DERM
LOCATION DETAILED: LEFT MEDIAL FRONTAL SCALP
LOCATION DETAILED: LEFT SUPERIOR OCCIPITAL SCALP
LOCATION DETAILED: RIGHT OCCIPITAL SCALP
LOCATION DETAILED: LEFT OCCIPITAL SCALP
LOCATION DETAILED: LEFT SUPERIOR PARIETAL SCALP
LOCATION DETAILED: MID-OCCIPITAL SCALP

## 2022-07-06 ASSESSMENT — LOCATION ZONE DERM: LOCATION ZONE: SCALP

## 2022-07-06 ASSESSMENT — BSA PSORIASIS: % BODY COVERED IN PSORIASIS: 2

## 2022-07-06 NOTE — PROCEDURE: TREATMENT REGIMEN
Otc Regimen: -pt can start Nutrafol dosing as directed on the bottle OR she can continue Biotin 5mg/mcg and add in a viviscal supplement.
Plan: Patient is also dealing with a b12 deficiency so will be starting B12 injections with PCP as well
Detail Level: Detailed
Plan: Her PCP has still not cleared her to start a biologic so will monitor progress with ILK today
Action 3: Continue
Detail Level: Zone

## 2022-07-06 NOTE — PROCEDURE: ADDITIONAL NOTES
Additional Notes: -we will increase ILK concentration to 20 if scalp is not better at next visit
Detail Level: Simple
Render Risk Assessment In Note?: no

## 2022-07-27 ENCOUNTER — OFFICE VISIT (OUTPATIENT)
Dept: URBAN - METROPOLITAN AREA TELEHEALTH 2 | Facility: TELEHEALTH | Age: 62
End: 2022-07-27

## 2022-07-27 ENCOUNTER — TELEPHONE ENCOUNTER (OUTPATIENT)
Dept: URBAN - METROPOLITAN AREA CLINIC 92 | Facility: CLINIC | Age: 62
End: 2022-07-27

## 2022-07-27 RX ORDER — NADOLOL 40 MG/1
1 TABLET TABLET ORAL TWICE A DAY
Status: ACTIVE | COMMUNITY

## 2022-07-27 RX ORDER — BUMETANIDE 0.5 MG/1
4 (2MG) TABLET ORAL ONCE A DAY
Status: ACTIVE | COMMUNITY

## 2022-07-27 RX ORDER — ASPIRIN 81 MG/1
1 TABLET TABLET, COATED ORAL ONCE A DAY
Status: ACTIVE | COMMUNITY

## 2022-07-27 RX ORDER — DENOSUMAB 60 MG/ML
AS DIRECTED INJECTION SUBCUTANEOUS
Status: ACTIVE | COMMUNITY

## 2022-07-27 RX ORDER — GABAPENTIN 300 MG/1
1 CAPSULE CAPSULE ORAL THREE TIMES A DAY
Status: ACTIVE | COMMUNITY

## 2022-07-27 RX ORDER — LACTULOSE 10 G/15ML
30 ML SOLUTION ORAL ONCE A DAY
Status: ON HOLD | COMMUNITY

## 2022-07-27 RX ORDER — VORTIOXETINE 20 MG/1
TAKE 1 TABLET (20 MG) BY ORAL ROUTE ONCE DAILY AT THE SAME TIME EACH DAY TABLET, FILM COATED ORAL ONCE A DAY
Refills: 0 | Status: ACTIVE | COMMUNITY
Start: 1900-01-01

## 2022-07-27 RX ORDER — RIFAXIMIN 550 MG/1
1 TABLET TABLET ORAL TWICE A DAY
Status: ACTIVE | COMMUNITY

## 2022-07-27 RX ORDER — INSULIN DEGLUDEC INJECTION 100 U/ML
8 UNITS INJECTION, SOLUTION SUBCUTANEOUS
Refills: 0 | Status: ACTIVE | COMMUNITY
Start: 1900-01-01

## 2022-07-27 RX ORDER — OMEPRAZOLE 40 MG/1
1 CAPSULE 30 MINUTES BEFORE MORNING MEAL CAPSULE, DELAYED RELEASE ORAL TWICE DAILY
Qty: 180 | Refills: 3 | Status: ACTIVE | COMMUNITY

## 2022-07-27 RX ORDER — POTASSIUM CHLORIDE 1500 MG/1
2 TABLETS WITH FOOD TABLET, FILM COATED, EXTENDED RELEASE ORAL ONCE A DAY
Refills: 0 | Status: ACTIVE | COMMUNITY
Start: 1900-01-01

## 2022-07-27 RX ORDER — MIDODRINE HYDROCHLORIDE 10 MG/1
1 TABLET TABLET ORAL THREE TIMES A DAY
Status: ACTIVE | COMMUNITY

## 2022-07-27 RX ORDER — DEXTROSE 4 G
1 TABLET TABLET,CHEWABLE ORAL ONCE A DAY
Status: ACTIVE | COMMUNITY

## 2022-07-27 NOTE — HPI-TODAY'S VISIT:
Pt is 61 yo f last seen april 2022 with hx baeza and smv/pv clot, hx varices, hx pse, march 2022 urosepsis/sbp and now here for ongoing care and eval.  A copy of the note was sent to the referring provider.  June 8, 2022 MRI showed chronic trace left pleural effusion with associated atelectasis in the lower thorax views.  There was no significant fat or iron but with nodular contour and cirrhotic morphology.  No suspicious lesions.  Prior cholecystectomy changes.  No duct dilation.  Spleen enlarged 18.4 cm.  Pancreas normal.  Adrenal glands normal.  Normal bilateral renal symmetric enhancement with 6 mm superior pole renal cyst and no suspicious lesions and no hydro.  Mild small and large bowel wall edema likely due to periportal enteropathy/colopathy.  Unchanged chronic fibrosis of portal vein with cavernous transformation reconstituted left portal vein.  Redemonstrated gastroesophageal perisplenic varices.  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  Only trace perihepatic and perisplenic ascites. June 7, 2022 visit with Dr. Flores mentions that the patient is status post TAVR with recent echo showing EF 55% of left ventricular outflow VTI of 21.  Chronic diastolic heart failure noted.  TAVR was on January 23, 2020. June 1, 2022 echo showed EF 50 to 55% aortic valve normal functioning bioprosthetic valve.  Aortic valve max velocity showed a mean gradient of 11.0 and a peak gradient 24.4. June 8, 2022 labs show sodium 135 potassium 3.9 glucose 297 BUN of 10 creatinine 0.75 total bilirubin 1.6 calcium 9.1 AST 36 ALT 18 alk phos 106 hemoglobin A1c up at 7.5 AFP 4 W count 3.2 hemoglobin 12.3 plate count 47 INR 1.35.  HIV test negative at that time. Transplant team appointment July 15    Dear Lady [facility], April 29th labs stable ammonia level normal at 61. INR normal at 1.2.   Sugar was elevated at 173.  Please share with primary provider. BUN of 13 creatinine 0.69 sodium 138 potassium 4.4 albumin normal at 3.8.  Bilirubin 1.2 alkaline phosphatase 112. Only the AST was elevated at 46 and ALT was ideal at 25. White blood cell count 3.7 hemoglobin 12.4 and MCV 87 and these are normal.  Platelet count low at 54.  Neutrophils normal at 2.8 and lymphocytes slightly low at 0.5. Meld down to 9 and meld na 9. Suggests that the infection is what tipped your labs recently  to be worse. So really need to focus on trying to prevent those recurrent urinary infections as worsens your underlying liver state. Please ask local ID to contact us to discuss the recent infection and options. Dr Gonzales   Saw the notes and had urosepsis. Had 30776 cells in the ascites.  Had 1 tap in the hospital. had the infection.  Prior tap in [place] had no infection and she had been started on abx once a month. 2500 ml.  20-30 wbc, nos.  90% of pts with sbp if the infection will be reinfected in a year.  Once a month is not a usual tx for sbp prevention. Daily or once a week. Dr Lopez is her id and they need to look at this new data and need to adjust tx.  Here at [facility].  Need new labs.  March 30, 2022 labs show sodium 137 potassium 3.6 BUN of 10 creatinine 0.68 glucose 156 total bilirubin 1.3 AST 25 ALT 14 alk phos 83 calcium 8.7 labs count 3.3 hemoglobin 12 platelet count 66.  March 28, 2022 CT abdomen and pelvis with IV contrast revealed left-sided pleural effusion with some atelectasis.  Cardiophrenic enhancing node measuring 1.1 x 1.8 cm slightly increased in size.  Liver with chronic liver disease.  Prior cholecystectomy no bile duct dilation.  Splenomegaly seen.  Pancreas normal.  No hydronephrosis but with left nonobstructing renal stone.  Diffuse colon wall thickening and edema with surrounding stranding.  Prominent retroperitoneal mesenteric lymph nodes.  Large varices seen on the vessel exam with focal occlusion of the SMV/portal vein which appears similar to prior study.  Moderate continuation of the right portal vein.  Mild increase cannot exclude thrombus within the splenic venous collaterals in the left upper quadrant.  March 28, 2022 paracentesis removed 2600 mL of fluid.  November 2021 MRI liver with no fat or iron cirrhotic liver in order to enhancing lesions.  Spleen 19 cm.  Prior cholecystectomy changes seen.  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  Multiple large collateral pathways including gastroesophageal varices.  Replaced right hepatic artery seen.  Ascites may have been primary vs uti.  She has been seen by urogyn for the recurrent uti.   On abx once a month not for prevention but rather infection every month and so that makes more sense.  Covid 19 Jan 2022 and admitted locally for sepsis.  June 8, 2022 patient has appointment for labs and another MRI and in July 20 9022 will be seen by the  oltx transplant nurse practitioner at [facility].   April 8, 2022 transplant clinic visit mentions that the patient has Baeza and had a recent hospitalization from March 20 7 March 21st for urosepsis.  Drained 2.6 L and prior to that had not been drained since February 2022.  Taking Bumex 2 mg twice daily and Aldactone 100 mg a day.  Meld increased during the hospital but was down to 14 by March 27, 2022.  Bili decreased to 1.3 on March 30.  Recap: Patient with history of chronic SMV/portal vein occlusion.  EGD January 2021 status post band ligation.  No significant encephalopathy on Xifaxan.  Current medicines include omeprazole 2 mg a day, aspirin 81 mg a day, Bumex 2 mg a day, vitamin D 1000 units a day, Prolia every 6 months Estrace 2 mg a day, gabapentin 3 mg 3 times daily, NovoLog sliding scale, Tresiba 8 units at night, lactulose 20 g Daily, mesalamine 1 g p.o. twice daily, midodrine 10 mg p.o. 3 times daily, nadolol 40 mg twice daily, potassium 40 mg a day, Xifaxan 550 twice daily, Aldactone on milligrams a day, Trintellix 20 mg a day.  Patient was listed as following up with Dr. Vickers for endoscopy. She says he has been seeing. He also saw dysphagia.  March 27 March 31 discharge summary from [facility].   She was a patient came in with concerns of urosepsis and left pyelonephritis.  Urinalysis high-grade 150 WBCs.  Blood cultures were negative but urine did grow pansensitive Klebsiella.  Patient was seen by ID and was given a course of daptomycin and Levaquin treatment.  They wanted urogynecology to see her. Left renal pole lesion that could be a nidus for recurrent infection.  Cell count on the fluid was 10,520 range  nucleated cells 84% neutrophils though the cultures were negative to date and maintained on antibiotics.  Looked up the actual fluid count from March 28 and had had albumin less than 1.5 total protein was 3.0 nucleated cells 10,520 neutrophils 84%.  Lasted been EGD high-grade fevers or November 17, 2021 with Dr. Vickers where she had grade 1 varices lower esophagus.  Moderate Schatzki ring was seen.  Dilation was seen to 51 [place].  Moderately erythematous mucosa without bleeding seen in the gastric antrum.  H. pylori testing sent.  Biopsy was negative for significant abnormality.  She did egd in nov 2021 and grade 1 varices noted. He is planning nov 2022.  nov 2021 mri: Reason For Exam Hepatocellular carcinoma;Hepatocellular carcinoma  REPORT EXAM: MRI Abdomen w/ + w/o Contrast  CLINICAL INDICATION: Hepatocellular carcinoma  TECHNIQUE: Multisequence, multiplanar MRI of the abdomen was performed without and with intravenous contrast. [id number]  CONTRAST: 18 cc of Prohance  COMPARISON: March 2, 2021  FINDINGS:  Lower Thorax: Normal.  Liver: No fat or iron. Cirrhotic liver morphology. No arterially enhancing lesions. No lesions with capsule. No lesions with washout.  Gallbladder/Biliary Tree: Status post cholecystectomy.  Spleen: Measuring 19.0 cm in maximum craniocaudal diameter.  Pancreas: Normal.  Adrenal Glands: Normal.   Kidneys/Ureters: Normal.  Gastrointestinal: Normal.   Lymph Nodes: Normal.  Vessels: Chronic thrombosis of the main portal vein with cavernous transformation redemonstrated. Multiple large collateral pathways including gastroesophageal varices, again noted. 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  Peritoneum/Retroperitoneum: Minimal ascites is present.  Bones/Soft Tissues: Normal.  IMPRESSION:     Cirrhosis with chronic portal vein thrombosis and cavernous transformation with associated evidence of portal hypertension. No hepatocellular carcinoma.  Plan: 1. Keep oltx eval appts June 2022. 2. Mri then. 3. Needs sbp prophylaxis. I will call ID and they will ask ID to discuss case. 4. Need to check meld. 5. See us in July 2022 to see how doing post above.  Stressed to pt the need for social distancing and strict handwashing and wearing a mask and to follow any other new or added CDC recommendations as this is an evolving target.  Duration of the visit was  minutes with 10 minutes of chart prep and 30 minutes in this face to face visit visit with time reviewing their prior and recent records and labs and discussing their current status and future plans for care for the patient and her daughter

## 2022-08-01 ENCOUNTER — OFFICE VISIT (OUTPATIENT)
Dept: URBAN - METROPOLITAN AREA TELEHEALTH 2 | Facility: TELEHEALTH | Age: 62
End: 2022-08-01

## 2022-08-01 VITALS — HEIGHT: 61 IN | BODY MASS INDEX: 34.74 KG/M2 | WEIGHT: 184 LBS

## 2022-08-01 RX ORDER — RIFAXIMIN 550 MG/1
1 TABLET TABLET ORAL TWICE A DAY
Status: ACTIVE | COMMUNITY

## 2022-08-01 RX ORDER — LACTULOSE 10 G/15ML
30 ML SOLUTION ORAL ONCE A DAY
COMMUNITY

## 2022-08-01 RX ORDER — VORTIOXETINE 20 MG/1
TAKE 1 TABLET (20 MG) BY ORAL ROUTE ONCE DAILY AT THE SAME TIME EACH DAY TABLET, FILM COATED ORAL ONCE A DAY
Refills: 0 | Status: ACTIVE | COMMUNITY
Start: 1900-01-01

## 2022-08-01 RX ORDER — MIDODRINE HYDROCHLORIDE 10 MG/1
1 TABLET TABLET ORAL THREE TIMES A DAY
Status: ACTIVE | COMMUNITY

## 2022-08-01 RX ORDER — GABAPENTIN 300 MG/1
1 CAPSULE CAPSULE ORAL THREE TIMES A DAY
Status: ACTIVE | COMMUNITY

## 2022-08-01 RX ORDER — NADOLOL 40 MG/1
1 TABLET TABLET ORAL TWICE A DAY
Status: ACTIVE | COMMUNITY

## 2022-08-01 RX ORDER — INSULIN DEGLUDEC INJECTION 100 U/ML
8 UNITS INJECTION, SOLUTION SUBCUTANEOUS
Refills: 0 | Status: ACTIVE | COMMUNITY
Start: 1900-01-01

## 2022-08-01 RX ORDER — POTASSIUM CHLORIDE 1500 MG/1
2 TABLETS WITH FOOD TABLET, FILM COATED, EXTENDED RELEASE ORAL ONCE A DAY
Refills: 0 | Status: ACTIVE | COMMUNITY
Start: 1900-01-01

## 2022-08-01 RX ORDER — OMEPRAZOLE 40 MG/1
1 CAPSULE 30 MINUTES BEFORE MORNING MEAL CAPSULE, DELAYED RELEASE ORAL TWICE DAILY
Qty: 180 | Refills: 3 | Status: ACTIVE | COMMUNITY

## 2022-08-01 RX ORDER — DEXTROSE 4 G
1 TABLET TABLET,CHEWABLE ORAL ONCE A DAY
Status: ACTIVE | COMMUNITY

## 2022-08-01 RX ORDER — ASPIRIN 81 MG/1
1 TABLET TABLET, COATED ORAL ONCE A DAY
Status: ACTIVE | COMMUNITY

## 2022-08-01 RX ORDER — DENOSUMAB 60 MG/ML
AS DIRECTED INJECTION SUBCUTANEOUS
Status: ACTIVE | COMMUNITY

## 2022-08-01 RX ORDER — BUMETANIDE 0.5 MG/1
4 (2MG) TABLET ORAL ONCE A DAY
Status: ACTIVE | COMMUNITY

## 2022-09-08 ENCOUNTER — RX ONLY (OUTPATIENT)
Age: 62
Setting detail: RX ONLY
End: 2022-09-08

## 2022-09-08 ENCOUNTER — APPOINTMENT (RX ONLY)
Dept: URBAN - METROPOLITAN AREA CLINIC 41 | Facility: CLINIC | Age: 62
Setting detail: DERMATOLOGY
End: 2022-09-08

## 2022-09-08 DIAGNOSIS — L40.0 PSORIASIS VULGARIS: ICD-10-CM

## 2022-09-08 PROCEDURE — ? INTRALESIONAL KENALOG

## 2022-09-08 PROCEDURE — ? MEDICATION COUNSELING

## 2022-09-08 PROCEDURE — ? COUNSELING

## 2022-09-08 PROCEDURE — 11901 INJECT SKIN LESIONS >7: CPT

## 2022-09-08 PROCEDURE — ? TREATMENT REGIMEN

## 2022-09-08 RX ORDER — FLUOCINOLONE ACETONIDE 0.11 MG/ML
ONCE OIL TOPICAL
Qty: 118.28 | Refills: 3 | Status: ERX

## 2022-09-08 ASSESSMENT — LOCATION DETAILED DESCRIPTION DERM
LOCATION DETAILED: LEFT SUPERIOR PARIETAL SCALP
LOCATION DETAILED: RIGHT OCCIPITAL SCALP
LOCATION DETAILED: RIGHT SUPERIOR OCCIPITAL SCALP
LOCATION DETAILED: LEFT OCCIPITAL SCALP
LOCATION DETAILED: LEFT INFERIOR OCCIPITAL SCALP
LOCATION DETAILED: MID-OCCIPITAL SCALP
LOCATION DETAILED: RIGHT INFERIOR OCCIPITAL SCALP

## 2022-09-08 ASSESSMENT — LOCATION ZONE DERM: LOCATION ZONE: SCALP

## 2022-09-08 ASSESSMENT — LOCATION SIMPLE DESCRIPTION DERM
LOCATION SIMPLE: POSTERIOR SCALP
LOCATION SIMPLE: SCALP

## 2022-09-08 NOTE — PROCEDURE: INTRALESIONAL KENALOG
How Many Mls Were Removed From The 80 Mg/Ml (5ml) Vial When Preparing The Injectable Solution?: 0
Total Volume (Ccs): 1.5
Include Z78.9 (Other Specified Conditions Influencing Health Status) As An Associated Diagnosis?: No
Consent: The risks of atrophy were reviewed with the patient.
Kenalog Preparation: Kenalog
Expiration Date For Kenalog (Optional): march 2023
Administered By (Optional): Dr. Holliday
Medical Necessity Clause: This procedure was medically necessary because the lesions that were treated were:
Detail Level: Detailed
Show Inventory Tab: Hide
Validate Note Data When Using Inventory: Yes
Ndc# For Kenalog Only: 8148-5802-66
Lot # For Kenalog (Optional): [id number]
Concentration Of Kenalog Solution Injected (Mg/Ml): 10.0

## 2022-09-08 NOTE — PROCEDURE: COUNSELING
Detail Level: Zone
Patient Specific Counseling (Will Not Stick From Patient To Patient): Suboptimal situation. PCP still does not want her on any biologic, and her Hx of liver issues make MTX, and even Soriatane, contraindicated. Did not respone to [facility]. Xtrac was of only limited impact. Not many other options: even newer topicals like Vtama are unavailable as she's on Medicare/M'caid.

## 2022-09-08 NOTE — PROCEDURE: MEDICATION COUNSELING
Ilumya Counseling: I discussed with the patient the risks of tildrakizumab including but not limited to immunosuppression, malignancy, posterior leukoencephalopathy syndrome, and serious infections.  The patient understands that monitoring is required including a PPD at baseline and must alert us or the primary physician if symptoms of infection or other concerning signs are noted.
Tazorac Pregnancy And Lactation Text: This medication is not safe during pregnancy. It is unknown if this medication is excreted in breast milk.
Oxybutynin Pregnancy And Lactation Text: This medication is Pregnancy Category B and is considered safe during pregnancy. It is unknown if it is excreted in breast milk.
Finasteride Male Counseling: Finasteride Counseling:  I discussed with the patient the risks of use of finasteride including but not limited to decreased libido, decreased ejaculate volume, gynecomastia, and depression. Women should not handle medication.  All of the patient's questions and concerns were addressed.
Tremfya Pregnancy And Lactation Text: The risk during pregnancy and breastfeeding is uncertain with this medication.
Mirvaso Pregnancy And Lactation Text: This medication has not been assigned a Pregnancy Risk Category. It is unknown if the medication is excreted in breast milk.
Albendazole Pregnancy And Lactation Text: This medication is Pregnancy Category C and it isn't known if it is safe during pregnancy. It is also excreted in breast milk.
Aklief Pregnancy And Lactation Text: It is unknown if this medication is safe to use during pregnancy.  It is unknown if this medication is excreted in breast milk.  Breastfeeding women should use the topical cream on the smallest area of the skin for the shortest time needed while breastfeeding.  Do not apply to nipple and areola.
Terbinafine Pregnancy And Lactation Text: This medication is Pregnancy Category B and is considered safe during pregnancy. It is also excreted in breast milk and breast feeding isn't recommended.
Qbrexza Counseling:  I discussed with the patient the risks of Qbrexza including but not limited to headache, mydriasis, blurred vision, dry eyes, nasal dryness, dry mouth, dry throat, dry skin, urinary hesitation, and constipation.  Local skin reactions including erythema, burning, stinging, and itching can also occur.
Cyclophosphamide Counseling:  I discussed with the patient the risks of cyclophosphamide including but not limited to hair loss, hormonal abnormalities, decreased fertility, abdominal pain, diarrhea, nausea and vomiting, bone marrow suppression and infection. The patient understands that monitoring is required while taking this medication.
Minocycline Pregnancy And Lactation Text: This medication is Pregnancy Category D and not consider safe during pregnancy. It is also excreted in breast milk.
Cimzia Pregnancy And Lactation Text: This medication crosses the placenta but can be considered safe in certain situations. Cimzia may be excreted in breast milk.
Acitretin Pregnancy And Lactation Text: This medication is Pregnancy Category X and should not be given to women who are pregnant or may become pregnant in the future. This medication is excreted in breast milk.
Doxycycline Counseling:  Patient counseled regarding possible photosensitivity and increased risk for sunburn.  Patient instructed to avoid sunlight, if possible.  When exposed to sunlight, patients should wear protective clothing, sunglasses, and sunscreen.  The patient was instructed to call the office immediately if the following severe adverse effects occur:  hearing changes, easy bruising/bleeding, severe headache, or vision changes.  The patient verbalized understanding of the proper use and possible adverse effects of doxycycline.  All of the patient's questions and concerns were addressed.
Clofazimine Pregnancy And Lactation Text: This medication is Pregnancy Category C and isn't considered safe during pregnancy. It is excreted in breast milk.
Otezla Pregnancy And Lactation Text: This medication is Pregnancy Category C and it isn't known if it is safe during pregnancy. It is unknown if it is excreted in breast milk.
Hydroquinone Counseling:  Patient advised that medication may result in skin irritation, lightening (hypopigmentation), dryness, and burning.  In the event of skin irritation, the patient was advised to reduce the amount of the drug applied or use it less frequently.  Rarely, spots that are treated with hydroquinone can become darker (pseudoochronosis).  Should this occur, patient instructed to stop medication and call the office. The patient verbalized understanding of the proper use and possible adverse effects of hydroquinone.  All of the patient's questions and concerns were addressed.
Winlevi Pregnancy And Lactation Text: This medication is considered safe during pregnancy and breastfeeding.
Thalidomide Pregnancy And Lactation Text: This medication is Pregnancy Category X and is absolutely contraindicated during pregnancy. It is unknown if it is excreted in breast milk.
Niacinamide Counseling: I recommended taking niacin or niacinamide, also know as vitamin B3, twice daily. Recent evidence suggests that taking vitamin B3 (500 mg twice daily) can reduce the risk of actinic keratoses and non-melanoma skin cancers. Side effects of vitamin B3 include flushing and headache.
Fluconazole Counseling:  Patient counseled regarding adverse effects of fluconazole including but not limited to headache, diarrhea, nausea, upset stomach, liver function test abnormalities, taste disturbance, and stomach pain.  There is a rare possibility of liver failure that can occur when taking fluconazole.  The patient understands that monitoring of LFTs and kidney function test may be required, especially at baseline. The patient verbalized understanding of the proper use and possible adverse effects of fluconazole.  All of the patient's questions and concerns were addressed.
Siliq Counseling:  I discussed with the patient the risks of Siliq including but not limited to new or worsening depression, suicidal thoughts and behavior, immunosuppression, malignancy, posterior leukoencephalopathy syndrome, and serious infections.  The patient understands that monitoring is required including a PPD at baseline and must alert us or the primary physician if symptoms of infection or other concerning signs are noted. There is also a special program designed to monitor depression which is required with Siliq.
Xeljanz Counseling: I discussed with the patient the risks of Xeljanz therapy including increased risk of infection, liver issues, headache, diarrhea, or cold symptoms. Live vaccines should be avoided. They were instructed to call if they have any problems.
Cantharidin Pregnancy And Lactation Text: The use of this medication during pregnancy or lactation is not recommended as there is insufficient data.
Topical Clindamycin Counseling: Patient counseled that this medication may cause skin irritation or allergic reactions.  In the event of skin irritation, the patient was advised to reduce the amount of the drug applied or use it less frequently.   The patient verbalized understanding of the proper use and possible adverse effects of clindamycin.  All of the patient's questions and concerns were addressed.
Propranolol Counseling:  I discussed with the patient the risks of propranolol including but not limited to low heart rate, low blood pressure, low blood sugar, restlessness and increased cold sensitivity. They should call the office if they experience any of these side effects.
Finasteride Pregnancy And Lactation Text: This medication is absolutely contraindicated during pregnancy. It is unknown if it is excreted in breast milk.
Qbrexza Pregnancy And Lactation Text: There is no available data on Qbrexza use in pregnant women.  There is no available data on Qbrexza use in lactation.
Ivermectin Counseling:  Patient instructed to take medication on an empty stomach with a full glass of water.  Patient informed of potential adverse effects including but not limited to nausea, diarrhea, dizziness, itching, and swelling of the extremities or lymph nodes.  The patient verbalized understanding of the proper use and possible adverse effects of ivermectin.  All of the patient's questions and concerns were addressed.
Azelaic Acid Counseling: Patient counseled that medicine may cause skin irritation and to avoid applying near the eyes.  In the event of skin irritation, the patient was advised to reduce the amount of the drug applied or use it less frequently.   The patient verbalized understanding of the proper use and possible adverse effects of azelaic acid.  All of the patient's questions and concerns were addressed.
Cyclophosphamide Pregnancy And Lactation Text: This medication is Pregnancy Category D and it isn't considered safe during pregnancy. This medication is excreted in breast milk.
Doxycycline Pregnancy And Lactation Text: This medication is Pregnancy Category D and not consider safe during pregnancy. It is also excreted in breast milk but is considered safe for shorter treatment courses.
Oxybutynin Counseling:  I discussed with the patient the risks of oxybutynin including but not limited to skin rash, drowsiness, dry mouth, difficulty urinating, and blurred vision.
Quinolones Counseling:  I discussed with the patient the risks of fluoroquinolones including but not limited to GI upset, allergic reaction, drug rash, diarrhea, dizziness, photosensitivity, yeast infections, liver function test abnormalities, tendonitis/tendon rupture.
Colchicine Counseling:  Patient counseled regarding adverse effects including but not limited to stomach upset (nausea, vomiting, stomach pain, or diarrhea).  Patient instructed to limit alcohol consumption while taking this medication.  Colchicine may reduce blood counts especially with prolonged use.  The patient understands that monitoring of kidney function and blood counts may be required, especially at baseline. The patient verbalized understanding of the proper use and possible adverse effects of colchicine.  All of the patient's questions and concerns were addressed.
Cosentyx Counseling:  I discussed with the patient the risks of Cosentyx including but not limited to worsening of Crohn's disease, immunosuppression, allergic reactions and infections.  The patient understands that monitoring is required including a PPD at baseline and must alert us or the primary physician if symptoms of infection or other concerning signs are noted.
Bexarotene Counseling:  I discussed with the patient the risks of bexarotene including but not limited to hair loss, dry lips/skin/eyes, liver abnormalities, hyperlipidemia, pancreatitis, depression/suicidal ideation, photosensitivity, drug rash/allergic reactions, hypothyroidism, anemia, leukopenia, infection, cataracts, and teratogenicity.  Patient understands that they will need regular blood tests to check lipid profile, liver function tests, white blood cell count, thyroid function tests and pregnancy test if applicable.
Hydroquinone Pregnancy And Lactation Text: This medication has not been assigned a Pregnancy Risk Category but animal studies failed to show danger with the topical medication. It is unknown if the medication is excreted in breast milk.
Zyclara Counseling:  I discussed with the patient the risks of imiquimod including but not limited to erythema, scaling, itching, weeping, crusting, and pain.  Patient understands that the inflammatory response to imiquimod is variable from person to person and was educated regarded proper titration schedule.  If flu-like symptoms develop, patient knows to discontinue the medication and contact us.
Niacinamide Pregnancy And Lactation Text: These medications are considered safe during pregnancy.
Azithromycin Counseling:  I discussed with the patient the risks of azithromycin including but not limited to GI upset, allergic reaction, drug rash, diarrhea, and yeast infections.
Tranexamic Acid Counseling:  Patient advised of the small risk of bleeding problems with tranexamic acid. They were also instructed to call if they developed any nausea, vomiting or diarrhea. All of the patient's questions and concerns were addressed.
Fluconazole Pregnancy And Lactation Text: This medication is Pregnancy Category C and it isn't know if it is safe during pregnancy. It is also excreted in breast milk.
Gabapentin Counseling: I discussed with the patient the risks of gabapentin including but not limited to dizziness, somnolence, fatigue and ataxia.
5-Fu Counseling: 5-Fluorouracil Counseling:  I discussed with the patient the risks of 5-fluorouracil including but not limited to erythema, scaling, itching, weeping, crusting, and pain.
Propranolol Pregnancy And Lactation Text: This medication is Pregnancy Category C and it isn't known if it is safe during pregnancy. It is excreted in breast milk.
Infliximab Counseling:  I discussed with the patient the risks of infliximab including but not limited to myelosuppression, immunosuppression, autoimmune hepatitis, demyelinating diseases, lymphoma, and serious infections.  The patient understands that monitoring is required including a PPD at baseline and must alert us or the primary physician if symptoms of infection or other concerning signs are noted.
Cimetidine Counseling:  I discussed with the patient the risks of Cimetidine including but not limited to gynecomastia, headache, diarrhea, nausea, drowsiness, arrhythmias, pancreatitis, skin rashes, psychosis, bone marrow suppression and kidney toxicity.
Azelaic Acid Pregnancy And Lactation Text: This medication is considered safe during pregnancy and breast feeding.
Xelalessandraz Pregnancy And Lactation Text: This medication is Pregnancy Category D and is not considered safe during pregnancy.  The risk during breast feeding is also uncertain.
Cyclosporine Counseling:  I discussed with the patient the risks of cyclosporine including but not limited to hypertension, gingival hyperplasia,myelosuppression, immunosuppression, liver damage, kidney damage, neurotoxicity, lymphoma, and serious infections. The patient understands that monitoring is required including baseline blood pressure, CBC, CMP, lipid panel and uric acid, and then 1-2 times monthly CMP and blood pressure.
Cosentyx Pregnancy And Lactation Text: This medication is Pregnancy Category B and is considered safe during pregnancy. It is unknown if this medication is excreted in breast milk.
Rhofade Counseling: Rhofade is a topical medication which can decrease superficial blood flow where applied. Side effects are uncommon and include stinging, redness and allergic reactions.
Skyrizi Counseling: I discussed with the patient the risks of risankizumab-rzaa including but not limited to immunosuppression, and serious infections.  The patient understands that monitoring is required including a PPD at baseline and must alert us or the primary physician if symptoms of infection or other concerning signs are noted.
Erythromycin Counseling:  I discussed with the patient the risks of erythromycin including but not limited to GI upset, allergic reaction, drug rash, diarrhea, increase in liver enzymes, and yeast infections.
Zyclara Pregnancy And Lactation Text: This medication is Pregnancy Category C. It is unknown if this medication is excreted in breast milk.
Bexarotene Pregnancy And Lactation Text: This medication is Pregnancy Category X and should not be given to women who are pregnant or may become pregnant. This medication should not be used if you are breast feeding.
Imiquimod Counseling:  I discussed with the patient the risks of imiquimod including but not limited to erythema, scaling, itching, weeping, crusting, and pain.  Patient understands that the inflammatory response to imiquimod is variable from person to person and was educated regarded proper titration schedule.  If flu-like symptoms develop, patient knows to discontinue the medication and contact us.
Griseofulvin Counseling:  I discussed with the patient the risks of griseofulvin including but not limited to photosensitivity, cytopenia, liver damage, nausea/vomiting and severe allergy.  The patient understands that this medication is best absorbed when taken with a fatty meal (e.g., ice cream or french fries).
Tranexamic Acid Pregnancy And Lactation Text: It is unknown if this medication is safe during pregnancy or breast feeding.
Azithromycin Pregnancy And Lactation Text: This medication is considered safe during pregnancy and is also secreted in breast milk.
Simponi Counseling:  I discussed with the patient the risks of golimumab including but not limited to myelosuppression, immunosuppression, autoimmune hepatitis, demyelinating diseases, lymphoma, and serious infections.  The patient understands that monitoring is required including a PPD at baseline and must alert us or the primary physician if symptoms of infection or other concerning signs are noted.
Nsaids Counseling: NSAID Counseling: I discussed with the patient that NSAIDs should be taken with food. Prolonged use of NSAIDs can result in the development of stomach ulcers.  Patient advised to stop taking NSAIDs if abdominal pain occurs.  The patient verbalized understanding of the proper use and possible adverse effects of NSAIDs.  All of the patient's questions and concerns were addressed.
Topical Ketoconazole Counseling: Patient counseled that this medication may cause skin irritation or allergic reactions.  In the event of skin irritation, the patient was advised to reduce the amount of the drug applied or use it less frequently.   The patient verbalized understanding of the proper use and possible adverse effects of ketoconazole.  All of the patient's questions and concerns were addressed.
Birth Control Pills Counseling: Birth Control Pill Counseling: I discussed with the patient the potential side effects of OCPs including but not limited to increased risk of stroke, heart attack, thrombophlebitis, deep venous thrombosis, hepatic adenomas, breast changes, GI upset, headaches, and depression.  The patient verbalized understanding of the proper use and possible adverse effects of OCPs. All of the patient's questions and concerns were addressed.
5-Fu Pregnancy And Lactation Text: This medication is Pregnancy Category X and contraindicated in pregnancy and in women who may become pregnant. It is unknown if this medication is excreted in breast milk.
Benzoyl Peroxide Counseling: Patient counseled that medicine may cause skin irritation and bleach clothing.  In the event of skin irritation, the patient was advised to reduce the amount of the drug applied or use it less frequently.   The patient verbalized understanding of the proper use and possible adverse effects of benzoyl peroxide.  All of the patient's questions and concerns were addressed.
Xolair Counseling:  Patient informed of potential adverse effects including but not limited to fever, muscle aches, rash and allergic reactions.  The patient verbalized understanding of the proper use and possible adverse effects of Xolair.  All of the patient's questions and concerns were addressed.
Cyclosporine Pregnancy And Lactation Text: This medication is Pregnancy Category C and it isn't know if it is safe during pregnancy. This medication is excreted in breast milk.
Dapsone Counseling: I discussed with the patient the risks of dapsone including but not limited to hemolytic anemia, agranulocytosis, rashes, methemoglobinemia, kidney failure, peripheral neuropathy, headaches, GI upset, and liver toxicity.  Patients who start dapsone require monitoring including baseline LFTs and weekly CBCs for the first month, then every month thereafter.  The patient verbalized understanding of the proper use and possible adverse effects of dapsone.  All of the patient's questions and concerns were addressed.
Dupixent Counseling: I discussed with the patient the risks of dupilumab including but not limited to eye infection and irritation, cold sores, injection site reactions, worsening of asthma, allergic reactions and increased risk of parasitic infection.  Live vaccines should be avoided while taking dupilumab. Dupilumab will also interact with certain medications such as warfarin and cyclosporine. The patient understands that monitoring is required and they must alert us or the primary physician if symptoms of infection or other concerning signs are noted.
Opzelura Counseling:  I discussed with the patient the risks of Opzelura including but not limited to nasopharngitis, bronchitis, ear infection, eosinophila, hives, diarrhea, folliculitis, tonsillitis, and rhinorrhea.  Taken orally, this medication has been linked to serious infections; higher rate of mortality; malignancy and lymphoproliferative disorders; major adverse cardiovascular events; thrombosis; thrombocytopenia, anemia, and neutropenia; and lipid elevations.
Rifampin Counseling: I discussed with the patient the risks of rifampin including but not limited to liver damage, kidney damage, red-orange body fluids, nausea/vomiting and severe allergy.
Isotretinoin Counseling: Patient should get monthly blood tests, not donate blood, not drive at night if vision affected, not share medication, and not undergo elective surgery for 6 months after tx completed. Side effects reviewed, pt to contact office should one occur.
Nsaids Pregnancy And Lactation Text: These medications are considered safe up to 30 weeks gestation. It is excreted in breast milk.
Bactrim Counseling:  I discussed with the patient the risks of sulfa antibiotics including but not limited to GI upset, allergic reaction, drug rash, diarrhea, dizziness, photosensitivity, and yeast infections.  Rarely, more serious reactions can occur including but not limited to aplastic anemia, agranulocytosis, methemoglobinemia, blood dyscrasias, liver or kidney failure, lung infiltrates or desquamative/blistering drug rashes.
Griseofulvin Pregnancy And Lactation Text: This medication is Pregnancy Category X and is known to cause serious birth defects. It is unknown if this medication is excreted in breast milk but breast feeding should be avoided.
Valtrex Counseling: I discussed with the patient the risks of valacyclovir including but not limited to kidney damage, nausea, vomiting and severe allergy.  The patient understands that if the infection seems to be worsening or is not improving, they are to call.
Doxepin Counseling:  Patient advised that the medication is sedating and not to drive a car after taking this medication. Patient informed of potential adverse effects including but not limited to dry mouth, urinary retention, and blurry vision.  The patient verbalized understanding of the proper use and possible adverse effects of doxepin.  All of the patient's questions and concerns were addressed.
Drysol Counseling:  I discussed with the patient the risks of drysol/aluminum chloride including but not limited to skin rash, itching, irritation, burning.
Glycopyrrolate Counseling:  I discussed with the patient the risks of glycopyrrolate including but not limited to skin rash, drowsiness, dry mouth, difficulty urinating, and blurred vision.
Olumiant Counseling: I discussed with the patient the risks of Olumiant therapy including but not limited to upper respiratory tract infections, shingles, cold sores, and nausea. Live vaccines should be avoided.  This medication has been linked to serious infections; higher rate of mortality; malignancy and lymphoproliferative disorders; major adverse cardiovascular events; thrombosis; gastrointestinal perforations; neutropenia; lymphopenia; anemia; liver enzyme elevations; and lipid elevations.
Xolair Pregnancy And Lactation Text: This medication is Pregnancy Category B and is considered safe during pregnancy. This medication is excreted in breast milk.
Birth Control Pills Pregnancy And Lactation Text: This medication should be avoided if pregnant and for the first 30 days post-partum.
Dapsone Pregnancy And Lactation Text: This medication is Pregnancy Category C and is not considered safe during pregnancy or breast feeding.
Methotrexate Counseling:  Patient counseled regarding adverse effects of methotrexate including but not limited to nausea, vomiting, abnormalities in liver function tests. Patients may develop mouth sores, rash, diarrhea, and abnormalities in blood counts. The patient understands that monitoring is required including LFT's and blood counts.  There is a rare possibility of scarring of the liver and lung problems that can occur when taking methotrexate. Persistent nausea, loss of appetite, pale stools, dark urine, cough, and shortness of breath should be reported immediately. Patient advised to discontinue methotrexate treatment at least three months before attempting to become pregnant.  I discussed the need for folate supplements while taking methotrexate.  These supplements can decrease side effects during methotrexate treatment. The patient verbalized understanding of the proper use and possible adverse effects of methotrexate.  All of the patient's questions and concerns were addressed.
Benzoyl Peroxide Pregnancy And Lactation Text: This medication is Pregnancy Category C. It is unknown if benzoyl peroxide is excreted in breast milk.
Stelara Counseling:  I discussed with the patient the risks of ustekinumab including but not limited to immunosuppression, malignancy, posterior leukoencephalopathy syndrome, and serious infections.  The patient understands that monitoring is required including a PPD at baseline and must alert us or the primary physician if symptoms of infection or other concerning signs are noted.
Opioid Counseling: I discussed with the patient the potential side effects of opioids including but not limited to addiction, altered mental status, and depression. I stressed avoiding alcohol, benzodiazepines, muscle relaxants and sleep aids unless specifically okayed by a physician. The patient verbalized understanding of the proper use and possible adverse effects of opioids. All of the patient's questions and concerns were addressed. They were instructed to flush the remaining pills down the toilet if they did not need them for pain.
Solaraze Counseling:  I discussed with the patient the risks of Solaraze including but not limited to erythema, scaling, itching, weeping, crusting, and pain.
Dupixent Pregnancy And Lactation Text: This medication likely crosses the placenta but the risk for the fetus is uncertain. This medication is excreted in breast milk.
Rifampin Pregnancy And Lactation Text: This medication is Pregnancy Category C and it isn't know if it is safe during pregnancy. It is also excreted in breast milk and should not be used if you are breast feeding.
Isotretinoin Pregnancy And Lactation Text: This medication is Pregnancy Category X and is considered extremely dangerous during pregnancy. It is unknown if it is excreted in breast milk.
Klisyri Counseling:  I discussed with the patient the risks of Klisyri including but not limited to erythema, scaling, itching, weeping, crusting, and pain.
Valtrex Pregnancy And Lactation Text: this medication is Pregnancy Category B and is considered safe during pregnancy. This medication is not directly found in breast milk but it's metabolite acyclovir is present.
Bactrim Pregnancy And Lactation Text: This medication is Pregnancy Category D and is known to cause fetal risk.  It is also excreted in breast milk.
Adbry Counseling: I discussed with the patient the risks of tralokinumab including but not limited to eye infection and irritation, cold sores, injection site reactions, worsening of asthma, allergic reactions and increased risk of parasitic infection.  Live vaccines should be avoided while taking tralokinumab. The patient understands that monitoring is required and they must alert us or the primary physician if symptoms of infection or other concerning signs are noted.
Odomzo Counseling- I discussed with the patient the risks of Odomzo including but not limited to nausea, vomiting, diarrhea, constipation, weight loss, changes in the sense of taste, decreased appetite, muscle spasms, and hair loss.  The patient verbalized understanding of the proper use and possible adverse effects of Odomzo.  All of the patient's questions and concerns were addressed.
Itraconazole Counseling:  I discussed with the patient the risks of itraconazole including but not limited to liver damage, nausea/vomiting, neuropathy, and severe allergy.  The patient understands that this medication is best absorbed when taken with acidic beverages such as non-diet cola or ginger ale.  The patient understands that monitoring is required including baseline LFTs and repeat LFTs at intervals.  The patient understands that they are to contact us or the primary physician if concerning signs are noted.
Opzelura Pregnancy And Lactation Text: There is insufficient data to evaluate drug-associated risk for major birth defects, miscarriage, or other adverse maternal or fetal outcomes.  There is a pregnancy registry that monitors pregnancy outcomes in pregnant persons exposed to the medication during pregnancy.  It is unknown if this medication is excreted in breast milk.  Do not breastfeed during treatment and for about 4 weeks after the last dose.
Doxepin Pregnancy And Lactation Text: This medication is Pregnancy Category C and it isn't known if it is safe during pregnancy. It is also excreted in breast milk and breast feeding isn't recommended.
Topical Sulfur Applications Counseling: Topical Sulfur Counseling: Patient counseled that this medication may cause skin irritation or allergic reactions.  In the event of skin irritation, the patient was advised to reduce the amount of the drug applied or use it less frequently.   The patient verbalized understanding of the proper use and possible adverse effects of topical sulfur application.  All of the patient's questions and concerns were addressed.
Spironolactone Counseling: Patient advised regarding risks of diarrhea, abdominal pain, hyperkalemia, birth defects (for female patients), liver toxicity and renal toxicity. The patient may need blood work to monitor liver and kidney function and potassium levels while on therapy. The patient verbalized understanding of the proper use and possible adverse effects of spironolactone.  All of the patient's questions and concerns were addressed.
Glycopyrrolate Pregnancy And Lactation Text: This medication is Pregnancy Category B and is considered safe during pregnancy. It is unknown if it is excreted breast milk.
Olumiant Pregnancy And Lactation Text: Based on animal studies, Olumiant may cause embryo-fetal harm when administered to pregnant women.  The medication should not be used in pregnancy.  Breastfeeding is not recommended during treatment.
Solaraze Pregnancy And Lactation Text: This medication is Pregnancy Category B and is considered safe. There is some data to suggest avoiding during the third trimester. It is unknown if this medication is excreted in breast milk.
Dutasteride Male Counseling: Dustasteride Counseling:  I discussed with the patient the risks of use of dutasteride including but not limited to decreased libido, decreased ejaculate volume, and gynecomastia. Women who can become pregnant should not handle medication.  All of the patient's questions and concerns were addressed.
Enbrel Counseling:  I discussed with the patient the risks of etanercept including but not limited to myelosuppression, immunosuppression, autoimmune hepatitis, demyelinating diseases, lymphoma, and infections.  The patient understands that monitoring is required including a PPD at baseline and must alert us or the primary physician if symptoms of infection or other concerning signs are noted.
Opioid Pregnancy And Lactation Text: These medications can lead to premature delivery and should be avoided during pregnancy. These medications are also present in breast milk in small amounts.
Carac Counseling:  I discussed with the patient the risks of Carac including but not limited to erythema, scaling, itching, weeping, crusting, and pain.
Methotrexate Pregnancy And Lactation Text: This medication is Pregnancy Category X and is known to cause fetal harm. This medication is excreted in breast milk.
High Dose Vitamin A Counseling: Side effects reviewed, pt to contact office should one occur.
Cephalexin Counseling: I counseled the patient regarding use of cephalexin as an antibiotic for prophylactic and/or therapeutic purposes. Cephalexin (commonly prescribed under brand name Keflex) is a cephalosporin antibiotic which is active against numerous classes of bacteria, including most skin bacteria. Side effects may include nausea, diarrhea, gastrointestinal upset, rash, hives, yeast infections, and in rare cases, hepatitis, kidney disease, seizures, fever, confusion, neurologic symptoms, and others. Patients with severe allergies to penicillin medications are cautioned that there is about a 10% incidence of cross-reactivity with cephalosporins. When possible, patients with penicillin allergies should use alternatives to cephalosporins for antibiotic therapy.
Sarecycline Counseling: Patient advised regarding possible photosensitivity and discoloration of the teeth, skin, lips, tongue and gums.  Patient instructed to avoid sunlight, if possible.  When exposed to sunlight, patients should wear protective clothing, sunglasses, and sunscreen.  The patient was instructed to call the office immediately if the following severe adverse effects occur:  hearing changes, easy bruising/bleeding, severe headache, or vision changes.  The patient verbalized understanding of the proper use and possible adverse effects of sarecycline.  All of the patient's questions and concerns were addressed.
Azathioprine Counseling:  I discussed with the patient the risks of azathioprine including but not limited to myelosuppression, immunosuppression, hepatotoxicity, lymphoma, and infections.  The patient understands that monitoring is required including baseline LFTs, Creatinine, possible TPMP genotyping and weekly CBCs for the first month and then every 2 weeks thereafter.  The patient verbalized understanding of the proper use and possible adverse effects of azathioprine.  All of the patient's questions and concerns were addressed.
Use Enhanced Medication Counseling?: No
Picato Counseling:  I discussed with the patient the risks of Picato including but not limited to erythema, scaling, itching, weeping, crusting, and pain.
Adbry Pregnancy And Lactation Text: It is unknown if this medication will adversely affect pregnancy or breast feeding.
Klisyri Pregnancy And Lactation Text: It is unknown if this medication can harm a developing fetus or if it is excreted in breast milk.
Elidel Counseling: Patient may experience a mild burning sensation during topical application. Elidel is not approved in children less than 2 years of age. There have been case reports of hematologic and skin malignancies in patients using topical calcineurin inhibitors although causality is questionable.
Hydroxyzine Counseling: Patient advised that the medication is sedating and not to drive a car after taking this medication.  Patient informed of potential adverse effects including but not limited to dry mouth, urinary retention, and blurry vision.  The patient verbalized understanding of the proper use and possible adverse effects of hydroxyzine.  All of the patient's questions and concerns were addressed.
Hydroxychloroquine Counseling:  I discussed with the patient that a baseline ophthalmologic exam is needed at the start of therapy and every year thereafter while on therapy. A CBC may also be warranted for monitoring.  The side effects of this medication were discussed with the patient, including but not limited to agranulocytosis, aplastic anemia, seizures, rashes, retinopathy, and liver toxicity. Patient instructed to call the office should any adverse effect occur.  The patient verbalized understanding of the proper use and possible adverse effects of Plaquenil.  All the patient's questions and concerns were addressed.
Erythromycin Pregnancy And Lactation Text: This medication is Pregnancy Category B and is considered safe during pregnancy. It is also excreted in breast milk.
Spironolactone Pregnancy And Lactation Text: This medication can cause feminization of the male fetus and should be avoided during pregnancy. The active metabolite is also found in breast milk.
Topical Sulfur Applications Pregnancy And Lactation Text: This medication is considered safe during pregnancy and breast feeding secondary to limited systemic absorption.
Rinvoq Counseling: I discussed with the patient the risks of Rinvoq therapy including but not limited to upper respiratory tract infections, shingles, cold sores, bronchitis, nausea, cough, fever, acne, and headache. Live vaccines should be avoided.  This medication has been linked to serious infections; higher rate of mortality; malignancy and lymphoproliferative disorders; major adverse cardiovascular events; thrombosis; thrombocytopenia, anemia, and neutropenia; lipid elevations; liver enzyme elevations; and gastrointestinal perforations.
Prednisone Counseling:  I discussed with the patient the risks of prolonged use of prednisone including but not limited to weight gain, insomnia, osteoporosis, mood changes, diabetes, susceptibility to infection, glaucoma and high blood pressure.  In cases where prednisone use is prolonged, patients should be monitored with blood pressure checks, serum glucose levels and an eye exam.  Additionally, the patient may need to be placed on GI prophylaxis, PCP prophylaxis, and calcium and vitamin D supplementation and/or a bisphosphonate.  The patient verbalized understanding of the proper use and the possible adverse effects of prednisone.  All of the patient's questions and concerns were addressed.
Topical Retinoid counseling:  Patient advised to apply a pea-sized amount only at bedtime and wait 30 minutes after washing their face before applying.  If too drying, patient may add a non-comedogenic moisturizer. The patient verbalized understanding of the proper use and possible adverse effects of retinoids.  All of the patient's questions and concerns were addressed.
Dutasteride Pregnancy And Lactation Text: This medication is absolutely contraindicated in women, especially during pregnancy and breast feeding. Feminization of male fetuses is possible if taking while pregnant.
High Dose Vitamin A Pregnancy And Lactation Text: High dose vitamin A therapy is contraindicated during pregnancy and breast feeding.
Taltz Counseling: I discussed with the patient the risks of ixekizumab including but not limited to immunosuppression, serious infections, worsening of inflammatory bowel disease and drug reactions.  The patient understands that monitoring is required including a PPD at baseline and must alert us or the primary physician if symptoms of infection or other concerning signs are noted.
Azathioprine Pregnancy And Lactation Text: This medication is Pregnancy Category D and isn't considered safe during pregnancy. It is unknown if this medication is excreted in breast milk.
Oral Minoxidil Counseling- I discussed with the patient the risks of oral minoxidil including but not limited to shortness of breath, swelling of the feet or ankles, dizziness, lightheadedness, unwanted hair growth and allergic reaction.  The patient verbalized understanding of the proper use and possible adverse effects of oral minoxidil.  All of the patient's questions and concerns were addressed.
Minoxidil Counseling: Minoxidil is a topical medication which can increase blood flow where it is applied. It is uncertain how this medication increases hair growth. Side effects are uncommon and include stinging and allergic reactions.
Ketoconazole Counseling:   Patient counseled regarding improving absorption with orange juice.  Adverse effects include but are not limited to breast enlargement, headache, diarrhea, nausea, upset stomach, liver function test abnormalities, taste disturbance, and stomach pain.  There is a rare possibility of liver failure that can occur when taking ketoconazole. The patient understands that monitoring of LFTs may be required, especially at baseline. The patient verbalized understanding of the proper use and possible adverse effects of ketoconazole.  All of the patient's questions and concerns were addressed.
Arava Counseling:  Patient counseled regarding adverse effects of Arava including but not limited to nausea, vomiting, abnormalities in liver function tests. Patients may develop mouth sores, rash, diarrhea, and abnormalities in blood counts. The patient understands that monitoring is required including LFTs and blood counts.  There is a rare possibility of scarring of the liver and lung problems that can occur when taking methotrexate. Persistent nausea, loss of appetite, pale stools, dark urine, cough, and shortness of breath should be reported immediately. Patient advised to discontinue Arava treatment and consult with a physician prior to attempting conception. The patient will have to undergo a treatment to eliminate Arava from the body prior to conception.
Cibinqo Counseling: I discussed with the patient the risks of Cibinqo therapy including but not limited to common cold, nausea, headache, cold sores, increased blood CPK levels, dizziness, UTIs, fatigue, acne, and vomitting. Live vaccines should be avoided.  This medication has been linked to serious infections; higher rate of mortality; malignancy and lymphoproliferative disorders; major adverse cardiovascular events; thrombosis; thrombocytopenia and lymphopenia; lipid elevations; and retinal detachment.
Metronidazole Counseling:  I discussed with the patient the risks of metronidazole including but not limited to seizures, nausea/vomiting, a metallic taste in the mouth, nausea/vomiting and severe allergy.
Cephalexin Pregnancy And Lactation Text: This medication is Pregnancy Category B and considered safe during pregnancy.  It is also excreted in breast milk but can be used safely for shorter doses.
Detail Level: Simple
Hydroxyzine Pregnancy And Lactation Text: This medication is not safe during pregnancy and should not be taken. It is also excreted in breast milk and breast feeding isn't recommended.
Hydroxychloroquine Pregnancy And Lactation Text: This medication has been shown to cause fetal harm but it isn't assigned a Pregnancy Risk Category. There are small amounts excreted in breast milk.
Rinvoq Pregnancy And Lactation Text: Based on animal studies, Rinvoq may cause embryo-fetal harm when administered to pregnant women.  The medication should not be used in pregnancy.  Breastfeeding is not recommended during treatment and for 6 days after the last dose.
SSKI Counseling:  I discussed with the patient the risks of SSKI including but not limited to thyroid abnormalities, metallic taste, GI upset, fever, headache, acne, arthralgias, paraesthesias, lymphadenopathy, easy bleeding, arrhythmias, and allergic reaction.
Wartpeel Counseling:  I discussed with the patient the risks of Wartpeel including but not limited to erythema, scaling, itching, weeping, crusting, and pain.
Calcipotriene Counseling: Cantharidin Counseling:  I discussed with the patient the risks of Cantharidin including but not limited to pain, redness, burning, itching, and blistering.
Humira Counseling:  I discussed with the patient the risks of adalimumab including but not limited to myelosuppression, immunosuppression, autoimmune hepatitis, demyelinating diseases, lymphoma, and serious infections.  The patient understands that monitoring is required including a PPD at baseline and must alert us or the primary physician if symptoms of infection or other concerning signs are noted.
Erivedge Counseling- I discussed with the patient the risks of Erivedge including but not limited to nausea, vomiting, diarrhea, constipation, weight loss, changes in the sense of taste, decreased appetite, muscle spasms, and hair loss.  The patient verbalized understanding of the proper use and possible adverse effects of Erivedge.  All of the patient's questions and concerns were addressed.
Tetracycline Counseling: Patient counseled regarding possible photosensitivity and increased risk for sunburn.  Patient instructed to avoid sunlight, if possible.  When exposed to sunlight, patients should wear protective clothing, sunglasses, and sunscreen.  The patient was instructed to call the office immediately if the following severe adverse effects occur:  hearing changes, easy bruising/bleeding, severe headache, or vision changes.  The patient verbalized understanding of the proper use and possible adverse effects of tetracycline.  All of the patient's questions and concerns were addressed. Patient understands to avoid pregnancy while on therapy due to potential birth defects.
Cellcept Counseling:  I discussed with the patient the risks of mycophenolate mofetil including but not limited to infection/immunosuppression, GI upset, hypokalemia, hypercholesterolemia, bone marrow suppression, lymphoproliferative disorders, malignancy, GI ulceration/bleed/perforation, colitis, interstitial lung disease, kidney failure, progressive multifocal leukoencephalopathy, and birth defects.  The patient understands that monitoring is required including a baseline creatinine and regular CBC testing. In addition, patient must alert us immediately if symptoms of infection or other concerning signs are noted.
Cibinqo Pregnancy And Lactation Text: It is unknown if this medication will adversely affect pregnancy or breast feeding.  You should not take this medication if you are currently pregnant or planning a pregnancy or while breastfeeding.
Protopic Counseling: Patient may experience a mild burning sensation during topical application. Protopic is not approved in children less than 2 years of age. There have been case reports of hematologic and skin malignancies in patients using topical calcineurin inhibitors although causality is questionable.
Oral Minoxidil Pregnancy And Lactation Text: This medication should only be used when clearly needed if you are pregnant, attempting to become pregnant or breast feeding.
Ketoconazole Pregnancy And Lactation Text: This medication is Pregnancy Category C and it isn't know if it is safe during pregnancy. It is also excreted in breast milk and breast feeding isn't recommended.
Clindamycin Counseling: I counseled the patient regarding use of clindamycin as an antibiotic for prophylactic and/or therapeutic purposes. Clindamycin is active against numerous classes of bacteria, including skin bacteria. Side effects may include nausea, diarrhea, gastrointestinal upset, rash, hives, yeast infections, and in rare cases, colitis.
Metronidazole Pregnancy And Lactation Text: This medication is Pregnancy Category B and considered safe during pregnancy.  It is also excreted in breast milk.
Sski Pregnancy And Lactation Text: This medication is Pregnancy Category D and isn't considered safe during pregnancy. It is excreted in breast milk.
Eucrisa Counseling: Patient may experience a mild burning sensation during topical application. Eucrisa is not approved in children less than 3 months of age.
Rituxan Counseling:  I discussed with the patient the risks of Rituxan infusions. Side effects can include infusion reactions, severe drug rashes including mucocutaneous reactions, reactivation of latent hepatitis and other infections and rarely progressive multifocal leukoencephalopathy.  All of the patient's questions and concerns were addressed.
Libtayo Counseling- I discussed with the patient the risks of Libtayo including but not limited to nausea, vomiting, diarrhea, and bone or muscle pain.  The patient verbalized understanding of the proper use and possible adverse effects of Libtayo.  All of the patient's questions and concerns were addressed.
Calcipotriene Pregnancy And Lactation Text: This medication has not been proven safe during pregnancy. It is unknown if this medication is excreted in breast milk.
Tremfya Counseling: I discussed with the patient the risks of guselkumab including but not limited to immunosuppression, serious infections, and drug reactions.  The patient understands that monitoring is required including a PPD at baseline and must alert us or the primary physician if symptoms of infection or other concerning signs are noted.
Albendazole Counseling:  I discussed with the patient the risks of albendazole including but not limited to cytopenia, kidney damage, nausea/vomiting and severe allergy.  The patient understands that this medication is being used in an off-label manner.
Tazorac Counseling:  Patient advised that medication is irritating and drying.  Patient may need to apply sparingly and wash off after an hour before eventually leaving it on overnight.  The patient verbalized understanding of the proper use and possible adverse effects of tazorac.  All of the patient's questions and concerns were addressed.
Aklief counseling:  Patient advised to apply a pea-sized amount only at bedtime and wait 30 minutes after washing their face before applying.  If too drying, patient may add a non-comedogenic moisturizer.  The most commonly reported side effects including irritation, redness, scaling, dryness, stinging, burning, itching, and increased risk of sunburn.  The patient verbalized understanding of the proper use and possible adverse effects of retinoids.  All of the patient's questions and concerns were addressed.
Protopic Pregnancy And Lactation Text: This medication is Pregnancy Category C. It is unknown if this medication is excreted in breast milk when applied topically.
Mirvaso Counseling: Mirvaso is a topical medication which can decrease superficial blood flow where applied. Side effects are uncommon and include stinging, redness and allergic reactions.
Clofazimine Counseling:  I discussed with the patient the risks of clofazimine including but not limited to skin and eye pigmentation, liver damage, nausea/vomiting, gastrointestinal bleeding and allergy.
Cimzia Counseling:  I discussed with the patient the risks of Cimzia including but not limited to immunosuppression, allergic reactions and infections.  The patient understands that monitoring is required including a PPD at baseline and must alert us or the primary physician if symptoms of infection or other concerning signs are noted.
Minocycline Counseling: Patient advised regarding possible photosensitivity and discoloration of the teeth, skin, lips, tongue and gums.  Patient instructed to avoid sunlight, if possible.  When exposed to sunlight, patients should wear protective clothing, sunglasses, and sunscreen.  The patient was instructed to call the office immediately if the following severe adverse effects occur:  hearing changes, easy bruising/bleeding, severe headache, or vision changes.  The patient verbalized understanding of the proper use and possible adverse effects of minocycline.  All of the patient's questions and concerns were addressed.
Otezla Counseling: The side effects of Otezla were discussed with the patient, including but not limited to worsening or new depression, weight loss, diarrhea, nausea, upper respiratory tract infection, and headache. Patient instructed to call the office should any adverse effect occur.  The patient verbalized understanding of the proper use and possible adverse effects of Otezla.  All the patient's questions and concerns were addressed.
Terbinafine Counseling: Patient counseling regarding adverse effects of terbinafine including but not limited to headache, diarrhea, rash, upset stomach, liver function test abnormalities, itching, taste/smell disturbance, nausea, abdominal pain, and flatulence.  There is a rare possibility of liver failure that can occur when taking terbinafine.  The patient understands that a baseline LFT and kidney function test may be required. The patient verbalized understanding of the proper use and possible adverse effects of terbinafine.  All of the patient's questions and concerns were addressed.
Acitretin Counseling:  I discussed with the patient the risks of acitretin including but not limited to hair loss, dry lips/skin/eyes, liver damage, hyperlipidemia, depression/suicidal ideation, photosensitivity.  Serious rare side effects can include but are not limited to pancreatitis, pseudotumor cerebri, bony changes, clot formation/stroke/heart attack.  Patient understands that alcohol is contraindicated since it can result in liver toxicity and significantly prolong the elimination of the drug by many years.
Clindamycin Pregnancy And Lactation Text: This medication can be used in pregnancy if certain situations. Clindamycin is also present in breast milk.
Libtayo Pregnancy And Lactation Text: This medication is contraindicated in pregnancy and when breast feeding.
Rituxan Pregnancy And Lactation Text: This medication is Pregnancy Category C and it isn't know if it is safe during pregnancy. It is unknown if this medication is excreted in breast milk but similar antibodies are known to be excreted.
Thalidomide Counseling: I discussed with the patient the risks of thalidomide including but not limited to birth defects, anxiety, weakness, chest pain, dizziness, cough and severe allergy.
Winlevi Counseling:  I discussed with the patient the risks of topical clascoterone including but not limited to erythema, scaling, itching, and stinging. Patient voiced their understanding.
Cantharidin Counseling: Calcipotriene Counseling:  I discussed with the patient the risks of calcipotriene including but not limited to erythema, scaling, itching, and irritation.

## 2022-09-08 NOTE — PROCEDURE: TREATMENT REGIMEN
Plan: Patients primary care doctor is still not wanting patient to do a biologic. Patient thinks the injections work better than the laser treatment. Discussed took otezla for three months and saw no improvement. Soriatane could be an option but it has potential liver side effects which may not be the best. We will continue injecting today to see if we can calm this flare up down. Patients insurance probably won’t cover newer topicals. We will plan to recheck on patient in three months.
Action 1: Continue
Continue Regimen: Dermasmoothe oil as needed to scalp
Detail Level: Zone

## 2022-09-29 ENCOUNTER — OFFICE VISIT (OUTPATIENT)
Dept: URBAN - METROPOLITAN AREA TELEHEALTH 2 | Facility: TELEHEALTH | Age: 62
End: 2022-09-29

## 2022-09-29 NOTE — HPI-TODAY'S VISIT:
Pt is 61 yo f last seen april 2022 with hx baeza and smv/pv clot, hx varices, hx pse, march 2022 urosepsis/sbp and now here for ongoing care and eval.  A copy of the note was sent to the referring provider.  June 8, 2022 MRI showed chronic trace left pleural effusion with associated atelectasis in the lower thorax views.  There was no significant fat or iron but with nodular contour and cirrhotic morphology.  No suspicious lesions.  Prior cholecystectomy changes.  No duct dilation.  Spleen enlarged 18.4 cm.  Pancreas normal.  Adrenal glands normal.  Normal bilateral renal symmetric enhancement with 6 mm superior pole renal cyst and no suspicious lesions and no hydro.  Mild small and large bowel wall edema likely due to periportal enteropathy/colopathy.  Unchanged chronic fibrosis of portal vein with cavernous transformation reconstituted left portal vein.  Redemonstrated gastroesophageal perisplenic varices.  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  Only trace perihepatic and perisplenic ascites. June 7, 2022 visit with Dr. Flores mentions that the patient is status post TAVR with recent echo showing EF 55% of left ventricular outflow VTI of 21.  Chronic diastolic heart failure noted.  TAVR was on January 23, 2020. June 1, 2022 echo showed EF 50 to 55% aortic valve normal functioning bioprosthetic valve.  Aortic valve max velocity showed a mean gradient of 11.0 and a peak gradient 24.4. June 8, 2022 labs show sodium 135 potassium 3.9 glucose 297 BUN of 10 creatinine 0.75 total bilirubin 1.6 calcium 9.1 AST 36 ALT 18 alk phos 106 hemoglobin A1c up at 7.5 AFP 4 W count 3.2 hemoglobin 12.3 plate count 47 INR 1.35.  HIV test negative at that time. Transplant team appointment July 15    Dear Lady Carson, April 29th labs stable ammonia level normal at 61. INR normal at 1.2.   Sugar was elevated at 173.  Please share with primary provider. BUN of 13 creatinine 0.69 sodium 138 potassium 4.4 albumin normal at 3.8.  Bilirubin 1.2 alkaline phosphatase 112. Only the AST was elevated at 46 and ALT was ideal at 25. White blood cell count 3.7 hemoglobin 12.4 and MCV 87 and these are normal.  Platelet count low at 54.  Neutrophils normal at 2.8 and lymphocytes slightly low at 0.5. Meld down to 9 and meld na 9. Suggests that the infection is what tipped your labs recently  to be worse. So really need to focus on trying to prevent those recurrent urinary infections as worsens your underlying liver state. Please ask local ID to contact us to discuss the recent infection and options. Dr Gonzales   Saw the notes and had urosepsis. Had 18033 cells in the ascites.  Had 1 tap in the hospital. had the infection.  Prior tap in [place] had no infection and she had been started on abx once a month. 2500 ml.  20-30 wbc, nos.  90% of pts with sbp if the infection will be reinfected in a year.  Once a month is not a usual tx for sbp prevention. Daily or once a week. Dr Lopez is her id and they need to look at this new data and need to adjust tx.  Here at [facility].  Need new labs.  March 30, 2022 labs show sodium 137 potassium 3.6 BUN of 10 creatinine 0.68 glucose 156 total bilirubin 1.3 AST 25 ALT 14 alk phos 83 calcium 8.7 labs count 3.3 hemoglobin 12 platelet count 66.  March 28, 2022 CT abdomen and pelvis with IV contrast revealed left-sided pleural effusion with some atelectasis.  Cardiophrenic enhancing node measuring 1.1 x 1.8 cm slightly increased in size.  Liver with chronic liver disease.  Prior cholecystectomy no bile duct dilation.  Splenomegaly seen.  Pancreas normal.  No hydronephrosis but with left nonobstructing renal stone.  Diffuse colon wall thickening and edema with surrounding stranding.  Prominent retroperitoneal mesenteric lymph nodes.  Large varices seen on the vessel exam with focal occlusion of the SMV/portal vein which appears similar to prior study.  Moderate continuation of the right portal vein.  Mild increase cannot exclude thrombus within the splenic venous collaterals in the left upper quadrant.  March 28, 2022 paracentesis removed 2600 mL of fluid.  November 2021 MRI liver with no fat or iron cirrhotic liver in order to enhancing lesions.  Spleen 19 cm.  Prior cholecystectomy changes seen.  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  Multiple large collateral pathways including gastroesophageal varices.  Replaced right hepatic artery seen.  Ascites may have been primary vs uti.  She has been seen by urogyn for the recurrent uti.   On abx once a month not for prevention but rather infection every month and so that makes more sense.  Covid 19 Jan 2022 and admitted locally for sepsis.  June 8, 2022 patient has appointment for labs and another MRI and in July 20 9022 will be seen by the  oltx transplant nurse practitioner at [facility].   April 8, 2022 transplant clinic visit mentions that the patient has Baeza and had a recent hospitalization from March 20 7 March 21st for urosepsis.  Drained 2.6 L and prior to that had not been drained since February 2022.  Taking Bumex 2 mg twice daily and Aldactone 100 mg a day.  Meld increased during the hospital but was down to 14 by March 27, 2022.  Bili decreased to 1.3 on March 30.  Recap: Patient with history of chronic SMV/portal vein occlusion.  EGD January 2021 status post band ligation.  No significant encephalopathy on Xifaxan.  Current medicines include omeprazole 2 mg a day, aspirin 81 mg a day, Bumex 2 mg a day, vitamin D 1000 units a day, Prolia every 6 months Estrace 2 mg a day, gabapentin 3 mg 3 times daily, NovoLog sliding scale, Tresiba 8 units at night, lactulose 20 g Daily, mesalamine 1 g p.o. twice daily, midodrine 10 mg p.o. 3 times daily, nadolol 40 mg twice daily, potassium 40 mg a day, Xifaxan 550 twice daily, Aldactone on milligrams a day, Trintellix 20 mg a day.  Patient was listed as following up with Dr. Vickers for endoscopy. She says he has been seeing. He also saw dysphagia.  March 27 March 31 discharge summary from [facility].   She was a patient came in with concerns of urosepsis and left pyelonephritis.  Urinalysis high-grade 150 WBCs.  Blood cultures were negative but urine did grow pansensitive Klebsiella.  Patient was seen by ID and was given a course of daptomycin and Levaquin treatment.  They wanted urogynecology to see her. Left renal pole lesion that could be a nidus for recurrent infection.  Cell count on the fluid was 10,520 range  nucleated cells 84% neutrophils though the cultures were negative to date and maintained on antibiotics.  Looked up the actual fluid count from March 28 and had had albumin less than 1.5 total protein was 3.0 nucleated cells 10,520 neutrophils 84%.  Lasted been EGD high-grade fevers or November 17, 2021 with Dr. Vickers where she had grade 1 varices lower esophagus.  Moderate Schatzki ring was seen.  Dilation was seen to 51 [place].  Moderately erythematous mucosa without bleeding seen in the gastric antrum.  H. pylori testing sent.  Biopsy was negative for significant abnormality.  She did egd in nov 2021 and grade 1 varices noted. He is planning nov 2022.  nov 2021 mri: Reason For Exam Hepatocellular carcinoma;Hepatocellular carcinoma  REPORT EXAM: MRI Abdomen w/ + w/o Contrast  CLINICAL INDICATION: Hepatocellular carcinoma  TECHNIQUE: Multisequence, multiplanar MRI of the abdomen was performed without and with intravenous contrast. [id number]  CONTRAST: 18 cc of Prohance  COMPARISON: March 2, 2021  FINDINGS:  Lower Thorax: Normal.  Liver: No fat or iron. Cirrhotic liver morphology. No arterially enhancing lesions. No lesions with capsule. No lesions with washout.  Gallbladder/Biliary Tree: Status post cholecystectomy.  Spleen: Measuring 19.0 cm in maximum craniocaudal diameter.  Pancreas: Normal.  Adrenal Glands: Normal.   Kidneys/Ureters: Normal.  Gastrointestinal: Normal.   Lymph Nodes: Normal.  Vessels: Chronic thrombosis of the main portal vein with cavernous transformation redemonstrated. Multiple large collateral pathways including gastroesophageal varices, again noted. 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  Peritoneum/Retroperitoneum: Minimal ascites is present.  Bones/Soft Tissues: Normal.  IMPRESSION:     Cirrhosis with chronic portal vein thrombosis and cavernous transformation with associated evidence of portal hypertension. No hepatocellular carcinoma.  Plan: 1. Keep oltx eval appts June 2022. 2. Mri then. 3. Needs sbp prophylaxis. I will call ID and they will ask ID to discuss case. 4. Need to check meld. 5. See us in July 2022 to see how doing post above.  Stressed to pt the need for social distancing and strict handwashing and wearing a mask and to follow any other new or added CDC recommendations as this is an evolving target.  Duration of the visit was  minutes with 10 minutes of chart prep and 30 minutes in this face to face visit visit with time reviewing their prior and recent records and labs and discussing their current status and future plans for care for the patient and her daughter

## 2022-10-20 NOTE — PROCEDURE: PATIENT SPECIFIC COUNSELING
EXAMINATION TYPE: Axial Bone Density

 

DATE OF EXAM: 10/20/2022

 

COMPARISON: NEW TO BONE DENSITY, BASELINE

 

CLINICAL HISTORY: 44 years year old Female.  ICD-10 CODE: C50.212 BREAST CANCER

 

Height:  65.2

Weight:  161

 

FRAX RISK QUESTIONS:

Secondary Osteoporosis: YES

    3.  Menopause before 45: YES

  

RISK FACTORS 

HISTORY OF: 

Diet low in dairy products/other sources of calcium:  YES

Postmenopausal woman: 43, TOTAL HYST

Hyperparathyroidism: NO

Adrenal Insufficiency: NO

 

MEDICATIONS: 

Thyroid Medications:  YES, FOR ABOUT A YEAR, SYNTHROID, 3 YRS, ARMOR FOR 1YR

Additional Medications: HX OF CHEMO AND RADIATION FOR LT BREAST CANCER, VIT D3 AND CALCIUM

Additional History: BREAST CANCER, THIS YEAR, LT BREAST, CHEMO AND RADIATION, THYROID

 

EXAM MEASUREMENTS: 

Bone mineral densitometry was performed using the [employer].

Bone mineral density as measured about the Lumbar spine is:  

----- L1-L4(G/cm2): 1.350

T Score Values are as follows:

----- L1:   1.8

----- L2:   1.5

----- L3:   1.3

----- L4:   1.1

----- L1-L4:   1.4

Bone mineral density     FIRST DEXA STUDY, BASELINE STUDY

 

Bone mineral density about the R hip (g/cm2): 1.060

Bone mineral density about the L hip (g/cm2):  1.062

T Score values are as follows:

-----R Neck:   -0.1

-----L Neck:    0.1

-----R Total:    0.4

-----L Total:    0.4

Bone mineral density    BASELINE STUDY

 

FRAX%s: The graph provided illustrates a 2.0% chance for a major osteoporotic fx and a 0.0% chance fo
r the hips probability for fx in 10 years time.

 

IMPRESSION:

Normal (Values between +1 and -1 indicate normal bone mass).  Consider repeating this study in 5 year
s or sooner if there is some new clinical indication.

 

NOTE:  T-SCORE=SD OF THE YOUNG ADULT MEAN.
D/w patient that having tried and failed IL-17, IL-23 and Xtrac I  feel we are in the position now to consider siliq.  We reviewed the risk of worsening depression and patient will monitor her mood closely. She has no h/o SI but should she feel such she will notify us immediately.  Forms signed and will apply for PA and patient will schedule full f/u for 1 month
Detail Level: Simple

## 2022-11-18 ENCOUNTER — APPOINTMENT (RX ONLY)
Dept: URBAN - METROPOLITAN AREA CLINIC 41 | Facility: CLINIC | Age: 62
Setting detail: DERMATOLOGY
End: 2022-11-18

## 2022-11-18 DIAGNOSIS — L82.0 INFLAMED SEBORRHEIC KERATOSIS: ICD-10-CM

## 2022-11-18 PROCEDURE — ? COUNSELING

## 2022-11-18 PROCEDURE — 17110 DESTRUCTION B9 LES UP TO 14: CPT

## 2022-11-18 PROCEDURE — ? LIQUID NITROGEN

## 2022-11-18 ASSESSMENT — LOCATION ZONE DERM: LOCATION ZONE: TRUNK

## 2022-11-18 ASSESSMENT — LOCATION DETAILED DESCRIPTION DERM
LOCATION DETAILED: LEFT LATERAL SUPERIOR CHEST
LOCATION DETAILED: RIGHT LATERAL SUPERIOR CHEST

## 2022-11-18 ASSESSMENT — LOCATION SIMPLE DESCRIPTION DERM: LOCATION SIMPLE: CHEST

## 2022-11-18 NOTE — HPI: EVALUATION OF SKIN LESION(S)
What Type Of Note Output Would You Prefer (Optional)?: Standard Output
Hpi Title: Evaluation of Skin Lesions
How Severe Are Your Spot(S)?: mild
Have Your Spot(S) Been Treated In The Past?: has not been treated
Additional History: Patient is here for a red and scabby itchy spot on her LT upper chest. She also has another spot on her RT upper chest both present 3 months.

## 2022-11-18 NOTE — PROCEDURE: LIQUID NITROGEN
Consent: The patient's consent was obtained including but not limited to risks of crusting, scabbing, blistering, scarring, darker or lighter pigmentary change, recurrence, incomplete removal and infection.
Medical Necessity Information: It is in your best interest to select a reason for this procedure from the list below. All of these items fulfill various CMS LCD requirements except the new and changing color options.
Medical Necessity Clause: This procedure was medically necessary because the lesions that were treated were:
Detail Level: Detailed
Show Topical Anesthesia Variable?: Yes
Post-Care Instructions: I reviewed with the patient in detail post-care instructions. Patient is to wear sunprotection, and avoid picking at any of the treated lesions. Pt may apply Vaseline to crusted or scabbing areas.
Include Z78.9 (Other Specified Conditions Influencing Health Status) As An Associated Diagnosis?: No
Spray Paint Text: The liquid nitrogen was applied to the skin utilizing a spray paint frosting technique.

## 2022-12-29 ENCOUNTER — RX ONLY (OUTPATIENT)
Age: 62
Setting detail: RX ONLY
End: 2022-12-29

## 2022-12-29 RX ORDER — FLUOCINOLONE ACETONIDE 0.11 MG/ML
ONCE OIL TOPICAL
Qty: 118.28 | Refills: 3 | Status: ERX

## 2023-01-23 ENCOUNTER — APPOINTMENT (RX ONLY)
Dept: URBAN - METROPOLITAN AREA CLINIC 41 | Facility: CLINIC | Age: 63
Setting detail: DERMATOLOGY
End: 2023-01-23

## 2023-01-23 DIAGNOSIS — L40.0 PSORIASIS VULGARIS: ICD-10-CM

## 2023-01-23 PROCEDURE — ? ADDITIONAL NOTES

## 2023-01-23 PROCEDURE — ? TREATMENT REGIMEN

## 2023-01-23 PROCEDURE — ? INTRALESIONAL KENALOG

## 2023-01-23 PROCEDURE — ? ORDER TESTS

## 2023-01-23 PROCEDURE — ? COUNSELING

## 2023-01-23 PROCEDURE — ? PRESCRIPTION

## 2023-01-23 PROCEDURE — 11900 INJECT SKIN LESIONS </W 7: CPT

## 2023-01-23 RX ORDER — TRIAMCINOLONE ACETONIDE 1 MG/G
OINTMENT TOPICAL
Qty: 80 | Refills: 0 | Status: ERX

## 2023-01-23 ASSESSMENT — LOCATION SIMPLE DESCRIPTION DERM
LOCATION SIMPLE: LEFT LOWER BACK
LOCATION SIMPLE: LEFT THIGH
LOCATION SIMPLE: RIGHT THIGH
LOCATION SIMPLE: POSTERIOR SCALP
LOCATION SIMPLE: LEFT POSTERIOR THIGH

## 2023-01-23 ASSESSMENT — LOCATION DETAILED DESCRIPTION DERM
LOCATION DETAILED: MID-OCCIPITAL SCALP
LOCATION DETAILED: LEFT INFERIOR LATERAL LOWER BACK
LOCATION DETAILED: LEFT ANTERIOR PROXIMAL THIGH
LOCATION DETAILED: RIGHT INFERIOR OCCIPITAL SCALP
LOCATION DETAILED: LEFT INFERIOR OCCIPITAL SCALP
LOCATION DETAILED: LEFT PROXIMAL POSTERIOR THIGH
LOCATION DETAILED: RIGHT ANTERIOR PROXIMAL THIGH

## 2023-01-23 ASSESSMENT — LOCATION ZONE DERM
LOCATION ZONE: LEG
LOCATION ZONE: SCALP
LOCATION ZONE: TRUNK

## 2023-01-23 NOTE — PROCEDURE: ADDITIONAL NOTES
Render Risk Assessment In Note?: yes
Additional Notes: I have asked triage to call patient to identify 1) what her liver diagnosis is 2) who she has been seeing about it and 3) what she has been treated with.\\n\\nShe has a 40% cancellation rate over the past 10 visits; however, she has shown up for most of her  her Xtrac laser treatments.  Not sure if she feels like the treatments aren't working so she doesn't come in or if there is some compliance issue.
Detail Level: Simple

## 2023-01-23 NOTE — PROCEDURE: INTRALESIONAL KENALOG
How Many Mls Were Removed From The 10 Mg/Ml (5ml) Vial When Preparing The Injectable Solution?: 0
Expiration Date For Kenalog (Optional): 09/23
Consent: The risks of atrophy were reviewed with the patient.
Show Inventory Tab: Hide
Kenalog Preparation: Kenalog
Administered By (Optional): son
Medical Necessity Clause: This procedure was medically necessary because the lesions that were treated were:
Ndc# For Kenalog Only: 6028-8425-27
Lot # For Kenalog (Optional): [id number]
Include Z78.9 (Other Specified Conditions Influencing Health Status) As An Associated Diagnosis?: No
Detail Level: Zone
Total Volume (Ccs): 2.5
Validate Note Data When Using Inventory: Yes
Concentration Of Kenalog Solution Injected (Mg/Ml): 10.0

## 2023-01-23 NOTE — PROCEDURE: COUNSELING
Detail Level: Zone
Patient Specific Counseling (Will Not Stick From Patient To Patient): Patient has mild trunkal psoriasis.  Her main problem has always been her scalp.  She wants ILK injections as she thinks they work the best of anything she has had and she doesn't want Xtrac or biologics - she stated she had some type of liver cancer.  She has been getting ILK since 8/2021 - many as adjuvant to Xtrac.  I don't feel entirely comfortable continuing ILK indefinitely although she has tried and failed multiple other treatments.  \\nShe states that she has used DermaSmooth but she only left it on for a few hours and then washed it out.  I told her that it needed to be kept on the scalp overnight with a shower cap and may need to be done nightly for 2-3 nights every couple of weeks.  I also gave her samples of Keralyte 5 shampoo for the scale.

## 2023-01-23 NOTE — HPI: RASH
How Severe Is Your Rash?: moderate
Is This A New Presentation, Or A Follow-Up?: Follow Up Rash
Additional History: This is a long-standing patient of Valente Rico PA-C and Dr. Se Ospina.  I am seeing her for her psoriasis today for the first time.  As far as I can tell she has no  history of psoriatic arthritis and she has had psoriais on scalp (worst) and other parts of the body with a maximum BSA of 5%.  She has been on several treatments - none of which have worked that well or that she was able to continue for various reasons.  She has had at least 3 rounds of Xtrac, failed Otezla, Cannot take MTX or Soriatane due to hepatic problems.  Has had sepsis so cyclosporin not a good option.  She has tried and failed Humira, Stelara, Skyrizi and topicals.  Her last note per Dr. Ospina states that ILK would be done and f/u in 3 months to see if calmed down.  She had appointment scheduled with Dr. Ospina in December, 2022 but canceled and rescheduled with me today.

## 2023-01-23 NOTE — PROCEDURE: ORDER TESTS
Billing Type: Third-Party Bill
Lab Facility: [id number]
Expected Date Of Service: 01/23/2023
Performing Laboratory: [id number]
Bill For Surgical Tray: no

## 2023-01-23 NOTE — PROCEDURE: TREATMENT REGIMEN
Action 1: Continue
Otc Regimen: Keralyte 5 shampoo
Initiate Regimen: DermaSmooth -  overnight with shower cap 2-3 days in a row a couple times/ month\\nKeralyte 5 shampoo -  alternate with regular shampoo\\nTMC to body lesions, twice a day, up to two weeks, then as needed.
Detail Level: Zone
Plan: RTC in two months with Dr. Holliday

## 2023-03-07 ENCOUNTER — LAB OUTSIDE AN ENCOUNTER (OUTPATIENT)
Dept: URBAN - METROPOLITAN AREA CLINIC 86 | Facility: CLINIC | Age: 63
End: 2023-03-07

## 2023-03-07 ENCOUNTER — OFFICE VISIT (OUTPATIENT)
Dept: URBAN - METROPOLITAN AREA CLINIC 86 | Facility: CLINIC | Age: 63
End: 2023-03-07
Payer: MEDICARE

## 2023-03-07 VITALS
TEMPERATURE: 97.5 F | WEIGHT: 175 LBS | DIASTOLIC BLOOD PRESSURE: 75 MMHG | BODY MASS INDEX: 33.04 KG/M2 | SYSTOLIC BLOOD PRESSURE: 122 MMHG | HEART RATE: 78 BPM | HEIGHT: 61 IN

## 2023-03-07 DIAGNOSIS — I85.00 VARICES, ESOPHAGEAL: ICD-10-CM

## 2023-03-07 DIAGNOSIS — R18.8 ASCITES: ICD-10-CM

## 2023-03-07 DIAGNOSIS — K76.82 HEPATIC ENCEPHALOPATHY: ICD-10-CM

## 2023-03-07 DIAGNOSIS — K75.81 NASH (NONALCOHOLIC STEATOHEPATITIS): ICD-10-CM

## 2023-03-07 PROBLEM — 48661000 PERITONITIS: Status: ACTIVE | Noted: 2022-04-28

## 2023-03-07 PROBLEM — 40739000 DYSPHAGIA: Status: ACTIVE | Noted: 2021-10-21

## 2023-03-07 PROBLEM — 9014002 PSORIASIS: Status: ACTIVE | Noted: 2023-03-07

## 2023-03-07 PROBLEM — 13920009 HEPATIC ENCEPHALOPATHY: Status: ACTIVE | Noted: 2023-02-26

## 2023-03-07 PROBLEM — 197001004 SUPERIOR MESENTERIC VEIN THROMBOSIS: Status: ACTIVE | Noted: 2022-04-28

## 2023-03-07 PROCEDURE — 99215 OFFICE O/P EST HI 40 MIN: CPT

## 2023-03-07 RX ORDER — BUMETANIDE 0.5 MG/1
4 (2MG) TABLET ORAL ONCE A DAY
Status: ACTIVE | COMMUNITY

## 2023-03-07 RX ORDER — RIFAXIMIN 550 MG/1
1 TABLET TABLET ORAL TWICE A DAY
Status: ACTIVE | COMMUNITY

## 2023-03-07 RX ORDER — VORTIOXETINE 20 MG/1
TAKE 1 TABLET (20 MG) BY ORAL ROUTE ONCE DAILY AT THE SAME TIME EACH DAY TABLET, FILM COATED ORAL ONCE A DAY
Refills: 0 | Status: ACTIVE | COMMUNITY
Start: 1900-01-01

## 2023-03-07 RX ORDER — OMEPRAZOLE 40 MG/1
1 CAPSULE 30 MINUTES BEFORE MORNING MEAL CAPSULE, DELAYED RELEASE ORAL TWICE DAILY
Qty: 180 | Refills: 3 | Status: ACTIVE | COMMUNITY

## 2023-03-07 RX ORDER — ASPIRIN 81 MG/1
1 TABLET TABLET, COATED ORAL ONCE A DAY
Status: ACTIVE | COMMUNITY

## 2023-03-07 RX ORDER — INSULIN DEGLUDEC INJECTION 100 U/ML
8 UNITS INJECTION, SOLUTION SUBCUTANEOUS
Refills: 0 | Status: ACTIVE | COMMUNITY
Start: 1900-01-01

## 2023-03-07 RX ORDER — DENOSUMAB 60 MG/ML
AS DIRECTED INJECTION SUBCUTANEOUS
Status: ON HOLD | COMMUNITY

## 2023-03-07 RX ORDER — GABAPENTIN 300 MG/1
1 CAPSULE CAPSULE ORAL THREE TIMES A DAY
Status: ACTIVE | COMMUNITY

## 2023-03-07 RX ORDER — POTASSIUM CHLORIDE 1500 MG/1
2 TABLETS WITH FOOD TABLET, FILM COATED, EXTENDED RELEASE ORAL ONCE A DAY
Refills: 0 | Status: ACTIVE | COMMUNITY
Start: 1900-01-01

## 2023-03-07 RX ORDER — DEXTROSE 4 G
1 TABLET TABLET,CHEWABLE ORAL ONCE A DAY
Status: ACTIVE | COMMUNITY

## 2023-03-07 RX ORDER — LACTULOSE 10 G/15ML
30 ML SOLUTION ORAL ONCE A DAY
Status: ACTIVE | COMMUNITY

## 2023-03-07 RX ORDER — NADOLOL 40 MG/1
1 TABLET TABLET ORAL TWICE A DAY
Status: ACTIVE | COMMUNITY

## 2023-03-07 RX ORDER — RIFAXIMIN 550 MG/1
1 TABLET TABLET ORAL TWICE A DAY
Qty: 60 TABLET | Refills: 5

## 2023-03-07 RX ORDER — MIDODRINE HYDROCHLORIDE 10 MG/1
1 TABLET TABLET ORAL THREE TIMES A DAY
Status: ACTIVE | COMMUNITY

## 2023-03-07 NOTE — HPI-TODAY'S VISIT:
Pt is 63 F last seen april 2022 with hx baeza and smv/pv clot, hx varices, hx pse, march 2022 urosepsis/sbp and now here for ongoing care and eval.  A copy of the note was sent to the referring provider.  She says she had some issues with daughter being in mental institution and she had trouble coming up and so now comes back. She is doing better.  She had adopted her at birth.  Right ear she had an skin cancer and has trouble wearing a mask for this.  She has been getting shot for scalp psoriasis and she does not know what is the shot. She has 2 rounds of it.  She has to get her every 2 months.  Dr. Pepito Haile [facility].[address] [address]  ~34.1 mi [phone]  We need to know what he is giving her so that we can say what we expect to be the case.  Depending on what he is giving her.  No recent labs and no recent imaging.  June 8, 2022 MRI showed chronic trace left pleural effusion with associated atelectasis in the lower thorax views.  There was no significant fat or iron but with nodular contour and cirrhotic morphology.  No suspicious lesions.  Prior cholecystectomy changes.  No duct dilation.  Spleen enlarged 18.4 cm.  Pancreas normal.  Adrenal glands normal.  Normal bilateral renal symmetric enhancement with 6 mm superior pole renal cyst and no suspicious lesions and no hydro.  Mild small and large bowel wall edema likely due to periportal enteropathy/colopathy.  Unchanged chronic fibrosis of portal vein with cavernous transformation reconstituted left portal vein.  Redemonstrated gastroesophageal perisplenic varices.  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  Only trace perihepatic and perisplenic ascites.  June 7, 2022 visit with Dr. Flores mentions that the patient is status post TAVR with recent echo showing EF 55% of left ventricular outflow VTI of 21.  Chronic diastolic heart failure noted.  TAVR was on January 23, 2020.  She has not followed with Dr Flores and sees local cards for the tavr.  June 1, 2022 echo showed EF 50 to 55% aortic valve normal functioning bioprosthetic valve.  Aortic valve max velocity showed a mean gradient of 11.0 and a peak gradient 24.4.  June 8, 2022 labs show sodium 135 potassium 3.9 glucose 297 BUN of 10 creatinine 0.75 total bilirubin 1.6 calcium 9.1 AST 36 ALT 18 alk phos 106 hemoglobin A1c up at 7.5 AFP 4 W count 3.2 hemoglobin 12.3 plate count 47 INR 1.35.  HIV test negative at that time.  Transplant team sees her every 6m and she sees the team there. She is told is stable and looks good and meld was 14.  She says told re mounjaro.  April 29th 2022 labs stable ammonia level normal at 61. INR normal at 1.2.   Sugar was elevated at 173.  Please share with primary provider. BUN of 13 creatinine 0.69 sodium 138 potassium 4.4 albumin normal at 3.8.  Bilirubin 1.2 alkaline phosphatase 112. Only the AST was elevated at 46 and ALT was ideal at 25. White blood cell count 3.7 hemoglobin 12.4 and MCV 87 and these are normal.  Platelet count low at 54.  Neutrophils normal at 2.8 and lymphocytes slightly low at 0.5. Meld down to 9 and meld na 9. Suggests that the infection is what tipped your labs recently  to be worse. So really need to focus on trying to prevent those recurrent urinary infections as worsens your underlying liver state. Please ask local ID to contact us to discuss the recent infection and options.   Saw the notes and had urosepsis. Had [id number] cells in the ascites.  Had 1 tap in the hospital. had the infection.  Prior tap in [place] had no infection and she had been started on abx once a month. 2500 ml.  20-30 wbc, nos.  90% of pts with sbp if the infection will be reinfected in a year.  Once a month is not a usual tx for sbp prevention. Daily or once a week. Dr Lopez is her id and they need to look at this new data and need to adjust tx.  March 30, 2022 labs show sodium 137 potassium 3.6 BUN of 10 creatinine 0.68 glucose 156 total bilirubin 1.3 AST 25 ALT 14 alk phos 83 calcium 8.7 labs count 3.3 hemoglobin 12 platelet count 66.  March 28, 2022 CT abdomen and pelvis with IV contrast revealed left-sided pleural effusion with some atelectasis.  Cardiophrenic enhancing node measuring 1.1 x 1.8 cm slightly increased in size.  Liver with chronic liver disease.  Prior cholecystectomy no bile duct dilation.  Splenomegaly seen.  Pancreas normal.  No hydronephrosis but with left nonobstructing renal stone.  Diffuse colon wall thickening and edema with surrounding stranding.  Prominent retroperitoneal mesenteric lymph nodes.  Large varices seen on the vessel exam with focal occlusion of the SMV/portal vein which appears similar to prior study.  Moderate continuation of the right portal vein.  Mild increase cannot exclude thrombus within the splenic venous collaterals in the left upper quadrant.  March 28, 2022 paracentesis removed 2600 mL of fluid.  November 2021 MRI liver with no fat or iron cirrhotic liver in order to enhancing lesions.  Spleen 19 cm.  Prior cholecystectomy changes seen.  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  Multiple large collateral pathways including gastroesophageal varices.  Replaced right hepatic artery seen.  Ascites may have been primary vs uti.  She has been seen by urogyn for the recurrent uti.   On abx once a month not for prevention but rather infection every month and so that makes more sense.  Covid 19 Jan 2022 and admitted locally for sepsis.  June 8, 2022 patient has appointment for labs and another MRI and in July 20 9022 will be seen by the  oltx transplant nurse practitioner at [facility].   April 8, 2022 transplant clinic visit mentions that the patient has Baeza and had a recent hospitalization from March 20 7 March 21st for urosepsis.  Drained 2.6 L and prior to that had not been drained since February 2022.  Taking Bumex 2 mg twice daily and Aldactone 100 mg a day.  Meld increased during the hospital but was down to 14 by March 27, 2022.  Bili decreased to 1.3 on March 30.  Recap: Patient with history of chronic SMV/portal vein occlusion.  EGD January 2021 status post band ligation.  No significant encephalopathy on Xifaxan.  Current medicines include omeprazole 2 mg a day, aspirin 81 mg a day, Bumex 2 mg a day, vitamin D 1000 units a day, Prolia every 6 months Estrace 2 mg a day, gabapentin 3 mg 3 times daily, NovoLog sliding scale, Tresiba 8 units at night, lactulose 20 g Daily, mesalamine 1 g p.o. twice daily, midodrine 10 mg p.o. 3 times daily, nadolol 40 mg twice daily, potassium 40 mg a day, Xifaxan 550 twice daily, Aldactone on milligrams a day, Trintellix 20 mg a day.  Patient was listed as following up with Dr. Vickers for endoscopy. She says he has been seeing. He also saw dysphagia.  March 27 March 31 discharge summary from [facility].   She was a patient came in with concerns of urosepsis and left pyelonephritis.  Urinalysis high-grade 150 WBCs.  Blood cultures were negative but urine did grow pansensitive Klebsiella.  Patient was seen by ID and was given a course of daptomycin and Levaquin treatment.  They wanted urogynecology to see her. Left renal pole lesion that could be a nidus for recurrent infection.  Cell count on the fluid was 10,520 range  nucleated cells 84% neutrophils though the cultures were negative to date and maintained on antibiotics.  Looked up the actual fluid count from March 28 and had had albumin less than 1.5 total protein was 3.0 nucleated cells 10,520 neutrophils 84%.  Lasted been EGD high-grade fevers or November 17, 2021 with Dr. Vickers where she had grade 1 varices lower esophagus.  Moderate Schatzki ring was seen.  Dilation was seen to 51 [place].  Moderately erythematous mucosa without bleeding seen in the gastric antrum.  H. pylori testing sent.  Biopsy was negative for significant abnormality.  She did egd in nov 2021 and grade 1 varices noted. He is planning nov 2022.  nov 2021 mri: Reason For Exam Hepatocellular carcinoma;Hepatocellular carcinoma  REPORT EXAM: MRI Abdomen w/ + w/o Contrast  CLINICAL INDICATION: Hepatocellular carcinoma  TECHNIQUE: Multisequence, multiplanar MRI of the abdomen was performed without and with intravenous contrast. [id number]  CONTRAST: 18 cc of Prohance  COMPARISON: March 2, 2021  FINDINGS:  Lower Thorax: Normal.  Liver: No fat or iron. Cirrhotic liver morphology. No arterially enhancing lesions. No lesions with capsule. No lesions with washout.  Gallbladder/Biliary Tree: Status post cholecystectomy.  Spleen: Measuring 19.0 cm in maximum craniocaudal diameter.  Pancreas: Normal.  Adrenal Glands: Normal.   Kidneys/Ureters: Normal.  Gastrointestinal: Normal.   Lymph Nodes: Normal.  Vessels: Chronic thrombosis of the main portal vein with cavernous transformation redemonstrated. Multiple large collateral pathways including gastroesophageal varices, again noted. 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  Peritoneum/Retroperitoneum: Minimal ascites is present.  Bones/Soft Tissues: Normal.  IMPRESSION:     Cirrhosis with chronic portal vein thrombosis and cavernous transformation with associated evidence of portal hypertension. No hepatocellular carcinoma.  Plan: 1. Need labs to see how doing. 2. Need mri to assess liver and the clot issues. 3. Need a note from Dr Haile as to what is being given to lknow its risk for her. 4. Pt will need to be seen again in 6m. 5. She will ask Dr Haile to send us note to know what he is giving you.  Stressed to pt the need for social distancing and strict handwashing and wearing a mask and to follow any other new or added CDC recommendations as this is an evolving target.  Duration of the visit was  50 minutes with 10 minutes of chart prep and 40 minutes in this face to face visit visit with time reviewing their prior and recent records and labs and discussing their current status and future plans for care for the patient and her daughter

## 2023-03-11 ENCOUNTER — TELEPHONE ENCOUNTER (OUTPATIENT)
Dept: URBAN - METROPOLITAN AREA CLINIC 92 | Facility: CLINIC | Age: 63
End: 2023-03-11

## 2023-03-11 LAB
A/G RATIO: 1.2
ALBUMIN: 3.7
ALKALINE PHOSPHATASE: 90
ALT (SGPT): 26
AST (SGOT): 52
BASO (ABSOLUTE): 0
BASOS: 1
BILIRUBIN, TOTAL: 2.3
BUN/CREATININE RATIO: 13
BUN: 10
CALCIUM: 9.5
CARBON DIOXIDE, TOTAL: 33
CHLORIDE: 96
CREATININE: 0.75
EGFR: 91
EOS (ABSOLUTE): 0.2
EOS: 4
GLOBULIN, TOTAL: 3.1
GLUCOSE: 176
HBSAG SCREEN: NEGATIVE
HEMATOCRIT: 40.6
HEMATOLOGY COMMENTS:: (no result)
HEMOGLOBIN: 13.7
HEP B CORE AB, TOT: POSITIVE
HEPATITIS B SURF AB QUANT: <3.1
IMMATURE CELLS: (no result)
IMMATURE GRANS (ABS): 0
IMMATURE GRANULOCYTES: 0
INR: 1.3
LYMPHS (ABSOLUTE): 0.6
LYMPHS: 16
MCH: 30.1
MCHC: 33.7
MCV: 89
MONOCYTES(ABSOLUTE): 0.3
MONOCYTES: 9
NEUTROPHILS (ABSOLUTE): 2.6
NEUTROPHILS: 70
NRBC: (no result)
PLATELETS: 39
POTASSIUM: 3.5
PROTEIN, TOTAL: 6.8
PROTHROMBIN TIME: 13.5
RBC: 4.55
RDW: 15.1
SODIUM: 140
WBC: 3.6

## 2023-03-11 NOTE — HPI-TODAY'S VISIT:
Dear Lady Carson, March 10 labs show hep B surface antibody with no immunity.  Hep B surface antigen negative which is good to note.  Hep B core total is positive indicating previous exposure history.  In cases like this, we usually recommend a single dose of hep B vaccine to serve as a booster for memory effect and then check the titer 3 months later.  That reexposure to the vaccine should push the titer back up. INR was 1.3 slightly higher from prior comparison of 1.2. Sugar was up to 176 from prior comparison of 173 please share with primary provider. BUN of 10 creatinine 0.75 sodium 140 potassium 3.5 chloride 96. CO2 was up a little bit at 33 from 26.  Albumin was 3.7 which is a little low.  Bilirubin was 2.3 up from 1.2 and not fractionated. Alk phos was 90.  That is actually doing better.  AST was still up at 52 up from 46 comparison.  ALT was 26 which was normal range and previously been 25. WBC was 3.6 hemoglobin 13.7 plate count was low at 39 and previously was 54.  MCV normal 89.  Neutrophils normal at 2.6 and lymphocytes a little low at 0.6 but up from 0.5 previously. Meld 13 and meld na 13 so staying low. Please share labs with local providers. Dr. Gonzales

## 2023-03-27 ENCOUNTER — ERX REFILL RESPONSE (OUTPATIENT)
Dept: URBAN - METROPOLITAN AREA CLINIC 92 | Facility: CLINIC | Age: 63
End: 2023-03-27

## 2023-03-27 RX ORDER — OMEPRAZOLE 40 MG/1
TAKE 1 CAPSULE BY MOUTH TWICE A DAY BEFORE A MEAL CAPSULE, DELAYED RELEASE ORAL
Qty: 180 CAPSULE | Refills: 0 | OUTPATIENT

## 2023-03-27 RX ORDER — OMEPRAZOLE 40 MG/1
1 CAPSULE 30 MINUTES BEFORE MORNING MEAL CAPSULE, DELAYED RELEASE ORAL TWICE DAILY
Qty: 180 | Refills: 3 | OUTPATIENT

## 2023-03-28 ENCOUNTER — APPOINTMENT (RX ONLY)
Dept: URBAN - METROPOLITAN AREA CLINIC 41 | Facility: CLINIC | Age: 63
Setting detail: DERMATOLOGY
End: 2023-03-28

## 2023-03-28 DIAGNOSIS — L40.0 PSORIASIS VULGARIS: ICD-10-CM

## 2023-03-28 PROCEDURE — 99213 OFFICE O/P EST LOW 20 MIN: CPT

## 2023-03-28 PROCEDURE — ? COUNSELING

## 2023-03-28 PROCEDURE — ? TREATMENT REGIMEN

## 2023-03-28 NOTE — PROCEDURE: COUNSELING
Detail Level: Generalized
Patient Specific Counseling (Will Not Stick From Patient To Patient): Certainly not clear but also much less involved than in the past, now confined to scalp and she's actually pretty comfortable today. We discussed IL steroid injections which she says worked well. I am fine with these but noted that I've seen tachyphylaxis after repetitive injections and that I'd recommend delaying them as much as tolerable to reduce risk of that. She agrees and no shots today. \\n\\n*****\\nWe discussed that we like the injections since she has had complications to other internal medications, we discussed that sometimes too much injections can make her not respond to that therapy, we need to find a way to minimize those and use only when it is really needed.

## 2023-03-28 NOTE — PROCEDURE: TREATMENT REGIMEN
Action 1: Continue
Detail Level: Zone
Continue Regimen: Derma smooth twice a week and we will recheck in 3-4 months

## 2023-03-29 ENCOUNTER — OUT OF OFFICE VISIT (OUTPATIENT)
Dept: URBAN - METROPOLITAN AREA MEDICAL CENTER 12 | Facility: MEDICAL CENTER | Age: 63
End: 2023-03-29
Payer: MEDICARE

## 2023-03-29 DIAGNOSIS — K62.5 ANAL BLEEDING: ICD-10-CM

## 2023-03-29 DIAGNOSIS — K75.81 NASH (NONALCOHOLIC STEATOHEPATITIS): ICD-10-CM

## 2023-03-29 DIAGNOSIS — K74.69 CIRRHOSIS, CRYPTOGENIC: ICD-10-CM

## 2023-03-29 DIAGNOSIS — D69.6 ACQUIRED AMEGAKARYOCYTIC THROMBOCYTOPENIA: ICD-10-CM

## 2023-03-29 PROCEDURE — 99222 1ST HOSP IP/OBS MODERATE 55: CPT

## 2023-03-29 PROCEDURE — G8427 DOCREV CUR MEDS BY ELIG CLIN: HCPCS

## 2023-03-30 ENCOUNTER — OUT OF OFFICE VISIT (OUTPATIENT)
Dept: URBAN - METROPOLITAN AREA MEDICAL CENTER 12 | Facility: MEDICAL CENTER | Age: 63
End: 2023-03-30
Payer: MEDICARE

## 2023-03-30 DIAGNOSIS — K62.5 ANAL BLEEDING: ICD-10-CM

## 2023-03-30 DIAGNOSIS — K31.89 ACQUIRED DEFORMITY OF DUODENUM: ICD-10-CM

## 2023-03-30 DIAGNOSIS — I85.10 ESOPH VARICE OTHER DIS: ICD-10-CM

## 2023-03-30 DIAGNOSIS — I86.4 BLEEDING GASTRIC VARICES: ICD-10-CM

## 2023-03-30 DIAGNOSIS — K76.6 CLINICALLY SIGNIFICANT PORTAL HYPERTENSION: ICD-10-CM

## 2023-03-30 DIAGNOSIS — K62.6 RECTAL ULCER: ICD-10-CM

## 2023-03-30 PROCEDURE — 43235 EGD DIAGNOSTIC BRUSH WASH: CPT | Performed by: INTERNAL MEDICINE

## 2023-03-30 PROCEDURE — 45330 DIAGNOSTIC SIGMOIDOSCOPY: CPT | Performed by: INTERNAL MEDICINE

## 2023-03-31 ENCOUNTER — OUT OF OFFICE VISIT (OUTPATIENT)
Dept: URBAN - METROPOLITAN AREA MEDICAL CENTER 12 | Facility: MEDICAL CENTER | Age: 63
End: 2023-03-31
Payer: MEDICARE

## 2023-03-31 DIAGNOSIS — K74.69 CIRRHOSIS, CRYPTOGENIC: ICD-10-CM

## 2023-03-31 DIAGNOSIS — K62.5 ANAL BLEEDING: ICD-10-CM

## 2023-03-31 DIAGNOSIS — K75.81 NASH (NONALCOHOLIC STEATOHEPATITIS): ICD-10-CM

## 2023-03-31 DIAGNOSIS — D69.6 ACQUIRED AMEGAKARYOCYTIC THROMBOCYTOPENIA: ICD-10-CM

## 2023-03-31 PROCEDURE — 99232 SBSQ HOSP IP/OBS MODERATE 35: CPT | Performed by: STUDENT IN AN ORGANIZED HEALTH CARE EDUCATION/TRAINING PROGRAM

## 2023-04-10 ENCOUNTER — TELEPHONE ENCOUNTER (OUTPATIENT)
Dept: URBAN - METROPOLITAN AREA CLINIC 86 | Facility: CLINIC | Age: 63
End: 2023-04-10

## 2023-04-10 NOTE — HPI-TODAY'S VISIT:
SPoke with pt. She was recently inpt and saw Dr Mcfadden who did egd and flex sig and needs colon. Dr Vickers retiring soon and she cannot see and so we spoke with her and she wanted to see Dr Mcfadden then. Spoke with Dr Mcfadden and she will review this info and try to set up needed colon.

## 2023-04-12 ENCOUNTER — TELEPHONE ENCOUNTER (OUTPATIENT)
Dept: URBAN - METROPOLITAN AREA CLINIC 86 | Facility: CLINIC | Age: 63
End: 2023-04-12

## 2023-04-18 ENCOUNTER — TELEPHONE ENCOUNTER (OUTPATIENT)
Dept: URBAN - METROPOLITAN AREA CLINIC 86 | Facility: CLINIC | Age: 63
End: 2023-04-18

## 2023-04-27 ENCOUNTER — LAB OUTSIDE AN ENCOUNTER (OUTPATIENT)
Dept: URBAN - METROPOLITAN AREA CLINIC 86 | Facility: CLINIC | Age: 63
End: 2023-04-27

## 2023-04-27 ENCOUNTER — TELEPHONE ENCOUNTER (OUTPATIENT)
Dept: URBAN - METROPOLITAN AREA CLINIC 92 | Facility: CLINIC | Age: 63
End: 2023-04-27

## 2023-04-27 ENCOUNTER — CLAIMS CREATED FROM THE CLAIM WINDOW (OUTPATIENT)
Dept: URBAN - METROPOLITAN AREA CLINIC 86 | Facility: CLINIC | Age: 63
End: 2023-04-27
Payer: MEDICARE

## 2023-04-27 ENCOUNTER — OFFICE VISIT (OUTPATIENT)
Dept: URBAN - METROPOLITAN AREA CLINIC 92 | Facility: CLINIC | Age: 63
End: 2023-04-27
Payer: MEDICARE

## 2023-04-27 ENCOUNTER — TELEPHONE ENCOUNTER (OUTPATIENT)
Dept: URBAN - METROPOLITAN AREA CLINIC 35 | Facility: CLINIC | Age: 63
End: 2023-04-27

## 2023-04-27 VITALS
HEART RATE: 106 BPM | DIASTOLIC BLOOD PRESSURE: 72 MMHG | HEIGHT: 61 IN | TEMPERATURE: 97.1 F | WEIGHT: 191.2 LBS | SYSTOLIC BLOOD PRESSURE: 114 MMHG | BODY MASS INDEX: 36.1 KG/M2

## 2023-04-27 VITALS
BODY MASS INDEX: 36.1 KG/M2 | HEART RATE: 106 BPM | TEMPERATURE: 97.1 F | SYSTOLIC BLOOD PRESSURE: 114 MMHG | DIASTOLIC BLOOD PRESSURE: 72 MMHG | HEIGHT: 61 IN | WEIGHT: 191.2 LBS

## 2023-04-27 DIAGNOSIS — K74.60 CIRRHOSIS OF LIVER: ICD-10-CM

## 2023-04-27 DIAGNOSIS — E23.7 PITUITARY GLAND DISORDER: ICD-10-CM

## 2023-04-27 DIAGNOSIS — K76.82 HEPATIC ENCEPHALOPATHY: ICD-10-CM

## 2023-04-27 DIAGNOSIS — I81 SUPERIOR MESENTERIC VEIN THROMBOSIS: ICD-10-CM

## 2023-04-27 DIAGNOSIS — K76.6 PORTAL HYPERTENSION: ICD-10-CM

## 2023-04-27 DIAGNOSIS — K02.9 DENTAL CARIES: ICD-10-CM

## 2023-04-27 DIAGNOSIS — Z71.89 VACCINE COUNSELING: ICD-10-CM

## 2023-04-27 DIAGNOSIS — K75.81 NASH (NONALCOHOLIC STEATOHEPATITIS): ICD-10-CM

## 2023-04-27 DIAGNOSIS — K64.9 RECTAL VARICES: ICD-10-CM

## 2023-04-27 DIAGNOSIS — D04.9: ICD-10-CM

## 2023-04-27 DIAGNOSIS — I85.00 VARICES, ESOPHAGEAL: ICD-10-CM

## 2023-04-27 DIAGNOSIS — E11.9 DIABETES: ICD-10-CM

## 2023-04-27 DIAGNOSIS — E66.3 OVER WEIGHT: ICD-10-CM

## 2023-04-27 DIAGNOSIS — Z86.010 HISTORY OF COLON POLYPS: ICD-10-CM

## 2023-04-27 DIAGNOSIS — R18.8 ASCITES: ICD-10-CM

## 2023-04-27 DIAGNOSIS — K92.1 HEMATOCHEZIA: ICD-10-CM

## 2023-04-27 DIAGNOSIS — I85.00 ESOPHAGEAL VARICES: ICD-10-CM

## 2023-04-27 DIAGNOSIS — K65.9 PERITONITIS, UNSPECIFIED: ICD-10-CM

## 2023-04-27 DIAGNOSIS — R13.10 DYSPHAGIA: ICD-10-CM

## 2023-04-27 DIAGNOSIS — I81 PORTAL VEIN THROMBOSIS: ICD-10-CM

## 2023-04-27 DIAGNOSIS — I85.00 ESOPHAGEAL VARICES WITHOUT BLEEDING: ICD-10-CM

## 2023-04-27 DIAGNOSIS — Z79.899 HIGH RISK MEDICATION USE: ICD-10-CM

## 2023-04-27 DIAGNOSIS — I86.4 GASTRIC VARICES: ICD-10-CM

## 2023-04-27 DIAGNOSIS — K62.6 RECTAL ULCER: ICD-10-CM

## 2023-04-27 PROBLEM — 450471000124106: Status: ACTIVE | Noted: 2023-04-27

## 2023-04-27 PROCEDURE — 99215 OFFICE O/P EST HI 40 MIN: CPT | Performed by: PHYSICIAN ASSISTANT

## 2023-04-27 PROCEDURE — 992 APS NON BILLABLE

## 2023-04-27 RX ORDER — DENOSUMAB 60 MG/ML
AS DIRECTED INJECTION SUBCUTANEOUS
Status: ON HOLD | COMMUNITY

## 2023-04-27 RX ORDER — DEXTROSE 4 G
1 TABLET TABLET,CHEWABLE ORAL ONCE A DAY
Status: ACTIVE | COMMUNITY

## 2023-04-27 RX ORDER — VORTIOXETINE 20 MG/1
TAKE 1 TABLET (20 MG) BY ORAL ROUTE ONCE DAILY AT THE SAME TIME EACH DAY TABLET, FILM COATED ORAL ONCE A DAY
Refills: 0 | Status: ACTIVE | COMMUNITY
Start: 1900-01-01

## 2023-04-27 RX ORDER — GABAPENTIN 300 MG/1
1 CAPSULE CAPSULE ORAL THREE TIMES A DAY
Status: ACTIVE | COMMUNITY

## 2023-04-27 RX ORDER — LACTULOSE 10 G/15ML
30 ML SOLUTION ORAL ONCE A DAY
Status: ACTIVE | COMMUNITY

## 2023-04-27 RX ORDER — POTASSIUM CHLORIDE 1500 MG/1
2 TABLETS WITH FOOD TABLET, FILM COATED, EXTENDED RELEASE ORAL ONCE A DAY
Refills: 0 | Status: ACTIVE | COMMUNITY
Start: 1900-01-01

## 2023-04-27 RX ORDER — OMEPRAZOLE 40 MG/1
TAKE 1 CAPSULE BY MOUTH TWICE A DAY BEFORE A MEAL CAPSULE, DELAYED RELEASE ORAL
Qty: 180 CAPSULE | Refills: 0 | Status: ACTIVE | COMMUNITY

## 2023-04-27 RX ORDER — RIFAXIMIN 550 MG/1
1 TABLET TABLET ORAL TWICE A DAY
Qty: 60 TABLET | Refills: 5 | Status: ACTIVE | COMMUNITY

## 2023-04-27 RX ORDER — ASPIRIN 81 MG/1
1 TABLET TABLET, COATED ORAL ONCE A DAY
Status: ACTIVE | COMMUNITY

## 2023-04-27 RX ORDER — INSULIN DEGLUDEC INJECTION 100 U/ML
8 UNITS INJECTION, SOLUTION SUBCUTANEOUS
Refills: 0 | Status: ACTIVE | COMMUNITY
Start: 1900-01-01

## 2023-04-27 RX ORDER — MIDODRINE HYDROCHLORIDE 10 MG/1
1 TABLET TABLET ORAL THREE TIMES A DAY
Status: ACTIVE | COMMUNITY

## 2023-04-27 RX ORDER — NADOLOL 40 MG/1
1 TABLET TABLET ORAL TWICE A DAY
Status: ACTIVE | COMMUNITY

## 2023-04-27 RX ORDER — BUMETANIDE 0.5 MG/1
4 (2MG) TABLET ORAL ONCE A DAY
Status: ACTIVE | COMMUNITY

## 2023-04-27 RX ORDER — RIFAXIMIN 550 MG/1
1 TABLET TABLET ORAL TWICE A DAY
Qty: 60 TABLET | Refills: 5

## 2023-04-27 NOTE — HPI-TODAY'S VISIT:
63-year-old female patient originally presented today for a hospital visit follow-up for rectal bleeding.  She was seen March 2023 at [facility] for rectal bleeding that started that day.  A flex sig on 3-30 was obtained that demonstrated a single solitary ulcer in the rectum s/p clips, rectal varices.  She also had endoscopy that demonstrated grade 1 esophageal varices with no bleeding, type I gastric varices without bleeding, portal hypertensive gastropathy and normal duodenum recommended repeat in 1 year.  She was told to follow-up with GI outpatient. More recently she states that she was seen at the [place] ER due to worsening ascites.  4 days ago she had a paracentesis.  She was wanting to be transferred to [place] but they noted that they could not do this since she was admitted to [employer] [facility].  She presents today for a repeat paracentesis as she is having more abdominal bloating, discomfort and pain. her rectal bleeding has gotten better but noted some on sunday before going to the ER.

## 2023-04-27 NOTE — PHYSICAL EXAM GASTROINTESTINAL
Abdomen,  soft, TTP t/o , ascites present, ,  no guarding or rigidity,  no masses palpable,  normal bowel sounds Liver and Spleen,no hepatosplenomegaly

## 2023-04-27 NOTE — HPI-TODAY'S VISIT:
Pt is 63 F last seen april 2022 with hx baeza and smv/pv clot, hx varices, hx pse, march 2022 urosepsis/sbp and now here for ongoing care and eval.  A copy of the note was sent to the referring provider.  4/27/23 HOspital follow up and recurrent ascites. She hospitalized patient recently at [facility].  She was in route to [place] per the notes but had EKG abnormalities and they redirected her to [place].  She had gained 19 pounds in the last several days.  Recent paracentesis on 419 with 4 L removed.  She was also having hematochezia and reviewed the chart and had recent rectal clips done and those need a re look and hospital noted she did not want to do there as she does things at [place]  CT scan done on 423  Cirrhotic morphology of the liver with this extensive paraesophageal and abdominal varices and splenomegaly. no lesions  April 24 paracentesis showing that there was 4 L removed.  April 24 labs with hemoglobin 9.8, platelets 47, INR 1.6.  Sodium 131, potassium 3.1, bilirubin 1.9, alkaline phosphatase 80, ALT 17, AST 37. cr .7  Per epic she has been pending an echo and rescheduled multiple times due to transportation issues. She was pending mri recently but not done  Discussed the ascites and need to see where from and ct not showing clot issue and need mri to be sure but also need that echo need to order paracentesis now fo rher. she has not been limited flluids, salt and she will start.  She needs to be reseen by oltx and we will refer her We will update and labs and she will need to see GI re the hematochezia given recent clips done.  Dr. Gonzales saw her as well and reminded her if acute then do care where she can. Also discussed with her the salt and needing to see oltx again.     recap She says she had some issues with daughter being in mental institution and she had trouble coming up and so now comes back. She is doing better.  She had adopted her at birth.  Right ear she had an skin cancer and has trouble wearing a mask for this.  She has been getting shot for scalp psoriasis and she does not know what is the shot. She has 2 rounds of it.  She has to get her every 2 months.  [facility] [address], [place] [address]  ~34.1 mi [phone]  We need to know what he is giving her so that we can say what we expect to be the case.  Depending on what he is giving her.  No recent labs and no recent imaging.  June 8, 2022 MRI showed chronic trace left pleural effusion with associated atelectasis in the lower thorax views.  There was no significant fat or iron but with nodular contour and cirrhotic morphology.  No suspicious lesions.  Prior cholecystectomy changes.  No duct dilation.  Spleen enlarged 18.4 cm.  Pancreas normal.  Adrenal glands normal.  Normal bilateral renal symmetric enhancement with 6 mm superior pole renal cyst and no suspicious lesions and no hydro.  Mild small and large bowel wall edema likely due to periportal enteropathy/colopathy.  Unchanged chronic fibrosis of portal vein with cavernous transformation reconstituted left portal vein.  Redemonstrated gastroesophageal perisplenic varices.  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  Only trace perihepatic and perisplenic ascites.  June 7, 2022 visit with Dr. Flores mentions that the patient is status post TAVR with recent echo showing EF 55% of left ventricular outflow VTI of 21.  Chronic diastolic heart failure noted.  TAVR was on January 23, 2020.  She has not followed with Dr Flores and sees local cards for the tavr.  June 1, 2022 echo showed EF 50 to 55% aortic valve normal functioning bioprosthetic valve.  Aortic valve max velocity showed a mean gradient of 11.0 and a peak gradient 24.4.  June 8, 2022 labs show sodium 135 potassium 3.9 glucose 297 BUN of 10 creatinine 0.75 total bilirubin 1.6 calcium 9.1 AST 36 ALT 18 alk phos 106 hemoglobin A1c up at 7.5 AFP 4 W count 3.2 hemoglobin 12.3 plate count 47 INR 1.35.  HIV test negative at that time.  Transplant team sees her every 6m and she sees the team there. She is told is stable and looks good and meld was 14.  She says told re mounjaro.  April 29th 2022 labs stable ammonia level normal at 61. INR normal at 1.2.   Sugar was elevated at 173.  Please share with primary provider. BUN of 13 creatinine 0.69 sodium 138 potassium 4.4 albumin normal at 3.8.  Bilirubin 1.2 alkaline phosphatase 112. Only the AST was elevated at 46 and ALT was ideal at 25. White blood cell count 3.7 hemoglobin 12.4 and MCV 87 and these are normal.  Platelet count low at 54.  Neutrophils normal at 2.8 and lymphocytes slightly low at 0.5. Meld down to 9 and meld na 9. Suggests that the infection is what tipped your labs recently  to be worse. So really need to focus on trying to prevent those recurrent urinary infections as worsens your underlying liver state. Please ask local ID to contact us to discuss the recent infection and options.   Saw the notes and had urosepsis. Had [id number] cells in the ascites.  Had 1 tap in the hospital. had the infection.  Prior tap in [place] had no infection and she had been started on abx once a month. 2500 ml.  20-30 wbc, nos.  90% of pts with sbp if the infection will be reinfected in a year.  Once a month is not a usual tx for sbp prevention. Daily or once a week. Dr Lopez is her id and they need to look at this new data and need to adjust tx.  March 30, 2022 labs show sodium 137 potassium 3.6 BUN of 10 creatinine 0.68 glucose 156 total bilirubin 1.3 AST 25 ALT 14 alk phos 83 calcium 8.7 labs count 3.3 hemoglobin 12 platelet count 66.  March 28, 2022 CT abdomen and pelvis with IV contrast revealed left-sided pleural effusion with some atelectasis.  Cardiophrenic enhancing node measuring 1.1 x 1.8 cm slightly increased in size.  Liver with chronic liver disease.  Prior cholecystectomy no bile duct dilation.  Splenomegaly seen.  Pancreas normal.  No hydronephrosis but with left nonobstructing renal stone.  Diffuse colon wall thickening and edema with surrounding stranding.  Prominent retroperitoneal mesenteric lymph nodes.  Large varices seen on the vessel exam with focal occlusion of the SMV/portal vein which appears similar to prior study.  Moderate continuation of the right portal vein.  Mild increase cannot exclude thrombus within the splenic venous collaterals in the left upper quadrant.  March 28, 2022 paracentesis removed 2600 mL of fluid.  November 2021 MRI liver with no fat or iron cirrhotic liver in order to enhancing lesions.  Spleen 19 cm.  Prior cholecystectomy changes seen.  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  Multiple large collateral pathways including gastroesophageal varices.  Replaced right hepatic artery seen.  Ascites may have been primary vs uti.  She has been seen by urogyn for the recurrent uti.   On abx once a month not for prevention but rather infection every month and so that makes more sense.  Covid 19 Jan 2022 and admitted locally for sepsis.  June 8, 2022 patient has appointment for labs and another MRI and in July 20 9022 will be seen by the  [facility]x transplant nurse practitioner at [facility].   April 8, 2022 transplant clinic visit mentions that the patient has Baeza and had a recent hospitalization from March 20 7 March 21st for urosepsis.  Drained 2.6 L and prior to that had not been drained since February 2022.  Taking Bumex 2 mg twice daily and Aldactone 100 mg a day.  Meld increased during the hospital but was down to 14 by March 27, 2022.  Bili decreased to 1.3 on March 30.  Recap: Patient with history of chronic SMV/portal vein occlusion.  EGD January 2021 status post band ligation.  No significant encephalopathy on Xifaxan.  Current medicines include omeprazole 2 mg a day, aspirin 81 mg a day, Bumex 2 mg a day, vitamin D 1000 units a day, Prolia every 6 months Estrace 2 mg a day, gabapentin 3 mg 3 times daily, NovoLog sliding scale, Tresiba 8 units at night, lactulose 20 g Daily, mesalamine 1 g p.o. twice daily, midodrine 10 mg p.o. 3 times daily, nadolol 40 mg twice daily, potassium 40 mg a day, Xifaxan 550 twice daily, Aldactone on milligrams a day, Trintellix 20 mg a day.  Patient was listed as following up with Dr. Vickers for endoscopy. She says he has been seeing. He also saw dysphagia.  March 27 March 31 discharge summary from [facility].   She was a patient came in with concerns of urosepsis and left pyelonephritis.  Urinalysis high-grade 150 WBCs.  Blood cultures were negative but urine did grow pansensitive Klebsiella.  Patient was seen by ID and was given a course of daptomycin and Levaquin treatment.  They wanted urogynecology to see her. Left renal pole lesion that could be a nidus for recurrent infection.  Cell count on the fluid was 10,520 range  nucleated cells 84% neutrophils though the cultures were negative to date and maintained on antibiotics.  Looked up the actual fluid count from March 28 and had had albumin less than 1.5 total protein was 3.0 nucleated cells 10,520 neutrophils 84%.  Lasted been EGD high-grade fevers or November 17, 2021 with Dr. Vickers where she had grade 1 varices lower esophagus.  Moderate Schatzki ring was seen.  Dilation was seen to 51 Cambodian.  Moderately erythematous mucosa without bleeding seen in the gastric antrum.  H. pylori testing sent.  Biopsy was negative for significant abnormality.  She did egd in nov 2021 and grade 1 varices noted. He is planning nov 2022.  nov 2021 mri: Reason For Exam Hepatocellular carcinoma;Hepatocellular carcinoma  REPORT EXAM: MRI Abdomen w/ + w/o Contrast  CLINICAL INDICATION: Hepatocellular carcinoma  TECHNIQUE: Multisequence, multiplanar MRI of the abdomen was performed without and with intravenous contrast. [id number]  CONTRAST: 18 cc of Prohance  COMPARISON: March 2, 2021  FINDINGS:  Lower Thorax: Normal.  Liver: No fat or iron. Cirrhotic liver morphology. No arterially enhancing lesions. No lesions with capsule. No lesions with washout.  Gallbladder/Biliary Tree: Status post cholecystectomy.  Spleen: Measuring 19.0 cm in maximum craniocaudal diameter.  Pancreas: Normal.  Adrenal Glands: Normal.   Kidneys/Ureters: Normal.  Gastrointestinal: Normal.   Lymph Nodes: Normal.  Vessels: Chronic thrombosis of the main portal vein with cavernous transformation redemonstrated. Multiple large collateral pathways including gastroesophageal varices, again noted. 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  Peritoneum/Retroperitoneum: Minimal ascites is present.  Bones/Soft Tissues: Normal.  IMPRESSION:     Cirrhosis with chronic portal vein thrombosis and cavernous transformation with associated evidence of portal hypertension. No hepatocellular carcinoma.

## 2023-04-27 NOTE — PHYSICAL EXAM CONSTITUTIONAL:
normal,  alert,  in no acute distress,  well developed, well nourished,  normal communication ability, confined to a wheelchair

## 2023-04-28 ENCOUNTER — TELEPHONE ENCOUNTER (OUTPATIENT)
Dept: URBAN - METROPOLITAN AREA CLINIC 92 | Facility: CLINIC | Age: 63
End: 2023-04-28

## 2023-05-01 ENCOUNTER — TELEPHONE ENCOUNTER (OUTPATIENT)
Dept: URBAN - METROPOLITAN AREA CLINIC 92 | Facility: CLINIC | Age: 63
End: 2023-05-01

## 2023-05-01 ENCOUNTER — OFFICE VISIT (OUTPATIENT)
Dept: URBAN - METROPOLITAN AREA CLINIC 92 | Facility: CLINIC | Age: 63
End: 2023-05-01
Payer: MEDICARE

## 2023-05-01 VITALS — BODY MASS INDEX: 36.1 KG/M2 | HEIGHT: 61 IN | WEIGHT: 191.2 LBS

## 2023-05-01 DIAGNOSIS — I85.00 ESOPHAGEAL VARICES WITHOUT BLEEDING: ICD-10-CM

## 2023-05-01 DIAGNOSIS — K92.1 HEMATOCHEZIA: ICD-10-CM

## 2023-05-01 DIAGNOSIS — K74.60 CIRRHOSIS OF LIVER: ICD-10-CM

## 2023-05-01 DIAGNOSIS — K62.6 RECTAL ULCER: ICD-10-CM

## 2023-05-01 DIAGNOSIS — I86.4 GASTRIC VARICES: ICD-10-CM

## 2023-05-01 DIAGNOSIS — K64.9 RECTAL VARICES: ICD-10-CM

## 2023-05-01 PROCEDURE — 99214 OFFICE O/P EST MOD 30 MIN: CPT

## 2023-05-01 RX ORDER — INSULIN DEGLUDEC INJECTION 100 U/ML
8 UNITS INJECTION, SOLUTION SUBCUTANEOUS
Refills: 0 | Status: ACTIVE | COMMUNITY
Start: 1900-01-01

## 2023-05-01 RX ORDER — GABAPENTIN 300 MG/1
1 CAPSULE CAPSULE ORAL THREE TIMES A DAY
Status: ACTIVE | COMMUNITY

## 2023-05-01 RX ORDER — BUMETANIDE 0.5 MG/1
4 (2MG) TABLET ORAL ONCE A DAY
Status: ACTIVE | COMMUNITY

## 2023-05-01 RX ORDER — DEXTROSE 4 G
1 TABLET TABLET,CHEWABLE ORAL ONCE A DAY
Status: ACTIVE | COMMUNITY

## 2023-05-01 RX ORDER — RIFAXIMIN 550 MG/1
1 TABLET TABLET ORAL TWICE A DAY
Qty: 60 TABLET | Refills: 5 | Status: ACTIVE | COMMUNITY

## 2023-05-01 RX ORDER — ASPIRIN 81 MG/1
1 TABLET TABLET, COATED ORAL ONCE A DAY
Status: ACTIVE | COMMUNITY

## 2023-05-01 RX ORDER — POTASSIUM CHLORIDE 1500 MG/1
2 TABLETS WITH FOOD TABLET, FILM COATED, EXTENDED RELEASE ORAL ONCE A DAY
Refills: 0 | Status: ACTIVE | COMMUNITY
Start: 1900-01-01

## 2023-05-01 RX ORDER — NADOLOL 40 MG/1
1 TABLET TABLET ORAL TWICE A DAY
Status: ACTIVE | COMMUNITY

## 2023-05-01 RX ORDER — DENOSUMAB 60 MG/ML
AS DIRECTED INJECTION SUBCUTANEOUS
Status: ON HOLD | COMMUNITY

## 2023-05-01 RX ORDER — MIDODRINE HYDROCHLORIDE 10 MG/1
1 TABLET TABLET ORAL THREE TIMES A DAY
Status: ACTIVE | COMMUNITY

## 2023-05-01 RX ORDER — OMEPRAZOLE 40 MG/1
TAKE 1 CAPSULE BY MOUTH TWICE A DAY BEFORE A MEAL CAPSULE, DELAYED RELEASE ORAL
Qty: 180 CAPSULE | Refills: 0 | Status: ACTIVE | COMMUNITY

## 2023-05-01 RX ORDER — LACTULOSE 10 G/15ML
30 ML SOLUTION ORAL ONCE A DAY
Status: ACTIVE | COMMUNITY

## 2023-05-01 RX ORDER — VORTIOXETINE 20 MG/1
TAKE 1 TABLET (20 MG) BY ORAL ROUTE ONCE DAILY AT THE SAME TIME EACH DAY TABLET, FILM COATED ORAL ONCE A DAY
Refills: 0 | Status: ACTIVE | COMMUNITY
Start: 1900-01-01

## 2023-05-01 NOTE — HPI-TODAY'S VISIT:
63-year-old female patient originally presented today for a hospital visit follow-up for rectal bleeding.  She was seen March 2023 at [facility] for rectal bleeding that started that day.  A flex sig on 3-30 was obtained that demonstrated a single solitary ulcer in the rectum s/p clips, rectal varices.  She also had endoscopy that demonstrated grade 1 esophageal varices with no bleeding, type I gastric varices without bleeding, portal hypertensive gastropathy and normal duodenum recommended repeat in 1 year.  She was told to follow-up with GI outpatient.  More recently she states that she was seen at the [facility] ER due to worsening ascites.  4 days ago she had a paracentesis.  She was wanting to be transferred to [place] but they noted that they could not do this since she was admitted to [place].  Her rectal bleeding has gotten better but noted some on sunday before going to the ER. She was sent to see our liver center.   Patient was able to see the liver center and get set up with a paracentesis which she had on Friday. She currently states that her bowel movements are normal and she has noted some blood in the stool but does not know if this is blood or food.  She has no blood when she wipes.  No melena.  She does have some abdominal pain still and is tender.  She has been on Prilosec before meals which does help her reflux symptoms. She denies any nausea, vomiting, fever, chills, dysphagia, odynophagia, NSAID use.  .  She is currently on 81 mg of aspirin and sees Tenisha samuel NP with [place] cardiology due to history of bicuspid valve and severe AS s/p transcatheter aortic valve replacement in 2020 and CHF.  Last echo on 3-2023 demonstrated normally functioning valve

## 2023-05-02 ENCOUNTER — TELEPHONE ENCOUNTER (OUTPATIENT)
Dept: URBAN - METROPOLITAN AREA CLINIC 92 | Facility: CLINIC | Age: 63
End: 2023-05-02

## 2023-05-04 ENCOUNTER — LAB OUTSIDE AN ENCOUNTER (OUTPATIENT)
Dept: URBAN - METROPOLITAN AREA CLINIC 86 | Facility: CLINIC | Age: 63
End: 2023-05-04

## 2023-05-05 ENCOUNTER — OFFICE VISIT (OUTPATIENT)
Dept: URBAN - METROPOLITAN AREA MEDICAL CENTER 12 | Facility: MEDICAL CENTER | Age: 63
End: 2023-05-05
Payer: MEDICARE

## 2023-05-05 DIAGNOSIS — D50.9 ANEMIA: ICD-10-CM

## 2023-05-05 DIAGNOSIS — D12.3 ADENOMA OF TRANSVERSE COLON: ICD-10-CM

## 2023-05-05 DIAGNOSIS — R19.5 ABNORMAL CONSISTENCY OF STOOL: ICD-10-CM

## 2023-05-05 PROCEDURE — 45385 COLONOSCOPY W/LESION REMOVAL: CPT | Performed by: INTERNAL MEDICINE

## 2023-05-08 ENCOUNTER — LAB OUTSIDE AN ENCOUNTER (OUTPATIENT)
Dept: URBAN - METROPOLITAN AREA CLINIC 86 | Facility: CLINIC | Age: 63
End: 2023-05-08

## 2023-05-31 ENCOUNTER — OFFICE VISIT (OUTPATIENT)
Dept: URBAN - METROPOLITAN AREA CLINIC 86 | Facility: CLINIC | Age: 63
End: 2023-05-31

## 2023-06-05 ENCOUNTER — OFFICE VISIT (OUTPATIENT)
Dept: URBAN - METROPOLITAN AREA CLINIC 86 | Facility: CLINIC | Age: 63
End: 2023-06-05
Payer: MEDICARE

## 2023-06-05 ENCOUNTER — TELEPHONE ENCOUNTER (OUTPATIENT)
Dept: URBAN - METROPOLITAN AREA CLINIC 92 | Facility: CLINIC | Age: 63
End: 2023-06-05

## 2023-06-05 ENCOUNTER — TELEPHONE ENCOUNTER (OUTPATIENT)
Dept: URBAN - METROPOLITAN AREA CLINIC 86 | Facility: CLINIC | Age: 63
End: 2023-06-05

## 2023-06-05 VITALS
BODY MASS INDEX: 35.12 KG/M2 | SYSTOLIC BLOOD PRESSURE: 101 MMHG | HEIGHT: 61 IN | HEART RATE: 99 BPM | DIASTOLIC BLOOD PRESSURE: 65 MMHG | TEMPERATURE: 97 F | WEIGHT: 186 LBS

## 2023-06-05 DIAGNOSIS — K75.81 NASH (NONALCOHOLIC STEATOHEPATITIS): ICD-10-CM

## 2023-06-05 DIAGNOSIS — K76.82 HEPATIC ENCEPHALOPATHY: ICD-10-CM

## 2023-06-05 PROCEDURE — 99214 OFFICE O/P EST MOD 30 MIN: CPT | Performed by: PHYSICIAN ASSISTANT

## 2023-06-05 RX ORDER — VORTIOXETINE 20 MG/1
TAKE 1 TABLET (20 MG) BY ORAL ROUTE ONCE DAILY AT THE SAME TIME EACH DAY TABLET, FILM COATED ORAL ONCE A DAY
Refills: 0 | Status: ACTIVE | COMMUNITY
Start: 1900-01-01

## 2023-06-05 RX ORDER — POTASSIUM CHLORIDE 1500 MG/1
2 TABLETS WITH FOOD TABLET, FILM COATED, EXTENDED RELEASE ORAL ONCE A DAY
Refills: 0 | Status: ACTIVE | COMMUNITY
Start: 1900-01-01

## 2023-06-05 RX ORDER — RIFAXIMIN 550 MG/1
1 TABLET TABLET ORAL TWICE A DAY
Qty: 60 TABLET | Refills: 5 | Status: ACTIVE | COMMUNITY

## 2023-06-05 RX ORDER — DENOSUMAB 60 MG/ML
AS DIRECTED INJECTION SUBCUTANEOUS
Status: ON HOLD | COMMUNITY

## 2023-06-05 RX ORDER — ASPIRIN 81 MG/1
1 TABLET TABLET, COATED ORAL ONCE A DAY
Status: ACTIVE | COMMUNITY

## 2023-06-05 RX ORDER — MIDODRINE HYDROCHLORIDE 10 MG/1
1 TABLET TABLET ORAL THREE TIMES A DAY
Status: ACTIVE | COMMUNITY

## 2023-06-05 RX ORDER — INSULIN DEGLUDEC INJECTION 100 U/ML
8 UNITS INJECTION, SOLUTION SUBCUTANEOUS
Refills: 0 | Status: ACTIVE | COMMUNITY
Start: 1900-01-01

## 2023-06-05 RX ORDER — DEXTROSE 4 G
1 TABLET TABLET,CHEWABLE ORAL ONCE A DAY
Status: ACTIVE | COMMUNITY

## 2023-06-05 RX ORDER — NADOLOL 40 MG/1
1 TABLET TABLET ORAL TWICE A DAY
Status: ACTIVE | COMMUNITY

## 2023-06-05 RX ORDER — OMEPRAZOLE 40 MG/1
TAKE 1 CAPSULE BY MOUTH TWICE A DAY BEFORE A MEAL CAPSULE, DELAYED RELEASE ORAL
Qty: 180 CAPSULE | Refills: 0 | Status: ACTIVE | COMMUNITY

## 2023-06-05 RX ORDER — LACTULOSE 10 G/15ML
30 ML SOLUTION ORAL ONCE A DAY
Status: ACTIVE | COMMUNITY

## 2023-06-05 RX ORDER — BUMETANIDE 0.5 MG/1
4 (2MG) TABLET ORAL ONCE A DAY
Status: ACTIVE | COMMUNITY

## 2023-06-05 RX ORDER — GABAPENTIN 300 MG/1
1 CAPSULE CAPSULE ORAL THREE TIMES A DAY
Status: ACTIVE | COMMUNITY

## 2023-06-05 NOTE — HPI-TODAY'S VISIT:
Pt is 63 F last seen april 2022 with hx baeza and smv/pv clot, hx varices, hx pse, march 2022 urosepsis/sbp and now here for ongoing care and eval.  A copy of the note was sent to the referring provider.  6/5/23  SHe did the mri on friday but interestingly it has not been read yet. Checked epic yesterday and today for this. Will follow up with the pending result.  She had the repeat colon.  She is having a paracentesis every 6 days or so, 4 L removed she is doing the low salt diet and limiting fluids. DIscussed the risk of recurrrent paracentesis and needs to see oltx back on this and see if options. HE symtpoms controlled on xifaxan and miralax as seh does not tolerate the lactulose She has not schedlued with dr. barrios as discussed. inr 1.43, mcv 86, hgb 11.1, platelets 125, bili 2.5, ast 36, alt 18, alk phos 101 na 136, cr 0.8 MELD 14, meld na 15 4/27/23 [facility] follow up and recurrent ascites. She hospitalized patient recently at [facility].  She was in route to [place] per the notes but had EKG abnormalities and they redirected her to [place].  She had gained 19 pounds in the last several days.  Recent paracentesis on 419 with 4 L removed.  She was also having hematochezia and reviewed the chart and had recent rectal clips done and those need a re look and hospital noted she did not want to do there as she does things at [place]  CT scan done on 423  Cirrhotic morphology of the liver with this extensive paraesophageal and abdominal varices and splenomegaly. no lesions  April 24 paracentesis showing that there was 4 L removed.  April 24 labs with hemoglobin 9.8, platelets 47, INR 1.6.  Sodium 131, potassium 3.1, bilirubin 1.9, alkaline phosphatase 80, ALT 17, AST 37. cr .7  Per epic she has been pending an echo and rescheduled multiple times due to transportation issues. She was pending mri recently but not done  Discussed the ascites and need to see where from and ct not showing clot issue and need mri to be sure but also need that echo need to order paracentesis now fo rher. she has not been limited flluids, salt and she will start.  She needs to be reseen by oltx and we will refer her We will update and labs and she will need to see GI re the hematochezia given recent clips done.  Dr. Gonzales saw her as well and reminded her if acute then do care where she can. Also discussed with her the salt and needing to see oltx again.     recap She says she had some issues with daughter being in mental institution and she had trouble coming up and so now comes back. She is doing better.  She had adopted her at birth.  Right ear she had an skin cancer and has trouble wearing a mask for this.  She has been getting shot for scalp psoriasis and she does not know what is the shot. She has 2 rounds of it.  She has to get her every 2 months.  Dr. Pepito Haile [facility].[address] [address]  ~34.1 mi [phone]  We need to know what he is giving her so that we can say what we expect to be the case.  Depending on what he is giving her.  No recent labs and no recent imaging.  June 8, 2022 MRI showed chronic trace left pleural effusion with associated atelectasis in the lower thorax views.  There was no significant fat or iron but with nodular contour and cirrhotic morphology.  No suspicious lesions.  Prior cholecystectomy changes.  No duct dilation.  Spleen enlarged 18.4 cm.  Pancreas normal.  Adrenal glands normal.  Normal bilateral renal symmetric enhancement with 6 mm superior pole renal cyst and no suspicious lesions and no hydro.  Mild small and large bowel wall edema likely due to periportal enteropathy/colopathy.  Unchanged chronic fibrosis of portal vein with cavernous transformation reconstituted left portal vein.  Redemonstrated gastroesophageal perisplenic varices.  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  Only trace perihepatic and perisplenic ascites.  June 7, 2022 visit with Dr. Flores mentions that the patient is status post TAVR with recent echo showing EF 55% of left ventricular outflow VTI of 21.  Chronic diastolic heart failure noted.  TAVR was on January 23, 2020.  She has not followed with Dr Flores and sees local cards for the tavr.  June 1, 2022 echo showed EF 50 to 55% aortic valve normal functioning bioprosthetic valve.  Aortic valve max velocity showed a mean gradient of 11.0 and a peak gradient 24.4.  June 8, 2022 labs show sodium 135 potassium 3.9 glucose 297 BUN of 10 creatinine 0.75 total bilirubin 1.6 calcium 9.1 AST 36 ALT 18 alk phos 106 hemoglobin A1c up at 7.5 AFP 4 W count 3.2 hemoglobin 12.3 plate count 47 INR 1.35.  HIV test negative at that time.  Transplant team sees her every 6m and she sees the team there. She is told is stable and looks good and meld was 14.  She says told re mounjaro.  April 29th 2022 labs stable ammonia level normal at 61. INR normal at 1.2.   Sugar was elevated at 173.  Please share with primary provider. BUN of 13 creatinine 0.69 sodium 138 potassium 4.4 albumin normal at 3.8.  Bilirubin 1.2 alkaline phosphatase 112. Only the AST was elevated at 46 and ALT was ideal at 25. White blood cell count 3.7 hemoglobin 12.4 and MCV 87 and these are normal.  Platelet count low at 54.  Neutrophils normal at 2.8 and lymphocytes slightly low at 0.5. Meld down to 9 and meld na 9. Suggests that the infection is what tipped your labs recently  to be worse. So really need to focus on trying to prevent those recurrent urinary infections as worsens your underlying liver state. Please ask local ID to contact us to discuss the recent infection and options.   Saw the notes and had urosepsis. Had [id number] cells in the ascites.  Had 1 tap in the hospital. had the infection.  Prior tap in [place] had no infection and she had been started on abx once a month. 2500 ml.  20-30 wbc, nos.  90% of pts with sbp if the infection will be reinfected in a year.  Once a month is not a usual tx for sbp prevention. Daily or once a week. Dr Lopez is her id and they need to look at this new data and need to adjust tx.  March 30, 2022 labs show sodium 137 potassium 3.6 BUN of 10 creatinine 0.68 glucose 156 total bilirubin 1.3 AST 25 ALT 14 alk phos 83 calcium 8.7 labs count 3.3 hemoglobin 12 platelet count 66.  March 28, 2022 CT abdomen and pelvis with IV contrast revealed left-sided pleural effusion with some atelectasis.  Cardiophrenic enhancing node measuring 1.1 x 1.8 cm slightly increased in size.  Liver with chronic liver disease.  Prior cholecystectomy no bile duct dilation.  Splenomegaly seen.  Pancreas normal.  No hydronephrosis but with left nonobstructing renal stone.  Diffuse colon wall thickening and edema with surrounding stranding.  Prominent retroperitoneal mesenteric lymph nodes.  Large varices seen on the vessel exam with focal occlusion of the SMV/portal vein which appears similar to prior study.  Moderate continuation of the right portal vein.  Mild increase cannot exclude thrombus within the splenic venous collaterals in the left upper quadrant.  March 28, 2022 paracentesis removed 2600 mL of fluid.  November 2021 MRI liver with no fat or iron cirrhotic liver in order to enhancing lesions.  Spleen 19 cm.  Prior cholecystectomy changes seen.  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  Multiple large collateral pathways including gastroesophageal varices.  Replaced right hepatic artery seen.  Ascites may have been primary vs uti.  She has been seen by urogyn for the recurrent uti.   On abx once a month not for prevention but rather infection every month and so that makes more sense.  Covid 19 Jan 2022 and admitted locally for sepsis.  June 8, 2022 patient has appointment for labs and another MRI and in July 20 9022 will be seen by the  oltx transplant nurse practitioner at [facility].   April 8, 2022 transplant clinic visit mentions that the patient has Baeza and had a recent hospitalization from March 20 7 March 21st for urosepsis.  Drained 2.6 L and prior to that had not been drained since February 2022.  Taking Bumex 2 mg twice daily and Aldactone 100 mg a day.  Meld increased during the hospital but was down to 14 by March 27, 2022.  Bili decreased to 1.3 on March 30.  Recap: Patient with history of chronic SMV/portal vein occlusion.  EGD January 2021 status post band ligation.  No significant encephalopathy on Xifaxan.  Current medicines include omeprazole 2 mg a day, aspirin 81 mg a day, Bumex 2 mg a day, vitamin D 1000 units a day, Prolia every 6 months Estrace 2 mg a day, gabapentin 3 mg 3 times daily, NovoLog sliding scale, Tresiba 8 units at night, lactulose 20 g Daily, mesalamine 1 g p.o. twice daily, midodrine 10 mg p.o. 3 times daily, nadolol 40 mg twice daily, potassium 40 mg a day, Xifaxan 550 twice daily, Aldactone on milligrams a day, Trintellix 20 mg a day.  Patient was listed as following up with Dr. Vickers for endoscopy. She says he has been seeing. He also saw dysphagia.  March 27 March 31 discharge summary from [facility].   She was a patient came in with concerns of urosepsis and left pyelonephritis.  Urinalysis high-grade 150 WBCs.  Blood cultures were negative but urine did grow pansensitive Klebsiella.  Patient was seen by ID and was given a course of daptomycin and Levaquin treatment.  They wanted urogynecology to see her. Left renal pole lesion that could be a nidus for recurrent infection.  Cell count on the fluid was 10,520 range  nucleated cells 84% neutrophils though the cultures were negative to date and maintained on antibiotics.  Looked up the actual fluid count from March 28 and had had albumin less than 1.5 total protein was 3.0 nucleated cells 10,520 neutrophils 84%.  Lasted been EGD high-grade fevers or November 17, 2021 with Dr. Vickers where she had grade 1 varices lower esophagus.  Moderate Schatzki ring was seen.  Dilation was seen to 51 [place].  Moderately erythematous mucosa without bleeding seen in the gastric antrum.  H. pylori testing sent.  Biopsy was negative for significant abnormality.  She did egd in nov 2021 and grade 1 varices noted. He is planning nov 2022.  nov 2021 mri: Reason For Exam Hepatocellular carcinoma;Hepatocellular carcinoma  REPORT EXAM: MRI Abdomen w/ + w/o Contrast  CLINICAL INDICATION: Hepatocellular carcinoma  TECHNIQUE: Multisequence, multiplanar MRI of the abdomen was performed without and with intravenous contrast. [id number]  CONTRAST: 18 cc of Prohance  COMPARISON: March 2, 2021  FINDINGS:  Lower Thorax: Normal.  Liver: No fat or iron. Cirrhotic liver morphology. No arterially enhancing lesions. No lesions with capsule. No lesions with washout.  Gallbladder/Biliary Tree: Status post cholecystectomy.  Spleen: Measuring 19.0 cm in maximum craniocaudal diameter.  Pancreas: Normal.  Adrenal Glands: Normal.   Kidneys/Ureters: Normal.  Gastrointestinal: Normal.   Lymph Nodes: Normal.  Vessels: Chronic thrombosis of the main portal vein with cavernous transformation redemonstrated. Multiple large collateral pathways including gastroesophageal varices, again noted. 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  Peritoneum/Retroperitoneum: Minimal ascites is present.  Bones/Soft Tissues: Normal.  IMPRESSION:     Cirrhosis with chronic portal vein thrombosis and cavernous transformation with associated evidence of portal hypertension. No hepatocellular carcinoma.

## 2023-06-05 NOTE — HPI-TODAY'S VISIT:
Dear Lady Carson,  The 6/2/23 MR elastography was sent to me after lunch today. As you recall it was not available for viewing during the visit today. I called and left you a voicemail about this.  Lower thorax with unchanged small left pleural effusion.  Liver with subjective evidence of fat deposition.  Cirrhotic morphology.  No suspicious lesions.  Elastography donel after a weighted mean of 3 measurements showing 3.2K PA consistent with stage I-II fibrosis.  The fat quantification 12.5% consistent with mild fatty liver.  There is no significant iron deposition.  They did not see any biliary ductal dilatation.  Splenomegaly measuring 20 cm was seen.  The pancreas normal, adrenal glands normal.  They saw a right upper renal cyst.  Please make sure the primary care provider is aware.  They saw reactive lymph nodes.  They thought that the aorta was normal.  The superior mesenteric and inferior mesenteric along with the bilateral renal arteries were patent.  They noted the replaced right hepatic artery arising from the superior mesenteric artery.  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  They noted that there has been interval progression of the degree of thrombosis of the portosystemic shunt with near complete occlusive thrombus.  They noted additional varices and this is why we have to stay on top of the endoscopic use.  They saw mesenteric edema unchanged from the previous CT.  Moderate ascites.  Overall they noted cirrhosis and did not see any suspicious lesions but they did note that there was progression of a now near occlusive thrombus of a large portosystemic venous shunt from the splenic vein to the left femoral vein.  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 As you recall we were doing this to check on the clot and to ensure there were no lesions. It is good to confirm no lesions. Need to be sure you see the transplant team back as discussed.  Do Valles PA-C

## 2023-06-22 ENCOUNTER — TELEPHONE ENCOUNTER (OUTPATIENT)
Dept: URBAN - METROPOLITAN AREA CLINIC 86 | Facility: CLINIC | Age: 63
End: 2023-06-22

## 2023-06-22 NOTE — HPI-TODAY'S VISIT:
Dear Lady Carson, June 5 MRI was updated by radiology today. When they compared to multiple priors dating back to 2018, the described retroperitoneal/mesenteric root edema and perivascular infiltrative soft tissue demonstrates avid enhancement with no restricted diffusion.  The degree of enhancement has progressed as well as the density infiltrative soft tissue compared to multiple priors. Retroperitoneal lymph nodes were also increased in number but not pathologically enlarged.  There was no evident medialization of the ureters.  They said that they findings are concerning for retroperitoneal fibrotic or inflammatory process. It appears that you were seen on June 20 by the transplant team/Dr Nichols and we will forward them this MRI correction so that they can be aware of this.  We will forward it to him via [employer]  to be aware of as he just saw you. Dr Gonzales

## 2023-06-28 ENCOUNTER — APPOINTMENT (RX ONLY)
Dept: URBAN - METROPOLITAN AREA CLINIC 41 | Facility: CLINIC | Age: 63
Setting detail: DERMATOLOGY
End: 2023-06-28

## 2023-06-28 DIAGNOSIS — L40.0 PSORIASIS VULGARIS: ICD-10-CM

## 2023-06-28 PROCEDURE — ? MEDICATION COUNSELING

## 2023-06-28 PROCEDURE — ? COUNSELING

## 2023-06-28 PROCEDURE — ? INTRALESIONAL KENALOG

## 2023-06-28 PROCEDURE — ? TREATMENT REGIMEN

## 2023-06-28 PROCEDURE — 11901 INJECT SKIN LESIONS >7: CPT

## 2023-06-28 ASSESSMENT — LOCATION DETAILED DESCRIPTION DERM
LOCATION DETAILED: RIGHT OCCIPITAL SCALP
LOCATION DETAILED: RIGHT INFERIOR OCCIPITAL SCALP
LOCATION DETAILED: LEFT OCCIPITAL SCALP
LOCATION DETAILED: RIGHT SUPERIOR POSTERIOR NECK
LOCATION DETAILED: LEFT INFERIOR OCCIPITAL SCALP
LOCATION DETAILED: MID-OCCIPITAL SCALP

## 2023-06-28 ASSESSMENT — LOCATION SIMPLE DESCRIPTION DERM
LOCATION SIMPLE: POSTERIOR SCALP
LOCATION SIMPLE: POSTERIOR NECK

## 2023-06-28 ASSESSMENT — LOCATION ZONE DERM
LOCATION ZONE: SCALP
LOCATION ZONE: NECK

## 2023-06-28 ASSESSMENT — BSA PSORIASIS: % BODY COVERED IN PSORIASIS: 1

## 2023-06-28 ASSESSMENT — PGA PSORIASIS: PGA PSORIASIS 2020: SEVERE

## 2023-06-28 NOTE — PROCEDURE: INTRALESIONAL KENALOG
Administered By (Optional): Dr Holliday
How Many Mls Were Removed From The 40 Mg/Ml (5ml) Vial When Preparing The Injectable Solution?: 0
Kenalog Preparation: Kenalog
Consent: The risks of atrophy were reviewed with the patient.
Total Volume (Ccs): 4.0
Medical Necessity Clause: This procedure was medically necessary because the lesions that were treated were:
Expiration Date For Kenalog (Optional): June 2024
Show Inventory Tab: Hide
Concentration Of Kenalog Solution Injected (Mg/Ml): 8.0
Detail Level: Detailed
Include Z78.9 (Other Specified Conditions Influencing Health Status) As An Associated Diagnosis?: No
Validate Note Data When Using Inventory: Yes
Ndc# For Kenalog Only: 6011-0555-81
Lot # For Kenalog (Optional): [id number]

## 2023-06-28 NOTE — PROCEDURE: TREATMENT REGIMEN
Action 1: Continue
Plan: Patients psoriasis is flaring a little more today. She is under a lot of stress at this moment. Discussed she has gone six months without having injections. We did injections today to calm down inflammation. We will continue using derma smooth oil for now. Rechecking in four months.
Detail Level: Zone
Continue Regimen: Derma smooth oil qd to scalp as needed for flares

## 2023-06-28 NOTE — PROCEDURE: MEDICATION COUNSELING
Hydroxyzine Counseling: Patient advised that the medication is sedating and not to drive a car after taking this medication.  Patient informed of potential adverse effects including but not limited to dry mouth, urinary retention, and blurry vision.  The patient verbalized understanding of the proper use and possible adverse effects of hydroxyzine.  All of the patient's questions and concerns were addressed.
Topical Retinoid Pregnancy And Lactation Text: This medication is Pregnancy Category C. It is unknown if this medication is excreted in breast milk.
Tranexamic Acid Counseling:  Patient advised of the small risk of bleeding problems with tranexamic acid. They were also instructed to call if they developed any nausea, vomiting or diarrhea. All of the patient's questions and concerns were addressed.
Topical Steroids Counseling: I discussed with the patient that prolonged use of topical steroids can result in the increased appearance of superficial blood vessels (telangiectasias), lightening (hypopigmentation) and thinning of the skin (atrophy).  Patient understands to avoid using high potency steroids in skin folds, the groin or the face.  The patient verbalized understanding of the proper use and possible adverse effects of topical steroids.  All of the patient's questions and concerns were addressed.
Cibinqo Counseling: I discussed with the patient the risks of Cibinqo therapy including but not limited to common cold, nausea, headache, cold sores, increased blood CPK levels, dizziness, UTIs, fatigue, acne, and vomitting. Live vaccines should be avoided.  This medication has been linked to serious infections; higher rate of mortality; malignancy and lymphoproliferative disorders; major adverse cardiovascular events; thrombosis; thrombocytopenia and lymphopenia; lipid elevations; and retinal detachment.
Cimzia Counseling:  I discussed with the patient the risks of Cimzia including but not limited to immunosuppression, allergic reactions and infections.  The patient understands that monitoring is required including a PPD at baseline and must alert us or the primary physician if symptoms of infection or other concerning signs are noted.
Otezla Pregnancy And Lactation Text: This medication is Pregnancy Category C and it isn't known if it is safe during pregnancy. It is unknown if it is excreted in breast milk.
Metronidazole Counseling:  I discussed with the patient the risks of metronidazole including but not limited to seizures, nausea/vomiting, a metallic taste in the mouth, nausea/vomiting and severe allergy.
Infliximab Counseling:  I discussed with the patient the risks of infliximab including but not limited to myelosuppression, immunosuppression, autoimmune hepatitis, demyelinating diseases, lymphoma, and serious infections.  The patient understands that monitoring is required including a PPD at baseline and must alert us or the primary physician if symptoms of infection or other concerning signs are noted.
Wartpeel Pregnancy And Lactation Text: This medication is Pregnancy Category X and contraindicated in pregnancy and in women who may become pregnant. It is unknown if this medication is excreted in breast milk.
Erivedge Counseling- I discussed with the patient the risks of Erivedge including but not limited to nausea, vomiting, diarrhea, constipation, weight loss, changes in the sense of taste, decreased appetite, muscle spasms, and hair loss.  The patient verbalized understanding of the proper use and possible adverse effects of Erivedge.  All of the patient's questions and concerns were addressed.
Sarecycline Pregnancy And Lactation Text: This medication is Pregnancy Category D and not consider safe during pregnancy. It is also excreted in breast milk.
Carac Counseling:  I discussed with the patient the risks of Carac including but not limited to erythema, scaling, itching, weeping, crusting, and pain.
Minoxidil Pregnancy And Lactation Text: This medication has not been assigned a Pregnancy Risk Category but animal studies failed to show danger with the topical medication. It is unknown if the medication is excreted in breast milk.
Opioid Counseling: I discussed with the patient the potential side effects of opioids including but not limited to addiction, altered mental status, and depression. I stressed avoiding alcohol, benzodiazepines, muscle relaxants and sleep aids unless specifically okayed by a physician. The patient verbalized understanding of the proper use and possible adverse effects of opioids. All of the patient's questions and concerns were addressed. They were instructed to flush the remaining pills down the toilet if they did not need them for pain.
Ketoconazole Counseling:   Patient counseled regarding improving absorption with orange juice.  Adverse effects include but are not limited to breast enlargement, headache, diarrhea, nausea, upset stomach, liver function test abnormalities, taste disturbance, and stomach pain.  There is a rare possibility of liver failure that can occur when taking ketoconazole. The patient understands that monitoring of LFTs may be required, especially at baseline. The patient verbalized understanding of the proper use and possible adverse effects of ketoconazole.  All of the patient's questions and concerns were addressed.
Finasteride Pregnancy And Lactation Text: This medication is absolutely contraindicated during pregnancy. It is unknown if it is excreted in breast milk.
Niacinamide Counseling: I recommended taking niacin or niacinamide, also know as vitamin B3, twice daily. Recent evidence suggests that taking vitamin B3 (500 mg twice daily) can reduce the risk of actinic keratoses and non-melanoma skin cancers. Side effects of vitamin B3 include flushing and headache.
High Dose Vitamin A Counseling: Side effects reviewed, pt to contact office should one occur.
Qbrexza Counseling:  I discussed with the patient the risks of Qbrexza including but not limited to headache, mydriasis, blurred vision, dry eyes, nasal dryness, dry mouth, dry throat, dry skin, urinary hesitation, and constipation.  Local skin reactions including erythema, burning, stinging, and itching can also occur.
Dapsone Pregnancy And Lactation Text: This medication is Pregnancy Category C and is not considered safe during pregnancy or breast feeding.
Bactrim Pregnancy And Lactation Text: This medication is Pregnancy Category D and is known to cause fetal risk.  It is also excreted in breast milk.
Tranexamic Acid Pregnancy And Lactation Text: It is unknown if this medication is safe during pregnancy or breast feeding.
Cyclophosphamide Counseling:  I discussed with the patient the risks of cyclophosphamide including but not limited to hair loss, hormonal abnormalities, decreased fertility, abdominal pain, diarrhea, nausea and vomiting, bone marrow suppression and infection. The patient understands that monitoring is required while taking this medication.
Cimzia Pregnancy And Lactation Text: This medication crosses the placenta but can be considered safe in certain situations. Cimzia may be excreted in breast milk.
Topical Steroids Applications Pregnancy And Lactation Text: Most topical steroids are considered safe to use during pregnancy and lactation.  Any topical steroid applied to the breast or nipple should be washed off before breastfeeding.
Stelara Counseling:  I discussed with the patient the risks of ustekinumab including but not limited to immunosuppression, malignancy, posterior leukoencephalopathy syndrome, and serious infections.  The patient understands that monitoring is required including a PPD at baseline and must alert us or the primary physician if symptoms of infection or other concerning signs are noted.
Cibinqo Pregnancy And Lactation Text: It is unknown if this medication will adversely affect pregnancy or breast feeding.  You should not take this medication if you are currently pregnant or planning a pregnancy or while breastfeeding.
Tazorac Counseling:  Patient advised that medication is irritating and drying.  Patient may need to apply sparingly and wash off after an hour before eventually leaving it on overnight.  The patient verbalized understanding of the proper use and possible adverse effects of tazorac.  All of the patient's questions and concerns were addressed.
Hydroxyzine Pregnancy And Lactation Text: This medication is not safe during pregnancy and should not be taken. It is also excreted in breast milk and breast feeding isn't recommended.
Eucrisa Counseling: Patient may experience a mild burning sensation during topical application. Eucrisa is not approved in children less than 3 months of age.
Oxybutynin Counseling:  I discussed with the patient the risks of oxybutynin including but not limited to skin rash, drowsiness, dry mouth, difficulty urinating, and blurred vision.
Infliximab Pregnancy And Lactation Text: This medication is Pregnancy Category B and is considered safe during pregnancy. It is unknown if this medication is excreted in breast milk.
Winlevi Counseling:  I discussed with the patient the risks of topical clascoterone including but not limited to erythema, scaling, itching, and stinging. Patient voiced their understanding.
Dupixent Counseling: I discussed with the patient the risks of dupilumab including but not limited to eye inflammation and irritation, cold sores, injection site reactions, allergic reactions and increased risk of parasitic infection. The patient understands that monitoring is required and they must alert us or the primary physician if symptoms of infection or other concerning signs are noted.
Niacinamide Pregnancy And Lactation Text: These medications are considered safe during pregnancy.
Cephalexin Counseling: I counseled the patient regarding use of cephalexin as an antibiotic for prophylactic and/or therapeutic purposes. Cephalexin (commonly prescribed under brand name Keflex) is a cephalosporin antibiotic which is active against numerous classes of bacteria, including most skin bacteria. Side effects may include nausea, diarrhea, gastrointestinal upset, rash, hives, yeast infections, and in rare cases, hepatitis, kidney disease, seizures, fever, confusion, neurologic symptoms, and others. Patients with severe allergies to penicillin medications are cautioned that there is about a 10% incidence of cross-reactivity with cephalosporins. When possible, patients with penicillin allergies should use alternatives to cephalosporins for antibiotic therapy.
Birth Control Pills Counseling: Birth Control Pill Counseling: I discussed with the patient the potential side effects of OCPs including but not limited to increased risk of stroke, heart attack, thrombophlebitis, deep venous thrombosis, hepatic adenomas, breast changes, GI upset, headaches, and depression.  The patient verbalized understanding of the proper use and possible adverse effects of OCPs. All of the patient's questions and concerns were addressed.
Erivedge Pregnancy And Lactation Text: This medication is Pregnancy Category X and is absolutely contraindicated during pregnancy. It is unknown if it is excreted in breast milk.
Opioid Pregnancy And Lactation Text: These medications can lead to premature delivery and should be avoided during pregnancy. These medications are also present in breast milk in small amounts.
Qbrexza Pregnancy And Lactation Text: There is no available data on Qbrexza use in pregnant women.  There is no available data on Qbrexza use in lactation.
High Dose Vitamin A Pregnancy And Lactation Text: High dose vitamin A therapy is contraindicated during pregnancy and breast feeding.
Cosentyx Counseling:  I discussed with the patient the risks of Cosentyx including but not limited to worsening of Crohn's disease, immunosuppression, allergic reactions and infections.  The patient understands that monitoring is required including a PPD at baseline and must alert us or the primary physician if symptoms of infection or other concerning signs are noted.
Cyclophosphamide Pregnancy And Lactation Text: This medication is Pregnancy Category D and it isn't considered safe during pregnancy. This medication is excreted in breast milk.
Metronidazole Pregnancy And Lactation Text: This medication is Pregnancy Category B and considered safe during pregnancy.  It is also excreted in breast milk.
Tetracycline Counseling: Patient counseled regarding possible photosensitivity and increased risk for sunburn.  Patient instructed to avoid sunlight, if possible.  When exposed to sunlight, patients should wear protective clothing, sunglasses, and sunscreen.  The patient was instructed to call the office immediately if the following severe adverse effects occur:  hearing changes, easy bruising/bleeding, severe headache, or vision changes.  The patient verbalized understanding of the proper use and possible adverse effects of tetracycline.  All of the patient's questions and concerns were addressed. Patient understands to avoid pregnancy while on therapy due to potential birth defects.
Mirvaso Counseling: Mirvaso is a topical medication which can decrease superficial blood flow where applied. Side effects are uncommon and include stinging, redness and allergic reactions.
Gabapentin Counseling: I discussed with the patient the risks of gabapentin including but not limited to dizziness, somnolence, fatigue and ataxia.
Ketoconazole Pregnancy And Lactation Text: This medication is Pregnancy Category C and it isn't know if it is safe during pregnancy. It is also excreted in breast milk and breast feeding isn't recommended.
Valtrex Counseling: I discussed with the patient the risks of valacyclovir including but not limited to kidney damage, nausea, vomiting and severe allergy.  The patient understands that if the infection seems to be worsening or is not improving, they are to call.
Olumiant Counseling: I discussed with the patient the risks of Olumiant therapy including but not limited to upper respiratory tract infections, shingles, cold sores, and nausea. Live vaccines should be avoided.  This medication has been linked to serious infections; higher rate of mortality; malignancy and lymphoproliferative disorders; major adverse cardiovascular events; thrombosis; gastrointestinal perforations; neutropenia; lymphopenia; anemia; liver enzyme elevations; and lipid elevations.
Libtayo Counseling- I discussed with the patient the risks of Libtayo including but not limited to nausea, vomiting, diarrhea, and bone or muscle pain.  The patient verbalized understanding of the proper use and possible adverse effects of Libtayo.  All of the patient's questions and concerns were addressed.
Rhofade Counseling: Rhofade is a topical medication which can decrease superficial blood flow where applied. Side effects are uncommon and include stinging, redness and allergic reactions.
Albendazole Counseling:  I discussed with the patient the risks of albendazole including but not limited to cytopenia, kidney damage, nausea/vomiting and severe allergy.  The patient understands that this medication is being used in an off-label manner.
Rituxan Counseling:  I discussed with the patient the risks of Rituxan infusions. Side effects can include infusion reactions, severe drug rashes including mucocutaneous reactions, reactivation of latent hepatitis and other infections and rarely progressive multifocal leukoencephalopathy.  All of the patient's questions and concerns were addressed.
Winlevi Pregnancy And Lactation Text: This medication is considered safe during pregnancy and breastfeeding.
Oxybutynin Pregnancy And Lactation Text: This medication is Pregnancy Category B and is considered safe during pregnancy. It is unknown if it is excreted in breast milk.
Cephalexin Pregnancy And Lactation Text: This medication is Pregnancy Category B and considered safe during pregnancy.  It is also excreted in breast milk but can be used safely for shorter doses.
Calcipotriene Counseling:  I discussed with the patient the risks of calcipotriene including but not limited to erythema, scaling, itching, and irritation.
Tazorac Pregnancy And Lactation Text: This medication is not safe during pregnancy. It is unknown if this medication is excreted in breast milk.
Nsaids Counseling: NSAID Counseling: I discussed with the patient that NSAIDs should be taken with food. Prolonged use of NSAIDs can result in the development of stomach ulcers.  Patient advised to stop taking NSAIDs if abdominal pain occurs.  The patient verbalized understanding of the proper use and possible adverse effects of NSAIDs.  All of the patient's questions and concerns were addressed.
Dupixent Pregnancy And Lactation Text: This medication likely crosses the placenta but the risk for the fetus is uncertain. This medication is excreted in breast milk.
Detail Level: Simple
Birth Control Pills Pregnancy And Lactation Text: This medication should be avoided if pregnant and for the first 30 days post-partum.
Calcipotriene Pregnancy And Lactation Text: The use of this medication during pregnancy or lactation is not recommended as there is insufficient data.
Minocycline Counseling: Patient advised regarding possible photosensitivity and discoloration of the teeth, skin, lips, tongue and gums.  Patient instructed to avoid sunlight, if possible.  When exposed to sunlight, patients should wear protective clothing, sunglasses, and sunscreen.  The patient was instructed to call the office immediately if the following severe adverse effects occur:  hearing changes, easy bruising/bleeding, severe headache, or vision changes.  The patient verbalized understanding of the proper use and possible adverse effects of minocycline.  All of the patient's questions and concerns were addressed.
Cyclosporine Counseling:  I discussed with the patient the risks of cyclosporine including but not limited to hypertension, gingival hyperplasia,myelosuppression, immunosuppression, liver damage, kidney damage, neurotoxicity, lymphoma, and serious infections. The patient understands that monitoring is required including baseline blood pressure, CBC, CMP, lipid panel and uric acid, and then 1-2 times monthly CMP and blood pressure.
Hydroquinone Counseling:  Patient advised that medication may result in skin irritation, lightening (hypopigmentation), dryness, and burning.  In the event of skin irritation, the patient was advised to reduce the amount of the drug applied or use it less frequently.  Rarely, spots that are treated with hydroquinone can become darker (pseudoochronosis).  Should this occur, patient instructed to stop medication and call the office. The patient verbalized understanding of the proper use and possible adverse effects of hydroquinone.  All of the patient's questions and concerns were addressed.
Gabapentin Pregnancy And Lactation Text: This medication is Pregnancy Category C and isn't considered safe during pregnancy. It is excreted in breast milk.
Mirvaso Pregnancy And Lactation Text: This medication has not been assigned a Pregnancy Risk Category. It is unknown if the medication is excreted in breast milk.
Valtrex Pregnancy And Lactation Text: this medication is Pregnancy Category B and is considered safe during pregnancy. This medication is not directly found in breast milk but it's metabolite acyclovir is present.
Terbinafine Counseling: Patient counseling regarding adverse effects of terbinafine including but not limited to headache, diarrhea, rash, upset stomach, liver function test abnormalities, itching, taste/smell disturbance, nausea, abdominal pain, and flatulence.  There is a rare possibility of liver failure that can occur when taking terbinafine.  The patient understands that a baseline LFT and kidney function test may be required. The patient verbalized understanding of the proper use and possible adverse effects of terbinafine.  All of the patient's questions and concerns were addressed.
Olumiant Pregnancy And Lactation Text: Based on animal studies, Olumiant may cause embryo-fetal harm when administered to pregnant women.  The medication should not be used in pregnancy.  Breastfeeding is not recommended during treatment.
Taltz Counseling: I discussed with the patient the risks of ixekizumab including but not limited to immunosuppression, serious infections, worsening of inflammatory bowel disease and drug reactions.  The patient understands that monitoring is required including a PPD at baseline and must alert us or the primary physician if symptoms of infection or other concerning signs are noted.
Arava Counseling:  Patient counseled regarding adverse effects of Arava including but not limited to nausea, vomiting, abnormalities in liver function tests. Patients may develop mouth sores, rash, diarrhea, and abnormalities in blood counts. The patient understands that monitoring is required including LFTs and blood counts.  There is a rare possibility of scarring of the liver and lung problems that can occur when taking methotrexate. Persistent nausea, loss of appetite, pale stools, dark urine, cough, and shortness of breath should be reported immediately. Patient advised to discontinue Arava treatment and consult with a physician prior to attempting conception. The patient will have to undergo a treatment to eliminate Arava from the body prior to conception.
Rituxan Pregnancy And Lactation Text: This medication is Pregnancy Category C and it isn't know if it is safe during pregnancy. It is unknown if this medication is excreted in breast milk but similar antibodies are known to be excreted.
VTAMA Counseling: I discussed with the patient that VTAMA is not for use in the eyes, mouth or mouth. They should call the office if they develop any signs of allergic reactions to VTAMA. The patient verbalized understanding of the proper use and possible adverse effects of VTAMA.  All of the patient's questions and concerns were addressed.
Propranolol Counseling:  I discussed with the patient the risks of propranolol including but not limited to low heart rate, low blood pressure, low blood sugar, restlessness and increased cold sensitivity. They should call the office if they experience any of these side effects.
Topical Clindamycin Counseling: Patient counseled that this medication may cause skin irritation or allergic reactions.  In the event of skin irritation, the patient was advised to reduce the amount of the drug applied or use it less frequently.   The patient verbalized understanding of the proper use and possible adverse effects of clindamycin.  All of the patient's questions and concerns were addressed.
Libtayo Pregnancy And Lactation Text: This medication is contraindicated in pregnancy and when breast feeding.
Cantharidin Counseling:  I discussed with the patient the risks of Cantharidin including but not limited to pain, redness, burning, itching, and blistering.
Aklief counseling:  Patient advised to apply a pea-sized amount only at bedtime and wait 30 minutes after washing their face before applying.  If too drying, patient may add a non-comedogenic moisturizer.  The most commonly reported side effects including irritation, redness, scaling, dryness, stinging, burning, itching, and increased risk of sunburn.  The patient verbalized understanding of the proper use and possible adverse effects of retinoids.  All of the patient's questions and concerns were addressed.
Nsaids Pregnancy And Lactation Text: These medications are considered safe up to 30 weeks gestation. It is excreted in breast milk.
Spironolactone Counseling: Patient advised regarding risks of diarrhea, abdominal pain, hyperkalemia, birth defects (for female patients), liver toxicity and renal toxicity. The patient may need blood work to monitor liver and kidney function and potassium levels while on therapy. The patient verbalized understanding of the proper use and possible adverse effects of spironolactone.  All of the patient's questions and concerns were addressed.
Albendazole Pregnancy And Lactation Text: This medication is Pregnancy Category C and it isn't known if it is safe during pregnancy. It is also excreted in breast milk.
Enbrel Counseling:  I discussed with the patient the risks of etanercept including but not limited to myelosuppression, immunosuppression, autoimmune hepatitis, demyelinating diseases, lymphoma, and infections.  The patient understands that monitoring is required including a PPD at baseline and must alert us or the primary physician if symptoms of infection or other concerning signs are noted.
Glycopyrrolate Counseling:  I discussed with the patient the risks of glycopyrrolate including but not limited to skin rash, drowsiness, dry mouth, difficulty urinating, and blurred vision.
Terbinafine Pregnancy And Lactation Text: This medication is Pregnancy Category B and is considered safe during pregnancy. It is also excreted in breast milk and breast feeding isn't recommended.
Include Pregnancy/Lactation Warning?: No
Clindamycin Counseling: I counseled the patient regarding use of clindamycin as an antibiotic for prophylactic and/or therapeutic purposes. Clindamycin is active against numerous classes of bacteria, including skin bacteria. Side effects may include nausea, diarrhea, gastrointestinal upset, rash, hives, yeast infections, and in rare cases, colitis.
Cantharidin Pregnancy And Lactation Text: This medication has not been proven safe during pregnancy. It is unknown if this medication is excreted in breast milk.
Cyclosporine Pregnancy And Lactation Text: This medication is Pregnancy Category C and it isn't know if it is safe during pregnancy. This medication is excreted in breast milk.
Opzelura Counseling:  I discussed with the patient the risks of Opzelura including but not limited to nasopharngitis, bronchitis, ear infection, eosinophila, hives, diarrhea, folliculitis, tonsillitis, and rhinorrhea.  Taken orally, this medication has been linked to serious infections; higher rate of mortality; malignancy and lymphoproliferative disorders; major adverse cardiovascular events; thrombosis; thrombocytopenia, anemia, and neutropenia; and lipid elevations.
Acitretin Counseling:  I discussed with the patient the risks of acitretin including but not limited to hair loss, dry lips/skin/eyes, liver damage, hyperlipidemia, depression/suicidal ideation, photosensitivity.  Serious rare side effects can include but are not limited to pancreatitis, pseudotumor cerebri, bony changes, clot formation/stroke/heart attack.  Patient understands that alcohol is contraindicated since it can result in liver toxicity and significantly prolong the elimination of the drug by many years.
5-Fu Counseling: 5-Fluorouracil Counseling:  I discussed with the patient the risks of 5-fluorouracil including but not limited to erythema, scaling, itching, weeping, crusting, and pain.
Taltz Pregnancy And Lactation Text: The risk during pregnancy and breastfeeding is uncertain with this medication.
Rinvoq Counseling: I discussed with the patient the risks of Rinvoq therapy including but not limited to upper respiratory tract infections, shingles, cold sores, bronchitis, nausea, cough, fever, acne, and headache. Live vaccines should be avoided.  This medication has been linked to serious infections; higher rate of mortality; malignancy and lymphoproliferative disorders; major adverse cardiovascular events; thrombosis; thrombocytopenia, anemia, and neutropenia; lipid elevations; liver enzyme elevations; and gastrointestinal perforations.
Propranolol Pregnancy And Lactation Text: This medication is Pregnancy Category C and it isn't known if it is safe during pregnancy. It is excreted in breast milk.
Fluconazole Counseling:  Patient counseled regarding adverse effects of fluconazole including but not limited to headache, diarrhea, nausea, upset stomach, liver function test abnormalities, taste disturbance, and stomach pain.  There is a rare possibility of liver failure that can occur when taking fluconazole.  The patient understands that monitoring of LFTs and kidney function test may be required, especially at baseline. The patient verbalized understanding of the proper use and possible adverse effects of fluconazole.  All of the patient's questions and concerns were addressed.
Vtama Pregnancy And Lactation Text: It is unknown if this medication can cause problems during pregnancy and breastfeeding.
Siliq Counseling:  I discussed with the patient the risks of Siliq including but not limited to new or worsening depression, suicidal thoughts and behavior, immunosuppression, malignancy, posterior leukoencephalopathy syndrome, and serious infections.  The patient understands that monitoring is required including a PPD at baseline and must alert us or the primary physician if symptoms of infection or other concerning signs are noted. There is also a special program designed to monitor depression which is required with Siliq.
Odomzo Counseling- I discussed with the patient the risks of Odomzo including but not limited to nausea, vomiting, diarrhea, constipation, weight loss, changes in the sense of taste, decreased appetite, muscle spasms, and hair loss.  The patient verbalized understanding of the proper use and possible adverse effects of Odomzo.  All of the patient's questions and concerns were addressed.
Quinolones Counseling:  I discussed with the patient the risks of fluoroquinolones including but not limited to GI upset, allergic reaction, drug rash, diarrhea, dizziness, photosensitivity, yeast infections, liver function test abnormalities, tendonitis/tendon rupture.
Acitretin Pregnancy And Lactation Text: This medication is Pregnancy Category X and should not be given to women who are pregnant or may become pregnant in the future. This medication is excreted in breast milk.
Opzelura Pregnancy And Lactation Text: There is insufficient data to evaluate drug-associated risk for major birth defects, miscarriage, or other adverse maternal or fetal outcomes.  There is a pregnancy registry that monitors pregnancy outcomes in pregnant persons exposed to the medication during pregnancy.  It is unknown if this medication is excreted in breast milk.  Do not breastfeed during treatment and for about 4 weeks after the last dose.
Olanzapine Counseling- I discussed with the patient the common side effects of olanzapine including but are not limited to: lack of energy, dry mouth, increased appetite, sleepiness, tremor, constipation, dizziness, changes in behavior, or restlessness.  Explained that teenagers are more likely to experience headaches, abdominal pain, pain in the arms or legs, tiredness, and sleepiness.  Serious side effects include but are not limited: increased risk of death in elderly patients who are confused, have memory loss, or dementia-related psychosis; hyperglycemia; increased cholesterol and triglycerides; and weight gain.
Spironolactone Pregnancy And Lactation Text: This medication can cause feminization of the male fetus and should be avoided during pregnancy. The active metabolite is also found in breast milk.
Solaraze Counseling:  I discussed with the patient the risks of Solaraze including but not limited to erythema, scaling, itching, weeping, crusting, and pain.
Aklief Pregnancy And Lactation Text: It is unknown if this medication is safe to use during pregnancy.  It is unknown if this medication is excreted in breast milk.  Breastfeeding women should use the topical cream on the smallest area of the skin for the shortest time needed while breastfeeding.  Do not apply to nipple and areola.
Clindamycin Pregnancy And Lactation Text: This medication can be used in pregnancy if certain situations. Clindamycin is also present in breast milk.
Ivermectin Counseling:  Patient instructed to take medication on an empty stomach with a full glass of water.  Patient informed of potential adverse effects including but not limited to nausea, diarrhea, dizziness, itching, and swelling of the extremities or lymph nodes.  The patient verbalized understanding of the proper use and possible adverse effects of ivermectin.  All of the patient's questions and concerns were addressed.
Glycopyrrolate Pregnancy And Lactation Text: This medication is Pregnancy Category B and is considered safe during pregnancy. It is unknown if it is excreted breast milk.
Rinvoq Pregnancy And Lactation Text: Based on animal studies, Rinvoq may cause embryo-fetal harm when administered to pregnant women.  The medication should not be used in pregnancy.  Breastfeeding is not recommended during treatment and for 6 days after the last dose.
Tremfya Counseling: I discussed with the patient the risks of guselkumab including but not limited to immunosuppression, serious infections, and drug reactions.  The patient understands that monitoring is required including a PPD at baseline and must alert us or the primary physician if symptoms of infection or other concerning signs are noted.
Fluconazole Pregnancy And Lactation Text: This medication is Pregnancy Category C and it isn't know if it is safe during pregnancy. It is also excreted in breast milk.
Methotrexate Counseling:  Patient counseled regarding adverse effects of methotrexate including but not limited to nausea, vomiting, abnormalities in liver function tests. Patients may develop mouth sores, rash, diarrhea, and abnormalities in blood counts. The patient understands that monitoring is required including LFT's and blood counts.  There is a rare possibility of scarring of the liver and lung problems that can occur when taking methotrexate. Persistent nausea, loss of appetite, pale stools, dark urine, cough, and shortness of breath should be reported immediately. Patient advised to discontinue methotrexate treatment at least three months before attempting to become pregnant.  I discussed the need for folate supplements while taking methotrexate.  These supplements can decrease side effects during methotrexate treatment. The patient verbalized understanding of the proper use and possible adverse effects of methotrexate.  All of the patient's questions and concerns were addressed.
Imiquimod Counseling:  I discussed with the patient the risks of imiquimod including but not limited to erythema, scaling, itching, weeping, crusting, and pain.  Patient understands that the inflammatory response to imiquimod is variable from person to person and was educated regarded proper titration schedule.  If flu-like symptoms develop, patient knows to discontinue the medication and contact us.
Cimetidine Counseling:  I discussed with the patient the risks of Cimetidine including but not limited to gynecomastia, headache, diarrhea, nausea, drowsiness, arrhythmias, pancreatitis, skin rashes, psychosis, bone marrow suppression and kidney toxicity.
Clofazimine Counseling:  I discussed with the patient the risks of clofazimine including but not limited to skin and eye pigmentation, liver damage, nausea/vomiting, gastrointestinal bleeding and allergy.
Zoryve Counseling:  I discussed with the patient that Zoryve is not for use in the eyes, mouth or vagina. The most commonly reported side effects include diarrhea, headache, insomnia, application site pain, upper respiratory tract infections, and urinary tract infections.  All of the patient's questions and concerns were addressed.
Topical Ketoconazole Counseling: Patient counseled that this medication may cause skin irritation or allergic reactions.  In the event of skin irritation, the patient was advised to reduce the amount of the drug applied or use it less frequently.   The patient verbalized understanding of the proper use and possible adverse effects of ketoconazole.  All of the patient's questions and concerns were addressed.
SSKI Counseling:  I discussed with the patient the risks of SSKI including but not limited to thyroid abnormalities, metallic taste, GI upset, fever, headache, acne, arthralgias, paraesthesias, lymphadenopathy, easy bleeding, arrhythmias, and allergic reaction.
Olanzapine Pregnancy And Lactation Text: This medication is pregnancy category C.   There are no adequate and well controlled trials with olanzapine in pregnant females.  Olanzapine should be used during pregnancy only if the potential benefit justifies the potential risk to the fetus.   In a study in lactating healthy women, olanzapine was excreted in breast milk.  It is recommended that women taking olanzapine should not breast feed.
Humira Counseling:  I discussed with the patient the risks of adalimumab including but not limited to myelosuppression, immunosuppression, autoimmune hepatitis, demyelinating diseases, lymphoma, and serious infections.  The patient understands that monitoring is required including a PPD at baseline and must alert us or the primary physician if symptoms of infection or other concerning signs are noted.
Doxycycline Counseling:  Patient counseled regarding possible photosensitivity and increased risk for sunburn.  Patient instructed to avoid sunlight, if possible.  When exposed to sunlight, patients should wear protective clothing, sunglasses, and sunscreen.  The patient was instructed to call the office immediately if the following severe adverse effects occur:  hearing changes, easy bruising/bleeding, severe headache, or vision changes.  The patient verbalized understanding of the proper use and possible adverse effects of doxycycline.  All of the patient's questions and concerns were addressed.
Solaraze Pregnancy And Lactation Text: This medication is Pregnancy Category B and is considered safe. There is some data to suggest avoiding during the third trimester. It is unknown if this medication is excreted in breast milk.
Azelaic Acid Counseling: Patient counseled that medicine may cause skin irritation and to avoid applying near the eyes.  In the event of skin irritation, the patient was advised to reduce the amount of the drug applied or use it less frequently.   The patient verbalized understanding of the proper use and possible adverse effects of azelaic acid.  All of the patient's questions and concerns were addressed.
Methotrexate Pregnancy And Lactation Text: This medication is Pregnancy Category X and is known to cause fetal harm. This medication is excreted in breast milk.
Griseofulvin Counseling:  I discussed with the patient the risks of griseofulvin including but not limited to photosensitivity, cytopenia, liver damage, nausea/vomiting and severe allergy.  The patient understands that this medication is best absorbed when taken with a fatty meal (e.g., ice cream or french fries).
Hydroxychloroquine Counseling:  I discussed with the patient that a baseline ophthalmologic exam is needed at the start of therapy and every year thereafter while on therapy. A CBC may also be warranted for monitoring.  The side effects of this medication were discussed with the patient, including but not limited to agranulocytosis, aplastic anemia, seizures, rashes, retinopathy, and liver toxicity. Patient instructed to call the office should any adverse effect occur.  The patient verbalized understanding of the proper use and possible adverse effects of Plaquenil.  All the patient's questions and concerns were addressed.
Bexarotene Counseling:  I discussed with the patient the risks of bexarotene including but not limited to hair loss, dry lips/skin/eyes, liver abnormalities, hyperlipidemia, pancreatitis, depression/suicidal ideation, photosensitivity, drug rash/allergic reactions, hypothyroidism, anemia, leukopenia, infection, cataracts, and teratogenicity.  Patient understands that they will need regular blood tests to check lipid profile, liver function tests, white blood cell count, thyroid function tests and pregnancy test if applicable.
Picato Counseling:  I discussed with the patient the risks of Picato including but not limited to erythema, scaling, itching, weeping, crusting, and pain.
Sotyktu Counseling:  I discussed the most common side effects of Sotyktu including: common cold, sore throat, sinus infections, cold sores, canker sores, folliculitis, and acne.  I also discussed more serious side effects of Sotyktu including but not limited to: serious allergic reactions; increased risk for infections such as TB; cancers such as lymphomas; rhabdomyolysis and elevated CPK; and elevated triglycerides and liver enzymes. 
Azathioprine Counseling:  I discussed with the patient the risks of azathioprine including but not limited to myelosuppression, immunosuppression, hepatotoxicity, lymphoma, and infections.  The patient understands that monitoring is required including baseline LFTs, Creatinine, possible TPMP genotyping and weekly CBCs for the first month and then every 2 weeks thereafter.  The patient verbalized understanding of the proper use and possible adverse effects of azathioprine.  All of the patient's questions and concerns were addressed.
Azelaic Acid Pregnancy And Lactation Text: This medication is considered safe during pregnancy and breast feeding.
Simponi Counseling:  I discussed with the patient the risks of golimumab including but not limited to myelosuppression, immunosuppression, autoimmune hepatitis, demyelinating diseases, lymphoma, and serious infections.  The patient understands that monitoring is required including a PPD at baseline and must alert us or the primary physician if symptoms of infection or other concerning signs are noted.
Doxycycline Pregnancy And Lactation Text: This medication is Pregnancy Category D and not consider safe during pregnancy. It is also excreted in breast milk but is considered safe for shorter treatment courses.
Soolantra Counseling: I discussed with the patients the risks of topial Soolantra. This is a medicine which decreases the number of mites and inflammation in the skin. You experience burning, stinging, eye irritation or allergic reactions.  Please call our office if you develop any problems from using this medication.
Sski Pregnancy And Lactation Text: This medication is Pregnancy Category D and isn't considered safe during pregnancy. It is excreted in breast milk.
Drysol Counseling:  I discussed with the patient the risks of drysol/aluminum chloride including but not limited to skin rash, itching, irritation, burning.
Oral Minoxidil Counseling- I discussed with the patient the risks of oral minoxidil including but not limited to shortness of breath, swelling of the feet or ankles, dizziness, lightheadedness, unwanted hair growth and allergic reaction.  The patient verbalized understanding of the proper use and possible adverse effects of oral minoxidil.  All of the patient's questions and concerns were addressed.
Prednisone Counseling:  I discussed with the patient the risks of prolonged use of prednisone including but not limited to weight gain, insomnia, osteoporosis, mood changes, diabetes, susceptibility to infection, glaucoma and high blood pressure.  In cases where prednisone use is prolonged, patients should be monitored with blood pressure checks, serum glucose levels and an eye exam.  Additionally, the patient may need to be placed on GI prophylaxis, PCP prophylaxis, and calcium and vitamin D supplementation and/or a bisphosphonate.  The patient verbalized understanding of the proper use and the possible adverse effects of prednisone.  All of the patient's questions and concerns were addressed.
Bexarotene Pregnancy And Lactation Text: This medication is Pregnancy Category X and should not be given to women who are pregnant or may become pregnant. This medication should not be used if you are breast feeding.
Adbry Counseling: I discussed with the patient the risks of tralokinumab including but not limited to eye infection and irritation, cold sores, injection site reactions, worsening of asthma, allergic reactions and increased risk of parasitic infection.  Live vaccines should be avoided while taking tralokinumab. The patient understands that monitoring is required and they must alert us or the primary physician if symptoms of infection or other concerning signs are noted.
Azathioprine Pregnancy And Lactation Text: This medication is Pregnancy Category D and isn't considered safe during pregnancy. It is unknown if this medication is excreted in breast milk.
Hydroxychloroquine Pregnancy And Lactation Text: This medication has been shown to cause fetal harm but it isn't assigned a Pregnancy Risk Category. There are small amounts excreted in breast milk.
Klisyri Counseling:  I discussed with the patient the risks of Klisyri including but not limited to erythema, scaling, itching, weeping, crusting, and pain.
Dutasteride Male Counseling: Dustasteride Counseling:  I discussed with the patient the risks of use of dutasteride including but not limited to decreased libido, decreased ejaculate volume, and gynecomastia. Women who can become pregnant should not handle medication.  All of the patient's questions and concerns were addressed.
Rifampin Counseling: I discussed with the patient the risks of rifampin including but not limited to liver damage, kidney damage, red-orange body fluids, nausea/vomiting and severe allergy.
Colchicine Counseling:  Patient counseled regarding adverse effects including but not limited to stomach upset (nausea, vomiting, stomach pain, or diarrhea).  Patient instructed to limit alcohol consumption while taking this medication.  Colchicine may reduce blood counts especially with prolonged use.  The patient understands that monitoring of kidney function and blood counts may be required, especially at baseline. The patient verbalized understanding of the proper use and possible adverse effects of colchicine.  All of the patient's questions and concerns were addressed.
Sotyktu Pregnancy And Lactation Text: There is insufficient data to evaluate whether or not Sotyktu is safe to use during pregnancy.   It is not known if Sotyktu passes into breast milk and whether or not it is safe to use when breastfeeding.  
Xolair Counseling:  Patient informed of potential adverse effects including but not limited to fever, muscle aches, rash and allergic reactions.  The patient verbalized understanding of the proper use and possible adverse effects of Xolair.  All of the patient's questions and concerns were addressed.
Azithromycin Counseling:  I discussed with the patient the risks of azithromycin including but not limited to GI upset, allergic reaction, drug rash, diarrhea, and yeast infections.
Thalidomide Counseling: I discussed with the patient the risks of thalidomide including but not limited to birth defects, anxiety, weakness, chest pain, dizziness, cough and severe allergy.
Zyclara Counseling:  I discussed with the patient the risks of imiquimod including but not limited to erythema, scaling, itching, weeping, crusting, and pain.  Patient understands that the inflammatory response to imiquimod is variable from person to person and was educated regarded proper titration schedule.  If flu-like symptoms develop, patient knows to discontinue the medication and contact us.
Griseofulvin Pregnancy And Lactation Text: This medication is Pregnancy Category X and is known to cause serious birth defects. It is unknown if this medication is excreted in breast milk but breast feeding should be avoided.
Ilumya Counseling: I discussed with the patient the risks of tildrakizumab including but not limited to immunosuppression, malignancy, posterior leukoencephalopathy syndrome, and serious infections.  The patient understands that monitoring is required including a PPD at baseline and must alert us or the primary physician if symptoms of infection or other concerning signs are noted.
Topical Metronidazole Counseling: Metronidazole is a topical antibiotic medication. You may experience burning, stinging, redness, or allergic reactions.  Please call our office if you develop any problems from using this medication.
Doxepin Counseling:  Patient advised that the medication is sedating and not to drive a car after taking this medication. Patient informed of potential adverse effects including but not limited to dry mouth, urinary retention, and blurry vision.  The patient verbalized understanding of the proper use and possible adverse effects of doxepin.  All of the patient's questions and concerns were addressed.
Isotretinoin Counseling: Patient should get monthly blood tests, not donate blood, not drive at night if vision affected, not share medication, and not undergo elective surgery for 6 months after tx completed. Side effects reviewed, pt to contact office should one occur.
Oral Minoxidil Pregnancy And Lactation Text: This medication should only be used when clearly needed if you are pregnant, attempting to become pregnant or breast feeding.
Benzoyl Peroxide Counseling: Patient counseled that medicine may cause skin irritation and bleach clothing.  In the event of skin irritation, the patient was advised to reduce the amount of the drug applied or use it less frequently.   The patient verbalized understanding of the proper use and possible adverse effects of benzoyl peroxide.  All of the patient's questions and concerns were addressed.
Topical Sulfur Applications Pregnancy And Lactation Text: This medication is considered safe during pregnancy and breast feeding secondary to limited systemic absorption.
Soolantra Pregnancy And Lactation Text: This medication is Pregnancy Category C. This medication is considered safe during breast feeding.
Erythromycin Counseling:  I discussed with the patient the risks of erythromycin including but not limited to GI upset, allergic reaction, drug rash, diarrhea, increase in liver enzymes, and yeast infections.
Dutasteride Pregnancy And Lactation Text: This medication is absolutely contraindicated in women, especially during pregnancy and breast feeding. Feminization of male fetuses is possible if taking while pregnant.
Low Dose Naltrexone Counseling- I discussed with the patient the potential risks and side effects of low dose naltrexone including but not limited to: more vivid dreams, headaches, nausea, vomiting, abdominal pain, fatigue, dizziness, and anxiety.
Azithromycin Pregnancy And Lactation Text: This medication is considered safe during pregnancy and is also secreted in breast milk.
Adbry Pregnancy And Lactation Text: It is unknown if this medication will adversely affect pregnancy or breast feeding.
Rifampin Pregnancy And Lactation Text: This medication is Pregnancy Category C and it isn't know if it is safe during pregnancy. It is also excreted in breast milk and should not be used if you are breast feeding.
Protopic Counseling: Patient may experience a mild burning sensation during topical application. Protopic is not approved in children less than 2 years of age. There have been case reports of hematologic and skin malignancies in patients using topical calcineurin inhibitors although causality is questionable.
Cellcept Counseling:  I discussed with the patient the risks of mycophenolate mofetil including but not limited to infection/immunosuppression, GI upset, hypokalemia, hypercholesterolemia, bone marrow suppression, lymphoproliferative disorders, malignancy, GI ulceration/bleed/perforation, colitis, interstitial lung disease, kidney failure, progressive multifocal leukoencephalopathy, and birth defects.  The patient understands that monitoring is required including a baseline creatinine and regular CBC testing. In addition, patient must alert us immediately if symptoms of infection or other concerning signs are noted.
Elidel Counseling: Patient may experience a mild burning sensation during topical application. Elidel is not approved in children less than 2 years of age. There have been case reports of hematologic and skin malignancies in patients using topical calcineurin inhibitors although causality is questionable.
Xolair Pregnancy And Lactation Text: This medication is Pregnancy Category B and is considered safe during pregnancy. This medication is excreted in breast milk.
Topical Metronidazole Pregnancy And Lactation Text: This medication is Pregnancy Category B and considered safe during pregnancy.  It is also considered safe to use while breastfeeding.
Klisyri Pregnancy And Lactation Text: It is unknown if this medication can harm a developing fetus or if it is excreted in breast milk.
Xeljanz Counseling: I discussed with the patient the risks of Xeljanz therapy including increased risk of infection, liver issues, headache, diarrhea, or cold symptoms. Live vaccines should be avoided. They were instructed to call if they have any problems.
Skyrizi Counseling: I discussed with the patient the risks of risankizumab-rzaa including but not limited to immunosuppression, and serious infections.  The patient understands that monitoring is required including a PPD at baseline and must alert us or the primary physician if symptoms of infection or other concerning signs are noted.
Itraconazole Counseling:  I discussed with the patient the risks of itraconazole including but not limited to liver damage, nausea/vomiting, neuropathy, and severe allergy.  The patient understands that this medication is best absorbed when taken with acidic beverages such as non-diet cola or ginger ale.  The patient understands that monitoring is required including baseline LFTs and repeat LFTs at intervals.  The patient understands that they are to contact us or the primary physician if concerning signs are noted.
Otezla Counseling: The side effects of Otezla were discussed with the patient, including but not limited to worsening or new depression, weight loss, diarrhea, nausea, upper respiratory tract infection, and headache. Patient instructed to call the office should any adverse effect occur.  The patient verbalized understanding of the proper use and possible adverse effects of Otezla.  All the patient's questions and concerns were addressed.
Isotretinoin Pregnancy And Lactation Text: This medication is Pregnancy Category X and is considered extremely dangerous during pregnancy. It is unknown if it is excreted in breast milk.
Doxepin Pregnancy And Lactation Text: This medication is Pregnancy Category C and it isn't known if it is safe during pregnancy. It is also excreted in breast milk and breast feeding isn't recommended.
Topical Retinoid counseling:  Patient advised to apply a pea-sized amount only at bedtime and wait 30 minutes after washing their face before applying.  If too drying, patient may add a non-comedogenic moisturizer. The patient verbalized understanding of the proper use and possible adverse effects of retinoids.  All of the patient's questions and concerns were addressed.
Benzoyl Peroxide Pregnancy And Lactation Text: This medication is Pregnancy Category C. It is unknown if benzoyl peroxide is excreted in breast milk.
Sarecycline Counseling: Patient advised regarding possible photosensitivity and discoloration of the teeth, skin, lips, tongue and gums.  Patient instructed to avoid sunlight, if possible.  When exposed to sunlight, patients should wear protective clothing, sunglasses, and sunscreen.  The patient was instructed to call the office immediately if the following severe adverse effects occur:  hearing changes, easy bruising/bleeding, severe headache, or vision changes.  The patient verbalized understanding of the proper use and possible adverse effects of sarecycline.  All of the patient's questions and concerns were addressed.
Wartpeel Counseling:  I discussed with the patient the risks of Wartpeel including but not limited to erythema, scaling, itching, weeping, crusting, and pain.
Protopic Pregnancy And Lactation Text: This medication is Pregnancy Category C. It is unknown if this medication is excreted in breast milk when applied topically.
Low Dose Naltrexone Pregnancy And Lactation Text: Naltrexone is pregnancy category C.  There have been no adequate and well-controlled studies in pregnant women.  It should be used in pregnancy only if the potential benefit justifies the potential risk to the fetus.   Limited data indicates that naltrexone is minimally excreted into breastmilk.
Finasteride Male Counseling: Finasteride Counseling:  I discussed with the patient the risks of use of finasteride including but not limited to decreased libido, decreased ejaculate volume, gynecomastia, and depression. Women should not handle medication.  All of the patient's questions and concerns were addressed.
Topical Sulfur Applications Counseling: Topical Sulfur Counseling: Patient counseled that this medication may cause skin irritation or allergic reactions.  In the event of skin irritation, the patient was advised to reduce the amount of the drug applied or use it less frequently.   The patient verbalized understanding of the proper use and possible adverse effects of topical sulfur application.  All of the patient's questions and concerns were addressed.
Erythromycin Pregnancy And Lactation Text: This medication is Pregnancy Category B and is considered safe during pregnancy. It is also excreted in breast milk.
Dapsone Counseling: I discussed with the patient the risks of dapsone including but not limited to hemolytic anemia, agranulocytosis, rashes, methemoglobinemia, kidney failure, peripheral neuropathy, headaches, GI upset, and liver toxicity.  Patients who start dapsone require monitoring including baseline LFTs and weekly CBCs for the first month, then every month thereafter.  The patient verbalized understanding of the proper use and possible adverse effects of dapsone.  All of the patient's questions and concerns were addressed.
Bactrim Counseling:  I discussed with the patient the risks of sulfa antibiotics including but not limited to GI upset, allergic reaction, drug rash, diarrhea, dizziness, photosensitivity, and yeast infections.  Rarely, more serious reactions can occur including but not limited to aplastic anemia, agranulocytosis, methemoglobinemia, blood dyscrasias, liver or kidney failure, lung infiltrates or desquamative/blistering drug rashes.
Xelalessandraz Pregnancy And Lactation Text: This medication is Pregnancy Category D and is not considered safe during pregnancy.  The risk during breast feeding is also uncertain.
Minoxidil Counseling: Minoxidil is a topical medication which can increase blood flow where it is applied. It is uncertain how this medication increases hair growth. Side effects are uncommon and include stinging and allergic reactions.

## 2023-06-28 NOTE — PROCEDURE: COUNSELING
Patient Specific Counseling (Will Not Stick From Patient To Patient): Thick plaque on scalp and will do IL steroid injections again. This remains an appropriate therapy given the liver failure making systemic therapy risky.
Detail Level: Zone

## 2023-06-28 NOTE — HPI: RASH (PSORIASIS)
How Severe Is Your Psoriasis?: mild
Is This A New Presentation, Or A Follow-Up?: Follow Up Psoriasis
Additional History: Patient says her scalp has done good but within the last month her scalp has started bothering her. She says she would like to have injections it has been six months since we did injections.\\n\\nIs scheduled for a shunt as she is requiring regular paracentesis for cirrhosis-related ascites.

## 2023-07-03 ENCOUNTER — LAB OUTSIDE AN ENCOUNTER (OUTPATIENT)
Dept: URBAN - METROPOLITAN AREA CLINIC 86 | Facility: CLINIC | Age: 63
End: 2023-07-03

## 2023-09-07 ENCOUNTER — LAB OUTSIDE AN ENCOUNTER (OUTPATIENT)
Dept: URBAN - METROPOLITAN AREA CLINIC 86 | Facility: CLINIC | Age: 63
End: 2023-09-07

## 2023-09-11 ENCOUNTER — OFFICE VISIT (OUTPATIENT)
Dept: URBAN - METROPOLITAN AREA CLINIC 86 | Facility: CLINIC | Age: 63
End: 2023-09-11

## 2023-09-11 NOTE — HPI-TODAY'S VISIT:
Pt is 63 F last seen June 2023 by Ms Hai with hx baeza and cirrhosis and smv/pv clot, hx varices, hx pse, march 2022 urosepsis/sbp and now here for ongoing care and eval.  A copy of the note was sent to the referring provider.  Dear Lady Carson, June 5 MRI was updated by radiology today. When they compared to multiple priors dating back to 2018, the described retroperitoneal/mesenteric root edema and perivascular infiltrative soft tissue demonstrates avid enhancement with no restricted diffusion.  The degree of enhancement has progressed as well as the density infiltrative soft tissue compared to multiple priors. Retroperitoneal lymph nodes were also increased in number but not pathologically enlarged.  There was no evident medialization of the ureters.  They said that they findings are concerning for retroperitoneal fibrotic or inflammatory process. It appears that you were seen on June 20 by the transplant team/Dr Barrios and we will forward them this MRI correction so that they can be aware of this.  We will forward it to him via [facility]  to be aware of as he just saw you. Dr Gonzales  The 6/2/23 MR elastography was sent to me after lunch today. As you recall it was not available for viewing during the visit today. I called and left you a voicemail about this.  Lower thorax with unchanged small left pleural effusion.  Liver with subjective evidence of fat deposition.  Cirrhotic morphology.  No suspicious lesions.  Elastography donel after a weighted mean of 3 measurements showing 3.2K PA consistent with stage I-II fibrosis.  The fat quantification 12.5% consistent with mild fatty liver.  There is no significant iron deposition.  They did not see any biliary ductal dilatation.  Splenomegaly measuring 20 cm was seen.  The pancreas normal, adrenal glands normal.  They saw a right upper renal cyst.  Please make sure the primary care provider is aware.  They saw reactive lymph nodes.  They thought that the aorta was normal.  The superior mesenteric and inferior mesenteric along with the bilateral renal arteries were patent.  They noted the replaced right hepatic artery arising from the superior mesenteric artery.  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  They noted that there has been interval progression of the degree of thrombosis of the portosystemic shunt with near complete occlusive thrombus.  They noted additional varices and this is why we have to stay on top of the endoscopic use.  They saw mesenteric edema unchanged from the previous CT.  Moderate ascites.  Overall they noted cirrhosis and did not see any suspicious lesions but they did note that there was progression of a now near occlusive thrombus of a large portosystemic venous shunt from the splenic vein to the left femoral vein.  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 As you recall we were doing this to check on the clot and to ensure there were no lesions. It is good to confirm no lesions. Need to be sure you see the transplant team back as discussed.  Do Valles PA-C  6/5/23  SHe did the mri on friday but interestingly it has not been read yet. Checked epic yesterday and today for this. Will follow up with the pending result.  She had the repeat colon.  She is having a paracentesis every 6 days or so, 4 L removed she is doing the low salt diet and limiting fluids. DIscussed the risk of recurrrent paracentesis and needs to see oltx back on this and see if options. HE symtpoms controlled on xifaxan and miralax as seh does not tolerate the lactulose She has not schedlued with dr. barrios as discussed. inr 1.43, mcv 86, hgb 11.1, platelets 125, bili 2.5, ast 36, alt 18, alk phos 101 na 136, cr 0.8 MELD 14, meld na 15 4/27/23 [facility] follow up and recurrent ascites. She hospitalized patient recently at [facility].  She was in route to [place] per the notes but had EKG abnormalities and they redirected her to [place].  She had gained 19 pounds in the last several days.  Recent paracentesis on 419 with 4 L removed.  She was also having hematochezia and reviewed the chart and had recent rectal clips done and those need a re look and hospital noted she did not want to do there as she does things at [place]  CT scan done on 423  Cirrhotic morphology of the liver with this extensive paraesophageal and abdominal varices and splenomegaly. no lesions  April 24 paracentesis showing that there was 4 L removed.  April 24 labs with hemoglobin 9.8, platelets 47, INR 1.6.  Sodium 131, potassium 3.1, bilirubin 1.9, alkaline phosphatase 80, ALT 17, AST 37. cr .7  Per epic she has been pending an echo and rescheduled multiple times due to transportation issues. She was pending mri recently but not done  Discussed the ascites and need to see where from and ct not showing clot issue and need mri to be sure but also need that echo need to order paracentesis now fo rher. she has not been limited flluids, salt and she will start.  She needs to be reseen by oltx and we will refer her We will update and labs and she will need to see GI re the hematochezia given recent clips done.  Dr. Gonzales saw her as well and reminded her if acute then do care where she can. Also discussed with her the salt and needing to see oltx again.     recap She says she had some issues with daughter being in mental institution and she had trouble coming up and so now comes back. She is doing better.  She had adopted her at birth.  Right ear she had an skin cancer and has trouble wearing a mask for this.  She has been getting shot for scalp psoriasis and she does not know what is the shot. She has 2 rounds of it.  She has to get her every 2 months.  Dr. Pepito Haile [facility].[address] [address]  ~34.1 mi [phone]  We need to know what he is giving her so that we can say what we expect to be the case.  Depending on what he is giving her.  No recent labs and no recent imaging.  June 8, 2022 MRI showed chronic trace left pleural effusion with associated atelectasis in the lower thorax views.  There was no significant fat or iron but with nodular contour and cirrhotic morphology.  No suspicious lesions.  Prior cholecystectomy changes.  No duct dilation.  Spleen enlarged 18.4 cm.  Pancreas normal.  Adrenal glands normal.  Normal bilateral renal symmetric enhancement with 6 mm superior pole renal cyst and no suspicious lesions and no hydro.  Mild small and large bowel wall edema likely due to periportal enteropathy/colopathy.  Unchanged chronic fibrosis of portal vein with cavernous transformation reconstituted left portal vein.  Redemonstrated gastroesophageal perisplenic varices.  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  Only trace perihepatic and perisplenic ascites.  June 7, 2022 visit with Dr. Flores mentions that the patient is status post TAVR with recent echo showing EF 55% of left ventricular outflow VTI of 21.  Chronic diastolic heart failure noted.  TAVR was on January 23, 2020.  She has not followed with Dr Flores and sees local cards for the tavr.  June 1, 2022 echo showed EF 50 to 55% aortic valve normal functioning bioprosthetic valve.  Aortic valve max velocity showed a mean gradient of 11.0 and a peak gradient 24.4.  June 8, 2022 labs show sodium 135 potassium 3.9 glucose 297 BUN of 10 creatinine 0.75 total bilirubin 1.6 calcium 9.1 AST 36 ALT 18 alk phos 106 hemoglobin A1c up at 7.5 AFP 4 W count 3.2 hemoglobin 12.3 plate count 47 INR 1.35.  HIV test negative at that time.  Transplant team sees her every 6m and she sees the team there. She is told is stable and looks good and meld was 14.  She says told re mounjaro.  April 29th 2022 labs stable ammonia level normal at 61. INR normal at 1.2.   Sugar was elevated at 173.  Please share with primary provider. BUN of 13 creatinine 0.69 sodium 138 potassium 4.4 albumin normal at 3.8.  Bilirubin 1.2 alkaline phosphatase 112. Only the AST was elevated at 46 and ALT was ideal at 25. White blood cell count 3.7 hemoglobin 12.4 and MCV 87 and these are normal.  Platelet count low at 54.  Neutrophils normal at 2.8 and lymphocytes slightly low at 0.5. Meld down to 9 and meld na 9. Suggests that the infection is what tipped your labs recently  to be worse. So really need to focus on trying to prevent those recurrent urinary infections as worsens your underlying liver state. Please ask local ID to contact us to discuss the recent infection and options.   Saw the notes and had urosepsis. Had [id number] cells in the ascites.  Had 1 tap in the hospital. had the infection.  Prior tap in [place] had no infection and she had been started on abx once a month. 2500 ml.  20-30 wbc, nos.  90% of pts with sbp if the infection will be reinfected in a year.  Once a month is not a usual tx for sbp prevention. Daily or once a week. Dr Lopez is her id and they need to look at this new data and need to adjust tx.  March 30, 2022 labs show sodium 137 potassium 3.6 BUN of 10 creatinine 0.68 glucose 156 total bilirubin 1.3 AST 25 ALT 14 alk phos 83 calcium 8.7 labs count 3.3 hemoglobin 12 platelet count 66.  March 28, 2022 CT abdomen and pelvis with IV contrast revealed left-sided pleural effusion with some atelectasis.  Cardiophrenic enhancing node measuring 1.1 x 1.8 cm slightly increased in size.  Liver with chronic liver disease.  Prior cholecystectomy no bile duct dilation.  Splenomegaly seen.  Pancreas normal.  No hydronephrosis but with left nonobstructing renal stone.  Diffuse colon wall thickening and edema with surrounding stranding.  Prominent retroperitoneal mesenteric lymph nodes.  Large varices seen on the vessel exam with focal occlusion of the SMV/portal vein which appears similar to prior study.  Moderate continuation of the right portal vein.  Mild increase cannot exclude thrombus within the splenic venous collaterals in the left upper quadrant.  March 28, 2022 paracentesis removed 2600 mL of fluid.  November 2021 MRI liver with no fat or iron cirrhotic liver in order to enhancing lesions.  Spleen 19 cm.  Prior cholecystectomy changes seen.  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  Multiple large collateral pathways including gastroesophageal varices.  Replaced right hepatic artery seen.  Ascites may have been primary vs uti.  She has been seen by urogyn for the recurrent uti.   On abx once a month not for prevention but rather infection every month and so that makes more sense.  Covid 19 Jan 2022 and admitted locally for sepsis.  June 8, 2022 patient has appointment for labs and another MRI and in July 20 9022 will be seen by the  oltx transplant nurse practitioner at [facility].   April 8, 2022 transplant clinic visit mentions that the patient has Baeza and had a recent hospitalization from March 20 7 March 21st for urosepsis.  Drained 2.6 L and prior to that had not been drained since February 2022.  Taking Bumex 2 mg twice daily and Aldactone 100 mg a day.  Meld increased during the hospital but was down to 14 by March 27, 2022.  Bili decreased to 1.3 on March 30.  Recap: Patient with history of chronic SMV/portal vein occlusion.  EGD January 2021 status post band ligation.  No significant encephalopathy on Xifaxan.  Current medicines include omeprazole 2 mg a day, aspirin 81 mg a day, Bumex 2 mg a day, vitamin D 1000 units a day, Prolia every 6 months Estrace 2 mg a day, gabapentin 3 mg 3 times daily, NovoLog sliding scale, Tresiba 8 units at night, lactulose 20 g Daily, mesalamine 1 g p.o. twice daily, midodrine 10 mg p.o. 3 times daily, nadolol 40 mg twice daily, potassium 40 mg a day, Xifaxan 550 twice daily, Aldactone on milligrams a day, Trintellix 20 mg a day.  Patient was listed as following up with Dr. Vickers for endoscopy. She says he has been seeing. He also saw dysphagia.  March 27 March 31 discharge summary from [facility] [place].   She was a patient came in with concerns of urosepsis and left pyelonephritis.  Urinalysis high-grade 150 WBCs.  Blood cultures were negative but urine did grow pansensitive Klebsiella.  Patient was seen by ID and was given a course of daptomycin and Levaquin treatment.  They wanted urogynecology to see her. Left renal pole lesion that could be a nidus for recurrent infection.  Cell count on the fluid was 10,520 range  nucleated cells 84% neutrophils though the cultures were negative to date and maintained on antibiotics.  Looked up the actual fluid count from March 28 and had had albumin less than 1.5 total protein was 3.0 nucleated cells 10,520 neutrophils 84%.  Lasted been EGD high-grade fevers or November 17, 2021 with Dr. Vickers where she had grade 1 varices lower esophagus.  Moderate Schatzki ring was seen.  Dilation was seen to 51 Icelandic.  Moderately erythematous mucosa without bleeding seen in the gastric antrum.  H. pylori testing sent.  Biopsy was negative for significant abnormality.  She did egd in nov 2021 and grade 1 varices noted. He is planning nov 2022.  nov 2021 mri: Reason For Exam Hepatocellular carcinoma;Hepatocellular carcinoma  REPORT EXAM: MRI Abdomen w/ + w/o Contrast  CLINICAL INDICATION: Hepatocellular carcinoma  TECHNIQUE: Multisequence, multiplanar MRI of the abdomen was performed without and with intravenous contrast. [id number]  CONTRAST: 18 cc of Prohance  COMPARISON: March 2, 2021  FINDINGS:  Lower Thorax: Normal.  Liver: No fat or iron. Cirrhotic liver morphology. No arterially enhancing lesions. No lesions with capsule. No lesions with washout.  Gallbladder/Biliary Tree: Status post cholecystectomy.  Spleen: Measuring 19.0 cm in maximum craniocaudal diameter.  Pancreas: Normal.  Adrenal Glands: Normal.   Kidneys/Ureters: Normal.  Gastrointestinal: Normal.   Lymph Nodes: Normal.  Vessels: Chronic thrombosis of the main portal vein with cavernous transformation redemonstrated. Multiple large collateral pathways including gastroesophageal varices, again noted. 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  Peritoneum/Retroperitoneum: Minimal ascites is present.  Bones/Soft Tissues: Normal.  IMPRESSION:     Cirrhosis with chronic portal vein thrombosis and cavernous transformation with associated evidence of portal hypertension. No hepatocellular carcinoma.

## 2023-09-29 ENCOUNTER — OFFICE VISIT (OUTPATIENT)
Dept: URBAN - METROPOLITAN AREA CLINIC 86 | Facility: CLINIC | Age: 63
End: 2023-09-29

## 2023-09-29 NOTE — HPI-TODAY'S VISIT:
Pt is 63 F last seen June 2023 by Ms Hai with hx baeza and cirrhosis and smv/pv clot, hx varices, hx pse, march 2022 urosepsis/sbp and now here for ongoing care and eval.  A copy of the note was sent to the referring provider.  Dear Lady Carson, June 5 MRI was updated by radiology today. When they compared to multiple priors dating back to 2018, the described retroperitoneal/mesenteric root edema and perivascular infiltrative soft tissue demonstrates avid enhancement with no restricted diffusion.  The degree of enhancement has progressed as well as the density infiltrative soft tissue compared to multiple priors. Retroperitoneal lymph nodes were also increased in number but not pathologically enlarged.  There was no evident medialization of the ureters.  They said that they findings are concerning for retroperitoneal fibrotic or inflammatory process. It appears that you were seen on June 20 by the transplant team/Dr Barrios and we will forward them this MRI correction so that they can be aware of this.  We will forward it to him via [facility]  to be aware of as he just saw you. Dr Gonzales  The 6/2/23 MR elastography was sent to me after lunch today. As you recall it was not available for viewing during the visit today. I called and left you a voicemail about this.  Lower thorax with unchanged small left pleural effusion.  Liver with subjective evidence of fat deposition.  Cirrhotic morphology.  No suspicious lesions.  Elastography donel after a weighted mean of 3 measurements showing 3.2K PA consistent with stage I-II fibrosis.  The fat quantification 12.5% consistent with mild fatty liver.  There is no significant iron deposition.  They did not see any biliary ductal dilatation.  Splenomegaly measuring 20 cm was seen.  The pancreas normal, adrenal glands normal.  They saw a right upper renal cyst.  Please make sure the primary care provider is aware.  They saw reactive lymph nodes.  They thought that the aorta was normal.  The superior mesenteric and inferior mesenteric along with the bilateral renal arteries were patent.  They noted the replaced right hepatic artery arising from the superior mesenteric artery.  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  They noted that there has been interval progression of the degree of thrombosis of the portosystemic shunt with near complete occlusive thrombus.  They noted additional varices and this is why we have to stay on top of the endoscopic use.  They saw mesenteric edema unchanged from the previous CT.  Moderate ascites.  Overall they noted cirrhosis and did not see any suspicious lesions but they did note that there was progression of a now near occlusive thrombus of a large portosystemic venous shunt from the splenic vein to the left femoral vein.  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 As you recall we were doing this to check on the clot and to ensure there were no lesions. It is good to confirm no lesions. Need to be sure you see the transplant team back as discussed.  Do Valles PA-C  6/5/23  SHe did the mri on friday but interestingly it has not been read yet. Checked epic yesterday and today for this. Will follow up with the pending result.  She had the repeat colon.  She is having a paracentesis every 6 days or so, 4 L removed she is doing the low salt diet and limiting fluids. DIscussed the risk of recurrrent paracentesis and needs to see oltx back on this and see if options. HE symtpoms controlled on xifaxan and miralax as seh does not tolerate the lactulose She has not schedlued with dr. barrios as discussed. inr 1.43, mcv 86, hgb 11.1, platelets 125, bili 2.5, ast 36, alt 18, alk phos 101 na 136, cr 0.8 MELD 14, meld na 15 4/27/23 HOspital follow up and recurrent ascites. She hospitalized patient recently at [facility].  She was in route to [place] per the notes but had EKG abnormalities and they redirected her to [facility].  She had gained 19 pounds in the last several days.  Recent paracentesis on 419 with 4 L removed.  She was also having hematochezia and reviewed the chart and had recent rectal clips done and those need a re look and hospital noted she did not want to do there as she does things at [place]  CT scan done on 423  Cirrhotic morphology of the liver with this extensive paraesophageal and abdominal varices and splenomegaly. no lesions  April 24 paracentesis showing that there was 4 L removed.  April 24 labs with hemoglobin 9.8, platelets 47, INR 1.6.  Sodium 131, potassium 3.1, bilirubin 1.9, alkaline phosphatase 80, ALT 17, AST 37. cr .7  Per epic she has been pending an echo and rescheduled multiple times due to transportation issues. She was pending mri recently but not done  Discussed the ascites and need to see where from and ct not showing clot issue and need mri to be sure but also need that echo need to order paracentesis now fo rher. she has not been limited flluids, salt and she will start.  She needs to be reseen by oltx and we will refer her We will update and labs and she will need to see GI re the hematochezia given recent clips done.  Dr. Gonzales saw her as well and reminded her if acute then do care where she can. Also discussed with her the salt and needing to see oltx again.     recap She says she had some issues with daughter being in mental institution and she had trouble coming up and so now comes back. She is doing better.  She had adopted her at birth.  Right ear she had an skin cancer and has trouble wearing a mask for this.  She has been getting shot for scalp psoriasis and she does not know what is the shot. She has 2 rounds of it.  She has to get her every 2 months.  Dr. Pepito Haile availdermatology.[address] [address]  ~34.1 mi [phone]  We need to know what he is giving her so that we can say what we expect to be the case.  Depending on what he is giving her.  No recent labs and no recent imaging.  June 8, 2022 MRI showed chronic trace left pleural effusion with associated atelectasis in the lower thorax views.  There was no significant fat or iron but with nodular contour and cirrhotic morphology.  No suspicious lesions.  Prior cholecystectomy changes.  No duct dilation.  Spleen enlarged 18.4 cm.  Pancreas normal.  Adrenal glands normal.  Normal bilateral renal symmetric enhancement with 6 mm superior pole renal cyst and no suspicious lesions and no hydro.  Mild small and large bowel wall edema likely due to periportal enteropathy/colopathy.  Unchanged chronic fibrosis of portal vein with cavernous transformation reconstituted left portal vein.  Redemonstrated gastroesophageal perisplenic varices.  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  Only trace perihepatic and perisplenic ascites.  June 7, 2022 visit with Dr. Flores mentions that the patient is status post TAVR with recent echo showing EF 55% of left ventricular outflow VTI of 21.  Chronic diastolic heart failure noted.  TAVR was on January 23, 2020.  She has not followed with Dr Flores and sees local cards for the tavr.  June 1, 2022 echo showed EF 50 to 55% aortic valve normal functioning bioprosthetic valve.  Aortic valve max velocity showed a mean gradient of 11.0 and a peak gradient 24.4.  June 8, 2022 labs show sodium 135 potassium 3.9 glucose 297 BUN of 10 creatinine 0.75 total bilirubin 1.6 calcium 9.1 AST 36 ALT 18 alk phos 106 hemoglobin A1c up at 7.5 AFP 4 W count 3.2 hemoglobin 12.3 plate count 47 INR 1.35.  HIV test negative at that time.  Transplant team sees her every 6m and she sees the team there. She is told is stable and looks good and meld was 14.  She says told re mounjaro.  April 29th 2022 labs stable ammonia level normal at 61. INR normal at 1.2.   Sugar was elevated at 173.  Please share with primary provider. BUN of 13 creatinine 0.69 sodium 138 potassium 4.4 albumin normal at 3.8.  Bilirubin 1.2 alkaline phosphatase 112. Only the AST was elevated at 46 and ALT was ideal at 25. White blood cell count 3.7 hemoglobin 12.4 and MCV 87 and these are normal.  Platelet count low at 54.  Neutrophils normal at 2.8 and lymphocytes slightly low at 0.5. Meld down to 9 and meld na 9. Suggests that the infection is what tipped your labs recently  to be worse. So really need to focus on trying to prevent those recurrent urinary infections as worsens your underlying liver state. Please ask local ID to contact us to discuss the recent infection and options.   Saw the notes and had urosepsis. Had [id number] cells in the ascites.  Had 1 tap in the hospital. had the infection.  Prior tap in [place] had no infection and she had been started on abx once a month. 2500 ml.  20-30 wbc, nos.  90% of pts with sbp if the infection will be reinfected in a year.  Once a month is not a usual tx for sbp prevention. Daily or once a week. Dr Lopez is her id and they need to look at this new data and need to adjust tx.  March 30, 2022 labs show sodium 137 potassium 3.6 BUN of 10 creatinine 0.68 glucose 156 total bilirubin 1.3 AST 25 ALT 14 alk phos 83 calcium 8.7 labs count 3.3 hemoglobin 12 platelet count 66.  March 28, 2022 CT abdomen and pelvis with IV contrast revealed left-sided pleural effusion with some atelectasis.  Cardiophrenic enhancing node measuring 1.1 x 1.8 cm slightly increased in size.  Liver with chronic liver disease.  Prior cholecystectomy no bile duct dilation.  Splenomegaly seen.  Pancreas normal.  No hydronephrosis but with left nonobstructing renal stone.  Diffuse colon wall thickening and edema with surrounding stranding.  Prominent retroperitoneal mesenteric lymph nodes.  Large varices seen on the vessel exam with focal occlusion of the SMV/portal vein which appears similar to prior study.  Moderate continuation of the right portal vein.  Mild increase cannot exclude thrombus within the splenic venous collaterals in the left upper quadrant.  March 28, 2022 paracentesis removed 2600 mL of fluid.  November 2021 MRI liver with no fat or iron cirrhotic liver in order to enhancing lesions.  Spleen 19 cm.  Prior cholecystectomy changes seen.  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  Multiple large collateral pathways including gastroesophageal varices.  Replaced right hepatic artery seen.  Ascites may have been primary vs uti.  She has been seen by urogyn for the recurrent uti.   On abx once a month not for prevention but rather infection every month and so that makes more sense.  Covid 19 Jan 2022 and admitted locally for sepsis.  June 8, 2022 patient has appointment for labs and another MRI and in July 20 9022 will be seen by the  oltx transplant nurse practitioner at [facility].   April 8, 2022 transplant clinic visit mentions that the patient has Baeza and had a recent hospitalization from March 20 7 March 21st for urosepsis.  Drained 2.6 L and prior to that had not been drained since February 2022.  Taking Bumex 2 mg twice daily and Aldactone 100 mg a day.  Meld increased during the hospital but was down to 14 by March 27, 2022.  Bili decreased to 1.3 on March 30.  Recap: Patient with history of chronic SMV/portal vein occlusion.  EGD January 2021 status post band ligation.  No significant encephalopathy on Xifaxan.  Current medicines include omeprazole 2 mg a day, aspirin 81 mg a day, Bumex 2 mg a day, vitamin D 1000 units a day, Prolia every 6 months Estrace 2 mg a day, gabapentin 3 mg 3 times daily, NovoLog sliding scale, Tresiba 8 units at night, lactulose 20 g Daily, mesalamine 1 g p.o. twice daily, midodrine 10 mg p.o. 3 times daily, nadolol 40 mg twice daily, potassium 40 mg a day, Xifaxan 550 twice daily, Aldactone on milligrams a day, Trintellix 20 mg a day.  Patient was listed as following up with Dr. Vickers for endoscopy. She says he has been seeing. He also saw dysphagia.  March 27 March 31 discharge summary from [facility].   She was a patient came in with concerns of urosepsis and left pyelonephritis.  Urinalysis high-grade 150 WBCs.  Blood cultures were negative but urine did grow pansensitive Klebsiella.  Patient was seen by ID and was given a course of daptomycin and Levaquin treatment.  They wanted urogynecology to see her. Left renal pole lesion that could be a nidus for recurrent infection.  Cell count on the fluid was 10,520 range  nucleated cells 84% neutrophils though the cultures were negative to date and maintained on antibiotics.  Looked up the actual fluid count from March 28 and had had albumin less than 1.5 total protein was 3.0 nucleated cells 10,520 neutrophils 84%.  Lasted been EGD high-grade fevers or November 17, 2021 with Dr. Vickers where she had grade 1 varices lower esophagus.  Moderate Schatzki ring was seen.  Dilation was seen to 51 Syriac.  Moderately erythematous mucosa without bleeding seen in the gastric antrum.  H. pylori testing sent.  Biopsy was negative for significant abnormality.  She did egd in nov 2021 and grade 1 varices noted. He is planning nov 2022.  nov 2021 mri: Reason For Exam Hepatocellular carcinoma;Hepatocellular carcinoma  REPORT EXAM: MRI Abdomen w/ + w/o Contrast  CLINICAL INDICATION: Hepatocellular carcinoma  TECHNIQUE: Multisequence, multiplanar MRI of the abdomen was performed without and with intravenous contrast. [id number]  CONTRAST: 18 cc of Prohance  COMPARISON: March 2, 2021  FINDINGS:  Lower Thorax: Normal.  Liver: No fat or iron. Cirrhotic liver morphology. No arterially enhancing lesions. No lesions with capsule. No lesions with washout.  Gallbladder/Biliary Tree: Status post cholecystectomy.  Spleen: Measuring 19.0 cm in maximum craniocaudal diameter.  Pancreas: Normal.  Adrenal Glands: Normal.   Kidneys/Ureters: Normal.  Gastrointestinal: Normal.   Lymph Nodes: Normal.  Vessels: Chronic thrombosis of the main portal vein with cavernous transformation redemonstrated. Multiple large collateral pathways including gastroesophageal varices, again noted. 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  Peritoneum/Retroperitoneum: Minimal ascites is present.  Bones/Soft Tissues: Normal.  IMPRESSION:     Cirrhosis with chronic portal vein thrombosis and cavernous transformation with associated evidence of portal hypertension. No hepatocellular carcinoma. Pt is 63 F last seen June 2023 by Ms Hai with hx baeza and cirrhosis and smv/pv clot, hx varices, hx pse, march 2022 urosepsis/sbp and now here for ongoing care and eval.  A copy of the note was sent to the referring provider.  Dear aLdy Carson, June 5 MRI was updated by radiology today. When they compared to multiple priors dating back to 2018, the described retroperitoneal/mesenteric root edema and perivascular infiltrative soft tissue demonstrates avid enhancement with no restricted diffusion.  The degree of enhancement has progressed as well as the density infiltrative soft tissue compared to multiple priors. Retroperitoneal lymph nodes were also increased in number but not pathologically enlarged.  There was no evident medialization of the ureters.  They said that they findings are concerning for retroperitoneal fibrotic or inflammatory process. It appears that you were seen on June 20 by the transplant team/Dr Barrios and we will forward them this MRI correction so that they can be aware of this.  We will forward it to him via [employer]  to be aware of as he just saw you. Dr Gonzales  The 6/2/23 MR elastography was sent to me after lunch today. As you recall it was not available for viewing during the visit today. I called and left you a voicemail about this.  Lower thorax with unchanged small left pleural effusion.  Liver with subjective evidence of fat deposition.  Cirrhotic morphology.  No suspicious lesions.  Elastography donel after a weighted mean of 3 measurements showing 3.2K PA consistent with stage I-II fibrosis.  The fat quantification 12.5% consistent with mild fatty liver.  There is no significant iron deposition.  They did not see any biliary ductal dilatation.  Splenomegaly measuring 20 cm was seen.  The pancreas normal, adrenal glands normal.  They saw a right upper renal cyst.  Please make sure the primary care provider is aware.  They saw reactive lymph nodes.  They thought that the aorta was normal.  The superior mesenteric and inferior mesenteric along with the bilateral renal arteries were patent.  They noted the replaced right hepatic artery arising from the superior mesenteric artery.  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  They noted that there has been interval progression of the degree of thrombosis of the portosystemic shunt with near complete occlusive thrombus.  They noted additional varices and this is why we have to stay on top of the endoscopic use.  They saw mesenteric edema unchanged from the previous CT.  Moderate ascites.  Overall they noted cirrhosis and did not see any suspicious lesions but they did note that there was progression of a now near occlusive thrombus of a large portosystemic venous shunt from the splenic vein to the left femoral vein.  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 As you recall we were doing this to check on the clot and to ensure there were no lesions. It is good to confirm no lesions. Need to be sure you see the transplant team back as discussed.  Do Valles PA-C  6/5/23  SHe did the mri on friday but interestingly it has not been read yet. Checked epic yesterday and today for this. Will follow up with the pending result.  She had the repeat colon.  She is having a paracentesis every 6 days or so, 4 L removed she is doing the low salt diet and limiting fluids. DIscussed the risk of recurrrent paracentesis and needs to see oltx back on this and see if options. HE symtpoms controlled on xifaxan and miralax as seh does not tolerate the lactulose She has not schedlued with dr. barrios as discussed. inr 1.43, mcv 86, hgb 11.1, platelets 125, bili 2.5, ast 36, alt 18, alk phos 101 na 136, cr 0.8 MELD 14, meld na 15 4/27/23 HOspital follow up and recurrent ascites. She hospitalized patient recently at [facility].  She was in route to [place] per the notes but had EKG abnormalities and they redirected her to [facility].  She had gained 19 pounds in the last several days.  Recent paracentesis on 419 with 4 L removed.  She was also having hematochezia and reviewed the chart and had recent rectal clips done and those need a re look and hospital noted she did not want to do there as she does things at [place]  CT scan done on 423  Cirrhotic morphology of the liver with this extensive paraesophageal and abdominal varices and splenomegaly. no lesions  April 24 paracentesis showing that there was 4 L removed.  April 24 labs with hemoglobin 9.8, platelets 47, INR 1.6.  Sodium 131, potassium 3.1, bilirubin 1.9, alkaline phosphatase 80, ALT 17, AST 37. cr .7  Per epic she has been pending an echo and rescheduled multiple times due to transportation issues. She was pending mri recently but not done  Discussed the ascites and need to see where from and ct not showing clot issue and need mri to be sure but also need that echo need to order paracentesis now fo rher. she has not been limited flluids, salt and she will start.  She needs to be reseen by oltx and we will refer her We will update and labs and she will need to see GI re the hematochezia given recent clips done.  Dr. Gonzales saw her as well and reminded her if acute then do care where she can. Also discussed with her the salt and needing to see oltx again.     recap She says she had some issues with daughter being in mental institution and she had trouble coming up and so now comes back. She is doing better.  She had adopted her at birth.  Right ear she had an skin cancer and has trouble wearing a mask for this.  She has been getting shot for scalp psoriasis and she does not know what is the shot. She has 2 rounds of it.  She has to get her every 2 months.  Dr. Pepito Haile availdermatology.[address] [address]  ~34.1 mi [phone]  We need to know what he is giving her so that we can say what we expect to be the case.  Depending on what he is giving her.  No recent labs and no recent imaging.  June 8, 2022 MRI showed chronic trace left pleural effusion with associated atelectasis in the lower thorax views.  There was no significant fat or iron but with nodular contour and cirrhotic morphology.  No suspicious lesions.  Prior cholecystectomy changes.  No duct dilation.  Spleen enlarged 18.4 cm.  Pancreas normal.  Adrenal glands normal.  Normal bilateral renal symmetric enhancement with 6 mm superior pole renal cyst and no suspicious lesions and no hydro.  Mild small and large bowel wall edema likely due to periportal enteropathy/colopathy.  Unchanged chronic fibrosis of portal vein with cavernous transformation reconstituted left portal vein.  Redemonstrated gastroesophageal perisplenic varices.  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  Only trace perihepatic and perisplenic ascites.  June 7, 2022 visit with Dr. Flores mentions that the patient is status post TAVR with recent echo showing EF 55% of left ventricular outflow VTI of 21.  Chronic diastolic heart failure noted.  TAVR was on January 23, 2020.  She has not followed with Dr Flores and sees local cards for the tavr.  June 1, 2022 echo showed EF 50 to 55% aortic valve normal functioning bioprosthetic valve.  Aortic valve max velocity showed a mean gradient of 11.0 and a peak gradient 24.4.  June 8, 2022 labs show sodium 135 potassium 3.9 glucose 297 BUN of 10 creatinine 0.75 total bilirubin 1.6 calcium 9.1 AST 36 ALT 18 alk phos 106 hemoglobin A1c up at 7.5 AFP 4 W count 3.2 hemoglobin 12.3 plate count 47 INR 1.35.  HIV test negative at that time.  Transplant team sees her every 6m and she sees the team there. She is told is stable and looks good and meld was 14.  She says told re mounjaro.  April 29th 2022 labs stable ammonia level normal at 61. INR normal at 1.2.   Sugar was elevated at 173.  Please share with primary provider. BUN of 13 creatinine 0.69 sodium 138 potassium 4.4 albumin normal at 3.8.  Bilirubin 1.2 alkaline phosphatase 112. Only the AST was elevated at 46 and ALT was ideal at 25. White blood cell count 3.7 hemoglobin 12.4 and MCV 87 and these are normal.  Platelet count low at 54.  Neutrophils normal at 2.8 and lymphocytes slightly low at 0.5. Meld down to 9 and meld na 9. Suggests that the infection is what tipped your labs recently  to be worse. So really need to focus on trying to prevent those recurrent urinary infections as worsens your underlying liver state. Please ask local ID to contact us to discuss the recent infection and options.   Saw the notes and had urosepsis. Had 51263 cells in the ascites.  Had 1 tap in the hospital. had the infection.  Prior tap in [place] had no infection and she had been started on abx once a month. 2500 ml.  20-30 wbc, nos.  90% of pts with sbp if the infection will be reinfected in a year.  Once a month is not a usual tx for sbp prevention. Daily or once a week. Dr Lopez is her id and they need to look at this new data and need to adjust tx.  March 30, 2022 labs show sodium 137 potassium 3.6 BUN of 10 creatinine 0.68 glucose 156 total bilirubin 1.3 AST 25 ALT 14 alk phos 83 calcium 8.7 labs count 3.3 hemoglobin 12 platelet count 68.  March 28, 2022 CT abdomen and pelvis with IV contrast revealed left-sided pleural effusion with some atelectasis.  Cardiophrenic enhancing node measuring 1.1 x 1.8 cm slightly increased in size.  Liver with chronic liver disease.  Prior cholecystectomy no bile duct dilation.  Splenomegaly seen.  Pancreas normal.  No hydronephrosis but with left nonobstructing renal stone.  Diffuse colon wall thickening and edema with surrounding stranding.  Prominent retroperitoneal mesenteric lymph nodes.  Large varices seen on the vessel exam with focal occlusion of the SMV/portal vein which appears similar to prior study.  Moderate continuation of the right portal vein.  Mild increase cannot exclude thrombus within the splenic venous collaterals in the left upper quadrant.  March 28, 2022 paracentesis removed 2600 mL of fluid.  November 2021 MRI liver with no fat or iron cirrhotic liver in order to enhancing lesions.  Spleen 19 cm.  Prior cholecystectomy changes seen.  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  Multiple large collateral pathways including gastroesophageal varices.  Replaced right hepatic artery seen.  Ascites may have been primary vs uti.  She has been seen by urogyn for the recurrent uti.   On abx once a month not for prevention but rather infection every month and so that makes more sense.  Covid 19 Jan 2022 and admitted locally for sepsis.  June 8, 2022 patient has appointment for labs and another MRI and in July 20 9022 will be seen by the  oltx transplant nurse practitioner at [facility].   April 8, 2022 transplant clinic visit mentions that the patient has Baeza and had a recent hospitalization from March 20 7 March 21st for urosepsis.  Drained 2.6 L and prior to that had not been drained since February 2022.  Taking Bumex 2 mg twice daily and Aldactone 100 mg a day.  Meld increased during the hospital but was down to 14 by March 27, 2022.  Bili decreased to 1.3 on March 30.  Recap: Patient with history of chronic SMV/portal vein occlusion.  EGD January 2021 status post band ligation.  No significant encephalopathy on Xifaxan.  Current medicines include omeprazole 2 mg a day, aspirin 81 mg a day, Bumex 2 mg a day, vitamin D 1000 units a day, Prolia every 6 months Estrace 2 mg a day, gabapentin 3 mg 3 times daily, NovoLog sliding scale, Tresiba 8 units at night, lactulose 20 g Daily, mesalamine 1 g p.o. twice daily, midodrine 10 mg p.o. 3 times daily, nadolol 40 mg twice daily, potassium 40 mg a day, Xifaxan 550 twice daily, Aldactone on milligrams a day, Trintellix 20 mg a day.  Patient was listed as following up with Dr. Vickers for endoscopy. She says he has been seeing. He also saw dysphagia.  March 27 March 31 discharge summary from [facility].   She was a patient came in with concerns of urosepsis and left pyelonephritis.  Urinalysis high-grade 150 WBCs.  Blood cultures were negative but urine did grow pansensitive Klebsiella.  Patient was seen by ID and was given a course of daptomycin and Levaquin treatment.  They wanted urogynecology to see her. Left renal pole lesion that could be a nidus for recurrent infection.  Cell count on the fluid was 10,520 range  nucleated cells 84% neutrophils though the cultures were negative to date and maintained on antibiotics.  Looked up the actual fluid count from March 28 and had had albumin less than 1.5 total protein was 3.0 nucleated cells 10,520 neutrophils 84%.  Lasted been EGD high-grade fevers or November 17, 2021 with Dr. Vickers where she had grade 1 varices lower esophagus.  Moderate Schatzki ring was seen.  Dilation was seen to 51 Syriac.  Moderately erythematous mucosa without bleeding seen in the gastric antrum.  H. pylori testing sent.  Biopsy was negative for significant abnormality.  She did egd in nov 2021 and grade 1 varices noted. He is planning nov 2022.  nov 2021 mri: Reason For Exam Hepatocellular carcinoma;Hepatocellular carcinoma  REPORT EXAM: MRI Abdomen w/ + w/o Contrast  CLINICAL INDICATION: Hepatocellular carcinoma  TECHNIQUE: Multisequence, multiplanar MRI of the abdomen was performed without and with intravenous contrast. [id number]  CONTRAST: 18 cc of Prohance  COMPARISON: March 2, 2021  FINDINGS:  Lower Thorax: Normal.  Liver: No fat or iron. Cirrhotic liver morphology. No arterially enhancing lesions. No lesions with capsule. No lesions with washout.  Gallbladder/Biliary Tree: Status post cholecystectomy.  Spleen: Measuring 19.0 cm in maximum craniocaudal diameter.  Pancreas: Normal.  Adrenal Glands: Normal.   Kidneys/Ureters: Normal.  Gastrointestinal: Normal.   Lymph Nodes: Normal.  Vessels: Chronic thrombosis of the main portal vein with cavernous transformation redemonstrated. Multiple large collateral pathways including gastroesophageal varices, again noted. 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  Peritoneum/Retroperitoneum: Minimal ascites is present.  Bones/Soft Tissues: Normal.  IMPRESSION:     Cirrhosis with chronic portal vein thrombosis and cavernous transformation with associated evidence of portal hypertension. No hepatocellular carcinoma.  Duration of the visit was minutes with 10 minutes of chart prep and 20 minutes of face to face/TeleMed visit reviewing recent records, discussing their current status and the future plans for the patient.

## 2023-10-02 ENCOUNTER — APPOINTMENT (RX ONLY)
Dept: URBAN - METROPOLITAN AREA CLINIC 41 | Facility: CLINIC | Age: 63
Setting detail: DERMATOLOGY
End: 2023-10-02

## 2023-10-02 DIAGNOSIS — L40.0 PSORIASIS VULGARIS: ICD-10-CM | Status: INADEQUATELY CONTROLLED

## 2023-10-02 PROCEDURE — ? COUNSELING

## 2023-10-02 PROCEDURE — 11901 INJECT SKIN LESIONS >7: CPT

## 2023-10-02 PROCEDURE — ? SKYRIZI INITIATION

## 2023-10-02 PROCEDURE — ? SEPARATE AND IDENTIFIABLE DOCUMENTATION

## 2023-10-02 PROCEDURE — ? ADDITIONAL NOTES

## 2023-10-02 PROCEDURE — ? INTRALESIONAL KENALOG

## 2023-10-02 PROCEDURE — ? ORDER TESTS

## 2023-10-02 PROCEDURE — 99214 OFFICE O/P EST MOD 30 MIN: CPT | Mod: 25

## 2023-10-02 ASSESSMENT — ITCH NUMERIC RATING SCALE: HOW SEVERE IS YOUR ITCHING?: 0

## 2023-10-02 ASSESSMENT — LOCATION SIMPLE DESCRIPTION DERM
LOCATION SIMPLE: SCALP
LOCATION SIMPLE: POSTERIOR SCALP

## 2023-10-02 ASSESSMENT — PGA PSORIASIS: PGA PSORIASIS 2020: MODERATE

## 2023-10-02 ASSESSMENT — LOCATION DETAILED DESCRIPTION DERM
LOCATION DETAILED: RIGHT INFERIOR OCCIPITAL SCALP
LOCATION DETAILED: LEFT SUPERIOR OCCIPITAL SCALP
LOCATION DETAILED: LEFT OCCIPITAL SCALP
LOCATION DETAILED: LEFT INFERIOR OCCIPITAL SCALP
LOCATION DETAILED: LEFT CENTRAL POSTAURICULAR SKIN
LOCATION DETAILED: LEFT CENTRAL PARIETAL SCALP
LOCATION DETAILED: LEFT INFERIOR PARIETAL SCALP
LOCATION DETAILED: LEFT SUPERIOR POSTAURICULAR SKIN
LOCATION DETAILED: MID-OCCIPITAL SCALP

## 2023-10-02 ASSESSMENT — BSA PSORIASIS: % BODY COVERED IN PSORIASIS: 1

## 2023-10-02 ASSESSMENT — LOCATION ZONE DERM: LOCATION ZONE: SCALP

## 2023-10-02 NOTE — PROCEDURE: COUNSELING
Patient Specific Counseling (Will Not Stick From Patient To Patient): Highly recalcitrant scalp psoriasis. Has failed Humira, Stelara, Cosentyx, and Siliq. IL steroids are the only treatment that have helped to any degree, but she requires injections every three months. I would still look for a systemic therapy that might give her relief, and would see if she is covered for Skyrizi.
Detail Level: Generalized

## 2023-10-02 NOTE — PROCEDURE: ORDER TESTS
Performing Laboratory: -[id number]
Expected Date Of Service: 10/02/2023
Lab Facility: [id number]
Bill For Surgical Tray: no
Billing Type: Third-Party Bill

## 2023-10-02 NOTE — PROCEDURE: ADDITIONAL NOTES
Additional Notes: She has appt with pcp for labs I sent over the quantiferon and she will do that or TB placement
Render Risk Assessment In Note?: no
Detail Level: Simple

## 2023-10-02 NOTE — PROCEDURE: SKYRIZI INITIATION
Is Phototherapy Contraindicated?: No
Skyrizi Monitoring Guidelines: A yearly test for tuberculosis is required while taking Skyrizi.
Diagnosis (Required): Psoriasis
Include Weight Question: Yes - Pounds
Skyrizi Dosing: 150 mg SC week 0 and week 4 then every 12 weeks thereafter
Detail Level: Zone
Pregnancy And Lactation Warning Text: The risk during pregnancy and breastfeeding is uncertain with this medication.

## 2023-10-04 ENCOUNTER — RX ONLY (OUTPATIENT)
Age: 63
Setting detail: RX ONLY
End: 2023-10-04

## 2023-10-04 RX ORDER — RISANKIZUMAB-RZAA 150 MG/ML
INJECTION SUBCUTANEOUS
Qty: 2 | Refills: 0 | Status: ERX

## 2023-10-04 RX ORDER — RISANKIZUMAB-RZAA 150 MG/ML
INJECTION SUBCUTANEOUS
Qty: 1 | Refills: 3 | Status: ERX

## 2023-10-13 ENCOUNTER — OFFICE VISIT (OUTPATIENT)
Dept: URBAN - METROPOLITAN AREA CLINIC 86 | Facility: CLINIC | Age: 63
End: 2023-10-13
Payer: MEDICARE

## 2023-10-13 VITALS
SYSTOLIC BLOOD PRESSURE: 102 MMHG | HEART RATE: 72 BPM | BODY MASS INDEX: 29.45 KG/M2 | TEMPERATURE: 97.3 F | HEIGHT: 61 IN | DIASTOLIC BLOOD PRESSURE: 60 MMHG | WEIGHT: 156 LBS

## 2023-10-13 DIAGNOSIS — K76.82 HEPATIC ENCEPHALOPATHY: ICD-10-CM

## 2023-10-13 DIAGNOSIS — I81 PORTAL VEIN THROMBOSIS: ICD-10-CM

## 2023-10-13 DIAGNOSIS — K75.81 NASH (NONALCOHOLIC STEATOHEPATITIS): ICD-10-CM

## 2023-10-13 DIAGNOSIS — K76.6 PORTAL HYPERTENSION: ICD-10-CM

## 2023-10-13 PROCEDURE — 99215 OFFICE O/P EST HI 40 MIN: CPT

## 2023-10-13 RX ORDER — DENOSUMAB 60 MG/ML
AS DIRECTED INJECTION SUBCUTANEOUS
Status: DISCONTINUED | COMMUNITY

## 2023-10-13 RX ORDER — ASPIRIN 81 MG/1
1 TABLET TABLET, COATED ORAL ONCE A DAY
Status: ACTIVE | COMMUNITY

## 2023-10-13 RX ORDER — INSULIN DEGLUDEC INJECTION 100 U/ML
8 UNITS INJECTION, SOLUTION SUBCUTANEOUS
Refills: 0 | Status: DISCONTINUED | COMMUNITY
Start: 1900-01-01

## 2023-10-13 RX ORDER — DEXTROSE 4 G
1 TABLET TABLET,CHEWABLE ORAL ONCE A DAY
Status: ACTIVE | COMMUNITY

## 2023-10-13 RX ORDER — MIDODRINE HYDROCHLORIDE 10 MG/1
1 TABLET TABLET ORAL THREE TIMES A DAY
Status: ACTIVE | COMMUNITY

## 2023-10-13 RX ORDER — OMEPRAZOLE 40 MG/1
TAKE 1 CAPSULE BY MOUTH TWICE A DAY BEFORE A MEAL CAPSULE, DELAYED RELEASE ORAL
Qty: 180 CAPSULE | Refills: 0 | Status: ACTIVE | COMMUNITY

## 2023-10-13 RX ORDER — RIFAXIMIN 550 MG/1
1 TABLET TABLET ORAL TWICE A DAY
Qty: 60 TABLET | Refills: 5 | Status: ACTIVE | COMMUNITY

## 2023-10-13 RX ORDER — BUMETANIDE 0.5 MG/1
4 (2MG) TABLET ORAL ONCE A DAY
Status: ACTIVE | COMMUNITY

## 2023-10-13 RX ORDER — SEMAGLUTIDE 0.68 MG/ML
AS DIRECTED INJECTION, SOLUTION SUBCUTANEOUS
Status: ACTIVE | COMMUNITY

## 2023-10-13 RX ORDER — NADOLOL 40 MG/1
1 TABLET TABLET ORAL TWICE A DAY
Status: ACTIVE | COMMUNITY

## 2023-10-13 RX ORDER — LACTULOSE 10 G/15ML
30 ML SOLUTION ORAL ONCE A DAY
Status: ACTIVE | COMMUNITY

## 2023-10-13 RX ORDER — VORTIOXETINE 20 MG/1
TAKE 1 TABLET (20 MG) BY ORAL ROUTE ONCE DAILY AT THE SAME TIME EACH DAY TABLET, FILM COATED ORAL ONCE A DAY
Refills: 0 | Status: ACTIVE | COMMUNITY
Start: 1900-01-01

## 2023-10-13 RX ORDER — POTASSIUM CHLORIDE 1500 MG/1
2 TABLETS WITH FOOD TABLET, FILM COATED, EXTENDED RELEASE ORAL ONCE A DAY
Refills: 0 | Status: ACTIVE | COMMUNITY
Start: 1900-01-01

## 2023-10-13 RX ORDER — GABAPENTIN 300 MG/1
1 CAPSULE CAPSULE ORAL THREE TIMES A DAY
Status: ACTIVE | COMMUNITY

## 2023-10-13 NOTE — HPI-TODAY'S VISIT:
Pt is 63 F last seen June 2023 by Ms Hai with hx baeza and cirrhosis and smv/pv clot, hx varices, hx pse, march 2022 urosepsis/sbp and now here for ongoing care and eval.  A copy of the note was sent to the referring provider.  Pt did the TIPS through [facility] and that was done Aug 2023. She was done and came home same day.  She says she has seen Dr Barrios and he has CT of abd and he wanted to do mri. She says not switched for this.  Sept 18 2023 U.s: EXAM: US ABDOMEN COMPLETE, US ABDOMEN DOPPLER COMPLETE&nbsp;CLINICAL INDICATION: TIPS.&nbsp;TECHNIQUE: Grayscale, pulsed wave and color Doppler sonography of the upper abdomen were performed. [id number]&nbsp;COMPARISON: CT 7/31/2023.&nbsp;FINDINGS: Liver: Coarsened echogenicity with a nodular surface contour compatible with morphologic changes of chronic liver disease. No lesions.&nbsp;Bile Ducts: No dilated intrahepatic biliary radicles. Common duct measures 4 mm.&nbsp;Gallbladder: Status post cholecystectomy. &nbsp;Pancreas: Obscured by overlying structures.&nbsp;Right Kidney: Measures 10.3 cm. Normal echogenicity. No hydronephrosis.&nbsp;Left Kidney: Measures 11.7 cm. Normal echogenicity. No hydronephrosis.&nbsp;Spleen: Measures 17.1 cm.&nbsp;Aorta: Normal caliber where imaged.&nbsp;IVC: Normal proximally, not imaged distally. &nbsp;Hepatic Veins: Normal.&nbsp;Portal Veins: Hepatopetal flow. 85-93 cm/s.&nbsp;Hepatic Arteries: Peak systolic velocity 48 cm/s. Resistive index 0.67. Normal waveform.&nbsp;TIPS: Stent is patent. Hepatopetal flow at the portal venous end and hepatofugal flow at the hepatic venous end. Velocity in the portal end 130-156 cm/s. Velocity at mid stent 143-162 cm/s. Velocity at the hepatic end 108-134 cm/s.&nbsp;Other: Trace perihepatic free fluid.&nbsp;IMPRESSION:1.  Patent TIPS.2.  Morphologic changes of chronic liver disease with mild sequelae of portal hypertension including splenomegaly and trace perihepatic free fluid.&nbsp;  July 2023 Ct did pre tips: EXAM: CT ABDOMEN W IV CONTRAST&nbsp;CLINICAL INDICATION: Portal hypertension.&nbsp;TECHNIQUE: Following administration of non-ionic IV contrast, postcontrast images through the abdomen were obtained. If applicable, point-of-care testing was approved following departmental protocol. [facility].1.7.1 &nbsp;COMPARISON: CT abdomen/pelvis 3/29/2023, MRI abdomen 6/2/2023.&nbsp;FINDINGS: Lower Thorax: Trace left pleural effusion and left basilar atelectasis.&nbsp;Liver: Morphologic changes of chronic liver disease including nodular surface contour, lobar redistribution and fissural widening. No suspicious focal lesion.&nbsp;Gallbladder/Biliary Tree: Status post cholecystectomy. No biliary ductal dilatation.&nbsp;Spleen: Enlarged measuring up to 19 cm in craniocaudal dimension.&nbsp;Pancreas: Normal.&nbsp;Adrenal Glands: Left adrenal gland difficult to visualize. Normal right adrenal gland. &nbsp;Kidneys/Ureters: Renal cysts. No hydronephrosis.&nbsp;Gastrointestinal: No bowel obstruction or inflammation. &nbsp;Lymph Nodes: Unchanged subcentimeter in short axis jaelyn hepatis and retroperitoneal lymph nodes, likely reactive.&nbsp;Vessels: Diminutive main and central portal veins which otherwise appear patent. Redemonstrated chronic occlusion of the portosplenic venous confluence involving the portal vein extending into the superior mesenteric vein. Multiple upper abdominal paraesophageal, perigastric and perisplenic varices. Additional extensive collateral vasculature in the anterior abdomen. Findings are similar to recent MRI from 6/2/2023. Redemonstrated thrombosed portosystemic shunt arising from the splenic vein and extending inferiorly into the pelvis (6:30) and similar to 6/2/2023 MRI. Replaced right hepatic artery arising from the SMA.&nbsp;Peritoneum/Retroperitoneum: Moderate volume abdominopelvic free fluid.&nbsp;Bones/Soft Tissues: No aggressive osseous lesion. Grade 1 anterolisthesis L4 relative to L5.&nbsp;IMPRESSION:1.  Morphologic changes of chronic liver disease with sequelae of portal hypertension including splenomegaly, moderate volume ascites and extensive varices.2.  Unchanged chronic occlusion at the portosplenic venous confluence involving the portal vein extending into the superior mesenteric vein. Remainder of the main portal vein and central intrahepatic portal veins are diminutive in caliber but patent.3.  Unchanged extensive abdominal varices and a thrombosed portosystemic shunt arising from the splenic vein.4.  Small left pleural effusion.  IR TIPS: Augh 2023 Findings: &nbsp;1.  Right internal jugular vein is ultrasonographically patent and compresses.  Needle entry was documented.2.  Right hepatic venogram confirms vein patency.3.  Portogram demonstrates small main portal vein and small intrahepatic branches with prominent collaterals, with dilated and tortuous coronary and splenic veins.4.  Splenic venography demonstrates large tortuous main splenic vein and collaterals, with stenosis near the portal confluence.5.  Splenic venography after splenic vein stent placement demonstrates improved flow across area of stenosis.6.  The initial portosystemic gradient is 22 mmHg.7.  Post-TIPSS portosystemic gradient is 9 mmHg.8.  The TIPSS has a smooth course and extends from the middle hepatic vein to the main portal vein.9.  There is good flow through the shunt at the conclusion of the procedure with no appreciable filling of varices.10.  4.8 liters of fluid were drained during the procedure.11.  New right internal jugular approach triple-lumen catheter terminates at the superior cavoatrial junction.&nbsp;IMPRESSION:Impression: &nbsp;1.  Successful creation of TIPS from middle hepatic vein to main portal vein with a 10 mm x 6 cm x 2 cm controlled-expansion Viatorr stent (ballooned to 10 mm). Splenic vein stenosis improved after Abre 10mm x 8cm stent placement.2.  The portosystemic gradient was reduced from 22 mm Hg to 9 mmHg.  Since the tips no bleeding and no more taps.  Need to reach to Dr Barrios re the issue of her needing the mri and not  the ct.  Sept 18 2023 pt 17 and inr 1.5 and  wbc 3.9 and hg 12.7 and plat 50 and mcv 86.6 and na 140 and k 4.0 and cl 105 and co2 30 and ca 9.1 and bun 9 and cr 0.7 and glu 140 and alb 2.9 and alk 132 and ast 33 and alt 14.  tb 2.5.  Meld 14 and meld na 14.  Has been feeling better and doing better in from 22 to 14.   She lost like 40 pounds with the tips.  June 5 MRI was updated by radiology today. When they compared to multiple priors dating back to 2018, the described retroperitoneal/mesenteric root edema and perivascular infiltrative soft tissue demonstrates avid enhancement with no restricted diffusion.  The degree of enhancement has progressed as well as the density infiltrative soft tissue compared to multiple priors. Retroperitoneal lymph nodes were also increased in number but not pathologically enlarged.  There was no evident medialization of the ureters.  They said that they findings are concerning for retroperitoneal fibrotic or inflammatory process. It appears that you were seen on June 20 by the transplant team/Dr Barrios and we will forward them this MRI correction so that they can be aware of this.  We will forward it to him via [facility]  to be aware of as he just saw you.   The 6/2/23 MR elastography was sent to me after lunch today. As you recall it was not available for viewing during the visit today. I called and left you a voicemail about this.  Lower thorax with unchanged small left pleural effusion.  Liver with subjective evidence of fat deposition.  Cirrhotic morphology.  No suspicious lesions.  Elastography donel after a weighted mean of 3 measurements showing 3.2K PA consistent with stage I-II fibrosis.  The fat quantification 12.5% consistent with mild fatty liver.  There is no significant iron deposition.  They did not see any biliary ductal dilatation.  Splenomegaly measuring 20 cm was seen.  The pancreas normal, adrenal glands normal.  They saw a right upper renal cyst.  Please make sure the primary care provider is aware.  They saw reactive lymph nodes.  They thought that the aorta was normal.  The superior mesenteric and inferior mesenteric along with the bilateral renal arteries were patent.  They noted the replaced right hepatic artery arising from the superior mesenteric artery.  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  They noted that there has been interval progression of the degree of thrombosis of the portosystemic shunt with near complete occlusive thrombus.  They noted additional varices and this is why we have to stay on top of the endoscopic use.  They saw mesenteric edema unchanged from the previous CT.  Moderate ascites.  Overall they noted cirrhosis and did not see any suspicious lesions but they did note that there was progression of a now near occlusive thrombus of a large portosystemic venous shunt from the splenic vein to the left femoral vein.  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 As you recall we were doing this to check on the clot and to ensure there were no lesions. It is good to confirm no lesions. Need to be sure you see the transplant team back as discussed.    6/5/23  SHe did the mri on friday but interestingly it has not been read yet. Checked epic yesterday and today for this. Will follow up with the pending result.  She had the repeat colon.  She is having a paracentesis every 6 days or so, 4 L removed she is doing the low salt diet and limiting fluids. DIscussed the risk of recurrrent paracentesis and needs to see oltx back on this and see if options. HE symtpoms controlled on xifaxan and miralax as seh does not tolerate the lactulose She has not schedlued with dr. barrios as discussed. inr 1.43, mcv 86, hgb 11.1, platelets 125, bili 2.5, ast 36, alt 18, alk phos 101 na 136, cr 0.8 MELD 14, meld na 15 4/27/23 [facility]spital follow up and recurrent ascites. She hospitalized patient recently at [facility].  She was in route to [place] per the notes but had EKG abnormalities and they redirected her to [place].  She had gained 19 pounds in the last several days.  Recent paracentesis on 419 with 4 L removed.  She was also having hematochezia and reviewed the chart and had recent rectal clips done and those need a re look and hospital noted she did not want to do there as she does things at [facility]  CT scan done on 423  Cirrhotic morphology of the liver with this extensive paraesophageal and abdominal varices and splenomegaly. no lesions  April 24 paracentesis showing that there was 4 L removed.  April 24 labs with hemoglobin 9.8, platelets 47, INR 1.6.  Sodium 131, potassium 3.1, bilirubin 1.9, alkaline phosphatase 80, ALT 17, AST 37. cr .7  Per epic she has been pending an echo and rescheduled multiple times due to transportation issues. She was pending mri recently but not done  Discussed the ascites and need to see where from and ct not showing clot issue and need mri to be sure but also need that echo need to order paracentesis now fo rher. she has not been limited flluids, salt and she will start.  She needs to be reseen by oltx and we will refer her We will update and labs and she will need to see GI re the hematochezia given recent clips done.  Dr. Gonzales saw her as well and reminded her if acute then do care where she can. Also discussed with her the salt and needing to see oltx again.     recap She says she had some issues with daughter being in mental institution and she had trouble coming up and so now comes back. She is doing better.  She had adopted her at birth.  Right ear she had an skin cancer and has trouble wearing a mask for this.  She has been getting shot for scalp psoriasis and she does not know what is the shot. She has 2 rounds of it.  She has to get her every 2 months.  Dr. Pepito Haile [facility]mat[facility].[address] [address]  ~34.1 mi [phone]  We need to know what he is giving her so that we can say what we expect to be the case.  Depending on what he is giving her.  No recent labs and no recent imaging.  June 8, 2022 MRI showed chronic trace left pleural effusion with associated atelectasis in the lower thorax views.  There was no significant fat or iron but with nodular contour and cirrhotic morphology.  No suspicious lesions.  Prior cholecystectomy changes.  No duct dilation.  Spleen enlarged 18.4 cm.  Pancreas normal.  Adrenal glands normal.  Normal bilateral renal symmetric enhancement with 6 mm superior pole renal cyst and no suspicious lesions and no hydro.  Mild small and large bowel wall edema likely due to periportal enteropathy/colopathy.  Unchanged chronic fibrosis of portal vein with cavernous transformation reconstituted left portal vein.  Redemonstrated gastroesophageal perisplenic varices.  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  Only trace perihepatic and perisplenic ascites.  June 7, 2022 visit with Dr. Flores mentions that the patient is status post TAVR with recent echo showing EF 55% of left ventricular outflow VTI of 21.  Chronic diastolic heart failure noted.  TAVR was on January 23, 2020.  She has not followed with Dr Flores and sees local cards for the tavr.  June 1, 2022 echo showed EF 50 to 55% aortic valve normal functioning bioprosthetic valve.  Aortic valve max velocity showed a mean gradient of 11.0 and a peak gradient 24.4.  June 8, 2022 labs show sodium 135 potassium 3.9 glucose 297 BUN of 10 creatinine 0.75 total bilirubin 1.6 calcium 9.1 AST 36 ALT 18 alk phos 106 hemoglobin A1c up at 7.5 AFP 4 W count 3.2 hemoglobin 12.3 plate count 47 INR 1.35.  HIV test negative at that time.  Transplant team sees her every 6m and she sees the team there. She is told is stable and looks good and meld was 14.  She says told re mounjaro.  April 29th 2022 labs stable ammonia level normal at 61. INR normal at 1.2.   Sugar was elevated at 173.  Please share with primary provider. BUN of 13 creatinine 0.69 sodium 138 potassium 4.4 albumin normal at 3.8.  Bilirubin 1.2 alkaline phosphatase 112. Only the AST was elevated at 46 and ALT was ideal at 25. White blood cell count 3.7 hemoglobin 12.4 and MCV 87 and these are normal.  Platelet count low at 54.  Neutrophils normal at 2.8 and lymphocytes slightly low at 0.5. Meld down to 9 and meld na 9. Suggests that the infection is what tipped your labs recently  to be worse. So really need to focus on trying to prevent those recurrent urinary infections as worsens your underlying liver state. Please ask local ID to contact us to discuss the recent infection and options.   Saw the notes and had urosepsis. Had 11925 cells in the ascites.  Had 1 tap in the hospital. had the infection.  Prior tap in [place] had no infection and she had been started on abx once a month. 2500 ml.  20-30 wbc, nos.  90% of pts with sbp if the infection will be reinfected in a year.  Once a month is not a usual tx for sbp prevention. Daily or once a week. Dr Lopez is her id and they need to look at this new data and need to adjust tx.  March 30, 2022 labs show sodium 137 potassium 3.6 BUN of 10 creatinine 0.68 glucose 156 total bilirubin 1.3 AST 25 ALT 14 alk phos 83 calcium 8.7 labs count 3.3 hemoglobin 12 platelet count 66.  March 28, 2022 CT abdomen and pelvis with IV contrast revealed left-sided pleural effusion with some atelectasis.  Cardiophrenic enhancing node measuring 1.1 x 1.8 cm slightly increased in size.  Liver with chronic liver disease.  Prior cholecystectomy no bile duct dilation.  Splenomegaly seen.  Pancreas normal.  No hydronephrosis but with left nonobstructing renal stone.  Diffuse colon wall thickening and edema with surrounding stranding.  Prominent retroperitoneal mesenteric lymph nodes.  Large varices seen on the vessel exam with focal occlusion of the SMV/portal vein which appears similar to prior study.  Moderate continuation of the right portal vein.  Mild increase cannot exclude thrombus within the splenic venous collaterals in the left upper quadrant.  March 28, 2022 paracentesis removed 2600 mL of fluid.  November 2021 MRI liver with no fat or iron cirrhotic liver in order to enhancing lesions.  Spleen 19 cm.  Prior cholecystectomy changes seen.  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  Multiple large collateral pathways including gastroesophageal varices.  Replaced right hepatic artery seen.  Ascites may have been primary vs uti.  She has been seen by urogyn for the recurrent uti.   On abx once a month not for prevention but rather infection every month and so that makes more sense.  Covid 19 Jan 2022 and admitted locally for sepsis.  June 8, 2022 patient has appointment for labs and another MRI and in July 20 9022 will be seen by the  oltx transplant nurse practitioner at [facility].   April 8, 2022 transplant clinic visit mentions that the patient has Baeza and had a recent hospitalization from March 20 7 March 21st for urosepsis.  Drained 2.6 L and prior to that had not been drained since February 2022.  Taking Bumex 2 mg twice daily and Aldactone 100 mg a day.  Meld increased during the hospital but was down to 14 by March 27, 2022.  Bili decreased to 1.3 on March 30.  Recap: Patient with history of chronic SMV/portal vein occlusion.  EGD January 2021 status post band ligation.  No significant encephalopathy on Xifaxan.  Current medicines include omeprazole 2 mg a day, aspirin 81 mg a day, Bumex 2 mg a day, vitamin D 1000 units a day, Prolia every 6 months Estrace 2 mg a day, gabapentin 3 mg 3 times daily, NovoLog sliding scale, Tresiba 8 units at night, lactulose 20 g Daily, mesalamine 1 g p.o. twice daily, midodrine 10 mg p.o. 3 times daily, nadolol 40 mg twice daily, potassium 40 mg a day, Xifaxan 550 twice daily, Aldactone on milligrams a day, Trintellix 20 mg a day.  Patient was listed as following up with Dr. Vickers for endoscopy. She says he has been seeing. He also saw dysphagia.  March 27 March 31 discharge summary from [facility].   She was a patient came in with concerns of urosepsis and left pyelonephritis.  Urinalysis high-grade 150 WBCs.  Blood cultures were negative but urine did grow pansensitive Klebsiella.  Patient was seen by ID and was given a course of daptomycin and Levaquin treatment.  They wanted urogynecology to see her. Left renal pole lesion that could be a nidus for recurrent infection.  Cell count on the fluid was 10,520 range  nucleated cells 84% neutrophils though the cultures were negative to date and maintained on antibiotics.  Looked up the actual fluid count from March 28 and had had albumin less than 1.5 total protein was 3.0 nucleated cells 10,520 neutrophils 84%.  Lasted been EGD high-grade fevers or November 17, 2021 with Dr. Vickers where she had grade 1 varices lower esophagus.  Moderate Schatzki ring was seen.  Dilation was seen to 51 [place].  Moderately erythematous mucosa without bleeding seen in the gastric antrum.  H. pylori testing sent.  Biopsy was negative for significant abnormality.  She did egd in nov 2021 and grade 1 varices noted. He is planning nov 2022.  nov 2021 mri: Reason For Exam Hepatocellular carcinoma;Hepatocellular carcinoma  REPORT EXAM: MRI Abdomen w/ + w/o Contrast  CLINICAL INDICATION: Hepatocellular carcinoma  TECHNIQUE: Multisequence, multiplanar MRI of the abdomen was performed without and with intravenous contrast. ESRC.2.7.3  CONTRAST: 18 cc of Prohance  COMPARISON: March 2, 2021  FINDINGS:  Lower Thorax: Normal.  Liver: No fat or iron. Cirrhotic liver morphology. No arterially enhancing lesions. No lesions with capsule. No lesions with washout.  Gallbladder/Biliary Tree: Status post cholecystectomy.  Spleen: Measuring 19.0 cm in maximum craniocaudal diameter.  Pancreas: Normal.  Adrenal Glands: Normal.   Kidneys/Ureters: Normal.  Gastrointestinal: Normal.   Lymph Nodes: Normal.  Vessels: Chronic thrombosis of the main portal vein with cavernous transformation redemonstrated. Multiple large collateral pathways including gastroesophageal varices, again noted. 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  Peritoneum/Retroperitoneum: Minimal ascites is present.  Bones/Soft Tissues: Normal.  IMPRESSION:     Cirrhosis with chronic portal vein thrombosis and cavernous transformation with associated evidence of portal hypertension. No hepatocellular carcinoma. Pt is 63 F last seen June 2023 by Ms Hai with hx baeza and cirrhosis and smv/pv clot, hx varices, hx pse, march 2022 urosepsis/sbp and now here for ongoing care and eval.  A copy of the note was sent to the referring provider.  Dear Lady Carson, June 5 MRI was updated by radiology today. When they compared to multiple priors dating back to 2018, the described retroperitoneal/mesenteric root edema and perivascular infiltrative soft tissue demonstrates avid enhancement with no restricted diffusion.  The degree of enhancement has progressed as well as the density infiltrative soft tissue compared to multiple priors. Retroperitoneal lymph nodes were also increased in number but not pathologically enlarged.  There was no evident medialization of the ureters.  They said that they findings are concerning for retroperitoneal fibrotic or inflammatory process. It appears that you were seen on June 20 by the transplant team/Dr Barrios and we will forward them this MRI correction so that they can be aware of this.  We will forward it to him via [facility]  to be aware of as he just saw you. Dr Gonzales  The 6/2/23 MR elastography was sent to me after lunch today. As you recall it was not available for viewing during the visit today. I called and left you a voicemail about this.  Lower thorax with unchanged small left pleural effusion.  Liver with subjective evidence of fat deposition.  Cirrhotic morphology.  No suspicious lesions.  Elastography donel after a weighted mean of 3 measurements showing 3.2K PA consistent with stage I-II fibrosis.  The fat quantification 12.5% consistent with mild fatty liver.  There is no significant iron deposition.  They did not see any biliary ductal dilatation.  Splenomegaly measuring 20 cm was seen.  The pancreas normal, adrenal glands normal.  They saw a right upper renal cyst.  Please make sure the primary care provider is aware.  They saw reactive lymph nodes.  They thought that the aorta was normal.  The superior mesenteric and inferior mesenteric along with the bilateral renal arteries were patent.  They noted the replaced right hepatic artery arising from the superior mesenteric artery.  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  They noted that there has been interval progression of the degree of thrombosis of the portosystemic shunt with near complete occlusive thrombus.  They noted additional varices and this is why we have to stay on top of the endoscopic use.  They saw mesenteric edema unchanged from the previous CT.  Moderate ascites.  Overall they noted cirrhosis and did not see any suspicious lesions but they did note that there was progression of a now near occlusive thrombus of a large portosystemic venous shunt from the splenic vein to the left femoral vein.  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 As you recall we were doing this to check on the clot and to ensure there were no lesions. It is good to confirm no lesions. Need to be sure you see the transplant team back as discussed.  oRberta Valles PA-C  6/5/23  SHe did the mri on friday but interestingly it has not been read yet. Checked epic yesterday and today for this. Will follow up with the pending result.  She had the repeat colon.  She is having a paracentesis every 6 days or so, 4 L removed she is doing the low salt diet and limiting fluids. DIscussed the risk of recurrrent paracentesis and needs to see oltx back on this and see if options. HE symtpoms controlled on xifaxan and miralax as seh does not tolerate the lactulose She has not schedlued with dr. barrios as discussed. inr 1.43, mcv 86, hgb 11.1, platelets 125, bili 2.5, ast 36, alt 18, alk phos 101 na 136, cr 0.8 MELD 14, meld na 15 4/27/23 HOspital follow up and recurrent ascites. She hospitalized patient recently at [facility].  She was in route to [place] per the notes but had EKG abnormalities and they redirected her to [place].  She had gained 19 pounds in the last several days.  Recent paracentesis on 419 with 4 L removed.  She was also having hematochezia and reviewed the chart and had recent rectal clips done and those need a re look and hospital noted she did not want to do there as she does things at [place]  CT scan done on 423  Cirrhotic morphology of the liver with this extensive paraesophageal and abdominal varices and splenomegaly. no lesions  April 24 paracentesis showing that there was 4 L removed.  April 24 labs with hemoglobin 9.8, platelets 47, INR 1.6.  Sodium 131, potassium 3.1, bilirubin 1.9, alkaline phosphatase 80, ALT 17, AST 37. cr .7  Per epic she has been pending an echo and rescheduled multiple times due to transportation issues. She was pending mri recently but not done  Discussed the ascites and need to see where from and ct not showing clot issue and need mri to be sure but also need that echo need to order paracentesis now fo rher. she has not been limited flluids, salt and she will start.  She needs to be reseen by oltx and we will refer her We will update and labs and she will need to see GI re the hematochezia given recent clips done.  Dr. Gonzales saw her as well and reminded her if acute then do care where she can. Also discussed with her the salt and needing to see oltx again.     recap She says she had some issues with daughter being in mental institution and she had trouble coming up and so now comes back. She is doing better.  She had adopted her at birth.  Right ear she had an skin cancer and has trouble wearing a mask for this.  She has been getting shot for scalp psoriasis and she does not know what is the shot. She has 2 rounds of it.  She has to get her every 2 months.  Dr. Pepito Haile avail[facility]mat[facility].[address] [address]  ~34.1 mi [phone]  We need to know what he is giving her so that we can say what we expect to be the case.  Depending on what he is giving her.  No recent labs and no recent imaging.  June 8, 2022 MRI showed chronic trace left pleural effusion with associated atelectasis in the lower thorax views.  There was no significant fat or iron but with nodular contour and cirrhotic morphology.  No suspicious lesions.  Prior cholecystectomy changes.  No duct dilation.  Spleen enlarged 18.4 cm.  Pancreas normal.  Adrenal glands normal.  Normal bilateral renal symmetric enhancement with 6 mm superior pole renal cyst and no suspicious lesions and no hydro.  Mild small and large bowel wall edema likely due to periportal enteropathy/colopathy.  Unchanged chronic fibrosis of portal vein with cavernous transformation reconstituted left portal vein.  Redemonstrated gastroesophageal perisplenic varices.  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  Only trace perihepatic and perisplenic ascites.  June 7, 2022 visit with Dr. Flores mentions that the patient is status post TAVR with recent echo showing EF 55% of left ventricular outflow VTI of 21.  Chronic diastolic heart failure noted.  TAVR was on January 23, 2020.  She has not followed with Dr Flores and sees local cards for the tavr.  June 1, 2022 echo showed EF 50 to 55% aortic valve normal functioning bioprosthetic valve.  Aortic valve max velocity showed a mean gradient of 11.0 and a peak gradient 24.4.  June 8, 2022 labs show sodium 135 potassium 3.9 glucose 297 BUN of 10 creatinine 0.75 total bilirubin 1.6 calcium 9.1 AST 36 ALT 18 alk phos 106 hemoglobin A1c up at 7.5 AFP 4 W count 3.2 hemoglobin 12.3 plate count 47 INR 1.35.  HIV test negative at that time.  Transplant team sees her every 6m and she sees the team there. She is told is stable and looks good and meld was 14.  She says told re mounjaro.  April 29th 2022 labs stable ammonia level normal at 61. INR normal at 1.2.   Sugar was elevated at 173.  Please share with primary provider. BUN of 13 creatinine 0.69 sodium 138 potassium 4.4 albumin normal at 3.8.  Bilirubin 1.2 alkaline phosphatase 112. Only the AST was elevated at 46 and ALT was ideal at 25. White blood cell count 3.7 hemoglobin 12.4 and MCV 87 and these are normal.  Platelet count low at 54.  Neutrophils normal at 2.8 and lymphocytes slightly low at 0.5. Meld down to 9 and meld na 9. Suggests that the infection is what tipped your labs recently  to be worse. So really need to focus on trying to prevent those recurrent urinary infections as worsens your underlying liver state. Please ask local ID to contact us to discuss the recent infection and options.   Saw the notes and had urosepsis. Had [id number] cells in the ascites.  Had 1 tap in the hospital. had the infection.  Prior tap in [place] had no infection and she had been started on abx once a month. 2500 ml.  20-30 wbc, nos.  90% of pts with sbp if the infection will be reinfected in a year.  Once a month is not a usual tx for sbp prevention. Daily or once a week. Dr Lopez is her id and they need to look at this new data and need to adjust tx.  March 30, 2022 labs show sodium 137 potassium 3.6 BUN of 10 creatinine 0.68 glucose 156 total bilirubin 1.3 AST 25 ALT 14 alk phos 83 calcium 8.7 labs count 3.3 hemoglobin 12 platelet count 66.  March 28, 2022 CT abdomen and pelvis with IV contrast revealed left-sided pleural effusion with some atelectasis.  Cardiophrenic enhancing node measuring 1.1 x 1.8 cm slightly increased in size.  Liver with chronic liver disease.  Prior cholecystectomy no bile duct dilation.  Splenomegaly seen.  Pancreas normal.  No hydronephrosis but with left nonobstructing renal stone.  Diffuse colon wall thickening and edema with surrounding stranding.  Prominent retroperitoneal mesenteric lymph nodes.  Large varices seen on the vessel exam with focal occlusion of the SMV/portal vein which appears similar to prior study.  Moderate continuation of the right portal vein.  Mild increase cannot exclude thrombus within the splenic venous collaterals in the left upper quadrant.  March 28, 2022 paracentesis removed 2600 mL of fluid.  November 2021 MRI liver with no fat or iron cirrhotic liver in order to enhancing lesions.  Spleen 19 cm.  Prior cholecystectomy changes seen.  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  Multiple large collateral pathways including gastroesophageal varices.  Replaced right hepatic artery seen.  Ascites may have been primary vs uti.  She has been seen by urogyn for the recurrent uti.   On abx once a month not for prevention but rather infection every month and so that makes more sense.  Covid 19 Jan 2022 and admitted locally for sepsis.  June 8, 2022 patient has appointment for labs and another MRI and in July 20 9022 will be seen by the  oltx transplant nurse practitioner at [facility].   April 8, 2022 transplant clinic visit mentions that the patient has Baeza and had a recent hospitalization from March 20 7 March 21st for urosepsis.  Drained 2.6 L and prior to that had not been drained since February 2022.  Taking Bumex 2 mg twice daily and Aldactone 100 mg a day.  Meld increased during the hospital but was down to 14 by March 27, 2022.  Bili decreased to 1.3 on March 30.  Recap: Patient with history of chronic SMV/portal vein occlusion.  EGD January 2021 status post band ligation.  No significant encephalopathy on Xifaxan.  Current medicines include omeprazole 2 mg a day, aspirin 81 mg a day, Bumex 2 mg a day, vitamin D 1000 units a day, Prolia every 6 months Estrace 2 mg a day, gabapentin 3 mg 3 times daily, NovoLog sliding scale, Tresiba 8 units at night, lactulose 20 g Daily, mesalamine 1 g p.o. twice daily, midodrine 10 mg p.o. 3 times daily, nadolol 40 mg twice daily, potassium 40 mg a day, Xifaxan 550 twice daily, Aldactone on milligrams a day, Trintellix 20 mg a day.  Patient was listed as following up with Dr. Vickers for endoscopy. She says he has been seeing. He also saw dysphagia.  March 27 March 31 discharge summary from [facility].   She was a patient came in with concerns of urosepsis and left pyelonephritis.  Urinalysis high-grade 150 WBCs.  Blood cultures were negative but urine did grow pansensitive Klebsiella.  Patient was seen by ID and was given a course of daptomycin and Levaquin treatment.  They wanted urogynecology to see her. Left renal pole lesion that could be a nidus for recurrent infection.  Cell count on the fluid was 10,520 range  nucleated cells 84% neutrophils though the cultures were negative to date and maintained on antibiotics.  Looked up the actual fluid count from March 28 and had had albumin less than 1.5 total protein was 3.0 nucleated cells 10,520 neutrophils 84%.  Lasted been EGD high-grade fevers or November 17, 2021 with Dr. Vickers where she had grade 1 varices lower esophagus.  Moderate Schatzki ring was seen.  Dilation was seen to 51 [place].  Moderately erythematous mucosa without bleeding seen in the gastric antrum.  H. pylori testing sent.  Biopsy was negative for significant abnormality.  She did egd in nov 2021 and grade 1 varices noted. He is planning nov 2022.  nov 2021 mri: Reason For Exam Hepatocellular carcinoma;Hepatocellular carcinoma  REPORT EXAM: MRI Abdomen w/ + w/o Contrast  CLINICAL INDICATION: Hepatocellular carcinoma  TECHNIQUE: Multisequence, multiplanar MRI of the abdomen was performed without and with intravenous contrast. [id number]  CONTRAST: 18 cc of Prohance  COMPARISON: March 2, 2021  FINDINGS:  Lower Thorax: Normal.  Liver: No fat or iron. Cirrhotic liver morphology. No arterially enhancing lesions. No lesions with capsule. No lesions with washout.  Gallbladder/Biliary Tree: Status post cholecystectomy.  Spleen: Measuring 19.0 cm in maximum craniocaudal diameter.  Pancreas: Normal.  Adrenal Glands: Normal.   Kidneys/Ureters: Normal.  Gastrointestinal: Normal.   Lymph Nodes: Normal.  Vessels: Chronic thrombosis of the main portal vein with cavernous transformation redemonstrated. Multiple large collateral pathways including gastroesophageal varices, again noted. 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  Peritoneum/Retroperitoneum: Minimal ascites is present.  Bones/Soft Tissues: Normal.  IMPRESSION:     Cirrhosis with chronic portal vein thrombosis and cavernous transformation with associated evidence of portal hypertension. No hepatocellular carcinoma.  Plan: 1. She needs to get the info to Dr Barrios to get the mri ordered and not the ct that is ordered, 2. Pt meld is 14 and tolerated tips and lost 40 pounds and no bleeding. 3. Pt will need to see roberta or in in  and do labs, 4. Pt will call if issues.   Duration of the visit was 60 minutes with 20 minutes of chart prep loaidng scans and tips and labs into suystem from epic to [facility] and then 40 minutes of face to face  visit reviewing recent records, discussing their current status and the future plans for the patient.

## 2023-10-13 NOTE — EXAM-PHYSICAL EXAM
Gen: awake and responsive. Eyes: anicteric, normal lids. Mouth: normal lips. Nose: no drainage. Hearing: intact grossly. Neck: trachea midline and no jvd. CV: RRR no s3. Lungs: clear. No wheezes, Abd: Soft, nabs, nr, NT. No appreciable hsm. No clear fluid wave. Ext: no sig edema, some palm erythema. Neuro: moves all 4 ext grossly. No asterixis. Skin: no pruritis and some palm erythema.

## 2023-10-14 ENCOUNTER — TELEPHONE ENCOUNTER (OUTPATIENT)
Dept: URBAN - METROPOLITAN AREA CLINIC 86 | Facility: CLINIC | Age: 63
End: 2023-10-14

## 2023-10-14 LAB
A/G RATIO: 1.2
ALBUMIN: 3.6
ALKALINE PHOSPHATASE: 115
ALT (SGPT): 16
AST (SGOT): 34
BASO (ABSOLUTE): 0.1
BASOS: 1
BILIRUBIN, TOTAL: 3.2
BUN/CREATININE RATIO: 13
BUN: 11
CALCIUM: 9.3
CARBON DIOXIDE, TOTAL: 28
CHLORIDE: 101
CREATININE: 0.83
EGFR: 80
EOS (ABSOLUTE): 0.1
EOS: 2
GLOBULIN, TOTAL: 2.9
GLUCOSE: 272
HEMATOCRIT: 39.8
HEMATOLOGY COMMENTS:: (no result)
HEMOGLOBIN: 13.3
IMMATURE CELLS: (no result)
IMMATURE GRANS (ABS): 0
IMMATURE GRANULOCYTES: 0
INR: 1.4
LYMPHS (ABSOLUTE): 0.6
LYMPHS: 11
MCH: 30.4
MCHC: 33.4
MCV: 91
MONOCYTES(ABSOLUTE): 0.4
MONOCYTES: 7
NEUTROPHILS (ABSOLUTE): 4.6
NEUTROPHILS: 79
NRBC: (no result)
PLATELETS: 52
POTASSIUM: 4.4
PROTEIN, TOTAL: 6.5
PROTHROMBIN TIME: 14.1
RBC: 4.37
RDW: 15.8
SODIUM: 140
WBC: 5.7

## 2023-10-14 NOTE — HPI-TODAY'S VISIT:
Dear Lady Carson,   October 13 labs show glucose elevated at 272 and possibly you were not fasting that day?  Please share with primary provider to continue to work on this issue.  BUN 11 creatinine 0.83 sodium 140 potassium 4.4 chloride 101 CO2 28 calcium 9.3 with albumin slightly diminished at 3.6 and previous given 3.7.  Bilirubin was slightly higher at 3.2 but not fractionated.  Alk phos normal at 115 AST down to 34 from 52 and ALT 16 down from 26.  Ideal ALT less than 25.  WBC 5.7 hemoglobin 13.3 platelet  count was slightly higher for you at 52 from 39 before.  RDW was slightly up at 15.8.  Neutrophils normal at 4.6 and lymphocytes slightly low at 0.6 but this has been chronically seen and you before.   Please share with primary provider.  INR 1.4.  Meld remains right at 15/15 and mainly driven by the elevated bilirubin. Please share labs with local providers.   No clear lab reason seen to decrease the diuretics for now. Await the mri. Dr Gonzales

## 2023-10-23 ENCOUNTER — TELEPHONE ENCOUNTER (OUTPATIENT)
Dept: URBAN - METROPOLITAN AREA CLINIC 86 | Facility: CLINIC | Age: 63
End: 2023-10-23

## 2023-11-14 ENCOUNTER — APPOINTMENT (RX ONLY)
Dept: URBAN - METROPOLITAN AREA CLINIC 41 | Facility: CLINIC | Age: 63
Setting detail: DERMATOLOGY
End: 2023-11-14

## 2023-11-14 ENCOUNTER — RX ONLY (OUTPATIENT)
Age: 63
Setting detail: RX ONLY
End: 2023-11-14

## 2023-11-14 DIAGNOSIS — L40.0 PSORIASIS VULGARIS: ICD-10-CM

## 2023-11-14 PROCEDURE — ? PRESCRIPTION MEDICATION MANAGEMENT

## 2023-11-14 PROCEDURE — 99214 OFFICE O/P EST MOD 30 MIN: CPT

## 2023-11-14 PROCEDURE — ? MEDICATION COUNSELING

## 2023-11-14 PROCEDURE — ? PRESCRIPTION

## 2023-11-14 PROCEDURE — ? COUNSELING

## 2023-11-14 RX ORDER — ROFLUMILAST 3 MG/G
CREAM TOPICAL
Qty: 60 | Refills: 3 | Status: ERX

## 2023-11-14 RX ORDER — ROFLUMILAST 3 MG/G
CREAM TOPICAL
Qty: 60 | Refills: 3 | Status: CANCELLED | COMMUNITY
Start: 2023-11-14

## 2023-11-14 RX ADMIN — ROFLUMILAST: 3 CREAM TOPICAL at 00:00

## 2023-11-14 ASSESSMENT — LOCATION DETAILED DESCRIPTION DERM: LOCATION DETAILED: RIGHT SUPERIOR PARIETAL SCALP

## 2023-11-14 ASSESSMENT — LOCATION ZONE DERM: LOCATION ZONE: SCALP

## 2023-11-14 ASSESSMENT — LOCATION SIMPLE DESCRIPTION DERM: LOCATION SIMPLE: SCALP

## 2023-11-14 ASSESSMENT — BSA PSORIASIS: % BODY COVERED IN PSORIASIS: 5

## 2023-11-14 ASSESSMENT — PGA PSORIASIS: PGA PSORIASIS 2020: MODERATE

## 2023-11-14 ASSESSMENT — ITCH NUMERIC RATING SCALE: HOW SEVERE IS YOUR ITCHING?: 10

## 2023-11-14 NOTE — PROCEDURE: PRESCRIPTION MEDICATION MANAGEMENT
Plan: Patient has tried and failed cosentyx, Otezla, Siliq, topical steroids. She has decided against using Skyrizi because of the immune system suppression. Her PCP Dr. Jeanmarie Arriaga helped her in deciding against it. She mentioned she was sick often while on other biologics. We had a lengthy discussion about the safety of Skyrizi and the efficacy. Patient notes her scalp is really itchy. She says she prefers ILK, though it doesn’t seem the injections are necessarily helping her as much as she would like. It was discussed it’s too soon to inject her since she just had injections but we did make her an appt for ILK in.   Recommended she bring her daughter at the next visit as she is one of her primary health advisers if she wishes to discuss further.

## 2023-11-14 NOTE — PROCEDURE: MEDICATION COUNSELING
Itraconazole Counseling:  I discussed with the patient the risks of itraconazole including but not limited to liver damage, nausea/vomiting, neuropathy, and severe allergy.  The patient understands that this medication is best absorbed when taken with acidic beverages such as non-diet cola or ginger ale.  The patient understands that monitoring is required including baseline LFTs and repeat LFTs at intervals.  The patient understands that they are to contact us or the primary physician if concerning signs are noted.
Humira Counseling:  I discussed with the patient the risks of adalimumab including but not limited to myelosuppression, immunosuppression, autoimmune hepatitis, demyelinating diseases, lymphoma, and serious infections.  The patient understands that monitoring is required including a PPD at baseline and must alert us or the primary physician if symptoms of infection or other concerning signs are noted.
Solaraze Pregnancy And Lactation Text: This medication is Pregnancy Category B and is considered safe. There is some data to suggest avoiding during the third trimester. It is unknown if this medication is excreted in breast milk.
Litfulo Pregnancy And Lactation Text: Based on animal studies, Lifulo may cause embryo-fetal harm when administered to pregnant women.  The medication should not be used in pregnancy.  Breastfeeding is not recommended during treatment.
Picato Counseling:  I discussed with the patient the risks of Picato including but not limited to erythema, scaling, itching, weeping, crusting, and pain.
Siliq Pregnancy And Lactation Text: The risk during pregnancy and breastfeeding is uncertain with this medication.
Cantharidin Pregnancy And Lactation Text: This medication has not been proven safe during pregnancy. It is unknown if this medication is excreted in breast milk.
Dapsone Pregnancy And Lactation Text: This medication is Pregnancy Category C and is not considered safe during pregnancy or breast feeding.
Cyclosporine Counseling:  I discussed with the patient the risks of cyclosporine including but not limited to hypertension, gingival hyperplasia,myelosuppression, immunosuppression, liver damage, kidney damage, neurotoxicity, lymphoma, and serious infections. The patient understands that monitoring is required including baseline blood pressure, CBC, CMP, lipid panel and uric acid, and then 1-2 times monthly CMP and blood pressure.
Oral Minoxidil Pregnancy And Lactation Text: This medication should only be used when clearly needed if you are pregnant, attempting to become pregnant or breast feeding.
Detail Level: Simple
Topical Ketoconazole Counseling: Patient counseled that this medication may cause skin irritation or allergic reactions.  In the event of skin irritation, the patient was advised to reduce the amount of the drug applied or use it less frequently.   The patient verbalized understanding of the proper use and possible adverse effects of ketoconazole.  All of the patient's questions and concerns were addressed.
Minocycline Pregnancy And Lactation Text: This medication is Pregnancy Category D and not consider safe during pregnancy. It is also excreted in breast milk.
Tremfya Counseling: I discussed with the patient the risks of guselkumab including but not limited to immunosuppression, serious infections, and drug reactions.  The patient understands that monitoring is required including a PPD at baseline and must alert us or the primary physician if symptoms of infection or other concerning signs are noted.
Thalidomide Counseling: I discussed with the patient the risks of thalidomide including but not limited to birth defects, anxiety, weakness, chest pain, dizziness, cough and severe allergy.
Birth Control Pills Pregnancy And Lactation Text: This medication should be avoided if pregnant and for the first 30 days post-partum.
Opioid Counseling: I discussed with the patient the potential side effects of opioids including but not limited to addiction, altered mental status, and depression. I stressed avoiding alcohol, benzodiazepines, muscle relaxants and sleep aids unless specifically okayed by a physician. The patient verbalized understanding of the proper use and possible adverse effects of opioids. All of the patient's questions and concerns were addressed. They were instructed to flush the remaining pills down the toilet if they did not need them for pain.
Doxepin Counseling:  Patient advised that the medication is sedating and not to drive a car after taking this medication. Patient informed of potential adverse effects including but not limited to dry mouth, urinary retention, and blurry vision.  The patient verbalized understanding of the proper use and possible adverse effects of doxepin.  All of the patient's questions and concerns were addressed.
Wartpeel Pregnancy And Lactation Text: This medication is Pregnancy Category X and contraindicated in pregnancy and in women who may become pregnant. It is unknown if this medication is excreted in breast milk.
Clindamycin Counseling: I counseled the patient regarding use of clindamycin as an antibiotic for prophylactic and/or therapeutic purposes. Clindamycin is active against numerous classes of bacteria, including skin bacteria. Side effects may include nausea, diarrhea, gastrointestinal upset, rash, hives, yeast infections, and in rare cases, colitis.
5-Fu Counseling: 5-Fluorouracil Counseling:  I discussed with the patient the risks of 5-fluorouracil including but not limited to erythema, scaling, itching, weeping, crusting, and pain.
Aklief Pregnancy And Lactation Text: It is unknown if this medication is safe to use during pregnancy.  It is unknown if this medication is excreted in breast milk.  Breastfeeding women should use the topical cream on the smallest area of the skin for the shortest time needed while breastfeeding.  Do not apply to nipple and areola.
Griseofulvin Pregnancy And Lactation Text: This medication is Pregnancy Category X and is known to cause serious birth defects. It is unknown if this medication is excreted in breast milk but breast feeding should be avoided.
Adbry Counseling: I discussed with the patient the risks of tralokinumab including but not limited to eye infection and irritation, cold sores, injection site reactions, worsening of asthma, allergic reactions and increased risk of parasitic infection.  Live vaccines should be avoided while taking tralokinumab. The patient understands that monitoring is required and they must alert us or the primary physician if symptoms of infection or other concerning signs are noted.
Enbrel Pregnancy And Lactation Text: This medication is Pregnancy Category B and is considered safe during pregnancy. It is unknown if this medication is excreted in breast milk.
Litfulo Counseling: I discussed with the patient the risks of Litfulo therapy including but not limited to upper respiratory tract infections, shingles, cold sores, and nausea. Live vaccines should be avoided.  This medication has been linked to serious infections; higher rate of mortality; malignancy and lymphoproliferative disorders; major adverse cardiovascular events; thrombosis; gastrointestinal perforations; neutropenia; lymphopenia; anemia; liver enzyme elevations; and lipid elevations.
Imiquimod Counseling:  I discussed with the patient the risks of imiquimod including but not limited to erythema, scaling, itching, weeping, crusting, and pain.  Patient understands that the inflammatory response to imiquimod is variable from person to person and was educated regarded proper titration schedule.  If flu-like symptoms develop, patient knows to discontinue the medication and contact us.
Opzelura Pregnancy And Lactation Text: There is insufficient data to evaluate drug-associated risk for major birth defects, miscarriage, or other adverse maternal or fetal outcomes.  There is a pregnancy registry that monitors pregnancy outcomes in pregnant persons exposed to the medication during pregnancy.  It is unknown if this medication is excreted in breast milk.  Do not breastfeed during treatment and for about 4 weeks after the last dose.
Dapsone Counseling: I discussed with the patient the risks of dapsone including but not limited to hemolytic anemia, agranulocytosis, rashes, methemoglobinemia, kidney failure, peripheral neuropathy, headaches, GI upset, and liver toxicity.  Patients who start dapsone require monitoring including baseline LFTs and weekly CBCs for the first month, then every month thereafter.  The patient verbalized understanding of the proper use and possible adverse effects of dapsone.  All of the patient's questions and concerns were addressed.
Oral Minoxidil Counseling- I discussed with the patient the risks of oral minoxidil including but not limited to shortness of breath, swelling of the feet or ankles, dizziness, lightheadedness, unwanted hair growth and allergic reaction.  The patient verbalized understanding of the proper use and possible adverse effects of oral minoxidil.  All of the patient's questions and concerns were addressed.
Siliq Counseling:  I discussed with the patient the risks of Siliq including but not limited to new or worsening depression, suicidal thoughts and behavior, immunosuppression, malignancy, posterior leukoencephalopathy syndrome, and serious infections.  The patient understands that monitoring is required including a PPD at baseline and must alert us or the primary physician if symptoms of infection or other concerning signs are noted. There is also a special program designed to monitor depression which is required with Siliq.
Xelalessandraz Pregnancy And Lactation Text: This medication is Pregnancy Category D and is not considered safe during pregnancy.  The risk during breast feeding is also uncertain.
Solaraze Counseling:  I discussed with the patient the risks of Solaraze including but not limited to erythema, scaling, itching, weeping, crusting, and pain.
Quinolones Counseling:  I discussed with the patient the risks of fluoroquinolones including but not limited to GI upset, allergic reaction, drug rash, diarrhea, dizziness, photosensitivity, yeast infections, liver function test abnormalities, tendonitis/tendon rupture.
Topical Clindamycin Pregnancy And Lactation Text: This medication is Pregnancy Category B and is considered safe during pregnancy. It is unknown if it is excreted in breast milk.
Sski Pregnancy And Lactation Text: This medication is Pregnancy Category D and isn't considered safe during pregnancy. It is excreted in breast milk.
Cyclophosphamide Pregnancy And Lactation Text: This medication is Pregnancy Category D and it isn't considered safe during pregnancy. This medication is excreted in breast milk.
Clindamycin Pregnancy And Lactation Text: This medication can be used in pregnancy if certain situations. Clindamycin is also present in breast milk.
Cimetidine Pregnancy And Lactation Text: This medication is Pregnancy Category B and is considered safe during pregnancy. It is also excreted in breast milk and breast feeding isn't recommended.
Birth Control Pills Counseling: Birth Control Pill Counseling: I discussed with the patient the potential side effects of OCPs including but not limited to increased risk of stroke, heart attack, thrombophlebitis, deep venous thrombosis, hepatic adenomas, breast changes, GI upset, headaches, and depression.  The patient verbalized understanding of the proper use and possible adverse effects of OCPs. All of the patient's questions and concerns were addressed.
Wartpeel Counseling:  I discussed with the patient the risks of Wartpeel including but not limited to erythema, scaling, itching, weeping, crusting, and pain.
Azelaic Acid Counseling: Patient counseled that medicine may cause skin irritation and to avoid applying near the eyes.  In the event of skin irritation, the patient was advised to reduce the amount of the drug applied or use it less frequently.   The patient verbalized understanding of the proper use and possible adverse effects of azelaic acid.  All of the patient's questions and concerns were addressed.
Hydroquinone Pregnancy And Lactation Text: This medication has not been assigned a Pregnancy Risk Category but animal studies failed to show danger with the topical medication. It is unknown if the medication is excreted in breast milk.
Enbrel Counseling:  I discussed with the patient the risks of etanercept including but not limited to myelosuppression, immunosuppression, autoimmune hepatitis, demyelinating diseases, lymphoma, and infections.  The patient understands that monitoring is required including a PPD at baseline and must alert us or the primary physician if symptoms of infection or other concerning signs are noted.
Zyclara Pregnancy And Lactation Text: This medication is Pregnancy Category C. It is unknown if this medication is excreted in breast milk.
Griseofulvin Counseling:  I discussed with the patient the risks of griseofulvin including but not limited to photosensitivity, cytopenia, liver damage, nausea/vomiting and severe allergy.  The patient understands that this medication is best absorbed when taken with a fatty meal (e.g., ice cream or french fries).
Cibinqo Pregnancy And Lactation Text: It is unknown if this medication will adversely affect pregnancy or breast feeding.  You should not take this medication if you are currently pregnant or planning a pregnancy or while breastfeeding.
Colchicine Pregnancy And Lactation Text: This medication is Pregnancy Category C and isn't considered safe during pregnancy. It is excreted in breast milk.
High Dose Vitamin A Pregnancy And Lactation Text: High dose vitamin A therapy is contraindicated during pregnancy and breast feeding.
Ivermectin Pregnancy And Lactation Text: This medication is Pregnancy Category C and it isn't known if it is safe during pregnancy. It is also excreted in breast milk.
Libtayo Pregnancy And Lactation Text: This medication is contraindicated in pregnancy and when breast feeding.
Opzelura Counseling:  I discussed with the patient the risks of Opzelura including but not limited to nasopharngitis, bronchitis, ear infection, eosinophila, hives, diarrhea, folliculitis, tonsillitis, and rhinorrhea.  Taken orally, this medication has been linked to serious infections; higher rate of mortality; malignancy and lymphoproliferative disorders; major adverse cardiovascular events; thrombosis; thrombocytopenia, anemia, and neutropenia; and lipid elevations.
Olanzapine Pregnancy And Lactation Text: This medication is pregnancy category C.   There are no adequate and well controlled trials with olanzapine in pregnant females.  Olanzapine should be used during pregnancy only if the potential benefit justifies the potential risk to the fetus.   In a study in lactating healthy women, olanzapine was excreted in breast milk.  It is recommended that women taking olanzapine should not breast feed.
Xeljanz Counseling: I discussed with the patient the risks of Xeljanz therapy including increased risk of infection, liver issues, headache, diarrhea, or cold symptoms. Live vaccines should be avoided. They were instructed to call if they have any problems.
Quinolones Pregnancy And Lactation Text: This medication is Pregnancy Category C and it isn't know if it is safe during pregnancy. It is also excreted in breast milk.
Cyclophosphamide Counseling:  I discussed with the patient the risks of cyclophosphamide including but not limited to hair loss, hormonal abnormalities, decreased fertility, abdominal pain, diarrhea, nausea and vomiting, bone marrow suppression and infection. The patient understands that monitoring is required while taking this medication.
Rituxan Pregnancy And Lactation Text: This medication is Pregnancy Category C and it isn't know if it is safe during pregnancy. It is unknown if this medication is excreted in breast milk but similar antibodies are known to be excreted.
Rhofade Pregnancy And Lactation Text: This medication has not been assigned a Pregnancy Risk Category. It is unknown if the medication is excreted in breast milk.
SSKI Counseling:  I discussed with the patient the risks of SSKI including but not limited to thyroid abnormalities, metallic taste, GI upset, fever, headache, acne, arthralgias, paraesthesias, lymphadenopathy, easy bleeding, arrhythmias, and allergic reaction.
Taltz Counseling: I discussed with the patient the risks of ixekizumab including but not limited to immunosuppression, serious infections, worsening of inflammatory bowel disease and drug reactions.  The patient understands that monitoring is required including a PPD at baseline and must alert us or the primary physician if symptoms of infection or other concerning signs are noted.
Doxycycline Counseling:  Patient counseled regarding possible photosensitivity and increased risk for sunburn.  Patient instructed to avoid sunlight, if possible.  When exposed to sunlight, patients should wear protective clothing, sunglasses, and sunscreen.  The patient was instructed to call the office immediately if the following severe adverse effects occur:  hearing changes, easy bruising/bleeding, severe headache, or vision changes.  The patient verbalized understanding of the proper use and possible adverse effects of doxycycline.  All of the patient's questions and concerns were addressed.
Topical Clindamycin Counseling: Patient counseled that this medication may cause skin irritation or allergic reactions.  In the event of skin irritation, the patient was advised to reduce the amount of the drug applied or use it less frequently.   The patient verbalized understanding of the proper use and possible adverse effects of clindamycin.  All of the patient's questions and concerns were addressed.
Drysol Counseling:  I discussed with the patient the risks of drysol/aluminum chloride including but not limited to skin rash, itching, irritation, burning.
Cimetidine Counseling:  I discussed with the patient the risks of Cimetidine including but not limited to gynecomastia, headache, diarrhea, nausea, drowsiness, arrhythmias, pancreatitis, skin rashes, psychosis, bone marrow suppression and kidney toxicity.
Finasteride Pregnancy And Lactation Text: This medication is absolutely contraindicated during pregnancy. It is unknown if it is excreted in breast milk.
Topical Sulfur Applications Pregnancy And Lactation Text: This medication is Pregnancy Category C and has an unknown safety profile during pregnancy. It is unknown if this topical medication is excreted in breast milk.
Azelaic Acid Pregnancy And Lactation Text: This medication is considered safe during pregnancy and breast feeding.
Zyclara Counseling:  I discussed with the patient the risks of imiquimod including but not limited to erythema, scaling, itching, weeping, crusting, and pain.  Patient understands that the inflammatory response to imiquimod is variable from person to person and was educated regarded proper titration schedule.  If flu-like symptoms develop, patient knows to discontinue the medication and contact us.
Dupixent Pregnancy And Lactation Text: This medication likely crosses the placenta but the risk for the fetus is uncertain. This medication is excreted in breast milk.
Cibinqo Counseling: I discussed with the patient the risks of Cibinqo therapy including but not limited to common cold, nausea, headache, cold sores, increased blood CPK levels, dizziness, UTIs, fatigue, acne, and vomitting. Live vaccines should be avoided.  This medication has been linked to serious infections; higher rate of mortality; malignancy and lymphoproliferative disorders; major adverse cardiovascular events; thrombosis; thrombocytopenia and lymphopenia; lipid elevations; and retinal detachment.
Colchicine Counseling:  Patient counseled regarding adverse effects including but not limited to stomach upset (nausea, vomiting, stomach pain, or diarrhea).  Patient instructed to limit alcohol consumption while taking this medication.  Colchicine may reduce blood counts especially with prolonged use.  The patient understands that monitoring of kidney function and blood counts may be required, especially at baseline. The patient verbalized understanding of the proper use and possible adverse effects of colchicine.  All of the patient's questions and concerns were addressed.
High Dose Vitamin A Counseling: Side effects reviewed, pt to contact office should one occur.
Olanzapine Counseling- I discussed with the patient the common side effects of olanzapine including but are not limited to: lack of energy, dry mouth, increased appetite, sleepiness, tremor, constipation, dizziness, changes in behavior, or restlessness.  Explained that teenagers are more likely to experience headaches, abdominal pain, pain in the arms or legs, tiredness, and sleepiness.  Serious side effects include but are not limited: increased risk of death in elderly patients who are confused, have memory loss, or dementia-related psychosis; hyperglycemia; increased cholesterol and triglycerides; and weight gain.
Ivermectin Counseling:  Patient instructed to take medication on an empty stomach with a full glass of water.  Patient informed of potential adverse effects including but not limited to nausea, diarrhea, dizziness, itching, and swelling of the extremities or lymph nodes.  The patient verbalized understanding of the proper use and possible adverse effects of ivermectin.  All of the patient's questions and concerns were addressed.
Libtayo Counseling- I discussed with the patient the risks of Libtayo including but not limited to nausea, vomiting, diarrhea, and bone or muscle pain.  The patient verbalized understanding of the proper use and possible adverse effects of Libtayo.  All of the patient's questions and concerns were addressed.
Rituxan Counseling:  I discussed with the patient the risks of Rituxan infusions. Side effects can include infusion reactions, severe drug rashes including mucocutaneous reactions, reactivation of latent hepatitis and other infections and rarely progressive multifocal leukoencephalopathy.  All of the patient's questions and concerns were addressed.
Propranolol Pregnancy And Lactation Text: This medication is Pregnancy Category C and it isn't known if it is safe during pregnancy. It is excreted in breast milk.
Cellcept Pregnancy And Lactation Text: This medication is Pregnancy Category D and isn't considered safe during pregnancy. It is unknown if this medication is excreted in breast milk.
Rhofade Counseling: Rhofade is a topical medication which can decrease superficial blood flow where applied. Side effects are uncommon and include stinging, redness and allergic reactions.
Hydroxychloroquine Pregnancy And Lactation Text: This medication has been shown to cause fetal harm but it isn't assigned a Pregnancy Risk Category. There are small amounts excreted in breast milk.
Sotyktu Pregnancy And Lactation Text: There is insufficient data to evaluate whether or not Sotyktu is safe to use during pregnancy.   It is not known if Sotyktu passes into breast milk and whether or not it is safe to use when breastfeeding.  
Tazorac Pregnancy And Lactation Text: This medication is not safe during pregnancy. It is unknown if this medication is excreted in breast milk.
Doxycycline Pregnancy And Lactation Text: This medication is Pregnancy Category D and not consider safe during pregnancy. It is also excreted in breast milk but is considered safe for shorter treatment courses.
Finasteride Male Counseling: Finasteride Counseling:  I discussed with the patient the risks of use of finasteride including but not limited to decreased libido, decreased ejaculate volume, gynecomastia, and depression. Women should not handle medication.  All of the patient's questions and concerns were addressed.
Topical Sulfur Applications Counseling: Topical Sulfur Counseling: Patient counseled that this medication may cause skin irritation or allergic reactions.  In the event of skin irritation, the patient was advised to reduce the amount of the drug applied or use it less frequently.   The patient verbalized understanding of the proper use and possible adverse effects of topical sulfur application.  All of the patient's questions and concerns were addressed.
Fluconazole Counseling:  Patient counseled regarding adverse effects of fluconazole including but not limited to headache, diarrhea, nausea, upset stomach, liver function test abnormalities, taste disturbance, and stomach pain.  There is a rare possibility of liver failure that can occur when taking fluconazole.  The patient understands that monitoring of LFTs and kidney function test may be required, especially at baseline. The patient verbalized understanding of the proper use and possible adverse effects of fluconazole.  All of the patient's questions and concerns were addressed.
Use Enhanced Medication Counseling?: No
Zoryve Pregnancy And Lactation Text: It is unknown if this medication can cause problems during pregnancy and breastfeeding.
Benzoyl Peroxide Counseling: Patient counseled that medicine may cause skin irritation and bleach clothing.  In the event of skin irritation, the patient was advised to reduce the amount of the drug applied or use it less frequently.   The patient verbalized understanding of the proper use and possible adverse effects of benzoyl peroxide.  All of the patient's questions and concerns were addressed.
Tetracycline Counseling: Patient counseled regarding possible photosensitivity and increased risk for sunburn.  Patient instructed to avoid sunlight, if possible.  When exposed to sunlight, patients should wear protective clothing, sunglasses, and sunscreen.  The patient was instructed to call the office immediately if the following severe adverse effects occur:  hearing changes, easy bruising/bleeding, severe headache, or vision changes.  The patient verbalized understanding of the proper use and possible adverse effects of tetracycline.  All of the patient's questions and concerns were addressed. Patient understands to avoid pregnancy while on therapy due to potential birth defects.
Isotretinoin Pregnancy And Lactation Text: This medication is Pregnancy Category X and is considered extremely dangerous during pregnancy. It is unknown if it is excreted in breast milk.
Dupixent Counseling: I discussed with the patient the risks of dupilumab including but not limited to eye infection and irritation, cold sores, injection site reactions, worsening of asthma, allergic reactions and increased risk of parasitic infection.  Live vaccines should be avoided while taking dupilumab. Dupilumab will also interact with certain medications such as warfarin and cyclosporine. The patient understands that monitoring is required and they must alert us or the primary physician if symptoms of infection or other concerning signs are noted.
Nsaids Pregnancy And Lactation Text: These medications are considered safe up to 30 weeks gestation. It is excreted in breast milk.
Erivedge Pregnancy And Lactation Text: This medication is Pregnancy Category X and is absolutely contraindicated during pregnancy. It is unknown if it is excreted in breast milk.
Cellcept Counseling:  I discussed with the patient the risks of mycophenolate mofetil including but not limited to infection/immunosuppression, GI upset, hypokalemia, hypercholesterolemia, bone marrow suppression, lymphoproliferative disorders, malignancy, GI ulceration/bleed/perforation, colitis, interstitial lung disease, kidney failure, progressive multifocal leukoencephalopathy, and birth defects.  The patient understands that monitoring is required including a baseline creatinine and regular CBC testing. In addition, patient must alert us immediately if symptoms of infection or other concerning signs are noted.
Tazorac Counseling:  Patient advised that medication is irritating and drying.  Patient may need to apply sparingly and wash off after an hour before eventually leaving it on overnight.  The patient verbalized understanding of the proper use and possible adverse effects of tazorac.  All of the patient's questions and concerns were addressed.
Mirvaso Counseling: Mirvaso is a topical medication which can decrease superficial blood flow where applied. Side effects are uncommon and include stinging, redness and allergic reactions.
Hydroxychloroquine Counseling:  I discussed with the patient that a baseline ophthalmologic exam is needed at the start of therapy and every year thereafter while on therapy. A CBC may also be warranted for monitoring.  The side effects of this medication were discussed with the patient, including but not limited to agranulocytosis, aplastic anemia, seizures, rashes, retinopathy, and liver toxicity. Patient instructed to call the office should any adverse effect occur.  The patient verbalized understanding of the proper use and possible adverse effects of Plaquenil.  All the patient's questions and concerns were addressed.
Sotyktu Counseling:  I discussed the most common side effects of Sotyktu including: common cold, sore throat, sinus infections, cold sores, canker sores, folliculitis, and acne.  I also discussed more serious side effects of Sotyktu including but not limited to: serious allergic reactions; increased risk for infections such as TB; cancers such as lymphomas; rhabdomyolysis and elevated CPK; and elevated triglycerides and liver enzymes. 
Prednisone Pregnancy And Lactation Text: This medication is Pregnancy Category C and it isn't know if it is safe during pregnancy. This medication is excreted in breast milk.
Elidel Counseling: Patient may experience a mild burning sensation during topical application. Elidel is not approved in children less than 2 years of age. There have been case reports of hematologic and skin malignancies in patients using topical calcineurin inhibitors although causality is questionable.
Stelara Counseling:  I discussed with the patient the risks of ustekinumab including but not limited to immunosuppression, malignancy, posterior leukoencephalopathy syndrome, and serious infections.  The patient understands that monitoring is required including a PPD at baseline and must alert us or the primary physician if symptoms of infection or other concerning signs are noted.
Propranolol Counseling:  I discussed with the patient the risks of propranolol including but not limited to low heart rate, low blood pressure, low blood sugar, restlessness and increased cold sensitivity. They should call the office if they experience any of these side effects.
Dutasteride Pregnancy And Lactation Text: This medication is absolutely contraindicated in women, especially during pregnancy and breast feeding. Feminization of male fetuses is possible if taking while pregnant.
Qbrexza Pregnancy And Lactation Text: There is no available data on Qbrexza use in pregnant women.  There is no available data on Qbrexza use in lactation.
Topical Steroids Applications Pregnancy And Lactation Text: Most topical steroids are considered safe to use during pregnancy and lactation.  Any topical steroid applied to the breast or nipple should be washed off before breastfeeding.
Erythromycin Counseling:  I discussed with the patient the risks of erythromycin including but not limited to GI upset, allergic reaction, drug rash, diarrhea, increase in liver enzymes, and yeast infections.
Valtrex Pregnancy And Lactation Text: this medication is Pregnancy Category B and is considered safe during pregnancy. This medication is not directly found in breast milk but it's metabolite acyclovir is present.
Azithromycin Pregnancy And Lactation Text: This medication is considered safe during pregnancy and is also secreted in breast milk.
Benzoyl Peroxide Pregnancy And Lactation Text: This medication is Pregnancy Category C. It is unknown if benzoyl peroxide is excreted in breast milk.
Isotretinoin Counseling: Patient should get monthly blood tests, not donate blood, not drive at night if vision affected, not share medication, and not undergo elective surgery for 6 months after tx completed. Side effects reviewed, pt to contact office should one occur.
Albendazole Counseling:  I discussed with the patient the risks of albendazole including but not limited to cytopenia, kidney damage, nausea/vomiting and severe allergy.  The patient understands that this medication is being used in an off-label manner.
Zoryve Counseling:  I discussed with the patient that Zoryve is not for use in the eyes, mouth or vagina. The most commonly reported side effects include diarrhea, headache, insomnia, application site pain, upper respiratory tract infections, and urinary tract infections.  All of the patient's questions and concerns were addressed.
Clofazimine Counseling:  I discussed with the patient the risks of clofazimine including but not limited to skin and eye pigmentation, liver damage, nausea/vomiting, gastrointestinal bleeding and allergy.
Terbinafine Counseling: Patient counseling regarding adverse effects of terbinafine including but not limited to headache, diarrhea, rash, upset stomach, liver function test abnormalities, itching, taste/smell disturbance, nausea, abdominal pain, and flatulence.  There is a rare possibility of liver failure that can occur when taking terbinafine.  The patient understands that a baseline LFT and kidney function test may be required. The patient verbalized understanding of the proper use and possible adverse effects of terbinafine.  All of the patient's questions and concerns were addressed.
Infliximab Counseling:  I discussed with the patient the risks of infliximab including but not limited to myelosuppression, immunosuppression, autoimmune hepatitis, demyelinating diseases, lymphoma, and serious infections.  The patient understands that monitoring is required including a PPD at baseline and must alert us or the primary physician if symptoms of infection or other concerning signs are noted.
Erivedge Counseling- I discussed with the patient the risks of Erivedge including but not limited to nausea, vomiting, diarrhea, constipation, weight loss, changes in the sense of taste, decreased appetite, muscle spasms, and hair loss.  The patient verbalized understanding of the proper use and possible adverse effects of Erivedge.  All of the patient's questions and concerns were addressed.
Nsaids Counseling: NSAID Counseling: I discussed with the patient that NSAIDs should be taken with food. Prolonged use of NSAIDs can result in the development of stomach ulcers.  Patient advised to stop taking NSAIDs if abdominal pain occurs.  The patient verbalized understanding of the proper use and possible adverse effects of NSAIDs.  All of the patient's questions and concerns were addressed.
Rinvoq Pregnancy And Lactation Text: Based on animal studies, Rinvoq may cause embryo-fetal harm when administered to pregnant women.  The medication should not be used in pregnancy.  Breastfeeding is not recommended during treatment and for 6 days after the last dose.
Qbrexza Counseling:  I discussed with the patient the risks of Qbrexza including but not limited to headache, mydriasis, blurred vision, dry eyes, nasal dryness, dry mouth, dry throat, dry skin, urinary hesitation, and constipation.  Local skin reactions including erythema, burning, stinging, and itching can also occur.
Glycopyrrolate Pregnancy And Lactation Text: This medication is Pregnancy Category B and is considered safe during pregnancy. It is unknown if it is excreted breast milk.
Prednisone Counseling:  I discussed with the patient the risks of prolonged use of prednisone including but not limited to weight gain, insomnia, osteoporosis, mood changes, diabetes, susceptibility to infection, glaucoma and high blood pressure.  In cases where prednisone use is prolonged, patients should be monitored with blood pressure checks, serum glucose levels and an eye exam.  Additionally, the patient may need to be placed on GI prophylaxis, PCP prophylaxis, and calcium and vitamin D supplementation and/or a bisphosphonate.  The patient verbalized understanding of the proper use and the possible adverse effects of prednisone.  All of the patient's questions and concerns were addressed.
Azithromycin Counseling:  I discussed with the patient the risks of azithromycin including but not limited to GI upset, allergic reaction, drug rash, diarrhea, and yeast infections.
Dutasteride Male Counseling: Dustasteride Counseling:  I discussed with the patient the risks of use of dutasteride including but not limited to decreased libido, decreased ejaculate volume, and gynecomastia. Women who can become pregnant should not handle medication.  All of the patient's questions and concerns were addressed.
Topical Steroids Counseling: I discussed with the patient that prolonged use of topical steroids can result in the increased appearance of superficial blood vessels (telangiectasias), lightening (hypopigmentation) and thinning of the skin (atrophy).  Patient understands to avoid using high potency steroids in skin folds, the groin or the face.  The patient verbalized understanding of the proper use and possible adverse effects of topical steroids.  All of the patient's questions and concerns were addressed.
Erythromycin Pregnancy And Lactation Text: This medication is Pregnancy Category B and is considered safe during pregnancy. It is also excreted in breast milk.
Valtrex Counseling: I discussed with the patient the risks of valacyclovir including but not limited to kidney damage, nausea, vomiting and severe allergy.  The patient understands that if the infection seems to be worsening or is not improving, they are to call.
Bactrim Counseling:  I discussed with the patient the risks of sulfa antibiotics including but not limited to GI upset, allergic reaction, drug rash, diarrhea, dizziness, photosensitivity, and yeast infections.  Rarely, more serious reactions can occur including but not limited to aplastic anemia, agranulocytosis, methemoglobinemia, blood dyscrasias, liver or kidney failure, lung infiltrates or desquamative/blistering drug rashes.
Carac Counseling:  I discussed with the patient the risks of Carac including but not limited to erythema, scaling, itching, weeping, crusting, and pain.
Cosentyx Counseling:  I discussed with the patient the risks of Cosentyx including but not limited to worsening of Crohn's disease, immunosuppression, allergic reactions and infections.  The patient understands that monitoring is required including a PPD at baseline and must alert us or the primary physician if symptoms of infection or other concerning signs are noted.
Bexarotene Pregnancy And Lactation Text: This medication is Pregnancy Category X and should not be given to women who are pregnant or may become pregnant. This medication should not be used if you are breast feeding.
Ketoconazole Pregnancy And Lactation Text: This medication is Pregnancy Category C and it isn't know if it is safe during pregnancy. It is also excreted in breast milk and breast feeding isn't recommended.
Sarecycline Counseling: Patient advised regarding possible photosensitivity and discoloration of the teeth, skin, lips, tongue and gums.  Patient instructed to avoid sunlight, if possible.  When exposed to sunlight, patients should wear protective clothing, sunglasses, and sunscreen.  The patient was instructed to call the office immediately if the following severe adverse effects occur:  hearing changes, easy bruising/bleeding, severe headache, or vision changes.  The patient verbalized understanding of the proper use and possible adverse effects of sarecycline.  All of the patient's questions and concerns were addressed.
Azathioprine Counseling:  I discussed with the patient the risks of azathioprine including but not limited to myelosuppression, immunosuppression, hepatotoxicity, lymphoma, and infections.  The patient understands that monitoring is required including baseline LFTs, Creatinine, possible TPMP genotyping and weekly CBCs for the first month and then every 2 weeks thereafter.  The patient verbalized understanding of the proper use and possible adverse effects of azathioprine.  All of the patient's questions and concerns were addressed.
Niacinamide Pregnancy And Lactation Text: These medications are considered safe during pregnancy.
Minoxidil Counseling: Minoxidil is a topical medication which can increase blood flow where it is applied. It is uncertain how this medication increases hair growth. Side effects are uncommon and include stinging and allergic reactions.
Rinvoq Counseling: I discussed with the patient the risks of Rinvoq therapy including but not limited to upper respiratory tract infections, shingles, cold sores, bronchitis, nausea, cough, fever, acne, and headache. Live vaccines should be avoided.  This medication has been linked to serious infections; higher rate of mortality; malignancy and lymphoproliferative disorders; major adverse cardiovascular events; thrombosis; thrombocytopenia, anemia, and neutropenia; lipid elevations; liver enzyme elevations; and gastrointestinal perforations.
Protopic Pregnancy And Lactation Text: This medication is Pregnancy Category C. It is unknown if this medication is excreted in breast milk when applied topically.
Glycopyrrolate Counseling:  I discussed with the patient the risks of glycopyrrolate including but not limited to skin rash, drowsiness, dry mouth, difficulty urinating, and blurred vision.
Oxybutynin Counseling:  I discussed with the patient the risks of oxybutynin including but not limited to skin rash, drowsiness, dry mouth, difficulty urinating, and blurred vision.
Topical Retinoid counseling:  Patient advised to apply a pea-sized amount only at bedtime and wait 30 minutes after washing their face before applying.  If too drying, patient may add a non-comedogenic moisturizer. The patient verbalized understanding of the proper use and possible adverse effects of retinoids.  All of the patient's questions and concerns were addressed.
Metronidazole Counseling:  I discussed with the patient the risks of metronidazole including but not limited to seizures, nausea/vomiting, a metallic taste in the mouth, nausea/vomiting and severe allergy.
Methotrexate Pregnancy And Lactation Text: This medication is Pregnancy Category X and is known to cause fetal harm. This medication is excreted in breast milk.
Eucrisa Counseling: Patient may experience a mild burning sensation during topical application. Eucrisa is not approved in children less than 2 years of age.
Tranexamic Acid Pregnancy And Lactation Text: It is unknown if this medication is safe during pregnancy or breast feeding.
Skyrizi Counseling: I discussed with the patient the risks of risankizumab-rzaa including but not limited to immunosuppression, and serious infections.  The patient understands that monitoring is required including a PPD at baseline and must alert us or the primary physician if symptoms of infection or other concerning signs are noted.
Bactrim Pregnancy And Lactation Text: This medication is Pregnancy Category D and is known to cause fetal risk.  It is also excreted in breast milk.
Xolair Pregnancy And Lactation Text: This medication is Pregnancy Category B and is considered safe during pregnancy. This medication is excreted in breast milk.
Topical Metronidazole Pregnancy And Lactation Text: This medication is Pregnancy Category B and considered safe during pregnancy.  It is also considered safe to use while breastfeeding.
Hydroxyzine Pregnancy And Lactation Text: This medication is not safe during pregnancy and should not be taken. It is also excreted in breast milk and breast feeding isn't recommended.
VTAMA Counseling: I discussed with the patient that VTAMA is not for use in the eyes, mouth or mouth. They should call the office if they develop any signs of allergic reactions to VTAMA. The patient verbalized understanding of the proper use and possible adverse effects of VTAMA.  All of the patient's questions and concerns were addressed.
Cimzia Pregnancy And Lactation Text: This medication crosses the placenta but can be considered safe in certain situations. Cimzia may be excreted in breast milk.
Klisyri Pregnancy And Lactation Text: It is unknown if this medication can harm a developing fetus or if it is excreted in breast milk.
Ilumya Counseling: I discussed with the patient the risks of tildrakizumab including but not limited to immunosuppression, malignancy, posterior leukoencephalopathy syndrome, and serious infections.  The patient understands that monitoring is required including a PPD at baseline and must alert us or the primary physician if symptoms of infection or other concerning signs are noted.
Arava Counseling:  Patient counseled regarding adverse effects of Arava including but not limited to nausea, vomiting, abnormalities in liver function tests. Patients may develop mouth sores, rash, diarrhea, and abnormalities in blood counts. The patient understands that monitoring is required including LFTs and blood counts.  There is a rare possibility of scarring of the liver and lung problems that can occur when taking methotrexate. Persistent nausea, loss of appetite, pale stools, dark urine, cough, and shortness of breath should be reported immediately. Patient advised to discontinue Arava treatment and consult with a physician prior to attempting conception. The patient will have to undergo a treatment to eliminate Arava from the body prior to conception.
Ketoconazole Counseling:   Patient counseled regarding improving absorption with orange juice.  Adverse effects include but are not limited to breast enlargement, headache, diarrhea, nausea, upset stomach, liver function test abnormalities, taste disturbance, and stomach pain.  There is a rare possibility of liver failure that can occur when taking ketoconazole. The patient understands that monitoring of LFTs may be required, especially at baseline. The patient verbalized understanding of the proper use and possible adverse effects of ketoconazole.  All of the patient's questions and concerns were addressed.
Bexarotene Counseling:  I discussed with the patient the risks of bexarotene including but not limited to hair loss, dry lips/skin/eyes, liver abnormalities, hyperlipidemia, pancreatitis, depression/suicidal ideation, photosensitivity, drug rash/allergic reactions, hypothyroidism, anemia, leukopenia, infection, cataracts, and teratogenicity.  Patient understands that they will need regular blood tests to check lipid profile, liver function tests, white blood cell count, thyroid function tests and pregnancy test if applicable.
Niacinamide Counseling: I recommended taking niacin or niacinamide, also know as vitamin B3, twice daily. Recent evidence suggests that taking vitamin B3 (500 mg twice daily) can reduce the risk of actinic keratoses and non-melanoma skin cancers. Side effects of vitamin B3 include flushing and headache.
Olumiant Pregnancy And Lactation Text: Based on animal studies, Olumiant may cause embryo-fetal harm when administered to pregnant women.  The medication should not be used in pregnancy.  Breastfeeding is not recommended during treatment.
Rifampin Pregnancy And Lactation Text: This medication is Pregnancy Category C and it isn't know if it is safe during pregnancy. It is also excreted in breast milk and should not be used if you are breast feeding.
Otezla Pregnancy And Lactation Text: This medication is Pregnancy Category C and it isn't known if it is safe during pregnancy. It is unknown if it is excreted in breast milk.
Protopic Counseling: Patient may experience a mild burning sensation during topical application. Protopic is not approved in children less than 2 years of age. There have been case reports of hematologic and skin malignancies in patients using topical calcineurin inhibitors although causality is questionable.
Metronidazole Pregnancy And Lactation Text: This medication is Pregnancy Category B and considered safe during pregnancy.  It is also excreted in breast milk.
Methotrexate Counseling:  Patient counseled regarding adverse effects of methotrexate including but not limited to nausea, vomiting, abnormalities in liver function tests. Patients may develop mouth sores, rash, diarrhea, and abnormalities in blood counts. The patient understands that monitoring is required including LFT's and blood counts.  There is a rare possibility of scarring of the liver and lung problems that can occur when taking methotrexate. Persistent nausea, loss of appetite, pale stools, dark urine, cough, and shortness of breath should be reported immediately. Patient advised to discontinue methotrexate treatment at least three months before attempting to become pregnant.  I discussed the need for folate supplements while taking methotrexate.  These supplements can decrease side effects during methotrexate treatment. The patient verbalized understanding of the proper use and possible adverse effects of methotrexate.  All of the patient's questions and concerns were addressed.
Soolantra Pregnancy And Lactation Text: This medication is Pregnancy Category C. This medication is considered safe during breast feeding.
Tranexamic Acid Counseling:  Patient advised of the small risk of bleeding problems with tranexamic acid. They were also instructed to call if they developed any nausea, vomiting or diarrhea. All of the patient's questions and concerns were addressed.
Hydroxyzine Counseling: Patient advised that the medication is sedating and not to drive a car after taking this medication.  Patient informed of potential adverse effects including but not limited to dry mouth, urinary retention, and blurry vision.  The patient verbalized understanding of the proper use and possible adverse effects of hydroxyzine.  All of the patient's questions and concerns were addressed.
Cephalexin Counseling: I counseled the patient regarding use of cephalexin as an antibiotic for prophylactic and/or therapeutic purposes. Cephalexin (commonly prescribed under brand name Keflex) is a cephalosporin antibiotic which is active against numerous classes of bacteria, including most skin bacteria. Side effects may include nausea, diarrhea, gastrointestinal upset, rash, hives, yeast infections, and in rare cases, hepatitis, kidney disease, seizures, fever, confusion, neurologic symptoms, and others. Patients with severe allergies to penicillin medications are cautioned that there is about a 10% incidence of cross-reactivity with cephalosporins. When possible, patients with penicillin allergies should use alternatives to cephalosporins for antibiotic therapy.
Xolair Counseling:  Patient informed of potential adverse effects including but not limited to fever, muscle aches, rash and allergic reactions.  The patient verbalized understanding of the proper use and possible adverse effects of Xolair.  All of the patient's questions and concerns were addressed.
Topical Metronidazole Counseling: Metronidazole is a topical antibiotic medication. You may experience burning, stinging, redness, or allergic reactions.  Please call our office if you develop any problems from using this medication.
Calcipotriene Counseling:  I discussed with the patient the risks of calcipotriene including but not limited to erythema, scaling, itching, and irritation.
Winlevi Pregnancy And Lactation Text: This medication is considered safe during pregnancy and breastfeeding.
Spironolactone Pregnancy And Lactation Text: This medication can cause feminization of the male fetus and should be avoided during pregnancy. The active metabolite is also found in breast milk.
Cimzia Counseling:  I discussed with the patient the risks of Cimzia including but not limited to immunosuppression, allergic reactions and infections.  The patient understands that monitoring is required including a PPD at baseline and must alert us or the primary physician if symptoms of infection or other concerning signs are noted.
Klisyri Counseling:  I discussed with the patient the risks of Klisyri including but not limited to erythema, scaling, itching, weeping, crusting, and pain.
Rifampin Counseling: I discussed with the patient the risks of rifampin including but not limited to liver damage, kidney damage, red-orange body fluids, nausea/vomiting and severe allergy.
Acitretin Pregnancy And Lactation Text: This medication is Pregnancy Category X and should not be given to women who are pregnant or may become pregnant in the future. This medication is excreted in breast milk.
Low Dose Naltrexone Pregnancy And Lactation Text: Naltrexone is pregnancy category C.  There have been no adequate and well-controlled studies in pregnant women.  It should be used in pregnancy only if the potential benefit justifies the potential risk to the fetus.   Limited data indicates that naltrexone is minimally excreted into breastmilk.
Odomzo Counseling- I discussed with the patient the risks of Odomzo including but not limited to nausea, vomiting, diarrhea, constipation, weight loss, changes in the sense of taste, decreased appetite, muscle spasms, and hair loss.  The patient verbalized understanding of the proper use and possible adverse effects of Odomzo.  All of the patient's questions and concerns were addressed.
Gabapentin Counseling: I discussed with the patient the risks of gabapentin including but not limited to dizziness, somnolence, fatigue and ataxia.
Olumiant Counseling: I discussed with the patient the risks of Olumiant therapy including but not limited to upper respiratory tract infections, shingles, cold sores, and nausea. Live vaccines should be avoided.  This medication has been linked to serious infections; higher rate of mortality; malignancy and lymphoproliferative disorders; major adverse cardiovascular events; thrombosis; gastrointestinal perforations; neutropenia; lymphopenia; anemia; liver enzyme elevations; and lipid elevations.
Calcipotriene Pregnancy And Lactation Text: The use of this medication during pregnancy or lactation is not recommended as there is insufficient data.
Otezla Counseling: The side effects of Otezla were discussed with the patient, including but not limited to worsening or new depression, weight loss, diarrhea, nausea, upper respiratory tract infection, and headache. Patient instructed to call the office should any adverse effect occur.  The patient verbalized understanding of the proper use and possible adverse effects of Otezla.  All the patient's questions and concerns were addressed.
Minocycline Counseling: Patient advised regarding possible photosensitivity and discoloration of the teeth, skin, lips, tongue and gums.  Patient instructed to avoid sunlight, if possible.  When exposed to sunlight, patients should wear protective clothing, sunglasses, and sunscreen.  The patient was instructed to call the office immediately if the following severe adverse effects occur:  hearing changes, easy bruising/bleeding, severe headache, or vision changes.  The patient verbalized understanding of the proper use and possible adverse effects of minocycline.  All of the patient's questions and concerns were addressed.
Simponi Counseling:  I discussed with the patient the risks of golimumab including but not limited to myelosuppression, immunosuppression, autoimmune hepatitis, demyelinating diseases, lymphoma, and serious infections.  The patient understands that monitoring is required including a PPD at baseline and must alert us or the primary physician if symptoms of infection or other concerning signs are noted.
Opioid Pregnancy And Lactation Text: These medications can lead to premature delivery and should be avoided during pregnancy. These medications are also present in breast milk in small amounts.
Hydroquinone Counseling:  Patient advised that medication may result in skin irritation, lightening (hypopigmentation), dryness, and burning.  In the event of skin irritation, the patient was advised to reduce the amount of the drug applied or use it less frequently.  Rarely, spots that are treated with hydroquinone can become darker (pseudoochronosis).  Should this occur, patient instructed to stop medication and call the office. The patient verbalized understanding of the proper use and possible adverse effects of hydroquinone.  All of the patient's questions and concerns were addressed.
Soolantra Counseling: I discussed with the patients the risks of topial Soolantra. This is a medicine which decreases the number of mites and inflammation in the skin. You experience burning, stinging, eye irritation or allergic reactions.  Please call our office if you develop any problems from using this medication.
Doxepin Pregnancy And Lactation Text: This medication is Pregnancy Category C and it isn't known if it is safe during pregnancy. It is also excreted in breast milk and breast feeding isn't recommended.
Cantharidin Counseling:  I discussed with the patient the risks of Cantharidin including but not limited to pain, redness, burning, itching, and blistering.
Spironolactone Counseling: Patient advised regarding risks of diarrhea, abdominal pain, hyperkalemia, birth defects (for female patients), liver toxicity and renal toxicity. The patient may need blood work to monitor liver and kidney function and potassium levels while on therapy. The patient verbalized understanding of the proper use and possible adverse effects of spironolactone.  All of the patient's questions and concerns were addressed.
Adbry Pregnancy And Lactation Text: It is unknown if this medication will adversely affect pregnancy or breast feeding.
Cephalexin Pregnancy And Lactation Text: This medication is Pregnancy Category B and considered safe during pregnancy.  It is also excreted in breast milk but can be used safely for shorter doses.
Winlevi Counseling:  I discussed with the patient the risks of topical clascoterone including but not limited to erythema, scaling, itching, and stinging. Patient voiced their understanding.
Acitretin Counseling:  I discussed with the patient the risks of acitretin including but not limited to hair loss, dry lips/skin/eyes, liver damage, hyperlipidemia, depression/suicidal ideation, photosensitivity.  Serious rare side effects can include but are not limited to pancreatitis, pseudotumor cerebri, bony changes, clot formation/stroke/heart attack.  Patient understands that alcohol is contraindicated since it can result in liver toxicity and significantly prolong the elimination of the drug by many years.
Aklief counseling:  Patient advised to apply a pea-sized amount only at bedtime and wait 30 minutes after washing their face before applying.  If too drying, patient may add a non-comedogenic moisturizer.  The most commonly reported side effects including irritation, redness, scaling, dryness, stinging, burning, itching, and increased risk of sunburn.  The patient verbalized understanding of the proper use and possible adverse effects of retinoids.  All of the patient's questions and concerns were addressed.
Low Dose Naltrexone Counseling- I discussed with the patient the potential risks and side effects of low dose naltrexone including but not limited to: more vivid dreams, headaches, nausea, vomiting, abdominal pain, fatigue, dizziness, and anxiety.

## 2023-11-14 NOTE — HPI: MEDICATION (SKYRIZI)
Is This A New Presentation, Or A Follow-Up?: Follow Up Skyrizi
How Severe Is It?: mild
Additional History: Patient is here for Skyrizi follow up for psoriasis. She has decided against using Skyrizi because of the immune system suppression. Her PCP Dr. Jeanmarie Arriaga helped her in deciding against it. She is only interested in Kenalog injections in her scalp.

## 2023-12-19 ENCOUNTER — APPOINTMENT (RX ONLY)
Dept: URBAN - METROPOLITAN AREA CLINIC 41 | Facility: CLINIC | Age: 63
Setting detail: DERMATOLOGY
End: 2023-12-19

## 2023-12-19 DIAGNOSIS — L40.0 PSORIASIS VULGARIS: ICD-10-CM

## 2023-12-19 PROCEDURE — 11901 INJECT SKIN LESIONS >7: CPT

## 2023-12-19 PROCEDURE — ? COUNSELING

## 2023-12-19 PROCEDURE — ? INTRALESIONAL KENALOG

## 2023-12-19 ASSESSMENT — LOCATION DETAILED DESCRIPTION DERM
LOCATION DETAILED: POSTERIOR MID-PARIETAL SCALP
LOCATION DETAILED: MID-OCCIPITAL SCALP
LOCATION DETAILED: RIGHT OCCIPITAL SCALP
LOCATION DETAILED: RIGHT INFERIOR OCCIPITAL SCALP
LOCATION DETAILED: LEFT INFERIOR OCCIPITAL SCALP
LOCATION DETAILED: LEFT OCCIPITAL SCALP

## 2023-12-19 ASSESSMENT — LOCATION SIMPLE DESCRIPTION DERM: LOCATION SIMPLE: POSTERIOR SCALP

## 2023-12-19 ASSESSMENT — LOCATION ZONE DERM: LOCATION ZONE: SCALP

## 2023-12-19 NOTE — PROCEDURE: INTRALESIONAL KENALOG
Detail Level: Detailed
Consent: The risks of atrophy were reviewed with the patient.
Expiration Date For Kenalog (Optional): 12\2025
Show Inventory Tab: Hide
Total Volume (Ccs): 0.5
How Many Mls Were Removed From The 40 Mg/Ml (10ml) Vial When Preparing The Injectable Solution?: 0
Include Z78.9 (Other Specified Conditions Influencing Health Status) As An Associated Diagnosis?: No
Concentration Of Kenalog Solution Injected (Mg/Ml): 10.0
Kenalog Preparation: Kenalog
Medical Necessity Clause: This procedure was medically necessary because the lesions that were treated were:
Kenalog Type Of Vial: Multiple Dose
Ndc# For Kenalog Only: [id number]
Validate Note Data When Using Inventory: Yes
Lot # For Kenalog (Optional): [id number]
Administered By (Optional): Yady Tillman

## 2024-01-02 ENCOUNTER — APPOINTMENT (RX ONLY)
Dept: URBAN - METROPOLITAN AREA CLINIC 41 | Facility: CLINIC | Age: 64
Setting detail: DERMATOLOGY
End: 2024-01-02

## 2024-01-02 DIAGNOSIS — L40.0 PSORIASIS VULGARIS: ICD-10-CM | Status: WELL CONTROLLED

## 2024-01-02 PROCEDURE — ? COUNSELING

## 2024-01-02 PROCEDURE — 99214 OFFICE O/P EST MOD 30 MIN: CPT

## 2024-01-02 PROCEDURE — ? PRESCRIPTION

## 2024-01-02 PROCEDURE — ? ADDITIONAL NOTES

## 2024-01-02 PROCEDURE — ? MEDICATION COUNSELING

## 2024-01-02 PROCEDURE — ? PRESCRIPTION MEDICATION MANAGEMENT

## 2024-01-02 RX ORDER — FLUOCINOLONE ACETONIDE 0.11 MG/ML
OIL TOPICAL
Qty: 118.28 | Refills: 0 | Status: ERX

## 2024-01-02 ASSESSMENT — BSA PSORIASIS: % BODY COVERED IN PSORIASIS: 3

## 2024-01-02 ASSESSMENT — LOCATION DETAILED DESCRIPTION DERM: LOCATION DETAILED: RIGHT SUPERIOR PARIETAL SCALP

## 2024-01-02 ASSESSMENT — LOCATION SIMPLE DESCRIPTION DERM: LOCATION SIMPLE: SCALP

## 2024-01-02 ASSESSMENT — ITCH NUMERIC RATING SCALE: HOW SEVERE IS YOUR ITCHING?: 10

## 2024-01-02 ASSESSMENT — LOCATION ZONE DERM: LOCATION ZONE: SCALP

## 2024-01-02 NOTE — PROCEDURE: MEDICATION COUNSELING
Tremfya Pregnancy And Lactation Text: The risk during pregnancy and breastfeeding is uncertain with this medication.
Erythromycin Pregnancy And Lactation Text: This medication is Pregnancy Category B and is considered safe during pregnancy. It is also excreted in breast milk.
Rinvoq Counseling: I discussed with the patient the risks of Rinvoq therapy including but not limited to upper respiratory tract infections, shingles, cold sores, bronchitis, nausea, cough, fever, acne, and headache. Live vaccines should be avoided.  This medication has been linked to serious infections; higher rate of mortality; malignancy and lymphoproliferative disorders; major adverse cardiovascular events; thrombosis; thrombocytopenia, anemia, and neutropenia; lipid elevations; liver enzyme elevations; and gastrointestinal perforations.
Solaraze Counseling:  I discussed with the patient the risks of Solaraze including but not limited to erythema, scaling, itching, weeping, crusting, and pain.
Valtrex Counseling: I discussed with the patient the risks of valacyclovir including but not limited to kidney damage, nausea, vomiting and severe allergy.  The patient understands that if the infection seems to be worsening or is not improving, they are to call.
Hydroxyzine Counseling: Patient advised that the medication is sedating and not to drive a car after taking this medication.  Patient informed of potential adverse effects including but not limited to dry mouth, urinary retention, and blurry vision.  The patient verbalized understanding of the proper use and possible adverse effects of hydroxyzine.  All of the patient's questions and concerns were addressed.
Aklief Pregnancy And Lactation Text: It is unknown if this medication is safe to use during pregnancy.  It is unknown if this medication is excreted in breast milk.  Breastfeeding women should use the topical cream on the smallest area of the skin for the shortest time needed while breastfeeding.  Do not apply to nipple and areola.
Acitretin Counseling:  I discussed with the patient the risks of acitretin including but not limited to hair loss, dry lips/skin/eyes, liver damage, hyperlipidemia, depression/suicidal ideation, photosensitivity.  Serious rare side effects can include but are not limited to pancreatitis, pseudotumor cerebri, bony changes, clot formation/stroke/heart attack.  Patient understands that alcohol is contraindicated since it can result in liver toxicity and significantly prolong the elimination of the drug by many years.
Opzelura Pregnancy And Lactation Text: There is insufficient data to evaluate drug-associated risk for major birth defects, miscarriage, or other adverse maternal or fetal outcomes.  There is a pregnancy registry that monitors pregnancy outcomes in pregnant persons exposed to the medication during pregnancy.  It is unknown if this medication is excreted in breast milk.  Do not breastfeed during treatment and for about 4 weeks after the last dose.
Oxybutynin Pregnancy And Lactation Text: This medication is Pregnancy Category B and is considered safe during pregnancy. It is unknown if it is excreted in breast milk.
Winlevi Pregnancy And Lactation Text: This medication is considered safe during pregnancy and breastfeeding.
Bactrim Counseling:  I discussed with the patient the risks of sulfa antibiotics including but not limited to GI upset, allergic reaction, drug rash, diarrhea, dizziness, photosensitivity, and yeast infections.  Rarely, more serious reactions can occur including but not limited to aplastic anemia, agranulocytosis, methemoglobinemia, blood dyscrasias, liver or kidney failure, lung infiltrates or desquamative/blistering drug rashes.
Itraconazole Pregnancy And Lactation Text: This medication is Pregnancy Category C and it isn't know if it is safe during pregnancy. It is also excreted in breast milk.
Azathioprine Counseling:  I discussed with the patient the risks of azathioprine including but not limited to myelosuppression, immunosuppression, hepatotoxicity, lymphoma, and infections.  The patient understands that monitoring is required including baseline LFTs, Creatinine, possible TPMP genotyping and weekly CBCs for the first month and then every 2 weeks thereafter.  The patient verbalized understanding of the proper use and possible adverse effects of azathioprine.  All of the patient's questions and concerns were addressed.
Bimzelx Counseling:  I discussed with the patient the risks of Bimzelx including but not limited to depression, immunosuppression, allergic reactions and infections.  The patient understands that monitoring is required including a PPD at baseline and must alert us or the primary physician if symptoms of infection or other concerning signs are noted.
Nsaids Counseling: NSAID Counseling: I discussed with the patient that NSAIDs should be taken with food. Prolonged use of NSAIDs can result in the development of stomach ulcers.  Patient advised to stop taking NSAIDs if abdominal pain occurs.  The patient verbalized understanding of the proper use and possible adverse effects of NSAIDs.  All of the patient's questions and concerns were addressed.
Humira Pregnancy And Lactation Text: This medication is Pregnancy Category B and is considered safe during pregnancy. It is unknown if this medication is excreted in breast milk.
Sarecycline Pregnancy And Lactation Text: This medication is Pregnancy Category D and not consider safe during pregnancy. It is also excreted in breast milk.
Ketoconazole Counseling:   Patient counseled regarding improving absorption with orange juice.  Adverse effects include but are not limited to breast enlargement, headache, diarrhea, nausea, upset stomach, liver function test abnormalities, taste disturbance, and stomach pain.  There is a rare possibility of liver failure that can occur when taking ketoconazole. The patient understands that monitoring of LFTs may be required, especially at baseline. The patient verbalized understanding of the proper use and possible adverse effects of ketoconazole.  All of the patient's questions and concerns were addressed.
Topical Ketoconazole Counseling: Patient counseled that this medication may cause skin irritation or allergic reactions.  In the event of skin irritation, the patient was advised to reduce the amount of the drug applied or use it less frequently.   The patient verbalized understanding of the proper use and possible adverse effects of ketoconazole.  All of the patient's questions and concerns were addressed.
Simponi Counseling:  I discussed with the patient the risks of golimumab including but not limited to myelosuppression, immunosuppression, autoimmune hepatitis, demyelinating diseases, lymphoma, and serious infections.  The patient understands that monitoring is required including a PPD at baseline and must alert us or the primary physician if symptoms of infection or other concerning signs are noted.
Valtrex Pregnancy And Lactation Text: this medication is Pregnancy Category B and is considered safe during pregnancy. This medication is not directly found in breast milk but it's metabolite acyclovir is present.
5-Fu Pregnancy And Lactation Text: This medication is Pregnancy Category X and contraindicated in pregnancy and in women who may become pregnant. It is unknown if this medication is excreted in breast milk.
Prednisone Counseling:  I discussed with the patient the risks of prolonged use of prednisone including but not limited to weight gain, insomnia, osteoporosis, mood changes, diabetes, susceptibility to infection, glaucoma and high blood pressure.  In cases where prednisone use is prolonged, patients should be monitored with blood pressure checks, serum glucose levels and an eye exam.  Additionally, the patient may need to be placed on GI prophylaxis, PCP prophylaxis, and calcium and vitamin D supplementation and/or a bisphosphonate.  The patient verbalized understanding of the proper use and the possible adverse effects of prednisone.  All of the patient's questions and concerns were addressed.
Acitretin Pregnancy And Lactation Text: This medication is Pregnancy Category X and should not be given to women who are pregnant or may become pregnant in the future. This medication is excreted in breast milk.
Imiquimod Counseling:  I discussed with the patient the risks of imiquimod including but not limited to erythema, scaling, itching, weeping, crusting, and pain.  Patient understands that the inflammatory response to imiquimod is variable from person to person and was educated regarded proper titration schedule.  If flu-like symptoms develop, patient knows to discontinue the medication and contact us.
Dutasteride Male Counseling: Dustasteride Counseling:  I discussed with the patient the risks of use of dutasteride including but not limited to decreased libido, decreased ejaculate volume, and gynecomastia. Women who can become pregnant should not handle medication.  All of the patient's questions and concerns were addressed.
Gabapentin Pregnancy And Lactation Text: This medication is Pregnancy Category C and isn't considered safe during pregnancy. It is excreted in breast milk.
Rinvoq Pregnancy And Lactation Text: Based on animal studies, Rinvoq may cause embryo-fetal harm when administered to pregnant women.  The medication should not be used in pregnancy.  Breastfeeding is not recommended during treatment and for 6 days after the last dose.
Bactrim Pregnancy And Lactation Text: This medication is Pregnancy Category D and is known to cause fetal risk.  It is also excreted in breast milk.
Picato Counseling:  I discussed with the patient the risks of Picato including but not limited to erythema, scaling, itching, weeping, crusting, and pain.
Azathioprine Pregnancy And Lactation Text: This medication is Pregnancy Category D and isn't considered safe during pregnancy. It is unknown if this medication is excreted in breast milk.
Arava Counseling:  Patient counseled regarding adverse effects of Arava including but not limited to nausea, vomiting, abnormalities in liver function tests. Patients may develop mouth sores, rash, diarrhea, and abnormalities in blood counts. The patient understands that monitoring is required including LFTs and blood counts.  There is a rare possibility of scarring of the liver and lung problems that can occur when taking methotrexate. Persistent nausea, loss of appetite, pale stools, dark urine, cough, and shortness of breath should be reported immediately. Patient advised to discontinue Arava treatment and consult with a physician prior to attempting conception. The patient will have to undergo a treatment to eliminate Arava from the body prior to conception.
Azelaic Acid Counseling: Patient counseled that medicine may cause skin irritation and to avoid applying near the eyes.  In the event of skin irritation, the patient was advised to reduce the amount of the drug applied or use it less frequently.   The patient verbalized understanding of the proper use and possible adverse effects of azelaic acid.  All of the patient's questions and concerns were addressed.
Metronidazole Counseling:  I discussed with the patient the risks of metronidazole including but not limited to seizures, nausea/vomiting, a metallic taste in the mouth, nausea/vomiting and severe allergy.
Xolair Counseling:  Patient informed of potential adverse effects including but not limited to fever, muscle aches, rash and allergic reactions.  The patient verbalized understanding of the proper use and possible adverse effects of Xolair.  All of the patient's questions and concerns were addressed.
Imiquimod Pregnancy And Lactation Text: This medication is Pregnancy Category C. It is unknown if this medication is excreted in breast milk.
Bimzelx Pregnancy And Lactation Text: This medication crosses the placenta and the safety is uncertain during pregnancy. It is unknown if this medication is present in breast milk.
Nsaids Pregnancy And Lactation Text: These medications are considered safe up to 30 weeks gestation. It is excreted in breast milk.
Solaraze Pregnancy And Lactation Text: This medication is Pregnancy Category B and is considered safe. There is some data to suggest avoiding during the third trimester. It is unknown if this medication is excreted in breast milk.
Propranolol Counseling:  I discussed with the patient the risks of propranolol including but not limited to low heart rate, low blood pressure, low blood sugar, restlessness and increased cold sensitivity. They should call the office if they experience any of these side effects.
Hydroxyzine Pregnancy And Lactation Text: This medication is not safe during pregnancy and should not be taken. It is also excreted in breast milk and breast feeding isn't recommended.
VTAMA Counseling: I discussed with the patient that VTAMA is not for use in the eyes, mouth or mouth. They should call the office if they develop any signs of allergic reactions to VTAMA. The patient verbalized understanding of the proper use and possible adverse effects of VTAMA.  All of the patient's questions and concerns were addressed.
Dutasteride Pregnancy And Lactation Text: This medication is absolutely contraindicated in women, especially during pregnancy and breast feeding. Feminization of male fetuses is possible if taking while pregnant.
Bexarotene Counseling:  I discussed with the patient the risks of bexarotene including but not limited to hair loss, dry lips/skin/eyes, liver abnormalities, hyperlipidemia, pancreatitis, depression/suicidal ideation, photosensitivity, drug rash/allergic reactions, hypothyroidism, anemia, leukopenia, infection, cataracts, and teratogenicity.  Patient understands that they will need regular blood tests to check lipid profile, liver function tests, white blood cell count, thyroid function tests and pregnancy test if applicable.
Glycopyrrolate Counseling:  I discussed with the patient the risks of glycopyrrolate including but not limited to skin rash, drowsiness, dry mouth, difficulty urinating, and blurred vision.
Cimzia Counseling:  I discussed with the patient the risks of Cimzia including but not limited to immunosuppression, allergic reactions and infections.  The patient understands that monitoring is required including a PPD at baseline and must alert us or the primary physician if symptoms of infection or other concerning signs are noted.
Ilumya Counseling: I discussed with the patient the risks of tildrakizumab including but not limited to immunosuppression, malignancy, posterior leukoencephalopathy syndrome, and serious infections.  The patient understands that monitoring is required including a PPD at baseline and must alert us or the primary physician if symptoms of infection or other concerning signs are noted.
Ketoconazole Pregnancy And Lactation Text: This medication is Pregnancy Category C and it isn't know if it is safe during pregnancy. It is also excreted in breast milk and breast feeding isn't recommended.
Soolantra Counseling: I discussed with the patients the risks of topial Soolantra. This is a medicine which decreases the number of mites and inflammation in the skin. You experience burning, stinging, eye irritation or allergic reactions.  Please call our office if you develop any problems from using this medication.
Cellcept Counseling:  I discussed with the patient the risks of mycophenolate mofetil including but not limited to infection/immunosuppression, GI upset, hypokalemia, hypercholesterolemia, bone marrow suppression, lymphoproliferative disorders, malignancy, GI ulceration/bleed/perforation, colitis, interstitial lung disease, kidney failure, progressive multifocal leukoencephalopathy, and birth defects.  The patient understands that monitoring is required including a baseline creatinine and regular CBC testing. In addition, patient must alert us immediately if symptoms of infection or other concerning signs are noted.
Use Enhanced Medication Counseling?: No
Azelaic Acid Pregnancy And Lactation Text: This medication is considered safe during pregnancy and breast feeding.
Arava Pregnancy And Lactation Text: This medication is Pregnancy Category X and is absolutely contraindicated during pregnancy. It is unknown if it is excreted in breast milk.
Metronidazole Pregnancy And Lactation Text: This medication is Pregnancy Category B and considered safe during pregnancy.  It is also excreted in breast milk.
Drysol Counseling:  I discussed with the patient the risks of drysol/aluminum chloride including but not limited to skin rash, itching, irritation, burning.
Prednisone Pregnancy And Lactation Text: This medication is Pregnancy Category C and it isn't know if it is safe during pregnancy. This medication is excreted in breast milk.
Tetracycline Counseling: Patient counseled regarding possible photosensitivity and increased risk for sunburn.  Patient instructed to avoid sunlight, if possible.  When exposed to sunlight, patients should wear protective clothing, sunglasses, and sunscreen.  The patient was instructed to call the office immediately if the following severe adverse effects occur:  hearing changes, easy bruising/bleeding, severe headache, or vision changes.  The patient verbalized understanding of the proper use and possible adverse effects of tetracycline.  All of the patient's questions and concerns were addressed. Patient understands to avoid pregnancy while on therapy due to potential birth defects.
Skyrizi Counseling: I discussed with the patient the risks of risankizumab-rzaa including but not limited to immunosuppression, and serious infections.  The patient understands that monitoring is required including a PPD at baseline and must alert us or the primary physician if symptoms of infection or other concerning signs are noted.
Propranolol Pregnancy And Lactation Text: This medication is Pregnancy Category C and it isn't known if it is safe during pregnancy. It is excreted in breast milk.
Vtama Pregnancy And Lactation Text: It is unknown if this medication can cause problems during pregnancy and breastfeeding.
Albendazole Counseling:  I discussed with the patient the risks of albendazole including but not limited to cytopenia, kidney damage, nausea/vomiting and severe allergy.  The patient understands that this medication is being used in an off-label manner.
Cephalexin Counseling: I counseled the patient regarding use of cephalexin as an antibiotic for prophylactic and/or therapeutic purposes. Cephalexin (commonly prescribed under brand name Keflex) is a cephalosporin antibiotic which is active against numerous classes of bacteria, including most skin bacteria. Side effects may include nausea, diarrhea, gastrointestinal upset, rash, hives, yeast infections, and in rare cases, hepatitis, kidney disease, seizures, fever, confusion, neurologic symptoms, and others. Patients with severe allergies to penicillin medications are cautioned that there is about a 10% incidence of cross-reactivity with cephalosporins. When possible, patients with penicillin allergies should use alternatives to cephalosporins for antibiotic therapy.
Sotyktu Counseling:  I discussed the most common side effects of Sotyktu including: common cold, sore throat, sinus infections, cold sores, canker sores, folliculitis, and acne.  I also discussed more serious side effects of Sotyktu including but not limited to: serious allergic reactions; increased risk for infections such as TB; cancers such as lymphomas; rhabdomyolysis and elevated CPK; and elevated triglycerides and liver enzymes. 
Klisyri Counseling:  I discussed with the patient the risks of Klisyri including but not limited to erythema, scaling, itching, weeping, crusting, and pain.
Xolair Pregnancy And Lactation Text: This medication is Pregnancy Category B and is considered safe during pregnancy. This medication is excreted in breast milk.
Olanzapine Counseling- I discussed with the patient the common side effects of olanzapine including but are not limited to: lack of energy, dry mouth, increased appetite, sleepiness, tremor, constipation, dizziness, changes in behavior, or restlessness.  Explained that teenagers are more likely to experience headaches, abdominal pain, pain in the arms or legs, tiredness, and sleepiness.  Serious side effects include but are not limited: increased risk of death in elderly patients who are confused, have memory loss, or dementia-related psychosis; hyperglycemia; increased cholesterol and triglycerides; and weight gain.
Soolantra Pregnancy And Lactation Text: This medication is Pregnancy Category C. This medication is considered safe during breast feeding.
Minocycline Counseling: Patient advised regarding possible photosensitivity and discoloration of the teeth, skin, lips, tongue and gums.  Patient instructed to avoid sunlight, if possible.  When exposed to sunlight, patients should wear protective clothing, sunglasses, and sunscreen.  The patient was instructed to call the office immediately if the following severe adverse effects occur:  hearing changes, easy bruising/bleeding, severe headache, or vision changes.  The patient verbalized understanding of the proper use and possible adverse effects of minocycline.  All of the patient's questions and concerns were addressed.
Terbinafine Counseling: Patient counseling regarding adverse effects of terbinafine including but not limited to headache, diarrhea, rash, upset stomach, liver function test abnormalities, itching, taste/smell disturbance, nausea, abdominal pain, and flatulence.  There is a rare possibility of liver failure that can occur when taking terbinafine.  The patient understands that a baseline LFT and kidney function test may be required. The patient verbalized understanding of the proper use and possible adverse effects of terbinafine.  All of the patient's questions and concerns were addressed.
Finasteride Male Counseling: Finasteride Counseling:  I discussed with the patient the risks of use of finasteride including but not limited to decreased libido, decreased ejaculate volume, gynecomastia, and depression. Women should not handle medication.  All of the patient's questions and concerns were addressed.
Glycopyrrolate Pregnancy And Lactation Text: This medication is Pregnancy Category B and is considered safe during pregnancy. It is unknown if it is excreted breast milk.
Bexarotene Pregnancy And Lactation Text: This medication is Pregnancy Category X and should not be given to women who are pregnant or may become pregnant. This medication should not be used if you are breast feeding.
Zoryve Counseling:  I discussed with the patient that Zoryve is not for use in the eyes, mouth or vagina. The most commonly reported side effects include diarrhea, headache, insomnia, application site pain, upper respiratory tract infections, and urinary tract infections.  All of the patient's questions and concerns were addressed.
SSKI Counseling:  I discussed with the patient the risks of SSKI including but not limited to thyroid abnormalities, metallic taste, GI upset, fever, headache, acne, arthralgias, paraesthesias, lymphadenopathy, easy bleeding, arrhythmias, and allergic reaction.
Sotyktu Pregnancy And Lactation Text: There is insufficient data to evaluate whether or not Sotyktu is safe to use during pregnancy.   It is not known if Sotyktu passes into breast milk and whether or not it is safe to use when breastfeeding.  
Clofazimine Counseling:  I discussed with the patient the risks of clofazimine including but not limited to skin and eye pigmentation, liver damage, nausea/vomiting, gastrointestinal bleeding and allergy.
Topical Metronidazole Counseling: Metronidazole is a topical antibiotic medication. You may experience burning, stinging, redness, or allergic reactions.  Please call our office if you develop any problems from using this medication.
Cimzia Pregnancy And Lactation Text: This medication crosses the placenta but can be considered safe in certain situations. Cimzia may be excreted in breast milk.
Benzoyl Peroxide Counseling: Patient counseled that medicine may cause skin irritation and bleach clothing.  In the event of skin irritation, the patient was advised to reduce the amount of the drug applied or use it less frequently.   The patient verbalized understanding of the proper use and possible adverse effects of benzoyl peroxide.  All of the patient's questions and concerns were addressed.
Detail Level: Simple
Klisyri Pregnancy And Lactation Text: It is unknown if this medication can harm a developing fetus or if it is excreted in breast milk.
Olanzapine Pregnancy And Lactation Text: This medication is pregnancy category C.   There are no adequate and well controlled trials with olanzapine in pregnant females.  Olanzapine should be used during pregnancy only if the potential benefit justifies the potential risk to the fetus.   In a study in lactating healthy women, olanzapine was excreted in breast milk.  It is recommended that women taking olanzapine should not breast feed.
Infliximab Counseling:  I discussed with the patient the risks of infliximab including but not limited to myelosuppression, immunosuppression, autoimmune hepatitis, demyelinating diseases, lymphoma, and serious infections.  The patient understands that monitoring is required including a PPD at baseline and must alert us or the primary physician if symptoms of infection or other concerning signs are noted.
Cephalexin Pregnancy And Lactation Text: This medication is Pregnancy Category B and considered safe during pregnancy.  It is also excreted in breast milk but can be used safely for shorter doses.
Cibinqo Counseling: I discussed with the patient the risks of Cibinqo therapy including but not limited to common cold, nausea, headache, cold sores, increased blood CPK levels, dizziness, UTIs, fatigue, acne, and vomitting. Live vaccines should be avoided.  This medication has been linked to serious infections; higher rate of mortality; malignancy and lymphoproliferative disorders; major adverse cardiovascular events; thrombosis; thrombocytopenia and lymphopenia; lipid elevations; and retinal detachment.
Albendazole Pregnancy And Lactation Text: This medication is Pregnancy Category C and it isn't known if it is safe during pregnancy. It is also excreted in breast milk.
Topical Metronidazole Pregnancy And Lactation Text: This medication is Pregnancy Category B and considered safe during pregnancy.  It is also considered safe to use while breastfeeding.
Elidel Counseling: Patient may experience a mild burning sensation during topical application. Elidel is not approved in children less than 2 years of age. There have been case reports of hematologic and skin malignancies in patients using topical calcineurin inhibitors although causality is questionable.
Hydroxychloroquine Counseling:  I discussed with the patient that a baseline ophthalmologic exam is needed at the start of therapy and every year thereafter while on therapy. A CBC may also be warranted for monitoring.  The side effects of this medication were discussed with the patient, including but not limited to agranulocytosis, aplastic anemia, seizures, rashes, retinopathy, and liver toxicity. Patient instructed to call the office should any adverse effect occur.  The patient verbalized understanding of the proper use and possible adverse effects of Plaquenil.  All the patient's questions and concerns were addressed.
Protopic Counseling: Patient may experience a mild burning sensation during topical application. Protopic is not approved in children less than 2 years of age. There have been case reports of hematologic and skin malignancies in patients using topical calcineurin inhibitors although causality is questionable.
Isotretinoin Counseling: Patient should get monthly blood tests, not donate blood, not drive at night if vision affected, not share medication, and not undergo elective surgery for 6 months after tx completed. Side effects reviewed, pt to contact office should one occur.
Erivedge Counseling- I discussed with the patient the risks of Erivedge including but not limited to nausea, vomiting, diarrhea, constipation, weight loss, changes in the sense of taste, decreased appetite, muscle spasms, and hair loss.  The patient verbalized understanding of the proper use and possible adverse effects of Erivedge.  All of the patient's questions and concerns were addressed.
Fluconazole Counseling:  Patient counseled regarding adverse effects of fluconazole including but not limited to headache, diarrhea, nausea, upset stomach, liver function test abnormalities, taste disturbance, and stomach pain.  There is a rare possibility of liver failure that can occur when taking fluconazole.  The patient understands that monitoring of LFTs and kidney function test may be required, especially at baseline. The patient verbalized understanding of the proper use and possible adverse effects of fluconazole.  All of the patient's questions and concerns were addressed.
Xeljanz Counseling: I discussed with the patient the risks of Xeljanz therapy including increased risk of infection, liver issues, headache, diarrhea, or cold symptoms. Live vaccines should be avoided. They were instructed to call if they have any problems.
Benzoyl Peroxide Pregnancy And Lactation Text: This medication is Pregnancy Category C. It is unknown if benzoyl peroxide is excreted in breast milk.
Finasteride Pregnancy And Lactation Text: This medication is absolutely contraindicated during pregnancy. It is unknown if it is excreted in breast milk.
Protopic Pregnancy And Lactation Text: This medication is Pregnancy Category C. It is unknown if this medication is excreted in breast milk when applied topically.
Cosentyx Counseling:  I discussed with the patient the risks of Cosentyx including but not limited to worsening of Crohn's disease, immunosuppression, allergic reactions and infections.  The patient understands that monitoring is required including a PPD at baseline and must alert us or the primary physician if symptoms of infection or other concerning signs are noted.
Clindamycin Counseling: I counseled the patient regarding use of clindamycin as an antibiotic for prophylactic and/or therapeutic purposes. Clindamycin is active against numerous classes of bacteria, including skin bacteria. Side effects may include nausea, diarrhea, gastrointestinal upset, rash, hives, yeast infections, and in rare cases, colitis.
Terbinafine Pregnancy And Lactation Text: This medication is Pregnancy Category B and is considered safe during pregnancy. It is also excreted in breast milk and breast feeding isn't recommended.
Topical Retinoid counseling:  Patient advised to apply a pea-sized amount only at bedtime and wait 30 minutes after washing their face before applying.  If too drying, patient may add a non-comedogenic moisturizer. The patient verbalized understanding of the proper use and possible adverse effects of retinoids.  All of the patient's questions and concerns were addressed.
Cibinqo Pregnancy And Lactation Text: It is unknown if this medication will adversely affect pregnancy or breast feeding.  You should not take this medication if you are currently pregnant or planning a pregnancy or while breastfeeding.
Stelara Counseling:  I discussed with the patient the risks of ustekinumab including but not limited to immunosuppression, malignancy, posterior leukoencephalopathy syndrome, and serious infections.  The patient understands that monitoring is required including a PPD at baseline and must alert us or the primary physician if symptoms of infection or other concerning signs are noted.
Cyclophosphamide Counseling:  I discussed with the patient the risks of cyclophosphamide including but not limited to hair loss, hormonal abnormalities, decreased fertility, abdominal pain, diarrhea, nausea and vomiting, bone marrow suppression and infection. The patient understands that monitoring is required while taking this medication.
Sski Pregnancy And Lactation Text: This medication is Pregnancy Category D and isn't considered safe during pregnancy. It is excreted in breast milk.
Topical Sulfur Applications Counseling: Topical Sulfur Counseling: Patient counseled that this medication may cause skin irritation or allergic reactions.  In the event of skin irritation, the patient was advised to reduce the amount of the drug applied or use it less frequently.   The patient verbalized understanding of the proper use and possible adverse effects of topical sulfur application.  All of the patient's questions and concerns were addressed.
Ivermectin Counseling:  Patient instructed to take medication on an empty stomach with a full glass of water.  Patient informed of potential adverse effects including but not limited to nausea, diarrhea, dizziness, itching, and swelling of the extremities or lymph nodes.  The patient verbalized understanding of the proper use and possible adverse effects of ivermectin.  All of the patient's questions and concerns were addressed.
Topical Steroids Counseling: I discussed with the patient that prolonged use of topical steroids can result in the increased appearance of superficial blood vessels (telangiectasias), lightening (hypopigmentation) and thinning of the skin (atrophy).  Patient understands to avoid using high potency steroids in skin folds, the groin or the face.  The patient verbalized understanding of the proper use and possible adverse effects of topical steroids.  All of the patient's questions and concerns were addressed.
Dupixent Pregnancy And Lactation Text: This medication likely crosses the placenta but the risk for the fetus is uncertain. This medication is excreted in breast milk.
Oral Minoxidil Counseling- I discussed with the patient the risks of oral minoxidil including but not limited to shortness of breath, swelling of the feet or ankles, dizziness, lightheadedness, unwanted hair growth and allergic reaction.  The patient verbalized understanding of the proper use and possible adverse effects of oral minoxidil.  All of the patient's questions and concerns were addressed.
Colchicine Counseling:  Patient counseled regarding adverse effects including but not limited to stomach upset (nausea, vomiting, stomach pain, or diarrhea).  Patient instructed to limit alcohol consumption while taking this medication.  Colchicine may reduce blood counts especially with prolonged use.  The patient understands that monitoring of kidney function and blood counts may be required, especially at baseline. The patient verbalized understanding of the proper use and possible adverse effects of colchicine.  All of the patient's questions and concerns were addressed.
Carac Counseling:  I discussed with the patient the risks of Carac including but not limited to erythema, scaling, itching, weeping, crusting, and pain.
Birth Control Pills Counseling: Birth Control Pill Counseling: I discussed with the patient the potential side effects of OCPs including but not limited to increased risk of stroke, heart attack, thrombophlebitis, deep venous thrombosis, hepatic adenomas, breast changes, GI upset, headaches, and depression.  The patient verbalized understanding of the proper use and possible adverse effects of OCPs. All of the patient's questions and concerns were addressed.
Isotretinoin Pregnancy And Lactation Text: This medication is Pregnancy Category X and is considered extremely dangerous during pregnancy. It is unknown if it is excreted in breast milk.
Minoxidil Counseling: Minoxidil is a topical medication which can increase blood flow where it is applied. It is uncertain how this medication increases hair growth. Side effects are uncommon and include stinging and allergic reactions.
Hydroxychloroquine Pregnancy And Lactation Text: This medication has been shown to cause fetal harm but it isn't assigned a Pregnancy Risk Category. There are small amounts excreted in breast milk.
Clindamycin Pregnancy And Lactation Text: This medication can be used in pregnancy if certain situations. Clindamycin is also present in breast milk.
Qbrexza Counseling:  I discussed with the patient the risks of Qbrexza including but not limited to headache, mydriasis, blurred vision, dry eyes, nasal dryness, dry mouth, dry throat, dry skin, urinary hesitation, and constipation.  Local skin reactions including erythema, burning, stinging, and itching can also occur.
Cyclophosphamide Pregnancy And Lactation Text: This medication is Pregnancy Category D and it isn't considered safe during pregnancy. This medication is excreted in breast milk.
Cimetidine Counseling:  I discussed with the patient the risks of Cimetidine including but not limited to gynecomastia, headache, diarrhea, nausea, drowsiness, arrhythmias, pancreatitis, skin rashes, psychosis, bone marrow suppression and kidney toxicity.
Quinolones Counseling:  I discussed with the patient the risks of fluoroquinolones including but not limited to GI upset, allergic reaction, drug rash, diarrhea, dizziness, photosensitivity, yeast infections, liver function test abnormalities, tendonitis/tendon rupture.
Minoxidil Pregnancy And Lactation Text: This medication has not been assigned a Pregnancy Risk Category but animal studies failed to show danger with the topical medication. It is unknown if the medication is excreted in breast milk.
Libtayo Counseling- I discussed with the patient the risks of Libtayo including but not limited to nausea, vomiting, diarrhea, and bone or muscle pain.  The patient verbalized understanding of the proper use and possible adverse effects of Libtayo.  All of the patient's questions and concerns were addressed.
Oral Minoxidil Pregnancy And Lactation Text: This medication should only be used when clearly needed if you are pregnant, attempting to become pregnant or breast feeding.
Topical Sulfur Applications Pregnancy And Lactation Text: This medication is Pregnancy Category C and has an unknown safety profile during pregnancy. It is unknown if this topical medication is excreted in breast milk.
Litfulo Counseling: I discussed with the patient the risks of Litfulo therapy including but not limited to upper respiratory tract infections, shingles, cold sores, and nausea. Live vaccines should be avoided.  This medication has been linked to serious infections; higher rate of mortality; malignancy and lymphoproliferative disorders; major adverse cardiovascular events; thrombosis; gastrointestinal perforations; neutropenia; lymphopenia; anemia; liver enzyme elevations; and lipid elevations.
Zyclara Counseling:  I discussed with the patient the risks of imiquimod including but not limited to erythema, scaling, itching, weeping, crusting, and pain.  Patient understands that the inflammatory response to imiquimod is variable from person to person and was educated regarded proper titration schedule.  If flu-like symptoms develop, patient knows to discontinue the medication and contact us.
Thalidomide Counseling: I discussed with the patient the risks of thalidomide including but not limited to birth defects, anxiety, weakness, chest pain, dizziness, cough and severe allergy.
Birth Control Pills Pregnancy And Lactation Text: This medication should be avoided if pregnant and for the first 30 days post-partum.
High Dose Vitamin A Counseling: Side effects reviewed, pt to contact office should one occur.
Low Dose Naltrexone Counseling- I discussed with the patient the potential risks and side effects of low dose naltrexone including but not limited to: more vivid dreams, headaches, nausea, vomiting, abdominal pain, fatigue, dizziness, and anxiety.
Topical Steroids Applications Pregnancy And Lactation Text: Most topical steroids are considered safe to use during pregnancy and lactation.  Any topical steroid applied to the breast or nipple should be washed off before breastfeeding.
Dupixent Counseling: I discussed with the patient the risks of dupilumab including but not limited to eye infection and irritation, cold sores, injection site reactions, worsening of asthma, allergic reactions and increased risk of parasitic infection.  Live vaccines should be avoided while taking dupilumab. Dupilumab will also interact with certain medications such as warfarin and cyclosporine. The patient understands that monitoring is required and they must alert us or the primary physician if symptoms of infection or other concerning signs are noted.
Enbrel Counseling:  I discussed with the patient the risks of etanercept including but not limited to myelosuppression, immunosuppression, autoimmune hepatitis, demyelinating diseases, lymphoma, and infections.  The patient understands that monitoring is required including a PPD at baseline and must alert us or the primary physician if symptoms of infection or other concerning signs are noted.
Rituxan Counseling:  I discussed with the patient the risks of Rituxan infusions. Side effects can include infusion reactions, severe drug rashes including mucocutaneous reactions, reactivation of latent hepatitis and other infections and rarely progressive multifocal leukoencephalopathy.  All of the patient's questions and concerns were addressed.
Tazorac Counseling:  Patient advised that medication is irritating and drying.  Patient may need to apply sparingly and wash off after an hour before eventually leaving it on overnight.  The patient verbalized understanding of the proper use and possible adverse effects of tazorac.  All of the patient's questions and concerns were addressed.
Cyclosporine Counseling:  I discussed with the patient the risks of cyclosporine including but not limited to hypertension, gingival hyperplasia,myelosuppression, immunosuppression, liver damage, kidney damage, neurotoxicity, lymphoma, and serious infections. The patient understands that monitoring is required including baseline blood pressure, CBC, CMP, lipid panel and uric acid, and then 1-2 times monthly CMP and blood pressure.
Eucrisa Counseling: Patient may experience a mild burning sensation during topical application. Eucrisa is not approved in children less than 2 years of age.
Litfulo Pregnancy And Lactation Text: Based on animal studies, Lifulo may cause embryo-fetal harm when administered to pregnant women.  The medication should not be used in pregnancy.  Breastfeeding is not recommended during treatment.
Taltz Counseling: I discussed with the patient the risks of ixekizumab including but not limited to immunosuppression, serious infections, worsening of inflammatory bowel disease and drug reactions.  The patient understands that monitoring is required including a PPD at baseline and must alert us or the primary physician if symptoms of infection or other concerning signs are noted.
Mirvaso Counseling: Mirvaso is a topical medication which can decrease superficial blood flow where applied. Side effects are uncommon and include stinging, redness and allergic reactions.
Doxycycline Counseling:  Patient counseled regarding possible photosensitivity and increased risk for sunburn.  Patient instructed to avoid sunlight, if possible.  When exposed to sunlight, patients should wear protective clothing, sunglasses, and sunscreen.  The patient was instructed to call the office immediately if the following severe adverse effects occur:  hearing changes, easy bruising/bleeding, severe headache, or vision changes.  The patient verbalized understanding of the proper use and possible adverse effects of doxycycline.  All of the patient's questions and concerns were addressed.
Libtayo Pregnancy And Lactation Text: This medication is contraindicated in pregnancy and when breast feeding.
Low Dose Naltrexone Pregnancy And Lactation Text: Naltrexone is pregnancy category C.  There have been no adequate and well-controlled studies in pregnant women.  It should be used in pregnancy only if the potential benefit justifies the potential risk to the fetus.   Limited data indicates that naltrexone is minimally excreted into breastmilk.
Qbrexza Pregnancy And Lactation Text: There is no available data on Qbrexza use in pregnant women.  There is no available data on Qbrexza use in lactation.
Wartpeel Counseling:  I discussed with the patient the risks of Wartpeel including but not limited to erythema, scaling, itching, weeping, crusting, and pain.
Griseofulvin Counseling:  I discussed with the patient the risks of griseofulvin including but not limited to photosensitivity, cytopenia, liver damage, nausea/vomiting and severe allergy.  The patient understands that this medication is best absorbed when taken with a fatty meal (e.g., ice cream or french fries).
Otezla Counseling: The side effects of Otezla were discussed with the patient, including but not limited to worsening or new depression, weight loss, diarrhea, nausea, upper respiratory tract infection, and headache. Patient instructed to call the office should any adverse effect occur.  The patient verbalized understanding of the proper use and possible adverse effects of Otezla.  All the patient's questions and concerns were addressed.
Rituxan Pregnancy And Lactation Text: This medication is Pregnancy Category C and it isn't know if it is safe during pregnancy. It is unknown if this medication is excreted in breast milk but similar antibodies are known to be excreted.
Rifampin Counseling: I discussed with the patient the risks of rifampin including but not limited to liver damage, kidney damage, red-orange body fluids, nausea/vomiting and severe allergy.
Tazorac Pregnancy And Lactation Text: This medication is not safe during pregnancy. It is unknown if this medication is excreted in breast milk.
Adbry Counseling: I discussed with the patient the risks of tralokinumab including but not limited to eye infection and irritation, cold sores, injection site reactions, worsening of asthma, allergic reactions and increased risk of parasitic infection.  Live vaccines should be avoided while taking tralokinumab. The patient understands that monitoring is required and they must alert us or the primary physician if symptoms of infection or other concerning signs are noted.
Rhofade Counseling: Rhofade is a topical medication which can decrease superficial blood flow where applied. Side effects are uncommon and include stinging, redness and allergic reactions.
High Dose Vitamin A Pregnancy And Lactation Text: High dose vitamin A therapy is contraindicated during pregnancy and breast feeding.
Griseofulvin Pregnancy And Lactation Text: This medication is Pregnancy Category X and is known to cause serious birth defects. It is unknown if this medication is excreted in breast milk but breast feeding should be avoided.
Spironolactone Counseling: Patient advised regarding risks of diarrhea, abdominal pain, hyperkalemia, birth defects (for female patients), liver toxicity and renal toxicity. The patient may need blood work to monitor liver and kidney function and potassium levels while on therapy. The patient verbalized understanding of the proper use and possible adverse effects of spironolactone.  All of the patient's questions and concerns were addressed.
Tranexamic Acid Counseling:  Patient advised of the small risk of bleeding problems with tranexamic acid. They were also instructed to call if they developed any nausea, vomiting or diarrhea. All of the patient's questions and concerns were addressed.
Dapsone Counseling: I discussed with the patient the risks of dapsone including but not limited to hemolytic anemia, agranulocytosis, rashes, methemoglobinemia, kidney failure, peripheral neuropathy, headaches, GI upset, and liver toxicity.  Patients who start dapsone require monitoring including baseline LFTs and weekly CBCs for the first month, then every month thereafter.  The patient verbalized understanding of the proper use and possible adverse effects of dapsone.  All of the patient's questions and concerns were addressed.
Doxycycline Pregnancy And Lactation Text: This medication is Pregnancy Category D and not consider safe during pregnancy. It is also excreted in breast milk but is considered safe for shorter treatment courses.
Calcipotriene Counseling:  I discussed with the patient the risks of calcipotriene including but not limited to erythema, scaling, itching, and irritation.
Siliq Counseling:  I discussed with the patient the risks of Siliq including but not limited to new or worsening depression, suicidal thoughts and behavior, immunosuppression, malignancy, posterior leukoencephalopathy syndrome, and serious infections.  The patient understands that monitoring is required including a PPD at baseline and must alert us or the primary physician if symptoms of infection or other concerning signs are noted. There is also a special program designed to monitor depression which is required with Siliq.
Azithromycin Counseling:  I discussed with the patient the risks of azithromycin including but not limited to GI upset, allergic reaction, drug rash, diarrhea, and yeast infections.
Calcipotriene Pregnancy And Lactation Text: The use of this medication during pregnancy or lactation is not recommended as there is insufficient data.
Mirvaso Pregnancy And Lactation Text: This medication has not been assigned a Pregnancy Risk Category. It is unknown if the medication is excreted in breast milk.
Otezla Pregnancy And Lactation Text: This medication is Pregnancy Category C and it isn't known if it is safe during pregnancy. It is unknown if it is excreted in breast milk.
Xelalessandraz Pregnancy And Lactation Text: This medication is Pregnancy Category D and is not considered safe during pregnancy.  The risk during breast feeding is also uncertain.
Olumiant Counseling: I discussed with the patient the risks of Olumiant therapy including but not limited to upper respiratory tract infections, shingles, cold sores, and nausea. Live vaccines should be avoided.  This medication has been linked to serious infections; higher rate of mortality; malignancy and lymphoproliferative disorders; major adverse cardiovascular events; thrombosis; gastrointestinal perforations; neutropenia; lymphopenia; anemia; liver enzyme elevations; and lipid elevations.
Hydroquinone Counseling:  Patient advised that medication may result in skin irritation, lightening (hypopigmentation), dryness, and burning.  In the event of skin irritation, the patient was advised to reduce the amount of the drug applied or use it less frequently.  Rarely, spots that are treated with hydroquinone can become darker (pseudoochronosis).  Should this occur, patient instructed to stop medication and call the office. The patient verbalized understanding of the proper use and possible adverse effects of hydroquinone.  All of the patient's questions and concerns were addressed.
Niacinamide Counseling: I recommended taking niacin or niacinamide, also know as vitamin B3, twice daily. Recent evidence suggests that taking vitamin B3 (500 mg twice daily) can reduce the risk of actinic keratoses and non-melanoma skin cancers. Side effects of vitamin B3 include flushing and headache.
Opioid Counseling: I discussed with the patient the potential side effects of opioids including but not limited to addiction, altered mental status, and depression. I stressed avoiding alcohol, benzodiazepines, muscle relaxants and sleep aids unless specifically okayed by a physician. The patient verbalized understanding of the proper use and possible adverse effects of opioids. All of the patient's questions and concerns were addressed. They were instructed to flush the remaining pills down the toilet if they did not need them for pain.
Spironolactone Pregnancy And Lactation Text: This medication can cause feminization of the male fetus and should be avoided during pregnancy. The active metabolite is also found in breast milk.
Doxepin Counseling:  Patient advised that the medication is sedating and not to drive a car after taking this medication. Patient informed of potential adverse effects including but not limited to dry mouth, urinary retention, and blurry vision.  The patient verbalized understanding of the proper use and possible adverse effects of doxepin.  All of the patient's questions and concerns were addressed.
Odomzo Counseling- I discussed with the patient the risks of Odomzo including but not limited to nausea, vomiting, diarrhea, constipation, weight loss, changes in the sense of taste, decreased appetite, muscle spasms, and hair loss.  The patient verbalized understanding of the proper use and possible adverse effects of Odomzo.  All of the patient's questions and concerns were addressed.
Itraconazole Counseling:  I discussed with the patient the risks of itraconazole including but not limited to liver damage, nausea/vomiting, neuropathy, and severe allergy.  The patient understands that this medication is best absorbed when taken with acidic beverages such as non-diet cola or ginger ale.  The patient understands that monitoring is required including baseline LFTs and repeat LFTs at intervals.  The patient understands that they are to contact us or the primary physician if concerning signs are noted.
Dapsone Pregnancy And Lactation Text: This medication is Pregnancy Category C and is not considered safe during pregnancy or breast feeding.
Cantharidin Counseling:  I discussed with the patient the risks of Cantharidin including but not limited to pain, redness, burning, itching, and blistering.
Methotrexate Counseling:  Patient counseled regarding adverse effects of methotrexate including but not limited to nausea, vomiting, abnormalities in liver function tests. Patients may develop mouth sores, rash, diarrhea, and abnormalities in blood counts. The patient understands that monitoring is required including LFT's and blood counts.  There is a rare possibility of scarring of the liver and lung problems that can occur when taking methotrexate. Persistent nausea, loss of appetite, pale stools, dark urine, cough, and shortness of breath should be reported immediately. Patient advised to discontinue methotrexate treatment at least three months before attempting to become pregnant.  I discussed the need for folate supplements while taking methotrexate.  These supplements can decrease side effects during methotrexate treatment. The patient verbalized understanding of the proper use and possible adverse effects of methotrexate.  All of the patient's questions and concerns were addressed.
Rifampin Pregnancy And Lactation Text: This medication is Pregnancy Category C and it isn't know if it is safe during pregnancy. It is also excreted in breast milk and should not be used if you are breast feeding.
Oxybutynin Counseling:  I discussed with the patient the risks of oxybutynin including but not limited to skin rash, drowsiness, dry mouth, difficulty urinating, and blurred vision.
Olumiant Pregnancy And Lactation Text: Based on animal studies, Olumiant may cause embryo-fetal harm when administered to pregnant women.  The medication should not be used in pregnancy.  Breastfeeding is not recommended during treatment.
Winlevi Counseling:  I discussed with the patient the risks of topical clascoterone including but not limited to erythema, scaling, itching, and stinging. Patient voiced their understanding.
Erythromycin Counseling:  I discussed with the patient the risks of erythromycin including but not limited to GI upset, allergic reaction, drug rash, diarrhea, increase in liver enzymes, and yeast infections.
Topical Clindamycin Counseling: Patient counseled that this medication may cause skin irritation or allergic reactions.  In the event of skin irritation, the patient was advised to reduce the amount of the drug applied or use it less frequently.   The patient verbalized understanding of the proper use and possible adverse effects of clindamycin.  All of the patient's questions and concerns were addressed.
Tranexamic Acid Pregnancy And Lactation Text: It is unknown if this medication is safe during pregnancy or breast feeding.
Tremfya Counseling: I discussed with the patient the risks of guselkumab including but not limited to immunosuppression, serious infections, and drug reactions.  The patient understands that monitoring is required including a PPD at baseline and must alert us or the primary physician if symptoms of infection or other concerning signs are noted.
Adbry Pregnancy And Lactation Text: It is unknown if this medication will adversely affect pregnancy or breast feeding.
Aklief counseling:  Patient advised to apply a pea-sized amount only at bedtime and wait 30 minutes after washing their face before applying.  If too drying, patient may add a non-comedogenic moisturizer.  The most commonly reported side effects including irritation, redness, scaling, dryness, stinging, burning, itching, and increased risk of sunburn.  The patient verbalized understanding of the proper use and possible adverse effects of retinoids.  All of the patient's questions and concerns were addressed.
Opioid Pregnancy And Lactation Text: These medications can lead to premature delivery and should be avoided during pregnancy. These medications are also present in breast milk in small amounts.
Azithromycin Pregnancy And Lactation Text: This medication is considered safe during pregnancy and is also secreted in breast milk.
Cantharidin Pregnancy And Lactation Text: This medication has not been proven safe during pregnancy. It is unknown if this medication is excreted in breast milk.
Methotrexate Pregnancy And Lactation Text: This medication is Pregnancy Category X and is known to cause fetal harm. This medication is excreted in breast milk.
Gabapentin Counseling: I discussed with the patient the risks of gabapentin including but not limited to dizziness, somnolence, fatigue and ataxia.
5-Fu Counseling: 5-Fluorouracil Counseling:  I discussed with the patient the risks of 5-fluorouracil including but not limited to erythema, scaling, itching, weeping, crusting, and pain.
Doxepin Pregnancy And Lactation Text: This medication is Pregnancy Category C and it isn't known if it is safe during pregnancy. It is also excreted in breast milk and breast feeding isn't recommended.
Humira Counseling:  I discussed with the patient the risks of adalimumab including but not limited to myelosuppression, immunosuppression, autoimmune hepatitis, demyelinating diseases, lymphoma, and serious infections.  The patient understands that monitoring is required including a PPD at baseline and must alert us or the primary physician if symptoms of infection or other concerning signs are noted.
Sarecycline Counseling: Patient advised regarding possible photosensitivity and discoloration of the teeth, skin, lips, tongue and gums.  Patient instructed to avoid sunlight, if possible.  When exposed to sunlight, patients should wear protective clothing, sunglasses, and sunscreen.  The patient was instructed to call the office immediately if the following severe adverse effects occur:  hearing changes, easy bruising/bleeding, severe headache, or vision changes.  The patient verbalized understanding of the proper use and possible adverse effects of sarecycline.  All of the patient's questions and concerns were addressed.
Opzelura Counseling:  I discussed with the patient the risks of Opzelura including but not limited to nasopharngitis, bronchitis, ear infection, eosinophila, hives, diarrhea, folliculitis, tonsillitis, and rhinorrhea.  Taken orally, this medication has been linked to serious infections; higher rate of mortality; malignancy and lymphoproliferative disorders; major adverse cardiovascular events; thrombosis; thrombocytopenia, anemia, and neutropenia; and lipid elevations.
Niacinamide Pregnancy And Lactation Text: These medications are considered safe during pregnancy.

## 2024-01-02 NOTE — PROCEDURE: ADDITIONAL NOTES
Additional Notes: Stop picking, peeling, or pulling off the scale, as mechanical disruption of the scale sometimes leads to thicker buildup. Use the oil to soften scale. We can try ILK again in a couple of weeks. (It’s too soon as she just had shots.) discussed that there is a tendency for tachyphylaxis and diminished efficacy over time with this treatment.
Render Risk Assessment In Note?: no
Detail Level: Simple

## 2024-01-02 NOTE — PROCEDURE: PRESCRIPTION MEDICATION MANAGEMENT
Detail Level: Zone
Continue Regimen: Derma-Smoothe/FS Scalp Oil 0.01 % -Sig: Apply to damp hair two times a week and rinse the following morning.
Render In Strict Bullet Format?: No
84

## 2024-01-13 ENCOUNTER — LAB OUTSIDE AN ENCOUNTER (OUTPATIENT)
Dept: URBAN - METROPOLITAN AREA CLINIC 86 | Facility: CLINIC | Age: 64
End: 2024-01-13

## 2024-01-15 ENCOUNTER — APPOINTMENT (RX ONLY)
Dept: URBAN - METROPOLITAN AREA CLINIC 41 | Facility: CLINIC | Age: 64
Setting detail: DERMATOLOGY
End: 2024-01-15

## 2024-01-15 DIAGNOSIS — L40.0 PSORIASIS VULGARIS: ICD-10-CM

## 2024-01-15 PROCEDURE — ? MEDICATION COUNSELING

## 2024-01-15 PROCEDURE — ? PRESCRIPTION MEDICATION MANAGEMENT

## 2024-01-15 PROCEDURE — ? COUNSELING

## 2024-01-15 PROCEDURE — ? INTRALESIONAL KENALOG

## 2024-01-15 PROCEDURE — 11900 INJECT SKIN LESIONS </W 7: CPT

## 2024-01-15 ASSESSMENT — LOCATION DETAILED DESCRIPTION DERM
LOCATION DETAILED: POSTERIOR MID-PARIETAL SCALP
LOCATION DETAILED: MID-OCCIPITAL SCALP
LOCATION DETAILED: LEFT SUPERIOR OCCIPITAL SCALP

## 2024-01-15 ASSESSMENT — BSA PSORIASIS: % BODY COVERED IN PSORIASIS: 3

## 2024-01-15 ASSESSMENT — ITCH NUMERIC RATING SCALE: HOW SEVERE IS YOUR ITCHING?: 8

## 2024-01-15 ASSESSMENT — LOCATION SIMPLE DESCRIPTION DERM: LOCATION SIMPLE: POSTERIOR SCALP

## 2024-01-15 ASSESSMENT — LOCATION ZONE DERM: LOCATION ZONE: SCALP

## 2024-01-15 ASSESSMENT — PGA PSORIASIS: PGA PSORIASIS 2020: MODERATE

## 2024-01-15 NOTE — PROCEDURE: PRESCRIPTION MEDICATION MANAGEMENT
Detail Level: Zone
Plan: Follow up every 4 weeks for the next 3 months
Continue Regimen: Derma-smooth scalp oil as needed for flares
Render In Strict Bullet Format?: No

## 2024-01-15 NOTE — PROCEDURE: INTRALESIONAL KENALOG
Require Ndc Code?: No
Kenalog Type Of Vial: Multiple Dose
How Many Mls Were Removed From The 40 Mg/Ml (10ml) Vial When Preparing The Injectable Solution?: 0
Ndc# For Kenalog Only: 2622211325
Medical Necessity Clause: This procedure was medically necessary because the lesions that were treated were:
Concentration Of Kenalog Solution Injected (Mg/Ml): 5.0
Detail Level: Detailed
Validate Note Data When Using Inventory: Yes
Consent: The risks of atrophy were reviewed with the patient.
Total Volume (Ccs): 0.3
Show Inventory Tab: Hide
Expiration Date For Kenalog (Optional): 12/2025
Lot # For Kenalog (Optional): [id number]
Kenalog Preparation: Kenalog
Administered By (Optional): ASHLEY Cruz

## 2024-01-15 NOTE — PROCEDURE: MEDICATION COUNSELING
Erivedge Pregnancy And Lactation Text: This medication is Pregnancy Category X and is absolutely contraindicated during pregnancy. It is unknown if it is excreted in breast milk.
Olanzapine Counseling- I discussed with the patient the common side effects of olanzapine including but are not limited to: lack of energy, dry mouth, increased appetite, sleepiness, tremor, constipation, dizziness, changes in behavior, or restlessness.  Explained that teenagers are more likely to experience headaches, abdominal pain, pain in the arms or legs, tiredness, and sleepiness.  Serious side effects include but are not limited: increased risk of death in elderly patients who are confused, have memory loss, or dementia-related psychosis; hyperglycemia; increased cholesterol and triglycerides; and weight gain.
Bexarotene Pregnancy And Lactation Text: This medication is Pregnancy Category X and should not be given to women who are pregnant or may become pregnant. This medication should not be used if you are breast feeding.
Albendazole Pregnancy And Lactation Text: This medication is Pregnancy Category C and it isn't known if it is safe during pregnancy. It is also excreted in breast milk.
Cellcept Pregnancy And Lactation Text: This medication is Pregnancy Category D and isn't considered safe during pregnancy. It is unknown if this medication is excreted in breast milk.
Dupixent Counseling: I discussed with the patient the risks of dupilumab including but not limited to eye infection and irritation, cold sores, injection site reactions, worsening of asthma, allergic reactions and increased risk of parasitic infection.  Live vaccines should be avoided while taking dupilumab. Dupilumab will also interact with certain medications such as warfarin and cyclosporine. The patient understands that monitoring is required and they must alert us or the primary physician if symptoms of infection or other concerning signs are noted.
Klisyri Counseling:  I discussed with the patient the risks of Klisyri including but not limited to erythema, scaling, itching, weeping, crusting, and pain.
Topical Steroids Counseling: I discussed with the patient that prolonged use of topical steroids can result in the increased appearance of superficial blood vessels (telangiectasias), lightening (hypopigmentation) and thinning of the skin (atrophy).  Patient understands to avoid using high potency steroids in skin folds, the groin or the face.  The patient verbalized understanding of the proper use and possible adverse effects of topical steroids.  All of the patient's questions and concerns were addressed.
Tranexamic Acid Pregnancy And Lactation Text: It is unknown if this medication is safe during pregnancy or breast feeding.
Finasteride Female Counseling: Finasteride Counseling:  I discussed with the patient the risks of use of finasteride including but not limited to decreased libido and sexual dysfunction. Explained the teratogenic nature of the medication and stressed the importance of not getting pregnant during treatment. All of the patient's questions and concerns were addressed.
Glycopyrrolate Pregnancy And Lactation Text: This medication is Pregnancy Category B and is considered safe during pregnancy. It is unknown if it is excreted breast milk.
Azelaic Acid Pregnancy And Lactation Text: This medication is considered safe during pregnancy and breast feeding.
Infliximab Pregnancy And Lactation Text: This medication is Pregnancy Category B and is considered safe during pregnancy. It is unknown if this medication is excreted in breast milk.
Picato Pregnancy And Lactation Text: This medication is Pregnancy Category C. It is unknown if this medication is excreted in breast milk.
Stelara Counseling:  I discussed with the patient the risks of ustekinumab including but not limited to immunosuppression, malignancy, posterior leukoencephalopathy syndrome, and serious infections.  The patient understands that monitoring is required including a PPD at baseline and must alert us or the primary physician if symptoms of infection or other concerning signs are noted.
Vtama Pregnancy And Lactation Text: It is unknown if this medication can cause problems during pregnancy and breastfeeding.
Detail Level: Simple
Include Pregnancy/Lactation Warning?: No
Clofazimine Counseling:  I discussed with the patient the risks of clofazimine including but not limited to skin and eye pigmentation, liver damage, nausea/vomiting, gastrointestinal bleeding and allergy.
Fluconazole Counseling:  Patient counseled regarding adverse effects of fluconazole including but not limited to headache, diarrhea, nausea, upset stomach, liver function test abnormalities, taste disturbance, and stomach pain.  There is a rare possibility of liver failure that can occur when taking fluconazole.  The patient understands that monitoring of LFTs and kidney function test may be required, especially at baseline. The patient verbalized understanding of the proper use and possible adverse effects of fluconazole.  All of the patient's questions and concerns were addressed.
Benzoyl Peroxide Counseling: Patient counseled that medicine may cause skin irritation and bleach clothing.  In the event of skin irritation, the patient was advised to reduce the amount of the drug applied or use it less frequently.   The patient verbalized understanding of the proper use and possible adverse effects of benzoyl peroxide.  All of the patient's questions and concerns were addressed.
Rituxan Counseling:  I discussed with the patient the risks of Rituxan infusions. Side effects can include infusion reactions, severe drug rashes including mucocutaneous reactions, reactivation of latent hepatitis and other infections and rarely progressive multifocal leukoencephalopathy.  All of the patient's questions and concerns were addressed.
Protopic Counseling: Patient may experience a mild burning sensation during topical application. Protopic is not approved in children less than 2 years of age. There have been case reports of hematologic and skin malignancies in patients using topical calcineurin inhibitors although causality is questionable.
Zoryve Counseling:  I discussed with the patient that Zoryve is not for use in the eyes, mouth or vagina. The most commonly reported side effects include diarrhea, headache, insomnia, application site pain, upper respiratory tract infections, and urinary tract infections.  All of the patient's questions and concerns were addressed.
Rinvoq Pregnancy And Lactation Text: Based on animal studies, Rinvoq may cause embryo-fetal harm when administered to pregnant women.  The medication should not be used in pregnancy.  Breastfeeding is not recommended during treatment and for 6 days after the last dose.
Propranolol Pregnancy And Lactation Text: This medication is Pregnancy Category C and it isn't known if it is safe during pregnancy. It is excreted in breast milk.
Minocycline Pregnancy And Lactation Text: This medication is Pregnancy Category D and not consider safe during pregnancy. It is also excreted in breast milk.
Clindamycin Counseling: I counseled the patient regarding use of clindamycin as an antibiotic for prophylactic and/or therapeutic purposes. Clindamycin is active against numerous classes of bacteria, including skin bacteria. Side effects may include nausea, diarrhea, gastrointestinal upset, rash, hives, yeast infections, and in rare cases, colitis.
Terbinafine Pregnancy And Lactation Text: This medication is Pregnancy Category B and is considered safe during pregnancy. It is also excreted in breast milk and breast feeding isn't recommended.
Cyclophosphamide Counseling:  I discussed with the patient the risks of cyclophosphamide including but not limited to hair loss, hormonal abnormalities, decreased fertility, abdominal pain, diarrhea, nausea and vomiting, bone marrow suppression and infection. The patient understands that monitoring is required while taking this medication.
Topical Steroids Applications Pregnancy And Lactation Text: Most topical steroids are considered safe to use during pregnancy and lactation.  Any topical steroid applied to the breast or nipple should be washed off before breastfeeding.
SSKI Counseling:  I discussed with the patient the risks of SSKI including but not limited to thyroid abnormalities, metallic taste, GI upset, fever, headache, acne, arthralgias, paraesthesias, lymphadenopathy, easy bleeding, arrhythmias, and allergic reaction.
Ivermectin Counseling:  Patient instructed to take medication on an empty stomach with a full glass of water.  Patient informed of potential adverse effects including but not limited to nausea, diarrhea, dizziness, itching, and swelling of the extremities or lymph nodes.  The patient verbalized understanding of the proper use and possible adverse effects of ivermectin.  All of the patient's questions and concerns were addressed.
Finasteride Pregnancy And Lactation Text: This medication is absolutely contraindicated during pregnancy. It is unknown if it is excreted in breast milk.
Hydroxychloroquine Counseling:  I discussed with the patient that a baseline ophthalmologic exam is needed at the start of therapy and every year thereafter while on therapy. A CBC may also be warranted for monitoring.  The side effects of this medication were discussed with the patient, including but not limited to agranulocytosis, aplastic anemia, seizures, rashes, retinopathy, and liver toxicity. Patient instructed to call the office should any adverse effect occur.  The patient verbalized understanding of the proper use and possible adverse effects of Plaquenil.  All the patient's questions and concerns were addressed.
Isotretinoin Counseling: Patient should get monthly blood tests, not donate blood, not drive at night if vision affected, not share medication, and not undergo elective surgery for 6 months after tx completed. Side effects reviewed, pt to contact office should one occur.
Klisyri Pregnancy And Lactation Text: It is unknown if this medication can harm a developing fetus or if it is excreted in breast milk.
Olanzapine Pregnancy And Lactation Text: This medication is pregnancy category C.   There are no adequate and well controlled trials with olanzapine in pregnant females.  Olanzapine should be used during pregnancy only if the potential benefit justifies the potential risk to the fetus.   In a study in lactating healthy women, olanzapine was excreted in breast milk.  It is recommended that women taking olanzapine should not breast feed.
Valtrex Counseling: I discussed with the patient the risks of valacyclovir including but not limited to kidney damage, nausea, vomiting and severe allergy.  The patient understands that if the infection seems to be worsening or is not improving, they are to call.
Dupixent Pregnancy And Lactation Text: This medication likely crosses the placenta but the risk for the fetus is uncertain. This medication is excreted in breast milk.
Libtayo Counseling- I discussed with the patient the risks of Libtayo including but not limited to nausea, vomiting, diarrhea, and bone or muscle pain.  The patient verbalized understanding of the proper use and possible adverse effects of Libtayo.  All of the patient's questions and concerns were addressed.
Fluconazole Pregnancy And Lactation Text: This medication is Pregnancy Category C and it isn't know if it is safe during pregnancy. It is also excreted in breast milk.
Benzoyl Peroxide Pregnancy And Lactation Text: This medication is Pregnancy Category C. It is unknown if benzoyl peroxide is excreted in breast milk.
Oral Minoxidil Counseling- I discussed with the patient the risks of oral minoxidil including but not limited to shortness of breath, swelling of the feet or ankles, dizziness, lightheadedness, unwanted hair growth and allergic reaction.  The patient verbalized understanding of the proper use and possible adverse effects of oral minoxidil.  All of the patient's questions and concerns were addressed.
Taltz Counseling: I discussed with the patient the risks of ixekizumab including but not limited to immunosuppression, serious infections, worsening of inflammatory bowel disease and drug reactions.  The patient understands that monitoring is required including a PPD at baseline and must alert us or the primary physician if symptoms of infection or other concerning signs are noted.
Cimetidine Counseling:  I discussed with the patient the risks of Cimetidine including but not limited to gynecomastia, headache, diarrhea, nausea, drowsiness, arrhythmias, pancreatitis, skin rashes, psychosis, bone marrow suppression and kidney toxicity.
Elidel Counseling: Patient may experience a mild burning sensation during topical application. Elidel is not approved in children less than 2 years of age. There have been case reports of hematologic and skin malignancies in patients using topical calcineurin inhibitors although causality is questionable.
Quinolones Counseling:  I discussed with the patient the risks of fluoroquinolones including but not limited to GI upset, allergic reaction, drug rash, diarrhea, dizziness, photosensitivity, yeast infections, liver function test abnormalities, tendonitis/tendon rupture.
Clofazimine Pregnancy And Lactation Text: This medication is Pregnancy Category C and isn't considered safe during pregnancy. It is excreted in breast milk.
Adbry Counseling: I discussed with the patient the risks of tralokinumab including but not limited to eye infection and irritation, cold sores, injection site reactions, worsening of asthma, allergic reactions and increased risk of parasitic infection.  Live vaccines should be avoided while taking tralokinumab. The patient understands that monitoring is required and they must alert us or the primary physician if symptoms of infection or other concerning signs are noted.
Clindamycin Pregnancy And Lactation Text: This medication can be used in pregnancy if certain situations. Clindamycin is also present in breast milk.
Protopic Pregnancy And Lactation Text: This medication is Pregnancy Category C. It is unknown if this medication is excreted in breast milk when applied topically.
Tazorac Counseling:  Patient advised that medication is irritating and drying.  Patient may need to apply sparingly and wash off after an hour before eventually leaving it on overnight.  The patient verbalized understanding of the proper use and possible adverse effects of tazorac.  All of the patient's questions and concerns were addressed.
Doxycycline Counseling:  Patient counseled regarding possible photosensitivity and increased risk for sunburn.  Patient instructed to avoid sunlight, if possible.  When exposed to sunlight, patients should wear protective clothing, sunglasses, and sunscreen.  The patient was instructed to call the office immediately if the following severe adverse effects occur:  hearing changes, easy bruising/bleeding, severe headache, or vision changes.  The patient verbalized understanding of the proper use and possible adverse effects of doxycycline.  All of the patient's questions and concerns were addressed.
Adbry Pregnancy And Lactation Text: It is unknown if this medication will adversely affect pregnancy or breast feeding.
Tazorac Pregnancy And Lactation Text: This medication is not safe during pregnancy. It is unknown if this medication is excreted in breast milk.
Colchicine Counseling:  Patient counseled regarding adverse effects including but not limited to stomach upset (nausea, vomiting, stomach pain, or diarrhea).  Patient instructed to limit alcohol consumption while taking this medication.  Colchicine may reduce blood counts especially with prolonged use.  The patient understands that monitoring of kidney function and blood counts may be required, especially at baseline. The patient verbalized understanding of the proper use and possible adverse effects of colchicine.  All of the patient's questions and concerns were addressed.
Enbrel Counseling:  I discussed with the patient the risks of etanercept including but not limited to myelosuppression, immunosuppression, autoimmune hepatitis, demyelinating diseases, lymphoma, and infections.  The patient understands that monitoring is required including a PPD at baseline and must alert us or the primary physician if symptoms of infection or other concerning signs are noted.
Birth Control Pills Counseling: Birth Control Pill Counseling: I discussed with the patient the potential side effects of OCPs including but not limited to increased risk of stroke, heart attack, thrombophlebitis, deep venous thrombosis, hepatic adenomas, breast changes, GI upset, headaches, and depression.  The patient verbalized understanding of the proper use and possible adverse effects of OCPs. All of the patient's questions and concerns were addressed.
Isotretinoin Pregnancy And Lactation Text: This medication is Pregnancy Category X and is considered extremely dangerous during pregnancy. It is unknown if it is excreted in breast milk.
Libtayo Pregnancy And Lactation Text: This medication is contraindicated in pregnancy and when breast feeding.
Hydroxychloroquine Pregnancy And Lactation Text: This medication has been shown to cause fetal harm but it isn't assigned a Pregnancy Risk Category. There are small amounts excreted in breast milk.
Minoxidil Counseling: Minoxidil is a topical medication which can increase blood flow where it is applied. It is uncertain how this medication increases hair growth. Side effects are uncommon and include stinging and allergic reactions.
Rituxan Pregnancy And Lactation Text: This medication is Pregnancy Category C and it isn't know if it is safe during pregnancy. It is unknown if this medication is excreted in breast milk but similar antibodies are known to be excreted.
Sotyktu Counseling:  I discussed the most common side effects of Sotyktu including: common cold, sore throat, sinus infections, cold sores, canker sores, folliculitis, and acne.  I also discussed more serious side effects of Sotyktu including but not limited to: serious allergic reactions; increased risk for infections such as TB; cancers such as lymphomas; rhabdomyolysis and elevated CPK; and elevated triglycerides and liver enzymes. 
Valtrex Pregnancy And Lactation Text: this medication is Pregnancy Category B and is considered safe during pregnancy. This medication is not directly found in breast milk but it's metabolite acyclovir is present.
Topical Sulfur Applications Counseling: Topical Sulfur Counseling: Patient counseled that this medication may cause skin irritation or allergic reactions.  In the event of skin irritation, the patient was advised to reduce the amount of the drug applied or use it less frequently.   The patient verbalized understanding of the proper use and possible adverse effects of topical sulfur application.  All of the patient's questions and concerns were addressed.
Cyclophosphamide Pregnancy And Lactation Text: This medication is Pregnancy Category D and it isn't considered safe during pregnancy. This medication is excreted in breast milk.
Zyclara Counseling:  I discussed with the patient the risks of imiquimod including but not limited to erythema, scaling, itching, weeping, crusting, and pain.  Patient understands that the inflammatory response to imiquimod is variable from person to person and was educated regarded proper titration schedule.  If flu-like symptoms develop, patient knows to discontinue the medication and contact us.
Taltz Pregnancy And Lactation Text: The risk during pregnancy and breastfeeding is uncertain with this medication.
Cibinqo Counseling: I discussed with the patient the risks of Cibinqo therapy including but not limited to common cold, nausea, headache, cold sores, increased blood CPK levels, dizziness, UTIs, fatigue, acne, and vomitting. Live vaccines should be avoided.  This medication has been linked to serious infections; higher rate of mortality; malignancy and lymphoproliferative disorders; major adverse cardiovascular events; thrombosis; thrombocytopenia and lymphopenia; lipid elevations; and retinal detachment.
Carac Counseling:  I discussed with the patient the risks of Carac including but not limited to erythema, scaling, itching, weeping, crusting, and pain.
Odomzo Counseling- I discussed with the patient the risks of Odomzo including but not limited to nausea, vomiting, diarrhea, constipation, weight loss, changes in the sense of taste, decreased appetite, muscle spasms, and hair loss.  The patient verbalized understanding of the proper use and possible adverse effects of Odomzo.  All of the patient's questions and concerns were addressed.
Sski Pregnancy And Lactation Text: This medication is Pregnancy Category D and isn't considered safe during pregnancy. It is excreted in breast milk.
Oral Minoxidil Pregnancy And Lactation Text: This medication should only be used when clearly needed if you are pregnant, attempting to become pregnant or breast feeding.
High Dose Vitamin A Counseling: Side effects reviewed, pt to contact office should one occur.
Sotyktu Pregnancy And Lactation Text: There is insufficient data to evaluate whether or not Sotyktu is safe to use during pregnancy.   It is not known if Sotyktu passes into breast milk and whether or not it is safe to use when breastfeeding.  
Minoxidil Pregnancy And Lactation Text: This medication has not been assigned a Pregnancy Risk Category but animal studies failed to show danger with the topical medication. It is unknown if the medication is excreted in breast milk.
Siliq Counseling:  I discussed with the patient the risks of Siliq including but not limited to new or worsening depression, suicidal thoughts and behavior, immunosuppression, malignancy, posterior leukoencephalopathy syndrome, and serious infections.  The patient understands that monitoring is required including a PPD at baseline and must alert us or the primary physician if symptoms of infection or other concerning signs are noted. There is also a special program designed to monitor depression which is required with Siliq.
Birth Control Pills Pregnancy And Lactation Text: This medication should be avoided if pregnant and for the first 30 days post-partum.
Low Dose Naltrexone Counseling- I discussed with the patient the potential risks and side effects of low dose naltrexone including but not limited to: more vivid dreams, headaches, nausea, vomiting, abdominal pain, fatigue, dizziness, and anxiety.
Topical Sulfur Applications Pregnancy And Lactation Text: This medication is Pregnancy Category C and has an unknown safety profile during pregnancy. It is unknown if this topical medication is excreted in breast milk.
Qbrexza Counseling:  I discussed with the patient the risks of Qbrexza including but not limited to headache, mydriasis, blurred vision, dry eyes, nasal dryness, dry mouth, dry throat, dry skin, urinary hesitation, and constipation.  Local skin reactions including erythema, burning, stinging, and itching can also occur.
Griseofulvin Counseling:  I discussed with the patient the risks of griseofulvin including but not limited to photosensitivity, cytopenia, liver damage, nausea/vomiting and severe allergy.  The patient understands that this medication is best absorbed when taken with a fatty meal (e.g., ice cream or french fries).
Topical Clindamycin Counseling: Patient counseled that this medication may cause skin irritation or allergic reactions.  In the event of skin irritation, the patient was advised to reduce the amount of the drug applied or use it less frequently.   The patient verbalized understanding of the proper use and possible adverse effects of clindamycin.  All of the patient's questions and concerns were addressed.
Eucrisa Counseling: Patient may experience a mild burning sensation during topical application. Eucrisa is not approved in children less than 2 years of age.
Griseofulvin Pregnancy And Lactation Text: This medication is Pregnancy Category X and is known to cause serious birth defects. It is unknown if this medication is excreted in breast milk but breast feeding should be avoided.
Rifampin Counseling: I discussed with the patient the risks of rifampin including but not limited to liver damage, kidney damage, red-orange body fluids, nausea/vomiting and severe allergy.
Doxycycline Pregnancy And Lactation Text: This medication is Pregnancy Category D and not consider safe during pregnancy. It is also excreted in breast milk but is considered safe for shorter treatment courses.
Bimzelx Counseling:  I discussed with the patient the risks of Bimzelx including but not limited to depression, immunosuppression, allergic reactions and infections.  The patient understands that monitoring is required including a PPD at baseline and must alert us or the primary physician if symptoms of infection or other concerning signs are noted.
Cyclosporine Counseling:  I discussed with the patient the risks of cyclosporine including but not limited to hypertension, gingival hyperplasia,myelosuppression, immunosuppression, liver damage, kidney damage, neurotoxicity, lymphoma, and serious infections. The patient understands that monitoring is required including baseline blood pressure, CBC, CMP, lipid panel and uric acid, and then 1-2 times monthly CMP and blood pressure.
Otezla Counseling: The side effects of Otezla were discussed with the patient, including but not limited to worsening or new depression, weight loss, diarrhea, nausea, upper respiratory tract infection, and headache. Patient instructed to call the office should any adverse effect occur.  The patient verbalized understanding of the proper use and possible adverse effects of Otezla.  All the patient's questions and concerns were addressed.
High Dose Vitamin A Pregnancy And Lactation Text: High dose vitamin A therapy is contraindicated during pregnancy and breast feeding.
Low Dose Naltrexone Pregnancy And Lactation Text: Naltrexone is pregnancy category C.  There have been no adequate and well-controlled studies in pregnant women.  It should be used in pregnancy only if the potential benefit justifies the potential risk to the fetus.   Limited data indicates that naltrexone is minimally excreted into breastmilk.
Erythromycin Counseling:  I discussed with the patient the risks of erythromycin including but not limited to GI upset, allergic reaction, drug rash, diarrhea, increase in liver enzymes, and yeast infections.
Wartpeel Counseling:  I discussed with the patient the risks of Wartpeel including but not limited to erythema, scaling, itching, weeping, crusting, and pain.
Carac Pregnancy And Lactation Text: This medication is Pregnancy Category X and contraindicated in pregnancy and in women who may become pregnant. It is unknown if this medication is excreted in breast milk.
Qbrexza Pregnancy And Lactation Text: There is no available data on Qbrexza use in pregnant women.  There is no available data on Qbrexza use in lactation.
Tremfya Counseling: I discussed with the patient the risks of guselkumab including but not limited to immunosuppression, serious infections, and drug reactions.  The patient understands that monitoring is required including a PPD at baseline and must alert us or the primary physician if symptoms of infection or other concerning signs are noted.
Azithromycin Counseling:  I discussed with the patient the risks of azithromycin including but not limited to GI upset, allergic reaction, drug rash, diarrhea, and yeast infections.
Xeljanz Counseling: I discussed with the patient the risks of Xeljanz therapy including increased risk of infection, liver issues, headache, diarrhea, or cold symptoms. Live vaccines should be avoided. They were instructed to call if they have any problems.
Doxepin Counseling:  Patient advised that the medication is sedating and not to drive a car after taking this medication. Patient informed of potential adverse effects including but not limited to dry mouth, urinary retention, and blurry vision.  The patient verbalized understanding of the proper use and possible adverse effects of doxepin.  All of the patient's questions and concerns were addressed.
Mirvaso Counseling: Mirvaso is a topical medication which can decrease superficial blood flow where applied. Side effects are uncommon and include stinging, redness and allergic reactions.
Spironolactone Counseling: Patient advised regarding risks of diarrhea, abdominal pain, hyperkalemia, birth defects (for female patients), liver toxicity and renal toxicity. The patient may need blood work to monitor liver and kidney function and potassium levels while on therapy. The patient verbalized understanding of the proper use and possible adverse effects of spironolactone.  All of the patient's questions and concerns were addressed.
Cibinqo Pregnancy And Lactation Text: It is unknown if this medication will adversely affect pregnancy or breast feeding.  You should not take this medication if you are currently pregnant or planning a pregnancy or while breastfeeding.
Thalidomide Counseling: I discussed with the patient the risks of thalidomide including but not limited to birth defects, anxiety, weakness, chest pain, dizziness, cough and severe allergy.
Humira Counseling:  I discussed with the patient the risks of adalimumab including but not limited to myelosuppression, immunosuppression, autoimmune hepatitis, demyelinating diseases, lymphoma, and serious infections.  The patient understands that monitoring is required including a PPD at baseline and must alert us or the primary physician if symptoms of infection or other concerning signs are noted.
Azithromycin Pregnancy And Lactation Text: This medication is considered safe during pregnancy and is also secreted in breast milk.
Itraconazole Counseling:  I discussed with the patient the risks of itraconazole including but not limited to liver damage, nausea/vomiting, neuropathy, and severe allergy.  The patient understands that this medication is best absorbed when taken with acidic beverages such as non-diet cola or ginger ale.  The patient understands that monitoring is required including baseline LFTs and repeat LFTs at intervals.  The patient understands that they are to contact us or the primary physician if concerning signs are noted.
Calcipotriene Counseling:  I discussed with the patient the risks of calcipotriene including but not limited to erythema, scaling, itching, and irritation.
Litfulo Counseling: I discussed with the patient the risks of Litfulo therapy including but not limited to upper respiratory tract infections, shingles, cold sores, and nausea. Live vaccines should be avoided.  This medication has been linked to serious infections; higher rate of mortality; malignancy and lymphoproliferative disorders; major adverse cardiovascular events; thrombosis; gastrointestinal perforations; neutropenia; lymphopenia; anemia; liver enzyme elevations; and lipid elevations.
Rhofade Counseling: Rhofade is a topical medication which can decrease superficial blood flow where applied. Side effects are uncommon and include stinging, redness and allergic reactions.
Cyclosporine Pregnancy And Lactation Text: This medication is Pregnancy Category C and it isn't know if it is safe during pregnancy. This medication is excreted in breast milk.
Dapsone Counseling: I discussed with the patient the risks of dapsone including but not limited to hemolytic anemia, agranulocytosis, rashes, methemoglobinemia, kidney failure, peripheral neuropathy, headaches, GI upset, and liver toxicity.  Patients who start dapsone require monitoring including baseline LFTs and weekly CBCs for the first month, then every month thereafter.  The patient verbalized understanding of the proper use and possible adverse effects of dapsone.  All of the patient's questions and concerns were addressed.
Bimzelx Pregnancy And Lactation Text: This medication crosses the placenta and the safety is uncertain during pregnancy. It is unknown if this medication is present in breast milk.
Rifampin Pregnancy And Lactation Text: This medication is Pregnancy Category C and it isn't know if it is safe during pregnancy. It is also excreted in breast milk and should not be used if you are breast feeding.
Topical Clindamycin Pregnancy And Lactation Text: This medication is Pregnancy Category B and is considered safe during pregnancy. It is unknown if it is excreted in breast milk.
Simponi Counseling:  I discussed with the patient the risks of golimumab including but not limited to myelosuppression, immunosuppression, autoimmune hepatitis, demyelinating diseases, lymphoma, and serious infections.  The patient understands that monitoring is required including a PPD at baseline and must alert us or the primary physician if symptoms of infection or other concerning signs are noted.
Methotrexate Counseling:  Patient counseled regarding adverse effects of methotrexate including but not limited to nausea, vomiting, abnormalities in liver function tests. Patients may develop mouth sores, rash, diarrhea, and abnormalities in blood counts. The patient understands that monitoring is required including LFT's and blood counts.  There is a rare possibility of scarring of the liver and lung problems that can occur when taking methotrexate. Persistent nausea, loss of appetite, pale stools, dark urine, cough, and shortness of breath should be reported immediately. Patient advised to discontinue methotrexate treatment at least three months before attempting to become pregnant.  I discussed the need for folate supplements while taking methotrexate.  These supplements can decrease side effects during methotrexate treatment. The patient verbalized understanding of the proper use and possible adverse effects of methotrexate.  All of the patient's questions and concerns were addressed.
Calcipotriene Pregnancy And Lactation Text: The use of this medication during pregnancy or lactation is not recommended as there is insufficient data.
Erythromycin Pregnancy And Lactation Text: This medication is Pregnancy Category B and is considered safe during pregnancy. It is also excreted in breast milk.
Opioid Counseling: I discussed with the patient the potential side effects of opioids including but not limited to addiction, altered mental status, and depression. I stressed avoiding alcohol, benzodiazepines, muscle relaxants and sleep aids unless specifically okayed by a physician. The patient verbalized understanding of the proper use and possible adverse effects of opioids. All of the patient's questions and concerns were addressed. They were instructed to flush the remaining pills down the toilet if they did not need them for pain.
Spironolactone Pregnancy And Lactation Text: This medication can cause feminization of the male fetus and should be avoided during pregnancy. The active metabolite is also found in breast milk.
Niacinamide Counseling: I recommended taking niacin or niacinamide, also know as vitamin B3, twice daily. Recent evidence suggests that taking vitamin B3 (500 mg twice daily) can reduce the risk of actinic keratoses and non-melanoma skin cancers. Side effects of vitamin B3 include flushing and headache.
Mirvaso Pregnancy And Lactation Text: This medication has not been assigned a Pregnancy Risk Category. It is unknown if the medication is excreted in breast milk.
Dapsone Pregnancy And Lactation Text: This medication is Pregnancy Category C and is not considered safe during pregnancy or breast feeding.
Dutasteride Male Counseling: Dustasteride Counseling:  I discussed with the patient the risks of use of dutasteride including but not limited to decreased libido, decreased ejaculate volume, and gynecomastia. Women who can become pregnant should not handle medication.  All of the patient's questions and concerns were addressed.
Cimzia Counseling:  I discussed with the patient the risks of Cimzia including but not limited to immunosuppression, allergic reactions and infections.  The patient understands that monitoring is required including a PPD at baseline and must alert us or the primary physician if symptoms of infection or other concerning signs are noted.
Hydroquinone Counseling:  Patient advised that medication may result in skin irritation, lightening (hypopigmentation), dryness, and burning.  In the event of skin irritation, the patient was advised to reduce the amount of the drug applied or use it less frequently.  Rarely, spots that are treated with hydroquinone can become darker (pseudoochronosis).  Should this occur, patient instructed to stop medication and call the office. The patient verbalized understanding of the proper use and possible adverse effects of hydroquinone.  All of the patient's questions and concerns were addressed.
Topical Ketoconazole Counseling: Patient counseled that this medication may cause skin irritation or allergic reactions.  In the event of skin irritation, the patient was advised to reduce the amount of the drug applied or use it less frequently.   The patient verbalized understanding of the proper use and possible adverse effects of ketoconazole.  All of the patient's questions and concerns were addressed.
Otezla Pregnancy And Lactation Text: This medication is Pregnancy Category C and it isn't known if it is safe during pregnancy. It is unknown if it is excreted in breast milk.
Doxepin Pregnancy And Lactation Text: This medication is Pregnancy Category C and it isn't known if it is safe during pregnancy. It is also excreted in breast milk and breast feeding isn't recommended.
Xelalessandraz Pregnancy And Lactation Text: This medication is Pregnancy Category D and is not considered safe during pregnancy.  The risk during breast feeding is also uncertain.
Xolair Counseling:  Patient informed of potential adverse effects including but not limited to fever, muscle aches, rash and allergic reactions.  The patient verbalized understanding of the proper use and possible adverse effects of Xolair.  All of the patient's questions and concerns were addressed.
Litfulo Pregnancy And Lactation Text: Based on animal studies, Lifulo may cause embryo-fetal harm when administered to pregnant women.  The medication should not be used in pregnancy.  Breastfeeding is not recommended during treatment.
Cantharidin Counseling:  I discussed with the patient the risks of Cantharidin including but not limited to pain, redness, burning, itching, and blistering.
Tranexamic Acid Counseling:  Patient advised of the small risk of bleeding problems with tranexamic acid. They were also instructed to call if they developed any nausea, vomiting or diarrhea. All of the patient's questions and concerns were addressed.
Oxybutynin Counseling:  I discussed with the patient the risks of oxybutynin including but not limited to skin rash, drowsiness, dry mouth, difficulty urinating, and blurred vision.
Hydroxyzine Counseling: Patient advised that the medication is sedating and not to drive a car after taking this medication.  Patient informed of potential adverse effects including but not limited to dry mouth, urinary retention, and blurry vision.  The patient verbalized understanding of the proper use and possible adverse effects of hydroxyzine.  All of the patient's questions and concerns were addressed.
Sarecycline Counseling: Patient advised regarding possible photosensitivity and discoloration of the teeth, skin, lips, tongue and gums.  Patient instructed to avoid sunlight, if possible.  When exposed to sunlight, patients should wear protective clothing, sunglasses, and sunscreen.  The patient was instructed to call the office immediately if the following severe adverse effects occur:  hearing changes, easy bruising/bleeding, severe headache, or vision changes.  The patient verbalized understanding of the proper use and possible adverse effects of sarecycline.  All of the patient's questions and concerns were addressed.
Bactrim Counseling:  I discussed with the patient the risks of sulfa antibiotics including but not limited to GI upset, allergic reaction, drug rash, diarrhea, dizziness, photosensitivity, and yeast infections.  Rarely, more serious reactions can occur including but not limited to aplastic anemia, agranulocytosis, methemoglobinemia, blood dyscrasias, liver or kidney failure, lung infiltrates or desquamative/blistering drug rashes.
Gabapentin Counseling: I discussed with the patient the risks of gabapentin including but not limited to dizziness, somnolence, fatigue and ataxia.
Metronidazole Counseling:  I discussed with the patient the risks of metronidazole including but not limited to seizures, nausea/vomiting, a metallic taste in the mouth, nausea/vomiting and severe allergy.
Dutasteride Female Counseling: Dutasteride Counseling:  I discussed with the patient the risks of use of dutasteride including but not limited to decreased libido and sexual dysfunction. Explained the teratogenic nature of the medication and stressed the importance of not getting pregnant during treatment. All of the patient's questions and concerns were addressed.
Acitretin Counseling:  I discussed with the patient the risks of acitretin including but not limited to hair loss, dry lips/skin/eyes, liver damage, hyperlipidemia, depression/suicidal ideation, photosensitivity.  Serious rare side effects can include but are not limited to pancreatitis, pseudotumor cerebri, bony changes, clot formation/stroke/heart attack.  Patient understands that alcohol is contraindicated since it can result in liver toxicity and significantly prolong the elimination of the drug by many years.
Bactrim Pregnancy And Lactation Text: This medication is Pregnancy Category D and is known to cause fetal risk.  It is also excreted in breast milk.
Ketoconazole Counseling:   Patient counseled regarding improving absorption with orange juice.  Adverse effects include but are not limited to breast enlargement, headache, diarrhea, nausea, upset stomach, liver function test abnormalities, taste disturbance, and stomach pain.  There is a rare possibility of liver failure that can occur when taking ketoconazole. The patient understands that monitoring of LFTs may be required, especially at baseline. The patient verbalized understanding of the proper use and possible adverse effects of ketoconazole.  All of the patient's questions and concerns were addressed.
Opzelura Counseling:  I discussed with the patient the risks of Opzelura including but not limited to nasopharngitis, bronchitis, ear infection, eosinophila, hives, diarrhea, folliculitis, tonsillitis, and rhinorrhea.  Taken orally, this medication has been linked to serious infections; higher rate of mortality; malignancy and lymphoproliferative disorders; major adverse cardiovascular events; thrombosis; thrombocytopenia, anemia, and neutropenia; and lipid elevations.
Niacinamide Pregnancy And Lactation Text: These medications are considered safe during pregnancy.
Ilumya Counseling: I discussed with the patient the risks of tildrakizumab including but not limited to immunosuppression, malignancy, posterior leukoencephalopathy syndrome, and serious infections.  The patient understands that monitoring is required including a PPD at baseline and must alert us or the primary physician if symptoms of infection or other concerning signs are noted.
Winlevi Counseling:  I discussed with the patient the risks of topical clascoterone including but not limited to erythema, scaling, itching, and stinging. Patient voiced their understanding.
Cantharidin Pregnancy And Lactation Text: This medication has not been proven safe during pregnancy. It is unknown if this medication is excreted in breast milk.
Cimzia Pregnancy And Lactation Text: This medication crosses the placenta but can be considered safe in certain situations. Cimzia may be excreted in breast milk.
Aklief counseling:  Patient advised to apply a pea-sized amount only at bedtime and wait 30 minutes after washing their face before applying.  If too drying, patient may add a non-comedogenic moisturizer.  The most commonly reported side effects including irritation, redness, scaling, dryness, stinging, burning, itching, and increased risk of sunburn.  The patient verbalized understanding of the proper use and possible adverse effects of retinoids.  All of the patient's questions and concerns were addressed.
Methotrexate Pregnancy And Lactation Text: This medication is Pregnancy Category X and is known to cause fetal harm. This medication is excreted in breast milk.
Opioid Pregnancy And Lactation Text: These medications can lead to premature delivery and should be avoided during pregnancy. These medications are also present in breast milk in small amounts.
Aklief Pregnancy And Lactation Text: It is unknown if this medication is safe to use during pregnancy.  It is unknown if this medication is excreted in breast milk.  Breastfeeding women should use the topical cream on the smallest area of the skin for the shortest time needed while breastfeeding.  Do not apply to nipple and areola.
Prednisone Counseling:  I discussed with the patient the risks of prolonged use of prednisone including but not limited to weight gain, insomnia, osteoporosis, mood changes, diabetes, susceptibility to infection, glaucoma and high blood pressure.  In cases where prednisone use is prolonged, patients should be monitored with blood pressure checks, serum glucose levels and an eye exam.  Additionally, the patient may need to be placed on GI prophylaxis, PCP prophylaxis, and calcium and vitamin D supplementation and/or a bisphosphonate.  The patient verbalized understanding of the proper use and the possible adverse effects of prednisone.  All of the patient's questions and concerns were addressed.
Opzelura Pregnancy And Lactation Text: There is insufficient data to evaluate drug-associated risk for major birth defects, miscarriage, or other adverse maternal or fetal outcomes.  There is a pregnancy registry that monitors pregnancy outcomes in pregnant persons exposed to the medication during pregnancy.  It is unknown if this medication is excreted in breast milk.  Do not breastfeed during treatment and for about 4 weeks after the last dose.
Skyrizi Counseling: I discussed with the patient the risks of risankizumab-rzaa including but not limited to immunosuppression, and serious infections.  The patient understands that monitoring is required including a PPD at baseline and must alert us or the primary physician if symptoms of infection or other concerning signs are noted.
Winlevi Pregnancy And Lactation Text: This medication is considered safe during pregnancy and breastfeeding.
Nsaids Counseling: NSAID Counseling: I discussed with the patient that NSAIDs should be taken with food. Prolonged use of NSAIDs can result in the development of stomach ulcers.  Patient advised to stop taking NSAIDs if abdominal pain occurs.  The patient verbalized understanding of the proper use and possible adverse effects of NSAIDs.  All of the patient's questions and concerns were addressed.
5-Fu Counseling: 5-Fluorouracil Counseling:  I discussed with the patient the risks of 5-fluorouracil including but not limited to erythema, scaling, itching, weeping, crusting, and pain.
Azathioprine Counseling:  I discussed with the patient the risks of azathioprine including but not limited to myelosuppression, immunosuppression, hepatotoxicity, lymphoma, and infections.  The patient understands that monitoring is required including baseline LFTs, Creatinine, possible TPMP genotyping and weekly CBCs for the first month and then every 2 weeks thereafter.  The patient verbalized understanding of the proper use and possible adverse effects of azathioprine.  All of the patient's questions and concerns were addressed.
Xolair Pregnancy And Lactation Text: This medication is Pregnancy Category B and is considered safe during pregnancy. This medication is excreted in breast milk.
Hydroxyzine Pregnancy And Lactation Text: This medication is not safe during pregnancy and should not be taken. It is also excreted in breast milk and breast feeding isn't recommended.
Solaraze Counseling:  I discussed with the patient the risks of Solaraze including but not limited to erythema, scaling, itching, weeping, crusting, and pain.
Olumiant Counseling: I discussed with the patient the risks of Olumiant therapy including but not limited to upper respiratory tract infections, shingles, cold sores, and nausea. Live vaccines should be avoided.  This medication has been linked to serious infections; higher rate of mortality; malignancy and lymphoproliferative disorders; major adverse cardiovascular events; thrombosis; gastrointestinal perforations; neutropenia; lymphopenia; anemia; liver enzyme elevations; and lipid elevations.
Solaraze Pregnancy And Lactation Text: This medication is Pregnancy Category B and is considered safe. There is some data to suggest avoiding during the third trimester. It is unknown if this medication is excreted in breast milk.
Cephalexin Counseling: I counseled the patient regarding use of cephalexin as an antibiotic for prophylactic and/or therapeutic purposes. Cephalexin (commonly prescribed under brand name Keflex) is a cephalosporin antibiotic which is active against numerous classes of bacteria, including most skin bacteria. Side effects may include nausea, diarrhea, gastrointestinal upset, rash, hives, yeast infections, and in rare cases, hepatitis, kidney disease, seizures, fever, confusion, neurologic symptoms, and others. Patients with severe allergies to penicillin medications are cautioned that there is about a 10% incidence of cross-reactivity with cephalosporins. When possible, patients with penicillin allergies should use alternatives to cephalosporins for antibiotic therapy.
Dutasteride Pregnancy And Lactation Text: This medication is absolutely contraindicated in women, especially during pregnancy and breast feeding. Feminization of male fetuses is possible if taking while pregnant.
Imiquimod Counseling:  I discussed with the patient the risks of imiquimod including but not limited to erythema, scaling, itching, weeping, crusting, and pain.  Patient understands that the inflammatory response to imiquimod is variable from person to person and was educated regarded proper titration schedule.  If flu-like symptoms develop, patient knows to discontinue the medication and contact us.
Topical Metronidazole Counseling: Metronidazole is a topical antibiotic medication. You may experience burning, stinging, redness, or allergic reactions.  Please call our office if you develop any problems from using this medication.
Ketoconazole Pregnancy And Lactation Text: This medication is Pregnancy Category C and it isn't know if it is safe during pregnancy. It is also excreted in breast milk and breast feeding isn't recommended.
Olumiant Pregnancy And Lactation Text: Based on animal studies, Olumiant may cause embryo-fetal harm when administered to pregnant women.  The medication should not be used in pregnancy.  Breastfeeding is not recommended during treatment.
Soolantra Pregnancy And Lactation Text: This medication is Pregnancy Category C. This medication is considered safe during breast feeding.
Arava Counseling:  Patient counseled regarding adverse effects of Arava including but not limited to nausea, vomiting, abnormalities in liver function tests. Patients may develop mouth sores, rash, diarrhea, and abnormalities in blood counts. The patient understands that monitoring is required including LFTs and blood counts.  There is a rare possibility of scarring of the liver and lung problems that can occur when taking methotrexate. Persistent nausea, loss of appetite, pale stools, dark urine, cough, and shortness of breath should be reported immediately. Patient advised to discontinue Arava treatment and consult with a physician prior to attempting conception. The patient will have to undergo a treatment to eliminate Arava from the body prior to conception.
Cosentyx Counseling:  I discussed with the patient the risks of Cosentyx including but not limited to worsening of Crohn's disease, immunosuppression, allergic reactions and infections.  The patient understands that monitoring is required including a PPD at baseline and must alert us or the primary physician if symptoms of infection or other concerning signs are noted.
Acitretin Pregnancy And Lactation Text: This medication is Pregnancy Category X and should not be given to women who are pregnant or may become pregnant in the future. This medication is excreted in breast milk.
Tetracycline Counseling: Patient counseled regarding possible photosensitivity and increased risk for sunburn.  Patient instructed to avoid sunlight, if possible.  When exposed to sunlight, patients should wear protective clothing, sunglasses, and sunscreen.  The patient was instructed to call the office immediately if the following severe adverse effects occur:  hearing changes, easy bruising/bleeding, severe headache, or vision changes.  The patient verbalized understanding of the proper use and possible adverse effects of tetracycline.  All of the patient's questions and concerns were addressed. Patient understands to avoid pregnancy while on therapy due to potential birth defects.
Metronidazole Pregnancy And Lactation Text: This medication is Pregnancy Category B and considered safe during pregnancy.  It is also excreted in breast milk.
VTAMA Counseling: I discussed with the patient that VTAMA is not for use in the eyes, mouth or mouth. They should call the office if they develop any signs of allergic reactions to VTAMA. The patient verbalized understanding of the proper use and possible adverse effects of VTAMA.  All of the patient's questions and concerns were addressed.
Azelaic Acid Counseling: Patient counseled that medicine may cause skin irritation and to avoid applying near the eyes.  In the event of skin irritation, the patient was advised to reduce the amount of the drug applied or use it less frequently.   The patient verbalized understanding of the proper use and possible adverse effects of azelaic acid.  All of the patient's questions and concerns were addressed.
Erivedge Counseling- I discussed with the patient the risks of Erivedge including but not limited to nausea, vomiting, diarrhea, constipation, weight loss, changes in the sense of taste, decreased appetite, muscle spasms, and hair loss.  The patient verbalized understanding of the proper use and possible adverse effects of Erivedge.  All of the patient's questions and concerns were addressed.
Picato Counseling:  I discussed with the patient the risks of Picato including but not limited to erythema, scaling, itching, weeping, crusting, and pain.
Nsaids Pregnancy And Lactation Text: These medications are considered safe up to 30 weeks gestation. It is excreted in breast milk.
Albendazole Counseling:  I discussed with the patient the risks of albendazole including but not limited to cytopenia, kidney damage, nausea/vomiting and severe allergy.  The patient understands that this medication is being used in an off-label manner.
Infliximab Counseling:  I discussed with the patient the risks of infliximab including but not limited to myelosuppression, immunosuppression, autoimmune hepatitis, demyelinating diseases, lymphoma, and serious infections.  The patient understands that monitoring is required including a PPD at baseline and must alert us or the primary physician if symptoms of infection or other concerning signs are noted.
Cephalexin Pregnancy And Lactation Text: This medication is Pregnancy Category B and considered safe during pregnancy.  It is also excreted in breast milk but can be used safely for shorter doses.
Terbinafine Counseling: Patient counseling regarding adverse effects of terbinafine including but not limited to headache, diarrhea, rash, upset stomach, liver function test abnormalities, itching, taste/smell disturbance, nausea, abdominal pain, and flatulence.  There is a rare possibility of liver failure that can occur when taking terbinafine.  The patient understands that a baseline LFT and kidney function test may be required. The patient verbalized understanding of the proper use and possible adverse effects of terbinafine.  All of the patient's questions and concerns were addressed.
Drysol Counseling:  I discussed with the patient the risks of drysol/aluminum chloride including but not limited to skin rash, itching, irritation, burning.
Rinvoq Counseling: I discussed with the patient the risks of Rinvoq therapy including but not limited to upper respiratory tract infections, shingles, cold sores, bronchitis, nausea, cough, fever, acne, and headache. Live vaccines should be avoided.  This medication has been linked to serious infections; higher rate of mortality; malignancy and lymphoproliferative disorders; major adverse cardiovascular events; thrombosis; thrombocytopenia, anemia, and neutropenia; lipid elevations; liver enzyme elevations; and gastrointestinal perforations.
Minocycline Counseling: Patient advised regarding possible photosensitivity and discoloration of the teeth, skin, lips, tongue and gums.  Patient instructed to avoid sunlight, if possible.  When exposed to sunlight, patients should wear protective clothing, sunglasses, and sunscreen.  The patient was instructed to call the office immediately if the following severe adverse effects occur:  hearing changes, easy bruising/bleeding, severe headache, or vision changes.  The patient verbalized understanding of the proper use and possible adverse effects of minocycline.  All of the patient's questions and concerns were addressed.
Topical Retinoid counseling:  Patient advised to apply a pea-sized amount only at bedtime and wait 30 minutes after washing their face before applying.  If too drying, patient may add a non-comedogenic moisturizer. The patient verbalized understanding of the proper use and possible adverse effects of retinoids.  All of the patient's questions and concerns were addressed.
Finasteride Male Counseling: Finasteride Counseling:  I discussed with the patient the risks of use of finasteride including but not limited to decreased libido, decreased ejaculate volume, gynecomastia, and depression. Women should not handle medication.  All of the patient's questions and concerns were addressed.
Glycopyrrolate Counseling:  I discussed with the patient the risks of glycopyrrolate including but not limited to skin rash, drowsiness, dry mouth, difficulty urinating, and blurred vision.
Soolantra Counseling: I discussed with the patients the risks of topial Soolantra. This is a medicine which decreases the number of mites and inflammation in the skin. You experience burning, stinging, eye irritation or allergic reactions.  Please call our office if you develop any problems from using this medication.
Propranolol Counseling:  I discussed with the patient the risks of propranolol including but not limited to low heart rate, low blood pressure, low blood sugar, restlessness and increased cold sensitivity. They should call the office if they experience any of these side effects.
Topical Metronidazole Pregnancy And Lactation Text: This medication is Pregnancy Category B and considered safe during pregnancy.  It is also considered safe to use while breastfeeding.
Bexarotene Counseling:  I discussed with the patient the risks of bexarotene including but not limited to hair loss, dry lips/skin/eyes, liver abnormalities, hyperlipidemia, pancreatitis, depression/suicidal ideation, photosensitivity, drug rash/allergic reactions, hypothyroidism, anemia, leukopenia, infection, cataracts, and teratogenicity.  Patient understands that they will need regular blood tests to check lipid profile, liver function tests, white blood cell count, thyroid function tests and pregnancy test if applicable.
Cellcept Counseling:  I discussed with the patient the risks of mycophenolate mofetil including but not limited to infection/immunosuppression, GI upset, hypokalemia, hypercholesterolemia, bone marrow suppression, lymphoproliferative disorders, malignancy, GI ulceration/bleed/perforation, colitis, interstitial lung disease, kidney failure, progressive multifocal leukoencephalopathy, and birth defects.  The patient understands that monitoring is required including a baseline creatinine and regular CBC testing. In addition, patient must alert us immediately if symptoms of infection or other concerning signs are noted.

## 2024-01-16 ENCOUNTER — OFFICE VISIT (OUTPATIENT)
Dept: URBAN - METROPOLITAN AREA CLINIC 86 | Facility: CLINIC | Age: 64
End: 2024-01-16

## 2024-01-16 RX ORDER — POTASSIUM CHLORIDE 1500 MG/1
2 TABLETS WITH FOOD TABLET, FILM COATED, EXTENDED RELEASE ORAL ONCE A DAY
Refills: 0 | Status: ACTIVE | COMMUNITY
Start: 1900-01-01

## 2024-01-16 RX ORDER — VORTIOXETINE 20 MG/1
TAKE 1 TABLET (20 MG) BY ORAL ROUTE ONCE DAILY AT THE SAME TIME EACH DAY TABLET, FILM COATED ORAL ONCE A DAY
Refills: 0 | Status: ACTIVE | COMMUNITY
Start: 1900-01-01

## 2024-01-16 RX ORDER — DEXTROSE 4 G
1 TABLET TABLET,CHEWABLE ORAL ONCE A DAY
Status: ACTIVE | COMMUNITY

## 2024-01-16 RX ORDER — OMEPRAZOLE 40 MG/1
TAKE 1 CAPSULE BY MOUTH TWICE A DAY BEFORE A MEAL CAPSULE, DELAYED RELEASE ORAL
Qty: 180 CAPSULE | Refills: 0 | Status: ACTIVE | COMMUNITY

## 2024-01-16 RX ORDER — NADOLOL 40 MG/1
1 TABLET TABLET ORAL TWICE A DAY
Status: ACTIVE | COMMUNITY

## 2024-01-16 RX ORDER — MIDODRINE HYDROCHLORIDE 10 MG/1
1 TABLET TABLET ORAL THREE TIMES A DAY
Status: ACTIVE | COMMUNITY

## 2024-01-16 RX ORDER — ASPIRIN 81 MG/1
1 TABLET TABLET, COATED ORAL ONCE A DAY
Status: ACTIVE | COMMUNITY

## 2024-01-16 RX ORDER — SEMAGLUTIDE 0.68 MG/ML
AS DIRECTED INJECTION, SOLUTION SUBCUTANEOUS
Status: ACTIVE | COMMUNITY

## 2024-01-16 RX ORDER — LACTULOSE 10 G/15ML
30 ML SOLUTION ORAL ONCE A DAY
Status: ACTIVE | COMMUNITY

## 2024-01-16 RX ORDER — GABAPENTIN 300 MG/1
1 CAPSULE CAPSULE ORAL THREE TIMES A DAY
Status: ACTIVE | COMMUNITY

## 2024-01-16 RX ORDER — BUMETANIDE 0.5 MG/1
4 (2MG) TABLET ORAL ONCE A DAY
Status: ACTIVE | COMMUNITY

## 2024-01-16 RX ORDER — RIFAXIMIN 550 MG/1
1 TABLET TABLET ORAL TWICE A DAY
Qty: 60 TABLET | Refills: 5 | Status: ACTIVE | COMMUNITY

## 2024-01-16 NOTE — HPI-TODAY'S VISIT:
Pt is 63 F last seen October 2023 by Dr. Gonzales with hx baeza and cirrhosis and smv/pv clot, hx varices, hx pse, march 2022 urosepsis/sbp and now here for ongoing care and eval.  A copy of the note was sent to the referring provider.  1/16/24 office visit  11/16/23 MRI EXAM: MR ABDOMEN W AND WO CONTRAST&nbsp;CLINICAL INDICATION: HCC Screening. BAEZA cirrhosis, recurrent ascites?that underwent creation of a transjugular intrahepatic portosystemic shunt (TIPS) and splenic vein stent placement on 8/8/23. TECHNIQUE: Multisequence, multiplanar MRI of the abdomen was performed without and with intravenous contrast. ESRC.2.7.3;COMPARISON: MRI 12/14/22 and CT 7/31/2023 and CTA 3/29/2023&nbsp;FINDINGS: Lower Thorax: Small left pleural effusion. Unchanged left lower lobe subsegmental atelectasis Liver: No fat or iron. Morphologic changes of chronic liver disease including nodular surface contour, lobar redistribution and fissural widening. No suspicious hepatic lesions.  Gallbladder/Biliary Tree: Mildly prominent bile ducts secondary to prior cholecystectomy. Spleen: Enlarged measuring up to 17.4 cm in CC dimension. Pancreas: Normal. Adrenal Glands: Normal.  Kidneys/Ureters: Renal cysts. No hydronephrosis. Gastrointestinal: No bowel obstruction.  Lymph Nodes: Marked interval improvement in previously seen extensive inflammatory soft tissue with enhancement in the retroperitoneum surrounding the abdominal aorta and branch vessels. Small subcentimeter in short axis retroperitoneal lymph nodes remain (13:55). Vessels: Grossly patent TIPS extends from the middle hepatic vein to the main portal vein. 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 Chronic occlusion of the SMV with reconstitution distally in the branch vessels of the SMV (13:61). Multiple collateral vasculature in the periportal region, central mesentery and the anterior abdomen. Redemonstrated partially thrombosed portosystemic shunt from the splenic vein (13:53) extending inferiorly to the pelvis, better seen on prior CTA from 3/29/2023. Upper abdominal perigastric and perisplenic varices. Replaced right hepatic artery arising from the superior mesenteric artery. Peritoneum/Retroperitoneum: Improved retroperitoneal/mesenteric root edema and enhancing soft tissue. Small right retroperitoneal nodule (13:56) measuring 1 cm, previously 2.1 x 1.9 cm. Bones/Soft Tissues: No suspicious osseous lesion. IMPRESSION:1.  No suspicious focal hepatic lesion.2.  Morphologic changes of chronic liver disease with stigmata of portal hypertension including splenomegaly and extensive upper abdominal varices.3.  Patent TIPS. Redemonstrated chronic thrombosis of the main portal vein and SMV with near occlusive thrombus of the large portosystemic venous shunt from the splenic vein extending to the pelvis. Extensive upper abdominal varices and collateral vasculature as detailed above.4.  Marked interval improvement in retroperitoneal enhancing soft tissue and lymphadenopathy, seen previously on 6/2/2023 suggestive of resolving nonspecific inflammatory process.The images were reviewed and interpreted by Hussain Casanova MD.  Has fu TIPs US to check patency next month  recap dear [place],   October 13 labs show glucose elevated at 272 and possibly you were not fasting that day?  Please share with primary provider to continue to work on this issue.  BUN 11 creatinine 0.83 sodium 140 potassium 4.4 chloride 101 CO2 28 calcium 9.3 with albumin slightly diminished at 3.6 and previous given 3.7.  Bilirubin was slightly higher at 3.2 but not fractionated.  Alk phos normal at 115 AST down to 34 from 52 and ALT 16 down from 26.  Ideal ALT less than 25.  WBC 5.7 hemoglobin 13.3 platelet  count was slightly higher for you at 52 from 39 before.  RDW was slightly up at 15.8.  Neutrophils normal at 4.6 and lymphocytes slightly low at 0.6 but this has been chronically seen and you before.  Please share with primary provider.  INR 1.4. Meld remains right at 15/15 and mainly driven by the elevated bilirubin. Please share labs with local providers.   No clear lab reason seen to decrease the diuretics for now. Await the mri. Dr Gonzales  Pt did the TIPS through [facility] and that was done Aug 2023. She was done and came home same day.  She says she has seen Dr Barrios and he has CT of abd and he wanted to do mri. She says not switched for this.  Sept 18 2023 U.s: EXAM: US ABDOMEN COMPLETE, US ABDOMEN DOPPLER COMPLETE&nbsp;CLINICAL INDICATION: TIPS.&nbsp;TECHNIQUE: Grayscale, pulsed wave and color Doppler sonography of the upper abdomen were performed. ESRC.3.7.1&nbsp;COMPARISON: CT 7/31/2023.&nbsp;FINDINGS: Liver: Coarsened echogenicity with a nodular surface contour compatible with morphologic changes of chronic liver disease. No lesions.&nbsp;Bile Ducts: No dilated intrahepatic biliary radicles. Common duct measures 4 mm.&nbsp;Gallbladder: Status post cholecystectomy. &nbsp;Pancreas: Obscured by overlying structures.&nbsp;Right Kidney: Measures 10.3 cm. Normal echogenicity. No hydronephrosis.&nbsp;Left Kidney: Measures 11.7 cm. Normal echogenicity. No hydronephrosis.&nbsp;Spleen: Measures 17.1 cm.&nbsp;Aorta: Normal caliber where imaged.&nbsp;IVC: Normal proximally, not imaged distally. &nbsp;Hepatic Veins: Normal.&nbsp;Portal Veins: Hepatopetal flow. 85-93 cm/s.&nbsp;Hepatic Arteries: Peak systolic velocity 48 cm/s. Resistive index 0.67. Normal waveform.&nbsp;TIPS: Stent is patent. Hepatopetal flow at the portal venous end and hepatofugal flow at the hepatic venous end. Velocity in the portal end 130-156 cm/s. Velocity at mid stent 143-162 cm/s. Velocity at the hepatic end 108-134 cm/s.&nbsp;Other: Trace perihepatic free fluid.&nbsp;IMPRESSION:1.  Patent TIPS.2.  Morphologic changes of chronic liver disease with mild sequelae of portal hypertension including splenomegaly and trace perihepatic free fluid.&nbsp;  July 2023 Ct did pre tips: EXAM: CT ABDOMEN W IV CONTRAST&nbsp;CLINICAL INDICATION: Portal hypertension.&nbsp;TECHNIQUE: Following administration of non-ionic IV contrast, postcontrast images through the abdomen were obtained. If applicable, point-of-care testing was approved following departmental protocol. ESRC.1.7.1 &nbsp;COMPARISON: CT abdomen/pelvis 3/29/2023, MRI abdomen 6/2/2023.&nbsp;FINDINGS: Lower Thorax: Trace left pleural effusion and left basilar atelectasis.&nbsp;Liver: Morphologic changes of chronic liver disease including nodular surface contour, lobar redistribution and fissural widening. No suspicious focal lesion.&nbsp;Gallbladder/Biliary Tree: Status post cholecystectomy. No biliary ductal dilatation.&nbsp;Spleen: Enlarged measuring up to 19 cm in craniocaudal dimension.&nbsp;Pancreas: Normal.&nbsp;Adrenal Glands: Left adrenal gland difficult to visualize. Normal right adrenal gland. &nbsp;Kidneys/Ureters: Renal cysts. No hydronephrosis.&nbsp;Gastrointestinal: No bowel obstruction or inflammation. &nbsp;Lymph Nodes: Unchanged subcentimeter in short axis jaelyn hepatis and retroperitoneal lymph nodes, likely reactive.&nbsp;Vessels: Diminutive main and central portal veins which otherwise appear patent. Redemonstrated chronic occlusion of the portosplenic venous confluence involving the portal vein extending into the superior mesenteric vein. Multiple upper abdominal paraesophageal, perigastric and perisplenic varices. Additional extensive collateral vasculature in the anterior abdomen. Findings are similar to recent MRI from 6/2/2023. Redemonstrated thrombosed portosystemic shunt arising from the splenic vein and extending inferiorly into the pelvis (6:30) and similar to 6/2/2023 MRI. Replaced right hepatic artery arising from the SMA.&nbsp;Peritoneum/Retroperitoneum: Moderate volume abdominopelvic free fluid.&nbsp;Bones/Soft Tissues: No aggressive osseous lesion. Grade 1 anterolisthesis L4 relative to L5.&nbsp;IMPRESSION:1.  Morphologic changes of chronic liver disease with sequelae of portal hypertension including splenomegaly, moderate volume ascites and extensive varices.2.  Unchanged chronic occlusion at the portosplenic venous confluence involving the portal vein extending into the superior mesenteric vein. Remainder of the main portal vein and central intrahepatic portal veins are diminutive in caliber but patent.3.  Unchanged extensive abdominal varices and a thrombosed portosystemic shunt arising from the splenic vein.4.  Small left pleural effusion.  IR TIPS: Augh 2023 Findings: &nbsp;1.  Right internal jugular vein is ultrasonographically patent and compresses.  Needle entry was documented.2.  Right hepatic venogram confirms vein patency.3.  Portogram demonstrates small main portal vein and small intrahepatic branches with prominent collaterals, with dilated and tortuous coronary and splenic veins.4.  Splenic venography demonstrates large tortuous main splenic vein and collaterals, with stenosis near the portal confluence.5.  Splenic venography after splenic vein stent placement demonstrates improved flow across area of stenosis.6.  The initial portosystemic gradient is 22 mmHg.7.  Post-TIPSS portosystemic gradient is 9 mmHg.8.  The TIPSS has a smooth course and extends from the middle hepatic vein to the main portal vein.9.  There is good flow through the shunt at the conclusion of the procedure with no appreciable filling of varices.10.  4.8 liters of fluid were drained during the procedure.11.  New right internal jugular approach triple-lumen catheter terminates at the superior cavoatrial junction.&nbsp;IMPRESSION:Impression: &nbsp;1.  Successful creation of TIPS from middle hepatic vein to main portal vein with a 10 mm x 6 cm x 2 cm controlled-expansion Viatorr stent (ballooned to 10 mm). Splenic vein stenosis improved after Abre 10mm x 8cm stent placement.2.  The portosystemic gradient was reduced from 22 mm Hg to 9 mmHg.  Since the tips no bleeding and no more taps.  Need to reach to Dr Barrios re the issue of her needing the mri and not  the ct.  Sept 18 2023 pt 17 and inr 1.5 and  wbc 3.9 and hg 12.7 and plat 50 and mcv 86.6 and na 140 and k 4.0 and cl 105 and co2 30 and ca 9.1 and bun 9 and cr 0.7 and glu 140 and alb 2.9 and alk 132 and ast 33 and alt 14.  tb 2.5.  Meld 14 and meld na 14.  Has been feeling better and doing better in from 22 to 14.   She lost like 40 pounds with the tips.  June 5 MRI was updated by radiology today. When they compared to multiple priors dating back to 2018, the described retroperitoneal/mesenteric root edema and perivascular infiltrative soft tissue demonstrates avid enhancement with no restricted diffusion.  The degree of enhancement has progressed as well as the density infiltrative soft tissue compared to multiple priors. Retroperitoneal lymph nodes were also increased in number but not pathologically enlarged.  There was no evident medialization of the ureters.  They said that they findings are concerning for retroperitoneal fibrotic or inflammatory process. It appears that you were seen on June 20 by the transplant team/Dr Barrios and we will forward them this MRI correction so that they can be aware of this.  We will forward it to him via [employer]  to be aware of as he just saw you.   The 6/2/23 MR elastography was sent to me after lunch today. As you recall it was not available for viewing during the visit today. I called and left you a voicemail about this.  Lower thorax with unchanged small left pleural effusion.  Liver with subjective evidence of fat deposition.  Cirrhotic morphology.  No suspicious lesions.  Elastography donel after a weighted mean of 3 measurements showing 3.2K PA consistent with stage I-II fibrosis.  The fat quantification 12.5% consistent with mild fatty liver.  There is no significant iron deposition.  They did not see any biliary ductal dilatation.  Splenomegaly measuring 20 cm was seen.  The pancreas normal, adrenal glands normal.  They saw a right upper renal cyst.  Please make sure the primary care provider is aware.  They saw reactive lymph nodes.  They thought that the aorta was normal.  The superior mesenteric and inferior mesenteric along with the bilateral renal arteries were patent.  They noted the replaced right hepatic artery arising from the superior mesenteric artery.  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  They noted that there has been interval progression of the degree of thrombosis of the portosystemic shunt with near complete occlusive thrombus.  They noted additional varices and this is why we have to stay on top of the endoscopic use.  They saw mesenteric edema unchanged from the previous CT.  Moderate ascites.  Overall they noted cirrhosis and did not see any suspicious lesions but they did note that there was progression of a now near occlusive thrombus of a large portosystemic venous shunt from the splenic vein to the left femoral vein.  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 As you recall we were doing this to check on the clot and to ensure there were no lesions. It is good to confirm no lesions. Need to be sure you see the transplant team back as discussed.    6/5/23  SHe did the mri on friday but interestingly it has not been read yet. Checked epic yesterday and today for this. Will follow up with the pending result.  She had the repeat colon.  She is having a paracentesis every 6 days or so, 4 L removed she is doing the low salt diet and limiting fluids. DIscussed the risk of recurrrent paracentesis and needs to see oltx back on this and see if options. HE symtpoms controlled on xifaxan and miralax as seh does not tolerate the lactulose She has not schedlued with dr. barrios as discussed. inr 1.43, mcv 86, hgb 11.1, platelets 125, bili 2.5, ast 36, alt 18, alk phos 101 na 136, cr 0.8 MELD 14, meld na 15 4/27/23 [facility] follow up and recurrent ascites. She hospitalized patient recently at [facility].  She was in route to [place] per the notes but had EKG abnormalities and they redirected her to [place].  She had gained 19 pounds in the last several days.  Recent paracentesis on 419 with 4 L removed.  She was also having hematochezia and reviewed the chart and had recent rectal clips done and those need a re look and hospital noted she did not want to do there as she does things at [facility]  CT scan done on 423  Cirrhotic morphology of the liver with this extensive paraesophageal and abdominal varices and splenomegaly. no lesions  April 24 paracentesis showing that there was 4 L removed.  April 24 labs with hemoglobin 9.8, platelets 47, INR 1.6.  Sodium 131, potassium 3.1, bilirubin 1.9, alkaline phosphatase 80, ALT 17, AST 37. cr .7  Per epic she has been pending an echo and rescheduled multiple times due to transportation issues. She was pending mri recently but not done  Discussed the ascites and need to see where from and ct not showing clot issue and need mri to be sure but also need that echo need to order paracentesis now fo rher. she has not been limited flluids, salt and she will start.  She needs to be reseen by oltx and we will refer her We will update and labs and she will need to see GI re the hematochezia given recent clips done.  Dr. Gonzales saw her as well and reminded her if acute then do care where she can. Also discussed with her the salt and needing to see oltx again.   Dr. Pepito Haile [facility].[address] [address]  ~34.1 mi [phone]  We need to know what he is giving her so that we can say what we expect to be the case.  Depending on what he is giving her.  No recent labs and no recent imaging.  June 8, 2022 MRI showed chronic trace left pleural effusion with associated atelectasis in the lower thorax views.  There was no significant fat or iron but with nodular contour and cirrhotic morphology.  No suspicious lesions.  Prior cholecystectomy changes.  No duct dilation.  Spleen enlarged 18.4 cm.  Pancreas normal.  Adrenal glands normal.  Normal bilateral renal symmetric enhancement with 6 mm superior pole renal cyst and no suspicious lesions and no hydro.  Mild small and large bowel wall edema likely due to periportal enteropathy/colopathy.  Unchanged chronic fibrosis of portal vein with cavernous transformation reconstituted left portal vein.  Redemonstrated gastroesophageal perisplenic varices.  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  Only trace perihepatic and perisplenic ascites.  June 7, 2022 visit with Dr. Flores mentions that the patient is status post TAVR with recent echo showing EF 55% of left ventricular outflow VTI of 21.  Chronic diastolic heart failure noted.  TAVR was on January 23, 2020.  She has not followed with Dr Flores and sees local cards for the tavr.  June 1, 2022 echo showed EF 50 to 55% aortic valve normal functioning bioprosthetic valve.  Aortic valve max velocity showed a mean gradient of 11.0 and a peak gradient 24.4.  June 8, 2022 labs show sodium 135 potassium 3.9 glucose 297 BUN of 10 creatinine 0.75 total bilirubin 1.6 calcium 9.1 AST 36 ALT 18 alk phos 106 hemoglobin A1c up at 7.5 AFP 4 W count 3.2 hemoglobin 12.3 plate count 47 INR 1.35.  HIV test negative at that time.  Transplant team sees her every 6m and she sees the team there. She is told is stable and looks good and meld was 14.  She says told re mounjaro.  April 29th 2022 labs stable ammonia level normal at 61. INR normal at 1.2.   Sugar was elevated at 173.  Please share with primary provider. BUN of 13 creatinine 0.69 sodium 138 potassium 4.4 albumin normal at 3.8.  Bilirubin 1.2 alkaline phosphatase 112. Only the AST was elevated at 46 and ALT was ideal at 25. White blood cell count 3.7 hemoglobin 12.4 and MCV 87 and these are normal.  Platelet count low at 54.  Neutrophils normal at 2.8 and lymphocytes slightly low at 0.5. Meld down to 9 and meld na 9. Suggests that the infection is what tipped your labs recently  to be worse. So really need to focus on trying to prevent those recurrent urinary infections as worsens your underlying liver state. Please ask local ID to contact us to discuss the recent infection and options.   Saw the notes and had urosepsis. Had 15750 cells in the ascites.  Had 1 tap in the hospital. had the infection.  Prior tap in [place] had no infection and she had been started on abx once a month. 2500 ml.  20-30 wbc, nos.  90% of pts with sbp if the infection will be reinfected in a year.  Once a month is not a usual tx for sbp prevention. Daily or once a week. Dr Lopez is her id and they need to look at this new data and need to adjust tx.  March 30, 2022 labs show sodium 137 potassium 3.6 BUN of 10 creatinine 0.68 glucose 156 total bilirubin 1.3 AST 25 ALT 14 alk phos 83 calcium 8.7 labs count 3.3 hemoglobin 12 platelet count 66.  March 28, 2022 CT abdomen and pelvis with IV contrast revealed left-sided pleural effusion with some atelectasis.  Cardiophrenic enhancing node measuring 1.1 x 1.8 cm slightly increased in size.  Liver with chronic liver disease.  Prior cholecystectomy no bile duct dilation.  Splenomegaly seen.  Pancreas normal.  No hydronephrosis but with left nonobstructing renal stone.  Diffuse colon wall thickening and edema with surrounding stranding.  Prominent retroperitoneal mesenteric lymph nodes.  Large varices seen on the vessel exam with focal occlusion of the SMV/portal vein which appears similar to prior study.  Moderate continuation of the right portal vein.  Mild increase cannot exclude thrombus within the splenic venous collaterals in the left upper quadrant.  March 28, 2022 paracentesis removed 2600 mL of fluid.  November 2021 MRI liver with no fat or iron cirrhotic liver in order to enhancing lesions.  Spleen 19 cm.  Prior cholecystectomy changes seen.  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  Multiple large collateral pathways including gastroesophageal varices.  Replaced right hepatic artery seen.  Ascites may have been primary vs uti.  She has been seen by urogyn for the recurrent uti.   On abx once a month not for prevention but rather infection every month and so that makes more sense.  Covid 19 Jan 2022 and admitted locally for sepsis.  June 8, 2022 patient has appointment for labs and another MRI and in July 20 9022 will be seen by the  oltx transplant nurse practitioner at [facility].   April 8, 2022 transplant clinic visit mentions that the patient has Baeza and had a recent hospitalization from March 20 7 March 21st for urosepsis.  Drained 2.6 L and prior to that had not been drained since February 2022.  Taking Bumex 2 mg twice daily and Aldactone 100 mg a day.  Meld increased during the hospital but was down to 14 by March 27, 2022.  Bili decreased to 1.3 on March 30.  Recap: Patient with history of chronic SMV/portal vein occlusion.  EGD January 2021 status post band ligation.  No significant encephalopathy on Xifaxan.  Current medicines include omeprazole 2 mg a day, aspirin 81 mg a day, Bumex 2 mg a day, vitamin D 1000 units a day, Prolia every 6 months Estrace 2 mg a day, gabapentin 3 mg 3 times daily, NovoLog sliding scale, Tresiba 8 units at night, lactulose 20 g Daily, mesalamine 1 g p.o. twice daily, midodrine 10 mg p.o. 3 times daily, nadolol 40 mg twice daily, potassium 40 mg a day, Xifaxan 550 twice daily, Aldactone on milligrams a day, Trintellix 20 mg a day.  Patient was listed as following up with Dr. Vickers for endoscopy. She says he has been seeing. He also saw dysphagia.  March 27 March 31 discharge summary from [facility].   She was a patient came in with concerns of urosepsis and left pyelonephritis.  Urinalysis high-grade 150 WBCs.  Blood cultures were negative but urine did grow pansensitive Klebsiella.  Patient was seen by ID and was given a course of daptomycin and Levaquin treatment.  They wanted urogynecology to see her. Left renal pole lesion that could be a nidus for recurrent infection.  Cell count on the fluid was 10,520 range  nucleated cells 84% neutrophils though the cultures were negative to date and maintained on antibiotics.  Looked up the actual fluid count from March 28 and had had albumin less than 1.5 total protein was 3.0 nucleated cells 10,520 neutrophils 84%.  Lasted been EGD high-grade fevers or November 17, 2021 with Dr. Vickers where she had grade 1 varices lower esophagus.  Moderate Schatzki ring was seen.  Dilation was seen to 51 [place].  Moderately erythematous mucosa without bleeding seen in the gastric antrum.  H. pylori testing sent.  Biopsy was negative for significant abnormality.  She did egd in nov 2021 and grade 1 varices noted. He is planning nov 2022.  nov 2021 mri: Reason For Exam Hepatocellular carcinoma;Hepatocellular carcinoma  REPORT EXAM: MRI Abdomen w/ + w/o Contrast  CLINICAL INDICATION: Hepatocellular carcinoma  TECHNIQUE: Multisequence, multiplanar MRI of the abdomen was performed without and with intravenous contrast. ESRC.2.7.3  CONTRAST: 18 cc of Prohance  COMPARISON: March 2, 2021  FINDINGS:  Lower Thorax: Normal.  Liver: No fat or iron. Cirrhotic liver morphology. No arterially enhancing lesions. No lesions with capsule. No lesions with washout.  Gallbladder/Biliary Tree: Status post cholecystectomy.  Spleen: Measuring 19.0 cm in maximum craniocaudal diameter.  Pancreas: Normal.  Adrenal Glands: Normal.   Kidneys/Ureters: Normal.  Gastrointestinal: Normal.   Lymph Nodes: Normal.  Vessels: Chronic thrombosis of the main portal vein with cavernous transformation redemonstrated. Multiple large collateral pathways including gastroesophageal varices, again noted. 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  Peritoneum/Retroperitoneum: Minimal ascites is present.  Bones/Soft Tissues: Normal.  IMPRESSION:     Cirrhosis with chronic portal vein thrombosis and cavernous transformation with associated evidence of portal hypertension. No hepatocellular carcinoma. Pt is 63 F last seen June 2023 by Ms Valles with hx baeza and cirrhosis and smv/pv clot, hx varices, hx pse, march 2022 urosepsis/sbp and now here for ongoing care and eval.  A copy of the note was sent to the referring provider.  Dear Lady Carson, June 5 MRI was updated by radiology today. When they compared to multiple priors dating back to 2018, the described retroperitoneal/mesenteric root edema and perivascular infiltrative soft tissue demonstrates avid enhancement with no restricted diffusion.  The degree of enhancement has progressed as well as the density infiltrative soft tissue compared to multiple priors. Retroperitoneal lymph nodes were also increased in number but not pathologically enlarged.  There was no evident medialization of the ureters.  They said that they findings are concerning for retroperitoneal fibrotic or inflammatory process. It appears that you were seen on June 20 by the transplant team/Dr Barrios and we will forward them this MRI correction so that they can be aware of this.  We will forward it to him via [facility]  to be aware of as he just saw you. Dr Gonzales  The 6/2/23 MR elastography was sent to me after lunch today. As you recall it was not available for viewing during the visit today. I called and left you a voicemail about this.  Lower thorax with unchanged small left pleural effusion.  Liver with subjective evidence of fat deposition.  Cirrhotic morphology.  No suspicious lesions.  Elastography donel after a weighted mean of 3 measurements showing 3.2K PA consistent with stage I-II fibrosis.  The fat quantification 12.5% consistent with mild fatty liver.  There is no significant iron deposition.  They did not see any biliary ductal dilatation.  Splenomegaly measuring 20 cm was seen.  The pancreas normal, adrenal glands normal.  They saw a right upper renal cyst.  Please make sure the primary care provider is aware.  They saw reactive lymph nodes.  They thought that the aorta was normal.  The superior mesenteric and inferior mesenteric along with the bilateral renal arteries were patent.  They noted the replaced right hepatic artery arising from the superior mesenteric artery.  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  They noted that there has been interval progression of the degree of thrombosis of the portosystemic shunt with near complete occlusive thrombus.  They noted additional varices and this is why we have to stay on top of the endoscopic use.  They saw mesenteric edema unchanged from the previous CT.  Moderate ascites.  Overall they noted cirrhosis and did not see any suspicious lesions but they did note that there was progression of a now near occlusive thrombus of a large portosystemic venous shunt from the splenic vein to the left femoral vein.  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 As you recall we were doing this to check on the clot and to ensure there were no lesions. It is good to confirm no lesions. Need to be sure you see the transplant team back as discussed.  Do Valles PA-C  6/5/23  SHe did the mri on friday but interestingly it has not been read yet. Checked epic yesterday and today for this. Will follow up with the pending result.  She had the repeat colon.  She is having a paracentesis every 6 days or so, 4 L removed she is doing the low salt diet and limiting fluids. DIscussed the risk of recurrrent paracentesis and needs to see oltx back on this and see if options. HE symtpoms controlled on xifaxan and miralax as seh does not tolerate the lactulose She has not schedlued with dr. barrios as discussed. inr 1.43, mcv 86, hgb 11.1, platelets 125, bili 2.5, ast 36, alt 18, alk phos 101 na 136, cr 0.8 MELD 14, meld na 15 4/27/23 [facility] follow up and recurrent ascites. She hospitalized patient recently at [facility].  She was in route to [place] per the notes but had EKG abnormalities and they redirected her to [place].  She had gained 19 pounds in the last several days.  Recent paracentesis on 419 with 4 L removed.  She was also having hematochezia and reviewed the chart and had recent rectal clips done and those need a re look and hospital noted she did not want to do there as she does things at [place]  CT scan done on 423  Cirrhotic morphology of the liver with this extensive paraesophageal and abdominal varices and splenomegaly. no lesions  April 24 paracentesis showing that there was 4 L removed.  April 24 labs with hemoglobin 9.8, platelets 47, INR 1.6.  Sodium 131, potassium 3.1, bilirubin 1.9, alkaline phosphatase 80, ALT 17, AST 37. cr .7  Per epic she has been pending an echo and rescheduled multiple times due to transportation issues. She was pending mri recently but not done  Discussed the ascites and need to see where from and ct not showing clot issue and need mri to be sure but also need that echo need to order paracentesis now fo rher. she has not been limited flluids, salt and she will start.  She needs to be reseen by oltx and we will refer her We will update and labs and she will need to see GI re the hematochezia given recent clips done.  Dr. Gonzales saw her as well and reminded her if acute then do care where she can. Also discussed with her the salt and needing to see oltx again.     recap She says she had some issues with daughter being in mental institution and she had trouble coming up and so now comes back. She is doing better.  She had adopted her at birth.  Right ear she had an skin cancer and has trouble wearing a mask for this.  She has been getting shot for scalp psoriasis and she does not know what is the shot. She has 2 rounds of it.  She has to get her every 2 months.  Dr. Pepito Haile [facility].[address] [place] [address]  ~34.1 mi [phone]  We need to know what he is giving her so that we can say what we expect to be the case.  Depending on what he is giving her.  No recent labs and no recent imaging.  June 8, 2022 MRI showed chronic trace left pleural effusion with associated atelectasis in the lower thorax views.  There was no significant fat or iron but with nodular contour and cirrhotic morphology.  No suspicious lesions.  Prior cholecystectomy changes.  No duct dilation.  Spleen enlarged 18.4 cm.  Pancreas normal.  Adrenal glands normal.  Normal bilateral renal symmetric enhancement with 6 mm superior pole renal cyst and no suspicious lesions and no hydro.  Mild small and large bowel wall edema likely due to periportal enteropathy/colopathy.  Unchanged chronic fibrosis of portal vein with cavernous transformation reconstituted left portal vein.  Redemonstrated gastroesophageal perisplenic varices.  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  Only trace perihepatic and perisplenic ascites.  June 7, 2022 visit with Dr. Flores mentions that the patient is status post TAVR with recent echo showing EF 55% of left ventricular outflow VTI of 21.  Chronic diastolic heart failure noted.  TAVR was on January 23, 2020.  She has not followed with Dr Flores and sees local cards for the tavr.  June 1, 2022 echo showed EF 50 to 55% aortic valve normal functioning bioprosthetic valve.  Aortic valve max velocity showed a mean gradient of 11.0 and a peak gradient 24.4.  June 8, 2022 labs show sodium 135 potassium 3.9 glucose 297 BUN of 10 creatinine 0.75 total bilirubin 1.6 calcium 9.1 AST 36 ALT 18 alk phos 106 hemoglobin A1c up at 7.5 AFP 4 W count 3.2 hemoglobin 12.3 plate count 47 INR 1.35.  HIV test negative at that time.  Transplant team sees her every 6m and she sees the team there. She is told is stable and looks good and meld was 14.  She says told re mounjaro.  April 29th 2022 labs stable ammonia level normal at 61. INR normal at 1.2.   Sugar was elevated at 173.  Please share with primary provider. BUN of 13 creatinine 0.69 sodium 138 potassium 4.4 albumin normal at 3.8.  Bilirubin 1.2 alkaline phosphatase 112. Only the AST was elevated at 46 and ALT was ideal at 25. White blood cell count 3.7 hemoglobin 12.4 and MCV 87 and these are normal.  Platelet count low at 54.  Neutrophils normal at 2.8 and lymphocytes slightly low at 0.5. Meld down to 9 and meld na 9. Suggests that the infection is what tipped your labs recently  to be worse. So really need to focus on trying to prevent those recurrent urinary infections as worsens your underlying liver state. Please ask local ID to contact us to discuss the recent infection and options.   Saw the notes and had urosepsis. Had 39986 cells in the ascites.  Had 1 tap in the hospital. had the infection.  Prior tap in [place] had no infection and she had been started on abx once a month. 2500 ml.  20-30 wbc, nos.  90% of pts with sbp if the infection will be reinfected in a year.  Once a month is not a usual tx for sbp prevention. Daily or once a week. Dr Lopez is her id and they need to look at this new data and need to adjust tx.  March 30, 2022 labs show sodium 137 potassium 3.6 BUN of 10 creatinine 0.68 glucose 156 total bilirubin 1.3 AST 25 ALT 14 alk phos 83 calcium 8.7 labs count 3.3 hemoglobin 12 platelet count 66.  March 28, 2022 CT abdomen and pelvis with IV contrast revealed left-sided pleural effusion with some atelectasis.  Cardiophrenic enhancing node measuring 1.1 x 1.8 cm slightly increased in size.  Liver with chronic liver disease.  Prior cholecystectomy no bile duct dilation.  Splenomegaly seen.  Pancreas normal.  No hydronephrosis but with left nonobstructing renal stone.  Diffuse colon wall thickening and edema with surrounding stranding.  Prominent retroperitoneal mesenteric lymph nodes.  Large varices seen on the vessel exam with focal occlusion of the SMV/portal vein which appears similar to prior study.  Moderate continuation of the right portal vein.  Mild increase cannot exclude thrombus within the splenic venous collaterals in the left upper quadrant.  March 28, 2022 paracentesis removed 2600 mL of fluid.  November 2021 MRI liver with no fat or iron cirrhotic liver in order to enhancing lesions.  Spleen 19 cm.  Prior cholecystectomy changes seen.  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  Multiple large collateral pathways including gastroesophageal varices.  Replaced right hepatic artery seen.  Ascites may have been primary vs uti.  She has been seen by urogyn for the recurrent uti.   On abx once a month not for prevention but rather infection every month and so that makes more sense.  Covid 19 Jan 2022 and admitted locally for sepsis.  June 8, 2022 patient has appointment for labs and another MRI and in July 20 9022 will be seen by the  oltx transplant nurse practitioner at [facility].   April 8, 2022 transplant clinic visit mentions that the patient has Baeza and had a recent hospitalization from March 20 7 March 21st for urosepsis.  Drained 2.6 L and prior to that had not been drained since February 2022.  Taking Bumex 2 mg twice daily and Aldactone 100 mg a day.  Meld increased during the hospital but was down to 14 by March 27, 2022.  Bili decreased to 1.3 on March 30.  Recap: Patient with history of chronic SMV/portal vein occlusion.  EGD January 2021 status post band ligation.  No significant encephalopathy on Xifaxan.  Current medicines include omeprazole 2 mg a day, aspirin 81 mg a day, Bumex 2 mg a day, vitamin D 1000 units a day, Prolia every 6 months Estrace 2 mg a day, gabapentin 3 mg 3 times daily, NovoLog sliding scale, Tresiba 8 units at night, lactulose 20 g Daily, mesalamine 1 g p.o. twice daily, midodrine 10 mg p.o. 3 times daily, nadolol 40 mg twice daily, potassium 40 mg a day, Xifaxan 550 twice daily, Aldactone on milligrams a day, Trintellix 20 mg a day.  Patient was listed as following up with Dr. Vickers for endoscopy. She says he has been seeing. He also saw dysphagia.  March 27 March 31 discharge summary from [facility].   She was a patient came in with concerns of urosepsis and left pyelonephritis.  Urinalysis high-grade 150 WBCs.  Blood cultures were negative but urine did grow pansensitive Klebsiella.  Patient was seen by ID and was given a course of daptomycin and Levaquin treatment.  They wanted urogynecology to see her. Left renal pole lesion that could be a nidus for recurrent infection.  Cell count on the fluid was 10,520 range  nucleated cells 84% neutrophils though the cultures were negative to date and maintained on antibiotics.  Looked up the actual fluid count from March 28 and had had albumin less than 1.5 total protein was 3.0 nucleated cells 10,520 neutrophils 84%.  Lasted been EGD high-grade fevers or November 17, 2021 with Dr. Vickers where she had grade 1 varices lower esophagus.  Moderate Schatzki ring was seen.  Dilation was seen to 51 [place].  Moderately erythematous mucosa without bleeding seen in the gastric antrum.  H. pylori testing sent.  Biopsy was negative for significant abnormality.  She did egd in nov 2021 and grade 1 varices noted. He is planning nov 2022.  nov 2021 mri: Reason For Exam Hepatocellular carcinoma;Hepatocellular carcinoma  REPORT EXAM: MRI Abdomen w/ + w/o Contrast  CLINICAL INDICATION: Hepatocellular carcinoma  TECHNIQUE: Multisequence, multiplanar MRI of the abdomen was performed without and with intravenous contrast. [id number]  CONTRAST: 18 cc of Prohance  COMPARISON: March 2, 2021  FINDINGS:  Lower Thorax: Normal.  Liver: No fat or iron. Cirrhotic liver morphology. No arterially enhancing lesions. No lesions with capsule. No lesions with washout.  Gallbladder/Biliary Tree: Status post cholecystectomy.  Spleen: Measuring 19.0 cm in maximum craniocaudal diameter.  Pancreas: Normal.  Adrenal Glands: Normal.   Kidneys/Ureters: Normal.  Gastrointestinal: Normal.   Lymph Nodes: Normal.  Vessels: Chronic thrombosis of the main portal vein with cavernous transformation redemonstrated. Multiple large collateral pathways including gastroesophageal varices, again noted. 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  Peritoneum/Retroperitoneum: Minimal ascites is present.  Bones/Soft Tissues: Normal.  IMPRESSION:     Cirrhosis with chronic portal vein thrombosis and cavernous transformation with associated evidence of portal hypertension. No hepatocellular carcinoma.

## 2024-01-19 ENCOUNTER — OFFICE VISIT (OUTPATIENT)
Dept: URBAN - METROPOLITAN AREA CLINIC 86 | Facility: CLINIC | Age: 64
End: 2024-01-19
Payer: MEDICARE

## 2024-01-19 VITALS
HEART RATE: 64 BPM | DIASTOLIC BLOOD PRESSURE: 70 MMHG | WEIGHT: 159 LBS | SYSTOLIC BLOOD PRESSURE: 124 MMHG | BODY MASS INDEX: 30.02 KG/M2 | TEMPERATURE: 98.2 F | HEIGHT: 61 IN

## 2024-01-19 DIAGNOSIS — I85.10 ESOPH VARICE OTHER DIS: ICD-10-CM

## 2024-01-19 DIAGNOSIS — K74.60 CIRRHOSIS OF LIVER: ICD-10-CM

## 2024-01-19 DIAGNOSIS — K76.82 HEPATIC ENCEPHALOPATHY: ICD-10-CM

## 2024-01-19 DIAGNOSIS — K75.81 NASH (NONALCOHOLIC STEATOHEPATITIS): ICD-10-CM

## 2024-01-19 PROCEDURE — 99214 OFFICE O/P EST MOD 30 MIN: CPT | Performed by: PHYSICIAN ASSISTANT

## 2024-01-19 RX ORDER — LACTULOSE 10 G/15ML
30 ML SOLUTION ORAL ONCE A DAY
Status: ACTIVE | COMMUNITY

## 2024-01-19 RX ORDER — OMEPRAZOLE 40 MG/1
TAKE 1 CAPSULE BY MOUTH TWICE A DAY BEFORE A MEAL CAPSULE, DELAYED RELEASE ORAL
Qty: 180 CAPSULE | Refills: 0 | Status: ACTIVE | COMMUNITY

## 2024-01-19 RX ORDER — ASPIRIN 81 MG/1
1 TABLET TABLET, COATED ORAL ONCE A DAY
Status: ACTIVE | COMMUNITY

## 2024-01-19 RX ORDER — VORTIOXETINE 20 MG/1
TAKE 1 TABLET (20 MG) BY ORAL ROUTE ONCE DAILY AT THE SAME TIME EACH DAY TABLET, FILM COATED ORAL ONCE A DAY
Refills: 0 | Status: ACTIVE | COMMUNITY
Start: 1900-01-01

## 2024-01-19 RX ORDER — DEXTROSE 4 G
1 TABLET TABLET,CHEWABLE ORAL ONCE A DAY
Status: ACTIVE | COMMUNITY

## 2024-01-19 RX ORDER — GABAPENTIN 300 MG/1
1 CAPSULE CAPSULE ORAL THREE TIMES A DAY
Status: ACTIVE | COMMUNITY

## 2024-01-19 RX ORDER — RIFAXIMIN 550 MG/1
1 TABLET TABLET ORAL TWICE A DAY
Qty: 60 TABLET | Refills: 5 | Status: ACTIVE | COMMUNITY

## 2024-01-19 RX ORDER — BUMETANIDE 0.5 MG/1
4 (2MG) TABLET ORAL ONCE A DAY
Status: ACTIVE | COMMUNITY

## 2024-01-19 RX ORDER — MIDODRINE HYDROCHLORIDE 10 MG/1
1 TABLET TABLET ORAL THREE TIMES A DAY
Status: ACTIVE | COMMUNITY

## 2024-01-19 RX ORDER — NADOLOL 40 MG/1
1 TABLET TABLET ORAL TWICE A DAY
Status: ACTIVE | COMMUNITY

## 2024-01-19 RX ORDER — POTASSIUM CHLORIDE 1500 MG/1
2 TABLETS WITH FOOD TABLET, FILM COATED, EXTENDED RELEASE ORAL ONCE A DAY
Refills: 0 | Status: ACTIVE | COMMUNITY
Start: 1900-01-01

## 2024-01-19 RX ORDER — SEMAGLUTIDE 0.68 MG/ML
AS DIRECTED INJECTION, SOLUTION SUBCUTANEOUS
Status: ACTIVE | COMMUNITY

## 2024-01-19 NOTE — HPI-TODAY'S VISIT:
Pt is 63 F last seen October 2023 by Dr. Gonzales with hx baeza and cirrhosis and smv/pv clot, hx varices, hx pse, march 2022 urosepsis/sbp and now here for ongoing care and eval.  A copy of the note was sent to the referring provider.  1/16/24 office visit  11/16/23 MRI EXAM: MR ABDOMEN W AND WO CONTRAST&nbsp;CLINICAL INDICATION: HCC Screening. BAEZA cirrhosis, recurrent ascites?that underwent creation of a transjugular intrahepatic portosystemic shunt (TIPS) and splenic vein stent placement on 8/8/23. TECHNIQUE: Multisequence, multiplanar MRI of the abdomen was performed without and with intravenous contrast. ESRC.2.7.3;COMPARISON: MRI 12/14/22 and CT 7/31/2023 and CTA 3/29/2023&nbsp;FINDINGS: Lower Thorax: Small left pleural effusion. Unchanged left lower lobe subsegmental atelectasis Liver: No fat or iron. Morphologic changes of chronic liver disease including nodular surface contour, lobar redistribution and fissural widening. No suspicious hepatic lesions.  Gallbladder/Biliary Tree: Mildly prominent bile ducts secondary to prior cholecystectomy. Spleen: Enlarged measuring up to 17.4 cm in CC dimension. Pancreas: Normal. Adrenal Glands: Normal.  Kidneys/Ureters: Renal cysts. No hydronephrosis. Gastrointestinal: No bowel obstruction.  Lymph Nodes: Marked interval improvement in previously seen extensive inflammatory soft tissue with enhancement in the retroperitoneum surrounding the abdominal aorta and branch vessels. Small subcentimeter in short axis retroperitoneal lymph nodes remain (13:55). Vessels: Grossly patent TIPS extends from the middle hepatic vein to the main portal vein. 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 Chronic occlusion of the SMV with reconstitution distally in the branch vessels of the SMV (13:61). Multiple collateral vasculature in the periportal region, central mesentery and the anterior abdomen. Redemonstrated partially thrombosed portosystemic shunt from the splenic vein (13:53) extending inferiorly to the pelvis, better seen on prior CTA from 3/29/2023. Upper abdominal perigastric and perisplenic varices. Replaced right hepatic artery arising from the superior mesenteric artery. Peritoneum/Retroperitoneum: Improved retroperitoneal/mesenteric root edema and enhancing soft tissue. Small right retroperitoneal nodule (13:56) measuring 1 cm, previously 2.1 x 1.9 cm. Bones/Soft Tissues: No suspicious osseous lesion. IMPRESSION:1.  No suspicious focal hepatic lesion.2.  Morphologic changes of chronic liver disease with stigmata of portal hypertension including splenomegaly and extensive upper abdominal varices.3.  Patent TIPS. Redemonstrated chronic thrombosis of the main portal vein and SMV with near occlusive thrombus of the large portosystemic venous shunt from the splenic vein extending to the pelvis. Extensive upper abdominal varices and collateral vasculature as detailed above.4.  Marked interval improvement in retroperitoneal enhancing soft tissue and lymphadenopathy, seen previously on 6/2/2023 suggestive of resolving nonspecific inflammatory process.The images were reviewed and interpreted by Hussain Casanova MD.  Has fu TIPs US to check patency next month EGD utd she is still on ozempic and no side affects    recap dear [place],   October 13 labs show glucose elevated at 272 and possibly you were not fasting that day?  Please share with primary provider to continue to work on this issue.  BUN 11 creatinine 0.83 sodium 140 potassium 4.4 chloride 101 CO2 28 calcium 9.3 with albumin slightly diminished at 3.6 and previous given 3.7.  Bilirubin was slightly higher at 3.2 but not fractionated.  Alk phos normal at 115 AST down to 34 from 52 and ALT 16 down from 26.  Ideal ALT less than 25.  WBC 5.7 hemoglobin 13.3 platelet  count was slightly higher for you at 52 from 39 before.  RDW was slightly up at 15.8.  Neutrophils normal at 4.6 and lymphocytes slightly low at 0.6 but this has been chronically seen and you before.  Please share with primary provider.  INR 1.4. Meld remains right at 15/15 and mainly driven by the elevated bilirubin. Please share labs with local providers.   No clear lab reason seen to decrease the diuretics for now. Await the mri. Dr Gonzales  Pt did the TIPS through [facility] and that was done Aug 2023. She was done and came home same day.  She says she has seen Dr Barrios and he has CT of abd and he wanted to do mri. She says not switched for this.  Sept 18 2023 U.s: EXAM: US ABDOMEN COMPLETE, US ABDOMEN DOPPLER COMPLETE&nbsp;CLINICAL INDICATION: TIPS.&nbsp;TECHNIQUE: Grayscale, pulsed wave and color Doppler sonography of the upper abdomen were performed. ESRC.3.7.1&nbsp;COMPARISON: CT 7/31/2023.&nbsp;FINDINGS: Liver: Coarsened echogenicity with a nodular surface contour compatible with morphologic changes of chronic liver disease. No lesions.&nbsp;Bile Ducts: No dilated intrahepatic biliary radicles. Common duct measures 4 mm.&nbsp;Gallbladder: Status post cholecystectomy. &nbsp;Pancreas: Obscured by overlying structures.&nbsp;Right Kidney: Measures 10.3 cm. Normal echogenicity. No hydronephrosis.&nbsp;Left Kidney: Measures 11.7 cm. Normal echogenicity. No hydronephrosis.&nbsp;Spleen: Measures 17.1 cm.&nbsp;Aorta: Normal caliber where imaged.&nbsp;IVC: Normal proximally, not imaged distally. &nbsp;Hepatic Veins: Normal.&nbsp;Portal Veins: Hepatopetal flow. 85-93 cm/s.&nbsp;Hepatic Arteries: Peak systolic velocity 48 cm/s. Resistive index 0.67. Normal waveform.&nbsp;TIPS: Stent is patent. Hepatopetal flow at the portal venous end and hepatofugal flow at the hepatic venous end. Velocity in the portal end 130-156 cm/s. Velocity at mid stent 143-162 cm/s. Velocity at the hepatic end 108-134 cm/s.&nbsp;Other: Trace perihepatic free fluid.&nbsp;IMPRESSION:1.  Patent TIPS.2.  Morphologic changes of chronic liver disease with mild sequelae of portal hypertension including splenomegaly and trace perihepatic free fluid.&nbsp;  July 2023 Ct did pre tips: EXAM: CT ABDOMEN W IV CONTRAST&nbsp;CLINICAL INDICATION: Portal hypertension.&nbsp;TECHNIQUE: Following administration of non-ionic IV contrast, postcontrast images through the abdomen were obtained. If applicable, point-of-care testing was approved following departmental protocol. [facility].1.7.1 &nbsp;COMPARISON: CT abdomen/pelvis 3/29/2023, MRI abdomen 6/2/2023.&nbsp;FINDINGS: Lower Thorax: Trace left pleural effusion and left basilar atelectasis.&nbsp;Liver: Morphologic changes of chronic liver disease including nodular surface contour, lobar redistribution and fissural widening. No suspicious focal lesion.&nbsp;Gallbladder/Biliary Tree: Status post cholecystectomy. No biliary ductal dilatation.&nbsp;Spleen: Enlarged measuring up to 19 cm in craniocaudal dimension.&nbsp;Pancreas: Normal.&nbsp;Adrenal Glands: Left adrenal gland difficult to visualize. Normal right adrenal gland. &nbsp;Kidneys/Ureters: Renal cysts. No hydronephrosis.&nbsp;Gastrointestinal: No bowel obstruction or inflammation. &nbsp;Lymph Nodes: Unchanged subcentimeter in short axis jaelyn hepatis and retroperitoneal lymph nodes, likely reactive.&nbsp;Vessels: Diminutive main and central portal veins which otherwise appear patent. Redemonstrated chronic occlusion of the portosplenic venous confluence involving the portal vein extending into the superior mesenteric vein. Multiple upper abdominal paraesophageal, perigastric and perisplenic varices. Additional extensive collateral vasculature in the anterior abdomen. Findings are similar to recent MRI from 6/2/2023. Redemonstrated thrombosed portosystemic shunt arising from the splenic vein and extending inferiorly into the pelvis (6:30) and similar to 6/2/2023 MRI. Replaced right hepatic artery arising from the SMA.&nbsp;Peritoneum/Retroperitoneum: Moderate volume abdominopelvic free fluid.&nbsp;Bones/Soft Tissues: No aggressive osseous lesion. Grade 1 anterolisthesis L4 relative to L5.&nbsp;IMPRESSION:1.  Morphologic changes of chronic liver disease with sequelae of portal hypertension including splenomegaly, moderate volume ascites and extensive varices.2.  Unchanged chronic occlusion at the portosplenic venous confluence involving the portal vein extending into the superior mesenteric vein. Remainder of the main portal vein and central intrahepatic portal veins are diminutive in caliber but patent.3.  Unchanged extensive abdominal varices and a thrombosed portosystemic shunt arising from the splenic vein.4.  Small left pleural effusion.  IR TIPS: Augh 2023 Findings: &nbsp;1.  Right internal jugular vein is ultrasonographically patent and compresses.  Needle entry was documented.2.  Right hepatic venogram confirms vein patency.3.  Portogram demonstrates small main portal vein and small intrahepatic branches with prominent collaterals, with dilated and tortuous coronary and splenic veins.4.  Splenic venography demonstrates large tortuous main splenic vein and collaterals, with stenosis near the portal confluence.5.  Splenic venography after splenic vein stent placement demonstrates improved flow across area of stenosis.6.  The initial portosystemic gradient is 22 mmHg.7.  Post-TIPSS portosystemic gradient is 9 mmHg.8.  The TIPSS has a smooth course and extends from the middle hepatic vein to the main portal vein.9.  There is good flow through the shunt at the conclusion of the procedure with no appreciable filling of varices.10.  4.8 liters of fluid were drained during the procedure.11.  New right internal jugular approach triple-lumen catheter terminates at the superior cavoatrial junction.&nbsp;IMPRESSION:Impression: &nbsp;1.  Successful creation of TIPS from middle hepatic vein to main portal vein with a 10 mm x 6 cm x 2 cm controlled-expansion Viatorr stent (ballooned to 10 mm). Splenic vein stenosis improved after Abre 10mm x 8cm stent placement.2.  The portosystemic gradient was reduced from 22 mm Hg to 9 mmHg.  Since the tips no bleeding and no more taps.  Need to reach to Dr Barrios re the issue of her needing the mri and not  the ct.  Sept 18 2023 pt 17 and inr 1.5 and  wbc 3.9 and hg 12.7 and plat 50 and mcv 86.6 and na 140 and k 4.0 and cl 105 and co2 30 and ca 9.1 and bun 9 and cr 0.7 and glu 140 and alb 2.9 and alk 132 and ast 33 and alt 14.  tb 2.5.  Meld 14 and meld na 14.  Has been feeling better and doing better in from 22 to 14.   She lost like 40 pounds with the tips.  June 5 MRI was updated by radiology today. When they compared to multiple priors dating back to 2018, the described retroperitoneal/mesenteric root edema and perivascular infiltrative soft tissue demonstrates avid enhancement with no restricted diffusion.  The degree of enhancement has progressed as well as the density infiltrative soft tissue compared to multiple priors. Retroperitoneal lymph nodes were also increased in number but not pathologically enlarged.  There was no evident medialization of the ureters.  They said that they findings are concerning for retroperitoneal fibrotic or inflammatory process. It appears that you were seen on June 20 by the transplant team/Dr Barrios and we will forward them this MRI correction so that they can be aware of this.  We will forward it to him via [facility]  to be aware of as he just saw you.   The 6/2/23 MR elastography was sent to me after lunch today. As you recall it was not available for viewing during the visit today. I called and left you a voicemail about this.  Lower thorax with unchanged small left pleural effusion.  Liver with subjective evidence of fat deposition.  Cirrhotic morphology.  No suspicious lesions.  Elastography donel after a weighted mean of 3 measurements showing 3.2K PA consistent with stage I-II fibrosis.  The fat quantification 12.5% consistent with mild fatty liver.  There is no significant iron deposition.  They did not see any biliary ductal dilatation.  Splenomegaly measuring 20 cm was seen.  The pancreas normal, adrenal glands normal.  They saw a right upper renal cyst.  Please make sure the primary care provider is aware.  They saw reactive lymph nodes.  They thought that the aorta was normal.  The superior mesenteric and inferior mesenteric along with the bilateral renal arteries were patent.  They noted the replaced right hepatic artery arising from the superior mesenteric artery.  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  They noted that there has been interval progression of the degree of thrombosis of the portosystemic shunt with near complete occlusive thrombus.  They noted additional varices and this is why we have to stay on top of the endoscopic use.  They saw mesenteric edema unchanged from the previous CT.  Moderate ascites.  Overall they noted cirrhosis and did not see any suspicious lesions but they did note that there was progression of a now near occlusive thrombus of a large portosystemic venous shunt from the splenic vein to the left femoral vein.  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 As you recall we were doing this to check on the clot and to ensure there were no lesions. It is good to confirm no lesions. Need to be sure you see the transplant team back as discussed.    6/5/23  SHe did the mri on friday but interestingly it has not been read yet. Checked epic yesterday and today for this. Will follow up with the pending result.  She had the repeat colon.  She is having a paracentesis every 6 days or so, 4 L removed she is doing the low salt diet and limiting fluids. DIscussed the risk of recurrrent paracentesis and needs to see oltx back on this and see if options. HE symtpoms controlled on xifaxan and miralax as seh does not tolerate the lactulose She has not schedlued with dr. barrios as discussed. inr 1.43, mcv 86, hgb 11.1, platelets 125, bili 2.5, ast 36, alt 18, alk phos 101 na 136, cr 0.8 MELD 14, meld na 15 4/27/23 HOspital follow up and recurrent ascites. She hospitalized patient recently at [facility].  She was in route to [place] per the notes but had EKG abnormalities and they redirected her to [place].  She had gained 19 pounds in the last several days.  Recent paracentesis on 419 with 4 L removed.  She was also having hematochezia and reviewed the chart and had recent rectal clips done and those need a re look and hospital noted she did not want to do there as she does things at [facility]  CT scan done on 423  Cirrhotic morphology of the liver with this extensive paraesophageal and abdominal varices and splenomegaly. no lesions  April 24 paracentesis showing that there was 4 L removed.  April 24 labs with hemoglobin 9.8, platelets 47, INR 1.6.  Sodium 131, potassium 3.1, bilirubin 1.9, alkaline phosphatase 80, ALT 17, AST 37. cr .7  Per epic she has been pending an echo and rescheduled multiple times due to transportation issues. She was pending mri recently but not done  Discussed the ascites and need to see where from and ct not showing clot issue and need mri to be sure but also need that echo need to order paracentesis now fo rher. she has not been limited flluids, salt and she will start.  She needs to be reseen by oltx and we will refer her We will update and labs and she will need to see GI re the hematochezia given recent clips done.  Dr. Gonzales saw her as well and reminded her if acute then do care where she can. Also discussed with her the salt and needing to see oltx again.   Dr. Pepito Haile [facility].[address], [place] [address]  ~34.1 mi [phone]  We need to know what he is giving her so that we can say what we expect to be the case.  Depending on what he is giving her.  No recent labs and no recent imaging.  June 8, 2022 MRI showed chronic trace left pleural effusion with associated atelectasis in the lower thorax views.  There was no significant fat or iron but with nodular contour and cirrhotic morphology.  No suspicious lesions.  Prior cholecystectomy changes.  No duct dilation.  Spleen enlarged 18.4 cm.  Pancreas normal.  Adrenal glands normal.  Normal bilateral renal symmetric enhancement with 6 mm superior pole renal cyst and no suspicious lesions and no hydro.  Mild small and large bowel wall edema likely due to periportal enteropathy/colopathy.  Unchanged chronic fibrosis of portal vein with cavernous transformation reconstituted left portal vein.  Redemonstrated gastroesophageal perisplenic varices.  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  Only trace perihepatic and perisplenic ascites.  June 7, 2022 visit with Dr. Flores mentions that the patient is status post TAVR with recent echo showing EF 55% of left ventricular outflow VTI of 21.  Chronic diastolic heart failure noted.  TAVR was on January 23, 2020.  She has not followed with Dr Flores and sees local cards for the tavr.  June 1, 2022 echo showed EF 50 to 55% aortic valve normal functioning bioprosthetic valve.  Aortic valve max velocity showed a mean gradient of 11.0 and a peak gradient 24.4.  June 8, 2022 labs show sodium 135 potassium 3.9 glucose 297 BUN of 10 creatinine 0.75 total bilirubin 1.6 calcium 9.1 AST 36 ALT 18 alk phos 106 hemoglobin A1c up at 7.5 AFP 4 W count 3.2 hemoglobin 12.3 plate count 47 INR 1.35.  HIV test negative at that time.  Transplant team sees her every 6m and she sees the team there. She is told is stable and looks good and meld was 14.  She says told re mounjaro.  April 29th 2022 labs stable ammonia level normal at 61. INR normal at 1.2.   Sugar was elevated at 173.  Please share with primary provider. BUN of 13 creatinine 0.69 sodium 138 potassium 4.4 albumin normal at 3.8.  Bilirubin 1.2 alkaline phosphatase 112. Only the AST was elevated at 46 and ALT was ideal at 25. White blood cell count 3.7 hemoglobin 12.4 and MCV 87 and these are normal.  Platelet count low at 54.  Neutrophils normal at 2.8 and lymphocytes slightly low at 0.5. Meld down to 9 and meld na 9. Suggests that the infection is what tipped your labs recently  to be worse. So really need to focus on trying to prevent those recurrent urinary infections as worsens your underlying liver state. Please ask local ID to contact us to discuss the recent infection and options.   Saw the notes and had urosepsis. Had [id number] cells in the ascites.  Had 1 tap in the hospital. had the infection.  Prior tap in [place] had no infection and she had been started on abx once a month. 2500 ml.  20-30 wbc, nos.  90% of pts with sbp if the infection will be reinfected in a year.  Once a month is not a usual tx for sbp prevention. Daily or once a week. Dr Lopez is her id and they need to look at this new data and need to adjust tx.  March 30, 2022 labs show sodium 137 potassium 3.6 BUN of 10 creatinine 0.68 glucose 156 total bilirubin 1.3 AST 25 ALT 14 alk phos 83 calcium 8.7 labs count 3.3 hemoglobin 12 platelet count 66.  March 28, 2022 CT abdomen and pelvis with IV contrast revealed left-sided pleural effusion with some atelectasis.  Cardiophrenic enhancing node measuring 1.1 x 1.8 cm slightly increased in size.  Liver with chronic liver disease.  Prior cholecystectomy no bile duct dilation.  Splenomegaly seen.  Pancreas normal.  No hydronephrosis but with left nonobstructing renal stone.  Diffuse colon wall thickening and edema with surrounding stranding.  Prominent retroperitoneal mesenteric lymph nodes.  Large varices seen on the vessel exam with focal occlusion of the SMV/portal vein which appears similar to prior study.  Moderate continuation of the right portal vein.  Mild increase cannot exclude thrombus within the splenic venous collaterals in the left upper quadrant.  March 28, 2022 paracentesis removed 2600 mL of fluid.  November 2021 MRI liver with no fat or iron cirrhotic liver in order to enhancing lesions.  Spleen 19 cm.  Prior cholecystectomy changes seen.  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  Multiple large collateral pathways including gastroesophageal varices.  Replaced right hepatic artery seen.  Ascites may have been primary vs uti.  She has been seen by urogyn for the recurrent uti.   On abx once a month not for prevention but rather infection every month and so that makes more sense.  Covid 19 Jan 2022 and admitted locally for sepsis.  June 8, 2022 patient has appointment for labs and another MRI and in July 20 9022 will be seen by the  oltx transplant nurse practitioner at [facility].   April 8, 2022 transplant clinic visit mentions that the patient has Baeza and had a recent hospitalization from March 20 7 March 21st for urosepsis.  Drained 2.6 L and prior to that had not been drained since February 2022.  Taking Bumex 2 mg twice daily and Aldactone 100 mg a day.  Meld increased during the hospital but was down to 14 by March 27, 2022.  Bili decreased to 1.3 on March 30.  Recap: Patient with history of chronic SMV/portal vein occlusion.  EGD January 2021 status post band ligation.  No significant encephalopathy on Xifaxan.  Current medicines include omeprazole 2 mg a day, aspirin 81 mg a day, Bumex 2 mg a day, vitamin D 1000 units a day, Prolia every 6 months Estrace 2 mg a day, gabapentin 3 mg 3 times daily, NovoLog sliding scale, Tresiba 8 units at night, lactulose 20 g Daily, mesalamine 1 g p.o. twice daily, midodrine 10 mg p.o. 3 times daily, nadolol 40 mg twice daily, potassium 40 mg a day, Xifaxan 550 twice daily, Aldactone on milligrams a day, Trintellix 20 mg a day.  Patient was listed as following up with Dr. Vickers for endoscopy. She says he has been seeing. He also saw dysphagia.  March 27 March 31 discharge summary from [facility].   She was a patient came in with concerns of urosepsis and left pyelonephritis.  Urinalysis high-grade 150 WBCs.  Blood cultures were negative but urine did grow pansensitive Klebsiella.  Patient was seen by ID and was given a course of daptomycin and Levaquin treatment.  They wanted urogynecology to see her. Left renal pole lesion that could be a nidus for recurrent infection.  Cell count on the fluid was 10,520 range  nucleated cells 84% neutrophils though the cultures were negative to date and maintained on antibiotics.  Looked up the actual fluid count from March 28 and had had albumin less than 1.5 total protein was 3.0 nucleated cells 10,520 neutrophils 84%.  Lasted been EGD high-grade fevers or November 17, 2021 with Dr. Vickers where she had grade 1 varices lower esophagus.  Moderate Schatzki ring was seen.  Dilation was seen to 51 [place].  Moderately erythematous mucosa without bleeding seen in the gastric antrum.  H. pylori testing sent.  Biopsy was negative for significant abnormality.  She did egd in nov 2021 and grade 1 varices noted. He is planning nov 2022.  nov 2021 mri: Reason For Exam Hepatocellular carcinoma;Hepatocellular carcinoma  REPORT EXAM: MRI Abdomen w/ + w/o Contrast  CLINICAL INDICATION: Hepatocellular carcinoma  TECHNIQUE: Multisequence, multiplanar MRI of the abdomen was performed without and with intravenous contrast. ESRC.2.7.3  CONTRAST: 18 cc of Prohance  COMPARISON: March 2, 2021  FINDINGS:  Lower Thorax: Normal.  Liver: No fat or iron. Cirrhotic liver morphology. No arterially enhancing lesions. No lesions with capsule. No lesions with washout.  Gallbladder/Biliary Tree: Status post cholecystectomy.  Spleen: Measuring 19.0 cm in maximum craniocaudal diameter.  Pancreas: Normal.  Adrenal Glands: Normal.   Kidneys/Ureters: Normal.  Gastrointestinal: Normal.   Lymph Nodes: Normal.  Vessels: Chronic thrombosis of the main portal vein with cavernous transformation redemonstrated. Multiple large collateral pathways including gastroesophageal varices, again noted. 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  Peritoneum/Retroperitoneum: Minimal ascites is present.  Bones/Soft Tissues: Normal.  IMPRESSION:     Cirrhosis with chronic portal vein thrombosis and cavernous transformation with associated evidence of portal hypertension. No hepatocellular carcinoma. Pt is 63 F last seen June 2023 by Ms Hai with hx baeza and cirrhosis and smv/pv clot, hx varices, hx pse, march 2022 urosepsis/sbp and now here for ongoing care and eval.  A copy of the note was sent to the referring provider.  Dear Lady Carson, June 5 MRI was updated by radiology today. When they compared to multiple priors dating back to 2018, the described retroperitoneal/mesenteric root edema and perivascular infiltrative soft tissue demonstrates avid enhancement with no restricted diffusion.  The degree of enhancement has progressed as well as the density infiltrative soft tissue compared to multiple priors. Retroperitoneal lymph nodes were also increased in number but not pathologically enlarged.  There was no evident medialization of the ureters.  They said that they findings are concerning for retroperitoneal fibrotic or inflammatory process. It appears that you were seen on June 20 by the transplant team/Dr Barrios and we will forward them this MRI correction so that they can be aware of this.  We will forward it to him via [employer]  to be aware of as he just saw you. Dr Gonzales  The 6/2/23 MR elastography was sent to me after lunch today. As you recall it was not available for viewing during the visit today. I called and left you a voicemail about this.  Lower thorax with unchanged small left pleural effusion.  Liver with subjective evidence of fat deposition.  Cirrhotic morphology.  No suspicious lesions.  Elastography donel after a weighted mean of 3 measurements showing 3.2K PA consistent with stage I-II fibrosis.  The fat quantification 12.5% consistent with mild fatty liver.  There is no significant iron deposition.  They did not see any biliary ductal dilatation.  Splenomegaly measuring 20 cm was seen.  The pancreas normal, adrenal glands normal.  They saw a right upper renal cyst.  Please make sure the primary care provider is aware.  They saw reactive lymph nodes.  They thought that the aorta was normal.  The superior mesenteric and inferior mesenteric along with the bilateral renal arteries were patent.  They noted the replaced right hepatic artery arising from the superior mesenteric artery.  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  They noted that there has been interval progression of the degree of thrombosis of the portosystemic shunt with near complete occlusive thrombus.  They noted additional varices and this is why we have to stay on top of the endoscopic use.  They saw mesenteric edema unchanged from the previous CT.  Moderate ascites.  Overall they noted cirrhosis and did not see any suspicious lesions but they did note that there was progression of a now near occlusive thrombus of a large portosystemic venous shunt from the splenic vein to the left femoral vein.  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 As you recall we were doing this to check on the clot and to ensure there were no lesions. It is good to confirm no lesions. Need to be sure you see the transplant team back as discussed.  Do Valles PA-C  6/5/23  SHe did the mri on friday but interestingly it has not been read yet. Checked epic yesterday and today for this. Will follow up with the pending result.  She had the repeat colon.  She is having a paracentesis every 6 days or so, 4 L removed she is doing the low salt diet and limiting fluids. DIscussed the risk of recurrrent paracentesis and needs to see oltx back on this and see if options. HE symtpoms controlled on xifaxan and miralax as seh does not tolerate the lactulose She has not schedlued with dr. barrios as discussed. inr 1.43, mcv 86, hgb 11.1, platelets 125, bili 2.5, ast 36, alt 18, alk phos 101 na 136, cr 0.8 MELD 14, meld na 15 4/27/23 HOspital follow up and recurrent ascites. She hospitalized patient recently at [facility].  She was in route to [place] per the notes but had EKG abnormalities and they redirected her to [place].  She had gained 19 pounds in the last several days.  Recent paracentesis on 419 with 4 L removed.  She was also having hematochezia and reviewed the chart and had recent rectal clips done and those need a re look and hospital noted she did not want to do there as she does things at [facility]  CT scan done on 423  Cirrhotic morphology of the liver with this extensive paraesophageal and abdominal varices and splenomegaly. no lesions  April 24 paracentesis showing that there was 4 L removed.  April 24 labs with hemoglobin 9.8, platelets 47, INR 1.6.  Sodium 131, potassium 3.1, bilirubin 1.9, alkaline phosphatase 80, ALT 17, AST 37. cr .7  Per epic she has been pending an echo and rescheduled multiple times due to transportation issues. She was pending mri recently but not done  Discussed the ascites and need to see where from and ct not showing clot issue and need mri to be sure but also need that echo need to order paracentesis now fo rher. she has not been limited flluids, salt and she will start.  She needs to be reseen by oltx and we will refer her We will update and labs and she will need to see GI re the hematochezia given recent clips done.  Dr. Gonzales saw her as well and reminded her if acute then do care where she can. Also discussed with her the salt and needing to see oltx again.     recap She says she had some issues with daughter being in mental institution and she had trouble coming up and so now comes back. She is doing better.  She had adopted her at birth.  Right ear she had an skin cancer and has trouble wearing a mask for this.  She has been getting shot for scalp psoriasis and she does not know what is the shot. She has 2 rounds of it.  She has to get her every 2 months.  Dr. Pepito Haile availdermatology.[facility] [address] [place] [address]  ~34.1 mi [phone]  We need to know what he is giving her so that we can say what we expect to be the case.  Depending on what he is giving her.  No recent labs and no recent imaging.  June 8, 2022 MRI showed chronic trace left pleural effusion with associated atelectasis in the lower thorax views.  There was no significant fat or iron but with nodular contour and cirrhotic morphology.  No suspicious lesions.  Prior cholecystectomy changes.  No duct dilation.  Spleen enlarged 18.4 cm.  Pancreas normal.  Adrenal glands normal.  Normal bilateral renal symmetric enhancement with 6 mm superior pole renal cyst and no suspicious lesions and no hydro.  Mild small and large bowel wall edema likely due to periportal enteropathy/colopathy.  Unchanged chronic fibrosis of portal vein with cavernous transformation reconstituted left portal vein.  Redemonstrated gastroesophageal perisplenic varices.  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  Only trace perihepatic and perisplenic ascites.  June 7, 2022 visit with Dr. Flores mentions that the patient is status post TAVR with recent echo showing EF 55% of left ventricular outflow VTI of 21.  Chronic diastolic heart failure noted.  TAVR was on January 23, 2020.  She has not followed with Dr Flores and sees local cards for the tavr.  June 1, 2022 echo showed EF 50 to 55% aortic valve normal functioning bioprosthetic valve.  Aortic valve max velocity showed a mean gradient of 11.0 and a peak gradient 24.4.  June 8, 2022 labs show sodium 135 potassium 3.9 glucose 297 BUN of 10 creatinine 0.75 total bilirubin 1.6 calcium 9.1 AST 36 ALT 18 alk phos 106 hemoglobin A1c up at 7.5 AFP 4 W count 3.2 hemoglobin 12.3 plate count 47 INR 1.35.  HIV test negative at that time.  Transplant team sees her every 6m and she sees the team there. She is told is stable and looks good and meld was 14.  She says told re mounjaro.  April 29th 2022 labs stable ammonia level normal at 61. INR normal at 1.2.   Sugar was elevated at 173.  Please share with primary provider. BUN of 13 creatinine 0.69 sodium 138 potassium 4.4 albumin normal at 3.8.  Bilirubin 1.2 alkaline phosphatase 112. Only the AST was elevated at 46 and ALT was ideal at 25. White blood cell count 3.7 hemoglobin 12.4 and MCV 87 and these are normal.  Platelet count low at 54.  Neutrophils normal at 2.8 and lymphocytes slightly low at 0.5. Meld down to 9 and meld na 9. Suggests that the infection is what tipped your labs recently  to be worse. So really need to focus on trying to prevent those recurrent urinary infections as worsens your underlying liver state. Please ask local ID to contact us to discuss the recent infection and options.   Saw the notes and had urosepsis. Had 05098 cells in the ascites.  Had 1 tap in the hospital. had the infection.  Prior tap in [place] had no infection and she had been started on abx once a month. 2500 ml.  20-30 wbc, nos.  90% of pts with sbp if the infection will be reinfected in a year.  Once a month is not a usual tx for sbp prevention. Daily or once a week. Dr Lopez is her id and they need to look at this new data and need to adjust tx.  March 30, 2022 labs show sodium 137 potassium 3.6 BUN of 10 creatinine 0.68 glucose 156 total bilirubin 1.3 AST 25 ALT 14 alk phos 83 calcium 8.7 labs count 3.3 hemoglobin 12 platelet count 66.  March 28, 2022 CT abdomen and pelvis with IV contrast revealed left-sided pleural effusion with some atelectasis.  Cardiophrenic enhancing node measuring 1.1 x 1.8 cm slightly increased in size.  Liver with chronic liver disease.  Prior cholecystectomy no bile duct dilation.  Splenomegaly seen.  Pancreas normal.  No hydronephrosis but with left nonobstructing renal stone.  Diffuse colon wall thickening and edema with surrounding stranding.  Prominent retroperitoneal mesenteric lymph nodes.  Large varices seen on the vessel exam with focal occlusion of the SMV/portal vein which appears similar to prior study.  Moderate continuation of the right portal vein.  Mild increase cannot exclude thrombus within the splenic venous collaterals in the left upper quadrant.  March 28, 2022 paracentesis removed 2600 mL of fluid.  November 2021 MRI liver with no fat or iron cirrhotic liver in order to enhancing lesions.  Spleen 19 cm.  Prior cholecystectomy changes seen.  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  Multiple large collateral pathways including gastroesophageal varices.  Replaced right hepatic artery seen.  Ascites may have been primary vs uti.  She has been seen by urogyn for the recurrent uti.   On abx once a month not for prevention but rather infection every month and so that makes more sense.  Covid 19 Jan 2022 and admitted locally for sepsis.  June 8, 2022 patient has appointment for labs and another MRI and in July 20 9022 will be seen by the  oltx transplant nurse practitioner at [facility].   April 8, 2022 transplant clinic visit mentions that the patient has Baeza and had a recent hospitalization from March 20 7 March 21st for urosepsis.  Drained 2.6 L and prior to that had not been drained since February 2022.  Taking Bumex 2 mg twice daily and Aldactone 100 mg a day.  Meld increased during the hospital but was down to 14 by March 27, 2022.  Bili decreased to 1.3 on March 30.  Recap: Patient with history of chronic SMV/portal vein occlusion.  EGD January 2021 status post band ligation.  No significant encephalopathy on Xifaxan.  Current medicines include omeprazole 2 mg a day, aspirin 81 mg a day, Bumex 2 mg a day, vitamin D 1000 units a day, Prolia every 6 months Estrace 2 mg a day, gabapentin 3 mg 3 times daily, NovoLog sliding scale, Tresiba 8 units at night, lactulose 20 g Daily, mesalamine 1 g p.o. twice daily, midodrine 10 mg p.o. 3 times daily, nadolol 40 mg twice daily, potassium 40 mg a day, Xifaxan 550 twice daily, Aldactone on milligrams a day, Trintellix 20 mg a day.  Patient was listed as following up with Dr. Vickers for endoscopy. She says he has been seeing. He also saw dysphagia.  March 27 March 31 discharge summary from [facility].   She was a patient came in with concerns of urosepsis and left pyelonephritis.  Urinalysis high-grade 150 WBCs.  Blood cultures were negative but urine did grow pansensitive Klebsiella.  Patient was seen by ID and was given a course of daptomycin and Levaquin treatment.  They wanted urogynecology to see her. Left renal pole lesion that could be a nidus for recurrent infection.  Cell count on the fluid was 10,520 range  nucleated cells 84% neutrophils though the cultures were negative to date and maintained on antibiotics.  Looked up the actual fluid count from March 28 and had had albumin less than 1.5 total protein was 3.0 nucleated cells 10,520 neutrophils 84%.  Lasted been EGD high-grade fevers or November 17, 2021 with Dr. Vickers where she had grade 1 varices lower esophagus.  Moderate Schatzki ring was seen.  Dilation was seen to 51 [place].  Moderately erythematous mucosa without bleeding seen in the gastric antrum.  H. pylori testing sent.  Biopsy was negative for significant abnormality.  She did egd in nov 2021 and grade 1 varices noted. He is planning nov 2022.  nov 2021 mri: Reason For Exam Hepatocellular carcinoma;Hepatocellular carcinoma  REPORT EXAM: MRI Abdomen w/ + w/o Contrast  CLINICAL INDICATION: Hepatocellular carcinoma  TECHNIQUE: Multisequence, multiplanar MRI of the abdomen was performed without and with intravenous contrast. [id number]  CONTRAST: 18 cc of Prohance  COMPARISON: March 2, 2021  FINDINGS:  Lower Thorax: Normal.  Liver: No fat or iron. Cirrhotic liver morphology. No arterially enhancing lesions. No lesions with capsule. No lesions with washout.  Gallbladder/Biliary Tree: Status post cholecystectomy.  Spleen: Measuring 19.0 cm in maximum craniocaudal diameter.  Pancreas: Normal.  Adrenal Glands: Normal.   Kidneys/Ureters: Normal.  Gastrointestinal: Normal.   Lymph Nodes: Normal.  Vessels: Chronic thrombosis of the main portal vein with cavernous transformation redemonstrated. Multiple large collateral pathways including gastroesophageal varices, again noted. 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  Peritoneum/Retroperitoneum: Minimal ascites is present.  Bones/Soft Tissues: Normal.  IMPRESSION:     Cirrhosis with chronic portal vein thrombosis and cavernous transformation with associated evidence of portal hypertension. No hepatocellular carcinoma.

## 2024-01-22 ENCOUNTER — TELEPHONE ENCOUNTER (OUTPATIENT)
Dept: URBAN - METROPOLITAN AREA CLINIC 86 | Facility: CLINIC | Age: 64
End: 2024-01-22

## 2024-01-22 NOTE — HPI-TODAY'S VISIT:
Dear Lady Carson,  The 1/19/24 labs were sent to me.  The sodium 140, potassium 3.9, creatinine 0.65, bilirubin 2.2, alkaline phosphatase 95, AST 21, ALT 13.  The INR 1.7.  Complete blood count showing white blood cells 5.1, hemoglobin 13.4, MCV 91, platelets 40. MELD 15. Lets lower the spironolactone to 50 mg. Please let us know if you need a new prescription for this. You will only need to take two 25 mg tablets a day.  Do Valles PA-C

## 2024-02-20 ENCOUNTER — APPOINTMENT (RX ONLY)
Dept: URBAN - METROPOLITAN AREA CLINIC 41 | Facility: CLINIC | Age: 64
Setting detail: DERMATOLOGY
End: 2024-02-20

## 2024-02-20 DIAGNOSIS — L40.0 PSORIASIS VULGARIS: ICD-10-CM

## 2024-02-20 PROCEDURE — ? INTRALESIONAL KENALOG

## 2024-02-20 PROCEDURE — ? PRESCRIPTION

## 2024-02-20 PROCEDURE — ? TREATMENT REGIMEN

## 2024-02-20 PROCEDURE — ? COUNSELING

## 2024-02-20 PROCEDURE — ? MEDICATION COUNSELING

## 2024-02-20 PROCEDURE — 11900 INJECT SKIN LESIONS </W 7: CPT

## 2024-02-20 RX ORDER — ROFLUMILAST 3 MG/G
AEROSOL, FOAM TOPICAL
Qty: 60 | Refills: 3 | Status: ERX | COMMUNITY
Start: 2024-02-20

## 2024-02-20 RX ADMIN — ROFLUMILAST: 3 AEROSOL, FOAM TOPICAL at 00:00

## 2024-02-20 ASSESSMENT — PGA PSORIASIS: PGA PSORIASIS 2020: MODERATE

## 2024-02-20 ASSESSMENT — ITCH NUMERIC RATING SCALE: HOW SEVERE IS YOUR ITCHING?: 9

## 2024-02-20 ASSESSMENT — LOCATION SIMPLE DESCRIPTION DERM: LOCATION SIMPLE: POSTERIOR SCALP

## 2024-02-20 ASSESSMENT — LOCATION DETAILED DESCRIPTION DERM
LOCATION DETAILED: MID-OCCIPITAL SCALP
LOCATION DETAILED: POSTERIOR MID-PARIETAL SCALP
LOCATION DETAILED: LEFT OCCIPITAL SCALP
LOCATION DETAILED: RIGHT OCCIPITAL SCALP

## 2024-02-20 ASSESSMENT — LOCATION ZONE DERM: LOCATION ZONE: SCALP

## 2024-02-20 NOTE — PROCEDURE: MEDICATION COUNSELING
Calcipotriene Counseling:  I discussed with the patient the risks of calcipotriene including but not limited to erythema, scaling, itching, and irritation.
Thalidomide Pregnancy And Lactation Text: This medication is Pregnancy Category X and is absolutely contraindicated during pregnancy. It is unknown if it is excreted in breast milk.
Hydroxyzine Pregnancy And Lactation Text: This medication is not safe during pregnancy and should not be taken. It is also excreted in breast milk and breast feeding isn't recommended.
Wartpeel Counseling:  I discussed with the patient the risks of Wartpeel including but not limited to erythema, scaling, itching, weeping, crusting, and pain.
Skyrizi Counseling: I discussed with the patient the risks of risankizumab-rzaa including but not limited to immunosuppression, and serious infections.  The patient understands that monitoring is required including a PPD at baseline and must alert us or the primary physician if symptoms of infection or other concerning signs are noted.
Otezla Counseling: The side effects of Otezla were discussed with the patient, including but not limited to worsening or new depression, weight loss, diarrhea, nausea, upper respiratory tract infection, and headache. Patient instructed to call the office should any adverse effect occur.  The patient verbalized understanding of the proper use and possible adverse effects of Otezla.  All the patient's questions and concerns were addressed.
Topical Clindamycin Pregnancy And Lactation Text: This medication is Pregnancy Category B and is considered safe during pregnancy. It is unknown if it is excreted in breast milk.
Azathioprine Counseling:  I discussed with the patient the risks of azathioprine including but not limited to myelosuppression, immunosuppression, hepatotoxicity, lymphoma, and infections.  The patient understands that monitoring is required including baseline LFTs, Creatinine, possible TPMP genotyping and weekly CBCs for the first month and then every 2 weeks thereafter.  The patient verbalized understanding of the proper use and possible adverse effects of azathioprine.  All of the patient's questions and concerns were addressed.
Rhofade Counseling: Rhofade is a topical medication which can decrease superficial blood flow where applied. Side effects are uncommon and include stinging, redness and allergic reactions.
Xolair Pregnancy And Lactation Text: This medication is Pregnancy Category B and is considered safe during pregnancy. This medication is excreted in breast milk.
Albendazole Pregnancy And Lactation Text: This medication is Pregnancy Category C and it isn't known if it is safe during pregnancy. It is also excreted in breast milk.
Low Dose Naltrexone Pregnancy And Lactation Text: Naltrexone is pregnancy category C.  There have been no adequate and well-controlled studies in pregnant women.  It should be used in pregnancy only if the potential benefit justifies the potential risk to the fetus.   Limited data indicates that naltrexone is minimally excreted into breastmilk.
Ilumya Pregnancy And Lactation Text: The risk during pregnancy and breastfeeding is uncertain with this medication.
Metronidazole Pregnancy And Lactation Text: This medication is Pregnancy Category B and considered safe during pregnancy.  It is also excreted in breast milk.
Mirvaso Pregnancy And Lactation Text: This medication has not been assigned a Pregnancy Risk Category. It is unknown if the medication is excreted in breast milk.
Dapsone Counseling: I discussed with the patient the risks of dapsone including but not limited to hemolytic anemia, agranulocytosis, rashes, methemoglobinemia, kidney failure, peripheral neuropathy, headaches, GI upset, and liver toxicity.  Patients who start dapsone require monitoring including baseline LFTs and weekly CBCs for the first month, then every month thereafter.  The patient verbalized understanding of the proper use and possible adverse effects of dapsone.  All of the patient's questions and concerns were addressed.
Tetracycline Counseling: Patient counseled regarding possible photosensitivity and increased risk for sunburn.  Patient instructed to avoid sunlight, if possible.  When exposed to sunlight, patients should wear protective clothing, sunglasses, and sunscreen.  The patient was instructed to call the office immediately if the following severe adverse effects occur:  hearing changes, easy bruising/bleeding, severe headache, or vision changes.  The patient verbalized understanding of the proper use and possible adverse effects of tetracycline.  All of the patient's questions and concerns were addressed. Patient understands to avoid pregnancy while on therapy due to potential birth defects.
Calcipotriene Pregnancy And Lactation Text: The use of this medication during pregnancy or lactation is not recommended as there is insufficient data.
Cosentyx Counseling:  I discussed with the patient the risks of Cosentyx including but not limited to worsening of Crohn's disease, immunosuppression, allergic reactions and infections.  The patient understands that monitoring is required including a PPD at baseline and must alert us or the primary physician if symptoms of infection or other concerning signs are noted.
Wartpeel Pregnancy And Lactation Text: This medication is Pregnancy Category X and contraindicated in pregnancy and in women who may become pregnant. It is unknown if this medication is excreted in breast milk.
Cantharidin Counseling:  I discussed with the patient the risks of Cantharidin including but not limited to pain, redness, burning, itching, and blistering.
Hydroquinone Counseling:  Patient advised that medication may result in skin irritation, lightening (hypopigmentation), dryness, and burning.  In the event of skin irritation, the patient was advised to reduce the amount of the drug applied or use it less frequently.  Rarely, spots that are treated with hydroquinone can become darker (pseudoochronosis).  Should this occur, patient instructed to stop medication and call the office. The patient verbalized understanding of the proper use and possible adverse effects of hydroquinone.  All of the patient's questions and concerns were addressed.
Ketoconazole Pregnancy And Lactation Text: This medication is Pregnancy Category C and it isn't know if it is safe during pregnancy. It is also excreted in breast milk and breast feeding isn't recommended.
Cephalexin Counseling: I counseled the patient regarding use of cephalexin as an antibiotic for prophylactic and/or therapeutic purposes. Cephalexin (commonly prescribed under brand name Keflex) is a cephalosporin antibiotic which is active against numerous classes of bacteria, including most skin bacteria. Side effects may include nausea, diarrhea, gastrointestinal upset, rash, hives, yeast infections, and in rare cases, hepatitis, kidney disease, seizures, fever, confusion, neurologic symptoms, and others. Patients with severe allergies to penicillin medications are cautioned that there is about a 10% incidence of cross-reactivity with cephalosporins. When possible, patients with penicillin allergies should use alternatives to cephalosporins for antibiotic therapy.
Dutasteride Pregnancy And Lactation Text: This medication is absolutely contraindicated in women, especially during pregnancy and breast feeding. Feminization of male fetuses is possible if taking while pregnant.
Olumiant Counseling: I discussed with the patient the risks of Olumiant therapy including but not limited to upper respiratory tract infections, shingles, cold sores, and nausea. Live vaccines should be avoided.  This medication has been linked to serious infections; higher rate of mortality; malignancy and lymphoproliferative disorders; major adverse cardiovascular events; thrombosis; gastrointestinal perforations; neutropenia; lymphopenia; anemia; liver enzyme elevations; and lipid elevations.
Opioid Counseling: I discussed with the patient the potential side effects of opioids including but not limited to addiction, altered mental status, and depression. I stressed avoiding alcohol, benzodiazepines, muscle relaxants and sleep aids unless specifically okayed by a physician. The patient verbalized understanding of the proper use and possible adverse effects of opioids. All of the patient's questions and concerns were addressed. They were instructed to flush the remaining pills down the toilet if they did not need them for pain.
Otezla Pregnancy And Lactation Text: This medication is Pregnancy Category C and it isn't known if it is safe during pregnancy. It is unknown if it is excreted in breast milk.
Topical Ketoconazole Counseling: Patient counseled that this medication may cause skin irritation or allergic reactions.  In the event of skin irritation, the patient was advised to reduce the amount of the drug applied or use it less frequently.   The patient verbalized understanding of the proper use and possible adverse effects of ketoconazole.  All of the patient's questions and concerns were addressed.
Niacinamide Counseling: I recommended taking niacin or niacinamide, also know as vitamin B3, twice daily. Recent evidence suggests that taking vitamin B3 (500 mg twice daily) can reduce the risk of actinic keratoses and non-melanoma skin cancers. Side effects of vitamin B3 include flushing and headache.
Azathioprine Pregnancy And Lactation Text: This medication is Pregnancy Category D and isn't considered safe during pregnancy. It is unknown if this medication is excreted in breast milk.
Aklief counseling:  Patient advised to apply a pea-sized amount only at bedtime and wait 30 minutes after washing their face before applying.  If too drying, patient may add a non-comedogenic moisturizer.  The most commonly reported side effects including irritation, redness, scaling, dryness, stinging, burning, itching, and increased risk of sunburn.  The patient verbalized understanding of the proper use and possible adverse effects of retinoids.  All of the patient's questions and concerns were addressed.
Solaraze Pregnancy And Lactation Text: This medication is Pregnancy Category B and is considered safe. There is some data to suggest avoiding during the third trimester. It is unknown if this medication is excreted in breast milk.
Cantharidin Pregnancy And Lactation Text: This medication has not been proven safe during pregnancy. It is unknown if this medication is excreted in breast milk.
Opzelura Counseling:  I discussed with the patient the risks of Opzelura including but not limited to nasopharngitis, bronchitis, ear infection, eosinophila, hives, diarrhea, folliculitis, tonsillitis, and rhinorrhea.  Taken orally, this medication has been linked to serious infections; higher rate of mortality; malignancy and lymphoproliferative disorders; major adverse cardiovascular events; thrombosis; thrombocytopenia, anemia, and neutropenia; and lipid elevations.
Ivermectin Counseling:  Patient instructed to take medication on an empty stomach with a full glass of water.  Patient informed of potential adverse effects including but not limited to nausea, diarrhea, dizziness, itching, and swelling of the extremities or lymph nodes.  The patient verbalized understanding of the proper use and possible adverse effects of ivermectin.  All of the patient's questions and concerns were addressed.
Minocycline Counseling: Patient advised regarding possible photosensitivity and discoloration of the teeth, skin, lips, tongue and gums.  Patient instructed to avoid sunlight, if possible.  When exposed to sunlight, patients should wear protective clothing, sunglasses, and sunscreen.  The patient was instructed to call the office immediately if the following severe adverse effects occur:  hearing changes, easy bruising/bleeding, severe headache, or vision changes.  The patient verbalized understanding of the proper use and possible adverse effects of minocycline.  All of the patient's questions and concerns were addressed.
Albendazole Counseling:  I discussed with the patient the risks of albendazole including but not limited to cytopenia, kidney damage, nausea/vomiting and severe allergy.  The patient understands that this medication is being used in an off-label manner.
Acitretin Counseling:  I discussed with the patient the risks of acitretin including but not limited to hair loss, dry lips/skin/eyes, liver damage, hyperlipidemia, depression/suicidal ideation, photosensitivity.  Serious rare side effects can include but are not limited to pancreatitis, pseudotumor cerebri, bony changes, clot formation/stroke/heart attack.  Patient understands that alcohol is contraindicated since it can result in liver toxicity and significantly prolong the elimination of the drug by many years.
Erivedge Counseling- I discussed with the patient the risks of Erivedge including but not limited to nausea, vomiting, diarrhea, constipation, weight loss, changes in the sense of taste, decreased appetite, muscle spasms, and hair loss.  The patient verbalized understanding of the proper use and possible adverse effects of Erivedge.  All of the patient's questions and concerns were addressed.
Tetracycline Pregnancy And Lactation Text: This medication is Pregnancy Category D and not consider safe during pregnancy. It is also excreted in breast milk.
Infliximab Counseling:  I discussed with the patient the risks of infliximab including but not limited to myelosuppression, immunosuppression, autoimmune hepatitis, demyelinating diseases, lymphoma, and serious infections.  The patient understands that monitoring is required including a PPD at baseline and must alert us or the primary physician if symptoms of infection or other concerning signs are noted.
Dapsone Pregnancy And Lactation Text: This medication is Pregnancy Category C and is not considered safe during pregnancy or breast feeding.
Cephalexin Pregnancy And Lactation Text: This medication is Pregnancy Category B and considered safe during pregnancy.  It is also excreted in breast milk but can be used safely for shorter doses.
Hydroquinone Pregnancy And Lactation Text: This medication has not been assigned a Pregnancy Risk Category but animal studies failed to show danger with the topical medication. It is unknown if the medication is excreted in breast milk.
Olumiant Pregnancy And Lactation Text: Based on animal studies, Olumiant may cause embryo-fetal harm when administered to pregnant women.  The medication should not be used in pregnancy.  Breastfeeding is not recommended during treatment.
Cosentyx Pregnancy And Lactation Text: This medication is Pregnancy Category B and is considered safe during pregnancy. It is unknown if this medication is excreted in breast milk.
Winlevi Counseling:  I discussed with the patient the risks of topical clascoterone including but not limited to erythema, scaling, itching, and stinging. Patient voiced their understanding.
Terbinafine Counseling: Patient counseling regarding adverse effects of terbinafine including but not limited to headache, diarrhea, rash, upset stomach, liver function test abnormalities, itching, taste/smell disturbance, nausea, abdominal pain, and flatulence.  There is a rare possibility of liver failure that can occur when taking terbinafine.  The patient understands that a baseline LFT and kidney function test may be required. The patient verbalized understanding of the proper use and possible adverse effects of terbinafine.  All of the patient's questions and concerns were addressed.
Detail Level: Simple
Aklief Pregnancy And Lactation Text: It is unknown if this medication is safe to use during pregnancy.  It is unknown if this medication is excreted in breast milk.  Breastfeeding women should use the topical cream on the smallest area of the skin for the shortest time needed while breastfeeding.  Do not apply to nipple and areola.
5-Fu Counseling: 5-Fluorouracil Counseling:  I discussed with the patient the risks of 5-fluorouracil including but not limited to erythema, scaling, itching, weeping, crusting, and pain.
Finasteride Male Counseling: Finasteride Counseling:  I discussed with the patient the risks of use of finasteride including but not limited to decreased libido, decreased ejaculate volume, gynecomastia, and depression. Women should not handle medication.  All of the patient's questions and concerns were addressed.
Opioid Pregnancy And Lactation Text: These medications can lead to premature delivery and should be avoided during pregnancy. These medications are also present in breast milk in small amounts.
Oxybutynin Counseling:  I discussed with the patient the risks of oxybutynin including but not limited to skin rash, drowsiness, dry mouth, difficulty urinating, and blurred vision.
Stelara Counseling:  I discussed with the patient the risks of ustekinumab including but not limited to immunosuppression, malignancy, posterior leukoencephalopathy syndrome, and serious infections.  The patient understands that monitoring is required including a PPD at baseline and must alert us or the primary physician if symptoms of infection or other concerning signs are noted.
Soolantra Counseling: I discussed with the patients the risks of topial Soolantra. This is a medicine which decreases the number of mites and inflammation in the skin. You experience burning, stinging, eye irritation or allergic reactions.  Please call our office if you develop any problems from using this medication.
Niacinamide Pregnancy And Lactation Text: These medications are considered safe during pregnancy.
Cellcept Counseling:  I discussed with the patient the risks of mycophenolate mofetil including but not limited to infection/immunosuppression, GI upset, hypokalemia, hypercholesterolemia, bone marrow suppression, lymphoproliferative disorders, malignancy, GI ulceration/bleed/perforation, colitis, interstitial lung disease, kidney failure, progressive multifocal leukoencephalopathy, and birth defects.  The patient understands that monitoring is required including a baseline creatinine and regular CBC testing. In addition, patient must alert us immediately if symptoms of infection or other concerning signs are noted.
Gabapentin Counseling: I discussed with the patient the risks of gabapentin including but not limited to dizziness, somnolence, fatigue and ataxia.
Acitretin Pregnancy And Lactation Text: This medication is Pregnancy Category X and should not be given to women who are pregnant or may become pregnant in the future. This medication is excreted in breast milk.
Terbinafine Pregnancy And Lactation Text: This medication is Pregnancy Category B and is considered safe during pregnancy. It is also excreted in breast milk and breast feeding isn't recommended.
Include Pregnancy/Lactation Warning?: No
Clindamycin Counseling: I counseled the patient regarding use of clindamycin as an antibiotic for prophylactic and/or therapeutic purposes. Clindamycin is active against numerous classes of bacteria, including skin bacteria. Side effects may include nausea, diarrhea, gastrointestinal upset, rash, hives, yeast infections, and in rare cases, colitis.
Methotrexate Pregnancy And Lactation Text: This medication is Pregnancy Category X and is known to cause fetal harm. This medication is excreted in breast milk.
Solaraze Counseling:  I discussed with the patient the risks of Solaraze including but not limited to erythema, scaling, itching, weeping, crusting, and pain.
Dupixent Counseling: I discussed with the patient the risks of dupilumab including but not limited to eye infection and irritation, cold sores, injection site reactions, worsening of asthma, allergic reactions and increased risk of parasitic infection.  Live vaccines should be avoided while taking dupilumab. Dupilumab will also interact with certain medications such as warfarin and cyclosporine. The patient understands that monitoring is required and they must alert us or the primary physician if symptoms of infection or other concerning signs are noted.
Finasteride Female Counseling: Finasteride Counseling:  I discussed with the patient the risks of use of finasteride including but not limited to decreased libido and sexual dysfunction. Explained the teratogenic nature of the medication and stressed the importance of not getting pregnant during treatment. All of the patient's questions and concerns were addressed.
Imiquimod Counseling:  I discussed with the patient the risks of imiquimod including but not limited to erythema, scaling, itching, weeping, crusting, and pain.  Patient understands that the inflammatory response to imiquimod is variable from person to person and was educated regarded proper titration schedule.  If flu-like symptoms develop, patient knows to discontinue the medication and contact us.
Winlevi Pregnancy And Lactation Text: This medication is considered safe during pregnancy and breastfeeding.
Rinvoq Counseling: I discussed with the patient the risks of Rinvoq therapy including but not limited to upper respiratory tract infections, shingles, cold sores, bronchitis, nausea, cough, fever, acne, and headache. Live vaccines should be avoided.  This medication has been linked to serious infections; higher rate of mortality; malignancy and lymphoproliferative disorders; major adverse cardiovascular events; thrombosis; thrombocytopenia, anemia, and neutropenia; lipid elevations; liver enzyme elevations; and gastrointestinal perforations.
Opzelura Pregnancy And Lactation Text: There is insufficient data to evaluate drug-associated risk for major birth defects, miscarriage, or other adverse maternal or fetal outcomes.  There is a pregnancy registry that monitors pregnancy outcomes in pregnant persons exposed to the medication during pregnancy.  It is unknown if this medication is excreted in breast milk.  Do not breastfeed during treatment and for about 4 weeks after the last dose.
Fluconazole Counseling:  Patient counseled regarding adverse effects of fluconazole including but not limited to headache, diarrhea, nausea, upset stomach, liver function test abnormalities, taste disturbance, and stomach pain.  There is a rare possibility of liver failure that can occur when taking fluconazole.  The patient understands that monitoring of LFTs and kidney function test may be required, especially at baseline. The patient verbalized understanding of the proper use and possible adverse effects of fluconazole.  All of the patient's questions and concerns were addressed.
Topical Metronidazole Counseling: Metronidazole is a topical antibiotic medication. You may experience burning, stinging, redness, or allergic reactions.  Please call our office if you develop any problems from using this medication.
Soolantra Pregnancy And Lactation Text: This medication is Pregnancy Category C. This medication is considered safe during breast feeding.
Bexarotene Counseling:  I discussed with the patient the risks of bexarotene including but not limited to hair loss, dry lips/skin/eyes, liver abnormalities, hyperlipidemia, pancreatitis, depression/suicidal ideation, photosensitivity, drug rash/allergic reactions, hypothyroidism, anemia, leukopenia, infection, cataracts, and teratogenicity.  Patient understands that they will need regular blood tests to check lipid profile, liver function tests, white blood cell count, thyroid function tests and pregnancy test if applicable.
Azelaic Acid Counseling: Patient counseled that medicine may cause skin irritation and to avoid applying near the eyes.  In the event of skin irritation, the patient was advised to reduce the amount of the drug applied or use it less frequently.   The patient verbalized understanding of the proper use and possible adverse effects of azelaic acid.  All of the patient's questions and concerns were addressed.
Tranexamic Acid Counseling:  Patient advised of the small risk of bleeding problems with tranexamic acid. They were also instructed to call if they developed any nausea, vomiting or diarrhea. All of the patient's questions and concerns were addressed.
Prednisone Counseling:  I discussed with the patient the risks of prolonged use of prednisone including but not limited to weight gain, insomnia, osteoporosis, mood changes, diabetes, susceptibility to infection, glaucoma and high blood pressure.  In cases where prednisone use is prolonged, patients should be monitored with blood pressure checks, serum glucose levels and an eye exam.  Additionally, the patient may need to be placed on GI prophylaxis, PCP prophylaxis, and calcium and vitamin D supplementation and/or a bisphosphonate.  The patient verbalized understanding of the proper use and the possible adverse effects of prednisone.  All of the patient's questions and concerns were addressed.
Nsaids Counseling: NSAID Counseling: I discussed with the patient that NSAIDs should be taken with food. Prolonged use of NSAIDs can result in the development of stomach ulcers.  Patient advised to stop taking NSAIDs if abdominal pain occurs.  The patient verbalized understanding of the proper use and possible adverse effects of NSAIDs.  All of the patient's questions and concerns were addressed.
Rituxan Counseling:  I discussed with the patient the risks of Rituxan infusions. Side effects can include infusion reactions, severe drug rashes including mucocutaneous reactions, reactivation of latent hepatitis and other infections and rarely progressive multifocal leukoencephalopathy.  All of the patient's questions and concerns were addressed.
Dupixent Pregnancy And Lactation Text: This medication likely crosses the placenta but the risk for the fetus is uncertain. This medication is excreted in breast milk.
Gabapentin Pregnancy And Lactation Text: This medication is Pregnancy Category C and isn't considered safe during pregnancy. It is excreted in breast milk.
Libtayo Counseling- I discussed with the patient the risks of Libtayo including but not limited to nausea, vomiting, diarrhea, and bone or muscle pain.  The patient verbalized understanding of the proper use and possible adverse effects of Libtayo.  All of the patient's questions and concerns were addressed.
Adbry Counseling: I discussed with the patient the risks of tralokinumab including but not limited to eye infection and irritation, cold sores, injection site reactions, worsening of asthma, allergic reactions and increased risk of parasitic infection.  Live vaccines should be avoided while taking tralokinumab. The patient understands that monitoring is required and they must alert us or the primary physician if symptoms of infection or other concerning signs are noted.
VTAMA Counseling: I discussed with the patient that VTAMA is not for use in the eyes, mouth or mouth. They should call the office if they develop any signs of allergic reactions to VTAMA. The patient verbalized understanding of the proper use and possible adverse effects of VTAMA.  All of the patient's questions and concerns were addressed.
Imiquimod Pregnancy And Lactation Text: This medication is Pregnancy Category C. It is unknown if this medication is excreted in breast milk.
Finasteride Pregnancy And Lactation Text: This medication is absolutely contraindicated during pregnancy. It is unknown if it is excreted in breast milk.
Rinvoq Pregnancy And Lactation Text: Based on animal studies, Rinvoq may cause embryo-fetal harm when administered to pregnant women.  The medication should not be used in pregnancy.  Breastfeeding is not recommended during treatment and for 6 days after the last dose.
Arava Counseling:  Patient counseled regarding adverse effects of Arava including but not limited to nausea, vomiting, abnormalities in liver function tests. Patients may develop mouth sores, rash, diarrhea, and abnormalities in blood counts. The patient understands that monitoring is required including LFTs and blood counts.  There is a rare possibility of scarring of the liver and lung problems that can occur when taking methotrexate. Persistent nausea, loss of appetite, pale stools, dark urine, cough, and shortness of breath should be reported immediately. Patient advised to discontinue Arava treatment and consult with a physician prior to attempting conception. The patient will have to undergo a treatment to eliminate Arava from the body prior to conception.
Quinolones Counseling:  I discussed with the patient the risks of fluoroquinolones including but not limited to GI upset, allergic reaction, drug rash, diarrhea, dizziness, photosensitivity, yeast infections, liver function test abnormalities, tendonitis/tendon rupture.
Picato Counseling:  I discussed with the patient the risks of Picato including but not limited to erythema, scaling, itching, weeping, crusting, and pain.
Drysol Counseling:  I discussed with the patient the risks of drysol/aluminum chloride including but not limited to skin rash, itching, irritation, burning.
Taltz Counseling: I discussed with the patient the risks of ixekizumab including but not limited to immunosuppression, serious infections, worsening of inflammatory bowel disease and drug reactions.  The patient understands that monitoring is required including a PPD at baseline and must alert us or the primary physician if symptoms of infection or other concerning signs are noted.
Propranolol Counseling:  I discussed with the patient the risks of propranolol including but not limited to low heart rate, low blood pressure, low blood sugar, restlessness and increased cold sensitivity. They should call the office if they experience any of these side effects.
Clindamycin Pregnancy And Lactation Text: This medication can be used in pregnancy if certain situations. Clindamycin is also present in breast milk.
Topical Metronidazole Pregnancy And Lactation Text: This medication is Pregnancy Category B and considered safe during pregnancy.  It is also considered safe to use while breastfeeding.
Fluconazole Pregnancy And Lactation Text: This medication is Pregnancy Category C and it isn't know if it is safe during pregnancy. It is also excreted in breast milk.
Cyclophosphamide Counseling:  I discussed with the patient the risks of cyclophosphamide including but not limited to hair loss, hormonal abnormalities, decreased fertility, abdominal pain, diarrhea, nausea and vomiting, bone marrow suppression and infection. The patient understands that monitoring is required while taking this medication.
Topical Retinoid counseling:  Patient advised to apply a pea-sized amount only at bedtime and wait 30 minutes after washing their face before applying.  If too drying, patient may add a non-comedogenic moisturizer. The patient verbalized understanding of the proper use and possible adverse effects of retinoids.  All of the patient's questions and concerns were addressed.
Tranexamic Acid Pregnancy And Lactation Text: It is unknown if this medication is safe during pregnancy or breast feeding.
Libtayo Pregnancy And Lactation Text: This medication is contraindicated in pregnancy and when breast feeding.
Bexarotene Pregnancy And Lactation Text: This medication is Pregnancy Category X and should not be given to women who are pregnant or may become pregnant. This medication should not be used if you are breast feeding.
Azelaic Acid Pregnancy And Lactation Text: This medication is considered safe during pregnancy and breast feeding.
Prednisone Pregnancy And Lactation Text: This medication is Pregnancy Category C and it isn't know if it is safe during pregnancy. This medication is excreted in breast milk.
Cimetidine Counseling:  I discussed with the patient the risks of Cimetidine including but not limited to gynecomastia, headache, diarrhea, nausea, drowsiness, arrhythmias, pancreatitis, skin rashes, psychosis, bone marrow suppression and kidney toxicity.
Rituxan Pregnancy And Lactation Text: This medication is Pregnancy Category C and it isn't know if it is safe during pregnancy. It is unknown if this medication is excreted in breast milk but similar antibodies are known to be excreted.
Nsaids Pregnancy And Lactation Text: These medications are considered safe up to 30 weeks gestation. It is excreted in breast milk.
Sotyktu Counseling:  I discussed the most common side effects of Sotyktu including: common cold, sore throat, sinus infections, cold sores, canker sores, folliculitis, and acne.  I also discussed more serious side effects of Sotyktu including but not limited to: serious allergic reactions; increased risk for infections such as TB; cancers such as lymphomas; rhabdomyolysis and elevated CPK; and elevated triglycerides and liver enzymes. 
Glycopyrrolate Counseling:  I discussed with the patient the risks of glycopyrrolate including but not limited to skin rash, drowsiness, dry mouth, difficulty urinating, and blurred vision.
Enbrel Counseling:  I discussed with the patient the risks of etanercept including but not limited to myelosuppression, immunosuppression, autoimmune hepatitis, demyelinating diseases, lymphoma, and infections.  The patient understands that monitoring is required including a PPD at baseline and must alert us or the primary physician if symptoms of infection or other concerning signs are noted.
Griseofulvin Counseling:  I discussed with the patient the risks of griseofulvin including but not limited to photosensitivity, cytopenia, liver damage, nausea/vomiting and severe allergy.  The patient understands that this medication is best absorbed when taken with a fatty meal (e.g., ice cream or french fries).
Vtama Pregnancy And Lactation Text: It is unknown if this medication can cause problems during pregnancy and breastfeeding.
Doxycycline Counseling:  Patient counseled regarding possible photosensitivity and increased risk for sunburn.  Patient instructed to avoid sunlight, if possible.  When exposed to sunlight, patients should wear protective clothing, sunglasses, and sunscreen.  The patient was instructed to call the office immediately if the following severe adverse effects occur:  hearing changes, easy bruising/bleeding, severe headache, or vision changes.  The patient verbalized understanding of the proper use and possible adverse effects of doxycycline.  All of the patient's questions and concerns were addressed.
Adbry Pregnancy And Lactation Text: It is unknown if this medication will adversely affect pregnancy or breast feeding.
Birth Control Pills Counseling: Birth Control Pill Counseling: I discussed with the patient the potential side effects of OCPs including but not limited to increased risk of stroke, heart attack, thrombophlebitis, deep venous thrombosis, hepatic adenomas, breast changes, GI upset, headaches, and depression.  The patient verbalized understanding of the proper use and possible adverse effects of OCPs. All of the patient's questions and concerns were addressed.
Klisyri Counseling:  I discussed with the patient the risks of Klisyri including but not limited to erythema, scaling, itching, weeping, crusting, and pain.
Benzoyl Peroxide Counseling: Patient counseled that medicine may cause skin irritation and bleach clothing.  In the event of skin irritation, the patient was advised to reduce the amount of the drug applied or use it less frequently.   The patient verbalized understanding of the proper use and possible adverse effects of benzoyl peroxide.  All of the patient's questions and concerns were addressed.
Propranolol Pregnancy And Lactation Text: This medication is Pregnancy Category C and it isn't known if it is safe during pregnancy. It is excreted in breast milk.
Topical Steroids Counseling: I discussed with the patient that prolonged use of topical steroids can result in the increased appearance of superficial blood vessels (telangiectasias), lightening (hypopigmentation) and thinning of the skin (atrophy).  Patient understands to avoid using high potency steroids in skin folds, the groin or the face.  The patient verbalized understanding of the proper use and possible adverse effects of topical steroids.  All of the patient's questions and concerns were addressed.
Olanzapine Counseling- I discussed with the patient the common side effects of olanzapine including but are not limited to: lack of energy, dry mouth, increased appetite, sleepiness, tremor, constipation, dizziness, changes in behavior, or restlessness.  Explained that teenagers are more likely to experience headaches, abdominal pain, pain in the arms or legs, tiredness, and sleepiness.  Serious side effects include but are not limited: increased risk of death in elderly patients who are confused, have memory loss, or dementia-related psychosis; hyperglycemia; increased cholesterol and triglycerides; and weight gain.
Cyclophosphamide Pregnancy And Lactation Text: This medication is Pregnancy Category D and it isn't considered safe during pregnancy. This medication is excreted in breast milk.
Protopic Counseling: Patient may experience a mild burning sensation during topical application. Protopic is not approved in children less than 2 years of age. There have been case reports of hematologic and skin malignancies in patients using topical calcineurin inhibitors although causality is questionable.
Isotretinoin Counseling: Patient should get monthly blood tests, not donate blood, not drive at night if vision affected, not share medication, and not undergo elective surgery for 6 months after tx completed. Side effects reviewed, pt to contact office should one occur.
Siliq Counseling:  I discussed with the patient the risks of Siliq including but not limited to new or worsening depression, suicidal thoughts and behavior, immunosuppression, malignancy, posterior leukoencephalopathy syndrome, and serious infections.  The patient understands that monitoring is required including a PPD at baseline and must alert us or the primary physician if symptoms of infection or other concerning signs are noted. There is also a special program designed to monitor depression which is required with Siliq.
Valtrex Counseling: I discussed with the patient the risks of valacyclovir including but not limited to kidney damage, nausea, vomiting and severe allergy.  The patient understands that if the infection seems to be worsening or is not improving, they are to call.
Glycopyrrolate Pregnancy And Lactation Text: This medication is Pregnancy Category B and is considered safe during pregnancy. It is unknown if it is excreted breast milk.
Doxycycline Pregnancy And Lactation Text: This medication is Pregnancy Category D and not consider safe during pregnancy. It is also excreted in breast milk but is considered safe for shorter treatment courses.
Klisyri Pregnancy And Lactation Text: It is unknown if this medication can harm a developing fetus or if it is excreted in breast milk.
Clofazimine Counseling:  I discussed with the patient the risks of clofazimine including but not limited to skin and eye pigmentation, liver damage, nausea/vomiting, gastrointestinal bleeding and allergy.
Odomzo Counseling- I discussed with the patient the risks of Odomzo including but not limited to nausea, vomiting, diarrhea, constipation, weight loss, changes in the sense of taste, decreased appetite, muscle spasms, and hair loss.  The patient verbalized understanding of the proper use and possible adverse effects of Odomzo.  All of the patient's questions and concerns were addressed.
Bimzelx Counseling:  I discussed with the patient the risks of Bimzelx including but not limited to depression, immunosuppression, allergic reactions and infections.  The patient understands that monitoring is required including a PPD at baseline and must alert us or the primary physician if symptoms of infection or other concerning signs are noted.
Zoryve Counseling:  I discussed with the patient that Zoryve is not for use in the eyes, mouth or vagina. The most commonly reported side effects include diarrhea, headache, insomnia, application site pain, upper respiratory tract infections, and urinary tract infections.  All of the patient's questions and concerns were addressed.
Rifampin Counseling: I discussed with the patient the risks of rifampin including but not limited to liver damage, kidney damage, red-orange body fluids, nausea/vomiting and severe allergy.
Azithromycin Counseling:  I discussed with the patient the risks of azithromycin including but not limited to GI upset, allergic reaction, drug rash, diarrhea, and yeast infections.
Cibinqo Counseling: I discussed with the patient the risks of Cibinqo therapy including but not limited to common cold, nausea, headache, cold sores, increased blood CPK levels, dizziness, UTIs, fatigue, acne, and vomitting. Live vaccines should be avoided.  This medication has been linked to serious infections; higher rate of mortality; malignancy and lymphoproliferative disorders; major adverse cardiovascular events; thrombosis; thrombocytopenia and lymphopenia; lipid elevations; and retinal detachment.
Topical Steroids Applications Pregnancy And Lactation Text: Most topical steroids are considered safe to use during pregnancy and lactation.  Any topical steroid applied to the breast or nipple should be washed off before breastfeeding.
Benzoyl Peroxide Pregnancy And Lactation Text: This medication is Pregnancy Category C. It is unknown if benzoyl peroxide is excreted in breast milk.
Birth Control Pills Pregnancy And Lactation Text: This medication should be avoided if pregnant and for the first 30 days post-partum.
Griseofulvin Pregnancy And Lactation Text: This medication is Pregnancy Category X and is known to cause serious birth defects. It is unknown if this medication is excreted in breast milk but breast feeding should be avoided.
Elidel Counseling: Patient may experience a mild burning sensation during topical application. Elidel is not approved in children less than 2 years of age. There have been case reports of hematologic and skin malignancies in patients using topical calcineurin inhibitors although causality is questionable.
SSKI Counseling:  I discussed with the patient the risks of SSKI including but not limited to thyroid abnormalities, metallic taste, GI upset, fever, headache, acne, arthralgias, paraesthesias, lymphadenopathy, easy bleeding, arrhythmias, and allergic reaction.
Valtrex Pregnancy And Lactation Text: this medication is Pregnancy Category B and is considered safe during pregnancy. This medication is not directly found in breast milk but it's metabolite acyclovir is present.
Tremfya Counseling: I discussed with the patient the risks of guselkumab including but not limited to immunosuppression, serious infections, and drug reactions.  The patient understands that monitoring is required including a PPD at baseline and must alert us or the primary physician if symptoms of infection or other concerning signs are noted.
Tazorac Counseling:  Patient advised that medication is irritating and drying.  Patient may need to apply sparingly and wash off after an hour before eventually leaving it on overnight.  The patient verbalized understanding of the proper use and possible adverse effects of tazorac.  All of the patient's questions and concerns were addressed.
Olanzapine Pregnancy And Lactation Text: This medication is pregnancy category C.   There are no adequate and well controlled trials with olanzapine in pregnant females.  Olanzapine should be used during pregnancy only if the potential benefit justifies the potential risk to the fetus.   In a study in lactating healthy women, olanzapine was excreted in breast milk.  It is recommended that women taking olanzapine should not breast feed.
Cyclosporine Counseling:  I discussed with the patient the risks of cyclosporine including but not limited to hypertension, gingival hyperplasia,myelosuppression, immunosuppression, liver damage, kidney damage, neurotoxicity, lymphoma, and serious infections. The patient understands that monitoring is required including baseline blood pressure, CBC, CMP, lipid panel and uric acid, and then 1-2 times monthly CMP and blood pressure.
Doxepin Counseling:  Patient advised that the medication is sedating and not to drive a car after taking this medication. Patient informed of potential adverse effects including but not limited to dry mouth, urinary retention, and blurry vision.  The patient verbalized understanding of the proper use and possible adverse effects of doxepin.  All of the patient's questions and concerns were addressed.
Hydroxychloroquine Counseling:  I discussed with the patient that a baseline ophthalmologic exam is needed at the start of therapy and every year thereafter while on therapy. A CBC may also be warranted for monitoring.  The side effects of this medication were discussed with the patient, including but not limited to agranulocytosis, aplastic anemia, seizures, rashes, retinopathy, and liver toxicity. Patient instructed to call the office should any adverse effect occur.  The patient verbalized understanding of the proper use and possible adverse effects of Plaquenil.  All the patient's questions and concerns were addressed.
Isotretinoin Pregnancy And Lactation Text: This medication is Pregnancy Category X and is considered extremely dangerous during pregnancy. It is unknown if it is excreted in breast milk.
Azithromycin Pregnancy And Lactation Text: This medication is considered safe during pregnancy and is also secreted in breast milk.
Erythromycin Counseling:  I discussed with the patient the risks of erythromycin including but not limited to GI upset, allergic reaction, drug rash, diarrhea, increase in liver enzymes, and yeast infections.
Rifampin Pregnancy And Lactation Text: This medication is Pregnancy Category C and it isn't know if it is safe during pregnancy. It is also excreted in breast milk and should not be used if you are breast feeding.
Humira Counseling:  I discussed with the patient the risks of adalimumab including but not limited to myelosuppression, immunosuppression, autoimmune hepatitis, demyelinating diseases, lymphoma, and serious infections.  The patient understands that monitoring is required including a PPD at baseline and must alert us or the primary physician if symptoms of infection or other concerning signs are noted.
Minoxidil Counseling: Minoxidil is a topical medication which can increase blood flow where it is applied. It is uncertain how this medication increases hair growth. Side effects are uncommon and include stinging and allergic reactions.
Spironolactone Counseling: Patient advised regarding risks of diarrhea, abdominal pain, hyperkalemia, birth defects (for female patients), liver toxicity and renal toxicity. The patient may need blood work to monitor liver and kidney function and potassium levels while on therapy. The patient verbalized understanding of the proper use and possible adverse effects of spironolactone.  All of the patient's questions and concerns were addressed.
Sotyktu Pregnancy And Lactation Text: There is insufficient data to evaluate whether or not Sotyktu is safe to use during pregnancy.   It is not known if Sotyktu passes into breast milk and whether or not it is safe to use when breastfeeding.  
Cibinqo Pregnancy And Lactation Text: It is unknown if this medication will adversely affect pregnancy or breast feeding.  You should not take this medication if you are currently pregnant or planning a pregnancy or while breastfeeding.
Bimzelx Pregnancy And Lactation Text: This medication crosses the placenta and the safety is uncertain during pregnancy. It is unknown if this medication is present in breast milk.
Sski Pregnancy And Lactation Text: This medication is Pregnancy Category D and isn't considered safe during pregnancy. It is excreted in breast milk.
Protopic Pregnancy And Lactation Text: This medication is Pregnancy Category C. It is unknown if this medication is excreted in breast milk when applied topically.
Topical Sulfur Applications Counseling: Topical Sulfur Counseling: Patient counseled that this medication may cause skin irritation or allergic reactions.  In the event of skin irritation, the patient was advised to reduce the amount of the drug applied or use it less frequently.   The patient verbalized understanding of the proper use and possible adverse effects of topical sulfur application.  All of the patient's questions and concerns were addressed.
Itraconazole Counseling:  I discussed with the patient the risks of itraconazole including but not limited to liver damage, nausea/vomiting, neuropathy, and severe allergy.  The patient understands that this medication is best absorbed when taken with acidic beverages such as non-diet cola or ginger ale.  The patient understands that monitoring is required including baseline LFTs and repeat LFTs at intervals.  The patient understands that they are to contact us or the primary physician if concerning signs are noted.
Tazorac Pregnancy And Lactation Text: This medication is not safe during pregnancy. It is unknown if this medication is excreted in breast milk.
High Dose Vitamin A Counseling: Side effects reviewed, pt to contact office should one occur.
Carac Counseling:  I discussed with the patient the risks of Carac including but not limited to erythema, scaling, itching, weeping, crusting, and pain.
Oral Minoxidil Counseling- I discussed with the patient the risks of oral minoxidil including but not limited to shortness of breath, swelling of the feet or ankles, dizziness, lightheadedness, unwanted hair growth and allergic reaction.  The patient verbalized understanding of the proper use and possible adverse effects of oral minoxidil.  All of the patient's questions and concerns were addressed.
Sarecycline Counseling: Patient advised regarding possible photosensitivity and discoloration of the teeth, skin, lips, tongue and gums.  Patient instructed to avoid sunlight, if possible.  When exposed to sunlight, patients should wear protective clothing, sunglasses, and sunscreen.  The patient was instructed to call the office immediately if the following severe adverse effects occur:  hearing changes, easy bruising/bleeding, severe headache, or vision changes.  The patient verbalized understanding of the proper use and possible adverse effects of sarecycline.  All of the patient's questions and concerns were addressed.
Simponi Counseling:  I discussed with the patient the risks of golimumab including but not limited to myelosuppression, immunosuppression, autoimmune hepatitis, demyelinating diseases, lymphoma, and serious infections.  The patient understands that monitoring is required including a PPD at baseline and must alert us or the primary physician if symptoms of infection or other concerning signs are noted.
Doxepin Pregnancy And Lactation Text: This medication is Pregnancy Category C and it isn't known if it is safe during pregnancy. It is also excreted in breast milk and breast feeding isn't recommended.
Hydroxychloroquine Pregnancy And Lactation Text: This medication has been shown to cause fetal harm but it isn't assigned a Pregnancy Risk Category. There are small amounts excreted in breast milk.
Colchicine Counseling:  Patient counseled regarding adverse effects including but not limited to stomach upset (nausea, vomiting, stomach pain, or diarrhea).  Patient instructed to limit alcohol consumption while taking this medication.  Colchicine may reduce blood counts especially with prolonged use.  The patient understands that monitoring of kidney function and blood counts may be required, especially at baseline. The patient verbalized understanding of the proper use and possible adverse effects of colchicine.  All of the patient's questions and concerns were addressed.
Cimzia Counseling:  I discussed with the patient the risks of Cimzia including but not limited to immunosuppression, allergic reactions and infections.  The patient understands that monitoring is required including a PPD at baseline and must alert us or the primary physician if symptoms of infection or other concerning signs are noted.
Zyclara Counseling:  I discussed with the patient the risks of imiquimod including but not limited to erythema, scaling, itching, weeping, crusting, and pain.  Patient understands that the inflammatory response to imiquimod is variable from person to person and was educated regarded proper titration schedule.  If flu-like symptoms develop, patient knows to discontinue the medication and contact us.
Bactrim Counseling:  I discussed with the patient the risks of sulfa antibiotics including but not limited to GI upset, allergic reaction, drug rash, diarrhea, dizziness, photosensitivity, and yeast infections.  Rarely, more serious reactions can occur including but not limited to aplastic anemia, agranulocytosis, methemoglobinemia, blood dyscrasias, liver or kidney failure, lung infiltrates or desquamative/blistering drug rashes.
Erythromycin Pregnancy And Lactation Text: This medication is Pregnancy Category B and is considered safe during pregnancy. It is also excreted in breast milk.
Spironolactone Pregnancy And Lactation Text: This medication can cause feminization of the male fetus and should be avoided during pregnancy. The active metabolite is also found in breast milk.
Qbrexza Counseling:  I discussed with the patient the risks of Qbrexza including but not limited to headache, mydriasis, blurred vision, dry eyes, nasal dryness, dry mouth, dry throat, dry skin, urinary hesitation, and constipation.  Local skin reactions including erythema, burning, stinging, and itching can also occur.
Eucrisa Counseling: Patient may experience a mild burning sensation during topical application. Eucrisa is not approved in children less than 2 years of age.
Dutasteride Male Counseling: Dustasteride Counseling:  I discussed with the patient the risks of use of dutasteride including but not limited to decreased libido, decreased ejaculate volume, and gynecomastia. Women who can become pregnant should not handle medication.  All of the patient's questions and concerns were addressed.
Topical Sulfur Applications Pregnancy And Lactation Text: This medication is Pregnancy Category C and has an unknown safety profile during pregnancy. It is unknown if this topical medication is excreted in breast milk.
Xolair Counseling:  Patient informed of potential adverse effects including but not limited to fever, muscle aches, rash and allergic reactions.  The patient verbalized understanding of the proper use and possible adverse effects of Xolair.  All of the patient's questions and concerns were addressed.
Xeljanz Counseling: I discussed with the patient the risks of Xeljanz therapy including increased risk of infection, liver issues, headache, diarrhea, or cold symptoms. Live vaccines should be avoided. They were instructed to call if they have any problems.
Litfulo Counseling: I discussed with the patient the risks of Litfulo therapy including but not limited to upper respiratory tract infections, shingles, cold sores, and nausea. Live vaccines should be avoided.  This medication has been linked to serious infections; higher rate of mortality; malignancy and lymphoproliferative disorders; major adverse cardiovascular events; thrombosis; gastrointestinal perforations; neutropenia; lymphopenia; anemia; liver enzyme elevations; and lipid elevations.
Methotrexate Counseling:  Patient counseled regarding adverse effects of methotrexate including but not limited to nausea, vomiting, abnormalities in liver function tests. Patients may develop mouth sores, rash, diarrhea, and abnormalities in blood counts. The patient understands that monitoring is required including LFT's and blood counts.  There is a rare possibility of scarring of the liver and lung problems that can occur when taking methotrexate. Persistent nausea, loss of appetite, pale stools, dark urine, cough, and shortness of breath should be reported immediately. Patient advised to discontinue methotrexate treatment at least three months before attempting to become pregnant.  I discussed the need for folate supplements while taking methotrexate.  These supplements can decrease side effects during methotrexate treatment. The patient verbalized understanding of the proper use and possible adverse effects of methotrexate.  All of the patient's questions and concerns were addressed.
Topical Clindamycin Counseling: Patient counseled that this medication may cause skin irritation or allergic reactions.  In the event of skin irritation, the patient was advised to reduce the amount of the drug applied or use it less frequently.   The patient verbalized understanding of the proper use and possible adverse effects of clindamycin.  All of the patient's questions and concerns were addressed.
Thalidomide Counseling: I discussed with the patient the risks of thalidomide including but not limited to birth defects, anxiety, weakness, chest pain, dizziness, cough and severe allergy.
High Dose Vitamin A Pregnancy And Lactation Text: High dose vitamin A therapy is contraindicated during pregnancy and breast feeding.
Hydroxyzine Counseling: Patient advised that the medication is sedating and not to drive a car after taking this medication.  Patient informed of potential adverse effects including but not limited to dry mouth, urinary retention, and blurry vision.  The patient verbalized understanding of the proper use and possible adverse effects of hydroxyzine.  All of the patient's questions and concerns were addressed.
Oral Minoxidil Pregnancy And Lactation Text: This medication should only be used when clearly needed if you are pregnant, attempting to become pregnant or breast feeding.
Qbrexza Pregnancy And Lactation Text: There is no available data on Qbrexza use in pregnant women.  There is no available data on Qbrexza use in lactation.
Low Dose Naltrexone Counseling- I discussed with the patient the potential risks and side effects of low dose naltrexone including but not limited to: more vivid dreams, headaches, nausea, vomiting, abdominal pain, fatigue, dizziness, and anxiety.
Ilumya Counseling: I discussed with the patient the risks of tildrakizumab including but not limited to immunosuppression, malignancy, posterior leukoencephalopathy syndrome, and serious infections.  The patient understands that monitoring is required including a PPD at baseline and must alert us or the primary physician if symptoms of infection or other concerning signs are noted.
Cimzia Pregnancy And Lactation Text: This medication crosses the placenta but can be considered safe in certain situations. Cimzia may be excreted in breast milk.
Ketoconazole Counseling:   Patient counseled regarding improving absorption with orange juice.  Adverse effects include but are not limited to breast enlargement, headache, diarrhea, nausea, upset stomach, liver function test abnormalities, taste disturbance, and stomach pain.  There is a rare possibility of liver failure that can occur when taking ketoconazole. The patient understands that monitoring of LFTs may be required, especially at baseline. The patient verbalized understanding of the proper use and possible adverse effects of ketoconazole.  All of the patient's questions and concerns were addressed.
Bactrim Pregnancy And Lactation Text: This medication is Pregnancy Category D and is known to cause fetal risk.  It is also excreted in breast milk.
Metronidazole Counseling:  I discussed with the patient the risks of metronidazole including but not limited to seizures, nausea/vomiting, a metallic taste in the mouth, nausea/vomiting and severe allergy.
Xelalessandraz Pregnancy And Lactation Text: This medication is Pregnancy Category D and is not considered safe during pregnancy.  The risk during breast feeding is also uncertain.
Litfulo Pregnancy And Lactation Text: Based on animal studies, Lifulo may cause embryo-fetal harm when administered to pregnant women.  The medication should not be used in pregnancy.  Breastfeeding is not recommended during treatment.
Mirvaso Counseling: Mirvaso is a topical medication which can decrease superficial blood flow where applied. Side effects are uncommon and include stinging, redness and allergic reactions.
Dutasteride Female Counseling: Dutasteride Counseling:  I discussed with the patient the risks of use of dutasteride including but not limited to decreased libido and sexual dysfunction. Explained the teratogenic nature of the medication and stressed the importance of not getting pregnant during treatment. All of the patient's questions and concerns were addressed.

## 2024-02-20 NOTE — PROCEDURE: INTRALESIONAL KENALOG
Kenalog Preparation: Kenalog
How Many Mls Were Removed From The 40 Mg/Ml (1ml) Vial When Preparing The Injectable Solution?: 0
Ndc# For Kenalog Only: 2723697552
Lot # For Kenalog (Optional): [id number]
Validate Note Data When Using Inventory: Yes
Require Ndc Code?: No
Show Inventory Tab: Hide
Medical Necessity Clause: This procedure was medically necessary because the lesions that were treated were:
Detail Level: Detailed
Administered By (Optional): Yady Tillman
Total Volume (Ccs): 0.5
Consent: The risks of atrophy were reviewed with the patient.
Concentration Of Kenalog Solution Injected (Mg/Ml): 10.0
Expiration Date For Kenalog (Optional): 12/2025
Kenalog Type Of Vial: Multiple Dose

## 2024-02-20 NOTE — PROCEDURE: TREATMENT REGIMEN
Action 4: Continue
Initiate Regimen: Zoryve 0.3 % topical foam -Sig: Apply to scalp once a day.
Detail Level: Zone

## 2024-03-18 ENCOUNTER — RX ONLY (OUTPATIENT)
Age: 64
Setting detail: RX ONLY
End: 2024-03-18

## 2024-03-18 ENCOUNTER — APPOINTMENT (RX ONLY)
Dept: URBAN - METROPOLITAN AREA CLINIC 41 | Facility: CLINIC | Age: 64
Setting detail: DERMATOLOGY
End: 2024-03-18

## 2024-03-18 DIAGNOSIS — L40.0 PSORIASIS VULGARIS: ICD-10-CM

## 2024-03-18 PROCEDURE — 11900 INJECT SKIN LESIONS </W 7: CPT

## 2024-03-18 PROCEDURE — ? INTRALESIONAL KENALOG

## 2024-03-18 PROCEDURE — ? COUNSELING

## 2024-03-18 RX ORDER — FLUOCINOLONE ACETONIDE 0.11 MG/ML
OIL TOPICAL
Qty: 118.28 | Refills: 2 | Status: ERX

## 2024-03-18 ASSESSMENT — LOCATION SIMPLE DESCRIPTION DERM
LOCATION SIMPLE: POSTERIOR SCALP
LOCATION SIMPLE: ANTERIOR SCALP

## 2024-03-18 ASSESSMENT — LOCATION DETAILED DESCRIPTION DERM
LOCATION DETAILED: MID-FRONTAL SCALP
LOCATION DETAILED: LEFT OCCIPITAL SCALP

## 2024-03-18 ASSESSMENT — LOCATION ZONE DERM: LOCATION ZONE: SCALP

## 2024-03-18 NOTE — PROCEDURE: INTRALESIONAL KENALOG
Detail Level: Detailed
Total Volume (Ccs): 0.5
Medical Necessity Clause: This procedure was medically necessary because the lesions that were treated were:
How Many Mls Were Removed From The 40 Mg/Ml (5ml) Vial When Preparing The Injectable Solution?: 0
Kenalog Preparation: Kenalog
Require Ndc Code?: No
Ndc# For Kenalog Only: 7708829765
Concentration Of Kenalog Solution Injected (Mg/Ml): 5.0
Show Inventory Tab: Hide
Expiration Date For Kenalog (Optional): 
Validate Note Data When Using Inventory: Yes
Kenalog Type Of Vial: Multiple Dose
Lot # For Kenalog (Optional): [id number]
Consent: The risks of atrophy were reviewed with the patient.
Administered By (Optional): Yady Tillman

## 2024-04-02 ENCOUNTER — TELEP (OUTPATIENT)
Dept: URBAN - METROPOLITAN AREA TELEHEALTH 2 | Facility: TELEHEALTH | Age: 64
End: 2024-04-02
Payer: MEDICARE

## 2024-04-02 VITALS — BODY MASS INDEX: 28.89 KG/M2 | HEIGHT: 61 IN | WEIGHT: 153 LBS

## 2024-04-02 DIAGNOSIS — K76.82 HEPATIC ENCEPHALOPATHY: ICD-10-CM

## 2024-04-02 DIAGNOSIS — K74.60 CIRRHOSIS OF LIVER: ICD-10-CM

## 2024-04-02 DIAGNOSIS — R18.8 ASCITES: ICD-10-CM

## 2024-04-02 DIAGNOSIS — K75.81 NASH (NONALCOHOLIC STEATOHEPATITIS): ICD-10-CM

## 2024-04-02 PROCEDURE — 99214 OFFICE O/P EST MOD 30 MIN: CPT | Performed by: PHYSICIAN ASSISTANT

## 2024-04-02 RX ORDER — BUMETANIDE 0.5 MG/1
4 (2MG) TABLET ORAL ONCE A DAY
Status: ACTIVE | COMMUNITY

## 2024-04-02 RX ORDER — LACTULOSE 10 G/15ML
30 ML SOLUTION ORAL ONCE A DAY
Status: ACTIVE | COMMUNITY

## 2024-04-02 RX ORDER — GABAPENTIN 300 MG/1
1 CAPSULE CAPSULE ORAL THREE TIMES A DAY
Status: ACTIVE | COMMUNITY

## 2024-04-02 RX ORDER — MIDODRINE HYDROCHLORIDE 10 MG/1
1 TABLET TABLET ORAL THREE TIMES A DAY
Status: ACTIVE | COMMUNITY

## 2024-04-02 RX ORDER — VORTIOXETINE 20 MG/1
TAKE 1 TABLET (20 MG) BY ORAL ROUTE ONCE DAILY AT THE SAME TIME EACH DAY TABLET, FILM COATED ORAL ONCE A DAY
Refills: 0 | Status: ACTIVE | COMMUNITY
Start: 1900-01-01

## 2024-04-02 RX ORDER — SEMAGLUTIDE 0.68 MG/ML
AS DIRECTED INJECTION, SOLUTION SUBCUTANEOUS
Status: ACTIVE | COMMUNITY

## 2024-04-02 RX ORDER — DEXTROSE 4 G
1 TABLET TABLET,CHEWABLE ORAL ONCE A DAY
Status: ACTIVE | COMMUNITY

## 2024-04-02 RX ORDER — NADOLOL 40 MG/1
1 TABLET TABLET ORAL TWICE A DAY
Status: ACTIVE | COMMUNITY

## 2024-04-02 RX ORDER — POTASSIUM CHLORIDE 1500 MG/1
2 TABLETS WITH FOOD TABLET, FILM COATED, EXTENDED RELEASE ORAL ONCE A DAY
Refills: 0 | Status: ACTIVE | COMMUNITY
Start: 1900-01-01

## 2024-04-02 RX ORDER — RIFAXIMIN 550 MG/1
1 TABLET TABLET ORAL TWICE A DAY
Qty: 60 TABLET | Refills: 5 | Status: ACTIVE | COMMUNITY

## 2024-04-02 RX ORDER — ASPIRIN 81 MG/1
1 TABLET TABLET, COATED ORAL ONCE A DAY
Status: ACTIVE | COMMUNITY

## 2024-04-02 RX ORDER — OMEPRAZOLE 40 MG/1
TAKE 1 CAPSULE BY MOUTH TWICE A DAY BEFORE A MEAL CAPSULE, DELAYED RELEASE ORAL
Qty: 180 CAPSULE | Refills: 0 | Status: ACTIVE | COMMUNITY

## 2024-04-02 NOTE — HPI-TODAY'S VISIT:
Pt is 64 F last seen Jan 2024 by myself with hx baeza/mash and cirrhosis and smv/pv clot, hx varices, hx pse, s/p pTIPs and now here for ongoing care.  A copy of the note was sent to the referring provider.   4/2/24  She saw [facility] on 3/25/2024 and had labs done.1.7, white blood cells 4.6, red blood cells 4.08, hemoglobin 13.3, MCV 94, platelets 36 and this is low.  aFP 2, sodium 143, potassium 4.1, bilirubin 3.0, alkaline phosphatase 82, AST 31, ALT 17. MELD 17They ordered a repeat MRI on her, due May 2024. saw IR and 2/2024 us with patent TIPS  The 1/19/24 labs were sent to me.  The sodium 140, potassium 3.9, creatinine 0.65, bilirubin 2.2, alkaline phosphatase 95, AST 21, ALT 13.  The INR 1.7.  Complete blood count showing white blood cells 5.1, hemoglobin 13.4, MCV 91, platelets 40. MELD 15. Lets lower the spironolactone to 50 mg. Please let us know if you need a new prescription for this. You will only need to take two 25 mg tablets a day.  Do Valles PA-C  reviewed the above and asked about recent illness and not sick but having a hard time.  asked abouve what changed and denies any changes  MRI due may 2024 she is more tired so less active and that could contribute to ast being up and follow     RECAP  1/16/24 office visit  11/16/23 MRI EXAM: MR ABDOMEN W AND WO CONTRAST&nbsp;CLINICAL INDICATION: HCC Screening. BAEZA cirrhosis, recurrent ascites?that underwent creation of a transjugular intrahepatic portosystemic shunt (TIPS) and splenic vein stent placement on 8/8/23. TECHNIQUE: Multisequence, multiplanar MRI of the abdomen was performed without and with intravenous contrast. ESRC.2.7.3;COMPARISON: MRI 12/14/22 and CT 7/31/2023 and CTA 3/29/2023&nbsp;FINDINGS: Lower Thorax: Small left pleural effusion. Unchanged left lower lobe subsegmental atelectasis Liver: No fat or iron. Morphologic changes of chronic liver disease including nodular surface contour, lobar redistribution and fissural widening. No suspicious hepatic lesions.  Gallbladder/Biliary Tree: Mildly prominent bile ducts secondary to prior cholecystectomy. Spleen: Enlarged measuring up to 17.4 cm in CC dimension. Pancreas: Normal. Adrenal Glands: Normal.  Kidneys/Ureters: Renal cysts. No hydronephrosis. Gastrointestinal: No bowel obstruction.  Lymph Nodes: Marked interval improvement in previously seen extensive inflammatory soft tissue with enhancement in the retroperitoneum surrounding the abdominal aorta and branch vessels. Small subcentimeter in short axis retroperitoneal lymph nodes remain (13:55). Vessels: Grossly patent TIPS extends from the middle hepatic vein to the main portal vein. 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 Chronic occlusion of the SMV with reconstitution distally in the branch vessels of the SMV (13:61). Multiple collateral vasculature in the periportal region, central mesentery and the anterior abdomen. Redemonstrated partially thrombosed portosystemic shunt from the splenic vein (13:53) extending inferiorly to the pelvis, better seen on prior CTA from 3/29/2023. Upper abdominal perigastric and perisplenic varices. Replaced right hepatic artery arising from the superior mesenteric artery. Peritoneum/Retroperitoneum: Improved retroperitoneal/mesenteric root edema and enhancing soft tissue. Small right retroperitoneal nodule (13:56) measuring 1 cm, previously 2.1 x 1.9 cm. Bones/Soft Tissues: No suspicious osseous lesion. IMPRESSION:1.  No suspicious focal hepatic lesion.2.  Morphologic changes of chronic liver disease with stigmata of portal hypertension including splenomegaly and extensive upper abdominal varices.3.  Patent TIPS. Redemonstrated chronic thrombosis of the main portal vein and SMV with near occlusive thrombus of the large portosystemic venous shunt from the splenic vein extending to the pelvis. Extensive upper abdominal varices and collateral vasculature as detailed above.4.  Marked interval improvement in retroperitoneal enhancing soft tissue and lymphadenopathy, seen previously on 6/2/2023 suggestive of resolving nonspecific inflammatory process.The images were reviewed and interpreted by Hussain Casanova MD.  Has fu TIPs US to check patency next month EGD utd she is still on ozempic and no side affects    dear [place], October 13 labs show glucose elevated at 272 and possibly you were not fasting that day?  Please share with primary provider to continue to work on this issue.  BUN 11 creatinine 0.83 sodium 140 potassium 4.4 chloride 101 CO2 28 calcium 9.3 with albumin slightly diminished at 3.6 and previous given 3.7.  Bilirubin was slightly higher at 3.2 but not fractionated.  Alk phos normal at 115 AST down to 34 from 52 and ALT 16 down from 26.  Ideal ALT less than 25.  WBC 5.7 hemoglobin 13.3 platelet  count was slightly higher for you at 52 from 39 before.  RDW was slightly up at 15.8.  Neutrophils normal at 4.6 and lymphocytes slightly low at 0.6 but this has been chronically seen and you before.  Please share with primary provider.  INR 1.4. Meld remains right at 15/15 and mainly driven by the elevated bilirubin. Please share labs with local providers.   No clear lab reason seen to decrease the diuretics for now. Await the mri. Dr Gonzales  Pt did the TIPS through [facility] and that was done Aug 2023. She was done and came home same day.  She says she has seen Dr Barrios and he has CT of abd and he wanted to do mri. She says not switched for this.  Sept 18 2023 U.s: EXAM: US ABDOMEN COMPLETE, US ABDOMEN DOPPLER COMPLETE&nbsp;CLINICAL INDICATION: TIPS.&nbsp;TECHNIQUE: Grayscale, pulsed wave and color Doppler sonography of the upper abdomen were performed. ESRC.3.7.1&nbsp;COMPARISON: CT 7/31/2023.&nbsp;FINDINGS: Liver: Coarsened echogenicity with a nodular surface contour compatible with morphologic changes of chronic liver disease. No lesions.&nbsp;Bile Ducts: No dilated intrahepatic biliary radicles. Common duct measures 4 mm.&nbsp;Gallbladder: Status post cholecystectomy. &nbsp;Pancreas: Obscured by overlying structures.&nbsp;Right Kidney: Measures 10.3 cm. Normal echogenicity. No hydronephrosis.&nbsp;Left Kidney: Measures 11.7 cm. Normal echogenicity. No hydronephrosis.&nbsp;Spleen: Measures 17.1 cm.&nbsp;Aorta: Normal caliber where imaged.&nbsp;IVC: Normal proximally, not imaged distally. &nbsp;Hepatic Veins: Normal.&nbsp;Portal Veins: Hepatopetal flow. 85-93 cm/s.&nbsp;Hepatic Arteries: Peak systolic velocity 48 cm/s. Resistive index 0.67. Normal waveform.&nbsp;TIPS: Stent is patent. Hepatopetal flow at the portal venous end and hepatofugal flow at the hepatic venous end. Velocity in the portal end 130-156 cm/s. Velocity at mid stent 143-162 cm/s. Velocity at the hepatic end 108-134 cm/s.&nbsp;Other: Trace perihepatic free fluid.&nbsp;IMPRESSION:1.  Patent TIPS.2.  Morphologic changes of chronic liver disease with mild sequelae of portal hypertension including splenomegaly and trace perihepatic free fluid.&nbsp;  July 2023 Ct did pre tips: EXAM: CT ABDOMEN W IV CONTRAST&nbsp;CLINICAL INDICATION: Portal hypertension.&nbsp;TECHNIQUE: Following administration of non-ionic IV contrast, postcontrast images through the abdomen were obtained. If applicable, point-of-care testing was approved following departmental protocol. [facility].1.7.1 &nbsp;COMPARISON: CT abdomen/pelvis 3/29/2023, MRI abdomen 6/2/2023.&nbsp;FINDINGS: Lower Thorax: Trace left pleural effusion and left basilar atelectasis.&nbsp;Liver: Morphologic changes of chronic liver disease including nodular surface contour, lobar redistribution and fissural widening. No suspicious focal lesion.&nbsp;Gallbladder/Biliary Tree: Status post cholecystectomy. No biliary ductal dilatation.&nbsp;Spleen: Enlarged measuring up to 19 cm in craniocaudal dimension.&nbsp;Pancreas: Normal.&nbsp;Adrenal Glands: Left adrenal gland difficult to visualize. Normal right adrenal gland. &nbsp;Kidneys/Ureters: Renal cysts. No hydronephrosis.&nbsp;Gastrointestinal: No bowel obstruction or inflammation. &nbsp;Lymph Nodes: Unchanged subcentimeter in short axis jaelyn hepatis and retroperitoneal lymph nodes, likely reactive.&nbsp;Vessels: Diminutive main and central portal veins which otherwise appear patent. Redemonstrated chronic occlusion of the portosplenic venous confluence involving the portal vein extending into the superior mesenteric vein. Multiple upper abdominal paraesophageal, perigastric and perisplenic varices. Additional extensive collateral vasculature in the anterior abdomen. Findings are similar to recent MRI from 6/2/2023. Redemonstrated thrombosed portosystemic shunt arising from the splenic vein and extending inferiorly into the pelvis (6:30) and similar to 6/2/2023 MRI. Replaced right hepatic artery arising from the SMA.&nbsp;Peritoneum/Retroperitoneum: Moderate volume abdominopelvic free fluid.&nbsp;Bones/Soft Tissues: No aggressive osseous lesion. Grade 1 anterolisthesis L4 relative to L5.&nbsp;IMPRESSION:1.  Morphologic changes of chronic liver disease with sequelae of portal hypertension including splenomegaly, moderate volume ascites and extensive varices.2.  Unchanged chronic occlusion at the portosplenic venous confluence involving the portal vein extending into the superior mesenteric vein. Remainder of the main portal vein and central intrahepatic portal veins are diminutive in caliber but patent.3.  Unchanged extensive abdominal varices and a thrombosed portosystemic shunt arising from the splenic vein.4.  Small left pleural effusion.  IR TIPS: Augh 2023 Findings: &nbsp;1.  Right internal jugular vein is ultrasonographically patent and compresses.  Needle entry was documented.2.  Right hepatic venogram confirms vein patency.3.  Portogram demonstrates small main portal vein and small intrahepatic branches with prominent collaterals, with dilated and tortuous coronary and splenic veins.4.  Splenic venography demonstrates large tortuous main splenic vein and collaterals, with stenosis near the portal confluence.5.  Splenic venography after splenic vein stent placement demonstrates improved flow across area of stenosis.6.  The initial portosystemic gradient is 22 mmHg.7.  Post-TIPSS portosystemic gradient is 9 mmHg.8.  The TIPSS has a smooth course and extends from the middle hepatic vein to the main portal vein.9.  There is good flow through the shunt at the conclusion of the procedure with no appreciable filling of varices.10.  4.8 liters of fluid were drained during the procedure.11.  New right internal jugular approach triple-lumen catheter terminates at the superior cavoatrial junction.&nbsp;IMPRESSION:Impression: &nbsp;1.  Successful creation of TIPS from middle hepatic vein to main portal vein with a 10 mm x 6 cm x 2 cm controlled-expansion Viatorr stent (ballooned to 10 mm). Splenic vein stenosis improved after Abre 10mm x 8cm stent placement.2.  The portosystemic gradient was reduced from 22 mm Hg to 9 mmHg.  Since the tips no bleeding and no more taps.  Need to reach to Dr Barrios re the issue of her needing the mri and not  the ct.  Sept 18 2023 pt 17 and inr 1.5 and  wbc 3.9 and hg 12.7 and plat 50 and mcv 86.6 and na 140 and k 4.0 and cl 105 and co2 30 and ca 9.1 and bun 9 and cr 0.7 and glu 140 and alb 2.9 and alk 132 and ast 33 and alt 14.  tb 2.5.  Meld 14 and meld na 14.  Has been feeling better and doing better in from 22 to 14.   She lost like 40 pounds with the tips.  June 5 MRI was updated by radiology today. When they compared to multiple priors dating back to 2018, the described retroperitoneal/mesenteric root edema and perivascular infiltrative soft tissue demonstrates avid enhancement with no restricted diffusion.  The degree of enhancement has progressed as well as the density infiltrative soft tissue compared to multiple priors. Retroperitoneal lymph nodes were also increased in number but not pathologically enlarged.  There was no evident medialization of the ureters.  They said that they findings are concerning for retroperitoneal fibrotic or inflammatory process. It appears that you were seen on June 20 by the transplant team/Dr Barrios and we will forward them this MRI correction so that they can be aware of this.  We will forward it to him via [facility]  to be aware of as he just saw you.   The 6/2/23 MR elastography was sent to me after lunch today. As you recall it was not available for viewing during the visit today. I called and left you a voicemail about this.  Lower thorax with unchanged small left pleural effusion.  Liver with subjective evidence of fat deposition.  Cirrhotic morphology.  No suspicious lesions.  Elastography donel after a weighted mean of 3 measurements showing 3.2K PA consistent with stage I-II fibrosis.  The fat quantification 12.5% consistent with mild fatty liver.  There is no significant iron deposition.  They did not see any biliary ductal dilatation.  Splenomegaly measuring 20 cm was seen.  The pancreas normal, adrenal glands normal.  They saw a right upper renal cyst.  Please make sure the primary care provider is aware.  They saw reactive lymph nodes.  They thought that the aorta was normal.  The superior mesenteric and inferior mesenteric along with the bilateral renal arteries were patent.  They noted the replaced right hepatic artery arising from the superior mesenteric artery.  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  They noted that there has been interval progression of the degree of thrombosis of the portosystemic shunt with near complete occlusive thrombus.  They noted additional varices and this is why we have to stay on top of the endoscopic use.  They saw mesenteric edema unchanged from the previous CT.  Moderate ascites.  Overall they noted cirrhosis and did not see any suspicious lesions but they did note that there was progression of a now near occlusive thrombus of a large portosystemic venous shunt from the splenic vein to the left femoral vein.  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 As you recall we were doing this to check on the clot and to ensure there were no lesions. It is good to confirm no lesions. Need to be sure you see the transplant team back as discussed.    6/5/23  SHe did the mri on friday but interestingly it has not been read yet. Checked epic yesterday and today for this. Will follow up with the pending result.  She had the repeat colon.  She is having a paracentesis every 6 days or so, 4 L removed she is doing the low salt diet and limiting fluids. DIscussed the risk of recurrrent paracentesis and needs to see oltx back on this and see if options. HE symtpoms controlled on xifaxan and miralax as seh does not tolerate the lactulose She has not schedlued with dr. barrios as discussed. inr 1.43, mcv 86, hgb 11.1, platelets 125, bili 2.5, ast 36, alt 18, alk phos 101 na 136, cr 0.8 MELD 14, meld na 15 4/27/23 HOspital follow up and recurrent ascites. She hospitalized patient recently at [facility].  She was in route to [place] per the notes but had EKG abnormalities and they redirected her to [place].  She had gained 19 pounds in the last several days.  Recent paracentesis on 419 with 4 L removed.  She was also having hematochezia and reviewed the chart and had recent rectal clips done and those need a re look and hospital noted she did not want to do there as she does things at [facility]  CT scan done on 423  Cirrhotic morphology of the liver with this extensive paraesophageal and abdominal varices and splenomegaly. no lesions  April 24 paracentesis showing that there was 4 L removed.  April 24 labs with hemoglobin 9.8, platelets 47, INR 1.6.  Sodium 131, potassium 3.1, bilirubin 1.9, alkaline phosphatase 80, ALT 17, AST 37. cr .7  Per epic she has been pending an echo and rescheduled multiple times due to transportation issues. She was pending mri recently but not done  Discussed the ascites and need to see where from and ct not showing clot issue and need mri to be sure but also need that echo need to order paracentesis now fo rher. she has not been limited flluids, salt and she will start.  She needs to be reseen by oltx and we will refer her We will update and labs and she will need to see GI re the hematochezia given recent clips done.  Dr. Gonzales saw her as well and reminded her if acute then do care where she can. Also discussed with her the salt and needing to see oltx again.   Dr. Pepito Haile [facility].[address] [address]  ~34.1 mi [phone]  We need to know what he is giving her so that we can say what we expect to be the case.  Depending on what he is giving her.  No recent labs and no recent imaging.  June 8, 2022 MRI showed chronic trace left pleural effusion with associated atelectasis in the lower thorax views.  There was no significant fat or iron but with nodular contour and cirrhotic morphology.  No suspicious lesions.  Prior cholecystectomy changes.  No duct dilation.  Spleen enlarged 18.4 cm.  Pancreas normal.  Adrenal glands normal.  Normal bilateral renal symmetric enhancement with 6 mm superior pole renal cyst and no suspicious lesions and no hydro.  Mild small and large bowel wall edema likely due to periportal enteropathy/colopathy.  Unchanged chronic fibrosis of portal vein with cavernous transformation reconstituted left portal vein.  Redemonstrated gastroesophageal perisplenic varices.  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  Only trace perihepatic and perisplenic ascites.  June 7, 2022 visit with Dr. Flores mentions that the patient is status post TAVR with recent echo showing EF 55% of left ventricular outflow VTI of 21.  Chronic diastolic heart failure noted.  TAVR was on January 23, 2020.  She has not followed with Dr Flores and sees local cards for the tavr.  June 1, 2022 echo showed EF 50 to 55% aortic valve normal functioning bioprosthetic valve.  Aortic valve max velocity showed a mean gradient of 11.0 and a peak gradient 24.4.  June 8, 2022 labs show sodium 135 potassium 3.9 glucose 297 BUN of 10 creatinine 0.75 total bilirubin 1.6 calcium 9.1 AST 36 ALT 18 alk phos 106 hemoglobin A1c up at 7.5 AFP 4 W count 3.2 hemoglobin 12.3 plate count 47 INR 1.35.  HIV test negative at that time.  Transplant team sees her every 6m and she sees the team there. She is told is stable and looks good and meld was 14.  She says told re mounjaro.  April 29th 2022 labs stable ammonia level normal at 61. INR normal at 1.2.   Sugar was elevated at 173.  Please share with primary provider. BUN of 13 creatinine 0.69 sodium 138 potassium 4.4 albumin normal at 3.8.  Bilirubin 1.2 alkaline phosphatase 112. Only the AST was elevated at 46 and ALT was ideal at 25. White blood cell count 3.7 hemoglobin 12.4 and MCV 87 and these are normal.  Platelet count low at 54.  Neutrophils normal at 2.8 and lymphocytes slightly low at 0.5. Meld down to 9 and meld na 9. Suggests that the infection is what tipped your labs recently  to be worse. So really need to focus on trying to prevent those recurrent urinary infections as worsens your underlying liver state. Please ask local ID to contact us to discuss the recent infection and options.   Saw the notes and had urosepsis. Had 07018 cells in the ascites.  Had 1 tap in the hospital. had the infection.  Prior tap in [place] had no infection and she had been started on abx once a month. 2500 ml.  20-30 wbc, nos.  90% of pts with sbp if the infection will be reinfected in a year.  Once a month is not a usual tx for sbp prevention. Daily or once a week. Dr Lopez is her id and they need to look at this new data and need to adjust tx.  March 30, 2022 labs show sodium 137 potassium 3.6 BUN of 10 creatinine 0.68 glucose 156 total bilirubin 1.3 AST 25 ALT 14 alk phos 83 calcium 8.7 labs count 3.3 hemoglobin 12 platelet count 66.  March 28, 2022 CT abdomen and pelvis with IV contrast revealed left-sided pleural effusion with some atelectasis.  Cardiophrenic enhancing node measuring 1.1 x 1.8 cm slightly increased in size.  Liver with chronic liver disease.  Prior cholecystectomy no bile duct dilation.  Splenomegaly seen.  Pancreas normal.  No hydronephrosis but with left nonobstructing renal stone.  Diffuse colon wall thickening and edema with surrounding stranding.  Prominent retroperitoneal mesenteric lymph nodes.  Large varices seen on the vessel exam with focal occlusion of the SMV/portal vein which appears similar to prior study.  Moderate continuation of the right portal vein.  Mild increase cannot exclude thrombus within the splenic venous collaterals in the left upper quadrant.  March 28, 2022 paracentesis removed 2600 mL of fluid.  November 2021 MRI liver with no fat or iron cirrhotic liver in order to enhancing lesions.  Spleen 19 cm.  Prior cholecystectomy changes seen.  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  Multiple large collateral pathways including gastroesophageal varices.  Replaced right hepatic artery seen.  Ascites may have been primary vs uti.  She has been seen by urogyn for the recurrent uti.   On abx once a month not for prevention but rather infection every month and so that makes more sense.  Covid 19 Jan 2022 and admitted locally for sepsis.  June 8, 2022 patient has appointment for labs and another MRI and in July 20 9022 will be seen by the  oltx transplant nurse practitioner at [facility].   April 8, 2022 transplant clinic visit mentions that the patient has Baeza and had a recent hospitalization from March 20 7 March 21st for urosepsis.  Drained 2.6 L and prior to that had not been drained since February 2022.  Taking Bumex 2 mg twice daily and Aldactone 100 mg a day.  Meld increased during the hospital but was down to 14 by March 27, 2022.  Bili decreased to 1.3 on March 30.  Recap: Patient with history of chronic SMV/portal vein occlusion.  EGD January 2021 status post band ligation.  No significant encephalopathy on Xifaxan.  Current medicines include omeprazole 2 mg a day, aspirin 81 mg a day, Bumex 2 mg a day, vitamin D 1000 units a day, Prolia every 6 months Estrace 2 mg a day, gabapentin 3 mg 3 times daily, NovoLog sliding scale, Tresiba 8 units at night, lactulose 20 g Daily, mesalamine 1 g p.o. twice daily, midodrine 10 mg p.o. 3 times daily, nadolol 40 mg twice daily, potassium 40 mg a day, Xifaxan 550 twice daily, Aldactone on milligrams a day, Trintellix 20 mg a day.  Patient was listed as following up with Dr. Vickers for endoscopy. She says he has been seeing. He also saw dysphagia.  March 27 March 31 discharge summary from [facility].   She was a patient came in with concerns of urosepsis and left pyelonephritis.  Urinalysis high-grade 150 WBCs.  Blood cultures were negative but urine did grow pansensitive Klebsiella.  Patient was seen by ID and was given a course of daptomycin and Levaquin treatment.  They wanted urogynecology to see her. Left renal pole lesion that could be a nidus for recurrent infection.  Cell count on the fluid was 10,520 range  nucleated cells 84% neutrophils though the cultures were negative to date and maintained on antibiotics.  Looked up the actual fluid count from March 28 and had had albumin less than 1.5 total protein was 3.0 nucleated cells 10,520 neutrophils 84%.  Lasted been EGD high-grade fevers or November 17, 2021 with Dr. Vickers where she had grade 1 varices lower esophagus.  Moderate Schatzki ring was seen.  Dilation was seen to 51 Somali.  Moderately erythematous mucosa without bleeding seen in the gastric antrum.  H. pylori testing sent.  Biopsy was negative for significant abnormality.  She did egd in nov 2021 and grade 1 varices noted. He is planning nov 2022.  nov 2021 mri: Reason For Exam Hepatocellular carcinoma;Hepatocellular carcinoma  REPORT EXAM: MRI Abdomen w/ + w/o Contrast  CLINICAL INDICATION: Hepatocellular carcinoma  TECHNIQUE: Multisequence, multiplanar MRI of the abdomen was performed without and with intravenous contrast. [id number]  CONTRAST: 18 cc of Prohance  COMPARISON: March 2, 2021  FINDINGS:  Lower Thorax: Normal.  Liver: No fat or iron. Cirrhotic liver morphology. No arterially enhancing lesions. No lesions with capsule. No lesions with washout.  Gallbladder/Biliary Tree: Status post cholecystectomy.  Spleen: Measuring 19.0 cm in maximum craniocaudal diameter.  Pancreas: Normal.  Adrenal Glands: Normal.   Kidneys/Ureters: Normal.  Gastrointestinal: Normal.   Lymph Nodes: Normal.  Vessels: Chronic thrombosis of the main portal vein with cavernous transformation redemonstrated. Multiple large collateral pathways including gastroesophageal varices, again noted. 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  Peritoneum/Retroperitoneum: Minimal ascites is present.  Bones/Soft Tissues: Normal.  IMPRESSION:     Cirrhosis with chronic portal vein thrombosis and cavernous transformation with associated evidence of portal hypertension. No hepatocellular carcinoma. Pt is 63 F last seen June 2023 by Ms Hai with hx baeza and cirrhosis and smv/pv clot, hx varices, hx pse, march 2022 urosepsis/sbp and now here for ongoing care and eval.  A copy of the note was sent to the referring provider.  Dear Lady Carson, June 5 MRI was updated by radiology today. When they compared to multiple priors dating back to 2018, the described retroperitoneal/mesenteric root edema and perivascular infiltrative soft tissue demonstrates avid enhancement with no restricted diffusion.  The degree of enhancement has progressed as well as the density infiltrative soft tissue compared to multiple priors. Retroperitoneal lymph nodes were also increased in number but not pathologically enlarged.  There was no evident medialization of the ureters.  They said that they findings are concerning for retroperitoneal fibrotic or inflammatory process. It appears that you were seen on June 20 by the transplant team/Dr Barrios and we will forward them this MRI correction so that they can be aware of this.  We will forward it to him via [facility]  to be aware of as he just saw you. Dr Gonzales  The 6/2/23 MR elastography was sent to me after lunch today. As you recall it was not available for viewing during the visit today. I called and left you a voicemail about this.  Lower thorax with unchanged small left pleural effusion.  Liver with subjective evidence of fat deposition.  Cirrhotic morphology.  No suspicious lesions.  Elastography donel after a weighted mean of 3 measurements showing 3.2K PA consistent with stage I-II fibrosis.  The fat quantification 12.5% consistent with mild fatty liver.  There is no significant iron deposition.  They did not see any biliary ductal dilatation.  Splenomegaly measuring 20 cm was seen.  The pancreas normal, adrenal glands normal.  They saw a right upper renal cyst.  Please make sure the primary care provider is aware.  They saw reactive lymph nodes.  They thought that the aorta was normal.  The superior mesenteric and inferior mesenteric along with the bilateral renal arteries were patent.  They noted the replaced right hepatic artery arising from the superior mesenteric artery.  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  They noted that there has been interval progression of the degree of thrombosis of the portosystemic shunt with near complete occlusive thrombus.  They noted additional varices and this is why we have to stay on top of the endoscopic use.  They saw mesenteric edema unchanged from the previous CT.  Moderate ascites.  Overall they noted cirrhosis and did not see any suspicious lesions but they did note that there was progression of a now near occlusive thrombus of a large portosystemic venous shunt from the splenic vein to the left femoral vein.  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 As you recall we were doing this to check on the clot and to ensure there were no lesions. It is good to confirm no lesions. Need to be sure you see the transplant team back as discussed.  Do Valles PA-C  6/5/23  SHe did the mri on friday but interestingly it has not been read yet. Checked epic yesterday and today for this. Will follow up with the pending result.  She had the repeat colon.  She is having a paracentesis every 6 days or so, 4 L removed she is doing the low salt diet and limiting fluids. DIscussed the risk of recurrrent paracentesis and needs to see oltx back on this and see if options. HE symtpoms controlled on xifaxan and miralax as seh does not tolerate the lactulose She has not schedlued with dr. barrios as discussed. inr 1.43, mcv 86, hgb 11.1, platelets 125, bili 2.5, ast 36, alt 18, alk phos 101 na 136, cr 0.8 MELD 14, meld na 15 4/27/23 HOspital follow up and recurrent ascites. She hospitalized patient recently at [facility].  She was in route to [place] per the notes but had EKG abnormalities and they redirected her to [place].  She had gained 19 pounds in the last several days.  Recent paracentesis on 419 with 4 L removed.  She was also having hematochezia and reviewed the chart and had recent rectal clips done and those need a re look and hospital noted she did not want to do there as she does things at [place]  CT scan done on 423  Cirrhotic morphology of the liver with this extensive paraesophageal and abdominal varices and splenomegaly. no lesions  April 24 paracentesis showing that there was 4 L removed.  April 24 labs with hemoglobin 9.8, platelets 47, INR 1.6.  Sodium 131, potassium 3.1, bilirubin 1.9, alkaline phosphatase 80, ALT 17, AST 37. cr .7  Per epic she has been pending an echo and rescheduled multiple times due to transportation issues. She was pending mri recently but not done  Discussed the ascites and need to see where from and ct not showing clot issue and need mri to be sure but also need that echo need to order paracentesis now fo rher. she has not been limited flluids, salt and she will start.  She needs to be reseen by oltx and we will refer her We will update and labs and she will need to see GI re the hematochezia given recent clips done.  Dr. Gonzales saw her as well and reminded her if acute then do care where she can. Also discussed with her the salt and needing to see oltx again.     recap She says she had some issues with daughter being in mental institution and she had trouble coming up and so now comes back. She is doing better.  She had adopted her at birth.  Right ear she had an skin cancer and has trouble wearing a mask for this.  She has been getting shot for scalp psoriasis and she does not know what is the shot. She has 2 rounds of it.  She has to get her every 2 months.  Dr. Pepito Haile availdermatology.[facility] [address] [place] [address]  ~34.1 mi [phone]  We need to know what he is giving her so that we can say what we expect to be the case.  Depending on what he is giving her.  No recent labs and no recent imaging.  June 8, 2022 MRI showed chronic trace left pleural effusion with associated atelectasis in the lower thorax views.  There was no significant fat or iron but with nodular contour and cirrhotic morphology.  No suspicious lesions.  Prior cholecystectomy changes.  No duct dilation.  Spleen enlarged 18.4 cm.  Pancreas normal.  Adrenal glands normal.  Normal bilateral renal symmetric enhancement with 6 mm superior pole renal cyst and no suspicious lesions and no hydro.  Mild small and large bowel wall edema likely due to periportal enteropathy/colopathy.  Unchanged chronic fibrosis of portal vein with cavernous transformation reconstituted left portal vein.  Redemonstrated gastroesophageal perisplenic varices.  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  Only trace perihepatic and perisplenic ascites.  June 7, 2022 visit with Dr. Flores mentions that the patient is status post TAVR with recent echo showing EF 55% of left ventricular outflow VTI of 21.  Chronic diastolic heart failure noted.  TAVR was on January 23, 2020.  She has not followed with Dr Flores and sees local cards for the tavr.  June 1, 2022 echo showed EF 50 to 55% aortic valve normal functioning bioprosthetic valve.  Aortic valve max velocity showed a mean gradient of 11.0 and a peak gradient 24.4.  June 8, 2022 labs show sodium 135 potassium 3.9 glucose 297 BUN of 10 creatinine 0.75 total bilirubin 1.6 calcium 9.1 AST 36 ALT 18 alk phos 106 hemoglobin A1c up at 7.5 AFP 4 W count 3.2 hemoglobin 12.3 plate count 47 INR 1.35.  HIV test negative at that time.  Transplant team sees her every 6m and she sees the team there. She is told is stable and looks good and meld was 14.  She says told re mounjaro.  April 29th 2022 labs stable ammonia level normal at 61. INR normal at 1.2.   Sugar was elevated at 173.  Please share with primary provider. BUN of 13 creatinine 0.69 sodium 138 potassium 4.4 albumin normal at 3.8.  Bilirubin 1.2 alkaline phosphatase 112. Only the AST was elevated at 46 and ALT was ideal at 25. White blood cell count 3.7 hemoglobin 12.4 and MCV 87 and these are normal.  Platelet count low at 54.  Neutrophils normal at 2.8 and lymphocytes slightly low at 0.5. Meld down to 9 and meld na 9. Suggests that the infection is what tipped your labs recently  to be worse. So really need to focus on trying to prevent those recurrent urinary infections as worsens your underlying liver state. Please ask local ID to contact us to discuss the recent infection and options.   Saw the notes and had urosepsis. Had 21158 cells in the ascites.  Had 1 tap in the hospital. had the infection.  Prior tap in [place] had no infection and she had been started on abx once a month. 2500 ml.  20-30 wbc, nos.  90% of pts with sbp if the infection will be reinfected in a year.  Once a month is not a usual tx for sbp prevention. Daily or once a week. Dr Lopez is her id and they need to look at this new data and need to adjust tx.  March 30, 2022 labs show sodium 137 potassium 3.6 BUN of 10 creatinine 0.68 glucose 156 total bilirubin 1.3 AST 25 ALT 14 alk phos 83 calcium 8.7 labs count 3.3 hemoglobin 12 platelet count 66.  March 28, 2022 CT abdomen and pelvis with IV contrast revealed left-sided pleural effusion with some atelectasis.  Cardiophrenic enhancing node measuring 1.1 x 1.8 cm slightly increased in size.  Liver with chronic liver disease.  Prior cholecystectomy no bile duct dilation.  Splenomegaly seen.  Pancreas normal.  No hydronephrosis but with left nonobstructing renal stone.  Diffuse colon wall thickening and edema with surrounding stranding.  Prominent retroperitoneal mesenteric lymph nodes.  Large varices seen on the vessel exam with focal occlusion of the SMV/portal vein which appears similar to prior study.  Moderate continuation of the right portal vein.  Mild increase cannot exclude thrombus within the splenic venous collaterals in the left upper quadrant.  March 28, 2022 paracentesis removed 2600 mL of fluid.  November 2021 MRI liver with no fat or iron cirrhotic liver in order to enhancing lesions.  Spleen 19 cm.  Prior cholecystectomy changes seen.  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  Multiple large collateral pathways including gastroesophageal varices.  Replaced right hepatic artery seen.  Ascites may have been primary vs uti.  She has been seen by urogyn for the recurrent uti.   On abx once a month not for prevention but rather infection every month and so that makes more sense.  Covid 19 Jan 2022 and admitted locally for sepsis.  June 8, 2022 patient has appointment for labs and another MRI and in July 20 9022 will be seen by the  oltx transplant nurse practitioner at [facility].   April 8, 2022 transplant clinic visit mentions that the patient has Baeza and had a recent hospitalization from March 20 7 March 21st for urosepsis.  Drained 2.6 L and prior to that had not been drained since February 2022.  Taking Bumex 2 mg twice daily and Aldactone 100 mg a day.  Meld increased during the hospital but was down to 14 by March 27, 2022.  Bili decreased to 1.3 on March 30.  Recap: Patient with history of chronic SMV/portal vein occlusion.  EGD January 2021 status post band ligation.  No significant encephalopathy on Xifaxan.  Current medicines include omeprazole 2 mg a day, aspirin 81 mg a day, Bumex 2 mg a day, vitamin D 1000 units a day, Prolia every 6 months Estrace 2 mg a day, gabapentin 3 mg 3 times daily, NovoLog sliding scale, Tresiba 8 units at night, lactulose 20 g Daily, mesalamine 1 g p.o. twice daily, midodrine 10 mg p.o. 3 times daily, nadolol 40 mg twice daily, potassium 40 mg a day, Xifaxan 550 twice daily, Aldactone on milligrams a day, Trintellix 20 mg a day.  Patient was listed as following up with Dr. Vickers for endoscopy. She says he has been seeing. He also saw dysphagia.  March 27 March 31 discharge summary from [facility].   She was a patient came in with concerns of urosepsis and left pyelonephritis.  Urinalysis high-grade 150 WBCs.  Blood cultures were negative but urine did grow pansensitive Klebsiella.  Patient was seen by ID and was given a course of daptomycin and Levaquin treatment.  They wanted urogynecology to see her. Left renal pole lesion that could be a nidus for recurrent infection.  Cell count on the fluid was 10,520 range  nucleated cells 84% neutrophils though the cultures were negative to date and maintained on antibiotics.  Looked up the actual fluid count from March 28 and had had albumin less than 1.5 total protein was 3.0 nucleated cells 10,520 neutrophils 84%.  Lasted been EGD high-grade fevers or November 17, 2021 with Dr. Vickers where she had grade 1 varices lower esophagus.  Moderate Schatzki ring was seen.  Dilation was seen to 51 [place].  Moderately erythematous mucosa without bleeding seen in the gastric antrum.  H. pylori testing sent.  Biopsy was negative for significant abnormality.  She did egd in nov 2021 and grade 1 varices noted. He is planning nov 2022.  nov 2021 mri: Reason For Exam Hepatocellular carcinoma;Hepatocellular carcinoma  REPORT EXAM: MRI Abdomen w/ + w/o Contrast  CLINICAL INDICATION: Hepatocellular carcinoma  TECHNIQUE: Multisequence, multiplanar MRI of the abdomen was performed without and with intravenous contrast. ESRC.[id number]  CONTRAST: 18 cc of Prohance  COMPARISON: March 2, 2021  FINDINGS:  Lower Thorax: Normal.  Liver: No fat or iron. Cirrhotic liver morphology. No arterially enhancing lesions. No lesions with capsule. No lesions with washout.  Gallbladder/Biliary Tree: Status post cholecystectomy.  Spleen: Measuring 19.0 cm in maximum craniocaudal diameter.  Pancreas: Normal.  Adrenal Glands: Normal.   Kidneys/Ureters: Normal.  Gastrointestinal: Normal.   Lymph Nodes: Normal.  Vessels: Chronic thrombosis of the main portal vein with cavernous transformation redemonstrated. Multiple large collateral pathways including gastroesophageal varices, again noted. 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  Peritoneum/Retroperitoneum: Minimal ascites is present.  Bones/Soft Tissues: Normal.  IMPRESSION:     Cirrhosis with chronic portal vein thrombosis and cavernous transformation with associated evidence of portal hypertension. No hepatocellular carcinoma.

## 2024-05-01 ENCOUNTER — LAB OUTSIDE AN ENCOUNTER (OUTPATIENT)
Dept: URBAN - METROPOLITAN AREA TELEHEALTH 2 | Facility: TELEHEALTH | Age: 64
End: 2024-05-01

## 2024-05-13 ENCOUNTER — TELEPHONE ENCOUNTER (OUTPATIENT)
Dept: URBAN - METROPOLITAN AREA CLINIC 86 | Facility: CLINIC | Age: 64
End: 2024-05-13

## 2024-05-21 ENCOUNTER — APPOINTMENT (RX ONLY)
Dept: URBAN - METROPOLITAN AREA CLINIC 41 | Facility: CLINIC | Age: 64
Setting detail: DERMATOLOGY
End: 2024-05-21

## 2024-05-21 DIAGNOSIS — L40.0 PSORIASIS VULGARIS: ICD-10-CM

## 2024-05-21 PROCEDURE — ? INTRALESIONAL KENALOG

## 2024-05-21 PROCEDURE — ? COUNSELING

## 2024-05-21 PROCEDURE — 11900 INJECT SKIN LESIONS </W 7: CPT

## 2024-05-21 ASSESSMENT — LOCATION DETAILED DESCRIPTION DERM
LOCATION DETAILED: MID-FRONTAL SCALP
LOCATION DETAILED: RIGHT OCCIPITAL SCALP
LOCATION DETAILED: LEFT OCCIPITAL SCALP

## 2024-05-21 ASSESSMENT — LOCATION ZONE DERM: LOCATION ZONE: SCALP

## 2024-05-21 ASSESSMENT — LOCATION SIMPLE DESCRIPTION DERM
LOCATION SIMPLE: POSTERIOR SCALP
LOCATION SIMPLE: ANTERIOR SCALP

## 2024-05-21 NOTE — PROCEDURE: INTRALESIONAL KENALOG
Detail Level: Detailed
Total Volume (Ccs): 3.0
Medical Necessity Clause: This procedure was medically necessary because the lesions that were treated were:
How Many Mls Were Removed From The 40 Mg/Ml (5ml) Vial When Preparing The Injectable Solution?: 0
Kenalog Preparation: Kenalog
Require Ndc Code?: No
Ndc# For Kenalog Only: 1483-3653-25
Concentration Of Kenalog Solution Injected (Mg/Ml): 5.0
Show Inventory Tab: Hide
Expiration Date For Kenalog (Optional): 
Validate Note Data When Using Inventory: Yes
Kenalog Type Of Vial: Multiple Dose
Lot # For Kenalog (Optional): [id number]
Consent: The risks of atrophy were reviewed with the patient.
Administered By (Optional): Yady Tillman

## 2024-06-18 ENCOUNTER — TELEPHONE ENCOUNTER (OUTPATIENT)
Dept: URBAN - METROPOLITAN AREA CLINIC 91 | Facility: CLINIC | Age: 64
End: 2024-06-18

## 2024-07-02 ENCOUNTER — LAB OUTSIDE AN ENCOUNTER (OUTPATIENT)
Dept: URBAN - METROPOLITAN AREA TELEHEALTH 2 | Facility: TELEHEALTH | Age: 64
End: 2024-07-02

## 2024-07-12 ENCOUNTER — OFFICE VISIT (OUTPATIENT)
Dept: URBAN - METROPOLITAN AREA TELEHEALTH 2 | Facility: TELEHEALTH | Age: 64
End: 2024-07-12

## 2024-07-23 ENCOUNTER — OFFICE VISIT (OUTPATIENT)
Dept: URBAN - METROPOLITAN AREA TELEHEALTH 2 | Facility: TELEHEALTH | Age: 64
End: 2024-07-23
Payer: MEDICARE

## 2024-07-23 ENCOUNTER — DASHBOARD ENCOUNTERS (OUTPATIENT)
Age: 64
End: 2024-07-23

## 2024-07-23 DIAGNOSIS — R13.19 DYSPHAGIA: ICD-10-CM

## 2024-07-23 DIAGNOSIS — K74.69 OTHER CIRRHOSIS OF LIVER: ICD-10-CM

## 2024-07-23 DIAGNOSIS — K76.82 HEPATIC ENCEPHALOPATHY: ICD-10-CM

## 2024-07-23 DIAGNOSIS — K75.81 NASH (NONALCOHOLIC STEATOHEPATITIS): ICD-10-CM

## 2024-07-23 PROCEDURE — 99214 OFFICE O/P EST MOD 30 MIN: CPT | Performed by: PHYSICIAN ASSISTANT

## 2024-07-23 RX ORDER — OMEPRAZOLE 40 MG/1
TAKE 1 CAPSULE BY MOUTH TWICE A DAY BEFORE A MEAL CAPSULE, DELAYED RELEASE ORAL
Qty: 180 CAPSULE | Refills: 0 | Status: ACTIVE | COMMUNITY

## 2024-07-23 RX ORDER — RIFAXIMIN 550 MG/1
1 TABLET TABLET ORAL TWICE A DAY
Qty: 60 TABLET | Refills: 5 | Status: ACTIVE | COMMUNITY

## 2024-07-23 RX ORDER — GABAPENTIN 300 MG/1
1 CAPSULE CAPSULE ORAL THREE TIMES A DAY
Status: ACTIVE | COMMUNITY

## 2024-07-23 RX ORDER — SEMAGLUTIDE 0.68 MG/ML
AS DIRECTED INJECTION, SOLUTION SUBCUTANEOUS
Status: ACTIVE | COMMUNITY

## 2024-07-23 RX ORDER — ASPIRIN 81 MG/1
1 TABLET TABLET, COATED ORAL ONCE A DAY
Status: ACTIVE | COMMUNITY

## 2024-07-23 RX ORDER — MIDODRINE HYDROCHLORIDE 10 MG/1
1 TABLET TABLET ORAL THREE TIMES A DAY
Status: ACTIVE | COMMUNITY

## 2024-07-23 RX ORDER — POTASSIUM CHLORIDE 1500 MG/1
2 TABLETS WITH FOOD TABLET, FILM COATED, EXTENDED RELEASE ORAL ONCE A DAY
Refills: 0 | Status: ACTIVE | COMMUNITY
Start: 1900-01-01

## 2024-07-23 RX ORDER — BUMETANIDE 0.5 MG/1
4 (2MG) TABLET ORAL ONCE A DAY
Status: ACTIVE | COMMUNITY

## 2024-07-23 RX ORDER — NADOLOL 40 MG/1
1 TABLET TABLET ORAL TWICE A DAY
Status: ACTIVE | COMMUNITY

## 2024-07-23 RX ORDER — DEXTROSE 4 G
1 TABLET TABLET,CHEWABLE ORAL ONCE A DAY
Status: ACTIVE | COMMUNITY

## 2024-07-23 RX ORDER — LACTULOSE 10 G/15ML
30 ML SOLUTION ORAL ONCE A DAY
Status: ACTIVE | COMMUNITY

## 2024-07-23 RX ORDER — VORTIOXETINE 20 MG/1
TAKE 1 TABLET (20 MG) BY ORAL ROUTE ONCE DAILY AT THE SAME TIME EACH DAY TABLET, FILM COATED ORAL ONCE A DAY
Refills: 0 | Status: ACTIVE | COMMUNITY
Start: 1900-01-01

## 2024-07-23 NOTE — HPI-TODAY'S VISIT:
Pt is 64 F last seen Jan 2024 by myself with hx baeza/mash and cirrhosis and smv/pv clot, hx varices, hx pse, s/p pTIPs and now here for ongoing care.  A copy of the note was sent to the referring provider.  7/23/24 telemed   MRI  6/17/24 cirrhosis no lesions and patent ttips hospitalized in june and admitted with concern for decompensated cirrhosis. She appears euvolemic, her weight is actually down from many recent clinic visits, she has no JVP, her belly is soft without significant free fluid on pocus, her lower extremity edema is trace and likely venous insufficiency or hypoalbuminemia related. There is no concern for sbp, she may have had UTI (diagnosed outpatient) and is completing abx for this 6/13. Her platelets are low but essentially at her baseline and she has no bleeding complications.     discussed the lactulose and titration to 3 times a day and she is working on this  taking the xifaxan  still a little sleepy and can see if taking the lactulose 3 times a day helps with increassing the bowel movements and her energy denies other symptoms of HE   recap 4/2/24  She saw [facility] on 3/25/2024 and had labs done.1.7, white blood cells 4.6, red blood cells 4.08, hemoglobin 13.3, MCV 94, platelets 36 and this is low.  aFP 2, sodium 143, potassium 4.1, bilirubin 3.0, alkaline phosphatase 82, AST 31, ALT 17. MELD 17They ordered a repeat MRI on her, due May 2024. saw IR and 2/2024 us with patent TIPS  The 1/19/24 labs were sent to me.  The sodium 140, potassium 3.9, creatinine 0.65, bilirubin 2.2, alkaline phosphatase 95, AST 21, ALT 13.  The INR 1.7.  Complete blood count showing white blood cells 5.1, hemoglobin 13.4, MCV 91, platelets 40. MELD 15. Lets lower the spironolactone to 50 mg. Please let us know if you need a new prescription for this. You will only need to take two 25 mg tablets a day.  Do Valles PA-C  reviewed the above and asked about recent illness and not sick but having a hard time.  asked abouve what changed and denies any changes  MRI due may 2024 she is more tired so less active and that could contribute to ast being up and follow    1/16/24 office visit  11/16/23 MRI EXAM: MR ABDOMEN W AND WO CONTRAST&nbsp;CLINICAL INDICATION: HCC Screening. BAEZA cirrhosis, recurrent ascites?that underwent creation of a transjugular intrahepatic portosystemic shunt (TIPS) and splenic vein stent placement on 8/8/23. TECHNIQUE: Multisequence, multiplanar MRI of the abdomen was performed without and with intravenous contrast. ESRC.[id number];COMPARISON: MRI 12/14/22 and CT 7/31/2023 and CTA 3/29/2023&nbsp;FINDINGS: Lower Thorax: Small left pleural effusion. Unchanged left lower lobe subsegmental atelectasis Liver: No fat or iron. Morphologic changes of chronic liver disease including nodular surface contour, lobar redistribution and fissural widening. No suspicious hepatic lesions.  Gallbladder/Biliary Tree: Mildly prominent bile ducts secondary to prior cholecystectomy. Spleen: Enlarged measuring up to 17.4 cm in CC dimension. Pancreas: Normal. Adrenal Glands: Normal.  Kidneys/Ureters: Renal cysts. No hydronephrosis. Gastrointestinal: No bowel obstruction.  Lymph Nodes: Marked interval improvement in previously seen extensive inflammatory soft tissue with enhancement in the retroperitoneum surrounding the abdominal aorta and branch vessels. Small subcentimeter in short axis retroperitoneal lymph nodes remain (13:55). Vessels: Grossly patent TIPS extends from the middle hepatic vein to the main portal vein. 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 Chronic occlusion of the SMV with reconstitution distally in the branch vessels of the SMV (13:61). Multiple collateral vasculature in the periportal region, central mesentery and the anterior abdomen. Redemonstrated partially thrombosed portosystemic shunt from the splenic vein (13:53) extending inferiorly to the pelvis, better seen on prior CTA from 3/29/2023. Upper abdominal perigastric and perisplenic varices. Replaced right hepatic artery arising from the superior mesenteric artery. Peritoneum/Retroperitoneum: Improved retroperitoneal/mesenteric root edema and enhancing soft tissue. Small right retroperitoneal nodule (13:56) measuring 1 cm, previously 2.1 x 1.9 cm. Bones/Soft Tissues: No suspicious osseous lesion. IMPRESSION:1.  No suspicious focal hepatic lesion.2.  Morphologic changes of chronic liver disease with stigmata of portal hypertension including splenomegaly and extensive upper abdominal varices.3.  Patent TIPS. Redemonstrated chronic thrombosis of the main portal vein and SMV with near occlusive thrombus of the large portosystemic venous shunt from the splenic vein extending to the pelvis. Extensive upper abdominal varices and collateral vasculature as detailed above.4.  Marked interval improvement in retroperitoneal enhancing soft tissue and lymphadenopathy, seen previously on 6/2/2023 suggestive of resolving nonspecific inflammatory process.The images were reviewed and interpreted by Hussain Casanova MD.  Has fu TIPs US to check patency next month EGD utd she is still on ozempic and no side affects    dear Lady Alli, October 13 labs show glucose elevated at 272 and possibly you were not fasting that day?  Please share with primary provider to continue to work on this issue.  BUN 11 creatinine 0.83 sodium 140 potassium 4.4 chloride 101 CO2 28 calcium 9.3 with albumin slightly diminished at 3.6 and previous given 3.7.  Bilirubin was slightly higher at 3.2 but not fractionated.  Alk phos normal at 115 AST down to 34 from 52 and ALT 16 down from 26.  Ideal ALT less than 25.  WBC 5.7 hemoglobin 13.3 platelet  count was slightly higher for you at 52 from 39 before.  RDW was slightly up at 15.8.  Neutrophils normal at 4.6 and lymphocytes slightly low at 0.6 but this has been chronically seen and you before.  Please share with primary provider.  INR 1.4. Meld remains right at 15/15 and mainly driven by the elevated bilirubin. Please share labs with local providers.   No clear lab reason seen to decrease the diuretics for now. Await the mri. Dr Gonzales  Pt did the TIPS through [facility] and that was done Aug 2023. She was done and came home same day.  She says she has seen Dr Barrios and he has CT of abd and he wanted to do mri. She says not switched for this.  Sept 18 2023 U.s: EXAM: US ABDOMEN COMPLETE, US ABDOMEN DOPPLER COMPLETE&nbsp;CLINICAL INDICATION: TIPS.&nbsp;TECHNIQUE: Grayscale, pulsed wave and color Doppler sonography of the upper abdomen were performed. ESRC.[id number]&nbsp;COMPARISON: CT 7/31/2023.&nbsp;FINDINGS: Liver: Coarsened echogenicity with a nodular surface contour compatible with morphologic changes of chronic liver disease. No lesions.&nbsp;Bile Ducts: No dilated intrahepatic biliary radicles. Common duct measures 4 mm.&nbsp;Gallbladder: Status post cholecystectomy. &nbsp;Pancreas: Obscured by overlying structures.&nbsp;Right Kidney: Measures 10.3 cm. Normal echogenicity. No hydronephrosis.&nbsp;Left Kidney: Measures 11.7 cm. Normal echogenicity. No hydronephrosis.&nbsp;Spleen: Measures 17.1 cm.&nbsp;Aorta: Normal caliber where imaged.&nbsp;IVC: Normal proximally, not imaged distally. &nbsp;Hepatic Veins: Normal.&nbsp;Portal Veins: Hepatopetal flow. 85-93 cm/s.&nbsp;Hepatic Arteries: Peak systolic velocity 48 cm/s. Resistive index 0.67. Normal waveform.&nbsp;TIPS: Stent is patent. Hepatopetal flow at the portal venous end and hepatofugal flow at the hepatic venous end. Velocity in the portal end 130-156 cm/s. Velocity at mid stent 143-162 cm/s. Velocity at the hepatic end 108-134 cm/s.&nbsp;Other: Trace perihepatic free fluid.&nbsp;IMPRESSION:1.  Patent TIPS.2.  Morphologic changes of chronic liver disease with mild sequelae of portal hypertension including splenomegaly and trace perihepatic free fluid.&nbsp;  July 2023 Ct did pre tips: EXAM: CT ABDOMEN W IV CONTRAST&nbsp;CLINICAL INDICATION: Portal hypertension.&nbsp;TECHNIQUE: Following administration of non-ionic IV contrast, postcontrast images through the abdomen were obtained. If applicable, point-of-care testing was approved following departmental protocol. ESRC.1.7.1 &nbsp;COMPARISON: CT abdomen/pelvis 3/29/2023, MRI abdomen 6/2/2023.&nbsp;FINDINGS: Lower Thorax: Trace left pleural effusion and left basilar atelectasis.&nbsp;Liver: Morphologic changes of chronic liver disease including nodular surface contour, lobar redistribution and fissural widening. No suspicious focal lesion.&nbsp;Gallbladder/Biliary Tree: Status post cholecystectomy. No biliary ductal dilatation.&nbsp;Spleen: Enlarged measuring up to 19 cm in craniocaudal dimension.&nbsp;Pancreas: Normal.&nbsp;Adrenal Glands: Left adrenal gland difficult to visualize. Normal right adrenal gland. &nbsp;Kidneys/Ureters: Renal cysts. No hydronephrosis.&nbsp;Gastrointestinal: No bowel obstruction or inflammation. &nbsp;Lymph Nodes: Unchanged subcentimeter in short axis jaelyn hepatis and retroperitoneal lymph nodes, likely reactive.&nbsp;Vessels: Diminutive main and central portal veins which otherwise appear patent. Redemonstrated chronic occlusion of the portosplenic venous confluence involving the portal vein extending into the superior mesenteric vein. Multiple upper abdominal paraesophageal, perigastric and perisplenic varices. Additional extensive collateral vasculature in the anterior abdomen. Findings are similar to recent MRI from 6/2/2023. Redemonstrated thrombosed portosystemic shunt arising from the splenic vein and extending inferiorly into the pelvis (6:30) and similar to 6/2/2023 MRI. Replaced right hepatic artery arising from the SMA.&nbsp;Peritoneum/Retroperitoneum: Moderate volume abdominopelvic free fluid.&nbsp;Bones/Soft Tissues: No aggressive osseous lesion. Grade 1 anterolisthesis L4 relative to L5.&nbsp;IMPRESSION:1.  Morphologic changes of chronic liver disease with sequelae of portal hypertension including splenomegaly, moderate volume ascites and extensive varices.2.  Unchanged chronic occlusion at the portosplenic venous confluence involving the portal vein extending into the superior mesenteric vein. Remainder of the main portal vein and central intrahepatic portal veins are diminutive in caliber but patent.3.  Unchanged extensive abdominal varices and a thrombosed portosystemic shunt arising from the splenic vein.4.  Small left pleural effusion.  IR TIPS: Augh 2023 Findings: &nbsp;1.  Right internal jugular vein is ultrasonographically patent and compresses.  Needle entry was documented.2.  Right hepatic venogram confirms vein patency.3.  Portogram demonstrates small main portal vein and small intrahepatic branches with prominent collaterals, with dilated and tortuous coronary and splenic veins.4.  Splenic venography demonstrates large tortuous main splenic vein and collaterals, with stenosis near the portal confluence.5.  Splenic venography after splenic vein stent placement demonstrates improved flow across area of stenosis.6.  The initial portosystemic gradient is 22 mmHg.7.  Post-TIPSS portosystemic gradient is 9 mmHg.8.  The TIPSS has a smooth course and extends from the middle hepatic vein to the main portal vein.9.  There is good flow through the shunt at the conclusion of the procedure with no appreciable filling of varices.10.  4.8 liters of fluid were drained during the procedure.11.  New right internal jugular approach triple-lumen catheter terminates at the superior cavoatrial junction.&nbsp;IMPRESSION:Impression: &nbsp;1.  Successful creation of TIPS from middle hepatic vein to main portal vein with a 10 mm x 6 cm x 2 cm controlled-expansion Viatorr stent (ballooned to 10 mm). Splenic vein stenosis improved after Abre 10mm x 8cm stent placement.2.  The portosystemic gradient was reduced from 22 mm Hg to 9 mmHg.  Since the tips no bleeding and no more taps.  Need to reach to Dr Barrios re the issue of her needing the mri and not  the ct.  Sept 18 2023 pt 17 and inr 1.5 and  wbc 3.9 and hg 12.7 and plat 50 and mcv 86.6 and na 140 and k 4.0 and cl 105 and co2 30 and ca 9.1 and bun 9 and cr 0.7 and glu 140 and alb 2.9 and alk 132 and ast 33 and alt 14.  tb 2.5.  Meld 14 and meld na 14.  Has been feeling better and doing better in from 22 to 14.   She lost like 40 pounds with the tips.  June 5 MRI was updated by radiology today. When they compared to multiple priors dating back to 2018, the described retroperitoneal/mesenteric root edema and perivascular infiltrative soft tissue demonstrates avid enhancement with no restricted diffusion.  The degree of enhancement has progressed as well as the density infiltrative soft tissue compared to multiple priors. Retroperitoneal lymph nodes were also increased in number but not pathologically enlarged.  There was no evident medialization of the ureters.  They said that they findings are concerning for retroperitoneal fibrotic or inflammatory process. It appears that you were seen on June 20 by the transplant team/Dr Barrios and we will forward them this MRI correction so that they can be aware of this.  We will forward it to him via [facility]  to be aware of as he just saw you.   The 6/2/23 MR elastography was sent to me after lunch today. As you recall it was not available for viewing during the visit today. I called and left you a voicemail about this.  Lower thorax with unchanged small left pleural effusion.  Liver with subjective evidence of fat deposition.  Cirrhotic morphology.  No suspicious lesions.  Elastography donel after a weighted mean of 3 measurements showing 3.2K PA consistent with stage I-II fibrosis.  The fat quantification 12.5% consistent with mild fatty liver.  There is no significant iron deposition.  They did not see any biliary ductal dilatation.  Splenomegaly measuring 20 cm was seen.  The pancreas normal, adrenal glands normal.  They saw a right upper renal cyst.  Please make sure the primary care provider is aware.  They saw reactive lymph nodes.  They thought that the aorta was normal.  The superior mesenteric and inferior mesenteric along with the bilateral renal arteries were patent.  They noted the replaced right hepatic artery arising from the superior mesenteric artery.  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  They noted that there has been interval progression of the degree of thrombosis of the portosystemic shunt with near complete occlusive thrombus.  They noted additional varices and this is why we have to stay on top of the endoscopic use.  They saw mesenteric edema unchanged from the previous CT.  Moderate ascites.  Overall they noted cirrhosis and did not see any suspicious lesions but they did note that there was progression of a now near occlusive thrombus of a large portosystemic venous shunt from the splenic vein to the left femoral vein.  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 As you recall we were doing this to check on the clot and to ensure there were no lesions. It is good to confirm no lesions. Need to be sure you see the transplant team back as discussed.    6/5/23  SHe did the mri on friday but interestingly it has not been read yet. Checked epic yesterday and today for this. Will follow up with the pending result.  She had the repeat colon.  She is having a paracentesis every 6 days or so, 4 L removed she is doing the low salt diet and limiting fluids. DIscussed the risk of recurrrent paracentesis and needs to see oltx back on this and see if options. HE symtpoms controlled on xifaxan and miralax as seh does not tolerate the lactulose She has not schedlued with dr. barrios as discussed. inr 1.43, mcv 86, hgb 11.1, platelets 125, bili 2.5, ast 36, alt 18, alk phos 101 na 136, cr 0.8 MELD 14, meld na 15 4/27/23 HOspital follow up and recurrent ascites. She hospitalized patient recently at [facility].  She was in route to [place] per the notes but had EKG abnormalities and they redirected her to [place].  She had gained 19 pounds in the last several days.  Recent paracentesis on 419 with 4 L removed.  She was also having hematochezia and reviewed the chart and had recent rectal clips done and those need a re look and hospital noted she did not want to do there as she does things at [facility]  CT scan done on 423  Cirrhotic morphology of the liver with this extensive paraesophageal and abdominal varices and splenomegaly. no lesions  April 24 paracentesis showing that there was 4 L removed.  April 24 labs with hemoglobin 9.8, platelets 47, INR 1.6.  Sodium 131, potassium 3.1, bilirubin 1.9, alkaline phosphatase 80, ALT 17, AST 37. cr .7  Per epic she has been pending an echo and rescheduled multiple times due to transportation issues. She was pending mri recently but not done  Discussed the ascites and need to see where from and ct not showing clot issue and need mri to be sure but also need that echo need to order paracentesis now fo rher. she has not been limited flluids, salt and she will start.  She needs to be reseen by oltx and we will refer her We will update and labs and she will need to see GI re the hematochezia given recent clips done.  Dr. Gonzales saw her as well and reminded her if acute then do care where she can. Also discussed with her the salt and needing to see oltx again.   [facility]. Pepito [facility]ile [facility] [address]  ~34.1 mi [phone]  We need to know what he is giving her so that we can say what we expect to be the case.  Depending on what he is giving her.  No recent labs and no recent imaging.  June 8, 2022 MRI showed chronic trace left pleural effusion with associated atelectasis in the lower thorax views.  There was no significant fat or iron but with nodular contour and cirrhotic morphology.  No suspicious lesions.  Prior cholecystectomy changes.  No duct dilation.  Spleen enlarged 18.4 cm.  Pancreas normal.  Adrenal glands normal.  Normal bilateral renal symmetric enhancement with 6 mm superior pole renal cyst and no suspicious lesions and no hydro.  Mild small and large bowel wall edema likely due to periportal enteropathy/colopathy.  Unchanged chronic fibrosis of portal vein with cavernous transformation reconstituted left portal vein.  Redemonstrated gastroesophageal perisplenic varices.  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  Only trace perihepatic and perisplenic ascites.  June 7, 2022 visit with Dr. Flores mentions that the patient is status post TAVR with recent echo showing EF 55% of left ventricular outflow VTI of 21.  Chronic diastolic heart failure noted.  TAVR was on January 23, 2020.  She has not followed with Dr Flores and sees local cards for the tavr.  June 1, 2022 echo showed EF 50 to 55% aortic valve normal functioning bioprosthetic valve.  Aortic valve max velocity showed a mean gradient of 11.0 and a peak gradient 24.4.  June 8, 2022 labs show sodium 135 potassium 3.9 glucose 297 BUN of 10 creatinine 0.75 total bilirubin 1.6 calcium 9.1 AST 36 ALT 18 alk phos 106 hemoglobin A1c up at 7.5 AFP 4 W count 3.2 hemoglobin 12.3 plate count 47 INR 1.35.  HIV test negative at that time.  Transplant team sees her every 6m and she sees the team there. She is told is stable and looks good and meld was 14.  She says told re mounjaro.  April 29th 2022 labs stable ammonia level normal at 61. INR normal at 1.2.   Sugar was elevated at 173.  Please share with primary provider. BUN of 13 creatinine 0.69 sodium 138 potassium 4.4 albumin normal at 3.8.  Bilirubin 1.2 alkaline phosphatase 112. Only the AST was elevated at 46 and ALT was ideal at 25. White blood cell count 3.7 hemoglobin 12.4 and MCV 87 and these are normal.  Platelet count low at 54.  Neutrophils normal at 2.8 and lymphocytes slightly low at 0.5. Meld down to 9 and meld na 9. Suggests that the infection is what tipped your labs recently  to be worse. So really need to focus on trying to prevent those recurrent urinary infections as worsens your underlying liver state. Please ask local ID to contact us to discuss the recent infection and options.   Saw the notes and had urosepsis. Had 08625 cells in the ascites.  Had 1 tap in the hospital. had the infection.  Prior tap in [place] had no infection and she had been started on abx once a month. 2500 ml.  20-30 wbc, nos.  90% of pts with sbp if the infection will be reinfected in a year.  Once a month is not a usual tx for sbp prevention. Daily or once a week. Dr Lopez is her id and they need to look at this new data and need to adjust tx.  March 30, 2022 labs show sodium 137 potassium 3.6 BUN of 10 creatinine 0.68 glucose 156 total bilirubin 1.3 AST 25 ALT 14 alk phos 83 calcium 8.7 labs count 3.3 hemoglobin 12 platelet count 66.  March 28, 2022 CT abdomen and pelvis with IV contrast revealed left-sided pleural effusion with some atelectasis.  Cardiophrenic enhancing node measuring 1.1 x 1.8 cm slightly increased in size.  Liver with chronic liver disease.  Prior cholecystectomy no bile duct dilation.  Splenomegaly seen.  Pancreas normal.  No hydronephrosis but with left nonobstructing renal stone.  Diffuse colon wall thickening and edema with surrounding stranding.  Prominent retroperitoneal mesenteric lymph nodes.  Large varices seen on the vessel exam with focal occlusion of the SMV/portal vein which appears similar to prior study.  Moderate continuation of the right portal vein.  Mild increase cannot exclude thrombus within the splenic venous collaterals in the left upper quadrant.  March 28, 2022 paracentesis removed 2600 mL of fluid.  November 2021 MRI liver with no fat or iron cirrhotic liver in order to enhancing lesions.  Spleen 19 cm.  Prior cholecystectomy changes seen.  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  Multiple large collateral pathways including gastroesophageal varices.  Replaced right hepatic artery seen.  Ascites may have been primary vs uti.  She has been seen by urogyn for the recurrent uti.   On abx once a month not for prevention but rather infection every month and so that makes more sense.  Covid 19 Jan 2022 and admitted locally for sepsis.  June 8, 2022 patient has appointment for labs and another MRI and in July 20 9022 will be seen by the  oltx transplant nurse practitioner at [facility].   April 8, 2022 transplant clinic visit mentions that the patient has Baeza and had a recent hospitalization from March 20 7 March 21st for urosepsis.  Drained 2.6 L and prior to that had not been drained since February 2022.  Taking Bumex 2 mg twice daily and Aldactone 100 mg a day.  Meld increased during the hospital but was down to 14 by March 27, 2022.  Bili decreased to 1.3 on March 30.  Patient with history of chronic SMV/portal vein occlusion.  EGD January 2021 status post band ligation.  No significant encephalopathy on Xifaxan.  Current medicines include omeprazole 2 mg a day, aspirin 81 mg a day, Bumex 2 mg a day, vitamin D 1000 units a day, Prolia every 6 months Estrace 2 mg a day, gabapentin 3 mg 3 times daily, NovoLog sliding scale, Tresiba 8 units at night, lactulose 20 g Daily, mesalamine 1 g p.o. twice daily, midodrine 10 mg p.o. 3 times daily, nadolol 40 mg twice daily, potassium 40 mg a day, Xifaxan 550 twice daily, Aldactone on milligrams a day, Trintellix 20 mg a day.  Patient was listed as following up with Dr. Vickers for endoscopy. She says he has been seeing. He also saw dysphagia.  March 27 March 31 discharge summary from [facility].   She was a patient came in with concerns of urosepsis and left pyelonephritis.  Urinalysis high-grade 150 WBCs.  Blood cultures were negative but urine did grow pansensitive Klebsiella.  Patient was seen by ID and was given a course of daptomycin and Levaquin treatment.  They wanted urogynecology to see her. Left renal pole lesion that could be a nidus for recurrent infection.  Cell count on the fluid was 10,520 range  nucleated cells 84% neutrophils though the cultures were negative to date and maintained on antibiotics.  Looked up the actual fluid count from March 28 and had had albumin less than 1.5 total protein was 3.0 nucleated cells 10,520 neutrophils 84%.  Lasted been EGD high-grade fevers or November 17, 2021 with Dr. Vickers where she had grade 1 varices lower esophagus.  Moderate Schatzki ring was seen.  Dilation was seen to 51 Burkinan.  Moderately erythematous mucosa without bleeding seen in the gastric antrum.  H. pylori testing sent.  Biopsy was negative for significant abnormality.  She did egd in nov 2021 and grade 1 varices noted. He is planning nov 2022.  nov 2021 mri: Reason For Exam Hepatocellular carcinoma;Hepatocellular carcinoma  REPORT EXAM: MRI Abdomen w/ + w/o Contrast  CLINICAL INDICATION: Hepatocellular carcinoma  TECHNIQUE: Multisequence, multiplanar MRI of the abdomen was performed without and with intravenous contrast. ESRC.[id number]  CONTRAST: 18 cc of Prohance  COMPARISON: March 2, 2021  FINDINGS:  Lower Thorax: Normal.  Liver: No fat or iron. Cirrhotic liver morphology. No arterially enhancing lesions. No lesions with capsule. No lesions with washout.  Gallbladder/Biliary Tree: Status post cholecystectomy.  Spleen: Measuring 19.0 cm in maximum craniocaudal diameter.  Pancreas: Normal.  Adrenal Glands: Normal.   Kidneys/Ureters: Normal.  Gastrointestinal: Normal.   Lymph Nodes: Normal.  Vessels: Chronic thrombosis of the main portal vein with cavernous transformation redemonstrated. Multiple large collateral pathways including gastroesophageal varices, again noted. 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  Peritoneum/Retroperitoneum: Minimal ascites is present.  Bones/Soft Tissues: Normal.  IMPRESSION:     Cirrhosis with chronic portal vein thrombosis and cavernous transformation with associated evidence of portal hypertension. No hepatocellular carcinoma. Pt is 63 F last seen June 2023 by Ms Valles with hx baeza and cirrhosis and smv/pv clot, hx varices, hx pse, march 2022 urosepsis/sbp and now here for ongoing care and eval.  A copy of the note was sent to the referring provider.  Dear Lady Carson, June 5 MRI was updated by radiology today. When they compared to multiple priors dating back to 2018, the described retroperitoneal/mesenteric root edema and perivascular infiltrative soft tissue demonstrates avid enhancement with no restricted diffusion.  The degree of enhancement has progressed as well as the density infiltrative soft tissue compared to multiple priors. Retroperitoneal lymph nodes were also increased in number but not pathologically enlarged.  There was no evident medialization of the ureters.  They said that they findings are concerning for retroperitoneal fibrotic or inflammatory process. It appears that you were seen on June 20 by the transplant team/Dr Barrios and we will forward them this MRI correction so that they can be aware of this.  We will forward it to him via [facility]  to be aware of as he just saw you. Dr Gonzales  The 6/2/23 MR elastography was sent to me after lunch today. As you recall it was not available for viewing during the visit today. I called and left you a voicemail about this.  Lower thorax with unchanged small left pleural effusion.  Liver with subjective evidence of fat deposition.  Cirrhotic morphology.  No suspicious lesions.  Elastography donel after a weighted mean of 3 measurements showing 3.2K PA consistent with stage I-II fibrosis.  The fat quantification 12.5% consistent with mild fatty liver.  There is no significant iron deposition.  They did not see any biliary ductal dilatation.  Splenomegaly measuring 20 cm was seen.  The pancreas normal, adrenal glands normal.  They saw a right upper renal cyst.  Please make sure the primary care provider is aware.  They saw reactive lymph nodes.  They thought that the aorta was normal.  The superior mesenteric and inferior mesenteric along with the bilateral renal arteries were patent.  They noted the replaced right hepatic artery arising from the superior mesenteric artery.  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  They noted that there has been interval progression of the degree of thrombosis of the portosystemic shunt with near complete occlusive thrombus.  They noted additional varices and this is why we have to stay on top of the endoscopic use.  They saw mesenteric edema unchanged from the previous CT.  Moderate ascites.  Overall they noted cirrhosis and did not see any suspicious lesions but they did note that there was progression of a now near occlusive thrombus of a large portosystemic venous shunt from the splenic vein to the left femoral vein.  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 As you recall we were doing this to check on the clot and to ensure there were no lesions. It is good to confirm no lesions. Need to be sure you see the transplant team back as discussed.  Do Valles PA-C  6/5/23  SHe did the mri on friday but interestingly it has not been read yet. Checked epic yesterday and today for this. Will follow up with the pending result.  She had the repeat colon.  She is having a paracentesis every 6 days or so, 4 L removed she is doing the low salt diet and limiting fluids. DIscussed the risk of recurrrent paracentesis and needs to see oltx back on this and see if options. HE symtpoms controlled on xifaxan and miralax as seh does not tolerate the lactulose She has not schedlued with dr. barrios as discussed. inr 1.43, mcv 86, hgb 11.1, platelets 125, bili 2.5, ast 36, alt 18, alk phos 101 na 136, cr 0.8 MELD 14, meld na 15 4/27/23 HOspital follow up and recurrent ascites. She hospitalized patient recently at [facility].  She was in route to [place] per the notes but had EKG abnormalities and they redirected her to [place].  She had gained 19 pounds in the last several days.  Recent paracentesis on 419 with 4 L removed.  She was also having hematochezia and reviewed the chart and had recent rectal clips done and those need a re look and hospital noted she did not want to do there as she does things at [facility]  CT scan done on 423  Cirrhotic morphology of the liver with this extensive paraesophageal and abdominal varices and splenomegaly. no lesions  April 24 paracentesis showing that there was 4 L removed.  April 24 labs with hemoglobin 9.8, platelets 47, INR 1.6.  Sodium 131, potassium 3.1, bilirubin 1.9, alkaline phosphatase 80, ALT 17, AST 37. cr .7  Per epic she has been pending an echo and rescheduled multiple times due to transportation issues. She was pending mri recently but not done  Discussed the ascites and need to see where from and ct not showing clot issue and need mri to be sure but also need that echo need to order paracentesis now fo rher. she has not been limited flluids, salt and she will start.  She needs to be reseen by oltx and we will refer her We will update and labs and she will need to see GI re the hematochezia given recent clips done.  Dr. Gonzales saw her as well and reminded her if acute then do care where she can. Also discussed with her the salt and needing to see oltx again.     recap She says she had some issues with daughter being in mental institution and she had trouble coming up and so now comes back. She is doing better.  She had adopted her at birth.  Right ear she had an skin cancer and has trouble wearing a mask for this.  She has been getting shot for scalp psoriasis and she does not know what is the shot. She has 2 rounds of it.  She has to get her every 2 months.  Dr. Pepito Haile availdermatology.[facility] [address], [place] [address]  ~34.1 mi [phone]  We need to know what he is giving her so that we can say what we expect to be the case.  Depending on what he is giving her.  No recent labs and no recent imaging.  June 8, 2022 MRI showed chronic trace left pleural effusion with associated atelectasis in the lower thorax views.  There was no significant fat or iron but with nodular contour and cirrhotic morphology.  No suspicious lesions.  Prior cholecystectomy changes.  No duct dilation.  Spleen enlarged 18.4 cm.  Pancreas normal.  Adrenal glands normal.  Normal bilateral renal symmetric enhancement with 6 mm superior pole renal cyst and no suspicious lesions and no hydro.  Mild small and large bowel wall edema likely due to periportal enteropathy/colopathy.  Unchanged chronic fibrosis of portal vein with cavernous transformation reconstituted left portal vein.  Redemonstrated gastroesophageal perisplenic varices.  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  Only trace perihepatic and perisplenic ascites.  June 7, 2022 visit with Dr. Flores mentions that the patient is status post TAVR with recent echo showing EF 55% of left ventricular outflow VTI of 21.  Chronic diastolic heart failure noted.  TAVR was on January 23, 2020.  She has not followed with Dr Flores and sees local cards for the tavr.  June 1, 2022 echo showed EF 50 to 55% aortic valve normal functioning bioprosthetic valve.  Aortic valve max velocity showed a mean gradient of 11.0 and a peak gradient 24.4.  June 8, 2022 labs show sodium 135 potassium 3.9 glucose 297 BUN of 10 creatinine 0.75 total bilirubin 1.6 calcium 9.1 AST 36 ALT 18 alk phos 106 hemoglobin A1c up at 7.5 AFP 4 W count 3.2 hemoglobin 12.3 plate count 47 INR 1.35.  HIV test negative at that time.  Transplant team sees her every 6m and she sees the team there. She is told is stable and looks good and meld was 14.  She says told re mounjaro.  April 29th 2022 labs stable ammonia level normal at 61. INR normal at 1.2.   Sugar was elevated at 173.  Please share with primary provider. BUN of 13 creatinine 0.69 sodium 138 potassium 4.4 albumin normal at 3.8.  Bilirubin 1.2 alkaline phosphatase 112. Only the AST was elevated at 46 and ALT was ideal at 25. White blood cell count 3.7 hemoglobin 12.4 and MCV 87 and these are normal.  Platelet count low at 54.  Neutrophils normal at 2.8 and lymphocytes slightly low at 0.5. Meld down to 9 and meld na 9. Suggests that the infection is what tipped your labs recently  to be worse. So really need to focus on trying to prevent those recurrent urinary infections as worsens your underlying liver state. Please ask local ID to contact us to discuss the recent infection and options.   Saw the notes and had urosepsis. Had 76701 cells in the ascites.  Had 1 tap in the hospital. had the infection.  Prior tap in [place] had no infection and she had been started on abx once a month. 2500 ml.  20-30 wbc, nos.  90% of pts with sbp if the infection will be reinfected in a year.  Once a month is not a usual tx for sbp prevention. Daily or once a week. Dr Lopez is her id and they need to look at this new data and need to adjust tx.  March 30, 2022 labs show sodium 137 potassium 3.6 BUN of 10 creatinine 0.68 glucose 156 total bilirubin 1.3 AST 25 ALT 14 alk phos 83 calcium 8.7 labs count 3.3 hemoglobin 12 platelet count 66.  March 28, 2022 CT abdomen and pelvis with IV contrast revealed left-sided pleural effusion with some atelectasis.  Cardiophrenic enhancing node measuring 1.1 x 1.8 cm slightly increased in size.  Liver with chronic liver disease.  Prior cholecystectomy no bile duct dilation.  Splenomegaly seen.  Pancreas normal.  No hydronephrosis but with left nonobstructing renal stone.  Diffuse colon wall thickening and edema with surrounding stranding.  Prominent retroperitoneal mesenteric lymph nodes.  Large varices seen on the vessel exam with focal occlusion of the SMV/portal vein which appears similar to prior study.  Moderate continuation of the right portal vein.  Mild increase cannot exclude thrombus within the splenic venous collaterals in the left upper quadrant.  March 28, 2022 paracentesis removed 2600 mL of fluid.  November 2021 MRI liver with no fat or iron cirrhotic liver in order to enhancing lesions.  Spleen 19 cm.  Prior cholecystectomy changes seen.  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  Multiple large collateral pathways including gastroesophageal varices.  Replaced right hepatic artery seen.  Ascites may have been primary vs uti.  She has been seen by urogyn for the recurrent uti.   On abx once a month not for prevention but rather infection every month and so that makes more sense.  Covid 19 Jan 2022 and admitted locally for sepsis.  June 8, 2022 patient has appointment for labs and another MRI and in July 20 9022 will be seen by the  oltx transplant nurse practitioner at [facility].   April 8, 2022 transplant clinic visit mentions that the patient has Baeza and had a recent hospitalization from March 20 7 March 21st for urosepsis.  Drained 2.6 L and prior to that had not been drained since February 2022.  Taking Bumex 2 mg twice daily and Aldactone 100 mg a day.  Meld increased during the hospital but was down to 14 by March 27, 2022.  Bili decreased to 1.3 on March 30.  Recap: Patient with history of chronic SMV/portal vein occlusion.  EGD January 2021 status post band ligation.  No significant encephalopathy on Xifaxan.  Current medicines include omeprazole 2 mg a day, aspirin 81 mg a day, Bumex 2 mg a day, vitamin D 1000 units a day, Prolia every 6 months Estrace 2 mg a day, gabapentin 3 mg 3 times daily, NovoLog sliding scale, Tresiba 8 units at night, lactulose 20 g Daily, mesalamine 1 g p.o. twice daily, midodrine 10 mg p.o. 3 times daily, nadolol 40 mg twice daily, potassium 40 mg a day, Xifaxan 550 twice daily, Aldactone on milligrams a day, Trintellix 20 mg a day.  Patient was listed as following up with Dr. Vickers for endoscopy. She says he has been seeing. He also saw dysphagia.  March 27 March 31 discharge summary from [facility].   She was a patient came in with concerns of urosepsis and left pyelonephritis.  Urinalysis high-grade 150 WBCs.  Blood cultures were negative but urine did grow pansensitive Klebsiella.  Patient was seen by ID and was given a course of daptomycin and Levaquin treatment.  They wanted urogynecology to see her. Left renal pole lesion that could be a nidus for recurrent infection.  Cell count on the fluid was 10,520 range  nucleated cells 84% neutrophils though the cultures were negative to date and maintained on antibiotics.  Looked up the actual fluid count from March 28 and had had albumin less than 1.5 total protein was 3.0 nucleated cells 10,520 neutrophils 84%.  Lasted been EGD high-grade fevers or November 17, 2021 with Dr. Vickers where she had grade 1 varices lower esophagus.  Moderate Schatzki ring was seen.  Dilation was seen to 51 Burkinan.  Moderately erythematous mucosa without bleeding seen in the gastric antrum.  H. pylori testing sent.  Biopsy was negative for significant abnormality.  She did egd in nov 2021 and grade 1 varices noted. He is planning nov 2022.  nov 2021 mri: Reason For Exam Hepatocellular carcinoma;Hepatocellular carcinoma  REPORT EXAM: MRI Abdomen w/ + w/o Contrast  CLINICAL INDICATION: Hepatocellular carcinoma  TECHNIQUE: Multisequence, multiplanar MRI of the abdomen was performed without and with intravenous contrast. ESRC.2.7.3  CONTRAST: 18 cc of Prohance  COMPARISON: March 2, 2021  FINDINGS:  Lower Thorax: Normal.  Liver: No fat or iron. Cirrhotic liver morphology. No arterially enhancing lesions. No lesions with capsule. No lesions with washout.  Gallbladder/Biliary Tree: Status post cholecystectomy.  Spleen: Measuring 19.0 cm in maximum craniocaudal diameter.  Pancreas: Normal.  Adrenal Glands: Normal.   Kidneys/Ureters: Normal.  Gastrointestinal: Normal.   Lymph Nodes: Normal.  Vessels: Chronic thrombosis of the main portal vein with cavernous transformation redemonstrated. Multiple large collateral pathways including gastroesophageal varices, again noted. 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  Peritoneum/Retroperitoneum: Minimal ascites is present.  Bones/Soft Tissues: Normal.  IMPRESSION:     Cirrhosis with chronic portal vein thrombosis and cavernous transformation with associated evidence of portal hypertension. No hepatocellular carcinoma.

## 2024-08-08 ENCOUNTER — TELEPHONE ENCOUNTER (OUTPATIENT)
Dept: URBAN - METROPOLITAN AREA CLINIC 86 | Facility: CLINIC | Age: 64
End: 2024-08-08

## 2024-08-08 NOTE — HPI-TODAY'S VISIT:
Dear Lady Carson,   The 8/6/24 labs were sent to me.  The glucose 114, creatinine 0.9, sodium low at 131, potassium slightly elevated at 5.3 may need to consider checking this again with her primary care.  Your bilirubin was up at 4.2 the alkaline phosphatase 102, AST 42 and ALT 20.  COVID AST and ALT is less than 25.  Have you been sick recently? White blood cells 8.4, hemoglobin up at 16, MCV 96 and platelets 80 INR 1.4.  Ammonia was normal at 63.  Previously in June the bilirubin was 4.9 in the AST was 33 and the ALT was 17. Albumin 3.9.  MELD score 16.  I saw a recent ultrasound showing the liver was partially obscured by overlying structures but I did see a nodular contour and coarsened echotexture.  Did not see definite lesions.  Bile duct at 2 mm in intensity dilation.  Right and left kidneys appear normal.  Spleen 14.2 cm.  They noted that the TIPS was patent.  We will review this at your follow-up and see how you are doing.  Please share this information with your other providers.  Do Valles PA-C

## 2024-08-13 ENCOUNTER — RX ONLY (OUTPATIENT)
Age: 64
Setting detail: RX ONLY
End: 2024-08-13

## 2024-08-13 ENCOUNTER — APPOINTMENT (RX ONLY)
Dept: URBAN - METROPOLITAN AREA CLINIC 41 | Facility: CLINIC | Age: 64
Setting detail: DERMATOLOGY
End: 2024-08-13

## 2024-08-13 DIAGNOSIS — L40.0 PSORIASIS VULGARIS: ICD-10-CM

## 2024-08-13 PROCEDURE — ? COUNSELING

## 2024-08-13 PROCEDURE — 11901 INJECT SKIN LESIONS >7: CPT

## 2024-08-13 PROCEDURE — ? INTRALESIONAL KENALOG

## 2024-08-13 RX ORDER — ROFLUMILAST 3 MG/G
AEROSOL, FOAM TOPICAL
Qty: 60 | Refills: 3 | Status: ERX

## 2024-08-13 ASSESSMENT — LOCATION DETAILED DESCRIPTION DERM
LOCATION DETAILED: MID-OCCIPITAL SCALP
LOCATION DETAILED: RIGHT INFERIOR OCCIPITAL SCALP

## 2024-08-13 ASSESSMENT — LOCATION ZONE DERM: LOCATION ZONE: SCALP

## 2024-08-13 ASSESSMENT — LOCATION SIMPLE DESCRIPTION DERM: LOCATION SIMPLE: POSTERIOR SCALP

## 2024-08-13 ASSESSMENT — PGA PSORIASIS: PGA PSORIASIS 2020: MILD

## 2024-08-13 ASSESSMENT — ITCH NUMERIC RATING SCALE: HOW SEVERE IS YOUR ITCHING?: 6

## 2024-08-13 ASSESSMENT — BSA PSORIASIS: % BODY COVERED IN PSORIASIS: 1

## 2024-08-13 NOTE — HPI: RASH (PSORIASIS)
How Severe Is Your Psoriasis?: mild
Do You Have A Family History Of Psoriasis?: no
Is This A New Presentation, Or A Follow-Up?: Follow Up Psoriasis
Additional History: Patient presents for psoriasis f/u she states that her psoriasis is better she rates her itch 6\10 she is using Zoryve Foam she has tried and failed Skyrizi Otezla Enstilar.

## 2024-08-13 NOTE — PROCEDURE: INTRALESIONAL KENALOG
Total Volume (Ccs): 1.5
Medical Necessity Clause: This procedure was medically necessary because the lesions that were treated were:
Administered By (Optional): Yady Tillman
How Many Mls Were Removed From The 80 Mg/Ml (5ml) Vial When Preparing The Injectable Solution?: 0
Detail Level: Detailed
Kenalog Type Of Vial: Multiple Dose
Lot # For Kenalog (Optional): [id number]
Consent: The risks of atrophy were reviewed with the patient.
Include Z78.9 (Other Specified Conditions Influencing Health Status) As An Associated Diagnosis?: No
Expiration Date For Kenalog (Optional): 3-2026
Show Inventory Tab: Hide
Ndc# For Kenalog Only: [id number]
Concentration Of Kenalog Solution Injected (Mg/Ml): 5.0
Validate Note Data When Using Inventory: Yes
Kenalog Preparation: Kenalog

## 2024-08-21 ENCOUNTER — OFFICE VISIT (OUTPATIENT)
Dept: URBAN - METROPOLITAN AREA CLINIC 86 | Facility: CLINIC | Age: 64
End: 2024-08-21
Payer: MEDICARE

## 2024-08-21 VITALS
SYSTOLIC BLOOD PRESSURE: 109 MMHG | HEART RATE: 80 BPM | TEMPERATURE: 97.3 F | DIASTOLIC BLOOD PRESSURE: 69 MMHG | WEIGHT: 137 LBS | BODY MASS INDEX: 25.86 KG/M2 | HEIGHT: 61 IN

## 2024-08-21 DIAGNOSIS — K75.81 NASH (NONALCOHOLIC STEATOHEPATITIS): ICD-10-CM

## 2024-08-21 DIAGNOSIS — K76.82 HEPATIC ENCEPHALOPATHY: ICD-10-CM

## 2024-08-21 DIAGNOSIS — K76.6 PORTAL HYPERTENSION: ICD-10-CM

## 2024-08-21 DIAGNOSIS — I81 PORTAL VEIN THROMBOSIS: ICD-10-CM

## 2024-08-21 PROCEDURE — 99214 OFFICE O/P EST MOD 30 MIN: CPT | Performed by: PHYSICIAN ASSISTANT

## 2024-08-21 RX ORDER — VORTIOXETINE 20 MG/1
TAKE 1 TABLET (20 MG) BY ORAL ROUTE ONCE DAILY AT THE SAME TIME EACH DAY TABLET, FILM COATED ORAL ONCE A DAY
Refills: 0 | Status: ACTIVE | COMMUNITY
Start: 1900-01-01

## 2024-08-21 RX ORDER — GABAPENTIN 300 MG/1
1 CAPSULE CAPSULE ORAL THREE TIMES A DAY
Status: ACTIVE | COMMUNITY

## 2024-08-21 RX ORDER — OMEPRAZOLE 40 MG/1
TAKE 1 CAPSULE BY MOUTH TWICE A DAY BEFORE A MEAL CAPSULE, DELAYED RELEASE ORAL
Qty: 180 CAPSULE | Refills: 0 | Status: ACTIVE | COMMUNITY

## 2024-08-21 RX ORDER — TIRZEPATIDE 7.5 MG/.5ML
AS DIRECTED INJECTION, SOLUTION SUBCUTANEOUS
Status: ACTIVE | COMMUNITY

## 2024-08-21 RX ORDER — BUMETANIDE 0.5 MG/1
4 (2MG) TABLET ORAL ONCE A DAY
Status: ACTIVE | COMMUNITY

## 2024-08-21 RX ORDER — DEXTROSE 4 G
1 TABLET TABLET,CHEWABLE ORAL ONCE A DAY
Status: ACTIVE | COMMUNITY

## 2024-08-21 RX ORDER — POTASSIUM CHLORIDE 1500 MG/1
2 TABLETS WITH FOOD TABLET, FILM COATED, EXTENDED RELEASE ORAL ONCE A DAY
Refills: 0 | Status: ACTIVE | COMMUNITY
Start: 1900-01-01

## 2024-08-21 RX ORDER — LACTULOSE 10 G/15ML
30 ML SOLUTION ORAL ONCE A DAY
Status: ACTIVE | COMMUNITY

## 2024-08-21 RX ORDER — RIFAXIMIN 550 MG/1
1 TABLET TABLET ORAL TWICE A DAY
Qty: 60 TABLET | Refills: 5 | Status: ACTIVE | COMMUNITY

## 2024-08-21 RX ORDER — SEMAGLUTIDE 0.68 MG/ML
AS DIRECTED INJECTION, SOLUTION SUBCUTANEOUS
Status: ON HOLD | COMMUNITY

## 2024-08-21 RX ORDER — MIDODRINE HYDROCHLORIDE 10 MG/1
1 TABLET TABLET ORAL THREE TIMES A DAY
Status: ACTIVE | COMMUNITY

## 2024-08-21 RX ORDER — NADOLOL 40 MG/1
1 TABLET TABLET ORAL TWICE A DAY
Status: ACTIVE | COMMUNITY

## 2024-08-21 RX ORDER — ASPIRIN 81 MG/1
1 TABLET TABLET, COATED ORAL ONCE A DAY
Status: ACTIVE | COMMUNITY

## 2024-08-21 NOTE — HPI-TODAY'S VISIT:
Pt is 64 F last seen Jan 2024 by myself with hx baeza/mash and cirrhosis and smv/pv clot, hx varices, hx pse, s/p pTIPs and now here for ongoing care.  A copy of the note was sent to the referring provider.  8/21/24 ov sHe had labs done on August 13 and her sodium 137, potassium 4.3, creatinine 0.78, bilirubin 3.8, alkaline phosphatase 76, AST 40 and ALT 21.  Albumin 3.1.  White blood cells 5.5, hemoglobin 14,  and platelets 69.  They did not do any INR. Dear Lady Carson,   The 8/6/24 labs were sent to me.  The glucose 114, creatinine 0.9, sodium low at 131, potassium slightly elevated at 5.3 may need to consider checking this again with her primary care.  Your bilirubin was up at 4.2 the alkaline phosphatase 102, AST 42 and ALT 20.  AST and ALT is less than 25.  Have you been sick recently? White blood cells 8.4, hemoglobin up at 16, MCV 96 and platelets 80 INR 1.4.  Ammonia was normal at 63.  Previously in June the bilirubin was 4.9 in the AST was 33 and the ALT was 17. Albumin 3.9.  MELD score 16.  I saw a recent ultrasound showing the liver was partially obscured by overlying structures but I did see a nodular contour and coarsened echotexture.  Did not see definite lesions.  Bile duct at 2 mm in intensity dilation.  Right and left kidneys appear normal.  Spleen 14.2 cm.  They noted that the TIPS was patent.  We will review this at your follow-up and see how you are doing.  Please share this information with your other providers.  Do Valles PA-C   she had a ct scan yesterday also showing patent tips  still having issues w/ the urinary tract infections and doing preventative otc meds for this and could not recall med name but after looking online we cound it and it is called cystex and ingregidients include nsaid sodium salicylate and would not recommend this she is taking xifaxan and lactulose and having bowel movements. she did aan ammonia check at [place] and normal.   recap 7/23/24 telemed   MRI  6/17/24 cirrhosis no lesions and patent ttips hospitalized in june and admitted with concern for decompensated cirrhosis. She appears euvolemic, her weight is actually down from many recent clinic visits, she has no JVP, her belly is soft without significant free fluid on pocus, her lower extremity edema is trace and likely venous insufficiency or hypoalbuminemia related. There is no concern for sbp, she may have had UTI (diagnosed outpatient) and is completing abx for this 6/13. Her platelets are low but essentially at her baseline and she has no bleeding complications.     discussed the lactulose and titration to 3 times a day and she is working on this  taking the xifaxan  still a little sleepy and can see if taking the lactulose 3 times a day helps with increassing the bowel movements and her energy denies other symptoms of HE   recap 4/2/24  She saw [facility] on 3/25/2024 and had labs done.1.7, white blood cells 4.6, red blood cells 4.08, hemoglobin 13.3, MCV 94, platelets 36 and this is low.  aFP 2, sodium 143, potassium 4.1, bilirubin 3.0, alkaline phosphatase 82, AST 31, ALT 17. MELD 17They ordered a repeat MRI on her, due May 2024. saw IR and 2/2024 us with patent TIPS  The 1/19/24 labs were sent to me.  The sodium 140, potassium 3.9, creatinine 0.65, bilirubin 2.2, alkaline phosphatase 95, AST 21, ALT 13.  The INR 1.7.  Complete blood count showing white blood cells 5.1, hemoglobin 13.4, MCV 91, platelets 40. MELD 15. Lets lower the spironolactone to 50 mg. Please let us know if you need a new prescription for this. You will only need to take two 25 mg tablets a day.  Do Valles PA-C  reviewed the above and asked about recent illness and not sick but having a hard time.  asked abouve what changed and denies any changes  MRI due may 2024 she is more tired so less active and that could contribute to ast being up and follow    1/16/24 office visit  11/16/23 MRI EXAM: MR ABDOMEN W AND WO CONTRAST&nbsp;CLINICAL INDICATION: HCC Screening. BAEZA cirrhosis, recurrent ascites?that underwent creation of a transjugular intrahepatic portosystemic shunt (TIPS) and splenic vein stent placement on 8/8/23. TECHNIQUE: Multisequence, multiplanar MRI of the abdomen was performed without and with intravenous contrast. ESRC.[id number];COMPARISON: MRI 12/14/22 and CT 7/31/2023 and CTA 3/29/2023&nbsp;FINDINGS: Lower Thorax: Small left pleural effusion. Unchanged left lower lobe subsegmental atelectasis Liver: No fat or iron. Morphologic changes of chronic liver disease including nodular surface contour, lobar redistribution and fissural widening. No suspicious hepatic lesions.  Gallbladder/Biliary Tree: Mildly prominent bile ducts secondary to prior cholecystectomy. Spleen: Enlarged measuring up to 17.4 cm in CC dimension. Pancreas: Normal. Adrenal Glands: Normal.  Kidneys/Ureters: Renal cysts. No hydronephrosis. Gastrointestinal: No bowel obstruction.  Lymph Nodes: Marked interval improvement in previously seen extensive inflammatory soft tissue with enhancement in the retroperitoneum surrounding the abdominal aorta and branch vessels. Small subcentimeter in short axis retroperitoneal lymph nodes remain (13:55). Vessels: Grossly patent TIPS extends from the middle hepatic vein to the main portal vein. 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 Chronic occlusion of the SMV with reconstitution distally in the branch vessels of the SMV (13:61). Multiple collateral vasculature in the periportal region, central mesentery and the anterior abdomen. Redemonstrated partially thrombosed portosystemic shunt from the splenic vein (13:53) extending inferiorly to the pelvis, better seen on prior CTA from 3/29/2023. Upper abdominal perigastric and perisplenic varices. Replaced right hepatic artery arising from the superior mesenteric artery. Peritoneum/Retroperitoneum: Improved retroperitoneal/mesenteric root edema and enhancing soft tissue. Small right retroperitoneal nodule (13:56) measuring 1 cm, previously 2.1 x 1.9 cm. Bones/Soft Tissues: No suspicious osseous lesion. IMPRESSION:1.  No suspicious focal hepatic lesion.2.  Morphologic changes of chronic liver disease with stigmata of portal hypertension including splenomegaly and extensive upper abdominal varices.3.  Patent TIPS. Redemonstrated chronic thrombosis of the main portal vein and SMV with near occlusive thrombus of the large portosystemic venous shunt from the splenic vein extending to the pelvis. Extensive upper abdominal varices and collateral vasculature as detailed above.4.  Marked interval improvement in retroperitoneal enhancing soft tissue and lymphadenopathy, seen previously on 6/2/2023 suggestive of resolving nonspecific inflammatory process.The images were reviewed and interpreted by Hussain Csaanova MD.  Has fu TIPs US to check patency next month EGD utd she is still on ozempic and no side affects    dear Lady Alli, October 13 labs show glucose elevated at 272 and possibly you were not fasting that day?  Please share with primary provider to continue to work on this issue.  BUN 11 creatinine 0.83 sodium 140 potassium 4.4 chloride 101 CO2 28 calcium 9.3 with albumin slightly diminished at 3.6 and previous given 3.7.  Bilirubin was slightly higher at 3.2 but not fractionated.  Alk phos normal at 115 AST down to 34 from 52 and ALT 16 down from 26.  Ideal ALT less than 25.  WBC 5.7 hemoglobin 13.3 platelet  count was slightly higher for you at 52 from 39 before.  RDW was slightly up at 15.8.  Neutrophils normal at 4.6 and lymphocytes slightly low at 0.6 but this has been chronically seen and you before.  Please share with primary provider.  INR 1.4. Meld remains right at 15/15 and mainly driven by the elevated bilirubin. Please share labs with local providers.   No clear lab reason seen to decrease the diuretics for now. Await the mri. Dr Gonzales  Pt did the TIPS through [place] and that was done Aug 2023. She was done and came home same day.  She says she has seen Dr Barrios and he has CT of abd and he wanted to do mri. She says not switched for this.  Sept 18 2023 U.s: EXAM: US ABDOMEN COMPLETE, US ABDOMEN DOPPLER COMPLETE&nbsp;CLINICAL INDICATION: TIPS.&nbsp;TECHNIQUE: Grayscale, pulsed wave and color Doppler sonography of the upper abdomen were performed. ESRC.[id number]&nbsp;COMPARISON: CT 7/31/2023.&nbsp;FINDINGS: Liver: Coarsened echogenicity with a nodular surface contour compatible with morphologic changes of chronic liver disease. No lesions.&nbsp;Bile Ducts: No dilated intrahepatic biliary radicles. Common duct measures 4 mm.&nbsp;Gallbladder: Status post cholecystectomy. &nbsp;Pancreas: Obscured by overlying structures.&nbsp;Right Kidney: Measures 10.3 cm. Normal echogenicity. No hydronephrosis.&nbsp;Left Kidney: Measures 11.7 cm. Normal echogenicity. No hydronephrosis.&nbsp;Spleen: Measures 17.1 cm.&nbsp;Aorta: Normal caliber where imaged.&nbsp;IVC: Normal proximally, not imaged distally. &nbsp;Hepatic Veins: Normal.&nbsp;Portal Veins: Hepatopetal flow. 85-93 cm/s.&nbsp;Hepatic Arteries: Peak systolic velocity 48 cm/s. Resistive index 0.67. Normal waveform.&nbsp;TIPS: Stent is patent. Hepatopetal flow at the portal venous end and hepatofugal flow at the hepatic venous end. Velocity in the portal end 130-156 cm/s. Velocity at mid stent 143-162 cm/s. Velocity at the hepatic end 108-134 cm/s.&nbsp;Other: Trace perihepatic free fluid.&nbsp;IMPRESSION:1.  Patent TIPS.2.  Morphologic changes of chronic liver disease with mild sequelae of portal hypertension including splenomegaly and trace perihepatic free fluid.&nbsp;  July 2023 Ct did pre tips: EXAM: CT ABDOMEN W IV CONTRAST&nbsp;CLINICAL INDICATION: Portal hypertension.&nbsp;TECHNIQUE: Following administration of non-ionic IV contrast, postcontrast images through the abdomen were obtained. If applicable, point-of-care testing was approved following departmental protocol. ESRC.1.7.1 &nbsp;COMPARISON: CT abdomen/pelvis 3/29/2023, MRI abdomen 6/2/2023.&nbsp;FINDINGS: Lower Thorax: Trace left pleural effusion and left basilar atelectasis.&nbsp;Liver: Morphologic changes of chronic liver disease including nodular surface contour, lobar redistribution and fissural widening. No suspicious focal lesion.&nbsp;Gallbladder/Biliary Tree: Status post cholecystectomy. No biliary ductal dilatation.&nbsp;Spleen: Enlarged measuring up to 19 cm in craniocaudal dimension.&nbsp;Pancreas: Normal.&nbsp;Adrenal Glands: Left adrenal gland difficult to visualize. Normal right adrenal gland. &nbsp;Kidneys/Ureters: Renal cysts. No hydronephrosis.&nbsp;Gastrointestinal: No bowel obstruction or inflammation. &nbsp;Lymph Nodes: Unchanged subcentimeter in short axis jaelyn hepatis and retroperitoneal lymph nodes, likely reactive.&nbsp;Vessels: Diminutive main and central portal veins which otherwise appear patent. Redemonstrated chronic occlusion of the portosplenic venous confluence involving the portal vein extending into the superior mesenteric vein. Multiple upper abdominal paraesophageal, perigastric and perisplenic varices. Additional extensive collateral vasculature in the anterior abdomen. Findings are similar to recent MRI from 6/2/2023. Redemonstrated thrombosed portosystemic shunt arising from the splenic vein and extending inferiorly into the pelvis (6:30) and similar to 6/2/2023 MRI. Replaced right hepatic artery arising from the SMA.&nbsp;Peritoneum/Retroperitoneum: Moderate volume abdominopelvic free fluid.&nbsp;Bones/Soft Tissues: No aggressive osseous lesion. Grade 1 anterolisthesis L4 relative to L5.&nbsp;IMPRESSION:1.  Morphologic changes of chronic liver disease with sequelae of portal hypertension including splenomegaly, moderate volume ascites and extensive varices.2.  Unchanged chronic occlusion at the portosplenic venous confluence involving the portal vein extending into the superior mesenteric vein. Remainder of the main portal vein and central intrahepatic portal veins are diminutive in caliber but patent.3.  Unchanged extensive abdominal varices and a thrombosed portosystemic shunt arising from the splenic vein.4.  Small left pleural effusion.  IR TIPS: Augh 2023 Findings: &nbsp;1.  Right internal jugular vein is ultrasonographically patent and compresses.  Needle entry was documented.2.  Right hepatic venogram confirms vein patency.3.  Portogram demonstrates small main portal vein and small intrahepatic branches with prominent collaterals, with dilated and tortuous coronary and splenic veins.4.  Splenic venography demonstrates large tortuous main splenic vein and collaterals, with stenosis near the portal confluence.5.  Splenic venography after splenic vein stent placement demonstrates improved flow across area of stenosis.6.  The initial portosystemic gradient is 22 mmHg.7.  Post-TIPSS portosystemic gradient is 9 mmHg.8.  The TIPSS has a smooth course and extends from the middle hepatic vein to the main portal vein.9.  There is good flow through the shunt at the conclusion of the procedure with no appreciable filling of varices.10.  4.8 liters of fluid were drained during the procedure.11.  New right internal jugular approach triple-lumen catheter terminates at the superior cavoatrial junction.&nbsp;IMPRESSION:Impression: &nbsp;1.  Successful creation of TIPS from middle hepatic vein to main portal vein with a 10 mm x 6 cm x 2 cm controlled-expansion Viatorr stent (ballooned to 10 mm). Splenic vein stenosis improved after Abre 10mm x 8cm stent placement.2.  The portosystemic gradient was reduced from 22 mm Hg to 9 mmHg.  Since the tips no bleeding and no more taps.  Need to reach to Dr Barrios re the issue of her needing the mri and not  the ct.  Sept 18 2023 pt 17 and inr 1.5 and  wbc 3.9 and hg 12.7 and plat 50 and mcv 86.6 and na 140 and k 4.0 and cl 105 and co2 30 and ca 9.1 and bun 9 and cr 0.7 and glu 140 and alb 2.9 and alk 132 and ast 33 and alt 14.  tb 2.5.  Meld 14 and meld na 14.  Has been feeling better and doing better in from 22 to 14.   She lost like 40 pounds with the tips.  June 5 MRI was updated by radiology today. When they compared to multiple priors dating back to 2018, the described retroperitoneal/mesenteric root edema and perivascular infiltrative soft tissue demonstrates avid enhancement with no restricted diffusion.  The degree of enhancement has progressed as well as the density infiltrative soft tissue compared to multiple priors. Retroperitoneal lymph nodes were also increased in number but not pathologically enlarged.  There was no evident medialization of the ureters.  They said that they findings are concerning for retroperitoneal fibrotic or inflammatory process. It appears that you were seen on June 20 by the transplant team/Dr Barrios and we will forward them this MRI correction so that they can be aware of this.  We will forward it to him via [employer]  to be aware of as he just saw you.   The 6/2/23 MR elastography was sent to me after lunch today. As you recall it was not available for viewing during the visit today. I called and left you a voicemail about this.  Lower thorax with unchanged small left pleural effusion.  Liver with subjective evidence of fat deposition.  Cirrhotic morphology.  No suspicious lesions.  Elastography donel after a weighted mean of 3 measurements showing 3.2K PA consistent with stage I-II fibrosis.  The fat quantification 12.5% consistent with mild fatty liver.  There is no significant iron deposition.  They did not see any biliary ductal dilatation.  Splenomegaly measuring 20 cm was seen.  The pancreas normal, adrenal glands normal.  They saw a right upper renal cyst.  Please make sure the primary care provider is aware.  They saw reactive lymph nodes.  They thought that the aorta was normal.  The superior mesenteric and inferior mesenteric along with the bilateral renal arteries were patent.  They noted the replaced right hepatic artery arising from the superior mesenteric artery.  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  They noted that there has been interval progression of the degree of thrombosis of the portosystemic shunt with near complete occlusive thrombus.  They noted additional varices and this is why we have to stay on top of the endoscopic use.  They saw mesenteric edema unchanged from the previous CT.  Moderate ascites.  Overall they noted cirrhosis and did not see any suspicious lesions but they did note that there was progression of a now near occlusive thrombus of a large portosystemic venous shunt from the splenic vein to the left femoral vein.  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 As you recall we were doing this to check on the clot and to ensure there were no lesions. It is good to confirm no lesions. Need to be sure you see the transplant team back as discussed.    6/5/23  SHe did the mri on friday but interestingly it has not been read yet. Checked epic yesterday and today for this. Will follow up with the pending result.  She had the repeat colon.  She is having a paracentesis every 6 days or so, 4 L removed she is doing the low salt diet and limiting fluids. DIscussed the risk of recurrrent paracentesis and needs to see oltx back on this and see if options. HE symtpoms controlled on xifaxan and miralax as seh does not tolerate the lactulose She has not schedlued with dr. barrios as discussed. inr 1.43, mcv 86, hgb 11.1, platelets 125, bili 2.5, ast 36, alt 18, alk phos 101 na 136, cr 0.8 MELD 14, meld na 15 4/27/23 HOspital follow up and recurrent ascites. She hospitalized patient recently at [facility].  She was in route to [place] per the notes but had EKG abnormalities and they redirected her to [place].  She had gained 19 pounds in the last several days.  Recent paracentesis on 419 with 4 L removed.  She was also having hematochezia and reviewed the chart and had recent rectal clips done and those need a re look and hospital noted she did not want to do there as she does things at [place]  CT scan done on 423  Cirrhotic morphology of the liver with this extensive paraesophageal and abdominal varices and splenomegaly. no lesions  April 24 paracentesis showing that there was 4 L removed.  April 24 labs with hemoglobin 9.8, platelets 47, INR 1.6.  Sodium 131, potassium 3.1, bilirubin 1.9, alkaline phosphatase 80, ALT 17, AST 37. cr .7  Per epic she has been pending an echo and rescheduled multiple times due to transportation issues. She was pending mri recently but not done  Discussed the ascites and need to see where from and ct not showing clot issue and need mri to be sure but also need that echo need to order paracentesis now fo rher. she has not been limited flluids, salt and she will start.  She needs to be reseen by oltx and we will refer her We will update and labs and she will need to see GI re the hematochezia given recent clips done.  Dr. Gonzales saw her as well and reminded her if acute then do care where she can. Also discussed with her the salt and needing to see oltx again.   Dr. Pepito Haile [facility].[address] [address]  ~34.1 mi [phone]  We need to know what he is giving her so that we can say what we expect to be the case.  Depending on what he is giving her.  No recent labs and no recent imaging.  June 8, 2022 MRI showed chronic trace left pleural effusion with associated atelectasis in the lower thorax views.  There was no significant fat or iron but with nodular contour and cirrhotic morphology.  No suspicious lesions.  Prior cholecystectomy changes.  No duct dilation.  Spleen enlarged 18.4 cm.  Pancreas normal.  Adrenal glands normal.  Normal bilateral renal symmetric enhancement with 6 mm superior pole renal cyst and no suspicious lesions and no hydro.  Mild small and large bowel wall edema likely due to periportal enteropathy/colopathy.  Unchanged chronic fibrosis of portal vein with cavernous transformation reconstituted left portal vein.  Redemonstrated gastroesophageal perisplenic varices.  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  Only trace perihepatic and perisplenic ascites.  June 7, 2022 visit with Dr. Flores mentions that the patient is status post TAVR with recent echo showing EF 55% of left ventricular outflow VTI of 21.  Chronic diastolic heart failure noted.  TAVR was on January 23, 2020.  She has not followed with Dr Flores and sees local cards for the tavr.  June 1, 2022 echo showed EF 50 to 55% aortic valve normal functioning bioprosthetic valve.  Aortic valve max velocity showed a mean gradient of 11.0 and a peak gradient 24.4.  June 8, 2022 labs show sodium 135 potassium 3.9 glucose 297 BUN of 10 creatinine 0.75 total bilirubin 1.6 calcium 9.1 AST 36 ALT 18 alk phos 106 hemoglobin A1c up at 7.5 AFP 4 W count 3.2 hemoglobin 12.3 plate count 47 INR 1.35.  HIV test negative at that time.  Transplant team sees her every 6m and she sees the team there. She is told is stable and looks good and meld was 14.  She says told maribel mounjaro.  April 29th 2022 labs stable ammonia level normal at 61. INR normal at 1.2.   Sugar was elevated at 173.  Please share with primary provider. BUN of 13 creatinine 0.69 sodium 138 potassium 4.4 albumin normal at 3.8.  Bilirubin 1.2 alkaline phosphatase 112. Only the AST was elevated at 46 and ALT was ideal at 25. White blood cell count 3.7 hemoglobin 12.4 and MCV 87 and these are normal.  Platelet count low at 54.  Neutrophils normal at 2.8 and lymphocytes slightly low at 0.5. Meld down to 9 and meld na 9. Suggests that the infection is what tipped your labs recently  to be worse. So really need to focus on trying to prevent those recurrent urinary infections as worsens your underlying liver state. Please ask local ID to contact us to discuss the recent infection and options.   Saw the notes and had urosepsis. Had [id number] cells in the ascites.  Had 1 tap in the hospital. had the infection.  Prior tap in [place] had no infection and she had been started on abx once a month. 2500 ml.  20-30 wbc, nos.  90% of pts with sbp if the infection will be reinfected in a year.  Once a month is not a usual tx for sbp prevention. Daily or once a week. Dr Lopez is her id and they need to look at this new data and need to adjust tx.  March 30, 2022 labs show sodium 137 potassium 3.6 BUN of 10 creatinine 0.68 glucose 156 total bilirubin 1.3 AST 25 ALT 14 alk phos 83 calcium 8.7 labs count 3.3 hemoglobin 12 platelet count 66.  March 28, 2022 CT abdomen and pelvis with IV contrast revealed left-sided pleural effusion with some atelectasis.  Cardiophrenic enhancing node measuring 1.1 x 1.8 cm slightly increased in size.  Liver with chronic liver disease.  Prior cholecystectomy no bile duct dilation.  Splenomegaly seen.  Pancreas normal.  No hydronephrosis but with left nonobstructing renal stone.  Diffuse colon wall thickening and edema with surrounding stranding.  Prominent retroperitoneal mesenteric lymph nodes.  Large varices seen on the vessel exam with focal occlusion of the SMV/portal vein which appears similar to prior study.  Moderate continuation of the right portal vein.  Mild increase cannot exclude thrombus within the splenic venous collaterals in the left upper quadrant.  March 28, 2022 paracentesis removed 2600 mL of fluid.  November 2021 MRI liver with no fat or iron cirrhotic liver in order to enhancing lesions.  Spleen 19 cm.  Prior cholecystectomy changes seen.  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  Multiple large collateral pathways including gastroesophageal varices.  Replaced right hepatic artery seen.  Ascites may have been primary vs uti.  She has been seen by urogyn for the recurrent uti.   On abx once a month not for prevention but rather infection every month and so that makes more sense.  Covid 19 Jan 2022 and admitted locally for sepsis.  June 8, 2022 patient has appointment for labs and another MRI and in July 20 9022 will be seen by the  oltx transplant nurse practitioner at [facility].   April 8, 2022 transplant clinic visit mentions that the patient has Baeza and had a recent hospitalization from March 20 7 March 21st for urosepsis.  Drained 2.6 L and prior to that had not been drained since February 2022.  Taking Bumex 2 mg twice daily and Aldactone 100 mg a day.  Meld increased during the hospital but was down to 14 by March 27, 2022.  Bili decreased to 1.3 on March 30.  Patient with history of chronic SMV/portal vein occlusion.  EGD January 2021 status post band ligation.  No significant encephalopathy on Xifaxan.  Current medicines include omeprazole 2 mg a day, aspirin 81 mg a day, Bumex 2 mg a day, vitamin D 1000 units a day, Prolia every 6 months Estrace 2 mg a day, gabapentin 3 mg 3 times daily, NovoLog sliding scale, Tresiba 8 units at night, lactulose 20 g Daily, mesalamine 1 g p.o. twice daily, midodrine 10 mg p.o. 3 times daily, nadolol 40 mg twice daily, potassium 40 mg a day, Xifaxan 550 twice daily, Aldactone on milligrams a day, Trintellix 20 mg a day.  Patient was listed as following up with Dr. Vickers for endoscopy. She says he has been seeing. He also saw dysphagia.  March 27 March 31 discharge summary from [facility].   She was a patient came in with concerns of urosepsis and left pyelonephritis.  Urinalysis high-grade 150 WBCs.  Blood cultures were negative but urine did grow pansensitive Klebsiella.  Patient was seen by ID and was given a course of daptomycin and Levaquin treatment.  They wanted urogynecology to see her. Left renal pole lesion that could be a nidus for recurrent infection.  Cell count on the fluid was 10,520 range  nucleated cells 84% neutrophils though the cultures were negative to date and maintained on antibiotics.  Looked up the actual fluid count from March 28 and had had albumin less than 1.5 total protein was 3.0 nucleated cells 10,520 neutrophils 84%.  Lasted been EGD high-grade fevers or November 17, 2021 with Dr. Vickers where she had grade 1 varices lower esophagus.  Moderate Schatzki ring was seen.  Dilation was seen to 51 [place].  Moderately erythematous mucosa without bleeding seen in the gastric antrum.  H. pylori testing sent.  Biopsy was negative for significant abnormality.  She did egd in nov 2021 and grade 1 varices noted. He is planning nov 2022.  nov 2021 mri: Reason For Exam Hepatocellular carcinoma;Hepatocellular carcinoma  REPORT EXAM: MRI Abdomen w/ + w/o Contrast  CLINICAL INDICATION: Hepatocellular carcinoma  TECHNIQUE: Multisequence, multiplanar MRI of the abdomen was performed without and with intravenous contrast. ESRC.[id number]  CONTRAST: 18 cc of Prohance  COMPARISON: March 2, 2021  FINDINGS:  Lower Thorax: Normal.  Liver: No fat or iron. Cirrhotic liver morphology. No arterially enhancing lesions. No lesions with capsule. No lesions with washout.  Gallbladder/Biliary Tree: Status post cholecystectomy.  Spleen: Measuring 19.0 cm in maximum craniocaudal diameter.  Pancreas: Normal.  Adrenal Glands: Normal.   Kidneys/Ureters: Normal.  Gastrointestinal: Normal.   Lymph Nodes: Normal.  Vessels: Chronic thrombosis of the main portal vein with cavernous transformation redemonstrated. Multiple large collateral pathways including gastroesophageal varices, again noted. 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  Peritoneum/Retroperitoneum: Minimal ascites is present.  Bones/Soft Tissues: Normal.  IMPRESSION:     Cirrhosis with chronic portal vein thrombosis and cavernous transformation with associated evidence of portal hypertension. No hepatocellular carcinoma. Pt is 63 F last seen June 2023 by Ms Valles with hx baeza and cirrhosis and smv/pv clot, hx varices, hx pse, march 2022 urosepsis/sbp and now here for ongoing care and eval.  A copy of the note was sent to the referring provider.  Dear Lady Carson, June 5 MRI was updated by radiology today. When they compared to multiple priors dating back to 2018, the described retroperitoneal/mesenteric root edema and perivascular infiltrative soft tissue demonstrates avid enhancement with no restricted diffusion.  The degree of enhancement has progressed as well as the density infiltrative soft tissue compared to multiple priors. Retroperitoneal lymph nodes were also increased in number but not pathologically enlarged.  There was no evident medialization of the ureters.  They said that they findings are concerning for retroperitoneal fibrotic or inflammatory process. It appears that you were seen on June 20 by the transplant team/Dr Barrios and we will forward them this MRI correction so that they can be aware of this.  We will forward it to him via [employer]  to be aware of as he just saw you. Dr Gonzales  The 6/2/23 MR elastography was sent to me after lunch today. As you recall it was not available for viewing during the visit today. I called and left you a voicemail about this.  Lower thorax with unchanged small left pleural effusion.  Liver with subjective evidence of fat deposition.  Cirrhotic morphology.  No suspicious lesions.  Elastography donel after a weighted mean of 3 measurements showing 3.2K PA consistent with stage I-II fibrosis.  The fat quantification 12.5% consistent with mild fatty liver.  There is no significant iron deposition.  They did not see any biliary ductal dilatation.  Splenomegaly measuring 20 cm was seen.  The pancreas normal, adrenal glands normal.  They saw a right upper renal cyst.  Please make sure the primary care provider is aware.  They saw reactive lymph nodes.  They thought that the aorta was normal.  The superior mesenteric and inferior mesenteric along with the bilateral renal arteries were patent.  They noted the replaced right hepatic artery arising from the superior mesenteric artery.  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  They noted that there has been interval progression of the degree of thrombosis of the portosystemic shunt with near complete occlusive thrombus.  They noted additional varices and this is why we have to stay on top of the endoscopic use.  They saw mesenteric edema unchanged from the previous CT.  Moderate ascites.  Overall they noted cirrhosis and did not see any suspicious lesions but they did note that there was progression of a now near occlusive thrombus of a large portosystemic venous shunt from the splenic vein to the left femoral vein.  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 As you recall we were doing this to check on the clot and to ensure there were no lesions. It is good to confirm no lesions. Need to be sure you see the transplant team back as discussed.  Do Valles PA-C  6/5/23  SHe did the mri on friday but interestingly it has not been read yet. Checked epic yesterday and today for this. Will follow up with the pending result.  She had the repeat colon.  She is having a paracentesis every 6 days or so, 4 L removed she is doing the low salt diet and limiting fluids. DIscussed the risk of recurrrent paracentesis and needs to see oltx back on this and see if options. HE symtpoms controlled on xifaxan and miralax as seh does not tolerate the lactulose She has not schedlued with dr. barrios as discussed. inr 1.43, mcv 86, hgb 11.1, platelets 125, bili 2.5, ast 36, alt 18, alk phos 101 na 136, cr 0.8 MELD 14, meld na 15 4/27/23 HOspital follow up and recurrent ascites. She hospitalized patient recently at [facility].  She was in route to [place] per the notes but had EKG abnormalities and they redirected her to [place].  She had gained 19 pounds in the last several days.  Recent paracentesis on 419 with 4 L removed.  She was also having hematochezia and reviewed the chart and had recent rectal clips done and those need a re look and hospital noted she did not want to do there as she does things at [place]  CT scan done on 423  Cirrhotic morphology of the liver with this extensive paraesophageal and abdominal varices and splenomegaly. no lesions  April 24 paracentesis showing that there was 4 L removed.  April 24 labs with hemoglobin 9.8, platelets 47, INR 1.6.  Sodium 131, potassium 3.1, bilirubin 1.9, alkaline phosphatase 80, ALT 17, AST 37. cr .7  Per epic she has been pending an echo and rescheduled multiple times due to transportation issues. She was pending mri recently but not done  Discussed the ascites and need to see where from and ct not showing clot issue and need mri to be sure but also need that echo need to order paracentesis now fo rher. she has not been limited flluids, salt and she will start.  She needs to be reseen by oltx and we will refer her We will update and labs and she will need to see GI re the hematochezia given recent clips done.  Dr. Gonzales saw her as well and reminded her if acute then do care where she can. Also discussed with her the salt and needing to see oltx again.     recap She says she had some issues with daughter being in mental institution and she had trouble coming up and so now comes back. She is doing better.  She had adopted her at birth.  Right ear she had an skin cancer and has trouble wearing a mask for this.  She has been getting shot for scalp psoriasis and she does not know what is the shot. She has 2 rounds of it.  She has to get her every 2 months.  [facility] [address]  ~34.1 mi [phone]  We need to know what he is giving her so that we can say what we expect to be the case.  Depending on what he is giving her.  No recent labs and no recent imaging.  June 8, 2022 MRI showed chronic trace left pleural effusion with associated atelectasis in the lower thorax views.  There was no significant fat or iron but with nodular contour and cirrhotic morphology.  No suspicious lesions.  Prior cholecystectomy changes.  No duct dilation.  Spleen enlarged 18.4 cm.  Pancreas normal.  Adrenal glands normal.  Normal bilateral renal symmetric enhancement with 6 mm superior pole renal cyst and no suspicious lesions and no hydro.  Mild small and large bowel wall edema likely due to periportal enteropathy/colopathy.  Unchanged chronic fibrosis of portal vein with cavernous transformation reconstituted left portal vein.  Redemonstrated gastroesophageal perisplenic varices.  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  Only trace perihepatic and perisplenic ascites.  June 7, 2022 visit with Dr. Flores mentions that the patient is status post TAVR with recent echo showing EF 55% of left ventricular outflow VTI of 21.  Chronic diastolic heart failure noted.  TAVR was on January 23, 2020.  She has not followed with Dr Flores and sees local cards for the tavr.  June 1, 2022 echo showed EF 50 to 55% aortic valve normal functioning bioprosthetic valve.  Aortic valve max velocity showed a mean gradient of 11.0 and a peak gradient 24.4.  June 8, 2022 labs show sodium 135 potassium 3.9 glucose 297 BUN of 10 creatinine 0.75 total bilirubin 1.6 calcium 9.1 AST 36 ALT 18 alk phos 106 hemoglobin A1c up at 7.5 AFP 4 W count 3.2 hemoglobin 12.3 plate count 47 INR 1.35.  HIV test negative at that time.  Transplant team sees her every 6m and she sees the team there. She is told is stable and looks good and meld was 14.  She says told re mounjaro.  April 29th 2022 labs stable ammonia level normal at 61. INR normal at 1.2.   Sugar was elevated at 173.  Please share with primary provider. BUN of 13 creatinine 0.69 sodium 138 potassium 4.4 albumin normal at 3.8.  Bilirubin 1.2 alkaline phosphatase 112. Only the AST was elevated at 46 and ALT was ideal at 25. White blood cell count 3.7 hemoglobin 12.4 and MCV 87 and these are normal.  Platelet count low at 54.  Neutrophils normal at 2.8 and lymphocytes slightly low at 0.5. Meld down to 9 and meld na 9. Suggests that the infection is what tipped your labs recently  to be worse. So really need to focus on trying to prevent those recurrent urinary infections as worsens your underlying liver state. Please ask local ID to contact us to discuss the recent infection and options.   Saw the notes and had urosepsis. Had [id number] cells in the ascites.  Had 1 tap in the hospital. had the infection.  Prior tap in [place] had no infection and she had been started on abx once a month. 2500 ml.  20-30 wbc, nos.  90% of pts with sbp if the infection will be reinfected in a year.  Once a month is not a usual tx for sbp prevention. Daily or once a week. Dr Lopez is her id and they need to look at this new data and need to adjust tx.  March 30, 2022 labs show sodium 137 potassium 3.6 BUN of 10 creatinine 0.68 glucose 156 total bilirubin 1.3 AST 25 ALT 14 alk phos 83 calcium 8.7 labs count 3.3 hemoglobin 12 platelet count 66.  March 28, 2022 CT abdomen and pelvis with IV contrast revealed left-sided pleural effusion with some atelectasis.  Cardiophrenic enhancing node measuring 1.1 x 1.8 cm slightly increased in size.  Liver with chronic liver disease.  Prior cholecystectomy no bile duct dilation.  Splenomegaly seen.  Pancreas normal.  No hydronephrosis but with left nonobstructing renal stone.  Diffuse colon wall thickening and edema with surrounding stranding.  Prominent retroperitoneal mesenteric lymph nodes.  Large varices seen on the vessel exam with focal occlusion of the SMV/portal vein which appears similar to prior study.  Moderate continuation of the right portal vein.  Mild increase cannot exclude thrombus within the splenic venous collaterals in the left upper quadrant.  March 28, 2022 paracentesis removed 2600 mL of fluid.  November 2021 MRI liver with no fat or iron cirrhotic liver in order to enhancing lesions.  Spleen 19 cm.  Prior cholecystectomy changes seen.  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  Multiple large collateral pathways including gastroesophageal varices.  Replaced right hepatic artery seen.  Ascites may have been primary vs uti.  She has been seen by urogyn for the recurrent uti.   On abx once a month not for prevention but rather infection every month and so that makes more sense.  Covid 19 Jan 2022 and admitted locally for sepsis.  June 8, 2022 patient has appointment for labs and another MRI and in July 20 9022 will be seen by the  oltx transplant nurse practitioner at [facility].   April 8, 2022 transplant clinic visit mentions that the patient has Baeza and had a recent hospitalization from March 20 7 March 21st for urosepsis.  Drained 2.6 L and prior to that had not been drained since February 2022.  Taking Bumex 2 mg twice daily and Aldactone 100 mg a day.  Meld increased during the hospital but was down to 14 by March 27, 2022.  Bili decreased to 1.3 on March 30.  Recap: Patient with history of chronic SMV/portal vein occlusion.  EGD January 2021 status post band ligation.  No significant encephalopathy on Xifaxan.  Current medicines include omeprazole 2 mg a day, aspirin 81 mg a day, Bumex 2 mg a day, vitamin D 1000 units a day, Prolia every 6 months Estrace 2 mg a day, gabapentin 3 mg 3 times daily, NovoLog sliding scale, Tresiba 8 units at night, lactulose 20 g Daily, mesalamine 1 g p.o. twice daily, midodrine 10 mg p.o. 3 times daily, nadolol 40 mg twice daily, potassium 40 mg a day, Xifaxan 550 twice daily, Aldactone on milligrams a day, Trintellix 20 mg a day.  Patient was listed as following up with Dr. Vickers for endoscopy. She says he has been seeing. He also saw dysphagia.  March 27 March 31 discharge summary from [facility].   She was a patient came in with concerns of urosepsis and left pyelonephritis.  Urinalysis high-grade 150 WBCs.  Blood cultures were negative but urine did grow pansensitive Klebsiella.  Patient was seen by ID and was given a course of daptomycin and Levaquin treatment.  They wanted urogynecology to see her. Left renal pole lesion that could be a nidus for recurrent infection.  Cell count on the fluid was 10,520 range  nucleated cells 84% neutrophils though the cultures were negative to date and maintained on antibiotics.  Looked up the actual fluid count from March 28 and had had albumin less than 1.5 total protein was 3.0 nucleated cells 10,520 neutrophils 84%.  Lasted been EGD high-grade fevers or November 17, 2021 with Dr. Vickers where she had grade 1 varices lower esophagus.  Moderate Schatzki ring was seen.  Dilation was seen to 51 [place].  Moderately erythematous mucosa without bleeding seen in the gastric antrum.  H. pylori testing sent.  Biopsy was negative for significant abnormality.  She did egd in nov 2021 and grade 1 varices noted. He is planning nov 2022.  nov 2021 mri: Reason For Exam Hepatocellular carcinoma;Hepatocellular carcinoma  REPORT EXAM: MRI Abdomen w/ + w/o Contrast  CLINICAL INDICATION: Hepatocellular carcinoma  TECHNIQUE: Multisequence, multiplanar MRI of the abdomen was performed without and with intravenous contrast. ESRC.2.7.3  CONTRAST: 18 cc of Prohance  COMPARISON: March 2, 2021  FINDINGS:  Lower Thorax: Normal.  Liver: No fat or iron. Cirrhotic liver morphology. No arterially enhancing lesions. No lesions with capsule. No lesions with washout.  Gallbladder/Biliary Tree: Status post cholecystectomy.  Spleen: Measuring 19.0 cm in maximum craniocaudal diameter.  Pancreas: Normal.  Adrenal Glands: Normal.   Kidneys/Ureters: Normal.  Gastrointestinal: Normal.   Lymph Nodes: Normal.  Vessels: Chronic thrombosis of the main portal vein with cavernous transformation redemonstrated. Multiple large collateral pathways including gastroesophageal varices, again noted. 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  Peritoneum/Retroperitoneum: Minimal ascites is present.  Bones/Soft Tissues: Normal.  IMPRESSION:     Cirrhosis with chronic portal vein thrombosis and cavernous transformation with associated evidence of portal hypertension. No hepatocellular carcinoma.

## 2024-10-23 ENCOUNTER — LAB OUTSIDE AN ENCOUNTER (OUTPATIENT)
Dept: URBAN - METROPOLITAN AREA TELEHEALTH 2 | Facility: TELEHEALTH | Age: 64
End: 2024-10-23

## 2024-10-28 ENCOUNTER — APPOINTMENT (RX ONLY)
Dept: URBAN - METROPOLITAN AREA CLINIC 41 | Facility: CLINIC | Age: 64
Setting detail: DERMATOLOGY
End: 2024-10-28

## 2024-10-28 DIAGNOSIS — L40.0 PSORIASIS VULGARIS: ICD-10-CM

## 2024-10-28 PROCEDURE — ? TREATMENT REGIMEN

## 2024-10-28 PROCEDURE — ? MEDICATION COUNSELING

## 2024-10-28 PROCEDURE — ? INTRALESIONAL KENALOG

## 2024-10-28 PROCEDURE — 11900 INJECT SKIN LESIONS </W 7: CPT

## 2024-10-28 PROCEDURE — ? PRESCRIPTION

## 2024-10-28 PROCEDURE — ? ADDITIONAL NOTES

## 2024-10-28 PROCEDURE — ? COUNSELING

## 2024-10-28 RX ORDER — FLUOCINOLONE ACETONIDE 0.11 MG/ML
OIL TOPICAL
Qty: 118.28 | Refills: 0 | Status: ERX | COMMUNITY
Start: 2024-10-28

## 2024-10-28 RX ADMIN — FLUOCINOLONE ACETONIDE: 0.11 OIL TOPICAL at 00:00

## 2024-10-28 ASSESSMENT — LOCATION SIMPLE DESCRIPTION DERM: LOCATION SIMPLE: POSTERIOR SCALP

## 2024-10-28 ASSESSMENT — LOCATION ZONE DERM: LOCATION ZONE: SCALP

## 2024-10-28 ASSESSMENT — LOCATION DETAILED DESCRIPTION DERM
LOCATION DETAILED: LEFT OCCIPITAL SCALP
LOCATION DETAILED: RIGHT OCCIPITAL SCALP
LOCATION DETAILED: MID-OCCIPITAL SCALP

## 2024-10-28 NOTE — HPI: RASH (PSORIASIS)
How Severe Is Your Psoriasis?: mild
Do You Have A Family History Of Psoriasis?: no
Is This A New Presentation, Or A Follow-Up?: Follow Up Psoriasis
Additional History: Patient states that her scalp has flared recently within the last 2 weeks.

## 2024-10-28 NOTE — PROCEDURE: TREATMENT REGIMEN
Action 3: Continue
Detail Level: Zone
Plan: Gave the patient some information for sotyktu to read over with her family.
Continue Regimen: Zoryve- apply a thin layer to the affected areas on the scalp once daily. \\nDerma smoothe- apply to the affected areas on the ears once daily as needed.

## 2024-10-28 NOTE — PROCEDURE: INTRALESIONAL KENALOG
Ndc# For Kenalog Only: [address]-26
How Many Mls Were Removed From The 40 Mg/Ml (10ml) Vial When Preparing The Injectable Solution?: 0
Bill For Wasted Drug (Kenalog)?: no
Kenalog Preparation: Kenalog
Total Volume (Ccs): 2.0
Detail Level: Detailed
Validate Note Data When Using Inventory: Yes
Medical Necessity Clause: This procedure was medically necessary because the lesions that were treated were:
Kenalog Type Of Vial: Multiple Dose
Administered By (Optional): Yady Tillman
Lot # For Kenalog (Optional): [id number]
Expiration Date For Kenalog (Optional): 08/2025
Consent: The risks of atrophy were reviewed with the patient.
Show Inventory Tab: Hide
Concentration Of Kenalog Solution Injected (Mg/Ml): 10.0

## 2024-10-28 NOTE — PROCEDURE: MEDICATION COUNSELING
Topical Clindamycin Counseling: Patient counseled that this medication may cause skin irritation or allergic reactions.  In the event of skin irritation, the patient was advised to reduce the amount of the drug applied or use it less frequently.   The patient verbalized understanding of the proper use and possible adverse effects of clindamycin.  All of the patient's questions and concerns were addressed.
Odomzo Counseling- I discussed with the patient the risks of Odomzo including but not limited to nausea, vomiting, diarrhea, constipation, weight loss, changes in the sense of taste, decreased appetite, muscle spasms, and hair loss.  The patient verbalized understanding of the proper use and possible adverse effects of Odomzo.  All of the patient's questions and concerns were addressed.
Cantharidin Counseling:  I discussed with the patient the risks of Cantharidin including but not limited to pain, redness, burning, itching, and blistering.
SSKI Counseling:  I discussed with the patient the risks of SSKI including but not limited to thyroid abnormalities, metallic taste, GI upset, fever, headache, acne, arthralgias, paraesthesias, lymphadenopathy, easy bleeding, arrhythmias, and allergic reaction.
Griseofulvin Counseling:  I discussed with the patient the risks of griseofulvin including but not limited to photosensitivity, cytopenia, liver damage, nausea/vomiting and severe allergy.  The patient understands that this medication is best absorbed when taken with a fatty meal (e.g., ice cream or french fries).
Dupixent Counseling: I discussed with the patient the risks of dupilumab including but not limited to eye infection and irritation, cold sores, injection site reactions, worsening of asthma, allergic reactions and increased risk of parasitic infection.  Live vaccines should be avoided while taking dupilumab. Dupilumab will also interact with certain medications such as warfarin and cyclosporine. The patient understands that monitoring is required and they must alert us or the primary physician if symptoms of infection or other concerning signs are noted.
Litfulo Counseling: I discussed with the patient the risks of Litfulo therapy including but not limited to upper respiratory tract infections, shingles, cold sores, and nausea. Live vaccines should be avoided.  This medication has been linked to serious infections; higher rate of mortality; malignancy and lymphoproliferative disorders; major adverse cardiovascular events; thrombosis; gastrointestinal perforations; neutropenia; lymphopenia; anemia; liver enzyme elevations; and lipid elevations.
Skyrizi Counseling: I discussed with the patient the risks of risankizumab-rzaa including but not limited to immunosuppression, and serious infections.  The patient understands that monitoring is required including a PPD at baseline and must alert us or the primary physician if symptoms of infection or other concerning signs are noted.
Hydroquinone Counseling:  Patient advised that medication may result in skin irritation, lightening (hypopigmentation), dryness, and burning.  In the event of skin irritation, the patient was advised to reduce the amount of the drug applied or use it less frequently.  Rarely, spots that are treated with hydroquinone can become darker (pseudoochronosis).  Should this occur, patient instructed to stop medication and call the office. The patient verbalized understanding of the proper use and possible adverse effects of hydroquinone.  All of the patient's questions and concerns were addressed.
Topical Sulfur Applications Pregnancy And Lactation Text: This medication is Pregnancy Category C and has an unknown safety profile during pregnancy. It is unknown if this topical medication is excreted in breast milk.
Ilumya Pregnancy And Lactation Text: The risk during pregnancy and breastfeeding is uncertain with this medication.
Quinolones Pregnancy And Lactation Text: This medication is Pregnancy Category C and it isn't know if it is safe during pregnancy. It is also excreted in breast milk.
Cyclophosphamide Pregnancy And Lactation Text: This medication is Pregnancy Category D and it isn't considered safe during pregnancy. This medication is excreted in breast milk.
Ivermectin Pregnancy And Lactation Text: This medication is Pregnancy Category C and it isn't known if it is safe during pregnancy. It is also excreted in breast milk.
Clofazimine Counseling:  I discussed with the patient the risks of clofazimine including but not limited to skin and eye pigmentation, liver damage, nausea/vomiting, gastrointestinal bleeding and allergy.
Cyclosporine Counseling:  I discussed with the patient the risks of cyclosporine including but not limited to hypertension, gingival hyperplasia,myelosuppression, immunosuppression, liver damage, kidney damage, neurotoxicity, lymphoma, and serious infections. The patient understands that monitoring is required including baseline blood pressure, CBC, CMP, lipid panel and uric acid, and then 1-2 times monthly CMP and blood pressure.
Cantharidin Pregnancy And Lactation Text: This medication has not been proven safe during pregnancy. It is unknown if this medication is excreted in breast milk.
Clofazimine Pregnancy And Lactation Text: This medication is Pregnancy Category C and isn't considered safe during pregnancy. It is excreted in breast milk.
Tremfya Counseling: I discussed with the patient the risks of guselkumab including but not limited to immunosuppression, serious infections, and drug reactions.  The patient understands that monitoring is required including a PPD at baseline and must alert us or the primary physician if symptoms of infection or other concerning signs are noted.
Hydroxychloroquine Counseling:  I discussed with the patient that a baseline ophthalmologic exam is needed at the start of therapy and every year thereafter while on therapy. A CBC may also be warranted for monitoring.  The side effects of this medication were discussed with the patient, including but not limited to agranulocytosis, aplastic anemia, seizures, rashes, retinopathy, and liver toxicity. Patient instructed to call the office should any adverse effect occur.  The patient verbalized understanding of the proper use and possible adverse effects of Plaquenil.  All the patient's questions and concerns were addressed.
Include Pregnancy/Lactation Warning?: No
Olanzapine Pregnancy And Lactation Text: This medication is pregnancy category C.   There are no adequate and well controlled trials with olanzapine in pregnant females.  Olanzapine should be used during pregnancy only if the potential benefit justifies the potential risk to the fetus.   In a study in lactating healthy women, olanzapine was excreted in breast milk.  It is recommended that women taking olanzapine should not breast feed.
Birth Control Pills Pregnancy And Lactation Text: This medication should be avoided if pregnant and for the first 30 days post-partum.
Clindamycin Counseling: I counseled the patient regarding use of clindamycin as an antibiotic for prophylactic and/or therapeutic purposes. Clindamycin is active against numerous classes of bacteria, including skin bacteria. Side effects may include nausea, diarrhea, gastrointestinal upset, rash, hives, yeast infections, and in rare cases, colitis.
Rhofade Pregnancy And Lactation Text: This medication has not been assigned a Pregnancy Risk Category. It is unknown if the medication is excreted in breast milk.
Cimetidine Pregnancy And Lactation Text: This medication is Pregnancy Category B and is considered safe during pregnancy. It is also excreted in breast milk and breast feeding isn't recommended.
Zyclara Pregnancy And Lactation Text: This medication is Pregnancy Category C. It is unknown if this medication is excreted in breast milk.
Opzelura Counseling:  I discussed with the patient the risks of Opzelura including but not limited to nasopharngitis, bronchitis, ear infection, eosinophila, hives, diarrhea, folliculitis, tonsillitis, and rhinorrhea.  Taken orally, this medication has been linked to serious infections; higher rate of mortality; malignancy and lymphoproliferative disorders; major adverse cardiovascular events; thrombosis; thrombocytopenia, anemia, and neutropenia; and lipid elevations.
Solaraze Counseling:  I discussed with the patient the risks of Solaraze including but not limited to erythema, scaling, itching, weeping, crusting, and pain.
Acitretin Counseling:  I discussed with the patient the risks of acitretin including but not limited to hair loss, dry lips/skin/eyes, liver damage, hyperlipidemia, depression/suicidal ideation, photosensitivity.  Serious rare side effects can include but are not limited to pancreatitis, pseudotumor cerebri, bony changes, clot formation/stroke/heart attack.  Patient understands that alcohol is contraindicated since it can result in liver toxicity and significantly prolong the elimination of the drug by many years.
Griseofulvin Pregnancy And Lactation Text: This medication is Pregnancy Category X and is known to cause serious birth defects. It is unknown if this medication is excreted in breast milk but breast feeding should be avoided.
Oral Minoxidil Counseling- I discussed with the patient the risks of oral minoxidil including but not limited to shortness of breath, swelling of the feet or ankles, dizziness, lightheadedness, unwanted hair growth and allergic reaction.  The patient verbalized understanding of the proper use and possible adverse effects of oral minoxidil.  All of the patient's questions and concerns were addressed.
Spironolactone Counseling: Patient advised regarding risks of diarrhea, abdominal pain, hyperkalemia, birth defects (for female patients), liver toxicity and renal toxicity. The patient may need blood work to monitor liver and kidney function and potassium levels while on therapy. The patient verbalized understanding of the proper use and possible adverse effects of spironolactone.  All of the patient's questions and concerns were addressed.
Sski Pregnancy And Lactation Text: This medication is Pregnancy Category D and isn't considered safe during pregnancy. It is excreted in breast milk.
Adbry Counseling: I discussed with the patient the risks of tralokinumab including but not limited to eye infection and irritation, cold sores, injection site reactions, worsening of asthma, allergic reactions and increased risk of parasitic infection.  Live vaccines should be avoided while taking tralokinumab. The patient understands that monitoring is required and they must alert us or the primary physician if symptoms of infection or other concerning signs are noted.
Litfulo Pregnancy And Lactation Text: Based on animal studies, Lifulo may cause embryo-fetal harm when administered to pregnant women.  The medication should not be used in pregnancy.  Breastfeeding is not recommended during treatment.
Infliximab Counseling:  I discussed with the patient the risks of infliximab including but not limited to myelosuppression, immunosuppression, autoimmune hepatitis, demyelinating diseases, lymphoma, and serious infections.  The patient understands that monitoring is required including a PPD at baseline and must alert us or the primary physician if symptoms of infection or other concerning signs are noted.
Rifampin Counseling: I discussed with the patient the risks of rifampin including but not limited to liver damage, kidney damage, red-orange body fluids, nausea/vomiting and severe allergy.
Wartpeel Counseling:  I discussed with the patient the risks of Wartpeel including but not limited to erythema, scaling, itching, weeping, crusting, and pain.
Odomzo Pregnancy And Lactation Text: This medication is Pregnancy Category X and is absolutely contraindicated during pregnancy. It is unknown if it is excreted in breast milk.
Hydroquinone Pregnancy And Lactation Text: This medication has not been assigned a Pregnancy Risk Category but animal studies failed to show danger with the topical medication. It is unknown if the medication is excreted in breast milk.
Dupixent Pregnancy And Lactation Text: This medication likely crosses the placenta but the risk for the fetus is uncertain. This medication is excreted in breast milk.
5-Fu Counseling: 5-Fluorouracil Counseling:  I discussed with the patient the risks of 5-fluorouracil including but not limited to erythema, scaling, itching, weeping, crusting, and pain.
Topical Clindamycin Pregnancy And Lactation Text: This medication is Pregnancy Category B and is considered safe during pregnancy. It is unknown if it is excreted in breast milk.
Wartpeel Pregnancy And Lactation Text: This medication is Pregnancy Category X and contraindicated in pregnancy and in women who may become pregnant. It is unknown if this medication is excreted in breast milk.
Rifampin Pregnancy And Lactation Text: This medication is Pregnancy Category C and it isn't know if it is safe during pregnancy. It is also excreted in breast milk and should not be used if you are breast feeding.
Imiquimod Counseling:  I discussed with the patient the risks of imiquimod including but not limited to erythema, scaling, itching, weeping, crusting, and pain.  Patient understands that the inflammatory response to imiquimod is variable from person to person and was educated regarded proper titration schedule.  If flu-like symptoms develop, patient knows to discontinue the medication and contact us.
Colchicine Counseling:  Patient counseled regarding adverse effects including but not limited to stomach upset (nausea, vomiting, stomach pain, or diarrhea).  Patient instructed to limit alcohol consumption while taking this medication.  Colchicine may reduce blood counts especially with prolonged use.  The patient understands that monitoring of kidney function and blood counts may be required, especially at baseline. The patient verbalized understanding of the proper use and possible adverse effects of colchicine.  All of the patient's questions and concerns were addressed.
Infliximab Pregnancy And Lactation Text: This medication is Pregnancy Category B and is considered safe during pregnancy. It is unknown if this medication is excreted in breast milk.
Cyclosporine Pregnancy And Lactation Text: This medication is Pregnancy Category C and it isn't know if it is safe during pregnancy. This medication is excreted in breast milk.
Aklief counseling:  Patient advised to apply a pea-sized amount only at bedtime and wait 30 minutes after washing their face before applying.  If too drying, patient may add a non-comedogenic moisturizer.  The most commonly reported side effects including irritation, redness, scaling, dryness, stinging, burning, itching, and increased risk of sunburn.  The patient verbalized understanding of the proper use and possible adverse effects of retinoids.  All of the patient's questions and concerns were addressed.
Opzelura Pregnancy And Lactation Text: There is insufficient data to evaluate drug-associated risk for major birth defects, miscarriage, or other adverse maternal or fetal outcomes.  There is a pregnancy registry that monitors pregnancy outcomes in pregnant persons exposed to the medication during pregnancy.  It is unknown if this medication is excreted in breast milk.  Do not breastfeed during treatment and for about 4 weeks after the last dose.
Doxepin Counseling:  Patient advised that the medication is sedating and not to drive a car after taking this medication. Patient informed of potential adverse effects including but not limited to dry mouth, urinary retention, and blurry vision.  The patient verbalized understanding of the proper use and possible adverse effects of doxepin.  All of the patient's questions and concerns were addressed.
Spevigo Counseling: I discussed with the patient the risks of Spevigo including but not limited to fatigue, nasuea, vomiting, headache, pruritus, urinary tract infection, an infusion related reactions.  The patient understands that monitoring is required including screening for tuberculosis at baseline and yearly screening thereafter while continuing Spevigo therapy. They should contact us if symptoms of infection or other concerning signs are noted.
Clindamycin Pregnancy And Lactation Text: This medication can be used in pregnancy if certain situations. Clindamycin is also present in breast milk.
Hydroxychloroquine Pregnancy And Lactation Text: This medication has been shown to cause fetal harm but it isn't assigned a Pregnancy Risk Category. There are small amounts excreted in breast milk.
Doxepin Pregnancy And Lactation Text: This medication is Pregnancy Category C and it isn't known if it is safe during pregnancy. It is also excreted in breast milk and breast feeding isn't recommended.
Low Dose Naltrexone Counseling- I discussed with the patient the potential risks and side effects of low dose naltrexone including but not limited to: more vivid dreams, headaches, nausea, vomiting, abdominal pain, fatigue, dizziness, and anxiety.
Acitretin Pregnancy And Lactation Text: This medication is Pregnancy Category X and should not be given to women who are pregnant or may become pregnant in the future. This medication is excreted in breast milk.
Oral Minoxidil Pregnancy And Lactation Text: This medication should only be used when clearly needed if you are pregnant, attempting to become pregnant or breast feeding.
Doxycycline Counseling:  Patient counseled regarding possible photosensitivity and increased risk for sunburn.  Patient instructed to avoid sunlight, if possible.  When exposed to sunlight, patients should wear protective clothing, sunglasses, and sunscreen.  The patient was instructed to call the office immediately if the following severe adverse effects occur:  hearing changes, easy bruising/bleeding, severe headache, or vision changes.  The patient verbalized understanding of the proper use and possible adverse effects of doxycycline.  All of the patient's questions and concerns were addressed.
Spironolactone Pregnancy And Lactation Text: This medication can cause feminization of the male fetus and should be avoided during pregnancy. The active metabolite is also found in breast milk.
Thalidomide Counseling: I discussed with the patient the risks of thalidomide including but not limited to birth defects, anxiety, weakness, chest pain, dizziness, cough and severe allergy.
Itraconazole Counseling:  I discussed with the patient the risks of itraconazole including but not limited to liver damage, nausea/vomiting, neuropathy, and severe allergy.  The patient understands that this medication is best absorbed when taken with acidic beverages such as non-diet cola or ginger ale.  The patient understands that monitoring is required including baseline LFTs and repeat LFTs at intervals.  The patient understands that they are to contact us or the primary physician if concerning signs are noted.
Enbrel Counseling:  I discussed with the patient the risks of etanercept including but not limited to myelosuppression, immunosuppression, autoimmune hepatitis, demyelinating diseases, lymphoma, and infections.  The patient understands that monitoring is required including a PPD at baseline and must alert us or the primary physician if symptoms of infection or other concerning signs are noted.
Topical Ketoconazole Counseling: Patient counseled that this medication may cause skin irritation or allergic reactions.  In the event of skin irritation, the patient was advised to reduce the amount of the drug applied or use it less frequently.   The patient verbalized understanding of the proper use and possible adverse effects of ketoconazole.  All of the patient's questions and concerns were addressed.
Olumiant Counseling: I discussed with the patient the risks of Olumiant therapy including but not limited to upper respiratory tract infections, shingles, cold sores, and nausea. Live vaccines should be avoided.  This medication has been linked to serious infections; higher rate of mortality; malignancy and lymphoproliferative disorders; major adverse cardiovascular events; thrombosis; gastrointestinal perforations; neutropenia; lymphopenia; anemia; liver enzyme elevations; and lipid elevations.
Adbry Pregnancy And Lactation Text: It is unknown if this medication will adversely affect pregnancy or breast feeding.
Solaraze Pregnancy And Lactation Text: This medication is Pregnancy Category B and is considered safe. There is some data to suggest avoiding during the third trimester. It is unknown if this medication is excreted in breast milk.
Opioid Counseling: I discussed with the patient the potential side effects of opioids including but not limited to addiction, altered mental status, and depression. I stressed avoiding alcohol, benzodiazepines, muscle relaxants and sleep aids unless specifically okayed by a physician. The patient verbalized understanding of the proper use and possible adverse effects of opioids. All of the patient's questions and concerns were addressed. They were instructed to flush the remaining pills down the toilet if they did not need them for pain.
Drysol Counseling:  I discussed with the patient the risks of drysol/aluminum chloride including but not limited to skin rash, itching, irritation, burning.
Opioid Pregnancy And Lactation Text: These medications can lead to premature delivery and should be avoided during pregnancy. These medications are also present in breast milk in small amounts.
Xolair Counseling:  Patient informed of potential adverse effects including but not limited to fever, muscle aches, rash and allergic reactions.  The patient verbalized understanding of the proper use and possible adverse effects of Xolair.  All of the patient's questions and concerns were addressed.
Olumiant Pregnancy And Lactation Text: Based on animal studies, Olumiant may cause embryo-fetal harm when administered to pregnant women.  The medication should not be used in pregnancy.  Breastfeeding is not recommended during treatment.
Dutasteride Male Counseling: Dustasteride Counseling:  I discussed with the patient the risks of use of dutasteride including but not limited to decreased libido, decreased ejaculate volume, and gynecomastia. Women who can become pregnant should not handle medication.  All of the patient's questions and concerns were addressed.
Spevigo Pregnancy And Lactation Text: The risk during pregnancy and breastfeeding is uncertain with this medication. This medication does cross the placenta. It is unknown if this medication is found in breast milk.
Picato Counseling:  I discussed with the patient the risks of Picato including but not limited to erythema, scaling, itching, weeping, crusting, and pain.
Rituxan Counseling:  I discussed with the patient the risks of Rituxan infusions. Side effects can include infusion reactions, severe drug rashes including mucocutaneous reactions, reactivation of latent hepatitis and other infections and rarely progressive multifocal leukoencephalopathy.  All of the patient's questions and concerns were addressed.
Aklief Pregnancy And Lactation Text: It is unknown if this medication is safe to use during pregnancy.  It is unknown if this medication is excreted in breast milk.  Breastfeeding women should use the topical cream on the smallest area of the skin for the shortest time needed while breastfeeding.  Do not apply to nipple and areola.
Winlevi Counseling:  I discussed with the patient the risks of topical clascoterone including but not limited to erythema, scaling, itching, and stinging. Patient voiced their understanding.
Methotrexate Counseling:  Patient counseled regarding adverse effects of methotrexate including but not limited to nausea, vomiting, abnormalities in liver function tests. Patients may develop mouth sores, rash, diarrhea, and abnormalities in blood counts. The patient understands that monitoring is required including LFT's and blood counts.  There is a rare possibility of scarring of the liver and lung problems that can occur when taking methotrexate. Persistent nausea, loss of appetite, pale stools, dark urine, cough, and shortness of breath should be reported immediately. Patient advised to discontinue methotrexate treatment at least three months before attempting to become pregnant.  I discussed the need for folate supplements while taking methotrexate.  These supplements can decrease side effects during methotrexate treatment. The patient verbalized understanding of the proper use and possible adverse effects of methotrexate.  All of the patient's questions and concerns were addressed.
Sarecycline Counseling: Patient advised regarding possible photosensitivity and discoloration of the teeth, skin, lips, tongue and gums.  Patient instructed to avoid sunlight, if possible.  When exposed to sunlight, patients should wear protective clothing, sunglasses, and sunscreen.  The patient was instructed to call the office immediately if the following severe adverse effects occur:  hearing changes, easy bruising/bleeding, severe headache, or vision changes.  The patient verbalized understanding of the proper use and possible adverse effects of sarecycline.  All of the patient's questions and concerns were addressed.
Methotrexate Pregnancy And Lactation Text: This medication is Pregnancy Category X and is known to cause fetal harm. This medication is excreted in breast milk.
Dapsone Counseling: I discussed with the patient the risks of dapsone including but not limited to hemolytic anemia, agranulocytosis, rashes, methemoglobinemia, kidney failure, peripheral neuropathy, headaches, GI upset, and liver toxicity.  Patients who start dapsone require monitoring including baseline LFTs and weekly CBCs for the first month, then every month thereafter.  The patient verbalized understanding of the proper use and possible adverse effects of dapsone.  All of the patient's questions and concerns were addressed.
Dutasteride Female Counseling: Dutasteride Counseling:  I discussed with the patient the risks of use of dutasteride including but not limited to decreased libido and sexual dysfunction. Explained the teratogenic nature of the medication and stressed the importance of not getting pregnant during treatment. All of the patient's questions and concerns were addressed.
Low Dose Naltrexone Pregnancy And Lactation Text: Naltrexone is pregnancy category C.  There have been no adequate and well-controlled studies in pregnant women.  It should be used in pregnancy only if the potential benefit justifies the potential risk to the fetus.   Limited data indicates that naltrexone is minimally excreted into breastmilk.
Azelaic Acid Counseling: Patient counseled that medicine may cause skin irritation and to avoid applying near the eyes.  In the event of skin irritation, the patient was advised to reduce the amount of the drug applied or use it less frequently.   The patient verbalized understanding of the proper use and possible adverse effects of azelaic acid.  All of the patient's questions and concerns were addressed.
Stelara Counseling:  I discussed with the patient the risks of ustekinumab including but not limited to immunosuppression, malignancy, posterior leukoencephalopathy syndrome, and serious infections.  The patient understands that monitoring is required including a PPD at baseline and must alert us or the primary physician if symptoms of infection or other concerning signs are noted.
Doxycycline Pregnancy And Lactation Text: This medication is Pregnancy Category D and not consider safe during pregnancy. It is also excreted in breast milk but is considered safe for shorter treatment courses.
Soolantra Counseling: I discussed with the patients the risks of topial Soolantra. This is a medicine which decreases the number of mites and inflammation in the skin. You experience burning, stinging, eye irritation or allergic reactions.  Please call our office if you develop any problems from using this medication.
Bimzelx Counseling:  I discussed with the patient the risks of Bimzelx including but not limited to depression, immunosuppression, allergic reactions and infections.  The patient understands that monitoring is required including a PPD at baseline and must alert us or the primary physician if symptoms of infection or other concerning signs are noted.
Hydroxyzine Counseling: Patient advised that the medication is sedating and not to drive a car after taking this medication.  Patient informed of potential adverse effects including but not limited to dry mouth, urinary retention, and blurry vision.  The patient verbalized understanding of the proper use and possible adverse effects of hydroxyzine.  All of the patient's questions and concerns were addressed.
Otezla Counseling: The side effects of Otezla were discussed with the patient, including but not limited to worsening or new depression, weight loss, diarrhea, nausea, upper respiratory tract infection, and headache. Patient instructed to call the office should any adverse effect occur.  The patient verbalized understanding of the proper use and possible adverse effects of Otezla.  All the patient's questions and concerns were addressed.
Bexarotene Counseling:  I discussed with the patient the risks of bexarotene including but not limited to hair loss, dry lips/skin/eyes, liver abnormalities, hyperlipidemia, pancreatitis, depression/suicidal ideation, photosensitivity, drug rash/allergic reactions, hypothyroidism, anemia, leukopenia, infection, cataracts, and teratogenicity.  Patient understands that they will need regular blood tests to check lipid profile, liver function tests, white blood cell count, thyroid function tests and pregnancy test if applicable.
Bexarotene Pregnancy And Lactation Text: This medication is Pregnancy Category X and should not be given to women who are pregnant or may become pregnant. This medication should not be used if you are breast feeding.
Ketoconazole Counseling:   Patient counseled regarding improving absorption with orange juice.  Adverse effects include but are not limited to breast enlargement, headache, diarrhea, nausea, upset stomach, liver function test abnormalities, taste disturbance, and stomach pain.  There is a rare possibility of liver failure that can occur when taking ketoconazole. The patient understands that monitoring of LFTs may be required, especially at baseline. The patient verbalized understanding of the proper use and possible adverse effects of ketoconazole.  All of the patient's questions and concerns were addressed.
Azathioprine Counseling:  I discussed with the patient the risks of azathioprine including but not limited to myelosuppression, immunosuppression, hepatotoxicity, lymphoma, and infections.  The patient understands that monitoring is required including baseline LFTs, Creatinine, possible TPMP genotyping and weekly CBCs for the first month and then every 2 weeks thereafter.  The patient verbalized understanding of the proper use and possible adverse effects of azathioprine.  All of the patient's questions and concerns were addressed.
Tranexamic Acid Counseling:  Patient advised of the small risk of bleeding problems with tranexamic acid. They were also instructed to call if they developed any nausea, vomiting or diarrhea. All of the patient's questions and concerns were addressed.
Humira Counseling:  I discussed with the patient the risks of adalimumab including but not limited to myelosuppression, immunosuppression, autoimmune hepatitis, demyelinating diseases, lymphoma, and serious infections.  The patient understands that monitoring is required including a PPD at baseline and must alert us or the primary physician if symptoms of infection or other concerning signs are noted.
Topical Metronidazole Counseling: Metronidazole is a topical antibiotic medication. You may experience burning, stinging, redness, or allergic reactions.  Please call our office if you develop any problems from using this medication.
Xolair Pregnancy And Lactation Text: This medication is Pregnancy Category B and is considered safe during pregnancy. This medication is excreted in breast milk.
Rinvoq Counseling: I discussed with the patient the risks of Rinvoq therapy including but not limited to upper respiratory tract infections, shingles, cold sores, bronchitis, nausea, cough, fever, acne, and headache. Live vaccines should be avoided.  This medication has been linked to serious infections; higher rate of mortality; malignancy and lymphoproliferative disorders; major adverse cardiovascular events; thrombosis; thrombocytopenia, anemia, and neutropenia; lipid elevations; liver enzyme elevations; and gastrointestinal perforations.
Rituxan Pregnancy And Lactation Text: This medication is Pregnancy Category C and it isn't know if it is safe during pregnancy. It is unknown if this medication is excreted in breast milk but similar antibodies are known to be excreted.
Klisyri Counseling:  I discussed with the patient the risks of Klisyri including but not limited to erythema, scaling, itching, weeping, crusting, and pain.
Winlevi Pregnancy And Lactation Text: This medication is considered safe during pregnancy and breastfeeding.
Sarecycline Pregnancy And Lactation Text: This medication is Pregnancy Category D and not consider safe during pregnancy. It is also excreted in breast milk.
Azithromycin Counseling:  I discussed with the patient the risks of azithromycin including but not limited to GI upset, allergic reaction, drug rash, diarrhea, and yeast infections.
Drysol Pregnancy And Lactation Text: This medication is considered safe during pregnancy and breast feeding.
Prednisone Counseling:  I discussed with the patient the risks of prolonged use of prednisone including but not limited to weight gain, insomnia, osteoporosis, mood changes, diabetes, susceptibility to infection, glaucoma and high blood pressure.  In cases where prednisone use is prolonged, patients should be monitored with blood pressure checks, serum glucose levels and an eye exam.  Additionally, the patient may need to be placed on GI prophylaxis, PCP prophylaxis, and calcium and vitamin D supplementation and/or a bisphosphonate.  The patient verbalized understanding of the proper use and the possible adverse effects of prednisone.  All of the patient's questions and concerns were addressed.
Klisyri Pregnancy And Lactation Text: It is unknown if this medication can harm a developing fetus or if it is excreted in breast milk.
Dapsone Pregnancy And Lactation Text: This medication is Pregnancy Category C and is not considered safe during pregnancy or breast feeding.
Rinvoq Pregnancy And Lactation Text: Based on animal studies, Rinvoq may cause embryo-fetal harm when administered to pregnant women.  The medication should not be used in pregnancy.  Breastfeeding is not recommended during treatment and for 6 days after the last dose.
Niacinamide Counseling: I recommended taking niacin or niacinamide, also know as vitamin B3, twice daily. Recent evidence suggests that taking vitamin B3 (500 mg twice daily) can reduce the risk of actinic keratoses and non-melanoma skin cancers. Side effects of vitamin B3 include flushing and headache.
Dutasteride Pregnancy And Lactation Text: This medication is absolutely contraindicated in women, especially during pregnancy and breast feeding. Feminization of male fetuses is possible if taking while pregnant.
Azithromycin Pregnancy And Lactation Text: This medication is considered safe during pregnancy and is also secreted in breast milk.
Otezla Pregnancy And Lactation Text: This medication is Pregnancy Category C and it isn't known if it is safe during pregnancy. It is unknown if it is excreted in breast milk.
Soolantra Pregnancy And Lactation Text: This medication is Pregnancy Category C. This medication is considered safe during breast feeding.
Bimzelx Pregnancy And Lactation Text: This medication crosses the placenta and the safety is uncertain during pregnancy. It is unknown if this medication is present in breast milk.
Erythromycin Counseling:  I discussed with the patient the risks of erythromycin including but not limited to GI upset, allergic reaction, drug rash, diarrhea, increase in liver enzymes, and yeast infections.
Protopic Counseling: Patient may experience a mild burning sensation during topical application. Protopic is not approved in children less than 2 years of age. There have been case reports of hematologic and skin malignancies in patients using topical calcineurin inhibitors although causality is questionable.
Hydroxyzine Pregnancy And Lactation Text: This medication is not safe during pregnancy and should not be taken. It is also excreted in breast milk and breast feeding isn't recommended.
Erivedge Counseling- I discussed with the patient the risks of Erivedge including but not limited to nausea, vomiting, diarrhea, constipation, weight loss, changes in the sense of taste, decreased appetite, muscle spasms, and hair loss.  The patient verbalized understanding of the proper use and possible adverse effects of Erivedge.  All of the patient's questions and concerns were addressed.
Topical Retinoid counseling:  Patient advised to apply a pea-sized amount only at bedtime and wait 30 minutes after washing their face before applying.  If too drying, patient may add a non-comedogenic moisturizer. The patient verbalized understanding of the proper use and possible adverse effects of retinoids.  All of the patient's questions and concerns were addressed.
Carac Counseling:  I discussed with the patient the risks of Carac including but not limited to erythema, scaling, itching, weeping, crusting, and pain.
Oxybutynin Counseling:  I discussed with the patient the risks of oxybutynin including but not limited to skin rash, drowsiness, dry mouth, difficulty urinating, and blurred vision.
Ketoconazole Pregnancy And Lactation Text: This medication is Pregnancy Category C and it isn't know if it is safe during pregnancy. It is also excreted in breast milk and breast feeding isn't recommended.
Tranexamic Acid Pregnancy And Lactation Text: It is unknown if this medication is safe during pregnancy or breast feeding.
Cimzia Counseling:  I discussed with the patient the risks of Cimzia including but not limited to immunosuppression, allergic reactions and infections.  The patient understands that monitoring is required including a PPD at baseline and must alert us or the primary physician if symptoms of infection or other concerning signs are noted.
Siliq Counseling:  I discussed with the patient the risks of Siliq including but not limited to new or worsening depression, suicidal thoughts and behavior, immunosuppression, malignancy, posterior leukoencephalopathy syndrome, and serious infections.  The patient understands that monitoring is required including a PPD at baseline and must alert us or the primary physician if symptoms of infection or other concerning signs are noted. There is also a special program designed to monitor depression which is required with Siliq.
Tetracycline Counseling: Patient counseled regarding possible photosensitivity and increased risk for sunburn.  Patient instructed to avoid sunlight, if possible.  When exposed to sunlight, patients should wear protective clothing, sunglasses, and sunscreen.  The patient was instructed to call the office immediately if the following severe adverse effects occur:  hearing changes, easy bruising/bleeding, severe headache, or vision changes.  The patient verbalized understanding of the proper use and possible adverse effects of tetracycline.  All of the patient's questions and concerns were addressed. Patient understands to avoid pregnancy while on therapy due to potential birth defects.
VTAMA Counseling: I discussed with the patient that VTAMA is not for use in the eyes, mouth or mouth. They should call the office if they develop any signs of allergic reactions to VTAMA. The patient verbalized understanding of the proper use and possible adverse effects of VTAMA.  All of the patient's questions and concerns were addressed.
Isotretinoin Counseling: Patient should get monthly blood tests, not donate blood, not drive at night if vision affected, not share medication, and not undergo elective surgery for 6 months after tx completed. Side effects reviewed, pt to contact office should one occur.
Elidel Counseling: Patient may experience a mild burning sensation during topical application. Elidel is not approved in children less than 2 years of age. There have been case reports of hematologic and skin malignancies in patients using topical calcineurin inhibitors although causality is questionable.
Topical Metronidazole Pregnancy And Lactation Text: This medication is Pregnancy Category B and considered safe during pregnancy.  It is also considered safe to use while breastfeeding.
Azathioprine Pregnancy And Lactation Text: This medication is Pregnancy Category D and isn't considered safe during pregnancy. It is unknown if this medication is excreted in breast milk.
Cellcept Counseling:  I discussed with the patient the risks of mycophenolate mofetil including but not limited to infection/immunosuppression, GI upset, hypokalemia, hypercholesterolemia, bone marrow suppression, lymphoproliferative disorders, malignancy, GI ulceration/bleed/perforation, colitis, interstitial lung disease, kidney failure, progressive multifocal leukoencephalopathy, and birth defects.  The patient understands that monitoring is required including a baseline creatinine and regular CBC testing. In addition, patient must alert us immediately if symptoms of infection or other concerning signs are noted.
Gabapentin Counseling: I discussed with the patient the risks of gabapentin including but not limited to dizziness, somnolence, fatigue and ataxia.
Albendazole Counseling:  I discussed with the patient the risks of albendazole including but not limited to cytopenia, kidney damage, nausea/vomiting and severe allergy.  The patient understands that this medication is being used in an off-label manner.
Vtama Pregnancy And Lactation Text: It is unknown if this medication can cause problems during pregnancy and breastfeeding.
Niacinamide Pregnancy And Lactation Text: These medications are considered safe during pregnancy.
Finasteride Male Counseling: Finasteride Counseling:  I discussed with the patient the risks of use of finasteride including but not limited to decreased libido, decreased ejaculate volume, gynecomastia, and depression. Women should not handle medication.  All of the patient's questions and concerns were addressed.
Bactrim Counseling:  I discussed with the patient the risks of sulfa antibiotics including but not limited to GI upset, allergic reaction, drug rash, diarrhea, dizziness, photosensitivity, and yeast infections.  Rarely, more serious reactions can occur including but not limited to aplastic anemia, agranulocytosis, methemoglobinemia, blood dyscrasias, liver or kidney failure, lung infiltrates or desquamative/blistering drug rashes.
Protopic Pregnancy And Lactation Text: This medication is Pregnancy Category C. It is unknown if this medication is excreted in breast milk when applied topically.
Benzoyl Peroxide Counseling: Patient counseled that medicine may cause skin irritation and bleach clothing.  In the event of skin irritation, the patient was advised to reduce the amount of the drug applied or use it less frequently.   The patient verbalized understanding of the proper use and possible adverse effects of benzoyl peroxide.  All of the patient's questions and concerns were addressed.
Erythromycin Pregnancy And Lactation Text: This medication is Pregnancy Category B and is considered safe during pregnancy. It is also excreted in breast milk.
Minoxidil Counseling: Minoxidil is a topical medication which can increase blood flow where it is applied. It is uncertain how this medication increases hair growth. Side effects are uncommon and include stinging and allergic reactions.
Qbrexza Counseling:  I discussed with the patient the risks of Qbrexza including but not limited to headache, mydriasis, blurred vision, dry eyes, nasal dryness, dry mouth, dry throat, dry skin, urinary hesitation, and constipation.  Local skin reactions including erythema, burning, stinging, and itching can also occur.
Nsaids Counseling: NSAID Counseling: I discussed with the patient that NSAIDs should be taken with food. Prolonged use of NSAIDs can result in the development of stomach ulcers.  Patient advised to stop taking NSAIDs if abdominal pain occurs.  The patient verbalized understanding of the proper use and possible adverse effects of NSAIDs.  All of the patient's questions and concerns were addressed.
Finasteride Female Counseling: Finasteride Counseling:  I discussed with the patient the risks of use of finasteride including but not limited to decreased libido and sexual dysfunction. Explained the teratogenic nature of the medication and stressed the importance of not getting pregnant during treatment. All of the patient's questions and concerns were addressed.
Metronidazole Counseling:  I discussed with the patient the risks of metronidazole including but not limited to seizures, nausea/vomiting, a metallic taste in the mouth, nausea/vomiting and severe allergy.
Valtrex Counseling: I discussed with the patient the risks of valacyclovir including but not limited to kidney damage, nausea, vomiting and severe allergy.  The patient understands that if the infection seems to be worsening or is not improving, they are to call.
Terbinafine Counseling: Patient counseling regarding adverse effects of terbinafine including but not limited to headache, diarrhea, rash, upset stomach, liver function test abnormalities, itching, taste/smell disturbance, nausea, abdominal pain, and flatulence.  There is a rare possibility of liver failure that can occur when taking terbinafine.  The patient understands that a baseline LFT and kidney function test may be required. The patient verbalized understanding of the proper use and possible adverse effects of terbinafine.  All of the patient's questions and concerns were addressed.
Minocycline Counseling: Patient advised regarding possible photosensitivity and discoloration of the teeth, skin, lips, tongue and gums.  Patient instructed to avoid sunlight, if possible.  When exposed to sunlight, patients should wear protective clothing, sunglasses, and sunscreen.  The patient was instructed to call the office immediately if the following severe adverse effects occur:  hearing changes, easy bruising/bleeding, severe headache, or vision changes.  The patient verbalized understanding of the proper use and possible adverse effects of minocycline.  All of the patient's questions and concerns were addressed.
Hyrimoz Counseling:  I discussed with the patient the risks of adalimumab including but not limited to myelosuppression, immunosuppression, autoimmune hepatitis, demyelinating diseases, lymphoma, and serious infections.  The patient understands that monitoring is required including a PPD at baseline and must alert us or the primary physician if symptoms of infection or other concerning signs are noted.
Topical Steroids Counseling: I discussed with the patient that prolonged use of topical steroids can result in the increased appearance of superficial blood vessels (telangiectasias), lightening (hypopigmentation) and thinning of the skin (atrophy).  Patient understands to avoid using high potency steroids in skin folds, the groin or the face.  The patient verbalized understanding of the proper use and possible adverse effects of topical steroids.  All of the patient's questions and concerns were addressed.
Sotyktu Counseling:  I discussed the most common side effects of Sotyktu including: common cold, sore throat, sinus infections, cold sores, canker sores, folliculitis, and acne.  I also discussed more serious side effects of Sotyktu including but not limited to: serious allergic reactions; increased risk for infections such as TB; cancers such as lymphomas; rhabdomyolysis and elevated CPK; and elevated triglycerides and liver enzymes. 
Cimzia Pregnancy And Lactation Text: This medication crosses the placenta but can be considered safe in certain situations. Cimzia may be excreted in breast milk.
Isotretinoin Pregnancy And Lactation Text: This medication is Pregnancy Category X and is considered extremely dangerous during pregnancy. It is unknown if it is excreted in breast milk.
Topical Steroids Applications Pregnancy And Lactation Text: Most topical steroids are considered safe to use during pregnancy and lactation.  Any topical steroid applied to the breast or nipple should be washed off before breastfeeding.
Eucrisa Counseling: Patient may experience a mild burning sensation during topical application. Eucrisa is not approved in children less than 2 years of age.
Cibinqo Counseling: I discussed with the patient the risks of Cibinqo therapy including but not limited to common cold, nausea, headache, cold sores, increased blood CPK levels, dizziness, UTIs, fatigue, acne, and vomitting. Live vaccines should be avoided.  This medication has been linked to serious infections; higher rate of mortality; malignancy and lymphoproliferative disorders; major adverse cardiovascular events; thrombosis; thrombocytopenia and lymphopenia; lipid elevations; and retinal detachment.
Valtrex Pregnancy And Lactation Text: this medication is Pregnancy Category B and is considered safe during pregnancy. This medication is not directly found in breast milk but it's metabolite acyclovir is present.
Arava Counseling:  Patient counseled regarding adverse effects of Arava including but not limited to nausea, vomiting, abnormalities in liver function tests. Patients may develop mouth sores, rash, diarrhea, and abnormalities in blood counts. The patient understands that monitoring is required including LFTs and blood counts.  There is a rare possibility of scarring of the liver and lung problems that can occur when taking methotrexate. Persistent nausea, loss of appetite, pale stools, dark urine, cough, and shortness of breath should be reported immediately. Patient advised to discontinue Arava treatment and consult with a physician prior to attempting conception. The patient will have to undergo a treatment to eliminate Arava from the body prior to conception.
Sotyktu Pregnancy And Lactation Text: There is insufficient data to evaluate whether or not Sotyktu is safe to use during pregnancy.   It is not known if Sotyktu passes into breast milk and whether or not it is safe to use when breastfeeding.  
Simponi Counseling:  I discussed with the patient the risks of golimumab including but not limited to myelosuppression, immunosuppression, autoimmune hepatitis, demyelinating diseases, lymphoma, and serious infections.  The patient understands that monitoring is required including a PPD at baseline and must alert us or the primary physician if symptoms of infection or other concerning signs are noted.
Bactrim Pregnancy And Lactation Text: This medication is Pregnancy Category D and is known to cause fetal risk.  It is also excreted in breast milk.
Zoryve Counseling:  I discussed with the patient that Zoryve is not for use in the eyes, mouth or vagina. The most commonly reported side effects include diarrhea, headache, insomnia, application site pain, upper respiratory tract infections, and urinary tract infections.  All of the patient's questions and concerns were addressed.
Benzoyl Peroxide Pregnancy And Lactation Text: This medication is Pregnancy Category C. It is unknown if benzoyl peroxide is excreted in breast milk.
Qbrexza Pregnancy And Lactation Text: There is no available data on Qbrexza use in pregnant women.  There is no available data on Qbrexza use in lactation.
Glycopyrrolate Counseling:  I discussed with the patient the risks of glycopyrrolate including but not limited to skin rash, drowsiness, dry mouth, difficulty urinating, and blurred vision.
Nsaids Pregnancy And Lactation Text: These medications are considered safe up to 30 weeks gestation. It is excreted in breast milk.
Cephalexin Counseling: I counseled the patient regarding use of cephalexin as an antibiotic for prophylactic and/or therapeutic purposes. Cephalexin (commonly prescribed under brand name Keflex) is a cephalosporin antibiotic which is active against numerous classes of bacteria, including most skin bacteria. Side effects may include nausea, diarrhea, gastrointestinal upset, rash, hives, yeast infections, and in rare cases, hepatitis, kidney disease, seizures, fever, confusion, neurologic symptoms, and others. Patients with severe allergies to penicillin medications are cautioned that there is about a 10% incidence of cross-reactivity with cephalosporins. When possible, patients with penicillin allergies should use alternatives to cephalosporins for antibiotic therapy.
Metronidazole Pregnancy And Lactation Text: This medication is Pregnancy Category B and considered safe during pregnancy.  It is also excreted in breast milk.
Propranolol Counseling:  I discussed with the patient the risks of propranolol including but not limited to low heart rate, low blood pressure, low blood sugar, restlessness and increased cold sensitivity. They should call the office if they experience any of these side effects.
Fluconazole Counseling:  Patient counseled regarding adverse effects of fluconazole including but not limited to headache, diarrhea, nausea, upset stomach, liver function test abnormalities, taste disturbance, and stomach pain.  There is a rare possibility of liver failure that can occur when taking fluconazole.  The patient understands that monitoring of LFTs and kidney function test may be required, especially at baseline. The patient verbalized understanding of the proper use and possible adverse effects of fluconazole.  All of the patient's questions and concerns were addressed.
Cosentyx Counseling:  I discussed with the patient the risks of Cosentyx including but not limited to worsening of Crohn's disease, immunosuppression, allergic reactions and infections.  The patient understands that monitoring is required including a PPD at baseline and must alert us or the primary physician if symptoms of infection or other concerning signs are noted.
Calcipotriene Counseling:  I discussed with the patient the risks of calcipotriene including but not limited to erythema, scaling, itching, and irritation.
Libtayo Counseling- I discussed with the patient the risks of Libtayo including but not limited to nausea, vomiting, diarrhea, and bone or muscle pain.  The patient verbalized understanding of the proper use and possible adverse effects of Libtayo.  All of the patient's questions and concerns were addressed.
Tazorac Counseling:  Patient advised that medication is irritating and drying.  Patient may need to apply sparingly and wash off after an hour before eventually leaving it on overnight.  The patient verbalized understanding of the proper use and possible adverse effects of tazorac.  All of the patient's questions and concerns were addressed.
High Dose Vitamin A Counseling: Side effects reviewed, pt to contact office should one occur.
Calcipotriene Pregnancy And Lactation Text: The use of this medication during pregnancy or lactation is not recommended as there is insufficient data.
Tazorac Pregnancy And Lactation Text: This medication is not safe during pregnancy. It is unknown if this medication is excreted in breast milk.
High Dose Vitamin A Pregnancy And Lactation Text: High dose vitamin A therapy is contraindicated during pregnancy and breast feeding.
Quinolones Counseling:  I discussed with the patient the risks of fluoroquinolones including but not limited to GI upset, allergic reaction, drug rash, diarrhea, dizziness, photosensitivity, yeast infections, liver function test abnormalities, tendonitis/tendon rupture.
Taltz Counseling: I discussed with the patient the risks of ixekizumab including but not limited to immunosuppression, serious infections, worsening of inflammatory bowel disease and drug reactions.  The patient understands that monitoring is required including a PPD at baseline and must alert us or the primary physician if symptoms of infection or other concerning signs are noted.
Propranolol Pregnancy And Lactation Text: This medication is Pregnancy Category C and it isn't known if it is safe during pregnancy. It is excreted in breast milk.
Ivermectin Counseling:  Patient instructed to take medication on an empty stomach with a full glass of water.  Patient informed of potential adverse effects including but not limited to nausea, diarrhea, dizziness, itching, and swelling of the extremities or lymph nodes.  The patient verbalized understanding of the proper use and possible adverse effects of ivermectin.  All of the patient's questions and concerns were addressed.
Cibinqo Pregnancy And Lactation Text: It is unknown if this medication will adversely affect pregnancy or breast feeding.  You should not take this medication if you are currently pregnant or planning a pregnancy or while breastfeeding.
Xeljanz Counseling: I discussed with the patient the risks of Xeljanz therapy including increased risk of infection, liver issues, headache, diarrhea, or cold symptoms. Live vaccines should be avoided. They were instructed to call if they have any problems.
Mirvaso Counseling: Mirvaso is a topical medication which can decrease superficial blood flow where applied. Side effects are uncommon and include stinging, redness and allergic reactions.
Finasteride Pregnancy And Lactation Text: This medication is absolutely contraindicated during pregnancy. It is unknown if it is excreted in breast milk.
Ilumya Counseling: I discussed with the patient the risks of tildrakizumab including but not limited to immunosuppression, malignancy, posterior leukoencephalopathy syndrome, and serious infections.  The patient understands that monitoring is required including a PPD at baseline and must alert us or the primary physician if symptoms of infection or other concerning signs are noted.
Topical Sulfur Applications Counseling: Topical Sulfur Counseling: Patient counseled that this medication may cause skin irritation or allergic reactions.  In the event of skin irritation, the patient was advised to reduce the amount of the drug applied or use it less frequently.   The patient verbalized understanding of the proper use and possible adverse effects of topical sulfur application.  All of the patient's questions and concerns were addressed.
Cyclophosphamide Counseling:  I discussed with the patient the risks of cyclophosphamide including but not limited to hair loss, hormonal abnormalities, decreased fertility, abdominal pain, diarrhea, nausea and vomiting, bone marrow suppression and infection. The patient understands that monitoring is required while taking this medication.
Zyclara Counseling:  I discussed with the patient the risks of imiquimod including but not limited to erythema, scaling, itching, weeping, crusting, and pain.  Patient understands that the inflammatory response to imiquimod is variable from person to person and was educated regarded proper titration schedule.  If flu-like symptoms develop, patient knows to discontinue the medication and contact us.
Xelalessandraz Pregnancy And Lactation Text: This medication is Pregnancy Category D and is not considered safe during pregnancy.  The risk during breast feeding is also uncertain.
Glycopyrrolate Pregnancy And Lactation Text: This medication is Pregnancy Category B and is considered safe during pregnancy. It is unknown if it is excreted breast milk.
Cephalexin Pregnancy And Lactation Text: This medication is Pregnancy Category B and considered safe during pregnancy.  It is also excreted in breast milk but can be used safely for shorter doses.
Cimetidine Counseling:  I discussed with the patient the risks of Cimetidine including but not limited to gynecomastia, headache, diarrhea, nausea, drowsiness, arrhythmias, pancreatitis, skin rashes, psychosis, bone marrow suppression and kidney toxicity.
Birth Control Pills Counseling: Birth Control Pill Counseling: I discussed with the patient the potential side effects of OCPs including but not limited to increased risk of stroke, heart attack, thrombophlebitis, deep venous thrombosis, hepatic adenomas, breast changes, GI upset, headaches, and depression.  The patient verbalized understanding of the proper use and possible adverse effects of OCPs. All of the patient's questions and concerns were addressed.
Detail Level: Simple
Rhofade Counseling: Rhofade is a topical medication which can decrease superficial blood flow where applied. Side effects are uncommon and include stinging, redness and allergic reactions.
Libtayo Pregnancy And Lactation Text: This medication is contraindicated in pregnancy and when breast feeding.
Olanzapine Counseling- I discussed with the patient the common side effects of olanzapine including but are not limited to: lack of energy, dry mouth, increased appetite, sleepiness, tremor, constipation, dizziness, changes in behavior, or restlessness.  Explained that teenagers are more likely to experience headaches, abdominal pain, pain in the arms or legs, tiredness, and sleepiness.  Serious side effects include but are not limited: increased risk of death in elderly patients who are confused, have memory loss, or dementia-related psychosis; hyperglycemia; increased cholesterol and triglycerides; and weight gain.

## 2024-10-28 NOTE — PROCEDURE: ADDITIONAL NOTES
Detail Level: Simple
Additional Notes: Scale is becoming very thick again. \\nI reminded the patient as discussed previously that ILK tends to work well for localized plaques, but that over time, steroids of any kind can gradually become less effective for psoriasis, requiring higher concentrations or more frequent dosing, but there are limits on how often we can do the shots and how “strong” we can make them. Discussed possibly considering an oral psoriasis treatment.
Render Risk Assessment In Note?: no

## 2024-11-18 ENCOUNTER — APPOINTMENT (RX ONLY)
Dept: URBAN - METROPOLITAN AREA CLINIC 41 | Facility: CLINIC | Age: 64
Setting detail: DERMATOLOGY
End: 2024-11-18

## 2024-11-18 DIAGNOSIS — L40.0 PSORIASIS VULGARIS: ICD-10-CM

## 2024-11-18 PROCEDURE — ? ADDITIONAL NOTES

## 2024-11-18 PROCEDURE — ? COUNSELING

## 2024-11-18 PROCEDURE — ? INTRALESIONAL KENALOG

## 2024-11-18 PROCEDURE — 11901 INJECT SKIN LESIONS >7: CPT

## 2024-11-18 ASSESSMENT — LOCATION DETAILED DESCRIPTION DERM
LOCATION DETAILED: RIGHT INFERIOR PARIETAL SCALP
LOCATION DETAILED: RIGHT SUPERIOR OCCIPITAL SCALP
LOCATION DETAILED: LEFT OCCIPITAL SCALP
LOCATION DETAILED: LEFT SUPERIOR POSTERIOR NECK
LOCATION DETAILED: RIGHT SUPERIOR POSTERIOR NECK
LOCATION DETAILED: MID POSTERIOR NECK
LOCATION DETAILED: MID-OCCIPITAL SCALP
LOCATION DETAILED: LEFT SUPERIOR OCCIPITAL SCALP

## 2024-11-18 ASSESSMENT — LOCATION SIMPLE DESCRIPTION DERM
LOCATION SIMPLE: POSTERIOR NECK
LOCATION SIMPLE: POSTERIOR SCALP
LOCATION SIMPLE: LEFT OCCIPITAL SCALP
LOCATION SIMPLE: SCALP
LOCATION SIMPLE: RIGHT OCCIPITAL SCALP

## 2024-11-18 ASSESSMENT — LOCATION ZONE DERM
LOCATION ZONE: NECK
LOCATION ZONE: SCALP

## 2024-11-18 NOTE — PROCEDURE: ADDITIONAL NOTES
Render Risk Assessment In Note?: no
Additional Notes: Yady Tillman has talked to her about this, but I did talk again today about how these injections might stop working.
Detail Level: Simple

## 2024-11-18 NOTE — PROCEDURE: INTRALESIONAL KENALOG
Kenalog Preparation: Kenalog
Ndc# For Kenalog Only: 9239-1605-86
Medical Necessity Clause: This procedure was medically necessary because the lesions that were treated were:
How Many Mls Were Removed From The 40 Mg/Ml (5ml) Vial When Preparing The Injectable Solution?: 0
Consent: The risks of atrophy were reviewed with the patient.
Detail Level: Zone
Total Volume (Ccs): 2.5
Kenalog Type Of Vial: Multiple Dose
Lot # For Kenalog (Optional): [id number]
Require Ndc Code?: No
Administered By (Optional): NICOLE
Show Inventory Tab: Hide
Expiration Date For Kenalog (Optional): 04/26
Concentration Of Kenalog Solution Injected (Mg/Ml): 10.0
Validate Note Data When Using Inventory: Yes

## 2024-12-02 ENCOUNTER — LAB OUTSIDE AN ENCOUNTER (OUTPATIENT)
Dept: URBAN - METROPOLITAN AREA CLINIC 86 | Facility: CLINIC | Age: 64
End: 2024-12-02

## 2024-12-19 ENCOUNTER — OFFICE VISIT (OUTPATIENT)
Dept: URBAN - METROPOLITAN AREA TELEHEALTH 2 | Facility: TELEHEALTH | Age: 64
End: 2024-12-19
Payer: MEDICARE

## 2024-12-19 VITALS — BODY MASS INDEX: 26.62 KG/M2 | WEIGHT: 141 LBS | HEIGHT: 61 IN

## 2024-12-19 DIAGNOSIS — K76.82 HEPATIC ENCEPHALOPATHY: ICD-10-CM

## 2024-12-19 DIAGNOSIS — Z79.899 HIGH RISK MEDICATION USE: ICD-10-CM

## 2024-12-19 DIAGNOSIS — D04.9: ICD-10-CM

## 2024-12-19 DIAGNOSIS — K65.9 PERITONITIS, UNSPECIFIED: ICD-10-CM

## 2024-12-19 DIAGNOSIS — K76.6 PORTAL HYPERTENSION: ICD-10-CM

## 2024-12-19 DIAGNOSIS — R18.8 ASCITES: ICD-10-CM

## 2024-12-19 DIAGNOSIS — Z71.89 VACCINE COUNSELING: ICD-10-CM

## 2024-12-19 DIAGNOSIS — E11.9 DIABETES: ICD-10-CM

## 2024-12-19 DIAGNOSIS — I85.00 ESOPHAGEAL VARICES: ICD-10-CM

## 2024-12-19 DIAGNOSIS — K02.9 DENTAL CARIES: ICD-10-CM

## 2024-12-19 DIAGNOSIS — E23.7 PITUITARY GLAND DISORDER: ICD-10-CM

## 2024-12-19 DIAGNOSIS — E66.3 OVER WEIGHT: ICD-10-CM

## 2024-12-19 DIAGNOSIS — K74.60 CIRRHOSIS OF LIVER: ICD-10-CM

## 2024-12-19 DIAGNOSIS — R13.10 DYSPHAGIA: ICD-10-CM

## 2024-12-19 DIAGNOSIS — K75.81 NASH (NONALCOHOLIC STEATOHEPATITIS): ICD-10-CM

## 2024-12-19 DIAGNOSIS — I81 PORTAL VEIN THROMBOSIS: ICD-10-CM

## 2024-12-19 PROCEDURE — 99214 OFFICE O/P EST MOD 30 MIN: CPT | Performed by: PHYSICIAN ASSISTANT

## 2024-12-19 RX ORDER — POTASSIUM CHLORIDE 1500 MG/1
2 TABLETS WITH FOOD TABLET, FILM COATED, EXTENDED RELEASE ORAL ONCE A DAY
Refills: 0 | Status: ACTIVE | COMMUNITY
Start: 1900-01-01

## 2024-12-19 RX ORDER — ASPIRIN 81 MG/1
1 TABLET TABLET, COATED ORAL ONCE A DAY
Status: ACTIVE | COMMUNITY

## 2024-12-19 RX ORDER — RIFAXIMIN 550 MG/1
1 TABLET TABLET ORAL TWICE A DAY
Qty: 60 TABLET | Refills: 5 | Status: ACTIVE | COMMUNITY

## 2024-12-19 RX ORDER — LACTULOSE 10 G/15ML
30 ML SOLUTION ORAL ONCE A DAY
Status: ACTIVE | COMMUNITY

## 2024-12-19 RX ORDER — MIDODRINE HYDROCHLORIDE 10 MG/1
1 TABLET TABLET ORAL THREE TIMES A DAY
Status: ACTIVE | COMMUNITY

## 2024-12-19 RX ORDER — GABAPENTIN 300 MG/1
1 CAPSULE CAPSULE ORAL THREE TIMES A DAY
Status: ACTIVE | COMMUNITY

## 2024-12-19 RX ORDER — TIRZEPATIDE 7.5 MG/.5ML
AS DIRECTED INJECTION, SOLUTION SUBCUTANEOUS
Status: ACTIVE | COMMUNITY

## 2024-12-19 RX ORDER — BUMETANIDE 0.5 MG/1
4 (2MG) TABLET ORAL ONCE A DAY
Status: ACTIVE | COMMUNITY

## 2024-12-19 RX ORDER — SEMAGLUTIDE 0.68 MG/ML
AS DIRECTED INJECTION, SOLUTION SUBCUTANEOUS
COMMUNITY

## 2024-12-19 RX ORDER — VORTIOXETINE 20 MG/1
TAKE 1 TABLET (20 MG) BY ORAL ROUTE ONCE DAILY AT THE SAME TIME EACH DAY TABLET, FILM COATED ORAL ONCE A DAY
Refills: 0 | Status: ACTIVE | COMMUNITY
Start: 1900-01-01

## 2024-12-19 RX ORDER — DEXTROSE 4 G
1 TABLET TABLET,CHEWABLE ORAL ONCE A DAY
Status: ACTIVE | COMMUNITY

## 2024-12-19 RX ORDER — NADOLOL 40 MG/1
1 TABLET TABLET ORAL TWICE A DAY
Status: ACTIVE | COMMUNITY

## 2024-12-19 RX ORDER — OMEPRAZOLE 40 MG/1
TAKE 1 CAPSULE BY MOUTH TWICE A DAY BEFORE A MEAL CAPSULE, DELAYED RELEASE ORAL
Qty: 180 CAPSULE | Refills: 0 | Status: ACTIVE | COMMUNITY

## 2024-12-19 NOTE — HPI-TODAY'S VISIT:
Pt is 64 F last seen Jan 2024 by myself with hx sr/mash and cirrhosis and smv/pv clot, hx varices, hx pse, s/p pTIPs and now here for ongoing care.  A copy of the note was sent to the referring provider.  12/19/24 Patient with sr cirrhosis and hx tips in 2023 and follows with [facility] as well.  inr 1.4 the na 142, cr 0.77, albumin 2.4, bilirubin 3.4, alkaline phosphatse 91, ast 29, alt 14 MELD 19 and stable. MRI is scheduled for tomorrow.  She had renal stent placed last week due to her utis and federica have it removed next week having issue with low k and dr. barrios sent in potassium  a1c < 5 and still on mounajro 2024 echo done in october and rv normal and she has ef 60-65  8/21/24 ov sHe had labs done on August 13 and her sodium 137, potassium 4.3, creatinine 0.78, bilirubin 3.8, alkaline phosphatase 76, AST 40 and ALT 21.  Albumin 3.1.  White blood cells 5.5, hemoglobin 14,  and platelets 69.  They did not do any INR. Dear Lady Carson,   The 8/6/24 labs were sent to me.  The glucose 114, creatinine 0.9, sodium low at 131, potassium slightly elevated at 5.3 may need to consider checking this again with her primary care.  Your bilirubin was up at 4.2 the alkaline phosphatase 102, AST 42 and ALT 20.  AST and ALT is less than 25.  Have you been sick recently? White blood cells 8.4, hemoglobin up at 16, MCV 96 and platelets 80 INR 1.4.  Ammonia was normal at 63.  Previously in June the bilirubin was 4.9 in the AST was 33 and the ALT was 17. Albumin 3.9.  MELD score 16.  I saw a recent ultrasound showing the liver was partially obscured by overlying structures but I did see a nodular contour and coarsened echotexture.  Did not see definite lesions.  Bile duct at 2 mm in intensity dilation.  Right and left kidneys appear normal.  Spleen 14.2 cm.  They noted that the TIPS was patent.  We will review this at your follow-up and see how you are doing.  Please share this information with your other providers.  Do Valles PA-C  .  11/16/23 MRI EXAM: MR ABDOMEN W AND WO CONTRAST&nbsp;CLINICAL INDICATION: HCC Screening. SR cirrhosis, recurrent ascites?that underwent creation of a transjugular intrahepatic portosystemic shunt (TIPS) and splenic vein stent placement on 8/8/23. TECHNIQUE: Multisequence, multiplanar MRI of the abdomen was performed without and with intravenous contrast. ESRC.[id number];COMPARISON: MRI 12/14/22 and CT 7/31/2023 and CTA 3/29/2023&nbsp;FINDINGS: Lower Thorax: Small left pleural effusion. Unchanged left lower lobe subsegmental atelectasis Liver: No fat or iron. Morphologic changes of chronic liver disease including nodular surface contour, lobar redistribution and fissural widening. No suspicious hepatic lesions.  Gallbladder/Biliary Tree: Mildly prominent bile ducts secondary to prior cholecystectomy. Spleen: Enlarged measuring up to 17.4 cm in CC dimension. Pancreas: Normal. Adrenal Glands: Normal.  Kidneys/Ureters: Renal cysts. No hydronephrosis. Gastrointestinal: No bowel obstruction.  Lymph Nodes: Marked interval improvement in previously seen extensive inflammatory soft tissue with enhancement in the retroperitoneum surrounding the abdominal aorta and branch vessels. Small subcentimeter in short axis retroperitoneal lymph nodes remain (13:55). Vessels: Grossly patent TIPS extends from the middle hepatic vein to the main portal vein. 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 Chronic occlusion of the SMV with reconstitution distally in the branch vessels of the SMV (13:61). Multiple collateral vasculature in the periportal region, central mesentery and the anterior abdomen. Redemonstrated partially thrombosed portosystemic shunt from the splenic vein (13:53) extending inferiorly to the pelvis, better seen on prior CTA from 3/29/2023. Upper abdominal perigastric and perisplenic varices. Replaced right hepatic artery arising from the superior mesenteric artery. Peritoneum/Retroperitoneum: Improved retroperitoneal/mesenteric root edema and enhancing soft tissue. Small right retroperitoneal nodule (13:56) measuring 1 cm, previously 2.1 x 1.9 cm. Bones/Soft Tissues: No suspicious osseous lesion. IMPRESSION:1.  No suspicious focal hepatic lesion.2.  Morphologic changes of chronic liver disease with stigmata of portal hypertension including splenomegaly and extensive upper abdominal varices.3.  Patent TIPS. Redemonstrated chronic thrombosis of the main portal vein and SMV with near occlusive thrombus of the large portosystemic venous shunt from the splenic vein extending to the pelvis. Extensive upper abdominal varices and collateral vasculature as detailed above.4.  Marked interval improvement in retroperitoneal enhancing soft tissue and lymphadenopathy, seen previously on 6/2/2023 suggestive of resolving nonspecific inflammatory process.The images were reviewed and interpreted by Hussain Casanova MD  Sept 18 2023 U.s: EXAM: US ABDOMEN COMPLETE, US ABDOMEN DOPPLER COMPLETE&nbsp;CLINICAL INDICATION: TIPS.&nbsp;TECHNIQUE: Grayscale, pulsed wave and color Doppler sonography of the upper abdomen were performed. ESRC.[id number]&nbsp;COMPARISON: CT 7/31/2023.&nbsp;FINDINGS: Liver: Coarsened echogenicity with a nodular surface contour compatible with morphologic changes of chronic liver disease. No lesions.&nbsp;Bile Ducts: No dilated intrahepatic biliary radicles. Common duct measures 4 mm.&nbsp;Gallbladder: Status post cholecystectomy. &nbsp;Pancreas: Obscured by overlying structures.&nbsp;Right Kidney: Measures 10.3 cm. Normal echogenicity. No hydronephrosis.&nbsp;Left Kidney: Measures 11.7 cm. Normal echogenicity. No hydronephrosis.&nbsp;Spleen: Measures 17.1 cm.&nbsp;Aorta: Normal caliber where imaged.&nbsp;IVC: Normal proximally, not imaged distally. &nbsp;Hepatic Veins: Normal.&nbsp;Portal Veins: Hepatopetal flow. 85-93 cm/s.&nbsp;Hepatic Arteries: Peak systolic velocity 48 cm/s. Resistive index 0.67. Normal waveform.&nbsp;TIPS: Stent is patent. Hepatopetal flow at the portal venous end and hepatofugal flow at the hepatic venous end. Velocity in the portal end 130-156 cm/s. Velocity at mid stent 143-162 cm/s. Velocity at the hepatic end 108-134 cm/s.&nbsp;Other: Trace perihepatic free fluid.&nbsp;IMPRESSION:1.  Patent TIPS.2.  Morphologic changes of chronic liver disease with mild sequelae of portal hypertension including splenomegaly and trace perihepatic free fluid.&nbsp;  July 2023 Ct did pre tips: EXAM: CT ABDOMEN W IV CONTRAST&nbsp;CLINICAL INDICATION: Portal hypertension.&nbsp;TECHNIQUE: Following administration of non-ionic IV contrast, postcontrast images through the abdomen were obtained. If applicable, point-of-care testing was approved following departmental protocol. [facility].[id number] &nbsp;COMPARISON: CT abdomen/pelvis 3/29/2023, MRI abdomen 6/2/2023.&nbsp;FINDINGS: Lower Thorax: Trace left pleural effusion and left basilar atelectasis.&nbsp;Liver: Morphologic changes of chronic liver disease including nodular surface contour, lobar redistribution and fissural widening. No suspicious focal lesion.&nbsp;Gallbladder/Biliary Tree: Status post cholecystectomy. No biliary ductal dilatation.&nbsp;Spleen: Enlarged measuring up to 19 cm in craniocaudal dimension.&nbsp;Pancreas: Normal.&nbsp;Adrenal Glands: Left adrenal gland difficult to visualize. Normal right adrenal gland. &nbsp;Kidneys/Ureters: Renal cysts. No hydronephrosis.&nbsp;Gastrointestinal: No bowel obstruction or inflammation. &nbsp;Lymph Nodes: Unchanged subcentimeter in short axis jaelyn hepatis and retroperitoneal lymph nodes, likely reactive.&nbsp;Vessels: Diminutive main and central portal veins which otherwise appear patent. Redemonstrated chronic occlusion of the portosplenic venous confluence involving the portal vein extending into the superior mesenteric vein. Multiple upper abdominal paraesophageal, perigastric and perisplenic varices. Additional extensive collateral vasculature in the anterior abdomen. Findings are similar to recent MRI from 6/2/2023. Redemonstrated thrombosed portosystemic shunt arising from the splenic vein and extending inferiorly into the pelvis (6:30) and similar to 6/2/2023 MRI. Replaced right hepatic artery arising from the SMA.&nbsp;Peritoneum/Retroperitoneum: Moderate volume abdominopelvic free fluid.&nbsp;Bones/Soft Tissues: No aggressive osseous lesion. Grade 1 anterolisthesis L4 relative to L5.&nbsp;IMPRESSION:1.  Morphologic changes of chronic liver disease with sequelae of portal hypertension including splenomegaly, moderate volume ascites and extensive varices.2.  Unchanged chronic occlusion at the portosplenic venous confluence involving the portal vein extending into the superior mesenteric vein. Remainder of the main portal vein and central intrahepatic portal veins are diminutive in caliber but patent.3.  Unchanged extensive abdominal varices and a thrombosed portosystemic shunt arising from the splenic vein.4.  Small left pleural effusion.  IR TIPS: Augh 2023 Findings: &nbsp;1.  Right internal jugular vein is ultrasonographically patent and compresses.  Needle entry was documented.2.  Right hepatic venogram confirms vein patency.3.  Portogram demonstrates small main portal vein and small intrahepatic branches with prominent collaterals, with dilated and tortuous coronary and splenic veins.4.  Splenic venography demonstrates large tortuous main splenic vein and collaterals, with stenosis near the portal confluence.5.  Splenic venography after splenic vein stent placement demonstrates improved flow across area of stenosis.6.  The initial portosystemic gradient is 22 mmHg.7.  Post-TIPSS portosystemic gradient is 9 mmHg.8.  The TIPSS has a smooth course and extends from the middle hepatic vein to the main portal vein.9.  There is good flow through the shunt at the conclusion of the procedure with no appreciable filling of varices.10.  4.8 liters of fluid were drained during the procedure.11.  New right internal jugular approach triple-lumen catheter terminates at the superior cavoatrial junction.&nbsp;IMPRESSION:Impression: &nbsp;1.  Successful creation of TIPS from middle hepatic vein to main portal vein with a 10 mm x 6 cm x 2 cm controlled-expansion Viatorr stent (ballooned to 10 mm). Splenic vein stenosis improved after Abre 10mm x 8cm stent placement.2.  The portosystemic gradient was reduced from 22 mm Hg to 9 mmHg.  Since the tips no bleeding and no more taps.  Need to reach to Dr Barrios re the issue of her needing the mri and not  the ct.  Sept 18 2023 pt 17 and inr 1.5 and  wbc 3.9 and hg 12.7 and plat 50 and mcv 86.6 and na 140 and k 4.0 and cl 105 and co2 30 and ca 9.1 and bun 9 and cr 0.7 and glu 140 and alb 2.9 and alk 132 and ast 33 and alt 14.  tb 2.5.  Meld 14 and meld na 14.  Has been feeling better and doing better in from 22 to 14.   She lost like 40 pounds with the tips.  June 5 MRI was updated by radiology today. When they compared to multiple priors dating back to 2018, the described retroperitoneal/mesenteric root edema and perivascular infiltrative soft tissue demonstrates avid enhancement with no restricted diffusion.  The degree of enhancement has progressed as well as the density infiltrative soft tissue compared to multiple priors. Retroperitoneal lymph nodes were also increased in number but not pathologically enlarged.  There was no evident medialization of the ureters.  They said that they findings are concerning for retroperitoneal fibrotic or inflammatory process. It appears that you were seen on June 20 by the transplant team/Dr Barrios and we will forward them this MRI correction so that they can be aware of this.  We will forward it to him via [facility]  to be aware of as he just saw you.   The 6/2/23 MR elastography was sent to me after lunch today. As you recall it was not available for viewing during the visit today. I called and left you a voicemail about this.  Lower thorax with unchanged small left pleural effusion.  Liver with subjective evidence of fat deposition.  Cirrhotic morphology.  No suspicious lesions.  Elastography donel after a weighted mean of 3 measurements showing 3.2K PA consistent with stage I-II fibrosis.  The fat quantification 12.5% consistent with mild fatty liver.  There is no significant iron deposition.  They did not see any biliary ductal dilatation.  Splenomegaly measuring 20 cm was seen.  The pancreas normal, adrenal glands normal.  They saw a right upper renal cyst.  Please make sure the primary care provider is aware.  They saw reactive lymph nodes.  They thought that the aorta was normal.  The superior mesenteric and inferior mesenteric along with the bilateral renal arteries were patent.  They noted the replaced right hepatic artery arising from the superior mesenteric artery.  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  They noted that there has been interval progression of the degree of thrombosis of the portosystemic shunt with near complete occlusive thrombus.  They noted additional varices and this is why we have to stay on top of the endoscopic use.  They saw mesenteric edema unchanged from the previous CT.  Moderate ascites.  Overall they noted cirrhosis and did not see any suspicious lesions but they did note that there was progression of a now near occlusive thrombus of a large portosystemic venous shunt from the splenic vein to the left femoral vein.  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 As you recall we were doing this to check on the clot and to ensure there were no lesions. It is good to confirm no lesions. Need to be sure you see the transplant team back as discussed.    6/5/23  SHe did the mri on friday but interestingly it has not been read yet. Checked epic yesterday and today for this. Will follow up with the pending result.  She had the repeat colon.  She is having a paracentesis every 6 days or so, 4 L removed she is doing the low salt diet and limiting fluids. DIscussed the risk of recurrrent paracentesis and needs to see oltx back on this and see if options. HE symtpoms controlled on xifaxan and miralax as seh does not tolerate the lactulose She has not schedlued with dr. barrios as discussed. inr 1.43, mcv 86, hgb 11.1, platelets 125, bili 2.5, ast 36, alt 18, alk phos 101 na 136, cr 0.8 MELD 14, meld na 15 4/27/23 HOspital follow up and recurrent ascites. She hospitalized patient recently at [facility] (formerly [facility]).  She was in route to [place] per the notes but had EKG abnormalities and they redirected her to [place].  She had gained 19 pounds in the last several days.  Recent paracentesis on 419 with 4 L removed.  She was also having hematochezia and reviewed the chart and had recent rectal clips done and those need a re look and hospital noted she did not want to do there as she does things at [place]   June 8, 2022 MRI showed chronic trace left pleural effusion with associated atelectasis in the lower thorax views.  There was no significant fat or iron but with nodular contour and cirrhotic morphology.  No suspicious lesions.  Prior cholecystectomy changes.  No duct dilation.  Spleen enlarged 18.4 cm.  Pancreas normal.  Adrenal glands normal.  Normal bilateral renal symmetric enhancement with 6 mm superior pole renal cyst and no suspicious lesions and no hydro.  Mild small and large bowel wall edema likely due to periportal enteropathy/colopathy.  Unchanged chronic fibrosis of portal vein with cavernous transformation reconstituted left portal vein.  Redemonstrated gastroesophageal perisplenic varices.  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  Only trace perihepatic and perisplenic ascites.  nov 2021 mri Reason For Exam Hepatocellular carcinoma;Hepatocellular carcinoma  REPORT EXAM: MRI Abdomen w/ + w/o Contrast  CLINICAL INDICATION: Hepatocellular carcinoma  TECHNIQUE: Multisequence, multiplanar MRI of the abdomen was performed without and with intravenous contrast. ESRC.2.7.3  CONTRAST: 18 cc of Prohance  COMPARISON: March 2, 2021  FINDINGS:  Lower Thorax: Normal.  Liver: No fat or iron. Cirrhotic liver morphology. No arterially enhancing lesions. No lesions with capsule. No lesions with washout.  Gallbladder/Biliary Tree: Status post cholecystectomy.  Spleen: Measuring 19.0 cm in maximum craniocaudal diameter.  Pancreas: Normal.  Adrenal Glands: Normal.   Kidneys/Ureters: Normal.  Gastrointestinal: Normal.   Lymph Nodes: Normal.  Vessels: Chronic thrombosis of the main portal vein with cavernous transformation redemonstrated. Multiple large collateral pathways including gastroesophageal varices, again noted. 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  Peritoneum/Retroperitoneum: Minimal ascites is present.  Bones/Soft Tissues: Normal.  IMPRESSION:     Cirrhosis with chronic portal vein thrombosis and cavernous transformation with associated evidence of portal hypertension. No hepatocellular carcinoma. Pt is 63 F last seen June 2023 by Ms Valles with hx sr and cirrhosis and smv/pv clot, hx varices, hx pse, march 2022 urosepsis/sbp and now here for ongoing care and eval.  A copy of the note was sent to the referring provider.  Dear Lady Carson, June 5 MRI was updated by radiology today. When they compared to multiple priors dating back to 2018, the described retroperitoneal/mesenteric root edema and perivascular infiltrative soft tissue demonstrates avid enhancement with no restricted diffusion.  The degree of enhancement has progressed as well as the density infiltrative soft tissue compared to multiple priors. Retroperitoneal lymph nodes were also increased in number but not pathologically enlarged.  There was no evident medialization of the ureters.  They said that they findings are concerning for retroperitoneal fibrotic or inflammatory process. It appears that you were seen on June 20 by the transplant team/Dr Barrios and we will forward them this MRI correction so that they can be aware of this.  We will forward it to him via [facility]  to be aware of as he just saw you. Dr Gonzales  The 6/2/23 MR elastography was sent to me after lunch today. As you recall it was not available for viewing during the visit today. I called and left you a voicemail about this.  Lower thorax with unchanged small left pleural effusion.  Liver with subjective evidence of fat deposition.  Cirrhotic morphology.  No suspicious lesions.  Elastography donel after a weighted mean of 3 measurements showing 3.2K PA consistent with stage I-II fibrosis.  The fat quantification 12.5% consistent with mild fatty liver.  There is no significant iron deposition.  They did not see any biliary ductal dilatation.  Splenomegaly measuring 20 cm was seen.  The pancreas normal, adrenal glands normal.  They saw a right upper renal cyst.  Please make sure the primary care provider is aware.  They saw reactive lymph nodes.  They thought that the aorta was normal.  The superior mesenteric and inferior mesenteric along with the bilateral renal arteries were patent.  They noted the replaced right hepatic artery arising from the superior mesenteric artery.  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  They noted that there has been interval progression of the degree of thrombosis of the portosystemic shunt with near complete occlusive thrombus.  They noted additional varices and this is why we have to stay on top of the endoscopic use.  They saw mesenteric edema unchanged from the previous CT.  Moderate ascites.  Overall they noted cirrhosis and did not see any suspicious lesions but they did note that there was progression of a now near occlusive thrombus of a large portosystemic venous shunt from the splenic vein to the left femoral vein.  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 As you recall we were doing this to check on the clot and to ensure there were no lesions. It is good to confirm no lesions. Need to be sure you see the transplant team back as discussed.  Do Valles PA-C  6/5/23  SHe did the mri on friday but interestingly it has not been read yet. Checked epic yesterday and today for this. Will follow up with the pending result.  She had the repeat colon.  She is having a paracentesis every 6 days or so, 4 L removed she is doing the low salt diet and limiting fluids. DIscussed the risk of recurrrent paracentesis and needs to see oltx back on this and see if options. HE symtpoms controlled on xifaxan and miralax as seh does not tolerate the lactulose She has not schedlued with dr. barrios as discussed. inr 1.43, mcv 86, hgb 11.1, platelets 125, bili 2.5, ast 36, alt 18, alk phos 101 na 136, cr 0.8 MELD 14, meld na 15 4/27/23 HOspital follow up and recurrent ascites. She hospitalized patient recently at [facility] ([facility]  She was in route to [place] per the notes but had EKG abnormalities and they redirected her to [place].  She had gained 19 pounds in the last several days.  Recent paracentesis on 419 with 4 L removed.  She was also having hematochezia and reviewed the chart and had recent rectal clips done and those need a re look and hospital noted she did not want to do there as she does things at [facility]   She says she had some issues with daughter being in mental institution and she had trouble coming up and so now comes back. She is doing better.  She had adopted her at birth.  Right ear she had an skin cancer and has trouble wearing a mask for this.  She has been getting shot for scalp psoriasis and she does not know what is the shot. She has 2 rounds of it.  She has to get her every 2 months.  Dr. Pepito Haile [facility].[address] [address]  ~34.1 mi [phone]  We need to know what he is giving her so that we can say what we expect to be the case.  Depending on what he is giving her.  No recent labs and no recent imaging.  June 8, 2022 MRI showed chronic trace left pleural effusion with associated atelectasis in the lower thorax views.  There was no significant fat or iron but with nodular contour and cirrhotic morphology.  No suspicious lesions.  Prior cholecystectomy changes.  No duct dilation.  Spleen enlarged 18.4 cm.  Pancreas normal.  Adrenal glands normal.  Normal bilateral renal symmetric enhancement with 6 mm superior pole renal cyst and no suspicious lesions and no hydro.  Mild small and large bowel wall edema likely due to periportal enteropathy/colopathy.  Unchanged chronic fibrosis of portal vein with cavernous transformation reconstituted left portal vein.  Redemonstrated gastroesophageal perisplenic varices.  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  Only trace perihepatic and perisplenic ascites.  June 7, 2022 visit with Dr. Flores mentions that the patient is status post TAVR with recent echo showing EF 55% of left ventricular outflow VTI of 21.  Chronic diastolic heart failure noted.  TAVR was on January 23, 2020.  She has not followed with Dr Flores and sees local cards for the tavr.  June 1, 2022 echo showed EF 50 to 55% aortic valve normal functioning bioprosthetic valve.  Aortic valve max velocity showed a mean gradient of 11.0 and a peak gradient 24.4   March 28, 2022 CT abdomen and pelvis with IV contrast revealed left-sided pleural effusion with some atelectasis.  Cardiophrenic enhancing node measuring 1.1 x 1.8 cm slightly increased in size.  Liver with chronic liver disease.  Prior cholecystectomy no bile duct dilation.  Splenomegaly seen.  Pancreas normal.  No hydronephrosis but with left nonobstructing renal stone.  Diffuse colon wall thickening and edema with surrounding stranding.  Prominent retroperitoneal mesenteric lymph nodes.  Large varices seen on the vessel exam with focal occlusion of the SMV/portal vein which appears similar to prior study.  Moderate continuation of the right portal vein.  Mild increase cannot exclude thrombus within the splenic venous collaterals in the left upper quadrant.   nov 2021 mri: Reason For Exam Hepatocellular carcinoma;Hepatocellular carcinoma  REPORT EXAM: MRI Abdomen w/ + w/o Contrast  CLINICAL INDICATION: Hepatocellular carcinoma  TECHNIQUE: Multisequence, multiplanar MRI of the abdomen was performed without and with intravenous contrast. [id number]  CONTRAST: 18 cc of Prohance  COMPARISON: March 2, 2021  FINDINGS:  Lower Thorax: Normal.  Liver: No fat or iron. Cirrhotic liver morphology. No arterially enhancing lesions. No lesions with capsule. No lesions with washout.  Gallbladder/Biliary Tree: Status post cholecystectomy.  Spleen: Measuring 19.0 cm in maximum craniocaudal diameter.  Pancreas: Normal.  Adrenal Glands: Normal.   Kidneys/Ureters: Normal.  Gastrointestinal: Normal.   Lymph Nodes: Normal.  Vessels: Chronic thrombosis of the main portal vein with cavernous transformation redemonstrated. Multiple large collateral pathways including gastroesophageal varices, again noted. 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  Peritoneum/Retroperitoneum: Minimal ascites is present.  Bones/Soft Tissues: Normal.  IMPRESSION:     Cirrhosis with chronic portal vein thrombosis and cavernous transformation with associated evidence of portal hypertension. No hepatocellular carcinoma.

## 2024-12-31 ENCOUNTER — APPOINTMENT (OUTPATIENT)
Dept: URBAN - METROPOLITAN AREA CLINIC 41 | Facility: CLINIC | Age: 64
Setting detail: DERMATOLOGY
End: 2024-12-31

## 2024-12-31 DIAGNOSIS — L40.0 PSORIASIS VULGARIS: ICD-10-CM

## 2024-12-31 DIAGNOSIS — L65.9 NONSCARRING HAIR LOSS, UNSPECIFIED: ICD-10-CM

## 2024-12-31 PROCEDURE — 99212 OFFICE O/P EST SF 10 MIN: CPT | Mod: 25

## 2024-12-31 PROCEDURE — ? TREATMENT REGIMEN

## 2024-12-31 PROCEDURE — ? COUNSELING

## 2024-12-31 PROCEDURE — ? INJECTION

## 2024-12-31 PROCEDURE — 11900 INJECT SKIN LESIONS </W 7: CPT

## 2024-12-31 ASSESSMENT — LOCATION ZONE DERM: LOCATION ZONE: SCALP

## 2024-12-31 ASSESSMENT — LOCATION DETAILED DESCRIPTION DERM
LOCATION DETAILED: RIGHT OCCIPITAL SCALP
LOCATION DETAILED: LEFT INFERIOR OCCIPITAL SCALP
LOCATION DETAILED: MID-OCCIPITAL SCALP
LOCATION DETAILED: LEFT OCCIPITAL SCALP

## 2024-12-31 ASSESSMENT — LOCATION SIMPLE DESCRIPTION DERM: LOCATION SIMPLE: POSTERIOR SCALP

## 2024-12-31 ASSESSMENT — PGA PSORIASIS: PGA PSORIASIS 2020: MILD

## 2024-12-31 ASSESSMENT — ITCH NUMERIC RATING SCALE: HOW SEVERE IS YOUR ITCHING?: 0

## 2024-12-31 ASSESSMENT — BSA PSORIASIS: % BODY COVERED IN PSORIASIS: 1

## 2024-12-31 NOTE — PROCEDURE: TREATMENT REGIMEN
Detail Level: Zone
Otc Regimen: Rogaine for men 1-2 x daily.
Plan: Advised to ask her primary care if she is a candidate to take oral minoxidil.

## 2024-12-31 NOTE — PROCEDURE: INJECTION
Total Volume Injected In Cc (Will Not Affected Billing): 2
Expiration Date (Optional): 04/2027
Bill For Wasted Drug?: no
Ndc #: [address]-55
Detail Level: None
Render J-Code Information In Note?: yes
Consent: The risks of the medication were reviewed with the patient.
Dose Administered (Numbers Only - Mg, G, Mcg, Units, Cc): 0
Dose Administered (Numbers Only - Mg, G, Mcg, Units, Cc): 3.0
Lot # (Optional): [id number]
Post-Care Instructions: I reviewed with the patient in detail post-care instructions. Patient understands to keep the injection sites clean and call the clinic if there is any redness, swelling or pain.
Type Of Vial Used?: Multi-Dose
Route: IL
Medication (1) And Associated J-Code Units: Triamcinolone acetonide, 10mg
Procedure Information: Please note that the numeric value listed in the Medication (1) and associated J-code units and Medication (2) and associated J-code units variables are j-code amounts and do not represent either the concentration or the total amount of the medications injected.  I strongly recommend selecting no to the Render J-code information in note question. This will allow your note to be more clear. If you are billing j-codes with your injection codes you need to document the total amount of the medication injected. This amount should match the j-code units. For example, if you are injecting Triamcinolone 40mg as an intramuscular injection you would select 40 for the dose field.. This would allow you to document  with 4 units (40mg = 10mg x 4). The total volume is not used to calculate j-codes only the amount of the medication administered.

## 2025-02-17 ENCOUNTER — APPOINTMENT (OUTPATIENT)
Dept: URBAN - METROPOLITAN AREA CLINIC 41 | Facility: CLINIC | Age: 65
Setting detail: DERMATOLOGY
End: 2025-02-17

## 2025-02-17 DIAGNOSIS — L40.0 PSORIASIS VULGARIS: ICD-10-CM

## 2025-02-17 DIAGNOSIS — L65.9 NONSCARRING HAIR LOSS, UNSPECIFIED: ICD-10-CM

## 2025-02-17 PROCEDURE — ? PRESCRIPTION

## 2025-02-17 PROCEDURE — 99212 OFFICE O/P EST SF 10 MIN: CPT | Mod: 25

## 2025-02-17 PROCEDURE — ? COUNSELING

## 2025-02-17 PROCEDURE — ? TREATMENT REGIMEN

## 2025-02-17 PROCEDURE — 11901 INJECT SKIN LESIONS >7: CPT

## 2025-02-17 PROCEDURE — ? INTRALESIONAL KENALOG

## 2025-02-17 PROCEDURE — ? MEDICATION COUNSELING

## 2025-02-17 RX ORDER — MINOXIDIL 2.5 MG/1
TABLET ORAL
Qty: 45 | Refills: 0 | Status: ERX | COMMUNITY
Start: 2025-02-17

## 2025-02-17 RX ADMIN — MINOXIDIL: 2.5 TABLET ORAL at 00:00

## 2025-02-17 ASSESSMENT — LOCATION DETAILED DESCRIPTION DERM
LOCATION DETAILED: LEFT SUPERIOR OCCIPITAL SCALP
LOCATION DETAILED: POSTERIOR MID-PARIETAL SCALP
LOCATION DETAILED: RIGHT SUPERIOR POSTERIOR PARIETAL SCALP
LOCATION DETAILED: RIGHT OCCIPITAL SCALP
LOCATION DETAILED: MID-OCCIPITAL SCALP
LOCATION DETAILED: RIGHT SUPERIOR OCCIPITAL SCALP

## 2025-02-17 ASSESSMENT — LOCATION ZONE DERM: LOCATION ZONE: SCALP

## 2025-02-17 ASSESSMENT — BSA PSORIASIS: % BODY COVERED IN PSORIASIS: 3

## 2025-02-17 ASSESSMENT — LOCATION SIMPLE DESCRIPTION DERM: LOCATION SIMPLE: POSTERIOR SCALP

## 2025-02-17 ASSESSMENT — ITCH NUMERIC RATING SCALE: HOW SEVERE IS YOUR ITCHING?: 2

## 2025-02-17 ASSESSMENT — PGA PSORIASIS: PGA PSORIASIS 2020: MILD

## 2025-02-17 NOTE — PROCEDURE: MEDICATION COUNSELING
Detail Level: Simple
Adbry Counseling: I discussed with the patient the risks of tralokinumab including but not limited to eye infection and irritation, cold sores, injection site reactions, worsening of asthma, allergic reactions and increased risk of parasitic infection.  Live vaccines should be avoided while taking tralokinumab. The patient understands that monitoring is required and they must alert us or the primary physician if symptoms of infection or other concerning signs are noted.
Colchicine Counseling:  Patient counseled regarding adverse effects including but not limited to stomach upset (nausea, vomiting, stomach pain, or diarrhea).  Patient instructed to limit alcohol consumption while taking this medication.  Colchicine may reduce blood counts especially with prolonged use.  The patient understands that monitoring of kidney function and blood counts may be required, especially at baseline. The patient verbalized understanding of the proper use and possible adverse effects of colchicine.  All of the patient's questions and concerns were addressed.
Opzelura Pregnancy And Lactation Text: There is insufficient data to evaluate drug-associated risk for major birth defects, miscarriage, or other adverse maternal or fetal outcomes.  There is a pregnancy registry that monitors pregnancy outcomes in pregnant persons exposed to the medication during pregnancy.  It is unknown if this medication is excreted in breast milk.  Do not breastfeed during treatment and for about 4 weeks after the last dose.
Oral Minoxidil Pregnancy And Lactation Text: This medication should only be used when clearly needed if you are pregnant, attempting to become pregnant or breast feeding.
Ilumya Pregnancy And Lactation Text: The risk during pregnancy and breastfeeding is uncertain with this medication.
Dutasteride Pregnancy And Lactation Text: This medication is absolutely contraindicated in women, especially during pregnancy and breast feeding. Feminization of male fetuses is possible if taking while pregnant.
Topical Sulfur Applications Pregnancy And Lactation Text: This medication is Pregnancy Category C and has an unknown safety profile during pregnancy. It is unknown if this topical medication is excreted in breast milk.
Azathioprine Counseling:  I discussed with the patient the risks of azathioprine including but not limited to myelosuppression, immunosuppression, hepatotoxicity, lymphoma, and infections.  The patient understands that monitoring is required including baseline LFTs, Creatinine, possible TPMP genotyping and weekly CBCs for the first month and then every 2 weeks thereafter.  The patient verbalized understanding of the proper use and possible adverse effects of azathioprine.  All of the patient's questions and concerns were addressed.
5-Fu Pregnancy And Lactation Text: This medication is Pregnancy Category X and contraindicated in pregnancy and in women who may become pregnant. It is unknown if this medication is excreted in breast milk.
Acitretin Pregnancy And Lactation Text: This medication is Pregnancy Category X and should not be given to women who are pregnant or may become pregnant in the future. This medication is excreted in breast milk.
Include Pregnancy/Lactation Warning?: No
Otezla Counseling: The side effects of Otezla were discussed with the patient, including but not limited to worsening or new depression, weight loss, diarrhea, nausea, upper respiratory tract infection, and headache. Patient instructed to call the office should any adverse effect occur.  The patient verbalized understanding of the proper use and possible adverse effects of Otezla.  All the patient's questions and concerns were addressed.
Drysol Counseling:  I discussed with the patient the risks of drysol/aluminum chloride including but not limited to skin rash, itching, irritation, burning.
Colchicine Pregnancy And Lactation Text: This medication is Pregnancy Category C and isn't considered safe during pregnancy. It is excreted in breast milk.
Adbry Pregnancy And Lactation Text: It is unknown if this medication will adversely affect pregnancy or breast feeding.
Picato Counseling:  I discussed with the patient the risks of Picato including but not limited to erythema, scaling, itching, weeping, crusting, and pain.
Picato Pregnancy And Lactation Text: This medication is Pregnancy Category C. It is unknown if this medication is excreted in breast milk.
Bexarotene Counseling:  I discussed with the patient the risks of bexarotene including but not limited to hair loss, dry lips/skin/eyes, liver abnormalities, hyperlipidemia, pancreatitis, depression/suicidal ideation, photosensitivity, drug rash/allergic reactions, hypothyroidism, anemia, leukopenia, infection, cataracts, and teratogenicity.  Patient understands that they will need regular blood tests to check lipid profile, liver function tests, white blood cell count, thyroid function tests and pregnancy test if applicable.
Wartpeel Counseling:  I discussed with the patient the risks of Wartpeel including but not limited to erythema, scaling, itching, weeping, crusting, and pain.
Finasteride Male Counseling: Finasteride Counseling:  I discussed with the patient the risks of use of finasteride including but not limited to decreased libido, decreased ejaculate volume, gynecomastia, and depression. Women should not handle medication.  All of the patient's questions and concerns were addressed.
Azathioprine Pregnancy And Lactation Text: This medication is Pregnancy Category D and isn't considered safe during pregnancy. It is unknown if this medication is excreted in breast milk.
Infliximab Counseling:  I discussed with the patient the risks of infliximab including but not limited to myelosuppression, immunosuppression, autoimmune hepatitis, demyelinating diseases, lymphoma, and serious infections.  The patient understands that monitoring is required including a PPD at baseline and must alert us or the primary physician if symptoms of infection or other concerning signs are noted.
Drysol Pregnancy And Lactation Text: This medication is considered safe during pregnancy and breast feeding.
Doxycycline Pregnancy And Lactation Text: This medication is Pregnancy Category D and not consider safe during pregnancy. It is also excreted in breast milk but is considered safe for shorter treatment courses.
Dapsone Counseling: I discussed with the patient the risks of dapsone including but not limited to hemolytic anemia, agranulocytosis, rashes, methemoglobinemia, kidney failure, peripheral neuropathy, headaches, GI upset, and liver toxicity.  Patients who start dapsone require monitoring including baseline LFTs and weekly CBCs for the first month, then every month thereafter.  The patient verbalized understanding of the proper use and possible adverse effects of dapsone.  All of the patient's questions and concerns were addressed.
Bimzelx Counseling:  I discussed with the patient the risks of Bimzelx including but not limited to depression, immunosuppression, allergic reactions and infections.  The patient understands that monitoring is required including a PPD at baseline and must alert us or the primary physician if symptoms of infection or other concerning signs are noted.
Erythromycin Counseling:  I discussed with the patient the risks of erythromycin including but not limited to GI upset, allergic reaction, drug rash, diarrhea, increase in liver enzymes, and yeast infections.
Dapsone Pregnancy And Lactation Text: This medication is Pregnancy Category C and is not considered safe during pregnancy or breast feeding.
Bimzelx Pregnancy And Lactation Text: This medication crosses the placenta and the safety is uncertain during pregnancy. It is unknown if this medication is present in breast milk.
Bexarotene Pregnancy And Lactation Text: This medication is Pregnancy Category X and should not be given to women who are pregnant or may become pregnant. This medication should not be used if you are breast feeding.
Cellcept Counseling:  I discussed with the patient the risks of mycophenolate mofetil including but not limited to infection/immunosuppression, GI upset, hypokalemia, hypercholesterolemia, bone marrow suppression, lymphoproliferative disorders, malignancy, GI ulceration/bleed/perforation, colitis, interstitial lung disease, kidney failure, progressive multifocal leukoencephalopathy, and birth defects.  The patient understands that monitoring is required including a baseline creatinine and regular CBC testing. In addition, patient must alert us immediately if symptoms of infection or other concerning signs are noted.
Opioid Counseling: I discussed with the patient the potential side effects of opioids including but not limited to addiction, altered mental status, and depression. I stressed avoiding alcohol, benzodiazepines, muscle relaxants and sleep aids unless specifically okayed by a physician. The patient verbalized understanding of the proper use and possible adverse effects of opioids. All of the patient's questions and concerns were addressed. They were instructed to flush the remaining pills down the toilet if they did not need them for pain.
Simlandi Counseling:  I discussed with the patient the risks of adalimumab including but not limited to myelosuppression, immunosuppression, autoimmune hepatitis, demyelinating diseases, lymphoma, and serious infections.  The patient understands that monitoring is required including a PPD at baseline and must alert us or the primary physician if symptoms of infection or other concerning signs are noted.
Protopic Counseling: Patient may experience a mild burning sensation during topical application. Protopic is not approved in children less than 2 years of age. There have been case reports of hematologic and skin malignancies in patients using topical calcineurin inhibitors although causality is questionable.
Finasteride Female Counseling: Finasteride Counseling:  I discussed with the patient the risks of use of finasteride including but not limited to decreased libido and sexual dysfunction. Explained the teratogenic nature of the medication and stressed the importance of not getting pregnant during treatment. All of the patient's questions and concerns were addressed.
Infliximab Pregnancy And Lactation Text: This medication is Pregnancy Category B and is considered safe during pregnancy. It is unknown if this medication is excreted in breast milk.
Otezla Pregnancy And Lactation Text: This medication is Pregnancy Category C and it isn't known if it is safe during pregnancy. It is unknown if it is excreted in breast milk.
Nemluvio Counseling: I discussed with the patient the risks of nemolizumab including but not limited to headache, gastrointestinal complaints, nasopharyngitis, musculoskeletal complaints, injection site reactions, and allergic reactions. The patient understands that monitoring is required and they must alert us or the primary physician if any side effects are noted.
Finasteride Pregnancy And Lactation Text: This medication is absolutely contraindicated during pregnancy. It is unknown if it is excreted in breast milk.
Winlevi Counseling:  I discussed with the patient the risks of topical clascoterone including but not limited to erythema, scaling, itching, and stinging. Patient voiced their understanding.
Elidel Counseling: Patient may experience a mild burning sensation during topical application. Elidel is not approved in children less than 2 years of age. There have been case reports of hematologic and skin malignancies in patients using topical calcineurin inhibitors although causality is questionable.
Cibinqo Counseling: I discussed with the patient the risks of Cibinqo therapy including but not limited to common cold, nausea, headache, cold sores, increased blood CPK levels, dizziness, UTIs, fatigue, acne, and vomitting. Live vaccines should be avoided.  This medication has been linked to serious infections; higher rate of mortality; malignancy and lymphoproliferative disorders; major adverse cardiovascular events; thrombosis; thrombocytopenia and lymphopenia; lipid elevations; and retinal detachment.
Cimzia Counseling:  I discussed with the patient the risks of Cimzia including but not limited to immunosuppression, allergic reactions and infections.  The patient understands that monitoring is required including a PPD at baseline and must alert us or the primary physician if symptoms of infection or other concerning signs are noted.
Gabapentin Counseling: I discussed with the patient the risks of gabapentin including but not limited to dizziness, somnolence, fatigue and ataxia.
Protopic Pregnancy And Lactation Text: This medication is Pregnancy Category C. It is unknown if this medication is excreted in breast milk when applied topically.
Erythromycin Pregnancy And Lactation Text: This medication is Pregnancy Category B and is considered safe during pregnancy. It is also excreted in breast milk.
Oxybutynin Counseling:  I discussed with the patient the risks of oxybutynin including but not limited to skin rash, drowsiness, dry mouth, difficulty urinating, and blurred vision.
Opioid Pregnancy And Lactation Text: These medications can lead to premature delivery and should be avoided during pregnancy. These medications are also present in breast milk in small amounts.
Topical Metronidazole Counseling: Metronidazole is a topical antibiotic medication. You may experience burning, stinging, redness, or allergic reactions.  Please call our office if you develop any problems from using this medication.
Terbinafine Counseling: Patient counseling regarding adverse effects of terbinafine including but not limited to headache, diarrhea, rash, upset stomach, liver function test abnormalities, itching, taste/smell disturbance, nausea, abdominal pain, and flatulence.  There is a rare possibility of liver failure that can occur when taking terbinafine.  The patient understands that a baseline LFT and kidney function test may be required. The patient verbalized understanding of the proper use and possible adverse effects of terbinafine.  All of the patient's questions and concerns were addressed.
Odomzo Counseling- I discussed with the patient the risks of Odomzo including but not limited to nausea, vomiting, diarrhea, constipation, weight loss, changes in the sense of taste, decreased appetite, muscle spasms, and hair loss.  The patient verbalized understanding of the proper use and possible adverse effects of Odomzo.  All of the patient's questions and concerns were addressed.
Humira Counseling:  I discussed with the patient the risks of adalimumab including but not limited to myelosuppression, immunosuppression, autoimmune hepatitis, demyelinating diseases, lymphoma, and serious infections.  The patient understands that monitoring is required including a PPD at baseline and must alert us or the primary physician if symptoms of infection or other concerning signs are noted.
Valtrex Pregnancy And Lactation Text: this medication is Pregnancy Category B and is considered safe during pregnancy. This medication is not directly found in breast milk but it's metabolite acyclovir is present.
Arava Counseling:  Patient counseled regarding adverse effects of Arava including but not limited to nausea, vomiting, abnormalities in liver function tests. Patients may develop mouth sores, rash, diarrhea, and abnormalities in blood counts. The patient understands that monitoring is required including LFTs and blood counts.  There is a rare possibility of scarring of the liver and lung problems that can occur when taking methotrexate. Persistent nausea, loss of appetite, pale stools, dark urine, cough, and shortness of breath should be reported immediately. Patient advised to discontinue Arava treatment and consult with a physician prior to attempting conception. The patient will have to undergo a treatment to eliminate Arava from the body prior to conception.
Valtrex Counseling: I discussed with the patient the risks of valacyclovir including but not limited to kidney damage, nausea, vomiting and severe allergy.  The patient understands that if the infection seems to be worsening or is not improving, they are to call.
Minoxidil Pregnancy And Lactation Text: This medication has not been assigned a Pregnancy Risk Category but animal studies failed to show danger with the topical medication. It is unknown if the medication is excreted in breast milk.
Zepbound Pregnancy And Lactation Text: The fetal risk of this medication is unknown and taking while pregnant is not recommended. It is unknown if this medication is present in breast milk.
Nsaids Counseling: NSAID Counseling: I discussed with the patient that NSAIDs should be taken with food. Prolonged use of NSAIDs can result in the development of stomach ulcers.  Patient advised to stop taking NSAIDs if abdominal pain occurs.  The patient verbalized understanding of the proper use and possible adverse effects of NSAIDs.  All of the patient's questions and concerns were addressed.
Sarecycline Pregnancy And Lactation Text: This medication is Pregnancy Category D and not consider safe during pregnancy. It is also excreted in breast milk.
Tetracycline Counseling: Patient counseled regarding possible photosensitivity and increased risk for sunburn.  Patient instructed to avoid sunlight, if possible.  When exposed to sunlight, patients should wear protective clothing, sunglasses, and sunscreen.  The patient was instructed to call the office immediately if the following severe adverse effects occur:  hearing changes, easy bruising/bleeding, severe headache, or vision changes.  The patient verbalized understanding of the proper use and possible adverse effects of tetracycline.  All of the patient's questions and concerns were addressed. Patient understands to avoid pregnancy while on therapy due to potential birth defects.
Odomzo Pregnancy And Lactation Text: This medication is Pregnancy Category X and is absolutely contraindicated during pregnancy. It is unknown if it is excreted in breast milk.
Topical Metronidazole Pregnancy And Lactation Text: This medication is Pregnancy Category B and considered safe during pregnancy.  It is also considered safe to use while breastfeeding.
Tazorac Counseling:  Patient advised that medication is irritating and drying.  Patient may need to apply sparingly and wash off after an hour before eventually leaving it on overnight.  The patient verbalized understanding of the proper use and possible adverse effects of tazorac.  All of the patient's questions and concerns were addressed.
Calcipotriene Counseling:  I discussed with the patient the risks of calcipotriene including but not limited to erythema, scaling, itching, and irritation.
Tremfya Counseling: I discussed with the patient the risks of guselkumab including but not limited to immunosuppression, serious infections, and drug reactions.  The patient understands that monitoring is required including a PPD at baseline and must alert us or the primary physician if symptoms of infection or other concerning signs are noted.
Calcipotriene Pregnancy And Lactation Text: The use of this medication during pregnancy or lactation is not recommended as there is insufficient data.
Nsaids Pregnancy And Lactation Text: These medications are considered safe up to 30 weeks gestation. It is excreted in breast milk.
Mirvaso Counseling: Mirvaso is a topical medication which can decrease superficial blood flow where applied. Side effects are uncommon and include stinging, redness and allergic reactions.
Terbinafine Pregnancy And Lactation Text: This medication is Pregnancy Category B and is considered safe during pregnancy. It is also excreted in breast milk and breast feeding isn't recommended.
Mirvaso Pregnancy And Lactation Text: This medication has not been assigned a Pregnancy Risk Category. It is unknown if the medication is excreted in breast milk.
Hyrimoz Counseling:  I discussed with the patient the risks of adalimumab including but not limited to myelosuppression, immunosuppression, autoimmune hepatitis, demyelinating diseases, lymphoma, and serious infections.  The patient understands that monitoring is required including a PPD at baseline and must alert us or the primary physician if symptoms of infection or other concerning signs are noted.
Topical Steroids Counseling: I discussed with the patient that prolonged use of topical steroids can result in the increased appearance of superficial blood vessels (telangiectasias), lightening (hypopigmentation) and thinning of the skin (atrophy).  Patient understands to avoid using high potency steroids in skin folds, the groin or the face.  The patient verbalized understanding of the proper use and possible adverse effects of topical steroids.  All of the patient's questions and concerns were addressed.
Tazorac Pregnancy And Lactation Text: This medication is not safe during pregnancy. It is unknown if this medication is excreted in breast milk.
Olanzapine Counseling- I discussed with the patient the common side effects of olanzapine including but are not limited to: lack of energy, dry mouth, increased appetite, sleepiness, tremor, constipation, dizziness, changes in behavior, or restlessness.  Explained that teenagers are more likely to experience headaches, abdominal pain, pain in the arms or legs, tiredness, and sleepiness.  Serious side effects include but are not limited: increased risk of death in elderly patients who are confused, have memory loss, or dementia-related psychosis; hyperglycemia; increased cholesterol and triglycerides; and weight gain.
Xolair Counseling:  Patient informed of potential adverse effects including but not limited to fever, muscle aches, rash and allergic reactions.  The patient verbalized understanding of the proper use and possible adverse effects of Xolair.  All of the patient's questions and concerns were addressed.
Topical Clindamycin Counseling: Patient counseled that this medication may cause skin irritation or allergic reactions.  In the event of skin irritation, the patient was advised to reduce the amount of the drug applied or use it less frequently.   The patient verbalized understanding of the proper use and possible adverse effects of clindamycin.  All of the patient's questions and concerns were addressed.
Cantharidin Counseling:  I discussed with the patient the risks of Cantharidin including but not limited to pain, redness, burning, itching, and blistering.
Clofazimine Counseling:  I discussed with the patient the risks of clofazimine including but not limited to skin and eye pigmentation, liver damage, nausea/vomiting, gastrointestinal bleeding and allergy.
Acitretin Counseling:  I discussed with the patient the risks of acitretin including but not limited to hair loss, dry lips/skin/eyes, liver damage, hyperlipidemia, depression/suicidal ideation, photosensitivity.  Serious rare side effects can include but are not limited to pancreatitis, pseudotumor cerebri, bony changes, clot formation/stroke/heart attack.  Patient understands that alcohol is contraindicated since it can result in liver toxicity and significantly prolong the elimination of the drug by many years.
Opzelura Counseling:  I discussed with the patient the risks of Opzelura including but not limited to nasopharngitis, bronchitis, ear infection, eosinophila, hives, diarrhea, folliculitis, tonsillitis, and rhinorrhea.  Taken orally, this medication has been linked to serious infections; higher rate of mortality; malignancy and lymphoproliferative disorders; major adverse cardiovascular events; thrombosis; thrombocytopenia, anemia, and neutropenia; and lipid elevations.
Dutasteride Male Counseling: Dustasteride Counseling:  I discussed with the patient the risks of use of dutasteride including but not limited to decreased libido, decreased ejaculate volume, and gynecomastia. Women who can become pregnant should not handle medication.  All of the patient's questions and concerns were addressed.
Topical Steroids Applications Pregnancy And Lactation Text: Most topical steroids are considered safe to use during pregnancy and lactation.  Any topical steroid applied to the breast or nipple should be washed off before breastfeeding.
Oral Minoxidil Counseling- I discussed with the patient the risks of oral minoxidil including but not limited to shortness of breath, swelling of the feet or ankles, dizziness, lightheadedness, unwanted hair growth and allergic reaction.  The patient verbalized understanding of the proper use and possible adverse effects of oral minoxidil.  All of the patient's questions and concerns were addressed.
Ilumya Counseling: I discussed with the patient the risks of tildrakizumab including but not limited to immunosuppression, malignancy, posterior leukoencephalopathy syndrome, and serious infections.  The patient understands that monitoring is required including a PPD at baseline and must alert us or the primary physician if symptoms of infection or other concerning signs are noted.
Topical Sulfur Applications Counseling: Topical Sulfur Counseling: Patient counseled that this medication may cause skin irritation or allergic reactions.  In the event of skin irritation, the patient was advised to reduce the amount of the drug applied or use it less frequently.   The patient verbalized understanding of the proper use and possible adverse effects of topical sulfur application.  All of the patient's questions and concerns were addressed.
Dutasteride Female Counseling: Dutasteride Counseling:  I discussed with the patient the risks of use of dutasteride including but not limited to decreased libido and sexual dysfunction. Explained the teratogenic nature of the medication and stressed the importance of not getting pregnant during treatment. All of the patient's questions and concerns were addressed.
Olanzapine Pregnancy And Lactation Text: This medication is pregnancy category C.   There are no adequate and well controlled trials with olanzapine in pregnant females.  Olanzapine should be used during pregnancy only if the potential benefit justifies the potential risk to the fetus.   In a study in lactating healthy women, olanzapine was excreted in breast milk.  It is recommended that women taking olanzapine should not breast feed.
Topical Clindamycin Pregnancy And Lactation Text: This medication is Pregnancy Category B and is considered safe during pregnancy. It is unknown if it is excreted in breast milk.
Xolair Pregnancy And Lactation Text: This medication is Pregnancy Category B and is considered safe during pregnancy. This medication is excreted in breast milk.
5-Fu Counseling: 5-Fluorouracil Counseling:  I discussed with the patient the risks of 5-fluorouracil including but not limited to erythema, scaling, itching, weeping, crusting, and pain.
Semaglutide Counseling: I reviewed the possible side effects including: thyroid tumors, kidney disease, gallbladder disease, abdominal pain, constipation, diarrhea, nausea, vomiting and pancreatitis. Do not take this medication if you have a history or family history of multiple endocrine neoplasia syndrome type 2. Side effects reviewed, pt to contact office should one occur.
Cephalexin Pregnancy And Lactation Text: This medication is Pregnancy Category B and considered safe during pregnancy.  It is also excreted in breast milk but can be used safely for shorter doses.
Doxepin Counseling:  Patient advised that the medication is sedating and not to drive a car after taking this medication. Patient informed of potential adverse effects including but not limited to dry mouth, urinary retention, and blurry vision.  The patient verbalized understanding of the proper use and possible adverse effects of doxepin.  All of the patient's questions and concerns were addressed.
Methotrexate Pregnancy And Lactation Text: This medication is Pregnancy Category X and is known to cause fetal harm. This medication is excreted in breast milk.
Thalidomide Counseling: I discussed with the patient the risks of thalidomide including but not limited to birth defects, anxiety, weakness, chest pain, dizziness, cough and severe allergy.
Imiquimod Counseling:  I discussed with the patient the risks of imiquimod including but not limited to erythema, scaling, itching, weeping, crusting, and pain.  Patient understands that the inflammatory response to imiquimod is variable from person to person and was educated regarded proper titration schedule.  If flu-like symptoms develop, patient knows to discontinue the medication and contact us.
Griseofulvin Pregnancy And Lactation Text: This medication is Pregnancy Category X and is known to cause serious birth defects. It is unknown if this medication is excreted in breast milk but breast feeding should be avoided.
Quinolones Counseling:  I discussed with the patient the risks of fluoroquinolones including but not limited to GI upset, allergic reaction, drug rash, diarrhea, dizziness, photosensitivity, yeast infections, liver function test abnormalities, tendonitis/tendon rupture.
Dupixent Pregnancy And Lactation Text: This medication likely crosses the placenta but the risk for the fetus is uncertain. This medication is excreted in breast milk.
Low Dose Naltrexone Counseling- I discussed with the patient the potential risks and side effects of low dose naltrexone including but not limited to: more vivid dreams, headaches, nausea, vomiting, abdominal pain, fatigue, dizziness, and anxiety.
Solaraze Counseling:  I discussed with the patient the risks of Solaraze including but not limited to erythema, scaling, itching, weeping, crusting, and pain.
Erivedge Counseling- I discussed with the patient the risks of Erivedge including but not limited to nausea, vomiting, diarrhea, constipation, weight loss, changes in the sense of taste, decreased appetite, muscle spasms, and hair loss.  The patient verbalized understanding of the proper use and possible adverse effects of Erivedge.  All of the patient's questions and concerns were addressed.
Isotretinoin Counseling: Patient should get monthly blood tests, not donate blood, not drive at night if vision affected, not share medication, and not undergo elective surgery for 6 months after tx completed. Side effects reviewed, pt to contact office should one occur.
Ebglyss Counseling: I discussed with the patient the risks of lebrikizumab including but not limited to eye inflammation and irritation, cold sores, injection site reactions, allergic reactions and increased risk of parasitic infection. The patient understands that monitoring is required and they must alert us or the primary physician if symptoms of infection or other concerning signs are noted.
Hydroxychloroquine Pregnancy And Lactation Text: This medication has been shown to cause fetal harm but it isn't assigned a Pregnancy Risk Category. There are small amounts excreted in breast milk.
Solaraze Pregnancy And Lactation Text: This medication is Pregnancy Category B and is considered safe. There is some data to suggest avoiding during the third trimester. It is unknown if this medication is excreted in breast milk.
Spevigo Pregnancy And Lactation Text: The risk during pregnancy and breastfeeding is uncertain with this medication. This medication does cross the placenta. It is unknown if this medication is found in breast milk.
Zyclara Counseling:  I discussed with the patient the risks of imiquimod including but not limited to erythema, scaling, itching, weeping, crusting, and pain.  Patient understands that the inflammatory response to imiquimod is variable from person to person and was educated regarded proper titration schedule.  If flu-like symptoms develop, patient knows to discontinue the medication and contact us.
Olumiant Pregnancy And Lactation Text: Based on animal studies, Olumiant may cause embryo-fetal harm when administered to pregnant women.  The medication should not be used in pregnancy.  Breastfeeding is not recommended during treatment.
Benzoyl Peroxide Counseling: Patient counseled that medicine may cause skin irritation and bleach clothing.  In the event of skin irritation, the patient was advised to reduce the amount of the drug applied or use it less frequently.   The patient verbalized understanding of the proper use and possible adverse effects of benzoyl peroxide.  All of the patient's questions and concerns were addressed.
Prednisone Counseling:  I discussed with the patient the risks of prolonged use of prednisone including but not limited to weight gain, insomnia, osteoporosis, mood changes, diabetes, susceptibility to infection, glaucoma and high blood pressure.  In cases where prednisone use is prolonged, patients should be monitored with blood pressure checks, serum glucose levels and an eye exam.  Additionally, the patient may need to be placed on GI prophylaxis, PCP prophylaxis, and calcium and vitamin D supplementation and/or a bisphosphonate.  The patient verbalized understanding of the proper use and the possible adverse effects of prednisone.  All of the patient's questions and concerns were addressed.
Doxepin Pregnancy And Lactation Text: This medication is Pregnancy Category C and it isn't known if it is safe during pregnancy. It is also excreted in breast milk and breast feeding isn't recommended.
Itraconazole Counseling:  I discussed with the patient the risks of itraconazole including but not limited to liver damage, nausea/vomiting, neuropathy, and severe allergy.  The patient understands that this medication is best absorbed when taken with acidic beverages such as non-diet cola or ginger ale.  The patient understands that monitoring is required including baseline LFTs and repeat LFTs at intervals.  The patient understands that they are to contact us or the primary physician if concerning signs are noted.
Low Dose Naltrexone Pregnancy And Lactation Text: Naltrexone is pregnancy category C.  There have been no adequate and well-controlled studies in pregnant women.  It should be used in pregnancy only if the potential benefit justifies the potential risk to the fetus.   Limited data indicates that naltrexone is minimally excreted into breastmilk.
Quinolones Pregnancy And Lactation Text: This medication is Pregnancy Category C and it isn't know if it is safe during pregnancy. It is also excreted in breast milk.
Rifampin Counseling: I discussed with the patient the risks of rifampin including but not limited to liver damage, kidney damage, red-orange body fluids, nausea/vomiting and severe allergy.
Ebglyss Pregnancy And Lactation Text: This medication likely crosses the placenta but the risk for the fetus is uncertain. It is unknown if this medication is excreted in breast milk.
Rinvoq Counseling: I discussed with the patient the risks of Rinvoq therapy including but not limited to upper respiratory tract infections, shingles, cold sores, bronchitis, nausea, cough, fever, acne, and headache. Live vaccines should be avoided.  This medication has been linked to serious infections; higher rate of mortality; malignancy and lymphoproliferative disorders; major adverse cardiovascular events; thrombosis; thrombocytopenia, anemia, and neutropenia; lipid elevations; liver enzyme elevations; and gastrointestinal perforations.
Soolantra Counseling: I discussed with the patients the risks of topial Soolantra. This is a medicine which decreases the number of mites and inflammation in the skin. You experience burning, stinging, eye irritation or allergic reactions.  Please call our office if you develop any problems from using this medication.
Clindamycin Counseling: I counseled the patient regarding use of clindamycin as an antibiotic for prophylactic and/or therapeutic purposes. Clindamycin is active against numerous classes of bacteria, including skin bacteria. Side effects may include nausea, diarrhea, gastrointestinal upset, rash, hives, yeast infections, and in rare cases, colitis.
Stelara Counseling:  I discussed with the patient the risks of ustekinumab including but not limited to immunosuppression, malignancy, posterior leukoencephalopathy syndrome, and serious infections.  The patient understands that monitoring is required including a PPD at baseline and must alert us or the primary physician if symptoms of infection or other concerning signs are noted.
Benzoyl Peroxide Pregnancy And Lactation Text: This medication is Pregnancy Category C. It is unknown if benzoyl peroxide is excreted in breast milk.
Hydroxyzine Counseling: Patient advised that the medication is sedating and not to drive a car after taking this medication.  Patient informed of potential adverse effects including but not limited to dry mouth, urinary retention, and blurry vision.  The patient verbalized understanding of the proper use and possible adverse effects of hydroxyzine.  All of the patient's questions and concerns were addressed.
Niacinamide Counseling: I recommended taking niacin or niacinamide, also know as vitamin B3, twice daily. Recent evidence suggests that taking vitamin B3 (500 mg twice daily) can reduce the risk of actinic keratoses and non-melanoma skin cancers. Side effects of vitamin B3 include flushing and headache.
Isotretinoin Pregnancy And Lactation Text: This medication is Pregnancy Category X and is considered extremely dangerous during pregnancy. It is unknown if it is excreted in breast milk.
Klisyri Counseling:  I discussed with the patient the risks of Klisyri including but not limited to erythema, scaling, itching, weeping, crusting, and pain.
Wegovy Counseling: I reviewed the possible side effects including: thyroid tumors, kidney disease, gallbladder disease, abdominal pain, constipation, diarrhea, nausea, vomiting and pancreatitis. Do not take this medication if you have a history or family history of multiple endocrine neoplasia syndrome type 2. Side effects reviewed, pt to contact office should one occur.
Rinvoq Pregnancy And Lactation Text: Based on animal studies, Rinvoq may cause embryo-fetal harm when administered to pregnant women.  The medication should not be used in pregnancy.  Breastfeeding is not recommended during treatment and for 6 days after the last dose.
Klisyri Pregnancy And Lactation Text: It is unknown if this medication can harm a developing fetus or if it is excreted in breast milk.
Ketoconazole Counseling:   Patient counseled regarding improving absorption with orange juice.  Adverse effects include but are not limited to breast enlargement, headache, diarrhea, nausea, upset stomach, liver function test abnormalities, taste disturbance, and stomach pain.  There is a rare possibility of liver failure that can occur when taking ketoconazole. The patient understands that monitoring of LFTs may be required, especially at baseline. The patient verbalized understanding of the proper use and possible adverse effects of ketoconazole.  All of the patient's questions and concerns were addressed.
Enbrel Counseling:  I discussed with the patient the risks of etanercept including but not limited to myelosuppression, immunosuppression, autoimmune hepatitis, demyelinating diseases, lymphoma, and infections.  The patient understands that monitoring is required including a PPD at baseline and must alert us or the primary physician if symptoms of infection or other concerning signs are noted.
Topical Ketoconazole Counseling: Patient counseled that this medication may cause skin irritation or allergic reactions.  In the event of skin irritation, the patient was advised to reduce the amount of the drug applied or use it less frequently.   The patient verbalized understanding of the proper use and possible adverse effects of ketoconazole.  All of the patient's questions and concerns were addressed.
Libtayo Counseling- I discussed with the patient the risks of Libtayo including but not limited to nausea, vomiting, diarrhea, and bone or muscle pain.  The patient verbalized understanding of the proper use and possible adverse effects of Libtayo.  All of the patient's questions and concerns were addressed.
Clindamycin Pregnancy And Lactation Text: This medication can be used in pregnancy if certain situations. Clindamycin is also present in breast milk.
High Dose Vitamin A Counseling: Side effects reviewed, pt to contact office should one occur.
Tranexamic Acid Counseling:  Patient advised of the small risk of bleeding problems with tranexamic acid. They were also instructed to call if they developed any nausea, vomiting or diarrhea. All of the patient's questions and concerns were addressed.
Rifampin Pregnancy And Lactation Text: This medication is Pregnancy Category C and it isn't know if it is safe during pregnancy. It is also excreted in breast milk and should not be used if you are breast feeding.
Prednisone Pregnancy And Lactation Text: This medication is Pregnancy Category C and it isn't know if it is safe during pregnancy. This medication is excreted in breast milk.
Soolantra Pregnancy And Lactation Text: This medication is Pregnancy Category C. This medication is considered safe during breast feeding.
Niacinamide Pregnancy And Lactation Text: These medications are considered safe during pregnancy.
Taltz Counseling: I discussed with the patient the risks of ixekizumab including but not limited to immunosuppression, serious infections, worsening of inflammatory bowel disease and drug reactions.  The patient understands that monitoring is required including a PPD at baseline and must alert us or the primary physician if symptoms of infection or other concerning signs are noted.
Topical Retinoid counseling:  Patient advised to apply a pea-sized amount only at bedtime and wait 30 minutes after washing their face before applying.  If too drying, patient may add a non-comedogenic moisturizer. The patient verbalized understanding of the proper use and possible adverse effects of retinoids.  All of the patient's questions and concerns were addressed.
Carac Counseling:  I discussed with the patient the risks of Carac including but not limited to erythema, scaling, itching, weeping, crusting, and pain.
Hydroxyzine Pregnancy And Lactation Text: This medication is not safe during pregnancy and should not be taken. It is also excreted in breast milk and breast feeding isn't recommended.
Xelalessandraz Pregnancy And Lactation Text: This medication is Pregnancy Category D and is not considered safe during pregnancy.  The risk during breast feeding is also uncertain.
Zepbound Counseling: I reviewed the possible side effects including: thyroid tumors, kidney disease, gallbladder disease, abdominal pain, constipation, diarrhea, nausea, vomiting and pancreatitis. Do not take this medication if you have a history or family history of multiple endocrine neoplasia syndrome type 2. Side effects reviewed, pt to contact office should one occur.
Xeljanz Counseling: I discussed with the patient the risks of Xeljanz therapy including increased risk of infection, liver issues, headache, diarrhea, or cold symptoms. Live vaccines should be avoided. They were instructed to call if they have any problems.
Minoxidil Counseling: Minoxidil is a topical medication which can increase blood flow where it is applied. It is uncertain how this medication increases hair growth. Side effects are uncommon and include stinging and allergic reactions.
Doxycycline Counseling:  Patient counseled regarding possible photosensitivity and increased risk for sunburn.  Patient instructed to avoid sunlight, if possible.  When exposed to sunlight, patients should wear protective clothing, sunglasses, and sunscreen.  The patient was instructed to call the office immediately if the following severe adverse effects occur:  hearing changes, easy bruising/bleeding, severe headache, or vision changes.  The patient verbalized understanding of the proper use and possible adverse effects of doxycycline.  All of the patient's questions and concerns were addressed.
High Dose Vitamin A Pregnancy And Lactation Text: High dose vitamin A therapy is contraindicated during pregnancy and breast feeding.
Tranexamic Acid Pregnancy And Lactation Text: It is unknown if this medication is safe during pregnancy or breast feeding.
Sarecycline Counseling: Patient advised regarding possible photosensitivity and discoloration of the teeth, skin, lips, tongue and gums.  Patient instructed to avoid sunlight, if possible.  When exposed to sunlight, patients should wear protective clothing, sunglasses, and sunscreen.  The patient was instructed to call the office immediately if the following severe adverse effects occur:  hearing changes, easy bruising/bleeding, severe headache, or vision changes.  The patient verbalized understanding of the proper use and possible adverse effects of sarecycline.  All of the patient's questions and concerns were addressed.
Ketoconazole Pregnancy And Lactation Text: This medication is Pregnancy Category C and it isn't know if it is safe during pregnancy. It is also excreted in breast milk and breast feeding isn't recommended.
Libtayo Pregnancy And Lactation Text: This medication is contraindicated in pregnancy and when breast feeding.
Albendazole Counseling:  I discussed with the patient the risks of albendazole including but not limited to cytopenia, kidney damage, nausea/vomiting and severe allergy.  The patient understands that this medication is being used in an off-label manner.
Simponi Counseling:  I discussed with the patient the risks of golimumab including but not limited to myelosuppression, immunosuppression, autoimmune hepatitis, demyelinating diseases, lymphoma, and serious infections.  The patient understands that monitoring is required including a PPD at baseline and must alert us or the primary physician if symptoms of infection or other concerning signs are noted.
Winlevi Pregnancy And Lactation Text: This medication is considered safe during pregnancy and breastfeeding.
Birth Control Pills Counseling: Birth Control Pill Counseling: I discussed with the patient the potential side effects of OCPs including but not limited to increased risk of stroke, heart attack, thrombophlebitis, deep venous thrombosis, hepatic adenomas, breast changes, GI upset, headaches, and depression.  The patient verbalized understanding of the proper use and possible adverse effects of OCPs. All of the patient's questions and concerns were addressed.
Nemluvio Pregnancy And Lactation Text: It is not known if Nemluvio causes fetal harm or is present in breast milk. Please proceed with caution if patients who are pregnant or breastfeeding.
Cibinqo Pregnancy And Lactation Text: It is unknown if this medication will adversely affect pregnancy or breast feeding.  You should not take this medication if you are currently pregnant or planning a pregnancy or while breastfeeding.
Cyclophosphamide Pregnancy And Lactation Text: This medication is Pregnancy Category D and it isn't considered safe during pregnancy. This medication is excreted in breast milk.
Ozempic Counseling: I reviewed the possible side effects including: thyroid tumors, kidney disease, gallbladder disease, abdominal pain, constipation, diarrhea, nausea, vomiting and pancreatitis. Do not take this medication if you have a history or family history of multiple endocrine neoplasia syndrome type 2. Side effects reviewed, pt to contact office should one occur.
Cyclophosphamide Counseling:  I discussed with the patient the risks of cyclophosphamide including but not limited to hair loss, hormonal abnormalities, decreased fertility, abdominal pain, diarrhea, nausea and vomiting, bone marrow suppression and infection. The patient understands that monitoring is required while taking this medication.
Eucrisa Counseling: Patient may experience a mild burning sensation during topical application. Eucrisa is not approved in children less than 2 years of age.
Azithromycin Counseling:  I discussed with the patient the risks of azithromycin including but not limited to GI upset, allergic reaction, drug rash, diarrhea, and yeast infections.
Metronidazole Counseling:  I discussed with the patient the risks of metronidazole including but not limited to seizures, nausea/vomiting, a metallic taste in the mouth, nausea/vomiting and severe allergy.
Cimzia Pregnancy And Lactation Text: This medication crosses the placenta but can be considered safe in certain situations. Cimzia may be excreted in breast milk.
Qbrexza Counseling:  I discussed with the patient the risks of Qbrexza including but not limited to headache, mydriasis, blurred vision, dry eyes, nasal dryness, dry mouth, dry throat, dry skin, urinary hesitation, and constipation.  Local skin reactions including erythema, burning, stinging, and itching can also occur.
Cosentyx Counseling:  I discussed with the patient the risks of Cosentyx including but not limited to worsening of Crohn's disease, immunosuppression, allergic reactions and infections.  The patient understands that monitoring is required including a PPD at baseline and must alert us or the primary physician if symptoms of infection or other concerning signs are noted.
Qbrexza Pregnancy And Lactation Text: There is no available data on Qbrexza use in pregnant women.  There is no available data on Qbrexza use in lactation.
Birth Control Pills Pregnancy And Lactation Text: This medication should be avoided if pregnant and for the first 30 days post-partum.
Litfulo Counseling: I discussed with the patient the risks of Litfulo therapy including but not limited to upper respiratory tract infections, shingles, cold sores, and nausea. Live vaccines should be avoided.  This medication has been linked to serious infections; higher rate of mortality; malignancy and lymphoproliferative disorders; major adverse cardiovascular events; thrombosis; gastrointestinal perforations; neutropenia; lymphopenia; anemia; liver enzyme elevations; and lipid elevations.
Bactrim Counseling:  I discussed with the patient the risks of sulfa antibiotics including but not limited to GI upset, allergic reaction, drug rash, diarrhea, dizziness, photosensitivity, and yeast infections.  Rarely, more serious reactions can occur including but not limited to aplastic anemia, agranulocytosis, methemoglobinemia, blood dyscrasias, liver or kidney failure, lung infiltrates or desquamative/blistering drug rashes.
Albendazole Pregnancy And Lactation Text: This medication is Pregnancy Category C and it isn't known if it is safe during pregnancy. It is also excreted in breast milk.
VTAMA Counseling: I discussed with the patient that VTAMA is not for use in the eyes, mouth or mouth. They should call the office if they develop any signs of allergic reactions to VTAMA. The patient verbalized understanding of the proper use and possible adverse effects of VTAMA.  All of the patient's questions and concerns were addressed.
Rituxan Counseling:  I discussed with the patient the risks of Rituxan infusions. Side effects can include infusion reactions, severe drug rashes including mucocutaneous reactions, reactivation of latent hepatitis and other infections and rarely progressive multifocal leukoencephalopathy.  All of the patient's questions and concerns were addressed.
Propranolol Pregnancy And Lactation Text: This medication is Pregnancy Category C and it isn't known if it is safe during pregnancy. It is excreted in breast milk.
Cyclosporine Counseling:  I discussed with the patient the risks of cyclosporine including but not limited to hypertension, gingival hyperplasia,myelosuppression, immunosuppression, liver damage, kidney damage, neurotoxicity, lymphoma, and serious infections. The patient understands that monitoring is required including baseline blood pressure, CBC, CMP, lipid panel and uric acid, and then 1-2 times monthly CMP and blood pressure.
Azithromycin Pregnancy And Lactation Text: This medication is considered safe during pregnancy and is also secreted in breast milk.
Vtama Pregnancy And Lactation Text: It is unknown if this medication can cause problems during pregnancy and breastfeeding.
Rituxan Pregnancy And Lactation Text: This medication is Pregnancy Category C and it isn't know if it is safe during pregnancy. It is unknown if this medication is excreted in breast milk but similar antibodies are known to be excreted.
Propranolol Counseling:  I discussed with the patient the risks of propranolol including but not limited to low heart rate, low blood pressure, low blood sugar, restlessness and increased cold sensitivity. They should call the office if they experience any of these side effects.
Fluconazole Counseling:  Patient counseled regarding adverse effects of fluconazole including but not limited to headache, diarrhea, nausea, upset stomach, liver function test abnormalities, taste disturbance, and stomach pain.  There is a rare possibility of liver failure that can occur when taking fluconazole.  The patient understands that monitoring of LFTs and kidney function test may be required, especially at baseline. The patient verbalized understanding of the proper use and possible adverse effects of fluconazole.  All of the patient's questions and concerns were addressed.
Glycopyrrolate Counseling:  I discussed with the patient the risks of glycopyrrolate including but not limited to skin rash, drowsiness, dry mouth, difficulty urinating, and blurred vision.
Metronidazole Pregnancy And Lactation Text: This medication is Pregnancy Category B and considered safe during pregnancy.  It is also excreted in breast milk.
Sotyktu Counseling:  I discussed the most common side effects of Sotyktu including: common cold, sore throat, sinus infections, cold sores, canker sores, folliculitis, and acne.  I also discussed more serious side effects of Sotyktu including but not limited to: serious allergic reactions; increased risk for infections such as TB; cancers such as lymphomas; rhabdomyolysis and elevated CPK; and elevated triglycerides and liver enzymes. 
Minocycline Counseling: Patient advised regarding possible photosensitivity and discoloration of the teeth, skin, lips, tongue and gums.  Patient instructed to avoid sunlight, if possible.  When exposed to sunlight, patients should wear protective clothing, sunglasses, and sunscreen.  The patient was instructed to call the office immediately if the following severe adverse effects occur:  hearing changes, easy bruising/bleeding, severe headache, or vision changes.  The patient verbalized understanding of the proper use and possible adverse effects of minocycline.  All of the patient's questions and concerns were addressed.
Litfulo Pregnancy And Lactation Text: Based on animal studies, Lifulo may cause embryo-fetal harm when administered to pregnant women.  The medication should not be used in pregnancy.  Breastfeeding is not recommended during treatment.
Rhofade Counseling: Rhofade is a topical medication which can decrease superficial blood flow where applied. Side effects are uncommon and include stinging, redness and allergic reactions.
Skyrizi Counseling: I discussed with the patient the risks of risankizumab-rzaa including but not limited to immunosuppression, and serious infections.  The patient understands that monitoring is required including a PPD at baseline and must alert us or the primary physician if symptoms of infection or other concerning signs are noted.
Ivermectin Counseling:  Patient instructed to take medication on an empty stomach with a full glass of water.  Patient informed of potential adverse effects including but not limited to nausea, diarrhea, dizziness, itching, and swelling of the extremities or lymph nodes.  The patient verbalized understanding of the proper use and possible adverse effects of ivermectin.  All of the patient's questions and concerns were addressed.
Spironolactone Counseling: Patient advised regarding risks of diarrhea, abdominal pain, hyperkalemia, birth defects (for female patients), liver toxicity and renal toxicity. The patient may need blood work to monitor liver and kidney function and potassium levels while on therapy. The patient verbalized understanding of the proper use and possible adverse effects of spironolactone.  All of the patient's questions and concerns were addressed.
Aklief counseling:  Patient advised to apply a pea-sized amount only at bedtime and wait 30 minutes after washing their face before applying.  If too drying, patient may add a non-comedogenic moisturizer.  The most commonly reported side effects including irritation, redness, scaling, dryness, stinging, burning, itching, and increased risk of sunburn.  The patient verbalized understanding of the proper use and possible adverse effects of retinoids.  All of the patient's questions and concerns were addressed.
Bactrim Pregnancy And Lactation Text: This medication is Pregnancy Category D and is known to cause fetal risk.  It is also excreted in breast milk.
SSKI Counseling:  I discussed with the patient the risks of SSKI including but not limited to thyroid abnormalities, metallic taste, GI upset, fever, headache, acne, arthralgias, paraesthesias, lymphadenopathy, easy bleeding, arrhythmias, and allergic reaction.
Saxenda Counseling: I reviewed the possible side effects including: thyroid tumors, kidney disease, gallbladder disease, abdominal pain, constipation, diarrhea, nausea, vomiting and pancreatitis. Do not take this medication if you have a history or family history of multiple endocrine neoplasia syndrome type 2. Side effects reviewed, pt to contact office should one occur.
Cimetidine Counseling:  I discussed with the patient the risks of Cimetidine including but not limited to gynecomastia, headache, diarrhea, nausea, drowsiness, arrhythmias, pancreatitis, skin rashes, psychosis, bone marrow suppression and kidney toxicity.
Zoryve Counseling:  I discussed with the patient that Zoryve is not for use in the eyes, mouth or vagina. The most commonly reported side effects include diarrhea, headache, insomnia, application site pain, upper respiratory tract infections, and urinary tract infections.  All of the patient's questions and concerns were addressed.
Siliq Counseling:  I discussed with the patient the risks of Siliq including but not limited to new or worsening depression, suicidal thoughts and behavior, immunosuppression, malignancy, posterior leukoencephalopathy syndrome, and serious infections.  The patient understands that monitoring is required including a PPD at baseline and must alert us or the primary physician if symptoms of infection or other concerning signs are noted. There is also a special program designed to monitor depression which is required with Siliq.
Sotyktu Pregnancy And Lactation Text: There is insufficient data to evaluate whether or not Sotyktu is safe to use during pregnancy.   It is not known if Sotyktu passes into breast milk and whether or not it is safe to use when breastfeeding.  
Hydroquinone Counseling:  Patient advised that medication may result in skin irritation, lightening (hypopigmentation), dryness, and burning.  In the event of skin irritation, the patient was advised to reduce the amount of the drug applied or use it less frequently.  Rarely, spots that are treated with hydroquinone can become darker (pseudoochronosis).  Should this occur, patient instructed to stop medication and call the office. The patient verbalized understanding of the proper use and possible adverse effects of hydroquinone.  All of the patient's questions and concerns were addressed.
Glycopyrrolate Pregnancy And Lactation Text: This medication is Pregnancy Category B and is considered safe during pregnancy. It is unknown if it is excreted breast milk.
Griseofulvin Counseling:  I discussed with the patient the risks of griseofulvin including but not limited to photosensitivity, cytopenia, liver damage, nausea/vomiting and severe allergy.  The patient understands that this medication is best absorbed when taken with a fatty meal (e.g., ice cream or french fries).
Olumiant Counseling: I discussed with the patient the risks of Olumiant therapy including but not limited to upper respiratory tract infections, shingles, cold sores, and nausea. Live vaccines should be avoided.  This medication has been linked to serious infections; higher rate of mortality; malignancy and lymphoproliferative disorders; major adverse cardiovascular events; thrombosis; gastrointestinal perforations; neutropenia; lymphopenia; anemia; liver enzyme elevations; and lipid elevations.
Dupixent Counseling: I discussed with the patient the risks of dupilumab including but not limited to eye infection and irritation, cold sores, injection site reactions, worsening of asthma, allergic reactions and increased risk of parasitic infection.  Live vaccines should be avoided while taking dupilumab. Dupilumab will also interact with certain medications such as warfarin and cyclosporine. The patient understands that monitoring is required and they must alert us or the primary physician if symptoms of infection or other concerning signs are noted.
Cephalexin Counseling: I counseled the patient regarding use of cephalexin as an antibiotic for prophylactic and/or therapeutic purposes. Cephalexin (commonly prescribed under brand name Keflex) is a cephalosporin antibiotic which is active against numerous classes of bacteria, including most skin bacteria. Side effects may include nausea, diarrhea, gastrointestinal upset, rash, hives, yeast infections, and in rare cases, hepatitis, kidney disease, seizures, fever, confusion, neurologic symptoms, and others. Patients with severe allergies to penicillin medications are cautioned that there is about a 10% incidence of cross-reactivity with cephalosporins. When possible, patients with penicillin allergies should use alternatives to cephalosporins for antibiotic therapy.
Cantharidin Pregnancy And Lactation Text: This medication has not been proven safe during pregnancy. It is unknown if this medication is excreted in breast milk.
Methotrexate Counseling:  Patient counseled regarding adverse effects of methotrexate including but not limited to nausea, vomiting, abnormalities in liver function tests. Patients may develop mouth sores, rash, diarrhea, and abnormalities in blood counts. The patient understands that monitoring is required including LFT's and blood counts.  There is a rare possibility of scarring of the liver and lung problems that can occur when taking methotrexate. Persistent nausea, loss of appetite, pale stools, dark urine, cough, and shortness of breath should be reported immediately. Patient advised to discontinue methotrexate treatment at least three months before attempting to become pregnant.  I discussed the need for folate supplements while taking methotrexate.  These supplements can decrease side effects during methotrexate treatment. The patient verbalized understanding of the proper use and possible adverse effects of methotrexate.  All of the patient's questions and concerns were addressed.
Sski Pregnancy And Lactation Text: This medication is Pregnancy Category D and isn't considered safe during pregnancy. It is excreted in breast milk.
Spironolactone Pregnancy And Lactation Text: This medication can cause feminization of the male fetus and should be avoided during pregnancy. The active metabolite is also found in breast milk.
Aklief Pregnancy And Lactation Text: It is unknown if this medication is safe to use during pregnancy.  It is unknown if this medication is excreted in breast milk.  Breastfeeding women should use the topical cream on the smallest area of the skin for the shortest time needed while breastfeeding.  Do not apply to nipple and areola.
Spevigo Counseling: I discussed with the patient the risks of Spevigo including but not limited to fatigue, nasuea, vomiting, headache, pruritus, urinary tract infection, an infusion related reactions.  The patient understands that monitoring is required including screening for tuberculosis at baseline and yearly screening thereafter while continuing Spevigo therapy. They should contact us if symptoms of infection or other concerning signs are noted.
Azelaic Acid Counseling: Patient counseled that medicine may cause skin irritation and to avoid applying near the eyes.  In the event of skin irritation, the patient was advised to reduce the amount of the drug applied or use it less frequently.   The patient verbalized understanding of the proper use and possible adverse effects of azelaic acid.  All of the patient's questions and concerns were addressed.
Hydroxychloroquine Counseling:  I discussed with the patient that a baseline ophthalmologic exam is needed at the start of therapy and every year thereafter while on therapy. A CBC may also be warranted for monitoring.  The side effects of this medication were discussed with the patient, including but not limited to agranulocytosis, aplastic anemia, seizures, rashes, retinopathy, and liver toxicity. Patient instructed to call the office should any adverse effect occur.  The patient verbalized understanding of the proper use and possible adverse effects of Plaquenil.  All the patient's questions and concerns were addressed.

## 2025-02-17 NOTE — PROCEDURE: INTRALESIONAL KENALOG
Detail Level: Detailed
Total Volume (Ccs): 2.0
Kenalog Preparation: Kenalog
How Many Mls Were Removed From The 40 Mg/Ml (1ml) Vial When Preparing The Injectable Solution?: 0
Administered By (Optional): Yady Tillman
Lot # For Kenalog (Optional): [id number]
Validate Note Data When Using Inventory: Yes
Medical Necessity Clause: This procedure was medically necessary because the lesions that were treated were:
Show Inventory Tab: Hide
Expiration Date For Kenalog (Optional): 6-2027
Include Z78.9 (Other Specified Conditions Influencing Health Status) As An Associated Diagnosis?: No
Concentration Of Kenalog Solution Injected (Mg/Ml): 3.0
Consent: The risks of atrophy were reviewed with the patient.
Kenalog Type Of Vial: Multiple Dose
Ndc# For Kenalog Only: [id number]

## 2025-02-17 NOTE — PROCEDURE: TREATMENT REGIMEN
Plan: She states that she has been noticing more thinning she is using the spray minoxidil and Nutrafol.Briefly discussed Sotyktu patient will discussed medication with her doctor and will discuss this more on her f/u appointment in April.
Detail Level: Zone
Initiate Treatment: minoxidil 2.5 mg tablet -Sig: Take a 1\2 pill a day.

## 2025-03-17 ENCOUNTER — RX ONLY (RX ONLY)
Age: 65
End: 2025-03-17

## 2025-03-17 RX ORDER — ROFLUMILAST 3 MG/G
AEROSOL, FOAM TOPICAL
Qty: 60 | Refills: 5 | Status: ERX

## 2025-03-17 RX ORDER — FLUOCINOLONE ACETONIDE 0.11 MG/ML
OIL TOPICAL
Qty: 118.28 | Refills: 6 | Status: ERX

## 2025-03-17 RX ORDER — FLUOCINOLONE ACETONIDE 0.11 MG/ML
OIL TOPICAL
Qty: 118.28 | Refills: 3 | Status: CANCELLED
Stop reason: CLARIF

## 2025-04-01 ENCOUNTER — APPOINTMENT (OUTPATIENT)
Dept: URBAN - METROPOLITAN AREA CLINIC 41 | Facility: CLINIC | Age: 65
Setting detail: DERMATOLOGY
End: 2025-04-01

## 2025-04-01 DIAGNOSIS — L40.0 PSORIASIS VULGARIS: ICD-10-CM

## 2025-04-01 DIAGNOSIS — L30.9 DERMATITIS, UNSPECIFIED: ICD-10-CM

## 2025-04-01 PROCEDURE — 11104 PUNCH BX SKIN SINGLE LESION: CPT

## 2025-04-01 PROCEDURE — ? TREATMENT REGIMEN

## 2025-04-01 PROCEDURE — ? BIOPSY BY PUNCH METHOD

## 2025-04-01 PROCEDURE — ? COUNSELING

## 2025-04-01 PROCEDURE — 99213 OFFICE O/P EST LOW 20 MIN: CPT | Mod: 25

## 2025-04-01 ASSESSMENT — LOCATION DETAILED DESCRIPTION DERM
LOCATION DETAILED: LEFT SUPERIOR OCCIPITAL SCALP
LOCATION DETAILED: MID-OCCIPITAL SCALP

## 2025-04-01 ASSESSMENT — LOCATION SIMPLE DESCRIPTION DERM: LOCATION SIMPLE: POSTERIOR SCALP

## 2025-04-01 ASSESSMENT — LOCATION ZONE DERM: LOCATION ZONE: SCALP

## 2025-04-01 NOTE — PROCEDURE: TREATMENT REGIMEN
Detail Level: Zone
Continue Regimen: Minoxidil 2.5 mg- take 1/2 tablet once daily.
Plan: Consulted with Dr. Holliday today. Advised that a biopsy would help pinpoint why she is loosing her hair.

## 2025-04-15 ENCOUNTER — APPOINTMENT (OUTPATIENT)
Dept: URBAN - METROPOLITAN AREA CLINIC 41 | Facility: CLINIC | Age: 65
Setting detail: DERMATOLOGY
End: 2025-04-15

## 2025-04-15 DIAGNOSIS — L65.0 TELOGEN EFFLUVIUM: ICD-10-CM

## 2025-04-15 DIAGNOSIS — L40.0 PSORIASIS VULGARIS: ICD-10-CM | Status: INADEQUATELY CONTROLLED

## 2025-04-15 PROCEDURE — ? OTEZLA INITIATION

## 2025-04-15 PROCEDURE — 99214 OFFICE O/P EST MOD 30 MIN: CPT

## 2025-04-15 PROCEDURE — ? ADDITIONAL NOTES

## 2025-04-15 PROCEDURE — ? COUNSELING

## 2025-04-15 PROCEDURE — ? TREATMENT REGIMEN

## 2025-04-15 ASSESSMENT — ITCH NUMERIC RATING SCALE: HOW SEVERE IS YOUR ITCHING?: 7

## 2025-04-15 ASSESSMENT — LOCATION ZONE DERM: LOCATION ZONE: SCALP

## 2025-04-15 ASSESSMENT — LOCATION DETAILED DESCRIPTION DERM: LOCATION DETAILED: LEFT SUPERIOR PARIETAL SCALP

## 2025-04-15 ASSESSMENT — LOCATION SIMPLE DESCRIPTION DERM: LOCATION SIMPLE: SCALP

## 2025-04-15 ASSESSMENT — BSA PSORIASIS: % BODY COVERED IN PSORIASIS: 3

## 2025-04-15 ASSESSMENT — PGA PSORIASIS: PGA PSORIASIS 2020: MODERATE

## 2025-04-15 NOTE — PROCEDURE: ADDITIONAL NOTES
Detail Level: Simple
Additional Notes: She has tried and failed Siliq Enstilar Clobetasol Solution Derma Soothe Scalp  Oil Zoryve Skyrizi.\\nWeit 225 Height 5”1.She was give Otezla Starter Pack today.
Render Risk Assessment In Note?: no

## 2025-04-15 NOTE — PROCEDURE: TREATMENT REGIMEN
Detail Level: Zone
Plan: Discussed that with this hair loss we can get the hair to grow back if we can correct the underlying factors.

## 2025-04-17 ENCOUNTER — RX ONLY (RX ONLY)
Age: 65
End: 2025-04-17

## 2025-04-17 RX ORDER — APREMILAST 30 MG/1
TABLET, FILM COATED ORAL
Qty: 60 | Refills: 5 | Status: CANCELLED
Stop reason: ENTERED-IN-ERROR

## 2025-04-17 RX ORDER — APREMILAST 30 MG/1
TABLET, FILM COATED ORAL
Qty: 60 | Refills: 5 | Status: CANCELLED | COMMUNITY
Start: 2025-04-17

## 2025-04-18 ENCOUNTER — RX ONLY (RX ONLY)
Age: 65
End: 2025-04-18

## 2025-04-18 RX ORDER — APREMILAST 30 MG/1
TABLET, FILM COATED ORAL
Qty: 60 | Refills: 5 | Status: ERX

## 2025-05-30 ENCOUNTER — RX ONLY (RX ONLY)
Age: 65
End: 2025-05-30

## 2025-05-30 ENCOUNTER — APPOINTMENT (OUTPATIENT)
Dept: URBAN - METROPOLITAN AREA CLINIC 41 | Facility: CLINIC | Age: 65
Setting detail: DERMATOLOGY
End: 2025-05-30

## 2025-05-30 DIAGNOSIS — L40.0 PSORIASIS VULGARIS: ICD-10-CM

## 2025-05-30 DIAGNOSIS — L65.0 TELOGEN EFFLUVIUM: ICD-10-CM

## 2025-05-30 PROCEDURE — ? COUNSELING

## 2025-05-30 PROCEDURE — ? TREATMENT REGIMEN

## 2025-05-30 PROCEDURE — ? OTEZLA MONITORING

## 2025-05-30 PROCEDURE — 99214 OFFICE O/P EST MOD 30 MIN: CPT

## 2025-05-30 RX ORDER — APREMILAST 30 MG/1
TABLET, FILM COATED ORAL BID
Qty: 60 | Refills: 5 | Status: ERX

## 2025-05-30 RX ORDER — APREMILAST 30 MG/1
TABLET, FILM COATED ORAL
Qty: 60 | Refills: 5 | Status: CANCELLED | COMMUNITY
Start: 2025-05-30

## 2025-05-30 ASSESSMENT — PGA PSORIASIS: PGA PSORIASIS 2020: MODERATE

## 2025-05-30 ASSESSMENT — LOCATION ZONE DERM: LOCATION ZONE: SCALP

## 2025-05-30 ASSESSMENT — LOCATION SIMPLE DESCRIPTION DERM: LOCATION SIMPLE: SCALP

## 2025-05-30 ASSESSMENT — LOCATION DETAILED DESCRIPTION DERM: LOCATION DETAILED: LEFT SUPERIOR PARIETAL SCALP

## 2025-05-30 ASSESSMENT — ITCH NUMERIC RATING SCALE: HOW SEVERE IS YOUR ITCHING?: 5

## 2025-05-30 ASSESSMENT — BSA PSORIASIS: % BODY COVERED IN PSORIASIS: 3

## 2025-05-30 NOTE — PROCEDURE: OTEZLA MONITORING
Detail Level: Zone
Length Of Therapy: 1 month
Comments: Things are going well with no side effects, we will continue treatment.\\nToo soon to evaluate efficacy but she is not worse.
Add High Risk Medication Management Associated Diagnosis?: No

## 2025-05-30 NOTE — HPI: MEDICATION (OTEZLA)
How Severe Is It?: mild
Is This A New Presentation, Or A Follow-Up?: Follow Up Otezla
What Dose Of Otezla Are You Taking?: 30mg a day
How Many Months Of Otezla Have You Completed?: 1

## 2025-05-30 NOTE — PROCEDURE: TREATMENT REGIMEN
Action 3: Continue
Continue Regimen: Zoryve foam- as needed
Detail Level: Zone
Plan: Discussed that with this hair loss we can get the hair to grow back if we can correct the underlying factors.

## 2025-08-11 ENCOUNTER — RX ONLY (RX ONLY)
Age: 65
End: 2025-08-11

## 2025-08-11 ENCOUNTER — APPOINTMENT (OUTPATIENT)
Dept: URBAN - METROPOLITAN AREA CLINIC 41 | Facility: CLINIC | Age: 65
Setting detail: DERMATOLOGY
End: 2025-08-11

## 2025-08-11 DIAGNOSIS — L40.0 PSORIASIS VULGARIS: ICD-10-CM

## 2025-08-11 PROCEDURE — ? MEDICATION COUNSELING

## 2025-08-11 PROCEDURE — ? PRESCRIPTION

## 2025-08-11 PROCEDURE — ? PRESCRIPTION MEDICATION MANAGEMENT

## 2025-08-11 PROCEDURE — ? COUNSELING

## 2025-08-11 RX ORDER — CLOBETASOL PROPIONATE 0.5 MG/G
AEROSOL, FOAM TOPICAL
Qty: 100 | Refills: 2 | Status: ERX | COMMUNITY
Start: 2025-08-11

## 2025-08-11 RX ORDER — ROFLUMILAST 3 MG/G
AEROSOL, FOAM TOPICAL
Qty: 60 | Refills: 5 | Status: ERX

## 2025-08-11 RX ORDER — SALICYLIC ACID 6 G/92.6ML
SHAMPOO TOPICAL
Qty: 160 | Refills: 2 | Status: ERX | COMMUNITY
Start: 2025-08-11

## 2025-08-11 RX ADMIN — SALICYLIC ACID: 6 SHAMPOO TOPICAL at 00:00

## 2025-08-11 RX ADMIN — CLOBETASOL PROPIONATE: 0.5 AEROSOL, FOAM TOPICAL at 00:00

## 2025-08-11 ASSESSMENT — BSA PSORIASIS: % BODY COVERED IN PSORIASIS: 2

## 2025-08-11 ASSESSMENT — ITCH NUMERIC RATING SCALE: HOW SEVERE IS YOUR ITCHING?: 7

## 2025-08-11 ASSESSMENT — LOCATION SIMPLE DESCRIPTION DERM: LOCATION SIMPLE: POSTERIOR SCALP

## 2025-08-11 ASSESSMENT — LOCATION ZONE DERM: LOCATION ZONE: SCALP

## 2025-08-11 ASSESSMENT — PGA PSORIASIS: PGA PSORIASIS 2020: MODERATE

## 2025-08-11 ASSESSMENT — LOCATION DETAILED DESCRIPTION DERM: LOCATION DETAILED: MID-OCCIPITAL SCALP
